# Patient Record
Sex: MALE | Race: BLACK OR AFRICAN AMERICAN | Employment: PART TIME | URBAN - METROPOLITAN AREA
[De-identification: names, ages, dates, MRNs, and addresses within clinical notes are randomized per-mention and may not be internally consistent; named-entity substitution may affect disease eponyms.]

---

## 2019-02-04 ENCOUNTER — HOSPITAL ENCOUNTER (INPATIENT)
Age: 39
LOS: 7 days | Discharge: HOME HEALTH | DRG: 464 | End: 2019-02-11
Attending: EMERGENCY MEDICINE | Admitting: INTERNAL MEDICINE
Payer: COMMERCIAL

## 2019-02-04 ENCOUNTER — APPOINTMENT (OUTPATIENT)
Dept: GENERAL RADIOLOGY | Age: 39
DRG: 464 | End: 2019-02-04
Payer: COMMERCIAL

## 2019-02-04 DIAGNOSIS — E11.621 DIABETIC ULCER OF LEFT FOOT ASSOCIATED WITH TYPE 2 DIABETES MELLITUS, WITH BONE INVOLVEMENT WITHOUT EVIDENCE OF NECROSIS, UNSPECIFIED PART OF FOOT (HCC): Primary | ICD-10-CM

## 2019-02-04 DIAGNOSIS — E11.628 DIABETIC FOOT INFECTION (HCC): ICD-10-CM

## 2019-02-04 DIAGNOSIS — L08.9 DIABETIC FOOT INFECTION (HCC): ICD-10-CM

## 2019-02-04 DIAGNOSIS — E11.65 TYPE 2 DIABETES MELLITUS WITH HYPERGLYCEMIA, WITHOUT LONG-TERM CURRENT USE OF INSULIN (HCC): ICD-10-CM

## 2019-02-04 DIAGNOSIS — L97.526 DIABETIC ULCER OF LEFT FOOT ASSOCIATED WITH TYPE 2 DIABETES MELLITUS, WITH BONE INVOLVEMENT WITHOUT EVIDENCE OF NECROSIS, UNSPECIFIED PART OF FOOT (HCC): Primary | ICD-10-CM

## 2019-02-04 LAB
ANION GAP SERPL CALCULATED.3IONS-SCNC: 10 MMOL/L (ref 3–16)
BASOPHILS ABSOLUTE: 0 K/UL (ref 0–0.2)
BASOPHILS RELATIVE PERCENT: 0.4 %
BUN BLDV-MCNC: 17 MG/DL (ref 7–20)
C-REACTIVE PROTEIN: 30.5 MG/L (ref 0–5.1)
CALCIUM SERPL-MCNC: 9.6 MG/DL (ref 8.3–10.6)
CHLORIDE BLD-SCNC: 102 MMOL/L (ref 99–110)
CO2: 26 MMOL/L (ref 21–32)
CREAT SERPL-MCNC: 1.1 MG/DL (ref 0.9–1.3)
EOSINOPHILS ABSOLUTE: 0.2 K/UL (ref 0–0.6)
EOSINOPHILS RELATIVE PERCENT: 1.8 %
GFR AFRICAN AMERICAN: >60
GFR NON-AFRICAN AMERICAN: >60
GLUCOSE BLD-MCNC: 305 MG/DL (ref 70–99)
HCT VFR BLD CALC: 41.5 % (ref 40.5–52.5)
HEMOGLOBIN: 13.5 G/DL (ref 13.5–17.5)
LYMPHOCYTES ABSOLUTE: 2.3 K/UL (ref 1–5.1)
LYMPHOCYTES RELATIVE PERCENT: 23.4 %
MCH RBC QN AUTO: 29.1 PG (ref 26–34)
MCHC RBC AUTO-ENTMCNC: 32.5 G/DL (ref 31–36)
MCV RBC AUTO: 89.6 FL (ref 80–100)
MONOCYTES ABSOLUTE: 0.4 K/UL (ref 0–1.3)
MONOCYTES RELATIVE PERCENT: 4.3 %
NEUTROPHILS ABSOLUTE: 6.9 K/UL (ref 1.7–7.7)
NEUTROPHILS RELATIVE PERCENT: 70.1 %
PDW BLD-RTO: 14.1 % (ref 12.4–15.4)
PLATELET # BLD: 206 K/UL (ref 135–450)
PMV BLD AUTO: 8.2 FL (ref 5–10.5)
POTASSIUM REFLEX MAGNESIUM: 4.8 MMOL/L (ref 3.5–5.1)
RBC # BLD: 4.63 M/UL (ref 4.2–5.9)
SEDIMENTATION RATE, ERYTHROCYTE: 61 MM/HR (ref 0–15)
SODIUM BLD-SCNC: 138 MMOL/L (ref 136–145)
WBC # BLD: 9.8 K/UL (ref 4–11)

## 2019-02-04 PROCEDURE — 86140 C-REACTIVE PROTEIN: CPT

## 2019-02-04 PROCEDURE — 86403 PARTICLE AGGLUT ANTBDY SCRN: CPT

## 2019-02-04 PROCEDURE — 99285 EMERGENCY DEPT VISIT HI MDM: CPT

## 2019-02-04 PROCEDURE — 80048 BASIC METABOLIC PNL TOTAL CA: CPT

## 2019-02-04 PROCEDURE — 85652 RBC SED RATE AUTOMATED: CPT

## 2019-02-04 PROCEDURE — 2580000003 HC RX 258: Performed by: STUDENT IN AN ORGANIZED HEALTH CARE EDUCATION/TRAINING PROGRAM

## 2019-02-04 PROCEDURE — 87070 CULTURE OTHR SPECIMN AEROBIC: CPT

## 2019-02-04 PROCEDURE — 2580000003 HC RX 258: Performed by: PHYSICIAN ASSISTANT

## 2019-02-04 PROCEDURE — 85025 COMPLETE CBC W/AUTO DIFF WBC: CPT

## 2019-02-04 PROCEDURE — 87186 SC STD MICRODIL/AGAR DIL: CPT

## 2019-02-04 PROCEDURE — 1200000000 HC SEMI PRIVATE

## 2019-02-04 PROCEDURE — 87205 SMEAR GRAM STAIN: CPT

## 2019-02-04 PROCEDURE — 87040 BLOOD CULTURE FOR BACTERIA: CPT

## 2019-02-04 PROCEDURE — 83036 HEMOGLOBIN GLYCOSYLATED A1C: CPT

## 2019-02-04 PROCEDURE — 73630 X-RAY EXAM OF FOOT: CPT

## 2019-02-04 PROCEDURE — 6360000002 HC RX W HCPCS: Performed by: STUDENT IN AN ORGANIZED HEALTH CARE EDUCATION/TRAINING PROGRAM

## 2019-02-04 PROCEDURE — 96365 THER/PROPH/DIAG IV INF INIT: CPT

## 2019-02-04 PROCEDURE — 36415 COLL VENOUS BLD VENIPUNCTURE: CPT

## 2019-02-04 PROCEDURE — 87077 CULTURE AEROBIC IDENTIFY: CPT

## 2019-02-04 RX ORDER — NICOTINE POLACRILEX 4 MG
15 LOZENGE BUCCAL PRN
Status: DISCONTINUED | OUTPATIENT
Start: 2019-02-04 | End: 2019-02-11 | Stop reason: HOSPADM

## 2019-02-04 RX ORDER — LISINOPRIL 10 MG/1
10 TABLET ORAL DAILY
Status: DISCONTINUED | OUTPATIENT
Start: 2019-02-05 | End: 2019-02-11 | Stop reason: HOSPADM

## 2019-02-04 RX ORDER — ONDANSETRON 2 MG/ML
4 INJECTION INTRAMUSCULAR; INTRAVENOUS EVERY 6 HOURS PRN
Status: DISCONTINUED | OUTPATIENT
Start: 2019-02-04 | End: 2019-02-11 | Stop reason: HOSPADM

## 2019-02-04 RX ORDER — SODIUM CHLORIDE 0.9 % (FLUSH) 0.9 %
10 SYRINGE (ML) INJECTION EVERY 12 HOURS SCHEDULED
Status: DISCONTINUED | OUTPATIENT
Start: 2019-02-04 | End: 2019-02-11 | Stop reason: HOSPADM

## 2019-02-04 RX ORDER — 0.9 % SODIUM CHLORIDE 0.9 %
1000 INTRAVENOUS SOLUTION INTRAVENOUS ONCE
Status: COMPLETED | OUTPATIENT
Start: 2019-02-04 | End: 2019-02-04

## 2019-02-04 RX ORDER — DEXTROSE MONOHYDRATE 50 MG/ML
100 INJECTION, SOLUTION INTRAVENOUS PRN
Status: DISCONTINUED | OUTPATIENT
Start: 2019-02-04 | End: 2019-02-11 | Stop reason: HOSPADM

## 2019-02-04 RX ORDER — DEXTROSE MONOHYDRATE 25 G/50ML
12.5 INJECTION, SOLUTION INTRAVENOUS PRN
Status: DISCONTINUED | OUTPATIENT
Start: 2019-02-04 | End: 2019-02-11 | Stop reason: HOSPADM

## 2019-02-04 RX ORDER — SODIUM CHLORIDE 0.9 % (FLUSH) 0.9 %
10 SYRINGE (ML) INJECTION PRN
Status: DISCONTINUED | OUTPATIENT
Start: 2019-02-04 | End: 2019-02-11 | Stop reason: HOSPADM

## 2019-02-04 RX ADMIN — DEXTROSE MONOHYDRATE 3.38 G: 5 INJECTION INTRAVENOUS at 21:01

## 2019-02-04 RX ADMIN — SODIUM CHLORIDE 1000 ML: 0.9 INJECTION, SOLUTION INTRAVENOUS at 20:36

## 2019-02-04 RX ADMIN — VANCOMYCIN HYDROCHLORIDE 2000 MG: 10 INJECTION, POWDER, LYOPHILIZED, FOR SOLUTION INTRAVENOUS at 21:58

## 2019-02-04 ASSESSMENT — ENCOUNTER SYMPTOMS
NAUSEA: 0
VOMITING: 0
SINUS PRESSURE: 0
COLOR CHANGE: 1
CONSTIPATION: 0
SHORTNESS OF BREATH: 0
COUGH: 0
CHEST TIGHTNESS: 0
ABDOMINAL PAIN: 0
SORE THROAT: 0
DIARRHEA: 0

## 2019-02-05 ENCOUNTER — APPOINTMENT (OUTPATIENT)
Dept: MRI IMAGING | Age: 39
DRG: 464 | End: 2019-02-05
Payer: COMMERCIAL

## 2019-02-05 LAB
ANION GAP SERPL CALCULATED.3IONS-SCNC: 12 MMOL/L (ref 3–16)
BASOPHILS ABSOLUTE: 0 K/UL (ref 0–0.2)
BASOPHILS RELATIVE PERCENT: 0.5 %
BUN BLDV-MCNC: 15 MG/DL (ref 7–20)
CALCIUM SERPL-MCNC: 9.1 MG/DL (ref 8.3–10.6)
CHLORIDE BLD-SCNC: 105 MMOL/L (ref 99–110)
CHOLESTEROL, TOTAL: 105 MG/DL (ref 0–199)
CO2: 21 MMOL/L (ref 21–32)
CREAT SERPL-MCNC: 1 MG/DL (ref 0.9–1.3)
EKG ATRIAL RATE: 78 BPM
EKG DIAGNOSIS: NORMAL
EKG P AXIS: 39 DEGREES
EKG P-R INTERVAL: 216 MS
EKG Q-T INTERVAL: 364 MS
EKG QRS DURATION: 90 MS
EKG QTC CALCULATION (BAZETT): 414 MS
EKG R AXIS: 6 DEGREES
EKG T AXIS: 30 DEGREES
EKG VENTRICULAR RATE: 78 BPM
EOSINOPHILS ABSOLUTE: 0.2 K/UL (ref 0–0.6)
EOSINOPHILS RELATIVE PERCENT: 1.8 %
ESTIMATED AVERAGE GLUCOSE: 274.7 MG/DL
GFR AFRICAN AMERICAN: >60
GFR NON-AFRICAN AMERICAN: >60
GLUCOSE BLD-MCNC: 144 MG/DL (ref 70–99)
GLUCOSE BLD-MCNC: 160 MG/DL (ref 70–99)
GLUCOSE BLD-MCNC: 279 MG/DL (ref 70–99)
GLUCOSE BLD-MCNC: 282 MG/DL (ref 70–99)
GLUCOSE BLD-MCNC: 418 MG/DL (ref 70–99)
HBA1C MFR BLD: 11.2 %
HCT VFR BLD CALC: 38.3 % (ref 40.5–52.5)
HDLC SERPL-MCNC: 27 MG/DL (ref 40–60)
HEMOGLOBIN: 12.5 G/DL (ref 13.5–17.5)
LACTIC ACID: 1 MMOL/L (ref 0.4–2)
LDL CHOLESTEROL CALCULATED: 27 MG/DL
LYMPHOCYTES ABSOLUTE: 2 K/UL (ref 1–5.1)
LYMPHOCYTES RELATIVE PERCENT: 24.1 %
MCH RBC QN AUTO: 29.1 PG (ref 26–34)
MCHC RBC AUTO-ENTMCNC: 32.6 G/DL (ref 31–36)
MCV RBC AUTO: 89.3 FL (ref 80–100)
MONOCYTES ABSOLUTE: 0.4 K/UL (ref 0–1.3)
MONOCYTES RELATIVE PERCENT: 5 %
NEUTROPHILS ABSOLUTE: 5.8 K/UL (ref 1.7–7.7)
NEUTROPHILS RELATIVE PERCENT: 68.6 %
PDW BLD-RTO: 14.3 % (ref 12.4–15.4)
PERFORMED ON: ABNORMAL
PLATELET # BLD: 181 K/UL (ref 135–450)
PMV BLD AUTO: 8.5 FL (ref 5–10.5)
POTASSIUM REFLEX MAGNESIUM: 4 MMOL/L (ref 3.5–5.1)
RBC # BLD: 4.28 M/UL (ref 4.2–5.9)
SODIUM BLD-SCNC: 138 MMOL/L (ref 136–145)
TRIGL SERPL-MCNC: 253 MG/DL (ref 0–150)
TSH REFLEX: 2.9 UIU/ML (ref 0.27–4.2)
VLDLC SERPL CALC-MCNC: 51 MG/DL
WBC # BLD: 8.5 K/UL (ref 4–11)

## 2019-02-05 PROCEDURE — 85025 COMPLETE CBC W/AUTO DIFF WBC: CPT

## 2019-02-05 PROCEDURE — 6360000002 HC RX W HCPCS: Performed by: STUDENT IN AN ORGANIZED HEALTH CARE EDUCATION/TRAINING PROGRAM

## 2019-02-05 PROCEDURE — 6370000000 HC RX 637 (ALT 250 FOR IP): Performed by: INTERNAL MEDICINE

## 2019-02-05 PROCEDURE — 73718 MRI LOWER EXTREMITY W/O DYE: CPT

## 2019-02-05 PROCEDURE — 84443 ASSAY THYROID STIM HORMONE: CPT

## 2019-02-05 PROCEDURE — 6370000000 HC RX 637 (ALT 250 FOR IP): Performed by: STUDENT IN AN ORGANIZED HEALTH CARE EDUCATION/TRAINING PROGRAM

## 2019-02-05 PROCEDURE — 6360000002 HC RX W HCPCS: Performed by: INTERNAL MEDICINE

## 2019-02-05 PROCEDURE — 80061 LIPID PANEL: CPT

## 2019-02-05 PROCEDURE — 93005 ELECTROCARDIOGRAM TRACING: CPT | Performed by: INTERNAL MEDICINE

## 2019-02-05 PROCEDURE — 2580000003 HC RX 258: Performed by: STUDENT IN AN ORGANIZED HEALTH CARE EDUCATION/TRAINING PROGRAM

## 2019-02-05 PROCEDURE — 36415 COLL VENOUS BLD VENIPUNCTURE: CPT

## 2019-02-05 PROCEDURE — 2580000003 HC RX 258: Performed by: INTERNAL MEDICINE

## 2019-02-05 PROCEDURE — 80048 BASIC METABOLIC PNL TOTAL CA: CPT

## 2019-02-05 PROCEDURE — 83605 ASSAY OF LACTIC ACID: CPT

## 2019-02-05 PROCEDURE — 1200000000 HC SEMI PRIVATE

## 2019-02-05 RX ORDER — 0.9 % SODIUM CHLORIDE 0.9 %
500 INTRAVENOUS SOLUTION INTRAVENOUS ONCE
Status: DISCONTINUED | OUTPATIENT
Start: 2019-02-05 | End: 2019-02-05

## 2019-02-05 RX ORDER — LIDOCAINE 4 G/G
1 PATCH TOPICAL DAILY
Status: DISCONTINUED | OUTPATIENT
Start: 2019-02-05 | End: 2019-02-11 | Stop reason: HOSPADM

## 2019-02-05 RX ORDER — ACETAMINOPHEN 500 MG
1000 TABLET ORAL EVERY 6 HOURS PRN
Status: DISCONTINUED | OUTPATIENT
Start: 2019-02-05 | End: 2019-02-11 | Stop reason: HOSPADM

## 2019-02-05 RX ADMIN — LISINOPRIL 10 MG: 10 TABLET ORAL at 09:34

## 2019-02-05 RX ADMIN — INSULIN LISPRO 10 UNITS: 100 INJECTION, SOLUTION INTRAVENOUS; SUBCUTANEOUS at 12:39

## 2019-02-05 RX ADMIN — ENOXAPARIN SODIUM 40 MG: 40 INJECTION SUBCUTANEOUS at 09:34

## 2019-02-05 RX ADMIN — INSULIN LISPRO 10 UNITS: 100 INJECTION, SOLUTION INTRAVENOUS; SUBCUTANEOUS at 18:58

## 2019-02-05 RX ADMIN — Medication 10 ML: at 23:06

## 2019-02-05 RX ADMIN — INSULIN GLARGINE 30 UNITS: 100 INJECTION, SOLUTION SUBCUTANEOUS at 01:01

## 2019-02-05 RX ADMIN — INSULIN LISPRO 1 UNITS: 100 INJECTION, SOLUTION INTRAVENOUS; SUBCUTANEOUS at 23:37

## 2019-02-05 RX ADMIN — PIPERACILLIN SODIUM,TAZOBACTAM SODIUM 3.38 G: 3; .375 INJECTION, POWDER, FOR SOLUTION INTRAVENOUS at 19:04

## 2019-02-05 RX ADMIN — ACETAMINOPHEN 1000 MG: 500 TABLET, FILM COATED ORAL at 23:03

## 2019-02-05 RX ADMIN — PIPERACILLIN SODIUM,TAZOBACTAM SODIUM 3.38 G: 3; .375 INJECTION, POWDER, FOR SOLUTION INTRAVENOUS at 11:53

## 2019-02-05 RX ADMIN — VANCOMYCIN HYDROCHLORIDE 1500 MG: 10 INJECTION, POWDER, LYOPHILIZED, FOR SOLUTION INTRAVENOUS at 23:05

## 2019-02-05 RX ADMIN — VANCOMYCIN HYDROCHLORIDE 1500 MG: 10 INJECTION, POWDER, LYOPHILIZED, FOR SOLUTION INTRAVENOUS at 09:21

## 2019-02-05 RX ADMIN — Medication 10 ML: at 09:22

## 2019-02-05 RX ADMIN — INSULIN LISPRO 2 UNITS: 100 INJECTION, SOLUTION INTRAVENOUS; SUBCUTANEOUS at 18:58

## 2019-02-05 RX ADMIN — INSULIN LISPRO 3 UNITS: 100 INJECTION, SOLUTION INTRAVENOUS; SUBCUTANEOUS at 01:01

## 2019-02-05 RX ADMIN — INSULIN LISPRO 6 UNITS: 100 INJECTION, SOLUTION INTRAVENOUS; SUBCUTANEOUS at 12:38

## 2019-02-05 RX ADMIN — PIPERACILLIN SODIUM,TAZOBACTAM SODIUM 3.38 G: 3; .375 INJECTION, POWDER, FOR SOLUTION INTRAVENOUS at 05:00

## 2019-02-05 RX ADMIN — INSULIN GLARGINE 30 UNITS: 100 INJECTION, SOLUTION SUBCUTANEOUS at 23:38

## 2019-02-05 ASSESSMENT — PAIN DESCRIPTION - DIRECTION: RADIATING_TOWARDS: LEFT NECK

## 2019-02-05 ASSESSMENT — PAIN DESCRIPTION - PAIN TYPE
TYPE: CHRONIC PAIN
TYPE: CHRONIC PAIN

## 2019-02-05 ASSESSMENT — PAIN DESCRIPTION - PROGRESSION
CLINICAL_PROGRESSION: NOT CHANGED
CLINICAL_PROGRESSION: NOT CHANGED

## 2019-02-05 ASSESSMENT — PAIN DESCRIPTION - ORIENTATION
ORIENTATION: LEFT
ORIENTATION: LEFT

## 2019-02-05 ASSESSMENT — PAIN DESCRIPTION - DESCRIPTORS
DESCRIPTORS: BURNING
DESCRIPTORS: ACHING;DISCOMFORT

## 2019-02-05 ASSESSMENT — PAIN SCALES - GENERAL
PAINLEVEL_OUTOF10: 7
PAINLEVEL_OUTOF10: 5
PAINLEVEL_OUTOF10: 8

## 2019-02-05 ASSESSMENT — PAIN DESCRIPTION - ONSET
ONSET: ON-GOING
ONSET: ON-GOING

## 2019-02-05 ASSESSMENT — PAIN DESCRIPTION - LOCATION
LOCATION: SHOULDER
LOCATION: SHOULDER

## 2019-02-05 ASSESSMENT — PAIN DESCRIPTION - FREQUENCY
FREQUENCY: CONTINUOUS
FREQUENCY: CONTINUOUS

## 2019-02-06 ENCOUNTER — ANESTHESIA EVENT (OUTPATIENT)
Dept: OPERATING ROOM | Age: 39
DRG: 464 | End: 2019-02-06
Payer: COMMERCIAL

## 2019-02-06 PROBLEM — E11.42 DIABETIC POLYNEUROPATHY ASSOCIATED WITH TYPE 2 DIABETES MELLITUS (HCC): Status: ACTIVE | Noted: 2019-02-06

## 2019-02-06 PROBLEM — L08.9 DIABETIC FOOT INFECTION (HCC): Status: ACTIVE | Noted: 2019-02-06

## 2019-02-06 PROBLEM — L97.526 DIABETIC ULCER OF LEFT FOOT ASSOCIATED WITH TYPE 2 DIABETES MELLITUS, WITH BONE INVOLVEMENT WITHOUT EVIDENCE OF NECROSIS (HCC): Status: ACTIVE | Noted: 2019-02-06

## 2019-02-06 PROBLEM — E11.628 DIABETIC FOOT INFECTION (HCC): Status: ACTIVE | Noted: 2019-02-06

## 2019-02-06 PROBLEM — E11.621 DIABETIC ULCER OF LEFT FOOT ASSOCIATED WITH TYPE 2 DIABETES MELLITUS, WITH BONE INVOLVEMENT WITHOUT EVIDENCE OF NECROSIS (HCC): Status: ACTIVE | Noted: 2019-02-06

## 2019-02-06 LAB
ANION GAP SERPL CALCULATED.3IONS-SCNC: 11 MMOL/L (ref 3–16)
BASOPHILS ABSOLUTE: 0 K/UL (ref 0–0.2)
BASOPHILS RELATIVE PERCENT: 0.2 %
BUN BLDV-MCNC: 15 MG/DL (ref 7–20)
CALCIUM SERPL-MCNC: 9.2 MG/DL (ref 8.3–10.6)
CHLORIDE BLD-SCNC: 104 MMOL/L (ref 99–110)
CO2: 24 MMOL/L (ref 21–32)
CREAT SERPL-MCNC: 1.1 MG/DL (ref 0.9–1.3)
EOSINOPHILS ABSOLUTE: 0.1 K/UL (ref 0–0.6)
EOSINOPHILS RELATIVE PERCENT: 2 %
GFR AFRICAN AMERICAN: >60
GFR NON-AFRICAN AMERICAN: >60
GLUCOSE BLD-MCNC: 174 MG/DL (ref 70–99)
GLUCOSE BLD-MCNC: 230 MG/DL (ref 70–99)
GLUCOSE BLD-MCNC: 290 MG/DL (ref 70–99)
GLUCOSE BLD-MCNC: 322 MG/DL (ref 70–99)
GLUCOSE BLD-MCNC: 322 MG/DL (ref 70–99)
HCT VFR BLD CALC: 38.4 % (ref 40.5–52.5)
HEMOGLOBIN: 12.4 G/DL (ref 13.5–17.5)
INR BLD: 0.95 (ref 0.86–1.14)
LYMPHOCYTES ABSOLUTE: 1.9 K/UL (ref 1–5.1)
LYMPHOCYTES RELATIVE PERCENT: 28 %
MCH RBC QN AUTO: 29 PG (ref 26–34)
MCHC RBC AUTO-ENTMCNC: 32.2 G/DL (ref 31–36)
MCV RBC AUTO: 90 FL (ref 80–100)
MONOCYTES ABSOLUTE: 0.4 K/UL (ref 0–1.3)
MONOCYTES RELATIVE PERCENT: 5.4 %
NEUTROPHILS ABSOLUTE: 4.5 K/UL (ref 1.7–7.7)
NEUTROPHILS RELATIVE PERCENT: 64.4 %
PDW BLD-RTO: 14.3 % (ref 12.4–15.4)
PERFORMED ON: ABNORMAL
PLATELET # BLD: 179 K/UL (ref 135–450)
PMV BLD AUTO: 8.5 FL (ref 5–10.5)
POTASSIUM REFLEX MAGNESIUM: 4.2 MMOL/L (ref 3.5–5.1)
PROTHROMBIN TIME: 10.8 SEC (ref 9.8–13)
RBC # BLD: 4.26 M/UL (ref 4.2–5.9)
SODIUM BLD-SCNC: 139 MMOL/L (ref 136–145)
VANCOMYCIN TROUGH: 17.1 UG/ML (ref 10–20)
WBC # BLD: 6.9 K/UL (ref 4–11)

## 2019-02-06 PROCEDURE — 36415 COLL VENOUS BLD VENIPUNCTURE: CPT

## 2019-02-06 PROCEDURE — 80202 ASSAY OF VANCOMYCIN: CPT

## 2019-02-06 PROCEDURE — 85610 PROTHROMBIN TIME: CPT

## 2019-02-06 PROCEDURE — 80048 BASIC METABOLIC PNL TOTAL CA: CPT

## 2019-02-06 PROCEDURE — 1200000000 HC SEMI PRIVATE

## 2019-02-06 PROCEDURE — 99255 IP/OBS CONSLTJ NEW/EST HI 80: CPT | Performed by: INTERNAL MEDICINE

## 2019-02-06 PROCEDURE — 2580000003 HC RX 258: Performed by: INTERNAL MEDICINE

## 2019-02-06 PROCEDURE — 6360000002 HC RX W HCPCS: Performed by: STUDENT IN AN ORGANIZED HEALTH CARE EDUCATION/TRAINING PROGRAM

## 2019-02-06 PROCEDURE — 6370000000 HC RX 637 (ALT 250 FOR IP): Performed by: STUDENT IN AN ORGANIZED HEALTH CARE EDUCATION/TRAINING PROGRAM

## 2019-02-06 PROCEDURE — 6360000002 HC RX W HCPCS: Performed by: INTERNAL MEDICINE

## 2019-02-06 PROCEDURE — 2580000003 HC RX 258: Performed by: STUDENT IN AN ORGANIZED HEALTH CARE EDUCATION/TRAINING PROGRAM

## 2019-02-06 PROCEDURE — 85025 COMPLETE CBC W/AUTO DIFF WBC: CPT

## 2019-02-06 PROCEDURE — 6370000000 HC RX 637 (ALT 250 FOR IP): Performed by: INTERNAL MEDICINE

## 2019-02-06 RX ADMIN — ACETAMINOPHEN 1000 MG: 500 TABLET, FILM COATED ORAL at 10:00

## 2019-02-06 RX ADMIN — INSULIN LISPRO 8 UNITS: 100 INJECTION, SOLUTION INTRAVENOUS; SUBCUTANEOUS at 18:09

## 2019-02-06 RX ADMIN — PIPERACILLIN SODIUM,TAZOBACTAM SODIUM 3.38 G: 3; .375 INJECTION, POWDER, FOR SOLUTION INTRAVENOUS at 12:58

## 2019-02-06 RX ADMIN — INSULIN LISPRO 2 UNITS: 100 INJECTION, SOLUTION INTRAVENOUS; SUBCUTANEOUS at 20:53

## 2019-02-06 RX ADMIN — ENOXAPARIN SODIUM 40 MG: 40 INJECTION SUBCUTANEOUS at 09:10

## 2019-02-06 RX ADMIN — INSULIN LISPRO 2 UNITS: 100 INJECTION, SOLUTION INTRAVENOUS; SUBCUTANEOUS at 13:33

## 2019-02-06 RX ADMIN — VANCOMYCIN HYDROCHLORIDE 1500 MG: 10 INJECTION, POWDER, LYOPHILIZED, FOR SOLUTION INTRAVENOUS at 10:56

## 2019-02-06 RX ADMIN — INSULIN LISPRO 6 UNITS: 100 INJECTION, SOLUTION INTRAVENOUS; SUBCUTANEOUS at 10:03

## 2019-02-06 RX ADMIN — PIPERACILLIN SODIUM,TAZOBACTAM SODIUM 3.38 G: 3; .375 INJECTION, POWDER, FOR SOLUTION INTRAVENOUS at 20:26

## 2019-02-06 RX ADMIN — INSULIN LISPRO 10 UNITS: 100 INJECTION, SOLUTION INTRAVENOUS; SUBCUTANEOUS at 13:33

## 2019-02-06 RX ADMIN — Medication 10 ML: at 20:27

## 2019-02-06 RX ADMIN — PIPERACILLIN SODIUM,TAZOBACTAM SODIUM 3.38 G: 3; .375 INJECTION, POWDER, FOR SOLUTION INTRAVENOUS at 03:02

## 2019-02-06 RX ADMIN — INSULIN GLARGINE 20 UNITS: 100 INJECTION, SOLUTION SUBCUTANEOUS at 20:52

## 2019-02-06 RX ADMIN — INSULIN LISPRO 10 UNITS: 100 INJECTION, SOLUTION INTRAVENOUS; SUBCUTANEOUS at 10:03

## 2019-02-06 RX ADMIN — INSULIN LISPRO 10 UNITS: 100 INJECTION, SOLUTION INTRAVENOUS; SUBCUTANEOUS at 18:09

## 2019-02-06 RX ADMIN — LISINOPRIL 10 MG: 10 TABLET ORAL at 09:10

## 2019-02-06 ASSESSMENT — PAIN SCALES - GENERAL
PAINLEVEL_OUTOF10: 2
PAINLEVEL_OUTOF10: 0
PAINLEVEL_OUTOF10: 3

## 2019-02-06 ASSESSMENT — PAIN - FUNCTIONAL ASSESSMENT: PAIN_FUNCTIONAL_ASSESSMENT: ACTIVITIES ARE NOT PREVENTED

## 2019-02-06 ASSESSMENT — PAIN DESCRIPTION - LOCATION: LOCATION: SHOULDER

## 2019-02-06 ASSESSMENT — PAIN DESCRIPTION - ORIENTATION: ORIENTATION: LEFT

## 2019-02-06 ASSESSMENT — PAIN DESCRIPTION - PAIN TYPE: TYPE: CHRONIC PAIN

## 2019-02-06 ASSESSMENT — PAIN DESCRIPTION - PROGRESSION
CLINICAL_PROGRESSION: NOT CHANGED
CLINICAL_PROGRESSION: GRADUALLY IMPROVING
CLINICAL_PROGRESSION: NOT CHANGED

## 2019-02-06 ASSESSMENT — PAIN DESCRIPTION - ONSET: ONSET: ON-GOING

## 2019-02-06 ASSESSMENT — PAIN DESCRIPTION - DESCRIPTORS: DESCRIPTORS: ACHING

## 2019-02-06 ASSESSMENT — PAIN DESCRIPTION - FREQUENCY: FREQUENCY: CONTINUOUS

## 2019-02-07 ENCOUNTER — ANESTHESIA (OUTPATIENT)
Dept: OPERATING ROOM | Age: 39
DRG: 464 | End: 2019-02-07
Payer: COMMERCIAL

## 2019-02-07 ENCOUNTER — APPOINTMENT (OUTPATIENT)
Dept: GENERAL RADIOLOGY | Age: 39
DRG: 464 | End: 2019-02-07
Payer: COMMERCIAL

## 2019-02-07 VITALS — DIASTOLIC BLOOD PRESSURE: 73 MMHG | SYSTOLIC BLOOD PRESSURE: 119 MMHG | OXYGEN SATURATION: 97 % | TEMPERATURE: 96.6 F

## 2019-02-07 LAB
ANION GAP SERPL CALCULATED.3IONS-SCNC: 10 MMOL/L (ref 3–16)
BASOPHILS ABSOLUTE: 0 K/UL (ref 0–0.2)
BASOPHILS RELATIVE PERCENT: 0.2 %
BUN BLDV-MCNC: 20 MG/DL (ref 7–20)
CALCIUM SERPL-MCNC: 9.4 MG/DL (ref 8.3–10.6)
CHLORIDE BLD-SCNC: 101 MMOL/L (ref 99–110)
CO2: 27 MMOL/L (ref 21–32)
CREAT SERPL-MCNC: 1.2 MG/DL (ref 0.9–1.3)
EOSINOPHILS ABSOLUTE: 0.1 K/UL (ref 0–0.6)
EOSINOPHILS RELATIVE PERCENT: 2 %
GFR AFRICAN AMERICAN: >60
GFR NON-AFRICAN AMERICAN: >60
GLUCOSE BLD-MCNC: 155 MG/DL (ref 70–99)
GLUCOSE BLD-MCNC: 159 MG/DL (ref 70–99)
GLUCOSE BLD-MCNC: 213 MG/DL (ref 70–99)
GLUCOSE BLD-MCNC: 279 MG/DL (ref 70–99)
GLUCOSE BLD-MCNC: 336 MG/DL (ref 70–99)
GLUCOSE BLD-MCNC: 416 MG/DL (ref 70–99)
HCT VFR BLD CALC: 38.7 % (ref 40.5–52.5)
HEMOGLOBIN: 12.3 G/DL (ref 13.5–17.5)
LYMPHOCYTES ABSOLUTE: 1.5 K/UL (ref 1–5.1)
LYMPHOCYTES RELATIVE PERCENT: 24.3 %
MCH RBC QN AUTO: 28.7 PG (ref 26–34)
MCHC RBC AUTO-ENTMCNC: 31.8 G/DL (ref 31–36)
MCV RBC AUTO: 90.3 FL (ref 80–100)
MONOCYTES ABSOLUTE: 0.4 K/UL (ref 0–1.3)
MONOCYTES RELATIVE PERCENT: 6.8 %
NEUTROPHILS ABSOLUTE: 4.2 K/UL (ref 1.7–7.7)
NEUTROPHILS RELATIVE PERCENT: 66.7 %
PDW BLD-RTO: 14.4 % (ref 12.4–15.4)
PERFORMED ON: ABNORMAL
PLATELET # BLD: 177 K/UL (ref 135–450)
PMV BLD AUTO: 8.4 FL (ref 5–10.5)
POTASSIUM REFLEX MAGNESIUM: 4.4 MMOL/L (ref 3.5–5.1)
RBC # BLD: 4.28 M/UL (ref 4.2–5.9)
SODIUM BLD-SCNC: 138 MMOL/L (ref 136–145)
WBC # BLD: 6.4 K/UL (ref 4–11)

## 2019-02-07 PROCEDURE — 0HXNXZZ TRANSFER LEFT FOOT SKIN, EXTERNAL APPROACH: ICD-10-PCS | Performed by: PODIATRIST

## 2019-02-07 PROCEDURE — 87205 SMEAR GRAM STAIN: CPT

## 2019-02-07 PROCEDURE — 6360000002 HC RX W HCPCS

## 2019-02-07 PROCEDURE — 6370000000 HC RX 637 (ALT 250 FOR IP): Performed by: STUDENT IN AN ORGANIZED HEALTH CARE EDUCATION/TRAINING PROGRAM

## 2019-02-07 PROCEDURE — 3700000001 HC ADD 15 MINUTES (ANESTHESIA): Performed by: PODIATRIST

## 2019-02-07 PROCEDURE — 3600000004 HC SURGERY LEVEL 4 BASE: Performed by: PODIATRIST

## 2019-02-07 PROCEDURE — 6360000002 HC RX W HCPCS: Performed by: STUDENT IN AN ORGANIZED HEALTH CARE EDUCATION/TRAINING PROGRAM

## 2019-02-07 PROCEDURE — 73630 X-RAY EXAM OF FOOT: CPT

## 2019-02-07 PROCEDURE — 6360000002 HC RX W HCPCS: Performed by: INTERNAL MEDICINE

## 2019-02-07 PROCEDURE — 1200000000 HC SEMI PRIVATE

## 2019-02-07 PROCEDURE — 2709999900 HC NON-CHARGEABLE SUPPLY: Performed by: PODIATRIST

## 2019-02-07 PROCEDURE — 2580000003 HC RX 258: Performed by: STUDENT IN AN ORGANIZED HEALTH CARE EDUCATION/TRAINING PROGRAM

## 2019-02-07 PROCEDURE — 36415 COLL VENOUS BLD VENIPUNCTURE: CPT

## 2019-02-07 PROCEDURE — 2580000003 HC RX 258: Performed by: INTERNAL MEDICINE

## 2019-02-07 PROCEDURE — 88311 DECALCIFY TISSUE: CPT

## 2019-02-07 PROCEDURE — 87070 CULTURE OTHR SPECIMN AEROBIC: CPT

## 2019-02-07 PROCEDURE — 80048 BASIC METABOLIC PNL TOTAL CA: CPT

## 2019-02-07 PROCEDURE — 2580000003 HC RX 258: Performed by: PODIATRIST

## 2019-02-07 PROCEDURE — 0Q9P0ZX DRAINAGE OF LEFT METATARSAL, OPEN APPROACH, DIAGNOSTIC: ICD-10-PCS | Performed by: PODIATRIST

## 2019-02-07 PROCEDURE — 87102 FUNGUS ISOLATION CULTURE: CPT

## 2019-02-07 PROCEDURE — 87116 MYCOBACTERIA CULTURE: CPT

## 2019-02-07 PROCEDURE — 88304 TISSUE EXAM BY PATHOLOGIST: CPT

## 2019-02-07 PROCEDURE — 99232 SBSQ HOSP IP/OBS MODERATE 35: CPT | Performed by: INTERNAL MEDICINE

## 2019-02-07 PROCEDURE — 2500000003 HC RX 250 WO HCPCS: Performed by: PODIATRIST

## 2019-02-07 PROCEDURE — 3600000014 HC SURGERY LEVEL 4 ADDTL 15MIN: Performed by: PODIATRIST

## 2019-02-07 PROCEDURE — 0Y6N0Z9 DETACHMENT AT LEFT FOOT, PARTIAL 1ST RAY, OPEN APPROACH: ICD-10-PCS | Performed by: PODIATRIST

## 2019-02-07 PROCEDURE — 87185 SC STD ENZYME DETCJ PER NZM: CPT

## 2019-02-07 PROCEDURE — 6360000002 HC RX W HCPCS: Performed by: NURSE ANESTHETIST, CERTIFIED REGISTERED

## 2019-02-07 PROCEDURE — 85025 COMPLETE CBC W/AUTO DIFF WBC: CPT

## 2019-02-07 PROCEDURE — 87075 CULTR BACTERIA EXCEPT BLOOD: CPT

## 2019-02-07 PROCEDURE — 3700000000 HC ANESTHESIA ATTENDED CARE: Performed by: PODIATRIST

## 2019-02-07 PROCEDURE — 87206 SMEAR FLUORESCENT/ACID STAI: CPT

## 2019-02-07 PROCEDURE — 2580000003 HC RX 258: Performed by: NURSE ANESTHETIST, CERTIFIED REGISTERED

## 2019-02-07 PROCEDURE — 7100000001 HC PACU RECOVERY - ADDTL 15 MIN: Performed by: PODIATRIST

## 2019-02-07 PROCEDURE — 2500000003 HC RX 250 WO HCPCS: Performed by: NURSE ANESTHETIST, CERTIFIED REGISTERED

## 2019-02-07 PROCEDURE — 87076 CULTURE ANAEROBE IDENT EACH: CPT

## 2019-02-07 PROCEDURE — 6370000000 HC RX 637 (ALT 250 FOR IP): Performed by: INTERNAL MEDICINE

## 2019-02-07 PROCEDURE — 7100000000 HC PACU RECOVERY - FIRST 15 MIN: Performed by: PODIATRIST

## 2019-02-07 PROCEDURE — 6360000002 HC RX W HCPCS: Performed by: PODIATRIST

## 2019-02-07 PROCEDURE — 87015 SPECIMEN INFECT AGNT CONCNTJ: CPT

## 2019-02-07 RX ORDER — HYDRALAZINE HYDROCHLORIDE 20 MG/ML
5 INJECTION INTRAMUSCULAR; INTRAVENOUS EVERY 5 MIN PRN
Status: DISCONTINUED | OUTPATIENT
Start: 2019-02-07 | End: 2019-02-07 | Stop reason: HOSPADM

## 2019-02-07 RX ORDER — EPHEDRINE SULFATE 50 MG/ML
INJECTION INTRAVENOUS PRN
Status: DISCONTINUED | OUTPATIENT
Start: 2019-02-07 | End: 2019-02-07 | Stop reason: SDUPTHER

## 2019-02-07 RX ORDER — FENTANYL CITRATE 50 UG/ML
INJECTION, SOLUTION INTRAMUSCULAR; INTRAVENOUS PRN
Status: DISCONTINUED | OUTPATIENT
Start: 2019-02-07 | End: 2019-02-07 | Stop reason: SDUPTHER

## 2019-02-07 RX ORDER — OXYCODONE HYDROCHLORIDE AND ACETAMINOPHEN 5; 325 MG/1; MG/1
1 TABLET ORAL ONCE
Status: COMPLETED | OUTPATIENT
Start: 2019-02-07 | End: 2019-02-07

## 2019-02-07 RX ORDER — MAGNESIUM HYDROXIDE 1200 MG/15ML
LIQUID ORAL CONTINUOUS PRN
Status: COMPLETED | OUTPATIENT
Start: 2019-02-07 | End: 2019-02-07

## 2019-02-07 RX ORDER — FENTANYL CITRATE 50 UG/ML
25 INJECTION, SOLUTION INTRAMUSCULAR; INTRAVENOUS EVERY 5 MIN PRN
Status: DISCONTINUED | OUTPATIENT
Start: 2019-02-07 | End: 2019-02-07 | Stop reason: HOSPADM

## 2019-02-07 RX ORDER — SODIUM CHLORIDE 9 MG/ML
INJECTION, SOLUTION INTRAVENOUS CONTINUOUS PRN
Status: DISCONTINUED | OUTPATIENT
Start: 2019-02-07 | End: 2019-02-07 | Stop reason: SDUPTHER

## 2019-02-07 RX ORDER — OXYCODONE HYDROCHLORIDE 5 MG/1
10 TABLET ORAL EVERY 4 HOURS PRN
Status: DISCONTINUED | OUTPATIENT
Start: 2019-02-07 | End: 2019-02-07 | Stop reason: SDUPTHER

## 2019-02-07 RX ORDER — PROPOFOL 10 MG/ML
INJECTION, EMULSION INTRAVENOUS PRN
Status: DISCONTINUED | OUTPATIENT
Start: 2019-02-07 | End: 2019-02-07 | Stop reason: SDUPTHER

## 2019-02-07 RX ORDER — ROCURONIUM BROMIDE 10 MG/ML
INJECTION, SOLUTION INTRAVENOUS PRN
Status: DISCONTINUED | OUTPATIENT
Start: 2019-02-07 | End: 2019-02-07 | Stop reason: SDUPTHER

## 2019-02-07 RX ORDER — OXYCODONE HYDROCHLORIDE 5 MG/1
10 TABLET ORAL EVERY 4 HOURS PRN
Status: DISCONTINUED | OUTPATIENT
Start: 2019-02-07 | End: 2019-02-11 | Stop reason: HOSPADM

## 2019-02-07 RX ORDER — OXYCODONE HYDROCHLORIDE 5 MG/1
5 TABLET ORAL EVERY 4 HOURS PRN
Status: DISCONTINUED | OUTPATIENT
Start: 2019-02-07 | End: 2019-02-11 | Stop reason: HOSPADM

## 2019-02-07 RX ORDER — LIDOCAINE HYDROCHLORIDE 20 MG/ML
INJECTION, SOLUTION EPIDURAL; INFILTRATION; INTRACAUDAL; PERINEURAL PRN
Status: DISCONTINUED | OUTPATIENT
Start: 2019-02-07 | End: 2019-02-07 | Stop reason: SDUPTHER

## 2019-02-07 RX ORDER — LIDOCAINE HYDROCHLORIDE 20 MG/ML
INJECTION, SOLUTION EPIDURAL; INFILTRATION; INTRACAUDAL; PERINEURAL PRN
Status: DISCONTINUED | OUTPATIENT
Start: 2019-02-07 | End: 2019-02-07 | Stop reason: ALTCHOICE

## 2019-02-07 RX ORDER — MORPHINE SULFATE 4 MG/ML
2 INJECTION, SOLUTION INTRAMUSCULAR; INTRAVENOUS EVERY 5 MIN PRN
Status: DISCONTINUED | OUTPATIENT
Start: 2019-02-07 | End: 2019-02-07 | Stop reason: HOSPADM

## 2019-02-07 RX ORDER — PIPERACILLIN SODIUM, TAZOBACTAM SODIUM 3; .375 G/15ML; G/15ML
INJECTION, POWDER, LYOPHILIZED, FOR SOLUTION INTRAVENOUS PRN
Status: DISCONTINUED | OUTPATIENT
Start: 2019-02-07 | End: 2019-02-07 | Stop reason: SDUPTHER

## 2019-02-07 RX ORDER — GLYCOPYRROLATE 1 MG/5 ML
SYRINGE (ML) INTRAVENOUS PRN
Status: DISCONTINUED | OUTPATIENT
Start: 2019-02-07 | End: 2019-02-07 | Stop reason: SDUPTHER

## 2019-02-07 RX ORDER — LINEZOLID 600 MG/1
600 TABLET, FILM COATED ORAL EVERY 12 HOURS SCHEDULED
Status: DISCONTINUED | OUTPATIENT
Start: 2019-02-07 | End: 2019-02-11 | Stop reason: HOSPADM

## 2019-02-07 RX ORDER — SUCCINYLCHOLINE CHLORIDE 20 MG/ML
INJECTION INTRAMUSCULAR; INTRAVENOUS PRN
Status: DISCONTINUED | OUTPATIENT
Start: 2019-02-07 | End: 2019-02-07 | Stop reason: SDUPTHER

## 2019-02-07 RX ORDER — BUPIVACAINE HYDROCHLORIDE 5 MG/ML
INJECTION, SOLUTION EPIDURAL; INTRACAUDAL PRN
Status: DISCONTINUED | OUTPATIENT
Start: 2019-02-07 | End: 2019-02-07 | Stop reason: ALTCHOICE

## 2019-02-07 RX ORDER — OXYCODONE HYDROCHLORIDE 5 MG/1
5 TABLET ORAL EVERY 4 HOURS PRN
Status: DISCONTINUED | OUTPATIENT
Start: 2019-02-07 | End: 2019-02-07 | Stop reason: SDUPTHER

## 2019-02-07 RX ORDER — POVIDONE-IODINE 10 MG/G
OINTMENT TOPICAL PRN
Status: DISCONTINUED | OUTPATIENT
Start: 2019-02-07 | End: 2019-02-07 | Stop reason: ALTCHOICE

## 2019-02-07 RX ORDER — LABETALOL HYDROCHLORIDE 5 MG/ML
5 INJECTION, SOLUTION INTRAVENOUS EVERY 10 MIN PRN
Status: DISCONTINUED | OUTPATIENT
Start: 2019-02-07 | End: 2019-02-07 | Stop reason: HOSPADM

## 2019-02-07 RX ORDER — MIDAZOLAM HYDROCHLORIDE 1 MG/ML
INJECTION INTRAMUSCULAR; INTRAVENOUS PRN
Status: DISCONTINUED | OUTPATIENT
Start: 2019-02-07 | End: 2019-02-07 | Stop reason: SDUPTHER

## 2019-02-07 RX ORDER — ENALAPRILAT 2.5 MG/2ML
1.25 INJECTION INTRAVENOUS
Status: DISCONTINUED | OUTPATIENT
Start: 2019-02-07 | End: 2019-02-07 | Stop reason: HOSPADM

## 2019-02-07 RX ORDER — ONDANSETRON 2 MG/ML
INJECTION INTRAMUSCULAR; INTRAVENOUS PRN
Status: DISCONTINUED | OUTPATIENT
Start: 2019-02-07 | End: 2019-02-07 | Stop reason: SDUPTHER

## 2019-02-07 RX ORDER — ONDANSETRON 2 MG/ML
4 INJECTION INTRAMUSCULAR; INTRAVENOUS
Status: DISCONTINUED | OUTPATIENT
Start: 2019-02-07 | End: 2019-02-07 | Stop reason: HOSPADM

## 2019-02-07 RX ADMIN — FENTANYL CITRATE 100 MCG: 50 INJECTION INTRAMUSCULAR; INTRAVENOUS at 12:10

## 2019-02-07 RX ADMIN — SODIUM CHLORIDE: 900 INJECTION, SOLUTION INTRAVENOUS at 12:01

## 2019-02-07 RX ADMIN — PIPERACILLIN SODIUM,TAZOBACTAM SODIUM 3.38 G: 3; .375 INJECTION, POWDER, FOR SOLUTION INTRAVENOUS at 12:35

## 2019-02-07 RX ADMIN — HYDROMORPHONE HYDROCHLORIDE 0.25 MG: 1 INJECTION, SOLUTION INTRAMUSCULAR; INTRAVENOUS; SUBCUTANEOUS at 15:10

## 2019-02-07 RX ADMIN — PIPERACILLIN SODIUM AND TAZOBACTAM SODIUM 3.38 G: .375; 3 INJECTION, POWDER, LYOPHILIZED, FOR SOLUTION INTRAVENOUS at 12:01

## 2019-02-07 RX ADMIN — Medication 0.2 MG: at 12:01

## 2019-02-07 RX ADMIN — LIDOCAINE HYDROCHLORIDE 100 MG: 20 INJECTION, SOLUTION EPIDURAL; INFILTRATION; INTRACAUDAL; PERINEURAL at 12:10

## 2019-02-07 RX ADMIN — EPHEDRINE SULFATE 10 MG: 50 INJECTION INTRAVENOUS at 12:56

## 2019-02-07 RX ADMIN — ACETAMINOPHEN 1000 MG: 500 TABLET, FILM COATED ORAL at 21:36

## 2019-02-07 RX ADMIN — ROCURONIUM BROMIDE 5 MG: 10 INJECTION, SOLUTION INTRAVENOUS at 12:10

## 2019-02-07 RX ADMIN — LISINOPRIL 10 MG: 10 TABLET ORAL at 08:17

## 2019-02-07 RX ADMIN — Medication 0.25 MG: at 15:10

## 2019-02-07 RX ADMIN — INSULIN LISPRO 3 UNITS: 100 INJECTION, SOLUTION INTRAVENOUS; SUBCUTANEOUS at 22:44

## 2019-02-07 RX ADMIN — EPHEDRINE SULFATE 5 MG: 50 INJECTION INTRAVENOUS at 13:42

## 2019-02-07 RX ADMIN — OXYCODONE AND ACETAMINOPHEN 1 TABLET: 5; 325 TABLET ORAL at 16:48

## 2019-02-07 RX ADMIN — ONDANSETRON 4 MG: 2 INJECTION INTRAMUSCULAR; INTRAVENOUS at 12:55

## 2019-02-07 RX ADMIN — SUCCINYLCHOLINE CHLORIDE 160 MG: 20 INJECTION, SOLUTION INTRAMUSCULAR; INTRAVENOUS; PARENTERAL at 12:10

## 2019-02-07 RX ADMIN — EPHEDRINE SULFATE 10 MG: 50 INJECTION INTRAVENOUS at 12:52

## 2019-02-07 RX ADMIN — EPHEDRINE SULFATE 5 MG: 50 INJECTION INTRAVENOUS at 13:30

## 2019-02-07 RX ADMIN — Medication 10 ML: at 20:57

## 2019-02-07 RX ADMIN — PIPERACILLIN SODIUM,TAZOBACTAM SODIUM 3.38 G: 3; .375 INJECTION, POWDER, FOR SOLUTION INTRAVENOUS at 03:55

## 2019-02-07 RX ADMIN — PROPOFOL 350 MG: 10 INJECTION, EMULSION INTRAVENOUS at 12:10

## 2019-02-07 RX ADMIN — ROCURONIUM BROMIDE 25 MG: 10 INJECTION, SOLUTION INTRAVENOUS at 12:27

## 2019-02-07 RX ADMIN — LINEZOLID 600 MG: 600 TABLET, FILM COATED ORAL at 20:52

## 2019-02-07 RX ADMIN — PIPERACILLIN SODIUM,TAZOBACTAM SODIUM 3.38 G: 3; .375 INJECTION, POWDER, FOR SOLUTION INTRAVENOUS at 18:34

## 2019-02-07 RX ADMIN — MIDAZOLAM HYDROCHLORIDE 2 MG: 2 INJECTION, SOLUTION INTRAMUSCULAR; INTRAVENOUS at 12:01

## 2019-02-07 RX ADMIN — INSULIN GLARGINE 20 UNITS: 100 INJECTION, SOLUTION SUBCUTANEOUS at 22:45

## 2019-02-07 ASSESSMENT — PULMONARY FUNCTION TESTS
PIF_VALUE: 1
PIF_VALUE: 28
PIF_VALUE: 28
PIF_VALUE: 29
PIF_VALUE: 28
PIF_VALUE: 28
PIF_VALUE: 2
PIF_VALUE: 28
PIF_VALUE: 28
PIF_VALUE: 1
PIF_VALUE: 28
PIF_VALUE: 29
PIF_VALUE: 28
PIF_VALUE: 1
PIF_VALUE: 29
PIF_VALUE: 28
PIF_VALUE: 21
PIF_VALUE: 30
PIF_VALUE: 28
PIF_VALUE: 28
PIF_VALUE: 1
PIF_VALUE: 28
PIF_VALUE: 1
PIF_VALUE: 21
PIF_VALUE: 29
PIF_VALUE: 1
PIF_VALUE: 28
PIF_VALUE: 4
PIF_VALUE: 28
PIF_VALUE: 21
PIF_VALUE: 29
PIF_VALUE: 28
PIF_VALUE: 28
PIF_VALUE: 29
PIF_VALUE: 3
PIF_VALUE: 28
PIF_VALUE: 29
PIF_VALUE: 28
PIF_VALUE: 28
PIF_VALUE: 20
PIF_VALUE: 2
PIF_VALUE: 7
PIF_VALUE: 29
PIF_VALUE: 29
PIF_VALUE: 21
PIF_VALUE: 28
PIF_VALUE: 29
PIF_VALUE: 28
PIF_VALUE: 4
PIF_VALUE: 28
PIF_VALUE: 29
PIF_VALUE: 3
PIF_VALUE: 22
PIF_VALUE: 28
PIF_VALUE: 37
PIF_VALUE: 0
PIF_VALUE: 8
PIF_VALUE: 3
PIF_VALUE: 28
PIF_VALUE: 28
PIF_VALUE: 22
PIF_VALUE: 27
PIF_VALUE: 29
PIF_VALUE: 1
PIF_VALUE: 28
PIF_VALUE: 22
PIF_VALUE: 22
PIF_VALUE: 28
PIF_VALUE: 29
PIF_VALUE: 29
PIF_VALUE: 28
PIF_VALUE: 28
PIF_VALUE: 29
PIF_VALUE: 29
PIF_VALUE: 28
PIF_VALUE: 29
PIF_VALUE: 28
PIF_VALUE: 2
PIF_VALUE: 28
PIF_VALUE: 29
PIF_VALUE: 28
PIF_VALUE: 29
PIF_VALUE: 1
PIF_VALUE: 28
PIF_VALUE: 28
PIF_VALUE: 29
PIF_VALUE: 21
PIF_VALUE: 7
PIF_VALUE: 3
PIF_VALUE: 28
PIF_VALUE: 29
PIF_VALUE: 21
PIF_VALUE: 28
PIF_VALUE: 22
PIF_VALUE: 28
PIF_VALUE: 30
PIF_VALUE: 28
PIF_VALUE: 31
PIF_VALUE: 1
PIF_VALUE: 28
PIF_VALUE: 29
PIF_VALUE: 28
PIF_VALUE: 28
PIF_VALUE: 2
PIF_VALUE: 29
PIF_VALUE: 28
PIF_VALUE: 28
PIF_VALUE: 2
PIF_VALUE: 28
PIF_VALUE: 29
PIF_VALUE: 28
PIF_VALUE: 29
PIF_VALUE: 28
PIF_VALUE: 20
PIF_VALUE: 21
PIF_VALUE: 35
PIF_VALUE: 28
PIF_VALUE: 29
PIF_VALUE: 28
PIF_VALUE: 29

## 2019-02-07 ASSESSMENT — PAIN DESCRIPTION - DESCRIPTORS: DESCRIPTORS: HEADACHE

## 2019-02-07 ASSESSMENT — PAIN DESCRIPTION - LOCATION
LOCATION: FOOT
LOCATION: HEAD

## 2019-02-07 ASSESSMENT — PAIN DESCRIPTION - ORIENTATION: ORIENTATION: LEFT

## 2019-02-07 ASSESSMENT — PAIN SCALES - GENERAL
PAINLEVEL_OUTOF10: 0
PAINLEVEL_OUTOF10: 3
PAINLEVEL_OUTOF10: 7
PAINLEVEL_OUTOF10: 0
PAINLEVEL_OUTOF10: 0
PAINLEVEL_OUTOF10: 3
PAINLEVEL_OUTOF10: 7

## 2019-02-07 ASSESSMENT — PAIN DESCRIPTION - PAIN TYPE
TYPE: SURGICAL PAIN
TYPE: ACUTE PAIN

## 2019-02-07 ASSESSMENT — PAIN DESCRIPTION - ONSET: ONSET: ON-GOING

## 2019-02-07 ASSESSMENT — PAIN DESCRIPTION - FREQUENCY: FREQUENCY: INTERMITTENT

## 2019-02-07 ASSESSMENT — PAIN DESCRIPTION - PROGRESSION: CLINICAL_PROGRESSION: GRADUALLY WORSENING

## 2019-02-08 LAB
ANION GAP SERPL CALCULATED.3IONS-SCNC: 11 MMOL/L (ref 3–16)
BASOPHILS ABSOLUTE: 0 K/UL (ref 0–0.2)
BASOPHILS RELATIVE PERCENT: 0.2 %
BUN BLDV-MCNC: 20 MG/DL (ref 7–20)
C-REACTIVE PROTEIN: 7.8 MG/L (ref 0–5.1)
CALCIUM SERPL-MCNC: 9 MG/DL (ref 8.3–10.6)
CHLORIDE BLD-SCNC: 102 MMOL/L (ref 99–110)
CO2: 25 MMOL/L (ref 21–32)
CREAT SERPL-MCNC: 1.2 MG/DL (ref 0.9–1.3)
EOSINOPHILS ABSOLUTE: 0.1 K/UL (ref 0–0.6)
EOSINOPHILS RELATIVE PERCENT: 1.4 %
GFR AFRICAN AMERICAN: >60
GFR NON-AFRICAN AMERICAN: >60
GLUCOSE BLD-MCNC: 216 MG/DL (ref 70–99)
GLUCOSE BLD-MCNC: 279 MG/DL (ref 70–99)
GLUCOSE BLD-MCNC: 345 MG/DL (ref 70–99)
GLUCOSE BLD-MCNC: 346 MG/DL (ref 70–99)
GLUCOSE BLD-MCNC: 388 MG/DL (ref 70–99)
GRAM STAIN RESULT: ABNORMAL
HCT VFR BLD CALC: 35.8 % (ref 40.5–52.5)
HEMOGLOBIN: 11.8 G/DL (ref 13.5–17.5)
LYMPHOCYTES ABSOLUTE: 1.4 K/UL (ref 1–5.1)
LYMPHOCYTES RELATIVE PERCENT: 14.9 %
MCH RBC QN AUTO: 29.4 PG (ref 26–34)
MCHC RBC AUTO-ENTMCNC: 32.9 G/DL (ref 31–36)
MCV RBC AUTO: 89.3 FL (ref 80–100)
MONOCYTES ABSOLUTE: 0.5 K/UL (ref 0–1.3)
MONOCYTES RELATIVE PERCENT: 5.8 %
NEUTROPHILS ABSOLUTE: 7.3 K/UL (ref 1.7–7.7)
NEUTROPHILS RELATIVE PERCENT: 77.7 %
ORGANISM: ABNORMAL
ORGANISM: ABNORMAL
PDW BLD-RTO: 14.4 % (ref 12.4–15.4)
PERFORMED ON: ABNORMAL
PLATELET # BLD: 196 K/UL (ref 135–450)
PMV BLD AUTO: 7.9 FL (ref 5–10.5)
POTASSIUM REFLEX MAGNESIUM: 4.4 MMOL/L (ref 3.5–5.1)
RBC # BLD: 4.01 M/UL (ref 4.2–5.9)
SEDIMENTATION RATE, ERYTHROCYTE: 39 MM/HR (ref 0–15)
SODIUM BLD-SCNC: 138 MMOL/L (ref 136–145)
WBC # BLD: 9.4 K/UL (ref 4–11)
WOUND/ABSCESS: ABNORMAL

## 2019-02-08 PROCEDURE — 97530 THERAPEUTIC ACTIVITIES: CPT

## 2019-02-08 PROCEDURE — 2580000003 HC RX 258: Performed by: INTERNAL MEDICINE

## 2019-02-08 PROCEDURE — 1200000000 HC SEMI PRIVATE

## 2019-02-08 PROCEDURE — 6370000000 HC RX 637 (ALT 250 FOR IP): Performed by: INTERNAL MEDICINE

## 2019-02-08 PROCEDURE — 85652 RBC SED RATE AUTOMATED: CPT

## 2019-02-08 PROCEDURE — 97166 OT EVAL MOD COMPLEX 45 MIN: CPT

## 2019-02-08 PROCEDURE — 6370000000 HC RX 637 (ALT 250 FOR IP): Performed by: STUDENT IN AN ORGANIZED HEALTH CARE EDUCATION/TRAINING PROGRAM

## 2019-02-08 PROCEDURE — 86140 C-REACTIVE PROTEIN: CPT

## 2019-02-08 PROCEDURE — 99232 SBSQ HOSP IP/OBS MODERATE 35: CPT | Performed by: INTERNAL MEDICINE

## 2019-02-08 PROCEDURE — 6360000002 HC RX W HCPCS: Performed by: STUDENT IN AN ORGANIZED HEALTH CARE EDUCATION/TRAINING PROGRAM

## 2019-02-08 PROCEDURE — 2580000003 HC RX 258: Performed by: STUDENT IN AN ORGANIZED HEALTH CARE EDUCATION/TRAINING PROGRAM

## 2019-02-08 PROCEDURE — 97116 GAIT TRAINING THERAPY: CPT

## 2019-02-08 PROCEDURE — 85025 COMPLETE CBC W/AUTO DIFF WBC: CPT

## 2019-02-08 PROCEDURE — 97535 SELF CARE MNGMENT TRAINING: CPT

## 2019-02-08 PROCEDURE — 80048 BASIC METABOLIC PNL TOTAL CA: CPT

## 2019-02-08 PROCEDURE — 97162 PT EVAL MOD COMPLEX 30 MIN: CPT

## 2019-02-08 PROCEDURE — 36415 COLL VENOUS BLD VENIPUNCTURE: CPT

## 2019-02-08 RX ORDER — LINEZOLID 600 MG/1
600 TABLET, FILM COATED ORAL 2 TIMES DAILY
Qty: 84 TABLET | Refills: 0 | Status: SHIPPED | OUTPATIENT
Start: 2019-02-08 | End: 2019-03-22

## 2019-02-08 RX ADMIN — INSULIN LISPRO 5 UNITS: 100 INJECTION, SOLUTION INTRAVENOUS; SUBCUTANEOUS at 21:13

## 2019-02-08 RX ADMIN — OXYCODONE HYDROCHLORIDE 5 MG: 5 TABLET ORAL at 16:13

## 2019-02-08 RX ADMIN — INSULIN GLARGINE 20 UNITS: 100 INJECTION, SOLUTION SUBCUTANEOUS at 11:44

## 2019-02-08 RX ADMIN — Medication 10 ML: at 21:22

## 2019-02-08 RX ADMIN — PIPERACILLIN SODIUM,TAZOBACTAM SODIUM 3.38 G: 3; .375 INJECTION, POWDER, FOR SOLUTION INTRAVENOUS at 11:47

## 2019-02-08 RX ADMIN — INSULIN LISPRO 10 UNITS: 100 INJECTION, SOLUTION INTRAVENOUS; SUBCUTANEOUS at 11:43

## 2019-02-08 RX ADMIN — INSULIN LISPRO 12 UNITS: 100 INJECTION, SOLUTION INTRAVENOUS; SUBCUTANEOUS at 17:06

## 2019-02-08 RX ADMIN — LINEZOLID 600 MG: 600 TABLET, FILM COATED ORAL at 07:51

## 2019-02-08 RX ADMIN — INSULIN LISPRO 10 UNITS: 100 INJECTION, SOLUTION INTRAVENOUS; SUBCUTANEOUS at 08:01

## 2019-02-08 RX ADMIN — PIPERACILLIN SODIUM,TAZOBACTAM SODIUM 3.38 G: 3; .375 INJECTION, POWDER, FOR SOLUTION INTRAVENOUS at 03:24

## 2019-02-08 RX ADMIN — INSULIN GLARGINE 20 UNITS: 100 INJECTION, SOLUTION SUBCUTANEOUS at 21:14

## 2019-02-08 RX ADMIN — PIPERACILLIN SODIUM,TAZOBACTAM SODIUM 3.38 G: 3; .375 INJECTION, POWDER, FOR SOLUTION INTRAVENOUS at 21:20

## 2019-02-08 RX ADMIN — Medication 10 ML: at 07:52

## 2019-02-08 RX ADMIN — ENOXAPARIN SODIUM 40 MG: 40 INJECTION SUBCUTANEOUS at 07:51

## 2019-02-08 RX ADMIN — LISINOPRIL 10 MG: 10 TABLET ORAL at 07:51

## 2019-02-08 RX ADMIN — INSULIN LISPRO 8 UNITS: 100 INJECTION, SOLUTION INTRAVENOUS; SUBCUTANEOUS at 08:03

## 2019-02-08 RX ADMIN — LINEZOLID 600 MG: 600 TABLET, FILM COATED ORAL at 21:21

## 2019-02-08 RX ADMIN — INSULIN LISPRO 8 UNITS: 100 INJECTION, SOLUTION INTRAVENOUS; SUBCUTANEOUS at 11:43

## 2019-02-08 ASSESSMENT — PAIN SCALES - GENERAL
PAINLEVEL_OUTOF10: 0
PAINLEVEL_OUTOF10: 6
PAINLEVEL_OUTOF10: 7
PAINLEVEL_OUTOF10: 3
PAINLEVEL_OUTOF10: 6

## 2019-02-09 LAB
ANION GAP SERPL CALCULATED.3IONS-SCNC: 9 MMOL/L (ref 3–16)
BASOPHILS ABSOLUTE: 0 K/UL (ref 0–0.2)
BASOPHILS RELATIVE PERCENT: 0.2 %
BLOOD CULTURE, ROUTINE: NORMAL
BUN BLDV-MCNC: 22 MG/DL (ref 7–20)
CALCIUM SERPL-MCNC: 8.6 MG/DL (ref 8.3–10.6)
CHLORIDE BLD-SCNC: 104 MMOL/L (ref 99–110)
CO2: 26 MMOL/L (ref 21–32)
CREAT SERPL-MCNC: 1.3 MG/DL (ref 0.9–1.3)
CULTURE, BLOOD 2: NORMAL
EOSINOPHILS ABSOLUTE: 0.2 K/UL (ref 0–0.6)
EOSINOPHILS RELATIVE PERCENT: 2.1 %
GFR AFRICAN AMERICAN: >60
GFR NON-AFRICAN AMERICAN: >60
GLUCOSE BLD-MCNC: 122 MG/DL (ref 70–99)
GLUCOSE BLD-MCNC: 188 MG/DL (ref 70–99)
GLUCOSE BLD-MCNC: 238 MG/DL (ref 70–99)
GLUCOSE BLD-MCNC: 261 MG/DL (ref 70–99)
GLUCOSE BLD-MCNC: 271 MG/DL (ref 70–99)
GLUCOSE BLD-MCNC: 362 MG/DL (ref 70–99)
GLUCOSE BLD-MCNC: 419 MG/DL (ref 70–99)
HCT VFR BLD CALC: 35.6 % (ref 40.5–52.5)
HEMOGLOBIN: 11.4 G/DL (ref 13.5–17.5)
LYMPHOCYTES ABSOLUTE: 1.4 K/UL (ref 1–5.1)
LYMPHOCYTES RELATIVE PERCENT: 19.3 %
MCH RBC QN AUTO: 29.2 PG (ref 26–34)
MCHC RBC AUTO-ENTMCNC: 32 G/DL (ref 31–36)
MCV RBC AUTO: 91.3 FL (ref 80–100)
MONOCYTES ABSOLUTE: 0.5 K/UL (ref 0–1.3)
MONOCYTES RELATIVE PERCENT: 7 %
NEUTROPHILS ABSOLUTE: 5.3 K/UL (ref 1.7–7.7)
NEUTROPHILS RELATIVE PERCENT: 71.4 %
PDW BLD-RTO: 14.5 % (ref 12.4–15.4)
PERFORMED ON: ABNORMAL
PLATELET # BLD: 167 K/UL (ref 135–450)
PMV BLD AUTO: 8.1 FL (ref 5–10.5)
POTASSIUM REFLEX MAGNESIUM: 4.8 MMOL/L (ref 3.5–5.1)
RBC # BLD: 3.9 M/UL (ref 4.2–5.9)
SODIUM BLD-SCNC: 139 MMOL/L (ref 136–145)
WBC # BLD: 7.5 K/UL (ref 4–11)

## 2019-02-09 PROCEDURE — 80048 BASIC METABOLIC PNL TOTAL CA: CPT

## 2019-02-09 PROCEDURE — 36415 COLL VENOUS BLD VENIPUNCTURE: CPT

## 2019-02-09 PROCEDURE — 6370000000 HC RX 637 (ALT 250 FOR IP): Performed by: STUDENT IN AN ORGANIZED HEALTH CARE EDUCATION/TRAINING PROGRAM

## 2019-02-09 PROCEDURE — 6370000000 HC RX 637 (ALT 250 FOR IP): Performed by: INTERNAL MEDICINE

## 2019-02-09 PROCEDURE — 2580000003 HC RX 258: Performed by: STUDENT IN AN ORGANIZED HEALTH CARE EDUCATION/TRAINING PROGRAM

## 2019-02-09 PROCEDURE — 1200000000 HC SEMI PRIVATE

## 2019-02-09 PROCEDURE — 85025 COMPLETE CBC W/AUTO DIFF WBC: CPT

## 2019-02-09 PROCEDURE — 2580000003 HC RX 258: Performed by: INTERNAL MEDICINE

## 2019-02-09 PROCEDURE — 99232 SBSQ HOSP IP/OBS MODERATE 35: CPT | Performed by: INTERNAL MEDICINE

## 2019-02-09 PROCEDURE — 6360000002 HC RX W HCPCS: Performed by: STUDENT IN AN ORGANIZED HEALTH CARE EDUCATION/TRAINING PROGRAM

## 2019-02-09 PROCEDURE — 99254 IP/OBS CNSLTJ NEW/EST MOD 60: CPT | Performed by: INTERNAL MEDICINE

## 2019-02-09 PROCEDURE — 2580000003 HC RX 258

## 2019-02-09 RX ORDER — ATORVASTATIN CALCIUM 20 MG/1
40 TABLET, FILM COATED ORAL NIGHTLY
Status: DISCONTINUED | OUTPATIENT
Start: 2019-02-09 | End: 2019-02-11 | Stop reason: HOSPADM

## 2019-02-09 RX ORDER — SODIUM CHLORIDE 9 MG/ML
INJECTION, SOLUTION INTRAVENOUS
Status: COMPLETED
Start: 2019-02-09 | End: 2019-02-09

## 2019-02-09 RX ADMIN — Medication 10 ML: at 21:21

## 2019-02-09 RX ADMIN — OXYCODONE HYDROCHLORIDE 10 MG: 5 TABLET ORAL at 06:31

## 2019-02-09 RX ADMIN — INSULIN LISPRO 15 UNITS: 100 INJECTION, SOLUTION INTRAVENOUS; SUBCUTANEOUS at 14:19

## 2019-02-09 RX ADMIN — LINEZOLID 600 MG: 600 TABLET, FILM COATED ORAL at 21:18

## 2019-02-09 RX ADMIN — INSULIN HUMAN 10 UNITS: 100 INJECTION, SOLUTION PARENTERAL at 08:31

## 2019-02-09 RX ADMIN — INSULIN LISPRO 9 UNITS: 100 INJECTION, SOLUTION INTRAVENOUS; SUBCUTANEOUS at 10:23

## 2019-02-09 RX ADMIN — INSULIN LISPRO 2 UNITS: 100 INJECTION, SOLUTION INTRAVENOUS; SUBCUTANEOUS at 14:21

## 2019-02-09 RX ADMIN — ATORVASTATIN CALCIUM 40 MG: 20 TABLET, FILM COATED ORAL at 21:18

## 2019-02-09 RX ADMIN — PIPERACILLIN SODIUM,TAZOBACTAM SODIUM 3.38 G: 3; .375 INJECTION, POWDER, FOR SOLUTION INTRAVENOUS at 04:30

## 2019-02-09 RX ADMIN — PIPERACILLIN SODIUM,TAZOBACTAM SODIUM 3.38 G: 3; .375 INJECTION, POWDER, FOR SOLUTION INTRAVENOUS at 10:22

## 2019-02-09 RX ADMIN — LISINOPRIL 10 MG: 10 TABLET ORAL at 08:33

## 2019-02-09 RX ADMIN — OXYCODONE HYDROCHLORIDE 10 MG: 5 TABLET ORAL at 01:05

## 2019-02-09 RX ADMIN — INSULIN LISPRO 15 UNITS: 100 INJECTION, SOLUTION INTRAVENOUS; SUBCUTANEOUS at 17:48

## 2019-02-09 RX ADMIN — Medication 10 ML: at 08:42

## 2019-02-09 RX ADMIN — SODIUM CHLORIDE: 9 INJECTION, SOLUTION INTRAVENOUS at 10:33

## 2019-02-09 RX ADMIN — LINEZOLID 600 MG: 600 TABLET, FILM COATED ORAL at 08:33

## 2019-02-09 RX ADMIN — OXYCODONE HYDROCHLORIDE 5 MG: 5 TABLET ORAL at 20:06

## 2019-02-09 RX ADMIN — ENOXAPARIN SODIUM 40 MG: 40 INJECTION SUBCUTANEOUS at 08:33

## 2019-02-09 ASSESSMENT — PAIN SCALES - GENERAL
PAINLEVEL_OUTOF10: 2
PAINLEVEL_OUTOF10: 8
PAINLEVEL_OUTOF10: 6
PAINLEVEL_OUTOF10: 7
PAINLEVEL_OUTOF10: 6
PAINLEVEL_OUTOF10: 6
PAINLEVEL_OUTOF10: 0
PAINLEVEL_OUTOF10: 5

## 2019-02-09 ASSESSMENT — PAIN DESCRIPTION - PAIN TYPE: TYPE: ACUTE PAIN

## 2019-02-09 ASSESSMENT — PAIN DESCRIPTION - ONSET: ONSET: ON-GOING

## 2019-02-09 ASSESSMENT — PAIN DESCRIPTION - PROGRESSION: CLINICAL_PROGRESSION: GRADUALLY WORSENING

## 2019-02-09 ASSESSMENT — PAIN DESCRIPTION - ORIENTATION: ORIENTATION: LEFT

## 2019-02-09 ASSESSMENT — PAIN DESCRIPTION - FREQUENCY: FREQUENCY: CONTINUOUS

## 2019-02-09 ASSESSMENT — PAIN DESCRIPTION - LOCATION: LOCATION: ANKLE

## 2019-02-10 LAB
ANION GAP SERPL CALCULATED.3IONS-SCNC: 10 MMOL/L (ref 3–16)
BASOPHILS ABSOLUTE: 0 K/UL (ref 0–0.2)
BASOPHILS RELATIVE PERCENT: 0.3 %
BUN BLDV-MCNC: 25 MG/DL (ref 7–20)
CALCIUM SERPL-MCNC: 9 MG/DL (ref 8.3–10.6)
CHLORIDE BLD-SCNC: 107 MMOL/L (ref 99–110)
CO2: 24 MMOL/L (ref 21–32)
CREAT SERPL-MCNC: 1.4 MG/DL (ref 0.9–1.3)
EOSINOPHILS ABSOLUTE: 0.2 K/UL (ref 0–0.6)
EOSINOPHILS RELATIVE PERCENT: 2.2 %
GFR AFRICAN AMERICAN: >60
GFR NON-AFRICAN AMERICAN: 56
GLUCOSE BLD-MCNC: 103 MG/DL (ref 70–99)
GLUCOSE BLD-MCNC: 125 MG/DL (ref 70–99)
GLUCOSE BLD-MCNC: 129 MG/DL (ref 70–99)
GLUCOSE BLD-MCNC: 140 MG/DL (ref 70–99)
GLUCOSE BLD-MCNC: 143 MG/DL (ref 70–99)
GLUCOSE BLD-MCNC: 99 MG/DL (ref 70–99)
HCT VFR BLD CALC: 35.5 % (ref 40.5–52.5)
HEMOGLOBIN: 11.2 G/DL (ref 13.5–17.5)
LYMPHOCYTES ABSOLUTE: 2 K/UL (ref 1–5.1)
LYMPHOCYTES RELATIVE PERCENT: 23.9 %
MCH RBC QN AUTO: 28.6 PG (ref 26–34)
MCHC RBC AUTO-ENTMCNC: 31.5 G/DL (ref 31–36)
MCV RBC AUTO: 90.8 FL (ref 80–100)
MONOCYTES ABSOLUTE: 0.7 K/UL (ref 0–1.3)
MONOCYTES RELATIVE PERCENT: 7.9 %
NEUTROPHILS ABSOLUTE: 5.6 K/UL (ref 1.7–7.7)
NEUTROPHILS RELATIVE PERCENT: 65.7 %
PDW BLD-RTO: 14.8 % (ref 12.4–15.4)
PERFORMED ON: ABNORMAL
PERFORMED ON: NORMAL
PLATELET # BLD: 176 K/UL (ref 135–450)
PMV BLD AUTO: 7.6 FL (ref 5–10.5)
POTASSIUM REFLEX MAGNESIUM: 4.4 MMOL/L (ref 3.5–5.1)
RBC # BLD: 3.91 M/UL (ref 4.2–5.9)
SODIUM BLD-SCNC: 141 MMOL/L (ref 136–145)
WBC # BLD: 8.6 K/UL (ref 4–11)

## 2019-02-10 PROCEDURE — 6370000000 HC RX 637 (ALT 250 FOR IP): Performed by: STUDENT IN AN ORGANIZED HEALTH CARE EDUCATION/TRAINING PROGRAM

## 2019-02-10 PROCEDURE — 1200000000 HC SEMI PRIVATE

## 2019-02-10 PROCEDURE — 80048 BASIC METABOLIC PNL TOTAL CA: CPT

## 2019-02-10 PROCEDURE — 6370000000 HC RX 637 (ALT 250 FOR IP): Performed by: INTERNAL MEDICINE

## 2019-02-10 PROCEDURE — 6360000002 HC RX W HCPCS: Performed by: STUDENT IN AN ORGANIZED HEALTH CARE EDUCATION/TRAINING PROGRAM

## 2019-02-10 PROCEDURE — 36415 COLL VENOUS BLD VENIPUNCTURE: CPT

## 2019-02-10 PROCEDURE — 85025 COMPLETE CBC W/AUTO DIFF WBC: CPT

## 2019-02-10 PROCEDURE — 2580000003 HC RX 258: Performed by: INTERNAL MEDICINE

## 2019-02-10 RX ADMIN — INSULIN LISPRO 15 UNITS: 100 INJECTION, SOLUTION INTRAVENOUS; SUBCUTANEOUS at 12:49

## 2019-02-10 RX ADMIN — ENOXAPARIN SODIUM 40 MG: 40 INJECTION SUBCUTANEOUS at 09:03

## 2019-02-10 RX ADMIN — Medication 10 ML: at 22:26

## 2019-02-10 RX ADMIN — OXYCODONE HYDROCHLORIDE 5 MG: 5 TABLET ORAL at 09:23

## 2019-02-10 RX ADMIN — INSULIN LISPRO 15 UNITS: 100 INJECTION, SOLUTION INTRAVENOUS; SUBCUTANEOUS at 18:08

## 2019-02-10 RX ADMIN — ATORVASTATIN CALCIUM 40 MG: 20 TABLET, FILM COATED ORAL at 20:54

## 2019-02-10 RX ADMIN — LINEZOLID 600 MG: 600 TABLET, FILM COATED ORAL at 09:03

## 2019-02-10 RX ADMIN — INSULIN LISPRO 1 UNITS: 100 INJECTION, SOLUTION INTRAVENOUS; SUBCUTANEOUS at 12:49

## 2019-02-10 RX ADMIN — Medication 10 ML: at 09:10

## 2019-02-10 RX ADMIN — LINEZOLID 600 MG: 600 TABLET, FILM COATED ORAL at 20:54

## 2019-02-10 RX ADMIN — LISINOPRIL 10 MG: 10 TABLET ORAL at 09:10

## 2019-02-10 RX ADMIN — INSULIN LISPRO 15 UNITS: 100 INJECTION, SOLUTION INTRAVENOUS; SUBCUTANEOUS at 09:04

## 2019-02-10 ASSESSMENT — PAIN SCALES - GENERAL
PAINLEVEL_OUTOF10: 0
PAINLEVEL_OUTOF10: 2
PAINLEVEL_OUTOF10: 6

## 2019-02-10 ASSESSMENT — PAIN DESCRIPTION - ORIENTATION: ORIENTATION: LEFT

## 2019-02-10 ASSESSMENT — PAIN DESCRIPTION - LOCATION: LOCATION: ANKLE

## 2019-02-11 VITALS
SYSTOLIC BLOOD PRESSURE: 148 MMHG | RESPIRATION RATE: 17 BRPM | OXYGEN SATURATION: 95 % | HEIGHT: 78 IN | BODY MASS INDEX: 36.45 KG/M2 | TEMPERATURE: 98.2 F | HEART RATE: 81 BPM | DIASTOLIC BLOOD PRESSURE: 82 MMHG | WEIGHT: 315 LBS

## 2019-02-11 LAB
ANION GAP SERPL CALCULATED.3IONS-SCNC: 12 MMOL/L (ref 3–16)
BASOPHILS ABSOLUTE: 0 K/UL (ref 0–0.2)
BASOPHILS RELATIVE PERCENT: 0.3 %
BUN BLDV-MCNC: 26 MG/DL (ref 7–20)
CALCIUM SERPL-MCNC: 9.6 MG/DL (ref 8.3–10.6)
CHLORIDE BLD-SCNC: 108 MMOL/L (ref 99–110)
CO2: 24 MMOL/L (ref 21–32)
CREAT SERPL-MCNC: 1.2 MG/DL (ref 0.9–1.3)
EOSINOPHILS ABSOLUTE: 0.2 K/UL (ref 0–0.6)
EOSINOPHILS RELATIVE PERCENT: 2.3 %
GFR AFRICAN AMERICAN: >60
GFR NON-AFRICAN AMERICAN: >60
GLUCOSE BLD-MCNC: 102 MG/DL (ref 70–99)
GLUCOSE BLD-MCNC: 147 MG/DL (ref 70–99)
GLUCOSE BLD-MCNC: 183 MG/DL (ref 70–99)
GLUCOSE BLD-MCNC: 196 MG/DL (ref 70–99)
HCT VFR BLD CALC: 36.3 % (ref 40.5–52.5)
HEMOGLOBIN: 11.7 G/DL (ref 13.5–17.5)
LYMPHOCYTES ABSOLUTE: 2.3 K/UL (ref 1–5.1)
LYMPHOCYTES RELATIVE PERCENT: 27.3 %
MCH RBC QN AUTO: 29 PG (ref 26–34)
MCHC RBC AUTO-ENTMCNC: 32.1 G/DL (ref 31–36)
MCV RBC AUTO: 90.5 FL (ref 80–100)
MONOCYTES ABSOLUTE: 0.6 K/UL (ref 0–1.3)
MONOCYTES RELATIVE PERCENT: 7.3 %
NEUTROPHILS ABSOLUTE: 5.3 K/UL (ref 1.7–7.7)
NEUTROPHILS RELATIVE PERCENT: 62.8 %
PDW BLD-RTO: 15 % (ref 12.4–15.4)
PERFORMED ON: ABNORMAL
PLATELET # BLD: 195 K/UL (ref 135–450)
PMV BLD AUTO: 7.9 FL (ref 5–10.5)
POTASSIUM REFLEX MAGNESIUM: 4.4 MMOL/L (ref 3.5–5.1)
RBC # BLD: 4.02 M/UL (ref 4.2–5.9)
SODIUM BLD-SCNC: 144 MMOL/L (ref 136–145)
WBC # BLD: 8.5 K/UL (ref 4–11)

## 2019-02-11 PROCEDURE — 36415 COLL VENOUS BLD VENIPUNCTURE: CPT

## 2019-02-11 PROCEDURE — 85025 COMPLETE CBC W/AUTO DIFF WBC: CPT

## 2019-02-11 PROCEDURE — 6370000000 HC RX 637 (ALT 250 FOR IP): Performed by: STUDENT IN AN ORGANIZED HEALTH CARE EDUCATION/TRAINING PROGRAM

## 2019-02-11 PROCEDURE — 6370000000 HC RX 637 (ALT 250 FOR IP): Performed by: INTERNAL MEDICINE

## 2019-02-11 PROCEDURE — 6360000002 HC RX W HCPCS: Performed by: STUDENT IN AN ORGANIZED HEALTH CARE EDUCATION/TRAINING PROGRAM

## 2019-02-11 PROCEDURE — 80048 BASIC METABOLIC PNL TOTAL CA: CPT

## 2019-02-11 PROCEDURE — 2580000003 HC RX 258: Performed by: INTERNAL MEDICINE

## 2019-02-11 PROCEDURE — 99232 SBSQ HOSP IP/OBS MODERATE 35: CPT | Performed by: INTERNAL MEDICINE

## 2019-02-11 RX ORDER — LISINOPRIL 10 MG/1
10 TABLET ORAL DAILY
Qty: 30 TABLET | Refills: 3 | Status: ON HOLD | OUTPATIENT
Start: 2019-02-12 | End: 2022-05-14 | Stop reason: HOSPADM

## 2019-02-11 RX ORDER — LINEZOLID 600 MG/1
600 TABLET, FILM COATED ORAL EVERY 12 HOURS SCHEDULED
Qty: 84 TABLET | Refills: 0 | Status: SHIPPED | OUTPATIENT
Start: 2019-02-11 | End: 2019-03-25

## 2019-02-11 RX ORDER — ATORVASTATIN CALCIUM 40 MG/1
40 TABLET, FILM COATED ORAL NIGHTLY
Qty: 30 TABLET | Refills: 3 | Status: ON HOLD | OUTPATIENT
Start: 2019-02-11 | End: 2022-05-14 | Stop reason: SDUPTHER

## 2019-02-11 RX ADMIN — LISINOPRIL 10 MG: 10 TABLET ORAL at 08:24

## 2019-02-11 RX ADMIN — ENOXAPARIN SODIUM 40 MG: 40 INJECTION SUBCUTANEOUS at 08:24

## 2019-02-11 RX ADMIN — Medication 10 ML: at 08:25

## 2019-02-11 RX ADMIN — LINEZOLID 600 MG: 600 TABLET, FILM COATED ORAL at 08:24

## 2019-02-11 RX ADMIN — INSULIN LISPRO 1 UNITS: 100 INJECTION, SOLUTION INTRAVENOUS; SUBCUTANEOUS at 08:27

## 2019-02-11 RX ADMIN — INSULIN LISPRO 1 UNITS: 100 INJECTION, SOLUTION INTRAVENOUS; SUBCUTANEOUS at 02:29

## 2019-02-11 RX ADMIN — INSULIN LISPRO 15 UNITS: 100 INJECTION, SOLUTION INTRAVENOUS; SUBCUTANEOUS at 08:32

## 2019-02-11 RX ADMIN — INSULIN LISPRO 15 UNITS: 100 INJECTION, SOLUTION INTRAVENOUS; SUBCUTANEOUS at 12:11

## 2019-02-11 ASSESSMENT — PAIN SCALES - GENERAL: PAINLEVEL_OUTOF10: 0

## 2019-02-14 LAB
ANAEROBIC CULTURE: ABNORMAL
CULTURE SURGICAL: ABNORMAL
GRAM STAIN RESULT: ABNORMAL
ORGANISM: ABNORMAL
ORGANISM: ABNORMAL

## 2019-02-28 LAB
C-REACTIVE PROTEIN: 1.3 MG/L (ref 0–5.1)
REASON FOR REJECTION: NORMAL
REJECTED TEST: NORMAL

## 2019-03-08 LAB
BASOPHILS ABSOLUTE: 0 K/UL (ref 0–0.2)
BASOPHILS RELATIVE PERCENT: 0.5 %
EOSINOPHILS ABSOLUTE: 0.2 K/UL (ref 0–0.6)
EOSINOPHILS RELATIVE PERCENT: 1.8 %
HCT VFR BLD CALC: 42.1 % (ref 40.5–52.5)
HEMOGLOBIN: 13.3 G/DL (ref 13.5–17.5)
LYMPHOCYTES ABSOLUTE: 2.2 K/UL (ref 1–5.1)
LYMPHOCYTES RELATIVE PERCENT: 22 %
MCH RBC QN AUTO: 29.6 PG (ref 26–34)
MCHC RBC AUTO-ENTMCNC: 31.7 G/DL (ref 31–36)
MCV RBC AUTO: 93.5 FL (ref 80–100)
MONOCYTES ABSOLUTE: 0.5 K/UL (ref 0–1.3)
MONOCYTES RELATIVE PERCENT: 4.5 %
NEUTROPHILS ABSOLUTE: 7.2 K/UL (ref 1.7–7.7)
NEUTROPHILS RELATIVE PERCENT: 71.2 %
PDW BLD-RTO: 16.4 % (ref 12.4–15.4)
PLATELET # BLD: 145 K/UL (ref 135–450)
PMV BLD AUTO: 9 FL (ref 5–10.5)
RBC # BLD: 4.5 M/UL (ref 4.2–5.9)
SEDIMENTATION RATE, ERYTHROCYTE: 29 MM/HR (ref 0–15)
WBC # BLD: 10.1 K/UL (ref 4–11)

## 2019-03-09 LAB
ANION GAP SERPL CALCULATED.3IONS-SCNC: 18 MMOL/L (ref 3–16)
BUN BLDV-MCNC: 28 MG/DL (ref 7–20)
C-REACTIVE PROTEIN: 1 MG/L (ref 0–5.1)
CALCIUM SERPL-MCNC: 10.2 MG/DL (ref 8.3–10.6)
CHLORIDE BLD-SCNC: 101 MMOL/L (ref 99–110)
CO2: 18 MMOL/L (ref 21–32)
CREAT SERPL-MCNC: 1.4 MG/DL (ref 0.9–1.3)
GFR AFRICAN AMERICAN: >60
GFR NON-AFRICAN AMERICAN: 56
GLUCOSE BLD-MCNC: 85 MG/DL (ref 70–99)
POTASSIUM SERPL-SCNC: 5.6 MMOL/L (ref 3.5–5.1)
SODIUM BLD-SCNC: 137 MMOL/L (ref 136–145)

## 2019-03-11 LAB
FUNGUS (MYCOLOGY) CULTURE: NORMAL
FUNGUS STAIN: NORMAL

## 2019-03-26 LAB
AFB CULTURE (MYCOBACTERIA): NORMAL
AFB SMEAR: NORMAL

## 2020-09-10 LAB
ALBUMIN SERPL-MCNC: 4.1 G/DL
ALP BLD-CCNC: 69 U/L
ALT SERPL-CCNC: 9 U/L
ANION GAP SERPL CALCULATED.3IONS-SCNC: NORMAL MMOL/L
AST SERPL-CCNC: 11 U/L
BASOPHILS ABSOLUTE: 0.1 /ΜL
BASOPHILS RELATIVE PERCENT: 0 %
BILIRUB SERPL-MCNC: 0.2 MG/DL (ref 0.1–1.4)
BUN BLDV-MCNC: 18 MG/DL
CALCIUM SERPL-MCNC: 9.7 MG/DL
CHLORIDE BLD-SCNC: 102 MMOL/L
CO2: 24 MMOL/L
CREAT SERPL-MCNC: 1.5 MG/DL
EOSINOPHILS ABSOLUTE: 0.1 /ΜL
EOSINOPHILS RELATIVE PERCENT: 2 %
GFR CALCULATED: 65
GLUCOSE BLD-MCNC: 308 MG/DL
HCT VFR BLD CALC: 44.2 % (ref 41–53)
HEMOGLOBIN: 14.5 G/DL (ref 13.5–17.5)
LYMPHOCYTES ABSOLUTE: 2.8 /ΜL
LYMPHOCYTES RELATIVE PERCENT: 21 %
MCH RBC QN AUTO: 29.7 PG
MCHC RBC AUTO-ENTMCNC: 32.8 G/DL
MCV RBC AUTO: 91 FL
MONOCYTES ABSOLUTE: 0.6 /ΜL
MONOCYTES RELATIVE PERCENT: 5 %
NEUTROPHILS ABSOLUTE: 9.8 /ΜL
NEUTROPHILS RELATIVE PERCENT: 71 %
PDW BLD-RTO: 14 %
PLATELET # BLD: 223 K/ΜL
PMV BLD AUTO: NORMAL FL
POTASSIUM SERPL-SCNC: 4.6 MMOL/L
RBC # BLD: 4.88 10^6/ΜL
SEDIMENTATION RATE, ERYTHROCYTE: 88
SODIUM BLD-SCNC: 136 MMOL/L
TOTAL PROTEIN: 7.5
WBC # BLD: 13.6 10^3/ML

## 2020-09-16 ENCOUNTER — HOSPITAL ENCOUNTER (OUTPATIENT)
Dept: MRI IMAGING | Age: 40
Discharge: HOME | End: 2020-09-16
Payer: COMMERCIAL

## 2020-09-16 PROCEDURE — 73718 MRI LOWER EXTREMITY W/O DYE: CPT

## 2020-09-17 ENCOUNTER — VIRTUAL VISIT (OUTPATIENT)
Dept: INFECTIOUS DISEASES | Age: 40
End: 2020-09-17
Payer: COMMERCIAL

## 2020-09-17 PROCEDURE — 99423 OL DIG E/M SVC 21+ MIN: CPT | Performed by: INTERNAL MEDICINE

## 2020-09-17 NOTE — PROGRESS NOTES
Infectious Diseases Oupatient Follow-up Note    Primary Care Physician:   Agnes Abreu MD  Last visit:    2/11/19  History Obtained From:    Patient, EPIC    CHIEF COMPLAINT / ID problem:     Chief Complaint   Patient presents with    Follow-up     wound opened up on L leg. was being seen by Dr. Meet Robertson. he was seen yesterday and was advised to FU with ID. states he doesnt understand much, mom normally handles all this. for his understanding needs IV abx and PICC line placed. HISTORY OF PRESENT ILLNESS / Interval history:      Ambar Mcdaniel is a 41 y.o. male who was seen today for L stump infection    40 yo man with hx DM, neuropathy, CVA, seizure. Hx L DFI, hx cult + MRSA (2/2016).  Hx L TMA     Admit 2/4/19 with L stump dehiscence with chronc wound  X-ray with irregularity of distal lateral 1st MT.    Culture + MRSA, Gp C Strep (GS with both GNR / GPC)  OR revision 2/9/19. Discharged on po linezolid. Today 9/17/20 - Video Visit (obtained consent, pt at home, doxy. me - see disclaimer at bottom)  L stump opened, about 1 yr ago per pt. Saw Podiatry / Dr Shelley Lyons, 'trimmed callous'. Labs down - sent to [facility]  Pt had MRI at [facility] - 1st MT signal abnormal c/c osteomyelitis (see report)  Had culture sent on 9/16. Had labs through Dr Keeley Jackson office    Pt reports that L stump wound - no pain, no drainage.     Past Medical History:    Past Medical History:   Diagnosis Date    Amputation of third toe, left, traumatic ([address]     Blood circulation, collateral     Cellulitis     Depression     Diabetic polyneuropathy associated with type 2 diabetes mellitus ([address] 2/6/2019    Hypertension     Morbid obesity due to excess calories ([address]     MRSA infection 02/04/2019    foot wound    Seizures (HCC)     Type II or unspecified type diabetes mellitus without mention of complication, not stated as uncontrolled     Unspecified cerebral artery occlusion with cerebral infarction        Past Surgical PE  General appearance, HEENT, resp status, skin, neuro - no abnormalities within limits of video visit     DATA:    See Epic    2/4/19 Wound cult + heavy growth group C Strep and MRSA  [facility] AUREUS MRSA   Antibiotic Interpretation VINOD Unit   ceFAZolin Resistant >16 mcg/mL   clindamycin Sensitive <=0.5 mcg/mL   erythromycin Resistant >4 mcg/mL   linezolid Sensitive 2 mcg/mL   oxacillin Resistant >2 mcg/mL   tetracycline Sensitive <=0.5 mcg/mL   trimethoprim-sulfamethoxazole Sensitive <=0.5/9.5 mcg/mL   vancomycin Sensitive 1 mcg/mL     9/16 MRI L foot --  Impression    1.  Status post transmetatarsal amputation.  There is soft tissue ulceration    at the plantar aspect of the 1st metatarsal stump with signal abnormality in    the 1st metatarsal compatible with osteomyelitis.  Signal abnormality is also    seen within the 2nd and 3rd metatarsal stumps, most likely related to    reactive osteitis, but possibility of early osteomyelitis at the distal tip    of the 2nd metatarsal stump is not entirely excluded.         2.  No evidence of soft tissue abscess.         3.  Signal change and atrophy in the intrinsic muscles of the foot, likely    related to diabetic neuropathy. IMPRESSION    DM, neuropathy, hx L TMA    L stump infection with extension osteomyelitis, polymicrobial / mixed infection   - 2/9/19 TMA revision, closure   - Cult - GBS, MRSA    L stump wound with extension OM by MRI   - wound chronic   - w/u labs, cult, MRI    - pt did NOT want to have surg and try antibiotic per Podiatrist     RECOMMENDATIONS:      Cont off antibiotics for now  Will obtain labs, cult result   Will review MRI    Discussed case with Dr Fransisca Hicks - chronic wound, not urgent to start antibiotic, will sent me lab results, micro result (when available). Dr Fransisca Hicks reported that pt did not want to have surg.   Given pt's non-compliance (walks on foot, does not take DM med), may NOT be appropriate for home iv antibiotics    Lico Brewster Stephany Thomason is a 41 y.o. male evaluated by a Virtual Visit (video visit) encounter to address concerns as mentioned above. A caregiver was present when appropriate. Due to this being a TeleHealth encounter ([facility]), evaluation of the following organ systems was limited: Vitals/Constitutional/EENT/Resp/CV/GI//MS/Neuro/Skin/Heme-Lymph-Imm. Pursuant to the emergency declaration under the [address] [place] [address] and the [employer] Act, this Virtual Visit was conducted with patient's (and/or legal guardian's) consent, to reduce the patient's risk of exposure to COVID-19 and provide necessary medical care. The patient (and/or legal guardian) has also been advised to contact this office for worsening conditions or problems, and seek emergency medical treatment and/or call 911 if deemed necessary. Patient identification was verified at the start of the visit: Yes    Total time spent for this encounter: >40 min    Services were provided through a video synchronous discussion virtually to substitute for in-person clinic visit. --Oscar Robles MD on 9/17/2020 at 3:26 PM  An electronic signature was used to authenticate this note.

## 2020-10-20 ENCOUNTER — TELEPHONE (OUTPATIENT)
Dept: INFECTIOUS DISEASES | Age: 40
End: 2020-10-20

## 2020-10-20 NOTE — TELEPHONE ENCOUNTER
Patient calling to see about RX for antibiotic as per discussion at last visit. Patient's preferred pharmacy is [facility] on [address] in [facility].

## 2020-10-26 NOTE — TELEPHONE ENCOUNTER
Called pt --    Pt has L TMA stump wound / osteomyelitis   - abnormal MRI    - wound PCR + MRSA, Ps aeruginosa (R cipro), P acne  Has had f/u with Dr Katharine Laguerre    Pt reports wound same or improving, no   Need surg debridement, revision, do not feel antibiotics will have any affect used alone    Call out to Dr Latonya Alvarado

## 2020-11-03 PROBLEM — E66.9 OBESE: Status: RESOLVED | Noted: 2020-11-03 | Resolved: 2020-11-03

## 2021-04-23 ENCOUNTER — PREP FOR PROCEDURE (OUTPATIENT)
Dept: PODIATRY | Age: 41
End: 2021-04-23

## 2021-04-23 DIAGNOSIS — L97.522 ULCER OF LEFT FOOT, WITH FAT LAYER EXPOSED (HCC): ICD-10-CM

## 2021-04-23 DIAGNOSIS — E11.42 DIABETIC POLYNEUROPATHY ASSOCIATED WITH TYPE 2 DIABETES MELLITUS (HCC): Primary | ICD-10-CM

## 2021-04-23 DIAGNOSIS — L03.031 CELLULITIS OF SECOND TOE, RIGHT: ICD-10-CM

## 2021-04-23 DIAGNOSIS — M86.171 ACUTE OSTEOMYELITIS OF RIGHT ANKLE OR FOOT (HCC): ICD-10-CM

## 2021-04-23 DIAGNOSIS — L02.611 ABSCESS OF SECOND TOE, RIGHT: ICD-10-CM

## 2021-04-23 RX ORDER — SODIUM CHLORIDE 0.9 % (FLUSH) 0.9 %
5-40 SYRINGE (ML) INJECTION PRN
Status: CANCELLED | OUTPATIENT
Start: 2021-04-23

## 2021-04-23 RX ORDER — SODIUM CHLORIDE 0.9 % (FLUSH) 0.9 %
5-40 SYRINGE (ML) INJECTION EVERY 12 HOURS SCHEDULED
Status: CANCELLED | OUTPATIENT
Start: 2021-04-23

## 2021-04-23 RX ORDER — SODIUM CHLORIDE 9 MG/ML
25 INJECTION, SOLUTION INTRAVENOUS PRN
Status: CANCELLED | OUTPATIENT
Start: 2021-04-23

## 2021-11-24 ENCOUNTER — HOSPITAL ENCOUNTER (EMERGENCY)
Age: 41
Discharge: HOME | End: 2021-11-24
Attending: EMERGENCY MEDICINE
Payer: COMMERCIAL

## 2021-11-24 ENCOUNTER — APPOINTMENT (OUTPATIENT)
Dept: GENERAL RADIOLOGY | Age: 41
End: 2021-11-24
Payer: COMMERCIAL

## 2021-11-24 VITALS
SYSTOLIC BLOOD PRESSURE: 157 MMHG | HEIGHT: 78 IN | DIASTOLIC BLOOD PRESSURE: 87 MMHG | HEART RATE: 78 BPM | TEMPERATURE: 98.1 F | BODY MASS INDEX: 36.45 KG/M2 | OXYGEN SATURATION: 98 % | WEIGHT: 315 LBS | RESPIRATION RATE: 14 BRPM

## 2021-11-24 DIAGNOSIS — S63.502A LEFT WRIST SPRAIN, INITIAL ENCOUNTER: ICD-10-CM
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
## 2021-11-24 DIAGNOSIS — S63.92XA HAND SPRAIN, LEFT, INITIAL ENCOUNTER: ICD-10-CM

## 2021-11-24 PROCEDURE — 6370000000 HC RX 637 (ALT 250 FOR IP): Performed by: EMERGENCY MEDICINE

## 2021-11-24 PROCEDURE — 73130 X-RAY EXAM OF HAND: CPT

## 2021-11-24 PROCEDURE — 99284 EMERGENCY DEPT VISIT MOD MDM: CPT

## 2021-11-24 PROCEDURE — 73090 X-RAY EXAM OF FOREARM: CPT

## 2021-11-24 RX ORDER — IBUPROFEN 800 MG/1
800 TABLET ORAL EVERY 8 HOURS PRN
Qty: 20 TABLET | Refills: 0 | Status: ON HOLD | OUTPATIENT
Start: 2021-11-24 | End: 2022-05-14 | Stop reason: HOSPADM

## 2021-11-24 RX ORDER — IBUPROFEN 800 MG/1
800 TABLET ORAL ONCE
Status: COMPLETED | OUTPATIENT
Start: 2021-11-24 | End: 2021-11-24

## 2021-11-24 RX ADMIN — IBUPROFEN 800 MG: 800 TABLET, FILM COATED ORAL at 19:31

## 2021-11-24 ASSESSMENT — PAIN SCALES - GENERAL
PAINLEVEL_OUTOF10: 9
PAINLEVEL_OUTOF10: 8

## 2021-11-24 ASSESSMENT — PAIN DESCRIPTION - LOCATION: LOCATION: ARM

## 2021-11-24 ASSESSMENT — PAIN DESCRIPTION - FREQUENCY: FREQUENCY: CONTINUOUS

## 2021-11-24 ASSESSMENT — PAIN DESCRIPTION - DESCRIPTORS: DESCRIPTORS: ACHING

## 2021-11-24 ASSESSMENT — PAIN DESCRIPTION - ORIENTATION: ORIENTATION: LEFT
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
## 2022-05-12 ENCOUNTER — APPOINTMENT (OUTPATIENT)
Dept: CT IMAGING | Age: 42
DRG: 469 | End: 2022-05-12
Payer: MEDICAID

## 2022-05-12 ENCOUNTER — HOSPITAL ENCOUNTER (INPATIENT)
Age: 42
LOS: 1 days | Discharge: LEFT BEFORE DISCHARGE | DRG: 469 | End: 2022-05-14
Attending: EMERGENCY MEDICINE | Admitting: INTERNAL MEDICINE
Payer: MEDICAID

## 2022-05-12 DIAGNOSIS — K85.90 ACUTE PANCREATITIS WITHOUT INFECTION OR NECROSIS, UNSPECIFIED PANCREATITIS TYPE: ICD-10-CM

## 2022-05-12 DIAGNOSIS — R11.2 NON-INTRACTABLE VOMITING WITH NAUSEA, UNSPECIFIED VOMITING TYPE: Primary | ICD-10-CM

## 2022-05-12 DIAGNOSIS — N17.9 AKI (ACUTE KIDNEY INJURY) (HCC): ICD-10-CM

## 2022-05-12 DIAGNOSIS — Z87.74 H/O ARTERIOVENOUS MALFORMATION (AVM): ICD-10-CM

## 2022-05-12 DIAGNOSIS — H54.61 VISION LOSS OF RIGHT EYE: ICD-10-CM

## 2022-05-12 LAB
A/G RATIO: 1 (ref 1.1–2.2)
ALBUMIN SERPL-MCNC: 3.5 G/DL (ref 3.4–5)
ALP BLD-CCNC: 86 U/L (ref 40–129)
ALT SERPL-CCNC: 28 U/L (ref 10–40)
ANION GAP SERPL CALCULATED.3IONS-SCNC: 15 MMOL/L (ref 3–16)
AST SERPL-CCNC: 26 U/L (ref 15–37)
BANDED NEUTROPHILS RELATIVE PERCENT: 1 % (ref 0–7)
BASOPHILS ABSOLUTE: 0 K/UL (ref 0–0.2)
BASOPHILS RELATIVE PERCENT: 0 %
BILIRUB SERPL-MCNC: 1.2 MG/DL (ref 0–1)
BUN BLDV-MCNC: 34 MG/DL (ref 7–20)
CALCIUM SERPL-MCNC: 9.7 MG/DL (ref 8.3–10.6)
CHLORIDE BLD-SCNC: 102 MMOL/L (ref 99–110)
CO2: 27 MMOL/L (ref 21–32)
CREAT SERPL-MCNC: 2.1 MG/DL (ref 0.9–1.3)
EOSINOPHILS ABSOLUTE: 0 K/UL (ref 0–0.6)
EOSINOPHILS RELATIVE PERCENT: 0 %
GFR AFRICAN AMERICAN: 42
GFR NON-AFRICAN AMERICAN: 35
GLUCOSE BLD-MCNC: 158 MG/DL (ref 70–99)
HCT VFR BLD CALC: 48.9 % (ref 40.5–52.5)
HEMOGLOBIN: 15.8 G/DL (ref 13.5–17.5)
INR BLD: 1.01 (ref 0.88–1.12)
LACTIC ACID: 1.3 MMOL/L (ref 0.4–2)
LIPASE: 185 U/L (ref 13–60)
LYMPHOCYTES ABSOLUTE: 2.3 K/UL (ref 1–5.1)
LYMPHOCYTES RELATIVE PERCENT: 15 %
MCH RBC QN AUTO: 28.8 PG (ref 26–34)
MCHC RBC AUTO-ENTMCNC: 32.3 G/DL (ref 31–36)
MCV RBC AUTO: 89.2 FL (ref 80–100)
MONOCYTES ABSOLUTE: 0.6 K/UL (ref 0–1.3)
MONOCYTES RELATIVE PERCENT: 4 %
NEUTROPHILS ABSOLUTE: 12.4 K/UL (ref 1.7–7.7)
NEUTROPHILS RELATIVE PERCENT: 80 %
PDW BLD-RTO: 15.1 % (ref 12.4–15.4)
PLATELET # BLD: 185 K/UL (ref 135–450)
PMV BLD AUTO: 8.2 FL (ref 5–10.5)
POTASSIUM REFLEX MAGNESIUM: 3.8 MMOL/L (ref 3.5–5.1)
PROTHROMBIN TIME: 11.4 SEC (ref 9.9–12.7)
RBC # BLD: 5.48 M/UL (ref 4.2–5.9)
RBC # BLD: NORMAL 10*6/UL
SODIUM BLD-SCNC: 144 MMOL/L (ref 136–145)
TOTAL PROTEIN: 7.1 G/DL (ref 6.4–8.2)
TOXIC GRANULATION: PRESENT
TROPONIN: <0.01 NG/ML
WBC # BLD: 15.3 K/UL (ref 4–11)

## 2022-05-12 PROCEDURE — 99285 EMERGENCY DEPT VISIT HI MDM: CPT

## 2022-05-12 PROCEDURE — 96361 HYDRATE IV INFUSION ADD-ON: CPT

## 2022-05-12 PROCEDURE — 80053 COMPREHEN METABOLIC PANEL: CPT

## 2022-05-12 PROCEDURE — 93005 ELECTROCARDIOGRAM TRACING: CPT | Performed by: EMERGENCY MEDICINE

## 2022-05-12 PROCEDURE — 84484 ASSAY OF TROPONIN QUANT: CPT

## 2022-05-12 PROCEDURE — 96360 HYDRATION IV INFUSION INIT: CPT

## 2022-05-12 PROCEDURE — 83690 ASSAY OF LIPASE: CPT

## 2022-05-12 PROCEDURE — 85610 PROTHROMBIN TIME: CPT

## 2022-05-12 PROCEDURE — 74176 CT ABD & PELVIS W/O CONTRAST: CPT

## 2022-05-12 PROCEDURE — 85025 COMPLETE CBC W/AUTO DIFF WBC: CPT

## 2022-05-12 PROCEDURE — 2580000003 HC RX 258: Performed by: EMERGENCY MEDICINE

## 2022-05-12 PROCEDURE — 83605 ASSAY OF LACTIC ACID: CPT

## 2022-05-12 RX ORDER — 0.9 % SODIUM CHLORIDE 0.9 %
1000 INTRAVENOUS SOLUTION INTRAVENOUS ONCE
Status: COMPLETED | OUTPATIENT
Start: 2022-05-12 | End: 2022-05-13

## 2022-05-12 RX ADMIN — SODIUM CHLORIDE 1000 ML: 9 INJECTION, SOLUTION INTRAVENOUS at 23:00

## 2022-05-13 ENCOUNTER — APPOINTMENT (OUTPATIENT)
Dept: CT IMAGING | Age: 42
DRG: 469 | End: 2022-05-13
Payer: MEDICAID

## 2022-05-13 ENCOUNTER — APPOINTMENT (OUTPATIENT)
Dept: MRI IMAGING | Age: 42
DRG: 469 | End: 2022-05-13
Payer: MEDICAID

## 2022-05-13 PROBLEM — H54.61 VISION LOSS OF RIGHT EYE: Status: ACTIVE | Noted: 2022-05-13

## 2022-05-13 LAB
ALBUMIN SERPL-MCNC: 2.6 G/DL (ref 3.4–5)
ANION GAP SERPL CALCULATED.3IONS-SCNC: 9 MMOL/L (ref 3–16)
BACTERIA: ABNORMAL /HPF
BASOPHILS ABSOLUTE: 0.1 K/UL (ref 0–0.2)
BASOPHILS RELATIVE PERCENT: 0.4 %
BILIRUBIN URINE: ABNORMAL
BLOOD, URINE: ABNORMAL
BUN BLDV-MCNC: 32 MG/DL (ref 7–20)
C-REACTIVE PROTEIN: 3.6 MG/L (ref 0–5.1)
CALCIUM SERPL-MCNC: 8.9 MG/DL (ref 8.3–10.6)
CHLORIDE BLD-SCNC: 103 MMOL/L (ref 99–110)
CHOLESTEROL, TOTAL: 166 MG/DL (ref 0–199)
CLARITY: ABNORMAL
CO2: 23 MMOL/L (ref 21–32)
COLOR: ABNORMAL
CREAT SERPL-MCNC: 2.1 MG/DL (ref 0.9–1.3)
EOSINOPHILS ABSOLUTE: 0.1 K/UL (ref 0–0.6)
EOSINOPHILS RELATIVE PERCENT: 1.1 %
EPITHELIAL CELLS, UA: ABNORMAL /HPF (ref 0–5)
GFR AFRICAN AMERICAN: 42
GFR NON-AFRICAN AMERICAN: 35
GLUCOSE BLD-MCNC: 136 MG/DL (ref 70–99)
GLUCOSE BLD-MCNC: 142 MG/DL (ref 70–99)
GLUCOSE BLD-MCNC: 143 MG/DL (ref 70–99)
GLUCOSE BLD-MCNC: 145 MG/DL (ref 70–99)
GLUCOSE BLD-MCNC: 151 MG/DL (ref 70–99)
GLUCOSE BLD-MCNC: 173 MG/DL (ref 70–99)
GLUCOSE URINE: 100 MG/DL
HCT VFR BLD CALC: 43.9 % (ref 40.5–52.5)
HDLC SERPL-MCNC: 33 MG/DL (ref 40–60)
HEMOGLOBIN: 14.3 G/DL (ref 13.5–17.5)
KETONES, URINE: 15 MG/DL
LDL CHOLESTEROL CALCULATED: 91 MG/DL
LEUKOCYTE ESTERASE, URINE: ABNORMAL
LYMPHOCYTES ABSOLUTE: 2.4 K/UL (ref 1–5.1)
LYMPHOCYTES RELATIVE PERCENT: 19.2 %
MCH RBC QN AUTO: 29.3 PG (ref 26–34)
MCHC RBC AUTO-ENTMCNC: 32.5 G/DL (ref 31–36)
MCV RBC AUTO: 90.1 FL (ref 80–100)
MICROSCOPIC EXAMINATION: YES
MONOCYTES ABSOLUTE: 0.8 K/UL (ref 0–1.3)
MONOCYTES RELATIVE PERCENT: 6.1 %
NEUTROPHILS ABSOLUTE: 9 K/UL (ref 1.7–7.7)
NEUTROPHILS RELATIVE PERCENT: 73.2 %
NITRITE, URINE: NEGATIVE
PDW BLD-RTO: 15.3 % (ref 12.4–15.4)
PERFORMED ON: ABNORMAL
PH UA: 5.5 (ref 5–8)
PHOSPHORUS: 3.3 MG/DL (ref 2.5–4.9)
PLATELET # BLD: 151 K/UL (ref 135–450)
PMV BLD AUTO: 8.2 FL (ref 5–10.5)
POTASSIUM SERPL-SCNC: 3.7 MMOL/L (ref 3.5–5.1)
PROTEIN UA: >=300 MG/DL
RBC # BLD: 4.88 M/UL (ref 4.2–5.9)
RBC UA: ABNORMAL /HPF (ref 0–4)
SODIUM BLD-SCNC: 135 MMOL/L (ref 136–145)
SPECIFIC GRAVITY UA: 1.02 (ref 1–1.03)
TOTAL CK: 31 U/L (ref 39–308)
TRICHOMONAS: PRESENT /HPF
TRIGL SERPL-MCNC: 210 MG/DL (ref 0–150)
URINE REFLEX TO CULTURE: YES
URINE TYPE: ABNORMAL
UROBILINOGEN, URINE: 4 E.U./DL
VLDLC SERPL CALC-MCNC: 42 MG/DL
WBC # BLD: 12.3 K/UL (ref 4–11)
WBC UA: >100 /HPF (ref 0–5)

## 2022-05-13 PROCEDURE — 2060000000 HC ICU INTERMEDIATE R&B

## 2022-05-13 PROCEDURE — 86140 C-REACTIVE PROTEIN: CPT

## 2022-05-13 PROCEDURE — 87086 URINE CULTURE/COLONY COUNT: CPT

## 2022-05-13 PROCEDURE — 70450 CT HEAD/BRAIN W/O DYE: CPT

## 2022-05-13 PROCEDURE — 87077 CULTURE AEROBIC IDENTIFY: CPT

## 2022-05-13 PROCEDURE — 36415 COLL VENOUS BLD VENIPUNCTURE: CPT

## 2022-05-13 PROCEDURE — 81001 URINALYSIS AUTO W/SCOPE: CPT

## 2022-05-13 PROCEDURE — 80061 LIPID PANEL: CPT

## 2022-05-13 PROCEDURE — 2580000003 HC RX 258: Performed by: EMERGENCY MEDICINE

## 2022-05-13 PROCEDURE — 99223 1ST HOSP IP/OBS HIGH 75: CPT | Performed by: PSYCHIATRY & NEUROLOGY

## 2022-05-13 PROCEDURE — 70551 MRI BRAIN STEM W/O DYE: CPT

## 2022-05-13 PROCEDURE — 6370000000 HC RX 637 (ALT 250 FOR IP): Performed by: INTERNAL MEDICINE

## 2022-05-13 PROCEDURE — 70547 MR ANGIOGRAPHY NECK W/O DYE: CPT

## 2022-05-13 PROCEDURE — 2580000003 HC RX 258: Performed by: INTERNAL MEDICINE

## 2022-05-13 PROCEDURE — 82550 ASSAY OF CK (CPK): CPT

## 2022-05-13 PROCEDURE — 83036 HEMOGLOBIN GLYCOSYLATED A1C: CPT

## 2022-05-13 PROCEDURE — APPNB180 APP NON BILLABLE TIME > 60 MINS: Performed by: NURSE PRACTITIONER

## 2022-05-13 PROCEDURE — 70544 MR ANGIOGRAPHY HEAD W/O DYE: CPT

## 2022-05-13 PROCEDURE — 92610 EVALUATE SWALLOWING FUNCTION: CPT

## 2022-05-13 PROCEDURE — 85025 COMPLETE CBC W/AUTO DIFF WBC: CPT

## 2022-05-13 PROCEDURE — 80069 RENAL FUNCTION PANEL: CPT

## 2022-05-13 PROCEDURE — 6360000002 HC RX W HCPCS: Performed by: INTERNAL MEDICINE

## 2022-05-13 RX ORDER — NIFEDIPINE 60 MG/1
60 TABLET, EXTENDED RELEASE ORAL 2 TIMES DAILY
Status: DISCONTINUED | OUTPATIENT
Start: 2022-05-13 | End: 2022-05-14 | Stop reason: HOSPADM

## 2022-05-13 RX ORDER — ASPIRIN 81 MG/1
81 TABLET ORAL DAILY
Status: DISCONTINUED | OUTPATIENT
Start: 2022-05-14 | End: 2022-05-14 | Stop reason: HOSPADM

## 2022-05-13 RX ORDER — SODIUM CHLORIDE 9 MG/ML
1000 INJECTION, SOLUTION INTRAVENOUS CONTINUOUS
Status: DISCONTINUED | OUTPATIENT
Start: 2022-05-13 | End: 2022-05-14 | Stop reason: HOSPADM

## 2022-05-13 RX ORDER — DEXTROSE MONOHYDRATE 50 MG/ML
100 INJECTION, SOLUTION INTRAVENOUS PRN
Status: DISCONTINUED | OUTPATIENT
Start: 2022-05-13 | End: 2022-05-14 | Stop reason: HOSPADM

## 2022-05-13 RX ORDER — ONDANSETRON 4 MG/1
4 TABLET, ORALLY DISINTEGRATING ORAL EVERY 8 HOURS PRN
Status: DISCONTINUED | OUTPATIENT
Start: 2022-05-13 | End: 2022-05-14 | Stop reason: HOSPADM

## 2022-05-13 RX ORDER — ASPIRIN 300 MG/1
300 SUPPOSITORY RECTAL DAILY
Status: DISCONTINUED | OUTPATIENT
Start: 2022-05-14 | End: 2022-05-14 | Stop reason: HOSPADM

## 2022-05-13 RX ORDER — ONDANSETRON 2 MG/ML
4 INJECTION INTRAMUSCULAR; INTRAVENOUS EVERY 6 HOURS PRN
Status: DISCONTINUED | OUTPATIENT
Start: 2022-05-13 | End: 2022-05-14 | Stop reason: HOSPADM

## 2022-05-13 RX ORDER — INSULIN LISPRO 100 [IU]/ML
0-3 INJECTION, SOLUTION INTRAVENOUS; SUBCUTANEOUS NIGHTLY
Status: DISCONTINUED | OUTPATIENT
Start: 2022-05-13 | End: 2022-05-14 | Stop reason: HOSPADM

## 2022-05-13 RX ORDER — ROSUVASTATIN CALCIUM 20 MG/1
20 TABLET, COATED ORAL NIGHTLY
Status: DISCONTINUED | OUTPATIENT
Start: 2022-05-13 | End: 2022-05-14 | Stop reason: HOSPADM

## 2022-05-13 RX ORDER — POLYETHYLENE GLYCOL 3350 17 G/17G
17 POWDER, FOR SOLUTION ORAL DAILY PRN
Status: DISCONTINUED | OUTPATIENT
Start: 2022-05-13 | End: 2022-05-14 | Stop reason: HOSPADM

## 2022-05-13 RX ORDER — ENOXAPARIN SODIUM 100 MG/ML
30 INJECTION SUBCUTANEOUS 2 TIMES DAILY
Status: DISCONTINUED | OUTPATIENT
Start: 2022-05-13 | End: 2022-05-14 | Stop reason: HOSPADM

## 2022-05-13 RX ORDER — SODIUM CHLORIDE 9 MG/ML
1000 INJECTION, SOLUTION INTRAVENOUS CONTINUOUS
Status: DISCONTINUED | OUTPATIENT
Start: 2022-05-13 | End: 2022-05-13

## 2022-05-13 RX ORDER — AMLODIPINE BESYLATE 5 MG/1
5 TABLET ORAL DAILY
Status: DISCONTINUED | OUTPATIENT
Start: 2022-05-13 | End: 2022-05-13

## 2022-05-13 RX ORDER — INSULIN LISPRO 100 [IU]/ML
0-6 INJECTION, SOLUTION INTRAVENOUS; SUBCUTANEOUS
Status: DISCONTINUED | OUTPATIENT
Start: 2022-05-13 | End: 2022-05-14 | Stop reason: HOSPADM

## 2022-05-13 RX ORDER — LABETALOL HYDROCHLORIDE 5 MG/ML
10 INJECTION, SOLUTION INTRAVENOUS EVERY 10 MIN PRN
Status: DISCONTINUED | OUTPATIENT
Start: 2022-05-13 | End: 2022-05-14 | Stop reason: HOSPADM

## 2022-05-13 RX ADMIN — ROSUVASTATIN CALCIUM 20 MG: 20 TABLET, COATED ORAL at 22:55

## 2022-05-13 RX ADMIN — INSULIN GLARGINE 10 UNITS: 100 INJECTION, SOLUTION SUBCUTANEOUS at 22:58

## 2022-05-13 RX ADMIN — ENOXAPARIN SODIUM 30 MG: 100 INJECTION SUBCUTANEOUS at 11:34

## 2022-05-13 RX ADMIN — AMLODIPINE BESYLATE 5 MG: 5 TABLET ORAL at 11:34

## 2022-05-13 RX ADMIN — CEFTRIAXONE 1000 MG: 1 INJECTION, POWDER, FOR SOLUTION INTRAMUSCULAR; INTRAVENOUS at 11:36

## 2022-05-13 RX ADMIN — NIFEDIPINE 60 MG: 60 TABLET, FILM COATED, EXTENDED RELEASE ORAL at 14:03

## 2022-05-13 RX ADMIN — INSULIN LISPRO 1 UNITS: 100 INJECTION, SOLUTION INTRAVENOUS; SUBCUTANEOUS at 22:57

## 2022-05-13 RX ADMIN — INSULIN LISPRO 1 UNITS: 100 INJECTION, SOLUTION INTRAVENOUS; SUBCUTANEOUS at 14:05

## 2022-05-13 RX ADMIN — INSULIN GLARGINE 10 UNITS: 100 INJECTION, SOLUTION SUBCUTANEOUS at 14:04

## 2022-05-13 RX ADMIN — SODIUM CHLORIDE 1000 ML: 9 INJECTION, SOLUTION INTRAVENOUS at 06:44

## 2022-05-13 RX ADMIN — NIFEDIPINE 60 MG: 60 TABLET, FILM COATED, EXTENDED RELEASE ORAL at 22:54

## 2022-05-13 RX ADMIN — ENOXAPARIN SODIUM 30 MG: 100 INJECTION SUBCUTANEOUS at 22:55

## 2022-05-13 ASSESSMENT — ENCOUNTER SYMPTOMS
VOICE CHANGE: 0
SHORTNESS OF BREATH: 0
BACK PAIN: 0
ABDOMINAL PAIN: 0

## 2022-05-13 ASSESSMENT — LIFESTYLE VARIABLES
HOW MANY STANDARD DRINKS CONTAINING ALCOHOL DO YOU HAVE ON A TYPICAL DAY: 1 OR 2
HOW OFTEN DO YOU HAVE A DRINK CONTAINING ALCOHOL: MONTHLY OR LESS

## 2022-05-13 ASSESSMENT — PAIN SCALES - GENERAL
PAINLEVEL_OUTOF10: 0

## 2022-05-13 NOTE — ED NOTES
Visual acuity repeated L eye 20/200      R eye - 20/25. EMD aware.      Willow Sesay RN  05/12/22 [id number]

## 2022-05-13 NOTE — ED TRIAGE NOTES
Patient c/o nausea for several days x three. Has been in bed for three days in the dark, today noticed that he could not see R eye. Denies headache, intermittent abdomen pain. Unable to see out of R eye doing visual acuity.  EMD here

## 2022-05-13 NOTE — ED NOTES
Report called to Raimundo GARCIA at [facility].      Raheem Galaviz RN  05/13/22 [id number]
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
## 2022-05-13 NOTE — PLAN OF CARE
Problem: SLP Adult - Impaired Swallowing  Goal: By Discharge: Advance to least restrictive diet without signs or symptoms of aspiration for planned discharge setting. See evaluation for individualized goals.   Outcome: Progressing

## 2022-05-13 NOTE — PROGRESS NOTES
Speech Language Pathology  Facility/Department: [place] [address] [facility]   CLINICAL BEDSIDE SWALLOW EVALUATION    NAME: Shane Herzog  : 1980  MRN: [id number]    ADMISSION DATE: 2022  ADMITTING DIAGNOSIS: has Amputation of third toe, left, traumatic ([address] Type II or unspecified type diabetes mellitus without mention of complication, not stated as uncontrolled ([address] Obesity (BMI 35.0-39.9 without comorbidity); DKA (diabetic ketoacidoses); Scrotal abscess; Perineal abscess; Sepsis due to Streptococcus agalactiae ([address] Morbid obesity due to excess calories ([address] Hypertension; Cellulitis of foot, left; Diabetic foot infection ([address] Diabetic ulcer of left foot associated with type 2 diabetes mellitus, with bone involvement without evidence of necrosis ([address] Diabetic polyneuropathy associated with type 2 diabetes mellitus ([address] Uncontrolled type 2 diabetes mellitus with hyperglycemia ([address] Ulcer of left foot, with fat layer exposed ([address] Acute osteomyelitis of right ankle or foot ([address] Cellulitis of second toe, right; Abscess of second toe, right; and Vision loss of right eye on their problem list.  ONSET DATE: 2022    Recent Chest Xray: none this admission    CT of Head: (2022)     Impression       1.  No acute intracranial process.         Date of Eval: 2022  Evaluating Therapist: Burnadette Apgar, SLP    Current Diet level:  Clear liquid      Primary Complaint  Patient Complaint: Denies difficulty swallowing. Pain:  Pain Assessment  Pain Assessment: None - Denies Pain  Pain Level: 0  Patient's Stated Pain Goal: 0 - No pain    Reason for Referral  Shane Herzog was referred for a bedside swallow evaluation to assess the efficiency of his swallow function, identify signs and symptoms of aspiration and make recommendations regarding safe dietary consistencies, effective compensatory strategies, and safe eating environment.     Impression  Dysphagia Diagnosis: No clinical indicators of dysphagia  Dysphagia Impression : Swallow function appears grossly intact; of note PO trials limited to clear liquids per diet order. Assessed tolerance thins via cup/straw/3 oz screen and mastication via large teaspoon full ice; pt with positive oral acceptance, timely oral prep, positive swallow movement, good oral clearance, no overt signs of aspiration or penetration, with patient passing 3 oz screen. Recommend continue current diet, and advance as able. Dysphagia Outcome Severity Scale: Level 6: Within functional limits/Modified independence     Treatment Plan  Requires SLP Intervention: Yes  Duration of Treatment: 1-2 wks or LOS  D/C Recommendations: To be determined       Recommended Diet and Intervention  Clears; recommend advance to regular solids when able  Recommended Form of Meds: PO  Recommendations: Dysphagia treatment  Therapeutic Interventions: Diet tolerance monitoring;Patient/Family education    Compensatory Swallowing Strategies  Compensatory Swallowing Strategies : Upright as possible for all oral intake    Treatment/Goals  Short-term Goals  Timeframe for Short-term Goals: 1-2 wks or LOS  Goal 1: Patient will tolerate least restrictive diet without overt signs of aspiration or associated decline in respiratory status. Goal 2: Patient/caregiver will demonstrate understanding of swallowing concerns/recommendations. Long-term Goals  Timeframe for Long-term Goals: NA    General  Chart Reviewed: Yes  Comments: Per yesterday's ED note (05/12/2022): 'Maryam Suresh is a 35 y.o. male who presents to the emergency department for vomiting, abdominal pain headache and loss of vision in his right eye. Patient's explains that about 3 days ago he started vomiting. He is also began having epigastric abdominal pain radiating to the left upper quadrant. He has no hematemesis. No diarrhea no constipation.   The patient also noticed that he has lost vision in his right eye though he is precautions in place; Bed alarm in place; Left in bed;Call light within reach       Therapy Time  SLP Individual Minutes  Time In: 1980  Time Out: 1252  Minutes: 22     SLP Total Treatment Time  Timed Code Treatment Minutes: 0 Minutes  Total Treatment Time: 22    Plan  Diet Recommendations: clears; recommend advance to regular solids as able/tolerated    Discharge Plan:  TBD  Discussed with RN, [facility]. Needs within reach. Electronically Signed by:  Niesha Mcintosh M.A., Jose Gifford   Speech-Language Pathologist  Pager #[phone]    This document will serve as a discharge summary if pt discharges before next treatment.

## 2022-05-13 NOTE — ED NOTES
EMD speaking with hospitalist. Requests EMD to call neurology and [facility]5 Melina Kramer, RN  05/13/22 7431

## 2022-05-13 NOTE — PLAN OF CARE
Problem: Discharge Planning  Goal: Discharge to home or other facility with appropriate resources  Outcome: Progressing  Flowsheets (Taken 5/13/2022 0854)  Discharge to home or other facility with appropriate resources: Identify barriers to discharge with patient and caregiver  Note: Pt to discharge to home after reviewing test results. Pt may stay overnight     Problem: Pain  Goal: Verbalizes/displays adequate comfort level or baseline comfort level  Outcome: Progressing  Note: Pt denies pain this shift     Problem: Safety - Adult  Goal: Free from fall injury  Outcome: Progressing     Problem: SLP Adult - Impaired Swallowing  Goal: By Discharge: Advance to least restrictive diet without signs or symptoms of aspiration for planned discharge setting. See evaluation for individualized goals.   5/13/2022 1359 by JOSE Reddy  Outcome: Progressing

## 2022-05-13 NOTE — CONSULTS
NEUROLOGY / Odilia Lazaro MD is requesting this consult. Reason for Consult:  R sided vision loss, hx of AVM  Admission Chief Complaint: Emesis and Other (loss of vision R eye)        History of Present Illness     Soraya Flores is a 35 y.o. y/o male with DM2, HTN, obesity (BMI 40), hx of AVM w/ stroke at 10 (per mother AVM did not rupture) who presents with severe emesis and partial vision loss to R eye (able to see some shadows and has floaters throughout visual field). Emesis started Monday evening, patient has been in dark room and he is unsure when vision loss started, it was noted until yesterday. Patient does recall vomiting violently. It was recommended in ED that patient have a dilated eye exam & stroke w/u, he was unable to transfer to  d/t bed availability and was transferred to [facility] for further w/u. Has hx of frequent headaches about 3 week, currently does not have headache. REVIEW OF SYSTEMS:   Constitutional- No weight loss or fevers   Eyes- No diplopia. No photophobia. Ears/nose/throat- No dysphagia. No Dysarthria   Cardiovascular- No palpitations. No chest pain   Respiratory- No dyspnea. No Cough   Gastrointestinal- No Abdominal pain. No Vomiting. Genitourinary- No incontinence. No urinary retention   Musculoskeletal- No myalgia. No arthralgia   Skin- No rash. No easy bruising. Psychiatric- No depression. No anxiety   Endocrine- No diabetes. No thyroid issues. Hematologic- No bleeding difficulty.  No fatigue   Neurologic- R eye vision loss     Past Medical, Surgical, Family, and Social History   PAST MEDICAL HISTORY:  Past Medical History:   Diagnosis Date    Amputation of third toe, left, traumatic ([address]     Anesthesia complication     has history of being aggitated and \"fights\"    Blood circulation, collateral     Cellulitis     Depression     Diabetic polyneuropathy associated with type 2 diabetes mellitus ([address] 2019    Hypertension     Morbid obesity due to excess calories [facility]     MRSA infection 2019    foot wound    Seizures (HCC)     Type II or unspecified type diabetes mellitus without mention of complication, not stated as uncontrolled     Unspecified cerebral artery occlusion with cerebral infarction     pt states at 10years of age     SURGICAL HISTORY:  Past Surgical History:   Procedure Laterality Date    AMPUTATION      left 3rd toe    FOOT AMPUTATION Left 2019    INCISION AND DRAINAGE, REVISION OF TRANSMETATARSAL AMPUTATION LEFT FOOT performed by Phil Nicholson DPM at [address] [place] [address]  2015    incision and drainage scrotal abcess  wound vac on    TOE AMPUTATION Right     partial great toe     FAMILY HISTORY & SOCIAL HISTORY:  Family history non-contributory  Family History   Problem Relation Age of Onset    Diabetes Mother     High Blood Pressure Mother     High Cholesterol Mother     Mental Illness Father     High Blood Pressure Father     Diabetes Father     Diabetes Paternal Grandfather      Social History     Tobacco Use    Smoking status: Current Every Day Smoker     Packs/day: 0.25     Years: 17.00     Pack years: 4.25     Types: Cigarettes, Cigars     Last attempt to quit: 2014     Years since quittin.9    Smokeless tobacco: Never Used   Vaping Use    Vaping Use: Never used   Substance Use Topics    Alcohol use: Yes     Comment: occasional    Drug use: Yes     Types: Marijuana Nara Mccain)     Comment: 2021          Allergies & Outpatient Medications   ALLERGIES:  Allergies   Allergen Reactions    Vicodin [Hydrocodone-Acetaminophen] Rash     Patient can tolerate Percocet without any problems     HOME MEDICATIONS:  Current Discharge Medication List      CONTINUE these medications which have NOT CHANGED    Details   ibuprofen (IBU) 800 MG tablet Take 1 tablet by mouth every 8 hours as needed for Pain  Qty: 20 tablet, Refills: 0      insulin glargine (LANTUS) 100 UNIT/ML injection pen Inject 30 Units into the skin 2 times daily  Qty: 5 pen, Refills: 3      insulin lispro (HUMALOG) 100 UNIT/ML pen Inject 15 Units into the skin 3 times daily (with meals)  Qty: 5 pen, Refills: 3      atorvastatin (LIPITOR) 40 MG tablet Take 1 tablet by mouth nightly  Qty: 30 tablet, Refills: 3      lisinopril (PRINIVIL;ZESTRIL) 10 MG tablet Take 1 tablet by mouth daily  Qty: 30 tablet, Refills: 3      metFORMIN (GLUCOPHAGE) 1000 MG tablet Take 1,000 mg by mouth 2 times daily (with meals)               Physical Exam   PHYSICAL EXAM:  BP (!) 173/99   Pulse 92   Temp 97.8 °F (36.6 °C) (Oral)   Resp 18   Wt (!) 330 lb (149.7 kg)   SpO2 97%   BMI 37.18 kg/m²     General Alert, no distress, well-nourished    Neurologic  Mental status:   Orientation to person, place, time, situation  Attention intact as able to attend well to the exam    Language fluent in conversation  Comprehension intact; follows simple commands    Cranial nerves:   CN2: left eye visual fields intact on gross assessment - R eye w/ minimal vision able to see some colors and shapes, has floaters throughout visual field. CN 3,4,6: Pupils equal and reactive to light, extraocular muscles intact  CN5: Facial sensation symmetric   CN7: Face symmetric  CN8: Hearing symmetric to spoken voice  CN9: Palate elevated symmetrically  CN11: Traps full strength on shoulder shrug  CN12: Tongue midline with protrusion    Motor Exam:   R  L    Deltoid 5 5   Biceps 5 5   Triceps 5 5   Wrist extension  5 5   Interossei 5 5      R  L    Hip flexion  5 5   Hip extension  5 5   Knee flexion  5 5   Knee extension  5 5   Ankle dorsiflexion  5 5   Ankle plantar flexion  5 5       Sensory: light touch intact and symmetric in all 4 extremities.   Cerebellar/coordination: finger nose finger normal without ataxia  Tone: normal in all 4 extremities    Cardiovascular: Warm, appears well perfused   Respiratory: Easy, non-labored respiratory pattern   Abdominal: Abdomen is without distention   Extremities: Upper and lower extremities are atraumatic in appearance without deformity. Diagnostic Results   IMAGES:  Images personally reviewed and agree w/ radiology interpretation. Head CT w/o contrast  Impression       1.  No acute intracranial process. LABS:  All results below personally reviewed. Pertinent positives & negatives are addressed in Impression & Recommendations below. LABS   Metabolic Panel Recent Labs     05/12/22 2125      K 3.8      CO2 27   BUN 34*   CREATININE 2.1*   GLUCOSE 158*   CALCIUM 9.7   LABALBU 3.5   ALKPHOS 86   ALT 28   AST 26      CBC / Coags Recent Labs     05/12/22 2125   WBC 15.3*   RBC 5.48   HGB 15.8   HCT 48.9      INR 1.01      Other Lab Results   Component Value Date    LABA1C 11.2 02/04/2019    LDLCALC 27 02/05/2019    TRIG 253 02/05/2019     Lab Results   Component Value Date    LACTA 1.3 05/12/2022          CURRENT SCHEDULED MEDICATIONS   Inpatient Medications   enoxaparin, 30 mg, SubCUTAneous, BID    [START ON 5/14/2022] aspirin, 81 mg, Oral, Daily **OR** [START ON 5/14/2022] aspirin, 300 mg, Rectal, Daily    rosuvastatin, 20 mg, Oral, Nightly    insulin glargine, 10 Units, SubCUTAneous, BID    insulin lispro, 0-6 Units, SubCUTAneous, TID WC    insulin lispro, 0-3 Units, SubCUTAneous, Nightly    amLODIPine, 5 mg, Oral, Daily   Infusions    sodium chloride 1,000 mL (05/13/22 0644)    dextrose        Antibiotics   Recent Abx Admin      No antibiotic orders with administrations found.                    IMPRESSION & RECOMMENDATIONS     IMPRESSION:  Patient is a 34 y/o M with acute onset vision loss to right eye based on symptoms would be concerned for possible retinal detachment vs retinal artery occlusion and believe patient should have urgent follow up with ophthalmology for dilated eye exam. With duration of symptoms not sure there is anything else to do acutely for patient. Will obtain an MRI to assess for new stroke or any related changes with AVM, if this is all stable would recommend discharging with urgent ophtho follow up as well as follow up w/ neurosurgery for continued monitoring of AVM    RECOMMENDATIONS:  MRI / MRA of brain  Follow up ophthalmology for dilated eye exam  Follow up w/ neurosurgery for continued monitoring of AVM if present on MRI / MRA  Patient should defer from driving w/ new vision loss until seen by ophthalmology and full visual field assessment can be completed.      MICHELLE Serrano - CNP   Neurology & Neurocritical Care   Neurology Line: [phone]  [employer]: [facility] NPs  5/13/2022 10:40 AM

## 2022-05-13 NOTE — ED NOTES
Pt alert and oriented at time of transfer and agreeable to admission. Report to [employer]. Pt's glasses and jacket placed in belonging's bag.      Chicho Carpio RN  05/13/22 [id number]

## 2022-05-13 NOTE — CONSULTS
Nephrology Consult Note  [facility]  [phone]   [email or web address]        Reason for Consult:  MICHELLE on CKD    HISTORY OF PRESENT ILLNESS:                This is a patient with significant past medical history of MICHELLE events who presented with N/V x 3 days and new right eye blindness. He presented to [facility] ED. Head CT was normal.  He has a baseline Cr of 1.4-1.5, with a GFR over 60. His Cr today is 2.1. No hematuria, foamy urine, or leg swelling. He did have an MICHELLE event in 2014 at the time of an abscess and infection. Past Medical History:        Diagnosis Date    Amputation of third toe, left, traumatic ([address]     Anesthesia complication     has history of being aggitated and \"fights\"    Blood circulation, collateral     Cellulitis     Depression     Diabetic polyneuropathy associated with type 2 diabetes mellitus ([facility] 2/6/2019    Hypertension     Morbid obesity due to excess calories ([facility]     MRSA infection 02/04/2019    foot wound    Seizures (HCC)     Type II or unspecified type diabetes mellitus without mention of complication, not stated as uncontrolled     Unspecified cerebral artery occlusion with cerebral infarction     pt states at 10years of age       Past Surgical History:        Procedure Laterality Date    AMPUTATION      left 3rd toe    FOOT AMPUTATION Left 2/7/2019    INCISION AND DRAINAGE, REVISION OF TRANSMETATARSAL AMPUTATION LEFT FOOT performed by Jennifer John DPM at [address] [place] [address]  8-    incision and drainage scrotal abcess  wound vac on    TOE AMPUTATION Right     partial great toe       Current Medications:    No current facility-administered medications on file prior to encounter.      Current Outpatient Medications on File Prior to Encounter   Medication Sig Dispense Refill    ibuprofen (IBU) 800 MG tablet Take 1 tablet by mouth every 8 hours as needed for Pain 20 tablet 0    insulin glargine (LANTUS) 100 UNIT/ML injection pen Inject 30 Units into the skin 2 times daily 5 pen 3    insulin lispro (HUMALOG) 100 UNIT/ML pen Inject 15 Units into the skin 3 times daily (with meals) 5 pen 3    atorvastatin (LIPITOR) 40 MG tablet Take 1 tablet by mouth nightly 30 tablet 3    lisinopril (PRINIVIL;ZESTRIL) 10 MG tablet Take 1 tablet by mouth daily 30 tablet 3    metFORMIN (GLUCOPHAGE) 1000 MG tablet Take 1,000 mg by mouth 2 times daily (with meals)         Allergies:  Vicodin [hydrocodone-acetaminophen]    Social History:    Social History     Socioeconomic History    Marital status: Single     Spouse name: Not on file    Number of children: Not on file    Years of education: Not on file    Highest education level: Not on file   Occupational History    Not on file   Tobacco Use    Smoking status: Current Every Day Smoker     Packs/day: 0.25     Years: 17.00     Pack years: 4.25     Types: Cigarettes, Cigars     Last attempt to quit: 2014     Years since quittin.9    Smokeless tobacco: Never Used   Vaping Use    Vaping Use: Never used   Substance and Sexual Activity    Alcohol use: Yes     Comment: occasional    Drug use: Yes     Types: Marijuana Lety Julissa)     Comment: 2021    Sexual activity: Yes     Partners: Female   Other Topics Concern    Not on file   Social History Narrative    Not on file     Social Determinants of Health     Financial Resource Strain:     Difficulty of Paying Living Expenses: Not on file   Food Insecurity:     Worried About Running Out of Food in the Last Year: Not on file    Geena of Food in the Last Year: Not on file   Transportation Needs:     Lack of Transportation (Medical): Not on file    Lack of Transportation (Non-Medical):  Not on file   Physical Activity:     Days of Exercise per Week: Not on file    Minutes of Exercise per Session: Not on file   Stress:     Feeling of Stress : Not on file   Social Connections:     Frequency of Communication with Friends and Family: Not on file    Frequency of Social Gatherings with Friends and Family: Not on file    Attends [employer] Services: Not on file    Active Member of Clubs or Organizations: Not on file    Attends Club or Organization Meetings: Not on file    Marital Status: Not on file   Intimate Partner Violence:     Fear of Current or Ex-Partner: Not on file    Emotionally Abused: Not on file    Physically Abused: Not on file    Sexually Abused: Not on file   Housing Stability:     Unable to Pay for Housing in the Last Year: Not on file    Number of [place] in the Last Year: Not on file    Unstable Housing in the Last Year: Not on file       Family History:       Problem Relation Age of Onset    Diabetes Mother     High Blood Pressure Mother     High Cholesterol Mother     Mental Illness Father     High Blood Pressure Father     Diabetes Father     Diabetes Paternal Grandfather          REVIEW OF SYSTEMS:    +right eye blindness. No chest pain. No shortness of breath. The remainder of the ROS is negative except per HPI. PHYSICAL EXAM:    Vitals:    BP (!) 173/99   Pulse 92   Temp 97.8 °F (36.6 °C) (Oral)   Resp 18   Wt (!) 330 lb (149.7 kg)   SpO2 97%   BMI 37.18 kg/m²   No intake/output data recorded. No intake/output data recorded. Physical Exam:  Gen: Resting in bed, NAD. HEENT: MMM, OP clear. CV: RRR no m/r. No S3.  Lungs: Clear bilaterally. No rhonchi. Abd: S/NT +BS  Ext: No edema, no cyanosis  Skin: Warm. No rashes appreciated. : No TTP over bladder, nondistended. No vision in right eye. Neuro: Alert and oriented x 3, nonfocal.  Joints: No erythema noted over joints.     DATA:    CBC:   Lab Results   Component Value Date    WBC 15.3 05/12/2022    RBC 5.48 05/12/2022    HGB 15.8 05/12/2022    HCT 48.9 05/12/2022    MCV 89.2 05/12/2022    MCH 28.8 05/12/2022    MCHC 32.3 05/12/2022    RDW 15.1 05/12/2022     05/12/2022    MPV 8.2 05/12/2022     BMP:    Lab Results   Component Value Date     05/12/2022    K 3.8 05/12/2022     05/12/2022    CO2 27 05/12/2022    BUN 34 05/12/2022    LABALBU 3.5 05/12/2022    CREATININE 2.1 05/12/2022    CALCIUM 9.7 05/12/2022    GFRAA 42 05/12/2022    GFRAA >60 06/29/2012    LABGLOM 35 05/12/2022    GLUCOSE 158 05/12/2022       IMPRESSION/RECOMMENDATIONS:      1. MICHELLE: Baseline Cr is 1.4-1.5, GFR of 60-70. Cr today is 2.1. Likely his MICHELLE is volume depletion in the setting of an ACE. Noted proteinuria on his UA, with 100+ WBC. Moderate blood, 3-4 RBC. Send CK, repeat UA and urine prot/Cr when he has improved. On IV fluids. Holding ACE. 2. Proteinuria: Likely diabetic nephropathy. 3. N/V: Symptomatic control. 4. Poor right eye vision: CT reviewed. Neuro and ophtho to see. MRI pending. 5. Hx of AV malformation, possible hx of CVA at age 10? 6. HTN urgency/emergency: BP not controlled. Stop norvasc, holding lisinopril. Start nifedipine BID. 7. DM2: Holding metformin. 8. Possible UTI: By UA. Send urine culture. Thank you for allowing me to participate in the care of this patient. I will continue to follow along. Please call with questions. Bebeto Romero MD  [facility]  [phone]  [facility]

## 2022-05-13 NOTE — ED NOTES
POCT . RN aware.       .Electronically signed by Thomas Hernandez RCP on 5/13/2022 at 6:33 AM     Thomas Hernandez RCP  05/13/22 1820

## 2022-05-13 NOTE — ED NOTES
AUSTIN spoke with neurology, waiting return call opthalmology.      Raheem Galaviz RN  05/13/22 5523

## 2022-05-13 NOTE — ED NOTES
Patient lying on R side, supported with pillows. Warm blanket in place, SR ^. Patient sleeping, arouses to verbal stimulation, EMD at bedside, updated on plan of care. Waiting on bed assignment at [facility]. Patient denies pain or SOB currently, feels comfortable.      Rodrick Acuña RN  05/13/22 1049

## 2022-05-13 NOTE — ED PROVIDER NOTES
[id number] [address] ENCOUNTER      Pt Name: Raven Smith  MRN: [id number]  [place] 1980  Date of evaluation: 5/12/2022  Provider: Shellie James MD    [address] [place] [address]       Chief Complaint   Patient presents with    Emesis    Other     loss of vision R eye         HISTORY OF PRESENT ILLNESS   (Location/Symptom, Timing/Onset,Context/Setting, Quality, Duration, Modifying Factors, Severity)  Note limiting factors. Raven Smith is a 35 y.o. male who presents to the emergency department for vomiting, abdominal pain headache and loss of vision in his right eye. Patient's explains that about 3 days ago he started vomiting. He is also began having epigastric abdominal pain radiating to the left upper quadrant. He has no hematemesis. No diarrhea no constipation. The patient also noticed that he has lost vision in his right eye though he is unable to tell me exactly when that has been. He said since has been vomiting he has been mostly in the room so he is uncertain if this occurred while he had been in the room. It was only today when he got out into the bright light that he noticed he had difficulty seeing out of the right eye. He describes it as blurry vision with ink blotches. It was not until much later in his ED stay that the patient mother presented and she mentioned that his symptoms had started on Monday night into Tuesday morning when she had spoken to the patient he mentioned that he had had a severe headache then he started vomiting. Since then he has not had the headache. She also mentioned that the patient has a history of an AV malformation. She said at the age of 10 he had a stroke but it did not rupture. He has not undergone any surgery for this. Patient has not had a fever. No neck pain or stiffness. Nursing notes were reviewed.     REVIEW OF SYSTEMS    (2-9 systems for level 4, 10 or more for level 5)     Review of Systems Constitutional: Negative for fever. HENT: Negative for voice change. Eyes: Positive for visual disturbance. Respiratory: Negative for shortness of breath. Cardiovascular: Negative for chest pain. Gastrointestinal: Negative for abdominal pain. Genitourinary: Negative for dysuria. Musculoskeletal: Negative for back pain, neck pain and neck stiffness. Skin: Negative for rash. Neurological: Positive for headaches. Negative for weakness and numbness. Hematological: Does not bruise/bleed easily.          PAST MEDICAL HISTORY     Past Medical History:   Diagnosis Date    Amputation of third toe, left, traumatic ([address]     Anesthesia complication     has history of being aggitated and \"fights\"    Blood circulation, collateral     Cellulitis     Depression     Diabetic polyneuropathy associated with type 2 diabetes mellitus ([facility] [address] 2/6/2019    Hypertension     Morbid obesity due to excess calories ([facility] [facility]     MRSA infection 02/04/2019    foot wound    Seizures (HCC)     Type II or unspecified type diabetes mellitus without mention of complication, not stated as uncontrolled     Unspecified cerebral artery occlusion with cerebral infarction     pt states at 10years of age         SURGICALHISTORY       Past Surgical History:   Procedure Laterality Date    AMPUTATION      left 3rd toe    FOOT AMPUTATION Left 2/7/2019    INCISION AND DRAINAGE, REVISION OF TRANSMETATARSAL AMPUTATION LEFT FOOT performed by Leroy Reddy DPM at [place] [address]  8-    incision and drainage scrotal abcess  wound vac on    TOE AMPUTATION Right     partial great toe         CURRENT MEDICATIONS       Previous Medications    ATORVASTATIN (LIPITOR) 40 MG TABLET    Take 1 tablet by mouth nightly    IBUPROFEN (IBU) 800 MG TABLET    Take 1 tablet by mouth every 8 hours as needed for Pain    INSULIN GLARGINE (LANTUS) 100 UNIT/ML INJECTION PEN    Inject 30 Units into the skin 2 times daily    INSULIN LISPRO (HUMALOG) 100 UNIT/ML PEN    Inject 15 Units into the skin 3 times daily (with meals)    LISINOPRIL (PRINIVIL;ZESTRIL) 10 MG TABLET    Take 1 tablet by mouth daily    METFORMIN (GLUCOPHAGE) 1000 MG TABLET    Take 1,000 mg by mouth 2 times daily (with meals)       ALLERGIES     Vicodin [hydrocodone-acetaminophen]    FAMILY HISTORY       Family History   Problem Relation Age of Onset    Diabetes Mother     High Blood Pressure Mother     High Cholesterol Mother     Mental Illness Father     High Blood Pressure Father     Diabetes Father     Diabetes Paternal Grandfather           SOCIAL HISTORY       Social History     Socioeconomic History    Marital status: Single     Spouse name: Not on file    Number of children: Not on file    Years of education: Not on file    Highest education level: Not on file   Occupational History    Not on file   Tobacco Use    Smoking status: Current Every Day Smoker     Packs/day: 0.25     Years: 17.00     Pack years: 4.25     Types: Cigarettes, Cigars     Last attempt to quit: 2014     Years since quittin.9    Smokeless tobacco: Never Used   Vaping Use    Vaping Use: Never used   Substance and Sexual Activity    Alcohol use: Yes     Comment: occasional    Drug use: Yes     Types: Marijuana Rodena Capes)     Comment: 2021    Sexual activity: Yes     Partners: Female   Other Topics Concern    Not on file   Social History Narrative    Not on file     Social Determinants of Health     Financial Resource Strain:     Difficulty of Paying Living Expenses: Not on file   Food Insecurity:     Worried About Running Out of Food in the Last Year: Not on file    Geena of Food in the Last Year: Not on file   Transportation Needs:     Lack of Transportation (Medical): Not on file    Lack of Transportation (Non-Medical):  Not on file   Physical Activity:     Days of Exercise per Week: Not on file    Minutes of Exercise per Session: Not on file   Stress:     Feeling of Stress : Not on file   Social Connections:     Frequency of Communication with Friends and Family: Not on file    Frequency of Social Gatherings with Friends and Family: Not on file    Attends [employer] Services: Not on file    Active Member of Clubs or Organizations: Not on file    Attends Club or Organization Meetings: Not on file    Marital Status: Not on file   Intimate Partner Violence:     Fear of Current or Ex-Partner: Not on file    Emotionally Abused: Not on file    Physically Abused: Not on file    Sexually Abused: Not on file   Housing Stability:     Unable to Pay for Housing in the Last Year: Not on file    Number of [place] in the Last Year: Not on file    Unstable Housing in the Last Year: Not on file       SCREENINGS   NIH Stroke Scale  Interval: Baseline  Level of Consciousness (1a): Alert  LOC Questions (1b): Answers both correctly  LOC Commands (1c): Performs both tasks correctly  Best Gaze (2): Normal  Visual (3): No visual loss  Facial Palsy (4): Normal symmetrical movement  Motor Arm, Left (5a): No drift  Motor Arm, Right (5b): No drift  Motor Leg, Left (6a): No drift  Motor Leg, Right (6b): No drift  Limb Ataxia (7): Absent  Sensory (8): Normal  Best Language (9): No aphasia  Dysarthria (10): Normal  Extinction and Inattention (11): No abnormality  Total: 0[place] Coma Scale  Eye Opening: Spontaneous  Best Verbal Response: Oriented  Best Motor Response: Obeys commands  [place] Coma Scale Score: 15        PHYSICAL EXAM    (up to 7 for level 4, 8 or more for level 5)     ED Triage Vitals   BP Temp Temp Source Pulse Resp SpO2 Height Weight   05/12/22 2100 05/12/22 2100 05/12/22 2100 05/12/22 2330 05/12/22 2330 05/12/22 2100 -- 05/12/22 2040   (!) 150/82 98.4 °F (36.9 °C) Oral 86 18 96 %  (!) 330 lb (149.7 kg)       Physical Exam  Vitals and nursing note reviewed. Constitutional:       Appearance: Normal appearance. He is well-developed. He is not ill-appearing. HENT:      Head: Normocephalic and atraumatic. Right Ear: External ear normal.      Left Ear: External ear normal.      Nose: Nose normal.   Eyes:      General: No scleral icterus. Right eye: No discharge. Left eye: No discharge. Extraocular Movements: Extraocular movements intact. Conjunctiva/sclera: Conjunctivae normal.      Pupils: Pupils are equal, round, and reactive to light. Comments: Eye pressure, right, 12. Vision reduced in the right eye to perception of light and shadows. There is no opacification of the lens on the right. The patient has normal.  Finger responds to direct and consensual light. Cardiovascular:      Rate and Rhythm: Normal rate and regular rhythm. Heart sounds: Normal heart sounds. Pulmonary:      Effort: Pulmonary effort is normal. No respiratory distress. Breath sounds: Normal breath sounds. No wheezing or rales. Abdominal:      General: Bowel sounds are normal. There is no distension. Palpations: Abdomen is soft. Tenderness: There is abdominal tenderness in the epigastric area and left upper quadrant. There is no guarding or rebound. Musculoskeletal:      Cervical back: Neck supple. Skin:     Coloration: Skin is not pale. Neurological:      Mental Status: He is alert. Comments: NIH 0.    Psychiatric:         Mood and Affect: Mood normal.         Behavior: Behavior normal.             DIAGNOSTIC RESULTS     EKG: All EKG's are interpreted by the Emergency Department Physician who either signs or Co-signs this chart in the absence of a cardiologist.    12 lead EKG shows Normal sinus rhythm, WI interval QRS QTC normal.  Normal axis.   No acute ischemic changes    RADIOLOGY:   Non-plain film images such as CT, Ultrasound and MRI are read by the radiologist. Plain radiographic images are visualized and preliminarily interpreted by the emergency physician with the below findings:        Interpretation per the Radiologist below, if available at the time of this note:    CT Head WO Contrast   Final Result      1. No acute intracranial process. CT ABDOMEN PELVIS WO CONTRAST Additional Contrast? None   Final Result      1. No acute abnormality on noncontrast CT of the abdomen and pelvis. ED BEDSIDE ULTRASOUND:   Performed by ED Physician - none    LABS:  Labs Reviewed   CBC WITH AUTO DIFFERENTIAL - Abnormal; Notable for the following components:       Result Value    WBC 15.3 (*)     Neutrophils Absolute 12.4 (*)     Toxic Granulation Present (*)     All other components within normal limits   COMPREHENSIVE METABOLIC PANEL W/ REFLEX TO MG FOR LOW K - Abnormal; Notable for the following components:    Glucose 158 (*)     BUN 34 (*)     CREATININE 2.1 (*)     GFR Non- 35 (*)     GFR African American 42 (*)     Albumin/Globulin Ratio 1.0 (*)     Total Bilirubin 1.2 (*)     All other components within normal limits   LIPASE - Abnormal; Notable for the following components:    Lipase 185.0 (*)     All other components within normal limits   PROTIME-INR   LACTIC ACID   TROPONIN   URINALYSIS WITH REFLEX TO CULTURE       All other labs were within normal range or not returned as of this dictation. EMERGENCY DEPARTMENT COURSE and DIFFERENTIAL DIAGNOSIS/MDM:   Vitals:    Vitals:    05/13/22 0330 05/13/22 0350 05/13/22 0403 05/13/22 0520   BP: (!) 154/72 (!) 158/64 (!) 158/64 (!) 156/88   Pulse:   78 80   Resp:   16 16   Temp:       TempSrc:       SpO2: 91% 97% 98% 99%   Weight:         Adult male who comes in with nausea and vomiting and pain in the epigastric region. On exam he has tenderness in the epigastric and left upper quadrant. Laboratory studies chest x-ray and EKG ordered. CT scan of the abdomen pelvis also ordered. CT scan of his head is also ordered. Laboratory studies reveal acute kidney injury elevated BUN and creatinine. Patient also has an elevated lipase.  his blood sugar hospitalist Dr. Roxane Washington ultimately accepts the patient to her facility. CRITICAL CARE TIME   Total Critical Care time was 50 minutes, excluding separately reportable procedures. There was a high probability of clinically significant/life threatening deterioration in the patient's condition which required my urgent intervention. CONSULTS:  None    PROCEDURES:       Procedures    FINAL IMPRESSION      1. Non-intractable vomiting with nausea, unspecified vomiting type    2. Vision loss of right eye    3. H/O arteriovenous malformation (AVM)    4. MICHELLE (acute kidney injury) ([facility] [address]    5. Acute pancreatitis without infection or necrosis, unspecified pancreatitis type          DISPOSITION/PLAN   DISPOSITION Decision To Admit 05/12/2022 11:58:58 PM      PATIENT REFERREDTO:  No follow-up provider specified.     DISCHARGEMEDICATIONS:  New Prescriptions    No medications on file          (Please note that portions of this note were completed with a voice recognition program.  Efforts were made to edit the dictations but occasionally words are mis-transcribed.)    Radha Copeland MD (electronically signed)  Attending Emergency Physician          Radha Copeland MD  05/13/22 5893

## 2022-05-13 NOTE — CONSULTS
GI consult Note    Shane Herzog is a 35 y.o. male patient. 1. Non-intractable vomiting with nausea, unspecified vomiting type    2. Vision loss of right eye    3. H/O arteriovenous malformation (AVM)    4. MICHELLE (acute kidney injury) ([facility] [address]    5. Acute pancreatitis without infection or necrosis, unspecified pancreatitis type        Admit Date: 5/12/2022    Subjective:       Seen at bed side and evaluated  Records reviewed  GI symptoms much improved      ROS:  Cardiovascular ROS: no chest pain or dyspnea on exertion  Gastrointestinal ROS: nausea and vomiting on admission.  Now better  Respiratory ROS: no cough, shortness of breath, or wheezing    Scheduled Meds:   enoxaparin  30 mg SubCUTAneous BID    [START ON 5/14/2022] aspirin  81 mg Oral Daily    Or    [START ON 5/14/2022] aspirin  300 mg Rectal Daily    rosuvastatin  20 mg Oral Nightly    insulin glargine  10 Units SubCUTAneous BID    insulin lispro  0-6 Units SubCUTAneous TID WC    insulin lispro  0-3 Units SubCUTAneous Nightly    cefTRIAXone (ROCEPHIN) IV  1,000 mg IntraVENous Q24H    NIFEdipine  60 mg Oral BID       Continuous Infusions:   dextrose      sodium chloride 100 mL/hr at 05/13/22 1131       PRN Meds:  ondansetron **OR** ondansetron, polyethylene glycol, labetalol, glucose, dextrose bolus **OR** dextrose bolus, glucagon (rDNA), dextrose      Objective:       Patient Vitals for the past 24 hrs:   BP Temp Temp src Pulse Resp SpO2 Weight   05/13/22 1710 (!) 183/97 98 °F (36.7 °C) Oral 77 18 100 % --   05/13/22 1210 (!) 193/99 97.4 °F (36.3 °C) Oral 74 18 100 % --   05/13/22 0851 (!) 173/99 -- -- -- -- -- --   05/13/22 0838 (!) 173/99 97.8 °F (36.6 °C) Oral 75 18 97 % --   05/13/22 0758 (!) 142/104 -- -- 75 16 100 % --   05/13/22 0520 (!) 156/88 -- -- 80 16 99 % --   05/13/22 0403 (!) 158/64 -- -- 78 16 98 % --   05/13/22 0350 (!) 158/64 -- -- -- -- 97 % --   05/13/22 0330 (!) 154/72 -- -- -- -- 91 % --   05/13/22 0300 (!) 169/74 -- -- -- -- 90 % --   22 0231 (!) 164/69 -- -- -- -- 96 % --   22 0213 (!) 153/84 -- -- 82 16 98 % --   22 0017 (!) 158/74 -- -- 79 16 100 % --   22 2330 (!) 162/92 -- -- 86 18 99 % --   22 2100 (!) 150/82 98.4 °F (36.9 °C) Oral -- -- 96 % --   220 -- -- -- -- -- -- (!) 330 lb (149.7 kg)       Exam:    VITALS:  BP (!) 183/97   Pulse 77   Temp 98 °F (36.7 °C) (Oral)   Resp 18   Wt (!) 330 lb (149.7 kg)   SpO2 100%   BMI 37.18 kg/m²   TEMPERATURE:  Current - Temp: 98 °F (36.7 °C); Max - Temp  Av.9 °F (36.6 °C)  Min: 97.4 °F (36.3 °C)  Max: 98.4 °F (36.9 °C)    NAD  General appearance: alert, appears stated age, cooperative and no distress  Head: Normocephalic, without obvious abnormality, atraumatic  Neck: supple, symmetrical, trachea midline and thyroid not enlarged, symmetric, no tenderness/mass/nodules  CVS:  RRR, Nl s1s2  Lungs CTA Bilaterally, normal effort  Abdomen: soft, non-tender; bowel sounds normal; no masses,  no organomegaly  AAOx3, No asterixis or encephalopathy  Extremities: No edema. Lab Data:  Recent Labs     22  140   WBC 15.3* 12.3*   HGB 15.8 14.3   HCT 48.9 43.9   MCV 89.2 90.1    151     Recent Labs     22  1405    135*   K 3.8 3.7    103   CO2 27 23   PHOS  --  3.3   BUN 34* 32*   CREATININE 2.1* 2.1*     Recent Labs     22   AST 26   ALT 28   BILITOT 1.2*   ALKPHOS 86     Recent Labs     22   LIPASE 185.0*     Recent Labs     22   PROT 7.1   INR 1.01     No results for input(s): PTT in the last 72 hours. No results for input(s): OCCULTBLD in the last 72 hours. Radiology review: as in records    Assessment:       Principal Problem:    Vision loss of right eye  Resolved Problems:    * No resolved hospital problems. *  nausea and vomiting and abdominal pain .  R/o ulcer vs acute gastritis    Recommendations:       EGD  Will order covid 19 test  May have to do his egd on Monday as his symptoms improved and dose not meet the criteria for emergent or urgent procedure.    Discussed with DR James Gerard MD  5/13/2022  7:15 PM

## 2022-05-13 NOTE — ED NOTES
Patient normally takes insulin injection QHS, has not had since Monday because he has not eaten. D/W EMD, will hold insulin for now.       Jose Guadalupe Duvall RN  05/13/22 0214

## 2022-05-14 VITALS
WEIGHT: 315 LBS | BODY MASS INDEX: 37.18 KG/M2 | TEMPERATURE: 97.8 F | RESPIRATION RATE: 18 BRPM | OXYGEN SATURATION: 97 % | HEART RATE: 80 BPM | DIASTOLIC BLOOD PRESSURE: 83 MMHG | SYSTOLIC BLOOD PRESSURE: 144 MMHG

## 2022-05-14 LAB
ALBUMIN SERPL-MCNC: 2.7 G/DL (ref 3.4–5)
ANION GAP SERPL CALCULATED.3IONS-SCNC: 8 MMOL/L (ref 3–16)
BUN BLDV-MCNC: 28 MG/DL (ref 7–20)
CALCIUM SERPL-MCNC: 9 MG/DL (ref 8.3–10.6)
CHLORIDE BLD-SCNC: 105 MMOL/L (ref 99–110)
CO2: 26 MMOL/L (ref 21–32)
CREAT SERPL-MCNC: 2 MG/DL (ref 0.9–1.3)
GFR AFRICAN AMERICAN: 44
GFR NON-AFRICAN AMERICAN: 37
GLUCOSE BLD-MCNC: 121 MG/DL (ref 70–99)
HCT VFR BLD CALC: 43.8 % (ref 40.5–52.5)
HEMOGLOBIN: 14.2 G/DL (ref 13.5–17.5)
MAGNESIUM: 1.8 MG/DL (ref 1.8–2.4)
MCH RBC QN AUTO: 29.4 PG (ref 26–34)
MCHC RBC AUTO-ENTMCNC: 32.6 G/DL (ref 31–36)
MCV RBC AUTO: 90.4 FL (ref 80–100)
ORGANISM: ABNORMAL
PDW BLD-RTO: 15.2 % (ref 12.4–15.4)
PHOSPHORUS: 3.2 MG/DL (ref 2.5–4.9)
PLATELET # BLD: 150 K/UL (ref 135–450)
PMV BLD AUTO: 8.2 FL (ref 5–10.5)
POTASSIUM SERPL-SCNC: 3.3 MMOL/L (ref 3.5–5.1)
RBC # BLD: 4.84 M/UL (ref 4.2–5.9)
SODIUM BLD-SCNC: 139 MMOL/L (ref 136–145)
URINE CULTURE, ROUTINE: ABNORMAL
WBC # BLD: 10.2 K/UL (ref 4–11)

## 2022-05-14 PROCEDURE — 85027 COMPLETE CBC AUTOMATED: CPT

## 2022-05-14 PROCEDURE — 6370000000 HC RX 637 (ALT 250 FOR IP): Performed by: INTERNAL MEDICINE

## 2022-05-14 PROCEDURE — 83735 ASSAY OF MAGNESIUM: CPT

## 2022-05-14 PROCEDURE — 80069 RENAL FUNCTION PANEL: CPT

## 2022-05-14 PROCEDURE — 36415 COLL VENOUS BLD VENIPUNCTURE: CPT

## 2022-05-14 PROCEDURE — 6360000002 HC RX W HCPCS: Performed by: INTERNAL MEDICINE

## 2022-05-14 PROCEDURE — 2580000003 HC RX 258: Performed by: INTERNAL MEDICINE

## 2022-05-14 RX ORDER — ACETAMINOPHEN 325 MG/1
650 TABLET ORAL EVERY 4 HOURS PRN
Status: DISCONTINUED | OUTPATIENT
Start: 2022-05-14 | End: 2022-05-14 | Stop reason: HOSPADM

## 2022-05-14 RX ORDER — ATORVASTATIN CALCIUM 40 MG/1
40 TABLET, FILM COATED ORAL NIGHTLY
Qty: 30 TABLET | Refills: 0 | Status: SHIPPED | OUTPATIENT
Start: 2022-05-14 | End: 2022-08-16

## 2022-05-14 RX ORDER — CEFUROXIME AXETIL 250 MG/1
250 TABLET ORAL 2 TIMES DAILY
Qty: 14 TABLET | Refills: 0 | Status: SHIPPED | OUTPATIENT
Start: 2022-05-14 | End: 2022-05-21

## 2022-05-14 RX ORDER — NIFEDIPINE 60 MG/1
60 TABLET, EXTENDED RELEASE ORAL 2 TIMES DAILY
Qty: 30 TABLET | Refills: 3 | Status: SHIPPED | OUTPATIENT
Start: 2022-05-14

## 2022-05-14 RX ORDER — PANTOPRAZOLE SODIUM 20 MG/1
20 TABLET, DELAYED RELEASE ORAL DAILY
Qty: 30 TABLET | Refills: 3 | Status: SHIPPED | OUTPATIENT
Start: 2022-05-14

## 2022-05-14 RX ORDER — ASPIRIN 81 MG/1
81 TABLET ORAL DAILY
Qty: 30 TABLET | Refills: 3 | Status: SHIPPED | OUTPATIENT
Start: 2022-05-15

## 2022-05-14 RX ADMIN — ENOXAPARIN SODIUM 30 MG: 100 INJECTION SUBCUTANEOUS at 10:36

## 2022-05-14 RX ADMIN — NIFEDIPINE 60 MG: 60 TABLET, FILM COATED, EXTENDED RELEASE ORAL at 10:36

## 2022-05-14 RX ADMIN — ASPIRIN 81 MG: 81 TABLET, COATED ORAL at 10:36

## 2022-05-14 RX ADMIN — INSULIN GLARGINE 10 UNITS: 100 INJECTION, SOLUTION SUBCUTANEOUS at 10:51

## 2022-05-14 RX ADMIN — CEFTRIAXONE 1000 MG: 1 INJECTION, POWDER, FOR SOLUTION INTRAMUSCULAR; INTRAVENOUS at 10:47

## 2022-05-14 RX ADMIN — SODIUM CHLORIDE 1000 ML: 9 INJECTION, SOLUTION INTRAVENOUS at 10:46

## 2022-05-14 ASSESSMENT — PAIN DESCRIPTION - PAIN TYPE: TYPE: ACUTE PAIN

## 2022-05-14 ASSESSMENT — PAIN SCALES - GENERAL
PAINLEVEL_OUTOF10: 10
PAINLEVEL_OUTOF10: 0

## 2022-05-14 ASSESSMENT — PAIN DESCRIPTION - LOCATION: LOCATION: HEAD

## 2022-05-14 ASSESSMENT — PAIN DESCRIPTION - FREQUENCY: FREQUENCY: INTERMITTENT

## 2022-05-14 ASSESSMENT — PAIN DESCRIPTION - ORIENTATION: ORIENTATION: ANTERIOR

## 2022-05-14 ASSESSMENT — PAIN DESCRIPTION - ONSET: ONSET: GRADUAL

## 2022-05-14 ASSESSMENT — PAIN - FUNCTIONAL ASSESSMENT: PAIN_FUNCTIONAL_ASSESSMENT: ACTIVITIES ARE NOT PREVENTED

## 2022-05-14 ASSESSMENT — PAIN DESCRIPTION - DESCRIPTORS: DESCRIPTORS: ACHING

## 2022-05-14 NOTE — PLAN OF CARE
Problem: Pain  Goal: Verbalizes/displays adequate comfort level or baseline comfort level  5/14/2022 0653 by Maxwell Richardson RN  Outcome: Progressing  Flowsheets (Taken 5/14/2022 9652)  Verbalizes/displays adequate comfort level or baseline comfort level:   Assess pain using appropriate pain scale   Encourage patient to monitor pain and request assistance  Note: Pt has 10/10 headache pain. Pt refused to take tylenol at this time. Lights dimmed. Will continue to monitor. Problem: Safety - Adult  Goal: Free from fall injury  5/14/2022 0653 by Maxwell Richardson RN  Outcome: Progressing  Flowsheets (Taken 5/14/2022 0377)  Free From Fall Injury: Instruct family/caregiver on patient safety  Note: Pt is independent and steady on feet. Pt scored as a low fall risk on Slaughter Fall Scale.

## 2022-05-14 NOTE — DISCHARGE SUMMARY
sounds. Musculoskeletal:         General: Normal range of motion. Skin:     General: Skin is warm and dry. Neurological:      Mental Status: He is alert. Comments: Right eye vision blurred            Consultants:   IP CONSULT TO NEUROLOGY  IP CONSULT TO NEPHROLOGY    Time Spent on Discharge:  30 minutes were spent in patient examination, evaluation, counseling as well as medication reconciliation, prescriptions for required medications, discharge plan and follow up. Surgeries/Procedures Performed:            Significant Diagnostic Studies:   Recent Labs:  CBC:   Recent Labs     05/12/22 2125 05/13/22 1405 05/14/22 0535   WBC 15.3* 12.3* 10.2   HGB 15.8 14.3 14.2   HCT 48.9 43.9 43.8   MCV 89.2 90.1 90.4    151 150     BMP:   Recent Labs     05/12/22 2125 05/13/22 1405 05/14/22 0535    135* 139   K 3.8 3.7 3.3*    103 105   CO2 27 23 26   PHOS  --  3.3 3.2   BUN 34* 32* 28*   CREATININE 2.1* 2.1* 2.0*     Mag:   Lab Results   Component Value Date    MG 1.80 05/14/2022     LIVER PROFILE:   Recent Labs     05/12/22 2125   AST 26   ALT 28   LIPASE 185.0*   BILITOT 1.2*   ALKPHOS 86     PT/INR:   Recent Labs     05/12/22 2125   PROTIME 11.4   INR 1.01     APTT: No results for input(s): APTT in the last 72 hours. BNP:  No results for input(s): BNP in the last 72 hours. CARDIAC ENZYMES:   Recent Labs     05/12/22 2125 05/13/22 1406   CKTOTAL  --  31*   TROPONINI <0.01  --          Radiology Last 7 Days:  MRA NECK WO CONTRAST   Final Result      1. No acute intracranial abnormality. 2.  Mild chronic small vessel ischemic disease. 3.  No aneurysms, vascular occlusions, or intracranial stenoses identified. 4. No MRI or MRA evidence of vascular malformation. 5.  No significant stenosis in the extracranial vertebral or carotid arteries. MRA HEAD WO CONTRAST   Final Result      1. No acute intracranial abnormality. 2.  Mild chronic small vessel ischemic disease. 3.  No aneurysms, vascular occlusions, or intracranial stenoses identified. 4. No MRI or MRA evidence of vascular malformation. 5.  No significant stenosis in the extracranial vertebral or carotid arteries. MRI BRAIN WO CONTRAST   Final Result      1. No acute intracranial abnormality. 2.  Mild chronic small vessel ischemic disease. 3.  No aneurysms, vascular occlusions, or intracranial stenoses identified. 4. No MRI or MRA evidence of vascular malformation. 5.  No significant stenosis in the extracranial vertebral or carotid arteries. CT Head WO Contrast   Final Result      1. No acute intracranial process. CT ABDOMEN PELVIS WO CONTRAST Additional Contrast? None   Final Result      1. No acute abnormality on noncontrast CT of the abdomen and pelvis. Treatment team at time of Discharge: Treatment Team: Attending Provider: Sawyer Hogan MD; Utilization Reviewer: Pa Mahajan RN; Consulting Physician: Maria L Ma MD; Utilization Reviewer: Nargis Wang RN; Registered Nurse: Tisha Pratt RN    Discharge Plan   Disposition: AMA    Provider Follow-Up:   Jesus Howe MD  [facility] [address]    In 1 day  follow up     Maria L Ma MD  [address] [place] [address] [phone]    Schedule an appointment as soon as possible for a visit in 1 week  follow up     Heaven Butler MD  [address] [place] [address]  [phone]    In 1 week  follow up     [address] [address]  [phone]    Schedule an appointment as soon as possible for a visit  new referral       Hospital/Incidental Findings Requiring Follow-Up:  [address]   Final Result      1. No acute intracranial abnormality. 2.  Mild chronic small vessel ischemic disease. 3.  No aneurysms, vascular occlusions, or intracranial stenoses identified. 4.   No MRI or MRA evidence of vascular malformation. 5.  No significant stenosis in the extracranial vertebral or carotid arteries. MRA HEAD WO CONTRAST   Final Result      1. No acute intracranial abnormality. 2.  Mild chronic small vessel ischemic disease. 3.  No aneurysms, vascular occlusions, or intracranial stenoses identified. 4. No MRI or MRA evidence of vascular malformation. 5.  No significant stenosis in the extracranial vertebral or carotid arteries. MRI BRAIN WO CONTRAST   Final Result      1. No acute intracranial abnormality. 2.  Mild chronic small vessel ischemic disease. 3.  No aneurysms, vascular occlusions, or intracranial stenoses identified. 4. No MRI or MRA evidence of vascular malformation. 5.  No significant stenosis in the extracranial vertebral or carotid arteries. CT Head WO Contrast   Final Result      1. No acute intracranial process. CT ABDOMEN PELVIS WO CONTRAST Additional Contrast? None   Final Result      1. No acute abnormality on noncontrast CT of the abdomen and pelvis. Discharge Medications         Medication List      START taking these medications    aspirin 81 MG EC tablet  Take 1 tablet by mouth daily  Start taking on:  May 15, 2022  Notes to patient: Aspirin  USE--Prevents heart attack and stroke (decreases platelet adhesion)  SIDE EFFECTS-- Stomach upset, bleeding/bruising more easily     cefUROXime 250 MG tablet  Commonly known as: CEFTIN  Take 1 tablet by mouth 2 times daily for 7 days  Notes to patient: Cefuroxime (Ceftin)  USE--  Treat bacterial infection  SIDE EFFECTS--   Upset stomach, diarrhea, vomiting     NIFEdipine 60 MG extended release tablet  Commonly known as: PROCARDIA XL  Take 1 tablet by mouth in the morning and at bedtime     pantoprazole 20 MG tablet  Commonly known as: Protonix  Take 1 tablet by mouth daily  Notes to patient: Pantoprazole  (Protonix)  USE--  Reduce stomach acid, protect stomach lining  SIDE EFFECTS--  Headache, diarrhea        CONTINUE taking these medications    atorvastatin 40 MG tablet  Commonly known as: LIPITOR  Take 1 tablet by mouth nightly     insulin glargine 100 UNIT/ML injection pen  Commonly known as: LANTUS;BASAGLAR  Inject 30 Units into the skin 2 times daily     insulin lispro 100 UNIT/ML pen  Commonly known as: HUMALOG  Inject 15 Units into the skin 3 times daily (with meals)        STOP taking these medications    ibuprofen 800 MG tablet  Commonly known as: IBU     lisinopril 10 MG tablet  Commonly known as: PRINIVIL;ZESTRIL     metFORMIN 1000 MG tablet  Commonly known as: GLUCOPHAGE           Where to Get Your Medications      You can get these medications from any pharmacy    Bring a paper prescription for each of these medications  · aspirin 81 MG EC tablet  · atorvastatin 40 MG tablet  · cefUROXime 250 MG tablet  · NIFEdipine 60 MG extended release tablet  · pantoprazole 20 MG tablet         Electronically signed by Cruzito Munoz MD on 5/14/22 at 12:08 PM EDT

## 2022-05-14 NOTE — PROGRESS NOTES
Pt leaving AMAwith all personal belongings. Pt walked down by this RN. IV and tele removed and tolerated well. Pt denies any questions at this time.

## 2022-05-15 LAB
EKG ATRIAL RATE: 88 BPM
EKG DIAGNOSIS: NORMAL
EKG P AXIS: 58 DEGREES
EKG P-R INTERVAL: 146 MS
EKG Q-T INTERVAL: 352 MS
EKG QRS DURATION: 88 MS
EKG QTC CALCULATION (BAZETT): 425 MS
EKG R AXIS: -2 DEGREES
EKG T AXIS: 44 DEGREES
EKG VENTRICULAR RATE: 88 BPM
ESTIMATED AVERAGE GLUCOSE: 159.9 MG/DL
HBA1C MFR BLD: 7.2 %

## 2022-05-15 PROCEDURE — 93010 ELECTROCARDIOGRAM REPORT: CPT | Performed by: INTERNAL MEDICINE

## 2022-08-16 ENCOUNTER — APPOINTMENT (OUTPATIENT)
Dept: GENERAL RADIOLOGY | Age: 42
End: 2022-08-16
Payer: COMMERCIAL

## 2022-08-16 ENCOUNTER — HOSPITAL ENCOUNTER (EMERGENCY)
Age: 42
Discharge: HOME | End: 2022-08-16
Attending: EMERGENCY MEDICINE
Payer: COMMERCIAL

## 2022-08-16 VITALS
TEMPERATURE: 98.1 F | WEIGHT: 315 LBS | DIASTOLIC BLOOD PRESSURE: 50 MMHG | SYSTOLIC BLOOD PRESSURE: 156 MMHG | OXYGEN SATURATION: 98 % | BODY MASS INDEX: 39.2 KG/M2 | RESPIRATION RATE: 16 BRPM | HEART RATE: 72 BPM

## 2022-08-16 DIAGNOSIS — S39.012A STRAIN OF LUMBAR REGION, INITIAL ENCOUNTER: Primary | ICD-10-CM
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
## 2022-08-16 DIAGNOSIS — M25.562 ACUTE PAIN OF LEFT KNEE: ICD-10-CM

## 2022-08-16 PROCEDURE — 99283 EMERGENCY DEPT VISIT LOW MDM: CPT

## 2022-08-16 PROCEDURE — 73560 X-RAY EXAM OF KNEE 1 OR 2: CPT

## 2022-08-16 RX ORDER — AMLODIPINE BESYLATE 10 MG/1
TABLET ORAL
COMMUNITY
Start: 2022-06-21

## 2022-08-16 RX ORDER — LIRAGLUTIDE 6 MG/ML
INJECTION SUBCUTANEOUS
COMMUNITY
Start: 2022-06-21

## 2022-08-16 RX ORDER — METHOCARBAMOL 750 MG/1
750 TABLET, FILM COATED ORAL 4 TIMES DAILY
Qty: 40 TABLET | Refills: 0 | Status: SHIPPED | OUTPATIENT
Start: 2022-08-16 | End: 2022-08-26

## 2022-08-16 RX ORDER — CARVEDILOL 12.5 MG/1
TABLET ORAL
COMMUNITY
Start: 2022-06-21

## 2022-08-16 RX ORDER — LISINOPRIL 10 MG/1
TABLET ORAL
COMMUNITY
Start: 2022-06-21

## 2022-08-16 ASSESSMENT — PAIN DESCRIPTION - LOCATION: LOCATION: GENERALIZED;BACK;NECK

## 2022-08-16 ASSESSMENT — PAIN SCALES - GENERAL: PAINLEVEL_OUTOF10: 8

## 2022-08-16 ASSESSMENT — PAIN - FUNCTIONAL ASSESSMENT: PAIN_FUNCTIONAL_ASSESSMENT: 0-10
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
## 2022-08-16 NOTE — DISCHARGE INSTRUCTIONS
The x-ray did not show any fractures. You may take Tylenol and ibuprofen on a scheduled basis. You may also take the muscle relaxer called Robaxin for muscle spasm. Please follow-up with your primary care doctor. Please return for worsening symptoms.

## 2022-08-16 NOTE — Clinical Note
Merna Blair was seen and treated in our emergency department on 8/16/2022. He may return to work on 08/19/2022. If you have any questions or concerns, please don't hesitate to call.       Mita Maria MD
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
## 2023-04-24 ENCOUNTER — APPOINTMENT (OUTPATIENT)
Dept: GENERAL RADIOLOGY | Age: 43
DRG: 304 | End: 2023-04-24
Payer: COMMERCIAL

## 2023-04-24 ENCOUNTER — HOSPITAL ENCOUNTER (INPATIENT)
Age: 43
LOS: 6 days | Discharge: LEFT BEFORE DISCHARGE | DRG: 304 | End: 2023-04-30
Attending: EMERGENCY MEDICINE | Admitting: INTERNAL MEDICINE
Payer: COMMERCIAL

## 2023-04-24 ENCOUNTER — APPOINTMENT (OUTPATIENT)
Dept: CT IMAGING | Age: 43
DRG: 304 | End: 2023-04-24
Payer: COMMERCIAL

## 2023-04-24 DIAGNOSIS — I16.0 HYPERTENSIVE URGENCY: ICD-10-CM

## 2023-04-24 DIAGNOSIS — R51.9 INTRACTABLE HEADACHE, UNSPECIFIED CHRONICITY PATTERN, UNSPECIFIED HEADACHE TYPE: ICD-10-CM

## 2023-04-24 DIAGNOSIS — N17.9 ACUTE KIDNEY INJURY (HCC): Primary | ICD-10-CM

## 2023-04-24 LAB
ALBUMIN SERPL-MCNC: 2.7 G/DL (ref 3.4–5)
ALBUMIN/GLOB SERPL: 0.7 {RATIO} (ref 1.1–2.2)
ALP SERPL-CCNC: 90 U/L (ref 40–129)
ALT SERPL-CCNC: 7 U/L (ref 10–40)
AMPHETAMINES UR QL SCN>1000 NG/ML: NORMAL
ANION GAP SERPL CALCULATED.3IONS-SCNC: 12 MMOL/L (ref 3–16)
AST SERPL-CCNC: 13 U/L (ref 15–37)
BACTERIA URNS QL MICRO: ABNORMAL /HPF
BARBITURATES UR QL SCN>200 NG/ML: NORMAL
BASE EXCESS BLDV CALC-SCNC: 0.9 MMOL/L (ref -3–3)
BASOPHILS # BLD: 0.1 K/UL (ref 0–0.2)
BASOPHILS NFR BLD: 0.6 %
BENZODIAZ UR QL SCN>200 NG/ML: NORMAL
BILIRUB SERPL-MCNC: 0.5 MG/DL (ref 0–1)
BILIRUB UR QL STRIP.AUTO: NEGATIVE
BUN SERPL-MCNC: 31 MG/DL (ref 7–20)
CALCIUM SERPL-MCNC: 9.5 MG/DL (ref 8.3–10.6)
CANNABINOIDS UR QL SCN>50 NG/ML: NORMAL
CHLORIDE SERPL-SCNC: 108 MMOL/L (ref 99–110)
CLARITY UR: CLEAR
CO2 BLDV-SCNC: 22 MMOL/L
CO2 SERPL-SCNC: 22 MMOL/L (ref 21–32)
COCAINE UR QL SCN: NORMAL
COLOR UR: YELLOW
CREAT SERPL-MCNC: 4.7 MG/DL (ref 0.9–1.3)
DEPRECATED RDW RBC AUTO: 15.8 % (ref 12.4–15.4)
DRUG SCREEN COMMENT UR-IMP: NORMAL
EOSINOPHIL # BLD: 0.1 K/UL (ref 0–0.6)
EOSINOPHIL NFR BLD: 0.9 %
EPI CELLS #/AREA URNS HPF: ABNORMAL /HPF (ref 0–5)
FENTANYL SCREEN, URINE: NORMAL
GFR SERPLBLD CREATININE-BSD FMLA CKD-EPI: 15 ML/MIN/{1.73_M2}
GLUCOSE BLD-MCNC: 149 MG/DL (ref 70–99)
GLUCOSE SERPL-MCNC: 180 MG/DL (ref 70–99)
GLUCOSE UR STRIP.AUTO-MCNC: 250 MG/DL
HCO3 BLDV-SCNC: 23.3 MMOL/L (ref 23–29)
HCT VFR BLD AUTO: 44.6 % (ref 40.5–52.5)
HGB BLD-MCNC: 14.8 G/DL (ref 13.5–17.5)
HGB UR QL STRIP.AUTO: ABNORMAL
KETONES UR STRIP.AUTO-MCNC: 15 MG/DL
LACTATE BLDV-SCNC: 1.8 MMOL/L (ref 0.4–1.9)
LEUKOCYTE ESTERASE UR QL STRIP.AUTO: NEGATIVE
LIPASE SERPL-CCNC: 75 U/L (ref 13–60)
LYMPHOCYTES # BLD: 1.7 K/UL (ref 1–5.1)
LYMPHOCYTES NFR BLD: 12.7 %
MCH RBC QN AUTO: 29.7 PG (ref 26–34)
MCHC RBC AUTO-ENTMCNC: 33.1 G/DL (ref 31–36)
MCV RBC AUTO: 89.7 FL (ref 80–100)
METHADONE UR QL SCN>300 NG/ML: NORMAL
MONOCYTES # BLD: 0.4 K/UL (ref 0–1.3)
MONOCYTES NFR BLD: 2.6 %
NEUTROPHILS # BLD: 11.4 K/UL (ref 1.7–7.7)
NEUTROPHILS NFR BLD: 83.2 %
NITRITE UR QL STRIP.AUTO: NEGATIVE
NT-PROBNP SERPL-MCNC: 1386 PG/ML (ref 0–124)
O2 THERAPY: ABNORMAL
OPIATES UR QL SCN>300 NG/ML: NORMAL
OXYCODONE UR QL SCN: NORMAL
PCO2 BLDV: 27 MMHG (ref 40–50)
PCP UR QL SCN>25 NG/ML: NORMAL
PERFORMED ON: ABNORMAL
PH BLDV: 7.54 [PH] (ref 7.35–7.45)
PH UR STRIP.AUTO: 7.5 [PH] (ref 5–8)
PH UR STRIP: 7.5 [PH]
PLATELET # BLD AUTO: 210 K/UL (ref 135–450)
PMV BLD AUTO: 7.9 FL (ref 5–10.5)
PO2 BLDV: 25.5 MMHG (ref 25–40)
POTASSIUM SERPL-SCNC: 4.5 MMOL/L (ref 3.5–5.1)
PROT SERPL-MCNC: 6.4 G/DL (ref 6.4–8.2)
PROT UR STRIP.AUTO-MCNC: >=300 MG/DL
RBC # BLD AUTO: 4.98 M/UL (ref 4.2–5.9)
RBC #/AREA URNS HPF: ABNORMAL /HPF (ref 0–4)
SAO2 % BLDV: 58 %
SODIUM SERPL-SCNC: 142 MMOL/L (ref 136–145)
SP GR UR STRIP.AUTO: 1.02 (ref 1–1.03)
TROPONIN, HIGH SENSITIVITY: 22 NG/L (ref 0–22)
TROPONIN, HIGH SENSITIVITY: 33 NG/L (ref 0–22)
UA COMPLETE W REFLEX CULTURE PNL UR: ABNORMAL
UA DIPSTICK W REFLEX MICRO PNL UR: YES
URN SPEC COLLECT METH UR: ABNORMAL
UROBILINOGEN UR STRIP-ACNC: 0.2 E.U./DL
WBC # BLD AUTO: 13.7 K/UL (ref 4–11)
WBC #/AREA URNS HPF: ABNORMAL /HPF (ref 0–5)

## 2023-04-24 PROCEDURE — 82803 BLOOD GASES ANY COMBINATION: CPT

## 2023-04-24 PROCEDURE — 73620 X-RAY EXAM OF FOOT: CPT

## 2023-04-24 PROCEDURE — 80053 COMPREHEN METABOLIC PANEL: CPT

## 2023-04-24 PROCEDURE — 96374 THER/PROPH/DIAG INJ IV PUSH: CPT

## 2023-04-24 PROCEDURE — 83880 ASSAY OF NATRIURETIC PEPTIDE: CPT

## 2023-04-24 PROCEDURE — 84484 ASSAY OF TROPONIN QUANT: CPT

## 2023-04-24 PROCEDURE — 96376 TX/PRO/DX INJ SAME DRUG ADON: CPT

## 2023-04-24 PROCEDURE — 6360000002 HC RX W HCPCS: Performed by: EMERGENCY MEDICINE

## 2023-04-24 PROCEDURE — 2580000003 HC RX 258: Performed by: EMERGENCY MEDICINE

## 2023-04-24 PROCEDURE — 2500000003 HC RX 250 WO HCPCS: Performed by: EMERGENCY MEDICINE

## 2023-04-24 PROCEDURE — 80307 DRUG TEST PRSMV CHEM ANLYZR: CPT

## 2023-04-24 PROCEDURE — 83605 ASSAY OF LACTIC ACID: CPT

## 2023-04-24 PROCEDURE — 2500000003 HC RX 250 WO HCPCS: Performed by: INTERNAL MEDICINE

## 2023-04-24 PROCEDURE — 70450 CT HEAD/BRAIN W/O DYE: CPT

## 2023-04-24 PROCEDURE — 81001 URINALYSIS AUTO W/SCOPE: CPT

## 2023-04-24 PROCEDURE — 99285 EMERGENCY DEPT VISIT HI MDM: CPT

## 2023-04-24 PROCEDURE — 96375 TX/PRO/DX INJ NEW DRUG ADDON: CPT

## 2023-04-24 PROCEDURE — 1200000000 HC SEMI PRIVATE

## 2023-04-24 PROCEDURE — 93005 ELECTROCARDIOGRAM TRACING: CPT | Performed by: EMERGENCY MEDICINE

## 2023-04-24 PROCEDURE — 83690 ASSAY OF LIPASE: CPT

## 2023-04-24 PROCEDURE — 96361 HYDRATE IV INFUSION ADD-ON: CPT

## 2023-04-24 PROCEDURE — 85025 COMPLETE CBC W/AUTO DIFF WBC: CPT

## 2023-04-24 RX ORDER — 0.9 % SODIUM CHLORIDE 0.9 %
1000 INTRAVENOUS SOLUTION INTRAVENOUS ONCE
Status: COMPLETED | OUTPATIENT
Start: 2023-04-24 | End: 2023-04-24

## 2023-04-24 RX ORDER — HALOPERIDOL 5 MG/ML
2 INJECTION INTRAMUSCULAR ONCE
Status: COMPLETED | OUTPATIENT
Start: 2023-04-24 | End: 2023-04-24

## 2023-04-24 RX ORDER — HYDRALAZINE HYDROCHLORIDE 20 MG/ML
10 INJECTION INTRAMUSCULAR; INTRAVENOUS ONCE
Status: COMPLETED | OUTPATIENT
Start: 2023-04-24 | End: 2023-04-24

## 2023-04-24 RX ORDER — LABETALOL HYDROCHLORIDE 5 MG/ML
10 INJECTION, SOLUTION INTRAVENOUS ONCE
Status: DISCONTINUED | OUTPATIENT
Start: 2023-04-24 | End: 2023-04-24

## 2023-04-24 RX ORDER — HYDRALAZINE HYDROCHLORIDE 20 MG/ML
10 INJECTION INTRAMUSCULAR; INTRAVENOUS EVERY 6 HOURS PRN
Status: DISCONTINUED | OUTPATIENT
Start: 2023-04-24 | End: 2023-04-30 | Stop reason: HOSPADM

## 2023-04-24 RX ORDER — KETOROLAC TROMETHAMINE 30 MG/ML
30 INJECTION, SOLUTION INTRAMUSCULAR; INTRAVENOUS ONCE
Status: DISCONTINUED | OUTPATIENT
Start: 2023-04-24 | End: 2023-04-24

## 2023-04-24 RX ORDER — DIPHENHYDRAMINE HYDROCHLORIDE 50 MG/ML
25 INJECTION INTRAMUSCULAR; INTRAVENOUS ONCE
Status: COMPLETED | OUTPATIENT
Start: 2023-04-24 | End: 2023-04-24

## 2023-04-24 RX ORDER — METOPROLOL TARTRATE 5 MG/5ML
5 INJECTION INTRAVENOUS ONCE
Status: DISCONTINUED | OUTPATIENT
Start: 2023-04-24 | End: 2023-04-24

## 2023-04-24 RX ORDER — LABETALOL HYDROCHLORIDE 5 MG/ML
10 INJECTION, SOLUTION INTRAVENOUS EVERY 4 HOURS PRN
Status: DISCONTINUED | OUTPATIENT
Start: 2023-04-24 | End: 2023-04-30 | Stop reason: HOSPADM

## 2023-04-24 RX ORDER — METOCLOPRAMIDE HYDROCHLORIDE 5 MG/ML
10 INJECTION INTRAMUSCULAR; INTRAVENOUS ONCE
Status: COMPLETED | OUTPATIENT
Start: 2023-04-24 | End: 2023-04-24

## 2023-04-24 RX ORDER — HYDRALAZINE HYDROCHLORIDE 20 MG/ML
20 INJECTION INTRAMUSCULAR; INTRAVENOUS ONCE
Status: COMPLETED | OUTPATIENT
Start: 2023-04-24 | End: 2023-04-24

## 2023-04-24 RX ORDER — MORPHINE SULFATE 2 MG/ML
4 INJECTION, SOLUTION INTRAMUSCULAR; INTRAVENOUS ONCE
Status: COMPLETED | OUTPATIENT
Start: 2023-04-24 | End: 2023-04-24

## 2023-04-24 RX ADMIN — METOCLOPRAMIDE HYDROCHLORIDE 10 MG: 5 INJECTION INTRAMUSCULAR; INTRAVENOUS at 15:47

## 2023-04-24 RX ADMIN — METOPROLOL TARTRATE 5 MG: 5 INJECTION INTRAVENOUS at 18:40

## 2023-04-24 RX ADMIN — HYDRALAZINE HYDROCHLORIDE 20 MG: 20 INJECTION INTRAMUSCULAR; INTRAVENOUS at 21:47

## 2023-04-24 RX ADMIN — MORPHINE SULFATE 4 MG: 2 INJECTION, SOLUTION INTRAMUSCULAR; INTRAVENOUS at 16:38

## 2023-04-24 RX ADMIN — HALOPERIDOL LACTATE 2 MG: 5 INJECTION, SOLUTION INTRAMUSCULAR at 18:40

## 2023-04-24 RX ADMIN — DIPHENHYDRAMINE HYDROCHLORIDE 25 MG: 50 INJECTION, SOLUTION INTRAMUSCULAR; INTRAVENOUS at 15:46

## 2023-04-24 RX ADMIN — LABETALOL HYDROCHLORIDE 10 MG: 5 INJECTION, SOLUTION INTRAVENOUS at 23:49

## 2023-04-24 RX ADMIN — HYDRALAZINE HYDROCHLORIDE 10 MG: 20 INJECTION INTRAMUSCULAR; INTRAVENOUS at 17:23

## 2023-04-24 RX ADMIN — SODIUM CHLORIDE 1000 ML: 9 INJECTION, SOLUTION INTRAVENOUS at 15:49

## 2023-04-24 ASSESSMENT — PAIN - FUNCTIONAL ASSESSMENT
PAIN_FUNCTIONAL_ASSESSMENT: 0-10
PAIN_FUNCTIONAL_ASSESSMENT: ACTIVITIES ARE NOT PREVENTED

## 2023-04-24 ASSESSMENT — PAIN DESCRIPTION - ORIENTATION
ORIENTATION: ANTERIOR
ORIENTATION: LEFT;RIGHT
ORIENTATION: ANTERIOR

## 2023-04-24 ASSESSMENT — PAIN DESCRIPTION - LOCATION
LOCATION: HEAD

## 2023-04-24 ASSESSMENT — PAIN DESCRIPTION - DESCRIPTORS
DESCRIPTORS: ACHING
DESCRIPTORS: ACHING

## 2023-04-24 ASSESSMENT — PAIN DESCRIPTION - ONSET: ONSET: ON-GOING

## 2023-04-24 ASSESSMENT — PAIN SCALES - GENERAL
PAINLEVEL_OUTOF10: 10

## 2023-04-24 ASSESSMENT — PAIN DESCRIPTION - FREQUENCY: FREQUENCY: CONTINUOUS

## 2023-04-24 ASSESSMENT — PAIN DESCRIPTION - PAIN TYPE
TYPE: ACUTE PAIN
TYPE: ACUTE PAIN

## 2023-04-25 ENCOUNTER — APPOINTMENT (OUTPATIENT)
Dept: GENERAL RADIOLOGY | Age: 43
DRG: 304 | End: 2023-04-25
Payer: COMMERCIAL

## 2023-04-25 LAB
ANION GAP SERPL CALCULATED.3IONS-SCNC: 11 MMOL/L (ref 3–16)
BASOPHILS # BLD: 0.1 K/UL (ref 0–0.2)
BASOPHILS NFR BLD: 0.7 %
BUN SERPL-MCNC: 32 MG/DL (ref 7–20)
CALCIUM SERPL-MCNC: 8.9 MG/DL (ref 8.3–10.6)
CHLORIDE SERPL-SCNC: 111 MMOL/L (ref 99–110)
CHLORIDE UR-SCNC: 35 MMOL/L
CK SERPL-CCNC: 122 U/L (ref 39–308)
CO2 SERPL-SCNC: 21 MMOL/L (ref 21–32)
CREAT SERPL-MCNC: 4.6 MG/DL (ref 0.9–1.3)
CRP SERPL-MCNC: <3 MG/L (ref 0–5.1)
DEPRECATED RDW RBC AUTO: 15.9 % (ref 12.4–15.4)
EKG ATRIAL RATE: 82 BPM
EKG DIAGNOSIS: NORMAL
EKG P AXIS: 54 DEGREES
EKG P-R INTERVAL: 180 MS
EKG Q-T INTERVAL: 392 MS
EKG QRS DURATION: 82 MS
EKG QTC CALCULATION (BAZETT): 457 MS
EKG R AXIS: 9 DEGREES
EKG T AXIS: -10 DEGREES
EKG VENTRICULAR RATE: 82 BPM
EOSINOPHIL # BLD: 0.1 K/UL (ref 0–0.6)
EOSINOPHIL NFR BLD: 0.5 %
ERYTHROCYTE [SEDIMENTATION RATE] IN BLOOD BY WESTERGREN METHOD: 74 MM/HR (ref 0–15)
GFR SERPLBLD CREATININE-BSD FMLA CKD-EPI: 15 ML/MIN/{1.73_M2}
GLUCOSE BLD-MCNC: 110 MG/DL (ref 70–99)
GLUCOSE BLD-MCNC: 119 MG/DL (ref 70–99)
GLUCOSE BLD-MCNC: 147 MG/DL (ref 70–99)
GLUCOSE BLD-MCNC: 85 MG/DL (ref 70–99)
GLUCOSE SERPL-MCNC: 107 MG/DL (ref 70–99)
HCT VFR BLD AUTO: 43.2 % (ref 40.5–52.5)
HGB BLD-MCNC: 14.2 G/DL (ref 13.5–17.5)
LACTATE BLDV-SCNC: 0.9 MMOL/L (ref 0.4–1.9)
LYMPHOCYTES # BLD: 3 K/UL (ref 1–5.1)
LYMPHOCYTES NFR BLD: 18.7 %
MCH RBC QN AUTO: 29.7 PG (ref 26–34)
MCHC RBC AUTO-ENTMCNC: 32.8 G/DL (ref 31–36)
MCV RBC AUTO: 90.6 FL (ref 80–100)
MONOCYTES # BLD: 0.8 K/UL (ref 0–1.3)
MONOCYTES NFR BLD: 5.3 %
NEUTROPHILS # BLD: 11.9 K/UL (ref 1.7–7.7)
NEUTROPHILS NFR BLD: 74.8 %
PERFORMED ON: ABNORMAL
PERFORMED ON: NORMAL
PLATELET # BLD AUTO: 204 K/UL (ref 135–450)
PMV BLD AUTO: 8 FL (ref 5–10.5)
POTASSIUM SERPL-SCNC: 3.9 MMOL/L (ref 3.5–5.1)
RBC # BLD AUTO: 4.77 M/UL (ref 4.2–5.9)
SODIUM SERPL-SCNC: 143 MMOL/L (ref 136–145)
SODIUM UR-SCNC: 50 MMOL/L
WBC # BLD AUTO: 15.9 K/UL (ref 4–11)

## 2023-04-25 PROCEDURE — 97162 PT EVAL MOD COMPLEX 30 MIN: CPT

## 2023-04-25 PROCEDURE — 83036 HEMOGLOBIN GLYCOSYLATED A1C: CPT

## 2023-04-25 PROCEDURE — 6370000000 HC RX 637 (ALT 250 FOR IP): Performed by: STUDENT IN AN ORGANIZED HEALTH CARE EDUCATION/TRAINING PROGRAM

## 2023-04-25 PROCEDURE — 73630 X-RAY EXAM OF FOOT: CPT

## 2023-04-25 PROCEDURE — 86140 C-REACTIVE PROTEIN: CPT

## 2023-04-25 PROCEDURE — 36415 COLL VENOUS BLD VENIPUNCTURE: CPT

## 2023-04-25 PROCEDURE — 82550 ASSAY OF CK (CPK): CPT

## 2023-04-25 PROCEDURE — 83605 ASSAY OF LACTIC ACID: CPT

## 2023-04-25 PROCEDURE — 2580000003 HC RX 258: Performed by: STUDENT IN AN ORGANIZED HEALTH CARE EDUCATION/TRAINING PROGRAM

## 2023-04-25 PROCEDURE — 85025 COMPLETE CBC W/AUTO DIFF WBC: CPT

## 2023-04-25 PROCEDURE — 97165 OT EVAL LOW COMPLEX 30 MIN: CPT

## 2023-04-25 PROCEDURE — 80048 BASIC METABOLIC PNL TOTAL CA: CPT

## 2023-04-25 PROCEDURE — 97530 THERAPEUTIC ACTIVITIES: CPT

## 2023-04-25 PROCEDURE — 6370000000 HC RX 637 (ALT 250 FOR IP): Performed by: INTERNAL MEDICINE

## 2023-04-25 PROCEDURE — 82570 ASSAY OF URINE CREATININE: CPT

## 2023-04-25 PROCEDURE — 2500000003 HC RX 250 WO HCPCS: Performed by: INTERNAL MEDICINE

## 2023-04-25 PROCEDURE — 2580000003 HC RX 258: Performed by: INTERNAL MEDICINE

## 2023-04-25 PROCEDURE — 93010 ELECTROCARDIOGRAM REPORT: CPT | Performed by: INTERNAL MEDICINE

## 2023-04-25 PROCEDURE — 97535 SELF CARE MNGMENT TRAINING: CPT

## 2023-04-25 PROCEDURE — 84300 ASSAY OF URINE SODIUM: CPT

## 2023-04-25 PROCEDURE — 84156 ASSAY OF PROTEIN URINE: CPT

## 2023-04-25 PROCEDURE — 85652 RBC SED RATE AUTOMATED: CPT

## 2023-04-25 PROCEDURE — 6360000002 HC RX W HCPCS: Performed by: INTERNAL MEDICINE

## 2023-04-25 PROCEDURE — 84134 ASSAY OF PREALBUMIN: CPT

## 2023-04-25 PROCEDURE — 97116 GAIT TRAINING THERAPY: CPT

## 2023-04-25 PROCEDURE — 1200000000 HC SEMI PRIVATE

## 2023-04-25 PROCEDURE — 82436 ASSAY OF URINE CHLORIDE: CPT

## 2023-04-25 RX ORDER — SODIUM CHLORIDE 9 MG/ML
INJECTION, SOLUTION INTRAVENOUS PRN
Status: DISCONTINUED | OUTPATIENT
Start: 2023-04-25 | End: 2023-04-30 | Stop reason: HOSPADM

## 2023-04-25 RX ORDER — SODIUM CHLORIDE 9 MG/ML
INJECTION, SOLUTION INTRAVENOUS CONTINUOUS
Status: DISCONTINUED | OUTPATIENT
Start: 2023-04-25 | End: 2023-04-25

## 2023-04-25 RX ORDER — SODIUM CHLORIDE 0.9 % (FLUSH) 0.9 %
10 SYRINGE (ML) INJECTION EVERY 12 HOURS SCHEDULED
Status: DISCONTINUED | OUTPATIENT
Start: 2023-04-25 | End: 2023-04-30 | Stop reason: HOSPADM

## 2023-04-25 RX ORDER — ACETAMINOPHEN 325 MG/1
650 TABLET ORAL EVERY 6 HOURS PRN
Status: DISCONTINUED | OUTPATIENT
Start: 2023-04-25 | End: 2023-04-25

## 2023-04-25 RX ORDER — POLYETHYLENE GLYCOL 3350 17 G/17G
17 POWDER, FOR SOLUTION ORAL DAILY PRN
Status: DISCONTINUED | OUTPATIENT
Start: 2023-04-25 | End: 2023-04-30 | Stop reason: HOSPADM

## 2023-04-25 RX ORDER — DEXTROSE MONOHYDRATE 100 MG/ML
INJECTION, SOLUTION INTRAVENOUS CONTINUOUS PRN
Status: DISCONTINUED | OUTPATIENT
Start: 2023-04-25 | End: 2023-04-30 | Stop reason: HOSPADM

## 2023-04-25 RX ORDER — AMLODIPINE BESYLATE 10 MG/1
10 TABLET ORAL DAILY
Status: DISCONTINUED | OUTPATIENT
Start: 2023-04-25 | End: 2023-04-25

## 2023-04-25 RX ORDER — ACETAMINOPHEN 650 MG/1
650 SUPPOSITORY RECTAL EVERY 6 HOURS PRN
Status: DISCONTINUED | OUTPATIENT
Start: 2023-04-25 | End: 2023-04-30 | Stop reason: HOSPADM

## 2023-04-25 RX ORDER — INSULIN LISPRO 100 [IU]/ML
0-4 INJECTION, SOLUTION INTRAVENOUS; SUBCUTANEOUS NIGHTLY
Status: DISCONTINUED | OUTPATIENT
Start: 2023-04-25 | End: 2023-04-30 | Stop reason: HOSPADM

## 2023-04-25 RX ORDER — PROMETHAZINE HYDROCHLORIDE 25 MG/1
12.5 TABLET ORAL EVERY 6 HOURS PRN
Status: DISCONTINUED | OUTPATIENT
Start: 2023-04-25 | End: 2023-04-30 | Stop reason: HOSPADM

## 2023-04-25 RX ORDER — SODIUM CHLORIDE 0.9 % (FLUSH) 0.9 %
10 SYRINGE (ML) INJECTION PRN
Status: DISCONTINUED | OUTPATIENT
Start: 2023-04-25 | End: 2023-04-30 | Stop reason: HOSPADM

## 2023-04-25 RX ORDER — HYDRALAZINE HYDROCHLORIDE 50 MG/1
50 TABLET, FILM COATED ORAL EVERY 8 HOURS SCHEDULED
Status: DISCONTINUED | OUTPATIENT
Start: 2023-04-25 | End: 2023-04-30 | Stop reason: HOSPADM

## 2023-04-25 RX ORDER — ACETAMINOPHEN 325 MG/1
650 TABLET ORAL EVERY 6 HOURS PRN
Status: DISCONTINUED | OUTPATIENT
Start: 2023-04-25 | End: 2023-04-30 | Stop reason: HOSPADM

## 2023-04-25 RX ORDER — NIFEDIPINE 60 MG/1
60 TABLET, EXTENDED RELEASE ORAL 2 TIMES DAILY
Status: DISCONTINUED | OUTPATIENT
Start: 2023-04-25 | End: 2023-04-30 | Stop reason: HOSPADM

## 2023-04-25 RX ORDER — ACETAMINOPHEN 650 MG/1
650 SUPPOSITORY RECTAL EVERY 6 HOURS PRN
Status: DISCONTINUED | OUTPATIENT
Start: 2023-04-25 | End: 2023-04-25

## 2023-04-25 RX ORDER — KETOROLAC TROMETHAMINE 15 MG/ML
15 INJECTION, SOLUTION INTRAMUSCULAR; INTRAVENOUS
Status: COMPLETED | OUTPATIENT
Start: 2023-04-25 | End: 2023-04-25

## 2023-04-25 RX ORDER — HEPARIN SODIUM 5000 [USP'U]/ML
5000 INJECTION, SOLUTION INTRAVENOUS; SUBCUTANEOUS EVERY 8 HOURS SCHEDULED
Status: DISCONTINUED | OUTPATIENT
Start: 2023-04-25 | End: 2023-04-30 | Stop reason: HOSPADM

## 2023-04-25 RX ORDER — ASPIRIN 81 MG/1
81 TABLET ORAL DAILY
Status: DISCONTINUED | OUTPATIENT
Start: 2023-04-25 | End: 2023-04-30 | Stop reason: HOSPADM

## 2023-04-25 RX ORDER — GLUCAGON 1 MG/ML
1 KIT INJECTION PRN
Status: DISCONTINUED | OUTPATIENT
Start: 2023-04-25 | End: 2023-04-30 | Stop reason: HOSPADM

## 2023-04-25 RX ORDER — CARVEDILOL 12.5 MG/1
12.5 TABLET ORAL 2 TIMES DAILY WITH MEALS
Status: DISCONTINUED | OUTPATIENT
Start: 2023-04-25 | End: 2023-04-29

## 2023-04-25 RX ORDER — ONDANSETRON 2 MG/ML
4 INJECTION INTRAMUSCULAR; INTRAVENOUS EVERY 6 HOURS PRN
Status: DISCONTINUED | OUTPATIENT
Start: 2023-04-25 | End: 2023-04-30 | Stop reason: HOSPADM

## 2023-04-25 RX ORDER — SODIUM CHLORIDE, SODIUM LACTATE, POTASSIUM CHLORIDE, CALCIUM CHLORIDE 600; 310; 30; 20 MG/100ML; MG/100ML; MG/100ML; MG/100ML
INJECTION, SOLUTION INTRAVENOUS CONTINUOUS
Status: DISCONTINUED | OUTPATIENT
Start: 2023-04-25 | End: 2023-04-28

## 2023-04-25 RX ORDER — ATORVASTATIN CALCIUM 20 MG/1
20 TABLET, FILM COATED ORAL NIGHTLY
Status: DISCONTINUED | OUTPATIENT
Start: 2023-04-25 | End: 2023-04-30 | Stop reason: HOSPADM

## 2023-04-25 RX ORDER — INSULIN LISPRO 100 [IU]/ML
0-8 INJECTION, SOLUTION INTRAVENOUS; SUBCUTANEOUS
Status: DISCONTINUED | OUTPATIENT
Start: 2023-04-25 | End: 2023-04-30 | Stop reason: HOSPADM

## 2023-04-25 RX ORDER — PANTOPRAZOLE SODIUM 20 MG/1
20 TABLET, DELAYED RELEASE ORAL DAILY
Status: DISCONTINUED | OUTPATIENT
Start: 2023-04-25 | End: 2023-04-30 | Stop reason: HOSPADM

## 2023-04-25 RX ADMIN — HEPARIN SODIUM 5000 UNITS: 5000 INJECTION INTRAVENOUS; SUBCUTANEOUS at 05:52

## 2023-04-25 RX ADMIN — SODIUM CHLORIDE, PRESERVATIVE FREE 10 ML: 5 INJECTION INTRAVENOUS at 09:28

## 2023-04-25 RX ADMIN — ONDANSETRON 4 MG: 2 INJECTION INTRAMUSCULAR; INTRAVENOUS at 12:36

## 2023-04-25 RX ADMIN — ATORVASTATIN CALCIUM 20 MG: 20 TABLET, FILM COATED ORAL at 21:44

## 2023-04-25 RX ADMIN — HYDRALAZINE HYDROCHLORIDE 50 MG: 50 TABLET, FILM COATED ORAL at 21:44

## 2023-04-25 RX ADMIN — AMLODIPINE BESYLATE 10 MG: 10 TABLET ORAL at 09:28

## 2023-04-25 RX ADMIN — HYDRALAZINE HYDROCHLORIDE 50 MG: 50 TABLET, FILM COATED ORAL at 15:10

## 2023-04-25 RX ADMIN — LABETALOL HYDROCHLORIDE 10 MG: 5 INJECTION, SOLUTION INTRAVENOUS at 09:28

## 2023-04-25 RX ADMIN — ASPIRIN 81 MG: 81 TABLET, COATED ORAL at 09:27

## 2023-04-25 RX ADMIN — KETOROLAC TROMETHAMINE 15 MG: 15 INJECTION, SOLUTION INTRAMUSCULAR; INTRAVENOUS at 02:25

## 2023-04-25 RX ADMIN — NIFEDIPINE 60 MG: 60 TABLET, EXTENDED RELEASE ORAL at 09:28

## 2023-04-25 RX ADMIN — NIFEDIPINE 60 MG: 60 TABLET, EXTENDED RELEASE ORAL at 21:44

## 2023-04-25 RX ADMIN — CARVEDILOL 12.5 MG: 12.5 TABLET, FILM COATED ORAL at 09:27

## 2023-04-25 RX ADMIN — SODIUM CHLORIDE, POTASSIUM CHLORIDE, SODIUM LACTATE AND CALCIUM CHLORIDE: 600; 310; 30; 20 INJECTION, SOLUTION INTRAVENOUS at 13:51

## 2023-04-25 RX ADMIN — HEPARIN SODIUM 5000 UNITS: 5000 INJECTION INTRAVENOUS; SUBCUTANEOUS at 21:44

## 2023-04-25 RX ADMIN — ACETAMINOPHEN 650 MG: 325 TABLET ORAL at 21:44

## 2023-04-25 RX ADMIN — PANTOPRAZOLE SODIUM 20 MG: 20 TABLET, DELAYED RELEASE ORAL at 06:47

## 2023-04-25 RX ADMIN — CARVEDILOL 12.5 MG: 12.5 TABLET, FILM COATED ORAL at 16:53

## 2023-04-25 RX ADMIN — HYDRALAZINE HYDROCHLORIDE 10 MG: 20 INJECTION INTRAMUSCULAR; INTRAVENOUS at 04:07

## 2023-04-25 RX ADMIN — HEPARIN SODIUM 5000 UNITS: 5000 INJECTION INTRAVENOUS; SUBCUTANEOUS at 15:10

## 2023-04-25 ASSESSMENT — PAIN DESCRIPTION - DESCRIPTORS
DESCRIPTORS: ACHING

## 2023-04-25 ASSESSMENT — PAIN DESCRIPTION - LOCATION
LOCATION: HEAD

## 2023-04-25 ASSESSMENT — PAIN DESCRIPTION - ORIENTATION
ORIENTATION: ANTERIOR
ORIENTATION: MID
ORIENTATION: ANTERIOR

## 2023-04-25 ASSESSMENT — PAIN - FUNCTIONAL ASSESSMENT
PAIN_FUNCTIONAL_ASSESSMENT: ACTIVITIES ARE NOT PREVENTED

## 2023-04-25 ASSESSMENT — PAIN DESCRIPTION - ONSET
ONSET: ON-GOING

## 2023-04-25 ASSESSMENT — PAIN SCALES - GENERAL
PAINLEVEL_OUTOF10: 5
PAINLEVEL_OUTOF10: 0
PAINLEVEL_OUTOF10: 7
PAINLEVEL_OUTOF10: 0
PAINLEVEL_OUTOF10: 7
PAINLEVEL_OUTOF10: 0
PAINLEVEL_OUTOF10: 3
PAINLEVEL_OUTOF10: 10
PAINLEVEL_OUTOF10: 7
PAINLEVEL_OUTOF10: 0

## 2023-04-25 ASSESSMENT — PAIN DESCRIPTION - PAIN TYPE
TYPE: ACUTE PAIN

## 2023-04-25 ASSESSMENT — PAIN DESCRIPTION - FREQUENCY
FREQUENCY: CONTINUOUS

## 2023-04-26 ENCOUNTER — APPOINTMENT (OUTPATIENT)
Dept: MRI IMAGING | Age: 43
DRG: 304 | End: 2023-04-26
Payer: COMMERCIAL

## 2023-04-26 ENCOUNTER — APPOINTMENT (OUTPATIENT)
Dept: ULTRASOUND IMAGING | Age: 43
DRG: 304 | End: 2023-04-26
Payer: COMMERCIAL

## 2023-04-26 ENCOUNTER — APPOINTMENT (OUTPATIENT)
Dept: CT IMAGING | Age: 43
DRG: 304 | End: 2023-04-26
Payer: COMMERCIAL

## 2023-04-26 LAB
ALBUMIN SERPL-MCNC: 1.9 G/DL (ref 3.4–5)
ANION GAP SERPL CALCULATED.3IONS-SCNC: 10 MMOL/L (ref 3–16)
BASOPHILS # BLD: 0.1 K/UL (ref 0–0.2)
BASOPHILS NFR BLD: 0.6 %
BUN SERPL-MCNC: 33 MG/DL (ref 7–20)
CALCIUM SERPL-MCNC: 8.6 MG/DL (ref 8.3–10.6)
CHLORIDE SERPL-SCNC: 109 MMOL/L (ref 99–110)
CO2 SERPL-SCNC: 23 MMOL/L (ref 21–32)
CREAT SERPL-MCNC: 5.1 MG/DL (ref 0.9–1.3)
CREAT UR-MCNC: 132.9 MG/DL (ref 39–259)
DEPRECATED RDW RBC AUTO: 16.4 % (ref 12.4–15.4)
EOSINOPHIL # BLD: 0.2 K/UL (ref 0–0.6)
EOSINOPHIL NFR BLD: 1.7 %
EST. AVERAGE GLUCOSE BLD GHB EST-MCNC: 125.5 MG/DL
GFR SERPLBLD CREATININE-BSD FMLA CKD-EPI: 13 ML/MIN/{1.73_M2}
GLUCOSE BLD-MCNC: 118 MG/DL (ref 70–99)
GLUCOSE BLD-MCNC: 132 MG/DL (ref 70–99)
GLUCOSE BLD-MCNC: 135 MG/DL (ref 70–99)
GLUCOSE SERPL-MCNC: 110 MG/DL (ref 70–99)
HBA1C MFR BLD: 6 %
HCT VFR BLD AUTO: 41.3 % (ref 40.5–52.5)
HGB BLD-MCNC: 13.6 G/DL (ref 13.5–17.5)
LYMPHOCYTES # BLD: 2.8 K/UL (ref 1–5.1)
LYMPHOCYTES NFR BLD: 26.4 %
MCH RBC QN AUTO: 30.3 PG (ref 26–34)
MCHC RBC AUTO-ENTMCNC: 32.9 G/DL (ref 31–36)
MCV RBC AUTO: 92.1 FL (ref 80–100)
MONOCYTES # BLD: 0.5 K/UL (ref 0–1.3)
MONOCYTES NFR BLD: 5.1 %
NEUTROPHILS # BLD: 7.1 K/UL (ref 1.7–7.7)
NEUTROPHILS NFR BLD: 66.2 %
PERFORMED ON: ABNORMAL
PHOSPHATE SERPL-MCNC: 5.3 MG/DL (ref 2.5–4.9)
PLATELET # BLD AUTO: 174 K/UL (ref 135–450)
PMV BLD AUTO: 8.1 FL (ref 5–10.5)
POTASSIUM SERPL-SCNC: 4.1 MMOL/L (ref 3.5–5.1)
POTASSIUM SERPL-SCNC: 4.1 MMOL/L (ref 3.5–5.1)
PREALB SERPL-MCNC: 16.2 MG/DL (ref 20–40)
PROT UR-MCNC: 2310 MG/DL
PROT/CREAT UR-RTO: 17.4 MG/DL
RBC # BLD AUTO: 4.48 M/UL (ref 4.2–5.9)
SODIUM SERPL-SCNC: 142 MMOL/L (ref 136–145)
WBC # BLD AUTO: 10.7 K/UL (ref 4–11)

## 2023-04-26 PROCEDURE — 83036 HEMOGLOBIN GLYCOSYLATED A1C: CPT

## 2023-04-26 PROCEDURE — 2580000003 HC RX 258: Performed by: INTERNAL MEDICINE

## 2023-04-26 PROCEDURE — 70450 CT HEAD/BRAIN W/O DYE: CPT

## 2023-04-26 PROCEDURE — 0JBP0ZZ EXCISION OF LEFT LOWER LEG SUBCUTANEOUS TISSUE AND FASCIA, OPEN APPROACH: ICD-10-PCS

## 2023-04-26 PROCEDURE — 6370000000 HC RX 637 (ALT 250 FOR IP): Performed by: INTERNAL MEDICINE

## 2023-04-26 PROCEDURE — 73718 MRI LOWER EXTREMITY W/O DYE: CPT

## 2023-04-26 PROCEDURE — 1200000000 HC SEMI PRIVATE

## 2023-04-26 PROCEDURE — 6370000000 HC RX 637 (ALT 250 FOR IP): Performed by: STUDENT IN AN ORGANIZED HEALTH CARE EDUCATION/TRAINING PROGRAM

## 2023-04-26 PROCEDURE — 36415 COLL VENOUS BLD VENIPUNCTURE: CPT

## 2023-04-26 PROCEDURE — 6360000002 HC RX W HCPCS: Performed by: INTERNAL MEDICINE

## 2023-04-26 PROCEDURE — 76770 US EXAM ABDO BACK WALL COMP: CPT

## 2023-04-26 PROCEDURE — 2580000003 HC RX 258: Performed by: STUDENT IN AN ORGANIZED HEALTH CARE EDUCATION/TRAINING PROGRAM

## 2023-04-26 PROCEDURE — 85025 COMPLETE CBC W/AUTO DIFF WBC: CPT

## 2023-04-26 PROCEDURE — 80069 RENAL FUNCTION PANEL: CPT

## 2023-04-26 RX ADMIN — ATORVASTATIN CALCIUM 20 MG: 20 TABLET, FILM COATED ORAL at 21:31

## 2023-04-26 RX ADMIN — PANTOPRAZOLE SODIUM 20 MG: 20 TABLET, DELAYED RELEASE ORAL at 09:26

## 2023-04-26 RX ADMIN — HYDRALAZINE HYDROCHLORIDE 50 MG: 50 TABLET, FILM COATED ORAL at 21:31

## 2023-04-26 RX ADMIN — NIFEDIPINE 60 MG: 60 TABLET, EXTENDED RELEASE ORAL at 21:31

## 2023-04-26 RX ADMIN — HEPARIN SODIUM 5000 UNITS: 5000 INJECTION INTRAVENOUS; SUBCUTANEOUS at 15:05

## 2023-04-26 RX ADMIN — ACETAMINOPHEN 650 MG: 325 TABLET ORAL at 11:05

## 2023-04-26 RX ADMIN — HEPARIN SODIUM 5000 UNITS: 5000 INJECTION INTRAVENOUS; SUBCUTANEOUS at 05:33

## 2023-04-26 RX ADMIN — CEFEPIME 2000 MG: 2 INJECTION, POWDER, FOR SOLUTION INTRAVENOUS at 11:23

## 2023-04-26 RX ADMIN — CARVEDILOL 12.5 MG: 12.5 TABLET, FILM COATED ORAL at 09:26

## 2023-04-26 RX ADMIN — CARVEDILOL 12.5 MG: 12.5 TABLET, FILM COATED ORAL at 18:09

## 2023-04-26 RX ADMIN — NIFEDIPINE 60 MG: 60 TABLET, EXTENDED RELEASE ORAL at 09:26

## 2023-04-26 RX ADMIN — SODIUM CHLORIDE, PRESERVATIVE FREE 10 ML: 5 INJECTION INTRAVENOUS at 09:28

## 2023-04-26 RX ADMIN — ACETAMINOPHEN 650 MG: 325 TABLET ORAL at 21:37

## 2023-04-26 RX ADMIN — ASPIRIN 81 MG: 81 TABLET, COATED ORAL at 09:26

## 2023-04-26 RX ADMIN — SODIUM CHLORIDE, POTASSIUM CHLORIDE, SODIUM LACTATE AND CALCIUM CHLORIDE: 600; 310; 30; 20 INJECTION, SOLUTION INTRAVENOUS at 03:43

## 2023-04-26 RX ADMIN — SODIUM CHLORIDE, POTASSIUM CHLORIDE, SODIUM LACTATE AND CALCIUM CHLORIDE: 600; 310; 30; 20 INJECTION, SOLUTION INTRAVENOUS at 18:05

## 2023-04-26 RX ADMIN — HYDRALAZINE HYDROCHLORIDE 50 MG: 50 TABLET, FILM COATED ORAL at 15:05

## 2023-04-26 RX ADMIN — HYDRALAZINE HYDROCHLORIDE 50 MG: 50 TABLET, FILM COATED ORAL at 05:33

## 2023-04-26 RX ADMIN — HEPARIN SODIUM 5000 UNITS: 5000 INJECTION INTRAVENOUS; SUBCUTANEOUS at 21:31

## 2023-04-26 ASSESSMENT — PAIN DESCRIPTION - FREQUENCY
FREQUENCY: CONTINUOUS
FREQUENCY: CONTINUOUS
FREQUENCY: INTERMITTENT

## 2023-04-26 ASSESSMENT — PAIN DESCRIPTION - LOCATION
LOCATION: HEAD

## 2023-04-26 ASSESSMENT — PAIN DESCRIPTION - DESCRIPTORS
DESCRIPTORS: ACHING
DESCRIPTORS: SHARP
DESCRIPTORS: SHARP

## 2023-04-26 ASSESSMENT — PAIN SCALES - GENERAL
PAINLEVEL_OUTOF10: 0
PAINLEVEL_OUTOF10: 10
PAINLEVEL_OUTOF10: 5
PAINLEVEL_OUTOF10: 3
PAINLEVEL_OUTOF10: 6

## 2023-04-26 ASSESSMENT — PAIN DESCRIPTION - PAIN TYPE
TYPE: ACUTE PAIN

## 2023-04-26 ASSESSMENT — PAIN DESCRIPTION - ONSET
ONSET: ON-GOING

## 2023-04-26 ASSESSMENT — PAIN - FUNCTIONAL ASSESSMENT
PAIN_FUNCTIONAL_ASSESSMENT: ACTIVITIES ARE NOT PREVENTED

## 2023-04-26 ASSESSMENT — PAIN DESCRIPTION - ORIENTATION: ORIENTATION: MID

## 2023-04-27 LAB
ALBUMIN SERPL-MCNC: 1.9 G/DL (ref 3.4–5)
ANION GAP SERPL CALCULATED.3IONS-SCNC: 9 MMOL/L (ref 3–16)
BASOPHILS # BLD: 0 K/UL (ref 0–0.2)
BASOPHILS NFR BLD: 0.4 %
BUN SERPL-MCNC: 35 MG/DL (ref 7–20)
CALCIUM SERPL-MCNC: 8.3 MG/DL (ref 8.3–10.6)
CHLORIDE SERPL-SCNC: 109 MMOL/L (ref 99–110)
CO2 SERPL-SCNC: 24 MMOL/L (ref 21–32)
CREAT SERPL-MCNC: 5.3 MG/DL (ref 0.9–1.3)
DEPRECATED RDW RBC AUTO: 16.2 % (ref 12.4–15.4)
EOSINOPHIL # BLD: 0.3 K/UL (ref 0–0.6)
EOSINOPHIL NFR BLD: 2.9 %
EST. AVERAGE GLUCOSE BLD GHB EST-MCNC: 125.5 MG/DL
GFR SERPLBLD CREATININE-BSD FMLA CKD-EPI: 13 ML/MIN/{1.73_M2}
GLUCOSE BLD-MCNC: 125 MG/DL (ref 70–99)
GLUCOSE BLD-MCNC: 162 MG/DL (ref 70–99)
GLUCOSE BLD-MCNC: 184 MG/DL (ref 70–99)
GLUCOSE SERPL-MCNC: 110 MG/DL (ref 70–99)
HBA1C MFR BLD: 6 %
HCT VFR BLD AUTO: 39.9 % (ref 40.5–52.5)
HGB BLD-MCNC: 13.2 G/DL (ref 13.5–17.5)
LYMPHOCYTES # BLD: 2.7 K/UL (ref 1–5.1)
LYMPHOCYTES NFR BLD: 29.8 %
MCH RBC QN AUTO: 30.7 PG (ref 26–34)
MCHC RBC AUTO-ENTMCNC: 33.2 G/DL (ref 31–36)
MCV RBC AUTO: 92.5 FL (ref 80–100)
MONOCYTES # BLD: 0.5 K/UL (ref 0–1.3)
MONOCYTES NFR BLD: 5.6 %
NEUTROPHILS # BLD: 5.6 K/UL (ref 1.7–7.7)
NEUTROPHILS NFR BLD: 61.3 %
PERFORMED ON: ABNORMAL
PHOSPHATE SERPL-MCNC: 4.8 MG/DL (ref 2.5–4.9)
PLATELET # BLD AUTO: 157 K/UL (ref 135–450)
PMV BLD AUTO: 8.2 FL (ref 5–10.5)
POTASSIUM SERPL-SCNC: 4 MMOL/L (ref 3.5–5.1)
POTASSIUM SERPL-SCNC: 4 MMOL/L (ref 3.5–5.1)
RBC # BLD AUTO: 4.32 M/UL (ref 4.2–5.9)
SODIUM SERPL-SCNC: 142 MMOL/L (ref 136–145)
WBC # BLD AUTO: 9.1 K/UL (ref 4–11)

## 2023-04-27 PROCEDURE — 6370000000 HC RX 637 (ALT 250 FOR IP): Performed by: INTERNAL MEDICINE

## 2023-04-27 PROCEDURE — 2580000003 HC RX 258: Performed by: STUDENT IN AN ORGANIZED HEALTH CARE EDUCATION/TRAINING PROGRAM

## 2023-04-27 PROCEDURE — 80069 RENAL FUNCTION PANEL: CPT

## 2023-04-27 PROCEDURE — 85025 COMPLETE CBC W/AUTO DIFF WBC: CPT

## 2023-04-27 PROCEDURE — 2580000003 HC RX 258: Performed by: INTERNAL MEDICINE

## 2023-04-27 PROCEDURE — 36415 COLL VENOUS BLD VENIPUNCTURE: CPT

## 2023-04-27 PROCEDURE — 6370000000 HC RX 637 (ALT 250 FOR IP): Performed by: STUDENT IN AN ORGANIZED HEALTH CARE EDUCATION/TRAINING PROGRAM

## 2023-04-27 PROCEDURE — 1200000000 HC SEMI PRIVATE

## 2023-04-27 PROCEDURE — 6360000002 HC RX W HCPCS: Performed by: INTERNAL MEDICINE

## 2023-04-27 RX ADMIN — SODIUM CHLORIDE, POTASSIUM CHLORIDE, SODIUM LACTATE AND CALCIUM CHLORIDE: 600; 310; 30; 20 INJECTION, SOLUTION INTRAVENOUS at 14:16

## 2023-04-27 RX ADMIN — HYDRALAZINE HYDROCHLORIDE 50 MG: 50 TABLET, FILM COATED ORAL at 05:27

## 2023-04-27 RX ADMIN — NIFEDIPINE 60 MG: 60 TABLET, EXTENDED RELEASE ORAL at 09:57

## 2023-04-27 RX ADMIN — HEPARIN SODIUM 5000 UNITS: 5000 INJECTION INTRAVENOUS; SUBCUTANEOUS at 21:30

## 2023-04-27 RX ADMIN — CARVEDILOL 12.5 MG: 12.5 TABLET, FILM COATED ORAL at 09:57

## 2023-04-27 RX ADMIN — SODIUM CHLORIDE, PRESERVATIVE FREE 10 ML: 5 INJECTION INTRAVENOUS at 09:57

## 2023-04-27 RX ADMIN — HEPARIN SODIUM 5000 UNITS: 5000 INJECTION INTRAVENOUS; SUBCUTANEOUS at 14:14

## 2023-04-27 RX ADMIN — ATORVASTATIN CALCIUM 20 MG: 20 TABLET, FILM COATED ORAL at 21:30

## 2023-04-27 RX ADMIN — CARVEDILOL 12.5 MG: 12.5 TABLET, FILM COATED ORAL at 18:16

## 2023-04-27 RX ADMIN — NIFEDIPINE 60 MG: 60 TABLET, EXTENDED RELEASE ORAL at 21:30

## 2023-04-27 RX ADMIN — HEPARIN SODIUM 5000 UNITS: 5000 INJECTION INTRAVENOUS; SUBCUTANEOUS at 05:27

## 2023-04-27 RX ADMIN — SODIUM CHLORIDE, POTASSIUM CHLORIDE, SODIUM LACTATE AND CALCIUM CHLORIDE: 600; 310; 30; 20 INJECTION, SOLUTION INTRAVENOUS at 05:27

## 2023-04-27 RX ADMIN — ASPIRIN 81 MG: 81 TABLET, COATED ORAL at 09:57

## 2023-04-27 RX ADMIN — PANTOPRAZOLE SODIUM 20 MG: 20 TABLET, DELAYED RELEASE ORAL at 09:57

## 2023-04-27 RX ADMIN — HYDRALAZINE HYDROCHLORIDE 50 MG: 50 TABLET, FILM COATED ORAL at 21:30

## 2023-04-27 RX ADMIN — HYDRALAZINE HYDROCHLORIDE 50 MG: 50 TABLET, FILM COATED ORAL at 14:15

## 2023-04-27 ASSESSMENT — PAIN SCALES - GENERAL: PAINLEVEL_OUTOF10: 0

## 2023-04-28 LAB
ALBUMIN SERPL-MCNC: 2.1 G/DL (ref 3.4–5)
ANION GAP SERPL CALCULATED.3IONS-SCNC: 10 MMOL/L (ref 3–16)
BASOPHILS # BLD: 0 K/UL (ref 0–0.2)
BASOPHILS NFR BLD: 0.5 %
BUN SERPL-MCNC: 36 MG/DL (ref 7–20)
CALCIUM SERPL-MCNC: 8.4 MG/DL (ref 8.3–10.6)
CHLORIDE SERPL-SCNC: 109 MMOL/L (ref 99–110)
CO2 SERPL-SCNC: 24 MMOL/L (ref 21–32)
CREAT SERPL-MCNC: 5.4 MG/DL (ref 0.9–1.3)
DEPRECATED RDW RBC AUTO: 15.8 % (ref 12.4–15.4)
EOSINOPHIL # BLD: 0.3 K/UL (ref 0–0.6)
EOSINOPHIL NFR BLD: 3 %
GFR SERPLBLD CREATININE-BSD FMLA CKD-EPI: 13 ML/MIN/{1.73_M2}
GLUCOSE BLD-MCNC: 119 MG/DL (ref 70–99)
GLUCOSE BLD-MCNC: 120 MG/DL (ref 70–99)
GLUCOSE BLD-MCNC: 152 MG/DL (ref 70–99)
GLUCOSE BLD-MCNC: 158 MG/DL (ref 70–99)
GLUCOSE SERPL-MCNC: 117 MG/DL (ref 70–99)
HCT VFR BLD AUTO: 39.2 % (ref 40.5–52.5)
HGB BLD-MCNC: 13.1 G/DL (ref 13.5–17.5)
LYMPHOCYTES # BLD: 2.9 K/UL (ref 1–5.1)
LYMPHOCYTES NFR BLD: 28.1 %
MCH RBC QN AUTO: 30.6 PG (ref 26–34)
MCHC RBC AUTO-ENTMCNC: 33.5 G/DL (ref 31–36)
MCV RBC AUTO: 91.3 FL (ref 80–100)
MONOCYTES # BLD: 0.6 K/UL (ref 0–1.3)
MONOCYTES NFR BLD: 6.1 %
NEUTROPHILS # BLD: 6.3 K/UL (ref 1.7–7.7)
NEUTROPHILS NFR BLD: 62.3 %
PERFORMED ON: ABNORMAL
PHOSPHATE SERPL-MCNC: 4.6 MG/DL (ref 2.5–4.9)
PLATELET # BLD AUTO: 175 K/UL (ref 135–450)
PMV BLD AUTO: 8.4 FL (ref 5–10.5)
POTASSIUM SERPL-SCNC: 3.7 MMOL/L (ref 3.5–5.1)
RBC # BLD AUTO: 4.29 M/UL (ref 4.2–5.9)
SODIUM SERPL-SCNC: 143 MMOL/L (ref 136–145)
WBC # BLD AUTO: 10.2 K/UL (ref 4–11)

## 2023-04-28 PROCEDURE — 6370000000 HC RX 637 (ALT 250 FOR IP): Performed by: INTERNAL MEDICINE

## 2023-04-28 PROCEDURE — 6370000000 HC RX 637 (ALT 250 FOR IP): Performed by: STUDENT IN AN ORGANIZED HEALTH CARE EDUCATION/TRAINING PROGRAM

## 2023-04-28 PROCEDURE — 6360000002 HC RX W HCPCS: Performed by: INTERNAL MEDICINE

## 2023-04-28 PROCEDURE — 2580000003 HC RX 258: Performed by: STUDENT IN AN ORGANIZED HEALTH CARE EDUCATION/TRAINING PROGRAM

## 2023-04-28 PROCEDURE — 80069 RENAL FUNCTION PANEL: CPT

## 2023-04-28 PROCEDURE — 36415 COLL VENOUS BLD VENIPUNCTURE: CPT

## 2023-04-28 PROCEDURE — 85025 COMPLETE CBC W/AUTO DIFF WBC: CPT

## 2023-04-28 PROCEDURE — 2580000003 HC RX 258: Performed by: INTERNAL MEDICINE

## 2023-04-28 PROCEDURE — 1200000000 HC SEMI PRIVATE

## 2023-04-28 RX ADMIN — HEPARIN SODIUM 5000 UNITS: 5000 INJECTION INTRAVENOUS; SUBCUTANEOUS at 22:15

## 2023-04-28 RX ADMIN — NIFEDIPINE 60 MG: 60 TABLET, EXTENDED RELEASE ORAL at 22:15

## 2023-04-28 RX ADMIN — ASPIRIN 81 MG: 81 TABLET, COATED ORAL at 09:55

## 2023-04-28 RX ADMIN — HEPARIN SODIUM 5000 UNITS: 5000 INJECTION INTRAVENOUS; SUBCUTANEOUS at 13:19

## 2023-04-28 RX ADMIN — ATORVASTATIN CALCIUM 20 MG: 20 TABLET, FILM COATED ORAL at 22:15

## 2023-04-28 RX ADMIN — CARVEDILOL 12.5 MG: 12.5 TABLET, FILM COATED ORAL at 17:14

## 2023-04-28 RX ADMIN — PANTOPRAZOLE SODIUM 20 MG: 20 TABLET, DELAYED RELEASE ORAL at 09:55

## 2023-04-28 RX ADMIN — HYDRALAZINE HYDROCHLORIDE 50 MG: 50 TABLET, FILM COATED ORAL at 22:14

## 2023-04-28 RX ADMIN — SODIUM CHLORIDE, POTASSIUM CHLORIDE, SODIUM LACTATE AND CALCIUM CHLORIDE: 600; 310; 30; 20 INJECTION, SOLUTION INTRAVENOUS at 10:00

## 2023-04-28 RX ADMIN — HEPARIN SODIUM 5000 UNITS: 5000 INJECTION INTRAVENOUS; SUBCUTANEOUS at 05:15

## 2023-04-28 RX ADMIN — CARVEDILOL 12.5 MG: 12.5 TABLET, FILM COATED ORAL at 09:55

## 2023-04-28 RX ADMIN — NIFEDIPINE 60 MG: 60 TABLET, EXTENDED RELEASE ORAL at 09:55

## 2023-04-28 RX ADMIN — HYDRALAZINE HYDROCHLORIDE 50 MG: 50 TABLET, FILM COATED ORAL at 13:19

## 2023-04-28 RX ADMIN — HYDRALAZINE HYDROCHLORIDE 50 MG: 50 TABLET, FILM COATED ORAL at 05:15

## 2023-04-28 RX ADMIN — SODIUM CHLORIDE, PRESERVATIVE FREE 10 ML: 5 INJECTION INTRAVENOUS at 00:16

## 2023-04-28 RX ADMIN — SODIUM CHLORIDE, POTASSIUM CHLORIDE, SODIUM LACTATE AND CALCIUM CHLORIDE: 600; 310; 30; 20 INJECTION, SOLUTION INTRAVENOUS at 00:16

## 2023-04-28 ASSESSMENT — PAIN SCALES - GENERAL
PAINLEVEL_OUTOF10: 0
PAINLEVEL_OUTOF10: 0

## 2023-04-29 LAB
ALBUMIN SERPL-MCNC: 2.1 G/DL (ref 3.4–5)
ANION GAP SERPL CALCULATED.3IONS-SCNC: 12 MMOL/L (ref 3–16)
BASOPHILS # BLD: 0 K/UL (ref 0–0.2)
BASOPHILS NFR BLD: 0.4 %
BUN SERPL-MCNC: 33 MG/DL (ref 7–20)
CALCIUM SERPL-MCNC: 8.8 MG/DL (ref 8.3–10.6)
CHLORIDE SERPL-SCNC: 110 MMOL/L (ref 99–110)
CO2 SERPL-SCNC: 21 MMOL/L (ref 21–32)
CREAT SERPL-MCNC: 5.3 MG/DL (ref 0.9–1.3)
CRP SERPL-MCNC: <3 MG/L (ref 0–5.1)
DEPRECATED RDW RBC AUTO: 16.3 % (ref 12.4–15.4)
EOSINOPHIL # BLD: 0.2 K/UL (ref 0–0.6)
EOSINOPHIL NFR BLD: 2.9 %
ERYTHROCYTE [SEDIMENTATION RATE] IN BLOOD BY WESTERGREN METHOD: 91 MM/HR (ref 0–15)
GFR SERPLBLD CREATININE-BSD FMLA CKD-EPI: 13 ML/MIN/{1.73_M2}
GLUCOSE BLD-MCNC: 113 MG/DL (ref 70–99)
GLUCOSE BLD-MCNC: 121 MG/DL (ref 70–99)
GLUCOSE BLD-MCNC: 162 MG/DL (ref 70–99)
GLUCOSE BLD-MCNC: 186 MG/DL (ref 70–99)
GLUCOSE SERPL-MCNC: 124 MG/DL (ref 70–99)
HCT VFR BLD AUTO: 43.4 % (ref 40.5–52.5)
HGB BLD-MCNC: 14 G/DL (ref 13.5–17.5)
LYMPHOCYTES # BLD: 1.8 K/UL (ref 1–5.1)
LYMPHOCYTES NFR BLD: 23.3 %
MCH RBC QN AUTO: 29.9 PG (ref 26–34)
MCHC RBC AUTO-ENTMCNC: 32.3 G/DL (ref 31–36)
MCV RBC AUTO: 92.6 FL (ref 80–100)
MONOCYTES # BLD: 0.4 K/UL (ref 0–1.3)
MONOCYTES NFR BLD: 5.9 %
NEUTROPHILS # BLD: 5.1 K/UL (ref 1.7–7.7)
NEUTROPHILS NFR BLD: 67.5 %
PERFORMED ON: ABNORMAL
PHOSPHATE SERPL-MCNC: 4.6 MG/DL (ref 2.5–4.9)
PLATELET # BLD AUTO: 179 K/UL (ref 135–450)
PMV BLD AUTO: 8.8 FL (ref 5–10.5)
POTASSIUM SERPL-SCNC: 4.2 MMOL/L (ref 3.5–5.1)
RBC # BLD AUTO: 4.69 M/UL (ref 4.2–5.9)
SODIUM SERPL-SCNC: 143 MMOL/L (ref 136–145)
WBC # BLD AUTO: 7.6 K/UL (ref 4–11)

## 2023-04-29 PROCEDURE — 6370000000 HC RX 637 (ALT 250 FOR IP): Performed by: STUDENT IN AN ORGANIZED HEALTH CARE EDUCATION/TRAINING PROGRAM

## 2023-04-29 PROCEDURE — 86140 C-REACTIVE PROTEIN: CPT

## 2023-04-29 PROCEDURE — 6360000002 HC RX W HCPCS: Performed by: INTERNAL MEDICINE

## 2023-04-29 PROCEDURE — 1200000000 HC SEMI PRIVATE

## 2023-04-29 PROCEDURE — 80069 RENAL FUNCTION PANEL: CPT

## 2023-04-29 PROCEDURE — 6370000000 HC RX 637 (ALT 250 FOR IP): Performed by: INTERNAL MEDICINE

## 2023-04-29 PROCEDURE — 85025 COMPLETE CBC W/AUTO DIFF WBC: CPT

## 2023-04-29 PROCEDURE — 36415 COLL VENOUS BLD VENIPUNCTURE: CPT

## 2023-04-29 PROCEDURE — 85652 RBC SED RATE AUTOMATED: CPT

## 2023-04-29 PROCEDURE — 2580000003 HC RX 258: Performed by: INTERNAL MEDICINE

## 2023-04-29 RX ORDER — CARVEDILOL 25 MG/1
25 TABLET ORAL 2 TIMES DAILY WITH MEALS
Status: DISCONTINUED | OUTPATIENT
Start: 2023-04-29 | End: 2023-04-30 | Stop reason: HOSPADM

## 2023-04-29 RX ADMIN — NIFEDIPINE 60 MG: 60 TABLET, EXTENDED RELEASE ORAL at 11:21

## 2023-04-29 RX ADMIN — NIFEDIPINE 60 MG: 60 TABLET, EXTENDED RELEASE ORAL at 21:00

## 2023-04-29 RX ADMIN — HYDRALAZINE HYDROCHLORIDE 50 MG: 50 TABLET, FILM COATED ORAL at 06:36

## 2023-04-29 RX ADMIN — CARVEDILOL 25 MG: 25 TABLET, FILM COATED ORAL at 17:26

## 2023-04-29 RX ADMIN — HEPARIN SODIUM 5000 UNITS: 5000 INJECTION INTRAVENOUS; SUBCUTANEOUS at 21:04

## 2023-04-29 RX ADMIN — CARVEDILOL 25 MG: 25 TABLET, FILM COATED ORAL at 11:21

## 2023-04-29 RX ADMIN — ATORVASTATIN CALCIUM 20 MG: 20 TABLET, FILM COATED ORAL at 21:00

## 2023-04-29 RX ADMIN — HYDRALAZINE HYDROCHLORIDE 50 MG: 50 TABLET, FILM COATED ORAL at 13:55

## 2023-04-29 RX ADMIN — HYDRALAZINE HYDROCHLORIDE 50 MG: 50 TABLET, FILM COATED ORAL at 21:00

## 2023-04-29 RX ADMIN — HEPARIN SODIUM 5000 UNITS: 5000 INJECTION INTRAVENOUS; SUBCUTANEOUS at 06:36

## 2023-04-29 RX ADMIN — HEPARIN SODIUM 5000 UNITS: 5000 INJECTION INTRAVENOUS; SUBCUTANEOUS at 13:56

## 2023-04-29 RX ADMIN — ASPIRIN 81 MG: 81 TABLET, COATED ORAL at 11:21

## 2023-04-29 RX ADMIN — SODIUM CHLORIDE, PRESERVATIVE FREE 10 ML: 5 INJECTION INTRAVENOUS at 11:21

## 2023-04-29 RX ADMIN — PANTOPRAZOLE SODIUM 20 MG: 20 TABLET, DELAYED RELEASE ORAL at 11:21

## 2023-04-29 RX ADMIN — SODIUM CHLORIDE, PRESERVATIVE FREE 10 ML: 5 INJECTION INTRAVENOUS at 21:01

## 2023-04-29 ASSESSMENT — PAIN SCALES - GENERAL: PAINLEVEL_OUTOF10: 0

## 2023-04-30 VITALS
TEMPERATURE: 97.4 F | HEART RATE: 80 BPM | HEIGHT: 78 IN | WEIGHT: 315 LBS | DIASTOLIC BLOOD PRESSURE: 88 MMHG | RESPIRATION RATE: 16 BRPM | SYSTOLIC BLOOD PRESSURE: 148 MMHG | BODY MASS INDEX: 36.45 KG/M2 | OXYGEN SATURATION: 97 %

## 2023-04-30 LAB
BASOPHILS # BLD: 0 K/UL (ref 0–0.2)
BASOPHILS NFR BLD: 0.5 %
DEPRECATED RDW RBC AUTO: 16.2 % (ref 12.4–15.4)
EOSINOPHIL # BLD: 0.3 K/UL (ref 0–0.6)
EOSINOPHIL NFR BLD: 2.8 %
GLUCOSE BLD-MCNC: 123 MG/DL (ref 70–99)
GLUCOSE BLD-MCNC: 154 MG/DL (ref 70–99)
HCT VFR BLD AUTO: 39.8 % (ref 40.5–52.5)
HGB BLD-MCNC: 12.9 G/DL (ref 13.5–17.5)
LYMPHOCYTES # BLD: 2.2 K/UL (ref 1–5.1)
LYMPHOCYTES NFR BLD: 24.2 %
MCH RBC QN AUTO: 30.1 PG (ref 26–34)
MCHC RBC AUTO-ENTMCNC: 32.5 G/DL (ref 31–36)
MCV RBC AUTO: 92.6 FL (ref 80–100)
MONOCYTES # BLD: 0.7 K/UL (ref 0–1.3)
MONOCYTES NFR BLD: 7.1 %
NEUTROPHILS # BLD: 6 K/UL (ref 1.7–7.7)
NEUTROPHILS NFR BLD: 65.4 %
PERFORMED ON: ABNORMAL
PERFORMED ON: ABNORMAL
PLATELET # BLD AUTO: 174 K/UL (ref 135–450)
PMV BLD AUTO: 8.5 FL (ref 5–10.5)
RBC # BLD AUTO: 4.3 M/UL (ref 4.2–5.9)
WBC # BLD AUTO: 9.2 K/UL (ref 4–11)

## 2023-04-30 PROCEDURE — 6370000000 HC RX 637 (ALT 250 FOR IP): Performed by: INTERNAL MEDICINE

## 2023-04-30 PROCEDURE — 2580000003 HC RX 258: Performed by: INTERNAL MEDICINE

## 2023-04-30 PROCEDURE — 85025 COMPLETE CBC W/AUTO DIFF WBC: CPT

## 2023-04-30 PROCEDURE — 6370000000 HC RX 637 (ALT 250 FOR IP): Performed by: STUDENT IN AN ORGANIZED HEALTH CARE EDUCATION/TRAINING PROGRAM

## 2023-04-30 PROCEDURE — 36415 COLL VENOUS BLD VENIPUNCTURE: CPT

## 2023-04-30 PROCEDURE — 6360000002 HC RX W HCPCS: Performed by: INTERNAL MEDICINE

## 2023-04-30 RX ADMIN — PANTOPRAZOLE SODIUM 20 MG: 20 TABLET, DELAYED RELEASE ORAL at 09:42

## 2023-04-30 RX ADMIN — CARVEDILOL 25 MG: 25 TABLET, FILM COATED ORAL at 09:42

## 2023-04-30 RX ADMIN — SODIUM CHLORIDE, PRESERVATIVE FREE 10 ML: 5 INJECTION INTRAVENOUS at 09:43

## 2023-04-30 RX ADMIN — HEPARIN SODIUM 5000 UNITS: 5000 INJECTION INTRAVENOUS; SUBCUTANEOUS at 04:31

## 2023-04-30 RX ADMIN — ASPIRIN 81 MG: 81 TABLET, COATED ORAL at 09:42

## 2023-04-30 RX ADMIN — NIFEDIPINE 60 MG: 60 TABLET, EXTENDED RELEASE ORAL at 09:42

## 2023-04-30 RX ADMIN — HYDRALAZINE HYDROCHLORIDE 50 MG: 50 TABLET, FILM COATED ORAL at 04:31

## 2023-04-30 ASSESSMENT — PAIN SCALES - GENERAL: PAINLEVEL_OUTOF10: 0

## 2024-01-22 PROBLEM — H43.10 VITREOUS HEMORRHAGE (HCC): Status: ACTIVE | Noted: 2023-02-21

## 2024-01-22 PROBLEM — S63.502A WRIST SPRAIN, LEFT, INITIAL ENCOUNTER: Status: ACTIVE | Noted: 2021-11-30

## 2024-03-03 NOTE — PROGRESS NOTES
arm AV graft.  Risks and benefits of surgically created dialysis access discussed  Non-maturation, need for surgical revision/maintenance and ischemic steal (decreased blood flow to hand) discussed  Consent obtained in the office    Sincerely appreciate the opportunity to participate in the care of this patient.           Reddy Howe M.D., FACS.  3/3/2024  10:51 AM

## 2024-03-05 ENCOUNTER — OFFICE VISIT (OUTPATIENT)
Dept: VASCULAR SURGERY | Age: 44
End: 2024-03-05
Payer: COMMERCIAL

## 2024-03-05 VITALS — WEIGHT: 315 LBS | HEIGHT: 78 IN | BODY MASS INDEX: 36.45 KG/M2

## 2024-03-05 DIAGNOSIS — N18.6 ESRD (END STAGE RENAL DISEASE) (HCC): Primary | ICD-10-CM

## 2024-03-05 PROBLEM — G93.40 ACUTE ENCEPHALOPATHY: Status: ACTIVE | Noted: 2023-10-01

## 2024-03-05 PROBLEM — I63.9 ISCHEMIC CEREBROVASCULAR ACCIDENT (CVA) (HCC): Status: ACTIVE | Noted: 2023-12-04

## 2024-03-05 PROBLEM — G93.9 DISORDER OF BRAIN: Status: ACTIVE | Noted: 2023-10-01

## 2024-03-05 PROCEDURE — 99204 OFFICE O/P NEW MOD 45 MIN: CPT | Performed by: SURGERY

## 2024-03-05 RX ORDER — NIRMATRELVIR AND RITONAVIR 300-100 MG
KIT ORAL
COMMUNITY
Start: 2023-11-27

## 2024-03-05 RX ORDER — HYDRALAZINE HYDROCHLORIDE 50 MG/1
TABLET, FILM COATED ORAL
COMMUNITY
Start: 2023-05-08

## 2024-03-05 RX ORDER — SODIUM BICARBONATE 650 MG/1
TABLET ORAL
COMMUNITY
Start: 2023-04-17

## 2024-03-05 RX ORDER — ONDANSETRON 4 MG/1
TABLET, FILM COATED ORAL
COMMUNITY

## 2024-03-05 RX ORDER — SEVELAMER CARBONATE 800 MG/1
TABLET, FILM COATED ORAL
COMMUNITY

## 2024-03-06 ENCOUNTER — PREP FOR PROCEDURE (OUTPATIENT)
Dept: VASCULAR SURGERY | Age: 44
End: 2024-03-06

## 2024-03-06 DIAGNOSIS — N18.6 END STAGE RENAL DISEASE (HCC): ICD-10-CM

## 2024-03-06 RX ORDER — SODIUM CHLORIDE 0.9 % (FLUSH) 0.9 %
5-40 SYRINGE (ML) INJECTION EVERY 12 HOURS SCHEDULED
Status: CANCELLED | OUTPATIENT
Start: 2024-03-06

## 2024-03-06 RX ORDER — SODIUM CHLORIDE 9 MG/ML
INJECTION, SOLUTION INTRAVENOUS CONTINUOUS
Status: CANCELLED | OUTPATIENT
Start: 2024-03-06

## 2024-03-06 RX ORDER — SODIUM CHLORIDE 9 MG/ML
INJECTION, SOLUTION INTRAVENOUS PRN
Status: CANCELLED | OUTPATIENT
Start: 2024-03-06

## 2024-03-06 RX ORDER — SODIUM CHLORIDE 0.9 % (FLUSH) 0.9 %
5-40 SYRINGE (ML) INJECTION PRN
Status: CANCELLED | OUTPATIENT
Start: 2024-03-06

## 2024-04-10 ENCOUNTER — ANESTHESIA EVENT (OUTPATIENT)
Dept: OPERATING ROOM | Age: 44
End: 2024-04-10
Payer: COMMERCIAL

## 2024-04-11 ENCOUNTER — HOSPITAL ENCOUNTER (OUTPATIENT)
Age: 44
Setting detail: OUTPATIENT SURGERY
Discharge: HOME | End: 2024-04-11
Attending: SURGERY | Admitting: SURGERY
Payer: COMMERCIAL

## 2024-04-11 ENCOUNTER — ANESTHESIA (OUTPATIENT)
Dept: OPERATING ROOM | Age: 44
End: 2024-04-11
Payer: COMMERCIAL

## 2024-04-11 VITALS
RESPIRATION RATE: 16 BRPM | HEIGHT: 70 IN | DIASTOLIC BLOOD PRESSURE: 63 MMHG | BODY MASS INDEX: 44.82 KG/M2 | OXYGEN SATURATION: 96 % | WEIGHT: 313.05 LBS | TEMPERATURE: 97 F | HEART RATE: 71 BPM | SYSTOLIC BLOOD PRESSURE: 153 MMHG

## 2024-04-11 DIAGNOSIS — Z99.2 ESRD (END STAGE RENAL DISEASE) ON DIALYSIS (HCC): Primary | ICD-10-CM

## 2024-04-11 DIAGNOSIS — N18.6 ESRD (END STAGE RENAL DISEASE) ON DIALYSIS (HCC): Primary | ICD-10-CM

## 2024-04-11 LAB
ABO + RH BLD: NORMAL
ANION GAP SERPL CALCULATED.3IONS-SCNC: 14 MMOL/L (ref 3–16)
APTT BLD: 29.5 SEC (ref 22.1–36.4)
BLD GP AB SCN SERPL QL: NORMAL
BUN SERPL-MCNC: 45 MG/DL (ref 7–20)
CALCIUM SERPL-MCNC: 10 MG/DL (ref 8.3–10.6)
CHLORIDE SERPL-SCNC: 93 MMOL/L (ref 99–110)
CO2 SERPL-SCNC: 28 MMOL/L (ref 21–32)
CREAT SERPL-MCNC: 10.7 MG/DL (ref 0.9–1.3)
DEPRECATED RDW RBC AUTO: 15.6 % (ref 12.4–15.4)
GFR SERPLBLD CREATININE-BSD FMLA CKD-EPI: 5 ML/MIN/{1.73_M2}
GLUCOSE BLD-MCNC: 116 MG/DL (ref 70–99)
GLUCOSE BLD-MCNC: 162 MG/DL (ref 70–99)
GLUCOSE SERPL-MCNC: 114 MG/DL (ref 70–99)
HCT VFR BLD AUTO: 30.9 % (ref 40.5–52.5)
HGB BLD-MCNC: 10 G/DL (ref 13.5–17.5)
INR PPP: 0.97 (ref 0.85–1.15)
MCH RBC QN AUTO: 30.2 PG (ref 26–34)
MCHC RBC AUTO-ENTMCNC: 32.4 G/DL (ref 31–36)
MCV RBC AUTO: 93.1 FL (ref 80–100)
PERFORMED ON: ABNORMAL
PERFORMED ON: ABNORMAL
PLATELET # BLD AUTO: 135 K/UL (ref 135–450)
PMV BLD AUTO: 7.8 FL (ref 5–10.5)
POTASSIUM SERPL-SCNC: 4.1 MMOL/L (ref 3.5–5.1)
PROTHROMBIN TIME: 13.1 SEC (ref 11.9–14.9)
RBC # BLD AUTO: 3.32 M/UL (ref 4.2–5.9)
SODIUM SERPL-SCNC: 135 MMOL/L (ref 136–145)
WBC # BLD AUTO: 7.3 K/UL (ref 4–11)

## 2024-04-11 PROCEDURE — C1768 GRAFT, VASCULAR: HCPCS | Performed by: SURGERY

## 2024-04-11 PROCEDURE — 6370000000 HC RX 637 (ALT 250 FOR IP): Performed by: SURGERY

## 2024-04-11 PROCEDURE — 86901 BLOOD TYPING SEROLOGIC RH(D): CPT

## 2024-04-11 PROCEDURE — 7100000001 HC PACU RECOVERY - ADDTL 15 MIN: Performed by: SURGERY

## 2024-04-11 PROCEDURE — 6360000002 HC RX W HCPCS: Performed by: NURSE ANESTHETIST, CERTIFIED REGISTERED

## 2024-04-11 PROCEDURE — 80048 BASIC METABOLIC PNL TOTAL CA: CPT

## 2024-04-11 PROCEDURE — 2500000003 HC RX 250 WO HCPCS: Performed by: SURGERY

## 2024-04-11 PROCEDURE — 86850 RBC ANTIBODY SCREEN: CPT

## 2024-04-11 PROCEDURE — 6360000002 HC RX W HCPCS: Performed by: SURGERY

## 2024-04-11 PROCEDURE — C1889 IMPLANT/INSERT DEVICE, NOC: HCPCS | Performed by: SURGERY

## 2024-04-11 PROCEDURE — 6360000002 HC RX W HCPCS

## 2024-04-11 PROCEDURE — 36830 ARTERY-VEIN NONAUTOGRAFT: CPT | Performed by: SURGERY

## 2024-04-11 PROCEDURE — 85730 THROMBOPLASTIN TIME PARTIAL: CPT

## 2024-04-11 PROCEDURE — 2580000003 HC RX 258: Performed by: SURGERY

## 2024-04-11 PROCEDURE — 7100000000 HC PACU RECOVERY - FIRST 15 MIN: Performed by: SURGERY

## 2024-04-11 PROCEDURE — 85610 PROTHROMBIN TIME: CPT

## 2024-04-11 PROCEDURE — 7100000011 HC PHASE II RECOVERY - ADDTL 15 MIN: Performed by: SURGERY

## 2024-04-11 PROCEDURE — 2500000003 HC RX 250 WO HCPCS: Performed by: NURSE ANESTHETIST, CERTIFIED REGISTERED

## 2024-04-11 PROCEDURE — P9047 ALBUMIN (HUMAN), 25%, 50ML: HCPCS | Performed by: NURSE ANESTHETIST, CERTIFIED REGISTERED

## 2024-04-11 PROCEDURE — 2709999900 HC NON-CHARGEABLE SUPPLY: Performed by: SURGERY

## 2024-04-11 PROCEDURE — 7100000010 HC PHASE II RECOVERY - FIRST 15 MIN: Performed by: SURGERY

## 2024-04-11 PROCEDURE — 86900 BLOOD TYPING SEROLOGIC ABO: CPT

## 2024-04-11 PROCEDURE — 3700000000 HC ANESTHESIA ATTENDED CARE: Performed by: SURGERY

## 2024-04-11 PROCEDURE — 36415 COLL VENOUS BLD VENIPUNCTURE: CPT

## 2024-04-11 PROCEDURE — 85027 COMPLETE CBC AUTOMATED: CPT

## 2024-04-11 PROCEDURE — 3600000004 HC SURGERY LEVEL 4 BASE: Performed by: SURGERY

## 2024-04-11 PROCEDURE — 3700000001 HC ADD 15 MINUTES (ANESTHESIA): Performed by: SURGERY

## 2024-04-11 PROCEDURE — 3600000014 HC SURGERY LEVEL 4 ADDTL 15MIN: Performed by: SURGERY

## 2024-04-11 PROCEDURE — A4217 STERILE WATER/SALINE, 500 ML: HCPCS | Performed by: SURGERY

## 2024-04-11 PROCEDURE — 6360000002 HC RX W HCPCS: Performed by: STUDENT IN AN ORGANIZED HEALTH CARE EDUCATION/TRAINING PROGRAM

## 2024-04-11 DEVICE — CLIP LIG M BLU TI HRT SHP WIRE HORZ 6 CLIPS PER PK: Type: IMPLANTABLE DEVICE | Status: FUNCTIONAL

## 2024-04-11 DEVICE — IMPLANTABLE DEVICE: Type: IMPLANTABLE DEVICE | Status: FUNCTIONAL

## 2024-04-11 DEVICE — GRAFT VASC L45CM DIA4-6MM UNIV PTFE STR STD WALL TAPR: Type: IMPLANTABLE DEVICE | Site: ARM | Status: FUNCTIONAL

## 2024-04-11 RX ORDER — SODIUM CHLORIDE 0.9 % (FLUSH) 0.9 %
5-40 SYRINGE (ML) INJECTION EVERY 12 HOURS SCHEDULED
Status: DISCONTINUED | OUTPATIENT
Start: 2024-04-11 | End: 2024-04-11 | Stop reason: HOSPADM

## 2024-04-11 RX ORDER — LIDOCAINE HYDROCHLORIDE 20 MG/ML
INJECTION, SOLUTION EPIDURAL; INFILTRATION; INTRACAUDAL; PERINEURAL PRN
Status: DISCONTINUED | OUTPATIENT
Start: 2024-04-11 | End: 2024-04-11 | Stop reason: SDUPTHER

## 2024-04-11 RX ORDER — HYDROMORPHONE HYDROCHLORIDE 2 MG/ML
0.5 INJECTION, SOLUTION INTRAMUSCULAR; INTRAVENOUS; SUBCUTANEOUS EVERY 5 MIN PRN
Status: DISCONTINUED | OUTPATIENT
Start: 2024-04-11 | End: 2024-04-11 | Stop reason: HOSPADM

## 2024-04-11 RX ORDER — DIPHENHYDRAMINE HYDROCHLORIDE 50 MG/ML
12.5 INJECTION INTRAMUSCULAR; INTRAVENOUS
Status: DISCONTINUED | OUTPATIENT
Start: 2024-04-11 | End: 2024-04-11 | Stop reason: HOSPADM

## 2024-04-11 RX ORDER — ONDANSETRON 2 MG/ML
4 INJECTION INTRAMUSCULAR; INTRAVENOUS
Status: COMPLETED | OUTPATIENT
Start: 2024-04-11 | End: 2024-04-11

## 2024-04-11 RX ORDER — FENTANYL CITRATE 50 UG/ML
INJECTION, SOLUTION INTRAMUSCULAR; INTRAVENOUS PRN
Status: DISCONTINUED | OUTPATIENT
Start: 2024-04-11 | End: 2024-04-11 | Stop reason: SDUPTHER

## 2024-04-11 RX ORDER — PROPOFOL 10 MG/ML
INJECTION, EMULSION INTRAVENOUS PRN
Status: DISCONTINUED | OUTPATIENT
Start: 2024-04-11 | End: 2024-04-11 | Stop reason: SDUPTHER

## 2024-04-11 RX ORDER — HEPARIN SODIUM 1000 [USP'U]/ML
INJECTION, SOLUTION INTRAVENOUS; SUBCUTANEOUS PRN
Status: DISCONTINUED | OUTPATIENT
Start: 2024-04-11 | End: 2024-04-11 | Stop reason: SDUPTHER

## 2024-04-11 RX ORDER — SODIUM CHLORIDE 0.9 % (FLUSH) 0.9 %
5-40 SYRINGE (ML) INJECTION PRN
Status: DISCONTINUED | OUTPATIENT
Start: 2024-04-11 | End: 2024-04-11 | Stop reason: HOSPADM

## 2024-04-11 RX ORDER — DEXAMETHASONE SODIUM PHOSPHATE 4 MG/ML
INJECTION, SOLUTION INTRA-ARTICULAR; INTRALESIONAL; INTRAMUSCULAR; INTRAVENOUS; SOFT TISSUE PRN
Status: DISCONTINUED | OUTPATIENT
Start: 2024-04-11 | End: 2024-04-11 | Stop reason: SDUPTHER

## 2024-04-11 RX ORDER — HALOPERIDOL 5 MG/ML
1 INJECTION INTRAMUSCULAR
Status: DISCONTINUED | OUTPATIENT
Start: 2024-04-11 | End: 2024-04-11 | Stop reason: HOSPADM

## 2024-04-11 RX ORDER — SODIUM CHLORIDE 9 MG/ML
INJECTION, SOLUTION INTRAVENOUS PRN
Status: DISCONTINUED | OUTPATIENT
Start: 2024-04-11 | End: 2024-04-11 | Stop reason: HOSPADM

## 2024-04-11 RX ORDER — EPHEDRINE SULFATE 50 MG/ML
INJECTION INTRAVENOUS PRN
Status: DISCONTINUED | OUTPATIENT
Start: 2024-04-11 | End: 2024-04-11 | Stop reason: SDUPTHER

## 2024-04-11 RX ORDER — ALBUMIN (HUMAN) 12.5 G/50ML
SOLUTION INTRAVENOUS PRN
Status: DISCONTINUED | OUTPATIENT
Start: 2024-04-11 | End: 2024-04-11 | Stop reason: SDUPTHER

## 2024-04-11 RX ORDER — IPRATROPIUM BROMIDE AND ALBUTEROL SULFATE 2.5; .5 MG/3ML; MG/3ML
1 SOLUTION RESPIRATORY (INHALATION)
Status: DISCONTINUED | OUTPATIENT
Start: 2024-04-11 | End: 2024-04-11 | Stop reason: HOSPADM

## 2024-04-11 RX ORDER — PROCHLORPERAZINE EDISYLATE 5 MG/ML
5 INJECTION INTRAMUSCULAR; INTRAVENOUS ONCE
Status: COMPLETED | OUTPATIENT
Start: 2024-04-11 | End: 2024-04-11

## 2024-04-11 RX ORDER — PHENYLEPHRINE HCL IN 0.9% NACL 1 MG/10 ML
SYRINGE (ML) INTRAVENOUS PRN
Status: DISCONTINUED | OUTPATIENT
Start: 2024-04-11 | End: 2024-04-11 | Stop reason: SDUPTHER

## 2024-04-11 RX ORDER — SODIUM CHLORIDE 9 MG/ML
INJECTION, SOLUTION INTRAVENOUS CONTINUOUS
Status: DISCONTINUED | OUTPATIENT
Start: 2024-04-11 | End: 2024-04-11 | Stop reason: HOSPADM

## 2024-04-11 RX ORDER — OXYCODONE HYDROCHLORIDE 5 MG/1
5 TABLET ORAL PRN
Status: DISCONTINUED | OUTPATIENT
Start: 2024-04-11 | End: 2024-04-11 | Stop reason: HOSPADM

## 2024-04-11 RX ORDER — OXYCODONE HYDROCHLORIDE 5 MG/1
5 TABLET ORAL EVERY 6 HOURS PRN
Qty: 20 TABLET | Refills: 0 | Status: SHIPPED | OUTPATIENT
Start: 2024-04-11 | End: 2024-04-16

## 2024-04-11 RX ORDER — HYDROMORPHONE HYDROCHLORIDE 2 MG/ML
0.25 INJECTION, SOLUTION INTRAMUSCULAR; INTRAVENOUS; SUBCUTANEOUS EVERY 5 MIN PRN
Status: DISCONTINUED | OUTPATIENT
Start: 2024-04-11 | End: 2024-04-11 | Stop reason: HOSPADM

## 2024-04-11 RX ORDER — OXYCODONE HYDROCHLORIDE 5 MG/1
10 TABLET ORAL PRN
Status: DISCONTINUED | OUTPATIENT
Start: 2024-04-11 | End: 2024-04-11 | Stop reason: HOSPADM

## 2024-04-11 RX ORDER — MIDAZOLAM HYDROCHLORIDE 1 MG/ML
INJECTION INTRAMUSCULAR; INTRAVENOUS PRN
Status: DISCONTINUED | OUTPATIENT
Start: 2024-04-11 | End: 2024-04-11 | Stop reason: SDUPTHER

## 2024-04-11 RX ORDER — PROCHLORPERAZINE EDISYLATE 5 MG/ML
INJECTION INTRAMUSCULAR; INTRAVENOUS
Status: COMPLETED
Start: 2024-04-11 | End: 2024-04-11

## 2024-04-11 RX ORDER — FENTANYL CITRATE 50 UG/ML
100 INJECTION, SOLUTION INTRAMUSCULAR; INTRAVENOUS
Status: DISCONTINUED | OUTPATIENT
Start: 2024-04-11 | End: 2024-04-11 | Stop reason: HOSPADM

## 2024-04-11 RX ORDER — ROCURONIUM BROMIDE 10 MG/ML
INJECTION, SOLUTION INTRAVENOUS PRN
Status: DISCONTINUED | OUTPATIENT
Start: 2024-04-11 | End: 2024-04-11 | Stop reason: SDUPTHER

## 2024-04-11 RX ORDER — MIDAZOLAM HYDROCHLORIDE 2 MG/2ML
2 INJECTION, SOLUTION INTRAMUSCULAR; INTRAVENOUS
Status: DISCONTINUED | OUTPATIENT
Start: 2024-04-11 | End: 2024-04-11 | Stop reason: HOSPADM

## 2024-04-11 RX ORDER — ONDANSETRON 2 MG/ML
INJECTION INTRAMUSCULAR; INTRAVENOUS PRN
Status: DISCONTINUED | OUTPATIENT
Start: 2024-04-11 | End: 2024-04-11 | Stop reason: SDUPTHER

## 2024-04-11 RX ORDER — HYDRALAZINE HYDROCHLORIDE 20 MG/ML
10 INJECTION INTRAMUSCULAR; INTRAVENOUS
Status: DISCONTINUED | OUTPATIENT
Start: 2024-04-11 | End: 2024-04-11 | Stop reason: HOSPADM

## 2024-04-11 RX ORDER — NALOXONE HYDROCHLORIDE 0.4 MG/ML
INJECTION, SOLUTION INTRAMUSCULAR; INTRAVENOUS; SUBCUTANEOUS PRN
Status: DISCONTINUED | OUTPATIENT
Start: 2024-04-11 | End: 2024-04-11 | Stop reason: HOSPADM

## 2024-04-11 RX ORDER — LABETALOL HYDROCHLORIDE 5 MG/ML
10 INJECTION, SOLUTION INTRAVENOUS
Status: DISCONTINUED | OUTPATIENT
Start: 2024-04-11 | End: 2024-04-11 | Stop reason: HOSPADM

## 2024-04-11 RX ORDER — PROTAMINE SULFATE 10 MG/ML
INJECTION, SOLUTION INTRAVENOUS PRN
Status: DISCONTINUED | OUTPATIENT
Start: 2024-04-11 | End: 2024-04-11 | Stop reason: SDUPTHER

## 2024-04-11 RX ADMIN — PROTAMINE SULFATE 20 MG: 10 INJECTION, SOLUTION INTRAVENOUS at 11:20

## 2024-04-11 RX ADMIN — EPHEDRINE SULFATE 10 MG: 50 INJECTION, SOLUTION INTRAVENOUS at 10:57

## 2024-04-11 RX ADMIN — EPHEDRINE SULFATE 20 MG: 50 INJECTION, SOLUTION INTRAVENOUS at 11:00

## 2024-04-11 RX ADMIN — SUGAMMADEX 100 MG: 100 INJECTION, SOLUTION INTRAVENOUS at 11:29

## 2024-04-11 RX ADMIN — MIDAZOLAM 2 MG: 1 INJECTION INTRAMUSCULAR; INTRAVENOUS at 09:50

## 2024-04-11 RX ADMIN — Medication 300 MCG: at 10:28

## 2024-04-11 RX ADMIN — SODIUM CHLORIDE 3000 MG: 900 INJECTION INTRAVENOUS at 09:47

## 2024-04-11 RX ADMIN — ROCURONIUM BROMIDE 50 MG: 10 INJECTION, SOLUTION INTRAVENOUS at 09:55

## 2024-04-11 RX ADMIN — SUGAMMADEX 100 MG: 100 INJECTION, SOLUTION INTRAVENOUS at 11:34

## 2024-04-11 RX ADMIN — HEPARIN SODIUM 4000 UNITS: 1000 INJECTION INTRAVENOUS; SUBCUTANEOUS at 10:29

## 2024-04-11 RX ADMIN — HYDROMORPHONE HYDROCHLORIDE 0.5 MG: 2 INJECTION, SOLUTION INTRAMUSCULAR; INTRAVENOUS; SUBCUTANEOUS at 12:20

## 2024-04-11 RX ADMIN — PROPOFOL 120 MG: 10 INJECTION, EMULSION INTRAVENOUS at 09:55

## 2024-04-11 RX ADMIN — PROCHLORPERAZINE EDISYLATE 5 MG: 5 INJECTION INTRAMUSCULAR; INTRAVENOUS at 13:53

## 2024-04-11 RX ADMIN — ALBUMIN (HUMAN) 12.5 G: 0.25 INJECTION, SOLUTION INTRAVENOUS at 11:02

## 2024-04-11 RX ADMIN — LIDOCAINE HYDROCHLORIDE 100 MG: 20 INJECTION, SOLUTION EPIDURAL; INFILTRATION; INTRACAUDAL; PERINEURAL at 09:55

## 2024-04-11 RX ADMIN — PROTAMINE SULFATE 20 MG: 10 INJECTION, SOLUTION INTRAVENOUS at 11:04

## 2024-04-11 RX ADMIN — FENTANYL CITRATE 50 MCG: 50 INJECTION, SOLUTION INTRAMUSCULAR; INTRAVENOUS at 09:55

## 2024-04-11 RX ADMIN — Medication 200 MCG: at 10:37

## 2024-04-11 RX ADMIN — ONDANSETRON 4 MG: 2 INJECTION INTRAMUSCULAR; INTRAVENOUS at 11:18

## 2024-04-11 RX ADMIN — Medication 200 MCG: at 10:17

## 2024-04-11 RX ADMIN — DEXAMETHASONE SODIUM PHOSPHATE 10 MG: 4 INJECTION, SOLUTION INTRAMUSCULAR; INTRAVENOUS at 10:03

## 2024-04-11 RX ADMIN — SODIUM CHLORIDE: 9 INJECTION, SOLUTION INTRAVENOUS at 09:44

## 2024-04-11 RX ADMIN — Medication 200 MCG: at 10:45

## 2024-04-11 RX ADMIN — ONDANSETRON 4 MG: 2 INJECTION INTRAMUSCULAR; INTRAVENOUS at 12:57

## 2024-04-11 RX ADMIN — FENTANYL CITRATE 50 MCG: 50 INJECTION, SOLUTION INTRAMUSCULAR; INTRAVENOUS at 11:23

## 2024-04-11 ASSESSMENT — PAIN SCALES - GENERAL
PAINLEVEL_OUTOF10: 7
PAINLEVEL_OUTOF10: 9

## 2024-04-11 ASSESSMENT — PAIN DESCRIPTION - LOCATION
LOCATION: ARM
LOCATION: ARM

## 2024-04-11 ASSESSMENT — PAIN - FUNCTIONAL ASSESSMENT
PAIN_FUNCTIONAL_ASSESSMENT: 0-10
PAIN_FUNCTIONAL_ASSESSMENT: 0-10

## 2024-04-11 ASSESSMENT — PAIN DESCRIPTION - ORIENTATION
ORIENTATION: RIGHT
ORIENTATION: RIGHT

## 2024-04-11 ASSESSMENT — ENCOUNTER SYMPTOMS: SHORTNESS OF BREATH: 1

## 2024-04-11 NOTE — OP NOTE
Operative Note      Patient: Serafin Weaver  YOB: 1980  MRN: [id number]    Date of Procedure: 4/11/2024    Pre-Op Diagnosis Codes:     * End stage renal disease (HCC) [N18.6]    Post-Op Diagnosis: Same       Procedure(s):  RIGHT ARM ARTERIOVENOUS GRAFT    Surgeon(s):  Reddy Howe II, MD    Assistant:   First Assistant: Serafin Sy    Anesthesia: General    Estimated Blood Loss (mL): Minimal    Complications: None    Specimens:   * No specimens in log *    Implants:  Implant Name Type Inv. Item Serial No.  Lot No. LRB No. Used Action   CLIP INT WECK SM WIDE RED TI TRNSVRS GRV CHEVRON SHP W/ PERCIS TIP 6 PER PK - [id number]  CLIP INT WECK SM WIDE RED TI TRNSVRS GRV CHEVRON SHP W/ PERCIS TIP 6 PER PK  [employer]  Right 2 Implanted   CLIP LIG M MAO TI HRT SHP WIRE HORZ 6 CLIPS PER PK - [id number]  CLIP LIG M MAO TI HRT SHP WIRE HORZ 6 CLIPS PER PK  [employer]  Right 1 Implanted   GRAFT VASC L45CM DIA4-6MM UNIV PTFE STR STD WALL TAPR - [id number] Vascular grafts GRAFT VASC L45CM DIA4-6MM UNIV PTFE STR STD WALL TAPR [id number] [employer]  Right 1 Implanted         Drains: * No LDAs found *    Findings:  Infection Present At Time Of Surgery (PATOS) (choose all levels that have infection present):  No infection present  Other Findings: as above    Detailed Description of Procedure:   [facility]    Operative Note 4/11/2024    Serafin Weaver  YOB: 1980  [id number]    Pre-procedure Diagnosis:  ESRD with need for permanent dialysis access    Post-procedure Diagnosis: Same    Procedure: right Brachial artery to basilic vein AV graft with 4X6mm Propaten graft        Anesthesia: MAC    Surgeons/Assistants: Reddy Howe II, MD    Estimated Blood Loss: Minimal    Complications: none    Specimens: were not obtained    Indications and consent:  This is a 43 y.o. year old male with end stage renal disease.  The

## 2024-04-11 NOTE — PROGRESS NOTES
Discharge instructions reviewed and understanding verbalized per pt/family with copy given. All home medications/new prescriptions have been reviewed, questions answered and patient/family state understanding. Medication information sheet provided for new prescriptions received when applicable      PT DISCHARGED HOME WITH FAMILY, PT IN STABLE CONDITION, TRANSPORTED to transport van with this RN  
Patient is at [facility]-faxed request for information to [phone]  
Patient states pain and nausea are \"better,\" left arm dressing CDI, phase I discharge criteria met, will transfer to [facility].  
Patient transferred from OR to PACU, responds to voice, VSS, right arm dressing CDI, +bruit over right arm graft, RUE vascular checks WDL.  
Patient vomiting green clear liquid, zofran given.  
Spoke with nurse at pts [facility] stated that blood thinners were placed on hold on 4/5/24.   
Teaching / education initiated regarding perioperative experience, expectations, and pain management during stay. Patient verbalized understanding.  
                  PRE OP INSTRUCTIONS

## 2024-04-11 NOTE — ANESTHESIA PRE PROCEDURE
Department of Anesthesiology  Preprocedure Note       Name:  Serafin Weaver   Age:  43 y.o.  :  1980                                          MRN:  [id number]         Date:  4/10/2024      Surgeon: Surgeon(s):  Reddy Howe II, MD    Procedure: Procedure(s):  RIGHT ARM ARTERIOVENOUS GRAFT  POSSIBLE RIGHT ARM ARTERIOVENOUS FISTULA CREATION    Medications prior to admission:   Prior to Admission medications    Medication Sig Start Date End Date Taking? Authorizing Provider   apixaban (ELIQUIS) 2.5 MG TABS tablet Take by mouth 10/12/23   ProviderHeavenly MD   bevacizumab (AVASTIN) 100 MG/4ML chemo injection inject 0.05mL intraocularly in the affected eye 23   Heavenly Larios MD   hydrALAZINE (APRESOLINE) 50 MG tablet Take by mouth 23   ProviderHeavenly MD   PAXLOVID, 300/100, 20 x 150 MG & 10 x 100MG TBPK  23   Heavenly Larios MD   ondansetron (ZOFRAN) 4 MG tablet Take by mouth    ProviderHeavenly MD   sevelamer (RENVELA) 800 MG tablet Take by mouth    Provider, MD Heavenly   sodium bicarbonate 650 MG tablet  23   ProviderHeavenly MD   VICTOZA 18 MG/3ML SOPN SC injection  22   ProviderHeavenly MD   lisinopril (PRINIVIL;ZESTRIL) 10 MG tablet  22   ProviderHeavenly MD   metFORMIN (GLUCOPHAGE) 1000 MG tablet  22   Heavenly Larios MD   carvedilol (COREG) 12.5 MG tablet  22   ProviderHeavenly MD   amLODIPine (NORVASC) 10 MG tablet  22   Provider, MD Heavenly   aspirin 81 MG EC tablet Take 1 tablet by mouth daily 5/15/22   Jose Chisholm MD   atorvastatin (LIPITOR) 40 MG tablet Take 1 tablet by mouth nightly 22  Jose Chisholm MD   NIFEdipine (PROCARDIA XL) 60 MG extended release tablet Take 1 tablet by mouth in the morning and at bedtime  Patient not taking: Reported on 2024   Jose Chisholm MD   pantoprazole (PROTONIX) 20 MG tablet Take 1 tablet by mouth daily  Patient not

## 2024-04-11 NOTE — ANESTHESIA POSTPROCEDURE EVALUATION
Department of Anesthesiology  Postprocedure Note    Patient: Serafin Weaver  MRN: [id number]  YOB: 1980  Date of evaluation: 4/11/2024    Procedure Summary       Date: 04/11/24 Room / Location: [address]    Anesthesia Start: 0949 Anesthesia Stop: 1146    Procedure: RIGHT ARM ARTERIOVENOUS GRAFT (Right: Arm Lower) Diagnosis:       End stage renal disease (HCC)      (End stage renal disease (HCC) [N18.6])    Surgeons: Reddy Howe II, MD Responsible Provider: Alexi Humphrey MD    Anesthesia Type: general ASA Status: 4            Anesthesia Type: No value filed.    Teresa Phase I: Teresa Score: 10    Teresa Phase II:      Anesthesia Post Evaluation    Patient location during evaluation: PACU  Patient participation: complete - patient participated  Level of consciousness: awake and alert  Pain score: 3  Airway patency: patent  Nausea & Vomiting: no nausea  Cardiovascular status: blood pressure returned to baseline  Respiratory status: acceptable  Hydration status: euvolemic  Pain management: adequate    No notable events documented.

## 2024-04-11 NOTE — H&P
Heavenly Larios MD   lisinopril (PRINIVIL;ZESTRIL) 10 MG tablet  6/21/22   Heavenly Larios MD   metFORMIN (GLUCOPHAGE) 1000 MG tablet  6/21/22   Heavenly Larios MD   carvedilol (COREG) 12.5 MG tablet  6/21/22   Heavenly Larios MD   amLODIPine (NORVASC) 10 MG tablet  6/21/22   Heavenly Larios MD   aspirin 81 MG EC tablet Take 1 tablet by mouth daily 5/15/22   Jose Chisholm MD   atorvastatin (LIPITOR) 40 MG tablet Take 1 tablet by mouth nightly 5/14/22 8/16/22  Jose Chisholm MD   NIFEdipine (PROCARDIA XL) 60 MG extended release tablet Take 1 tablet by mouth in the morning and at bedtime  Patient not taking: Reported on 4/4/2024 5/14/22   Jose Chisholm MD   pantoprazole (PROTONIX) 20 MG tablet Take 1 tablet by mouth daily  Patient not taking: Reported on 4/29/2023 5/14/22   Jose Chisholm MD   insulin glargine (LANTUS) 100 UNIT/ML injection pen Inject 30 Units into the skin 2 times daily  Patient not taking: Reported on 8/16/2022 2/11/19   Mirlande Tena MD   insulin lispro (HUMALOG) 100 UNIT/ML pen Inject 15 Units into the skin 3 times daily (with meals)  Patient not taking: Reported on 4/24/2023 2/11/19   Mirlande Tena MD        [facility] Meds:    Current Facility-Administered Medications   Medication Dose Route Frequency Provider Last Rate Last Admin    fentaNYL (SUBLIMAZE) injection 100 mcg  100 mcg IntraVENous Once PRN Alexi Humphrey MD        midazolam PF (VERSED) injection 2 mg  2 mg IntraVENous Once PRN Alexi Humphrey MD        0.9 % sodium chloride infusion   IntraVENous Continuous Reddy Howe II, MD        sodium chloride flush 0.9 % injection 5-40 mL  5-40 mL IntraVENous 2 times per day Reddy Howe II, MD        sodium chloride flush 0.9 % injection 5-40 mL  5-40 mL IntraVENous PRN Reddy Howe II, MD        0.9 % sodium chloride infusion   IntraVENous PRN Reddy Howe II, MD        ceFAZolin Sodium (ANCEF) 3,000 mg in sodium chloride 0.9

## 2024-04-11 NOTE — DISCHARGE INSTRUCTIONS
Call Dr. Howe with any questions, concerns, and to schedule a follow up appointment.  [phone]    When you sleep, avoid placing pressure on the extremity with the fistula or graft.  Never allow anyone to take a blood pressure or perform a venipuncture on the extremity with the fistula or graft.  Do not wear tight clothing above or around the access site.  Turbulent blood flow over the AV fistula or AV graft is commonly felt as a vibration and is termed a thrill. Routinely check the access site for the thrill, which indicates that the AV site is still functioning (if the vibration or thrill disappears, call your health care provider immediately).     Call your doctor immediately or come to the Emergency Department if you:    have severe chills or fever    have excessive bleeding from the access site    have excessive swelling    pain at the access site that is not relieved by pain medication      ANESTHESIA DISCHARGE INSTRUCTIONS    Wear your seatbelt home.  You are under the influence of drugs-do not drink alcohol, drive, operate machinery, make any important decisions or sign any legal documents for 24 hours.  Children should not ride bikes, skate boards or play on gym sets for 24 hours after surgery.  A responsible adult needs to be with you for 24 hours.  You may experience lightheadedness, dizziness, or sleepiness following surgery.  Rest at home today- increase activity as tolerated.  Progress slowly to a regular diet unless your physician has instructed you otherwise. Drink plenty of water.  If persistent nausea and vomiting becomes a problem, call your physician.  Coughing, sore throat and muscle aches are other side effects of anesthesia, and should improve with time.  Do not drive or operate machinery while taking narcotics.  Females of childbearing potential and on hormonal birth control, should use two forms of contraception following procedure if given a medication called sugammadex and/or emend.

## 2024-04-29 ENCOUNTER — TELEPHONE (OUTPATIENT)
Dept: VASCULAR SURGERY | Age: 44
End: 2024-04-29

## 2024-04-29 NOTE — TELEPHONE ENCOUNTER
Nimesh Oliver advised that patient will need to be seen at [facility] access [place] to have a new Tunneled Dialysis catheter placed. Phone number to [facility] given to her.

## 2024-04-29 NOTE — TELEPHONE ENCOUNTER
Rashida with [facility] called in regards to pt. Rashida stated that pt was seem at  ED 4/27 for a dislodged fistula. Rashida stated that pt no longer has dialysis access. Rashida wanted to make sure our office was made aware of the situation. Best callback number is [phone].

## 2024-05-12 NOTE — PROGRESS NOTES
[facility] Vascular and Endovascular Surgery     Referring Provider:  Venita Kendrick MD     Nephrologist:  Mary    Office Follow Up after Dialysis Access    Subjective: Patient is status post right brachial artery basilic vein AV graft on April 11, 2024 and presents today for his first postoperative visit.  Denies any pain or complaints of his right arm or hand.      Objective:  There were no vitals taken for this visit.  Incision: Clean and dry  Good thrill and bruit  2+ radial pulse    Assessment:  ESRD s/p right brachial artery basilic vein graft      Plan:  43-year-old male with clinically patent right upper arm AV graft.  Okay to begin cannulation.  He is doing very well.  Happy to see back as needed.  Sincerely appreciate the opportunity to participate in his care.

## 2024-05-14 ENCOUNTER — OFFICE VISIT (OUTPATIENT)
Dept: VASCULAR SURGERY | Age: 44
End: 2024-05-14

## 2024-05-14 VITALS — BODY MASS INDEX: 44.82 KG/M2 | WEIGHT: 313.05 LBS | HEIGHT: 70 IN

## 2024-05-14 DIAGNOSIS — N18.6 END STAGE RENAL DISEASE (HCC): Primary | ICD-10-CM

## 2024-05-14 PROCEDURE — 99024 POSTOP FOLLOW-UP VISIT: CPT | Performed by: SURGERY

## 2024-07-17 ENCOUNTER — APPOINTMENT (OUTPATIENT)
Dept: GENERAL RADIOLOGY | Age: 44
DRG: 239 | End: 2024-07-17
Payer: COMMERCIAL

## 2024-07-17 ENCOUNTER — HOSPITAL ENCOUNTER (INPATIENT)
Age: 44
LOS: 12 days | Discharge: NURSING, REHAB OR LONG-TERM CARE | DRG: 239 | End: 2024-07-30
Attending: EMERGENCY MEDICINE | Admitting: FAMILY MEDICINE
Payer: COMMERCIAL

## 2024-07-17 DIAGNOSIS — M89.50 OSTEOLYSIS: ICD-10-CM

## 2024-07-17 DIAGNOSIS — M86.9 OSTEOMYELITIS OF RIGHT FOOT, UNSPECIFIED TYPE (HCC): ICD-10-CM

## 2024-07-17 DIAGNOSIS — A48.0 GAS GANGRENE (HCC): ICD-10-CM

## 2024-07-17 DIAGNOSIS — M86.10 ACUTE OSTEOMYELITIS (HCC): Primary | ICD-10-CM

## 2024-07-17 DIAGNOSIS — M86.9 OSTEOMYELITIS, UNSPECIFIED SITE, UNSPECIFIED TYPE (HCC): ICD-10-CM

## 2024-07-17 LAB
ALBUMIN SERPL-MCNC: 3.6 G/DL (ref 3.4–5)
ALBUMIN/GLOB SERPL: 0.9 {RATIO} (ref 1.1–2.2)
ALP SERPL-CCNC: 85 U/L (ref 40–129)
ALT SERPL-CCNC: 14 U/L (ref 10–40)
ANION GAP SERPL CALCULATED.3IONS-SCNC: 17 MMOL/L (ref 3–16)
AST SERPL-CCNC: 14 U/L (ref 15–37)
BASOPHILS # BLD: 0 K/UL (ref 0–0.2)
BASOPHILS NFR BLD: 0 %
BILIRUB SERPL-MCNC: 0.6 MG/DL (ref 0–1)
BUN SERPL-MCNC: 53 MG/DL (ref 7–20)
CALCIUM SERPL-MCNC: 9.7 MG/DL (ref 8.3–10.6)
CHLORIDE SERPL-SCNC: 93 MMOL/L (ref 99–110)
CO2 SERPL-SCNC: 26 MMOL/L (ref 21–32)
CREAT SERPL-MCNC: 13.8 MG/DL (ref 0.9–1.3)
DEPRECATED RDW RBC AUTO: 14.6 % (ref 12.4–15.4)
EOSINOPHIL # BLD: 0 K/UL (ref 0–0.6)
EOSINOPHIL NFR BLD: 0 %
ERYTHROCYTE [SEDIMENTATION RATE] IN BLOOD BY WESTERGREN METHOD: 100 MM/HR (ref 0–15)
GFR SERPLBLD CREATININE-BSD FMLA CKD-EPI: 4 ML/MIN/{1.73_M2}
GLUCOSE SERPL-MCNC: 108 MG/DL (ref 70–99)
HCT VFR BLD AUTO: 31.4 % (ref 40.5–52.5)
HGB BLD-MCNC: 10.3 G/DL (ref 13.5–17.5)
LYMPHOCYTES # BLD: 2.3 K/UL (ref 1–5.1)
LYMPHOCYTES NFR BLD: 13 %
MCH RBC QN AUTO: 31.9 PG (ref 26–34)
MCHC RBC AUTO-ENTMCNC: 32.8 G/DL (ref 31–36)
MCV RBC AUTO: 97.3 FL (ref 80–100)
MONOCYTES # BLD: 1.4 K/UL (ref 0–1.3)
MONOCYTES NFR BLD: 8 %
NEUTROPHILS # BLD: 14 K/UL (ref 1.7–7.7)
NEUTROPHILS NFR BLD: 79 %
PLATELET # BLD AUTO: 205 K/UL (ref 135–450)
PMV BLD AUTO: 7.7 FL (ref 5–10.5)
POIKILOCYTOSIS BLD QL SMEAR: ABNORMAL
POTASSIUM SERPL-SCNC: 4.4 MMOL/L (ref 3.5–5.1)
PROT SERPL-MCNC: 7.7 G/DL (ref 6.4–8.2)
RBC # BLD AUTO: 3.23 M/UL (ref 4.2–5.9)
SCHISTOCYTES BLD QL SMEAR: ABNORMAL
SODIUM SERPL-SCNC: 136 MMOL/L (ref 136–145)
WBC # BLD AUTO: 17.7 K/UL (ref 4–11)

## 2024-07-17 PROCEDURE — 84134 ASSAY OF PREALBUMIN: CPT

## 2024-07-17 PROCEDURE — 2580000003 HC RX 258

## 2024-07-17 PROCEDURE — 71045 X-RAY EXAM CHEST 1 VIEW: CPT

## 2024-07-17 PROCEDURE — 80053 COMPREHEN METABOLIC PANEL: CPT

## 2024-07-17 PROCEDURE — 99285 EMERGENCY DEPT VISIT HI MDM: CPT

## 2024-07-17 PROCEDURE — 86140 C-REACTIVE PROTEIN: CPT

## 2024-07-17 PROCEDURE — 96365 THER/PROPH/DIAG IV INF INIT: CPT

## 2024-07-17 PROCEDURE — 85025 COMPLETE CBC W/AUTO DIFF WBC: CPT

## 2024-07-17 PROCEDURE — 36415 COLL VENOUS BLD VENIPUNCTURE: CPT

## 2024-07-17 PROCEDURE — 6370000000 HC RX 637 (ALT 250 FOR IP)

## 2024-07-17 PROCEDURE — 6360000002 HC RX W HCPCS

## 2024-07-17 PROCEDURE — 83036 HEMOGLOBIN GLYCOSYLATED A1C: CPT

## 2024-07-17 PROCEDURE — 85652 RBC SED RATE AUTOMATED: CPT

## 2024-07-17 PROCEDURE — 87040 BLOOD CULTURE FOR BACTERIA: CPT

## 2024-07-17 PROCEDURE — 73630 X-RAY EXAM OF FOOT: CPT

## 2024-07-17 RX ORDER — OXYCODONE HYDROCHLORIDE 5 MG/1
5 TABLET ORAL ONCE
Status: COMPLETED | OUTPATIENT
Start: 2024-07-17 | End: 2024-07-17

## 2024-07-17 RX ADMIN — PIPERACILLIN AND TAZOBACTAM 3375 MG: 3; .375 INJECTION, POWDER, LYOPHILIZED, FOR SOLUTION INTRAVENOUS at 23:52

## 2024-07-17 RX ADMIN — OXYCODONE HYDROCHLORIDE 5 MG: 5 TABLET ORAL at 21:19

## 2024-07-17 ASSESSMENT — PAIN DESCRIPTION - ORIENTATION
ORIENTATION: LEFT
ORIENTATION: RIGHT

## 2024-07-17 ASSESSMENT — PAIN DESCRIPTION - LOCATION: LOCATION: FOOT

## 2024-07-17 ASSESSMENT — PAIN DESCRIPTION - ONSET: ONSET: ON-GOING

## 2024-07-17 ASSESSMENT — PAIN DESCRIPTION - DESCRIPTORS
DESCRIPTORS: SHARP
DESCRIPTORS: DISCOMFORT

## 2024-07-17 ASSESSMENT — PAIN - FUNCTIONAL ASSESSMENT
PAIN_FUNCTIONAL_ASSESSMENT: PREVENTS OR INTERFERES SOME ACTIVE ACTIVITIES AND ADLS
PAIN_FUNCTIONAL_ASSESSMENT: 0-10

## 2024-07-17 ASSESSMENT — PAIN DESCRIPTION - FREQUENCY: FREQUENCY: CONTINUOUS

## 2024-07-17 ASSESSMENT — LIFESTYLE VARIABLES
HOW OFTEN DO YOU HAVE A DRINK CONTAINING ALCOHOL: NEVER
HOW MANY STANDARD DRINKS CONTAINING ALCOHOL DO YOU HAVE ON A TYPICAL DAY: PATIENT DOES NOT DRINK

## 2024-07-17 ASSESSMENT — PAIN DESCRIPTION - PAIN TYPE: TYPE: ACUTE PAIN;SURGICAL PAIN

## 2024-07-17 ASSESSMENT — PAIN SCALES - GENERAL: PAINLEVEL_OUTOF10: 10

## 2024-07-18 ENCOUNTER — APPOINTMENT (OUTPATIENT)
Dept: GENERAL RADIOLOGY | Age: 44
DRG: 239 | End: 2024-07-18
Payer: COMMERCIAL

## 2024-07-18 ENCOUNTER — ANESTHESIA (OUTPATIENT)
Dept: OPERATING ROOM | Age: 44
End: 2024-07-18
Payer: COMMERCIAL

## 2024-07-18 ENCOUNTER — ANESTHESIA EVENT (OUTPATIENT)
Dept: OPERATING ROOM | Age: 44
End: 2024-07-18
Payer: COMMERCIAL

## 2024-07-18 ENCOUNTER — APPOINTMENT (OUTPATIENT)
Dept: VASCULAR LAB | Age: 44
DRG: 239 | End: 2024-07-18
Payer: COMMERCIAL

## 2024-07-18 PROBLEM — M86.10 ACUTE OSTEOMYELITIS (HCC): Status: ACTIVE | Noted: 2021-04-23

## 2024-07-18 PROBLEM — M86.9 OSTEOMYELITIS OF FIFTH TOE OF RIGHT FOOT (HCC): Status: ACTIVE | Noted: 2024-07-18

## 2024-07-18 PROBLEM — A48.0 GAS GANGRENE (HCC): Status: ACTIVE | Noted: 2024-07-18

## 2024-07-18 LAB
ABO + RH BLD: NORMAL
BLD GP AB SCN SERPL QL: NORMAL
CRP SERPL-MCNC: 304.8 MG/L (ref 0–5.1)
EKG ATRIAL RATE: 77 BPM
EKG DIAGNOSIS: NORMAL
EKG P AXIS: 48 DEGREES
EKG P-R INTERVAL: 164 MS
EKG Q-T INTERVAL: 370 MS
EKG QRS DURATION: 86 MS
EKG QTC CALCULATION (BAZETT): 418 MS
EKG R AXIS: 14 DEGREES
EKG T AXIS: 49 DEGREES
EKG VENTRICULAR RATE: 77 BPM
EST. AVERAGE GLUCOSE BLD GHB EST-MCNC: 119.8 MG/DL
GLUCOSE BLD-MCNC: 100 MG/DL (ref 70–99)
GLUCOSE BLD-MCNC: 103 MG/DL (ref 70–99)
GLUCOSE BLD-MCNC: 109 MG/DL (ref 70–99)
GLUCOSE BLD-MCNC: 112 MG/DL (ref 70–99)
HBA1C MFR BLD: 5.8 %
INR PPP: 1.33 (ref 0.85–1.15)
PERFORMED ON: ABNORMAL
PREALB SERPL-MCNC: 10.4 MG/DL (ref 20–40)
PROTHROMBIN TIME: 16.7 SEC (ref 11.9–14.9)

## 2024-07-18 PROCEDURE — 73630 X-RAY EXAM OF FOOT: CPT

## 2024-07-18 PROCEDURE — 6360000002 HC RX W HCPCS

## 2024-07-18 PROCEDURE — 2580000003 HC RX 258

## 2024-07-18 PROCEDURE — 6360000002 HC RX W HCPCS: Performed by: FAMILY MEDICINE

## 2024-07-18 PROCEDURE — 99255 IP/OBS CONSLTJ NEW/EST HI 80: CPT | Performed by: INTERNAL MEDICINE

## 2024-07-18 PROCEDURE — 87076 CULTURE ANAEROBE IDENT EACH: CPT

## 2024-07-18 PROCEDURE — 93010 ELECTROCARDIOGRAM REPORT: CPT | Performed by: INTERNAL MEDICINE

## 2024-07-18 PROCEDURE — 99223 1ST HOSP IP/OBS HIGH 75: CPT | Performed by: INTERNAL MEDICINE

## 2024-07-18 PROCEDURE — 87015 SPECIMEN INFECT AGNT CONCNTJ: CPT

## 2024-07-18 PROCEDURE — 87077 CULTURE AEROBIC IDENTIFY: CPT

## 2024-07-18 PROCEDURE — 85610 PROTHROMBIN TIME: CPT

## 2024-07-18 PROCEDURE — 6360000002 HC RX W HCPCS: Performed by: ANESTHESIOLOGY

## 2024-07-18 PROCEDURE — 87075 CULTR BACTERIA EXCEPT BLOOD: CPT

## 2024-07-18 PROCEDURE — 93925 LOWER EXTREMITY STUDY: CPT

## 2024-07-18 PROCEDURE — 87070 CULTURE OTHR SPECIMN AEROBIC: CPT

## 2024-07-18 PROCEDURE — 7100000001 HC PACU RECOVERY - ADDTL 15 MIN: Performed by: PODIATRIST

## 2024-07-18 PROCEDURE — 87206 SMEAR FLUORESCENT/ACID STAI: CPT

## 2024-07-18 PROCEDURE — 0Q9N0ZZ DRAINAGE OF RIGHT METATARSAL, OPEN APPROACH: ICD-10-PCS | Performed by: PODIATRIST

## 2024-07-18 PROCEDURE — 3600000002 HC SURGERY LEVEL 2 BASE: Performed by: PODIATRIST

## 2024-07-18 PROCEDURE — 0Y6X0Z1 DETACHMENT AT RIGHT 5TH TOE, HIGH, OPEN APPROACH: ICD-10-PCS | Performed by: PODIATRIST

## 2024-07-18 PROCEDURE — 93005 ELECTROCARDIOGRAM TRACING: CPT

## 2024-07-18 PROCEDURE — 6360000002 HC RX W HCPCS: Performed by: PODIATRIST

## 2024-07-18 PROCEDURE — 6370000000 HC RX 637 (ALT 250 FOR IP): Performed by: FAMILY MEDICINE

## 2024-07-18 PROCEDURE — 36415 COLL VENOUS BLD VENIPUNCTURE: CPT

## 2024-07-18 PROCEDURE — 3700000001 HC ADD 15 MINUTES (ANESTHESIA): Performed by: PODIATRIST

## 2024-07-18 PROCEDURE — 86901 BLOOD TYPING SEROLOGIC RH(D): CPT

## 2024-07-18 PROCEDURE — 3700000000 HC ANESTHESIA ATTENDED CARE: Performed by: PODIATRIST

## 2024-07-18 PROCEDURE — 87040 BLOOD CULTURE FOR BACTERIA: CPT

## 2024-07-18 PROCEDURE — 88311 DECALCIFY TISSUE: CPT

## 2024-07-18 PROCEDURE — 87186 SC STD MICRODIL/AGAR DIL: CPT

## 2024-07-18 PROCEDURE — 87116 MYCOBACTERIA CULTURE: CPT

## 2024-07-18 PROCEDURE — 1200000000 HC SEMI PRIVATE

## 2024-07-18 PROCEDURE — 87205 SMEAR GRAM STAIN: CPT

## 2024-07-18 PROCEDURE — 87102 FUNGUS ISOLATION CULTURE: CPT

## 2024-07-18 PROCEDURE — 2580000003 HC RX 258: Performed by: ANESTHESIOLOGY

## 2024-07-18 PROCEDURE — 86850 RBC ANTIBODY SCREEN: CPT

## 2024-07-18 PROCEDURE — 2580000003 HC RX 258: Performed by: FAMILY MEDICINE

## 2024-07-18 PROCEDURE — 2709999900 HC NON-CHARGEABLE SUPPLY: Performed by: PODIATRIST

## 2024-07-18 PROCEDURE — 86900 BLOOD TYPING SEROLOGIC ABO: CPT

## 2024-07-18 PROCEDURE — 96367 TX/PROPH/DG ADDL SEQ IV INF: CPT

## 2024-07-18 PROCEDURE — 7100000000 HC PACU RECOVERY - FIRST 15 MIN: Performed by: PODIATRIST

## 2024-07-18 PROCEDURE — 3600000012 HC SURGERY LEVEL 2 ADDTL 15MIN: Performed by: PODIATRIST

## 2024-07-18 PROCEDURE — 2500000003 HC RX 250 WO HCPCS: Performed by: PODIATRIST

## 2024-07-18 PROCEDURE — 88305 TISSUE EXAM BY PATHOLOGIST: CPT

## 2024-07-18 RX ORDER — POLYETHYLENE GLYCOL 3350 17 G/17G
17 POWDER, FOR SOLUTION ORAL DAILY PRN
Status: DISCONTINUED | OUTPATIENT
Start: 2024-07-18 | End: 2024-07-30 | Stop reason: HOSPADM

## 2024-07-18 RX ORDER — LABETALOL HYDROCHLORIDE 5 MG/ML
10 INJECTION, SOLUTION INTRAVENOUS
Status: DISCONTINUED | OUTPATIENT
Start: 2024-07-18 | End: 2024-07-18 | Stop reason: HOSPADM

## 2024-07-18 RX ORDER — SODIUM BICARBONATE 650 MG/1
650 TABLET ORAL DAILY
Status: DISCONTINUED | OUTPATIENT
Start: 2024-07-18 | End: 2024-07-18

## 2024-07-18 RX ORDER — ASPIRIN 81 MG/1
81 TABLET ORAL DAILY
Status: DISCONTINUED | OUTPATIENT
Start: 2024-07-18 | End: 2024-07-30 | Stop reason: HOSPADM

## 2024-07-18 RX ORDER — INSULIN LISPRO 100 [IU]/ML
0-16 INJECTION, SOLUTION INTRAVENOUS; SUBCUTANEOUS
Status: DISCONTINUED | OUTPATIENT
Start: 2024-07-18 | End: 2024-07-30 | Stop reason: HOSPADM

## 2024-07-18 RX ORDER — SODIUM CHLORIDE 0.9 % (FLUSH) 0.9 %
5-40 SYRINGE (ML) INJECTION EVERY 12 HOURS SCHEDULED
Status: DISCONTINUED | OUTPATIENT
Start: 2024-07-18 | End: 2024-07-26

## 2024-07-18 RX ORDER — ACETAMINOPHEN 325 MG/1
650 TABLET ORAL
Status: DISCONTINUED | OUTPATIENT
Start: 2024-07-18 | End: 2024-07-18 | Stop reason: HOSPADM

## 2024-07-18 RX ORDER — AMLODIPINE BESYLATE 5 MG/1
5 TABLET ORAL DAILY
Status: DISCONTINUED | OUTPATIENT
Start: 2024-07-18 | End: 2024-07-19

## 2024-07-18 RX ORDER — PROCHLORPERAZINE EDISYLATE 5 MG/ML
5 INJECTION INTRAMUSCULAR; INTRAVENOUS
Status: DISCONTINUED | OUTPATIENT
Start: 2024-07-18 | End: 2024-07-18 | Stop reason: HOSPADM

## 2024-07-18 RX ORDER — SODIUM CHLORIDE 0.9 % (FLUSH) 0.9 %
5-40 SYRINGE (ML) INJECTION EVERY 12 HOURS SCHEDULED
Status: DISCONTINUED | OUTPATIENT
Start: 2024-07-18 | End: 2024-07-18 | Stop reason: HOSPADM

## 2024-07-18 RX ORDER — HYDROMORPHONE HYDROCHLORIDE 1 MG/ML
0.5 INJECTION, SOLUTION INTRAMUSCULAR; INTRAVENOUS; SUBCUTANEOUS EVERY 5 MIN PRN
Status: DISCONTINUED | OUTPATIENT
Start: 2024-07-18 | End: 2024-07-18 | Stop reason: HOSPADM

## 2024-07-18 RX ORDER — BUPIVACAINE HYDROCHLORIDE 5 MG/ML
INJECTION, SOLUTION EPIDURAL; INTRACAUDAL PRN
Status: DISCONTINUED | OUTPATIENT
Start: 2024-07-18 | End: 2024-07-18 | Stop reason: ALTCHOICE

## 2024-07-18 RX ORDER — SEVELAMER CARBONATE 800 MG/1
800 TABLET, FILM COATED ORAL
Status: DISCONTINUED | OUTPATIENT
Start: 2024-07-18 | End: 2024-07-29

## 2024-07-18 RX ORDER — PREGABALIN 75 MG/1
75 CAPSULE ORAL DAILY
Status: DISCONTINUED | OUTPATIENT
Start: 2024-07-18 | End: 2024-07-30 | Stop reason: HOSPADM

## 2024-07-18 RX ORDER — NALOXONE HYDROCHLORIDE 0.4 MG/ML
INJECTION, SOLUTION INTRAMUSCULAR; INTRAVENOUS; SUBCUTANEOUS PRN
Status: DISCONTINUED | OUTPATIENT
Start: 2024-07-18 | End: 2024-07-18 | Stop reason: HOSPADM

## 2024-07-18 RX ORDER — SODIUM CHLORIDE 0.9 % (FLUSH) 0.9 %
5-40 SYRINGE (ML) INJECTION PRN
Status: DISCONTINUED | OUTPATIENT
Start: 2024-07-18 | End: 2024-07-30 | Stop reason: HOSPADM

## 2024-07-18 RX ORDER — INSULIN LISPRO 100 [IU]/ML
0-4 INJECTION, SOLUTION INTRAVENOUS; SUBCUTANEOUS NIGHTLY
Status: DISCONTINUED | OUTPATIENT
Start: 2024-07-18 | End: 2024-07-30 | Stop reason: HOSPADM

## 2024-07-18 RX ORDER — HYDRALAZINE HYDROCHLORIDE 50 MG/1
50 TABLET, FILM COATED ORAL EVERY 8 HOURS SCHEDULED
Status: DISCONTINUED | OUTPATIENT
Start: 2024-07-18 | End: 2024-07-18

## 2024-07-18 RX ORDER — FENTANYL CITRATE 50 UG/ML
25 INJECTION, SOLUTION INTRAMUSCULAR; INTRAVENOUS EVERY 5 MIN PRN
Status: DISCONTINUED | OUTPATIENT
Start: 2024-07-18 | End: 2024-07-18 | Stop reason: HOSPADM

## 2024-07-18 RX ORDER — CARVEDILOL 12.5 MG/1
12.5 TABLET ORAL 2 TIMES DAILY
Status: DISCONTINUED | OUTPATIENT
Start: 2024-07-18 | End: 2024-07-19

## 2024-07-18 RX ORDER — LIDOCAINE HYDROCHLORIDE 10 MG/ML
INJECTION, SOLUTION EPIDURAL; INFILTRATION; INTRACAUDAL; PERINEURAL PRN
Status: DISCONTINUED | OUTPATIENT
Start: 2024-07-18 | End: 2024-07-18 | Stop reason: ALTCHOICE

## 2024-07-18 RX ORDER — OXYCODONE HYDROCHLORIDE 5 MG/1
2.5 TABLET ORAL EVERY 6 HOURS PRN
Status: DISCONTINUED | OUTPATIENT
Start: 2024-07-18 | End: 2024-07-19

## 2024-07-18 RX ORDER — HEPARIN SODIUM 5000 [USP'U]/ML
5000 INJECTION, SOLUTION INTRAVENOUS; SUBCUTANEOUS EVERY 8 HOURS SCHEDULED
Status: DISCONTINUED | OUTPATIENT
Start: 2024-07-18 | End: 2024-07-19

## 2024-07-18 RX ORDER — LINEZOLID 2 MG/ML
600 INJECTION, SOLUTION INTRAVENOUS EVERY 12 HOURS
Status: DISCONTINUED | OUTPATIENT
Start: 2024-07-18 | End: 2024-07-18

## 2024-07-18 RX ORDER — IPRATROPIUM BROMIDE AND ALBUTEROL SULFATE 2.5; .5 MG/3ML; MG/3ML
1 SOLUTION RESPIRATORY (INHALATION)
Status: DISCONTINUED | OUTPATIENT
Start: 2024-07-18 | End: 2024-07-18 | Stop reason: HOSPADM

## 2024-07-18 RX ORDER — ONDANSETRON 2 MG/ML
INJECTION INTRAMUSCULAR; INTRAVENOUS PRN
Status: DISCONTINUED | OUTPATIENT
Start: 2024-07-18 | End: 2024-07-18 | Stop reason: SDUPTHER

## 2024-07-18 RX ORDER — ONDANSETRON 2 MG/ML
4 INJECTION INTRAMUSCULAR; INTRAVENOUS
Status: DISCONTINUED | OUTPATIENT
Start: 2024-07-18 | End: 2024-07-18 | Stop reason: HOSPADM

## 2024-07-18 RX ORDER — FENTANYL CITRATE 50 UG/ML
INJECTION, SOLUTION INTRAMUSCULAR; INTRAVENOUS PRN
Status: DISCONTINUED | OUTPATIENT
Start: 2024-07-18 | End: 2024-07-18 | Stop reason: SDUPTHER

## 2024-07-18 RX ORDER — OXYCODONE HYDROCHLORIDE 5 MG/1
5 TABLET ORAL EVERY 4 HOURS PRN
Status: DISCONTINUED | OUTPATIENT
Start: 2024-07-18 | End: 2024-07-30 | Stop reason: HOSPADM

## 2024-07-18 RX ORDER — ONDANSETRON 2 MG/ML
4 INJECTION INTRAMUSCULAR; INTRAVENOUS EVERY 6 HOURS PRN
Status: DISCONTINUED | OUTPATIENT
Start: 2024-07-18 | End: 2024-07-30 | Stop reason: HOSPADM

## 2024-07-18 RX ORDER — SODIUM CHLORIDE 9 MG/ML
INJECTION, SOLUTION INTRAVENOUS PRN
Status: DISCONTINUED | OUTPATIENT
Start: 2024-07-18 | End: 2024-07-30 | Stop reason: HOSPADM

## 2024-07-18 RX ORDER — ONDANSETRON 4 MG/1
4 TABLET, ORALLY DISINTEGRATING ORAL EVERY 8 HOURS PRN
Status: DISCONTINUED | OUTPATIENT
Start: 2024-07-18 | End: 2024-07-30 | Stop reason: HOSPADM

## 2024-07-18 RX ORDER — SODIUM CHLORIDE 9 MG/ML
INJECTION, SOLUTION INTRAVENOUS PRN
Status: DISCONTINUED | OUTPATIENT
Start: 2024-07-18 | End: 2024-07-18 | Stop reason: HOSPADM

## 2024-07-18 RX ORDER — SODIUM CHLORIDE 9 MG/ML
INJECTION, SOLUTION INTRAVENOUS CONTINUOUS PRN
Status: DISCONTINUED | OUTPATIENT
Start: 2024-07-18 | End: 2024-07-18 | Stop reason: SDUPTHER

## 2024-07-18 RX ORDER — HEPARIN SODIUM 5000 [USP'U]/ML
5000 INJECTION, SOLUTION INTRAVENOUS; SUBCUTANEOUS EVERY 8 HOURS SCHEDULED
Status: CANCELLED | OUTPATIENT
Start: 2024-07-19

## 2024-07-18 RX ORDER — SODIUM CHLORIDE 0.9 % (FLUSH) 0.9 %
5-40 SYRINGE (ML) INJECTION PRN
Status: DISCONTINUED | OUTPATIENT
Start: 2024-07-18 | End: 2024-07-18 | Stop reason: HOSPADM

## 2024-07-18 RX ORDER — MIDAZOLAM HYDROCHLORIDE 1 MG/ML
INJECTION INTRAMUSCULAR; INTRAVENOUS PRN
Status: DISCONTINUED | OUTPATIENT
Start: 2024-07-18 | End: 2024-07-18 | Stop reason: SDUPTHER

## 2024-07-18 RX ORDER — ATORVASTATIN CALCIUM 40 MG/1
40 TABLET, FILM COATED ORAL NIGHTLY
Status: DISCONTINUED | OUTPATIENT
Start: 2024-07-18 | End: 2024-07-30 | Stop reason: HOSPADM

## 2024-07-18 RX ADMIN — PHENYLEPHRINE HYDROCHLORIDE 100 MCG: 10 INJECTION INTRAVENOUS at 21:31

## 2024-07-18 RX ADMIN — SODIUM CHLORIDE: 9 INJECTION, SOLUTION INTRAVENOUS at 10:34

## 2024-07-18 RX ADMIN — SEVELAMER CARBONATE 800 MG: 800 TABLET, FILM COATED ORAL at 10:22

## 2024-07-18 RX ADMIN — SODIUM CHLORIDE, PRESERVATIVE FREE 10 ML: 5 INJECTION INTRAVENOUS at 23:01

## 2024-07-18 RX ADMIN — PIPERACILLIN AND TAZOBACTAM 3375 MG: 3; .375 INJECTION, POWDER, LYOPHILIZED, FOR SOLUTION INTRAVENOUS at 12:00

## 2024-07-18 RX ADMIN — CARVEDILOL 12.5 MG: 12.5 TABLET, FILM COATED ORAL at 10:22

## 2024-07-18 RX ADMIN — MIDAZOLAM HYDROCHLORIDE 2 MG: 2 INJECTION, SOLUTION INTRAMUSCULAR; INTRAVENOUS at 21:01

## 2024-07-18 RX ADMIN — PREGABALIN 75 MG: 75 CAPSULE ORAL at 05:04

## 2024-07-18 RX ADMIN — FENTANYL CITRATE 100 MCG: 50 INJECTION, SOLUTION INTRAMUSCULAR; INTRAVENOUS at 21:02

## 2024-07-18 RX ADMIN — SODIUM BICARBONATE 650 MG: 650 TABLET ORAL at 10:22

## 2024-07-18 RX ADMIN — SODIUM CHLORIDE: 9 INJECTION, SOLUTION INTRAVENOUS at 11:59

## 2024-07-18 RX ADMIN — OXYCODONE 5 MG: 5 TABLET ORAL at 17:53

## 2024-07-18 RX ADMIN — SODIUM CHLORIDE: 9 INJECTION, SOLUTION INTRAVENOUS at 20:54

## 2024-07-18 RX ADMIN — SODIUM CHLORIDE, PRESERVATIVE FREE 10 ML: 5 INJECTION INTRAVENOUS at 09:00

## 2024-07-18 RX ADMIN — VANCOMYCIN HYDROCHLORIDE 2500 MG: 10 INJECTION, POWDER, LYOPHILIZED, FOR SOLUTION INTRAVENOUS at 00:49

## 2024-07-18 RX ADMIN — ATORVASTATIN CALCIUM 40 MG: 40 TABLET, FILM COATED ORAL at 22:59

## 2024-07-18 RX ADMIN — LINEZOLID 600 MG: 600 INJECTION, SOLUTION INTRAVENOUS at 01:42

## 2024-07-18 RX ADMIN — OXYCODONE 5 MG: 5 TABLET ORAL at 10:22

## 2024-07-18 RX ADMIN — HEPARIN SODIUM 5000 UNITS: 5000 INJECTION INTRAVENOUS; SUBCUTANEOUS at 05:38

## 2024-07-18 RX ADMIN — AMLODIPINE BESYLATE 5 MG: 5 TABLET ORAL at 10:22

## 2024-07-18 RX ADMIN — CARVEDILOL 12.5 MG: 12.5 TABLET, FILM COATED ORAL at 22:59

## 2024-07-18 RX ADMIN — MIDAZOLAM HYDROCHLORIDE 2 MG: 2 INJECTION, SOLUTION INTRAMUSCULAR; INTRAVENOUS at 21:25

## 2024-07-18 RX ADMIN — ONDANSETRON 4 MG: 2 INJECTION INTRAMUSCULAR; INTRAVENOUS at 21:30

## 2024-07-18 ASSESSMENT — PAIN SCALES - WONG BAKER
WONGBAKER_NUMERICALRESPONSE: NO HURT

## 2024-07-18 ASSESSMENT — PAIN SCALES - GENERAL
PAINLEVEL_OUTOF10: 0
PAINLEVEL_OUTOF10: 10

## 2024-07-18 ASSESSMENT — PAIN DESCRIPTION - ORIENTATION
ORIENTATION: RIGHT
ORIENTATION: RIGHT

## 2024-07-18 ASSESSMENT — PAIN DESCRIPTION - LOCATION: LOCATION: LEG;FOOT

## 2024-07-18 ASSESSMENT — PAIN DESCRIPTION - DESCRIPTORS
DESCRIPTORS: ACHING;BURNING
DESCRIPTORS: ACHING;BURNING

## 2024-07-18 NOTE — ED PROVIDER NOTES
ED Attending Attestation Note     Date of evaluation: 2/23/2023    This patient was seen by the advance practice provider.  I have seen and examined the patient, agree with the workup, evaluation, management and diagnosis. The care plan has been discussed.      Patient History     Chief Complaint: No chief complaint on file.      HPI: Serafin Weaver is a 44 y.o. who presents to the Emergency Department with complaints of worsening right foot pain.  The patient has a complex past medical history that includes end-stage renal disease on hemodialysis and a number of other comorbidities.  He is being followed by podiatry for known right foot ulceration, and presents today with increasing pain in the right foot.      He has a past medical history of Amputation of third toe, left, traumatic (HCC), Anesthesia complication, Blood circulation, collateral, Cellulitis, Depression, Diabetic polyneuropathy associated with type 2 diabetes mellitus (HCC), Hypertension, Morbid obesity due to excess calories (HCC), MRSA infection, Seizures (HCC), Type II or unspecified type diabetes mellitus without mention of complication, not stated as uncontrolled, and Unspecified cerebral artery occlusion with cerebral infarction.    He has a past surgical history that includes amputation; Toe amputation (Right); other surgical history (8-); Foot Amputation (Left, 2/7/2019); and vascular surgery (Right, 4/11/2024).    family history includes Diabetes in his father, mother, and paternal grandfather; High Blood Pressure in his father and mother; High Cholesterol in his mother; Mental Illness in his father.    He reports that he has been smoking cigarettes and cigars. He has a 4.3 pack-year smoking history. He has never used smokeless tobacco. He reports that he does not currently use alcohol. He reports current drug use. Drug: Marijuana (Weed).    Pertinent Physical Exam Findings:   Patient does have notable ulceration to the lateral 
the right foot, extending laterally.  There is an ulceration on the lateral aspect of the foot, overlying the lateral aspect of the head of the fifth metatarsal.  There is also an ulceration present on the most distal aspect of the great toe.  Second digit status post amputation. + Erythema and warmth  Pulses dopplerable bilaterally    AV fistula right upper extremity with palpable thrill   Skin:     Capillary Refill: Capillary refill takes less than 2 seconds.      Findings: No rash.   Neurological:      Mental Status: He is alert and oriented to person, place, and time.   Psychiatric:         Mood and Affect: Mood normal.         Behavior: Behavior normal.                       Diagnostic Results       RADIOLOGY:  XR CHEST PORTABLE   Final Result   1. Lordotic projection suggesting mild pulmonary venous congestion.      Electronically signed by Kwasi Ren MD      XR FOOT RIGHT (MIN 3 VIEWS)   Final Result   1. Soft tissue gas suspicious for gas-forming organism along the distal lateral foot with lytic changes around the fifth MTP joints suspicious for osteomyelitis.      Electronically signed by Kwasi Ren MD      XR FOOT RIGHT (MIN 3 VIEWS)    (Results Pending)   XR FOOT RIGHT (MIN 3 VIEWS)    (Results Pending)       LABS:   Results for orders placed or performed during the hospital encounter of 07/17/24   CBC with Auto Differential   Result Value Ref Range    WBC 17.7 (H) 4.0 - 11.0 K/uL    RBC 3.23 (L) 4.20 - 5.90 M/uL    Hemoglobin 10.3 (L) 13.5 - 17.5 g/dL    Hematocrit 31.4 (L) 40.5 - 52.5 %    MCV 97.3 80.0 - 100.0 fL    MCH 31.9 26.0 - 34.0 pg    MCHC 32.8 31.0 - 36.0 g/dL    RDW 14.6 12.4 - 15.4 %    Platelets 205 135 - 450 K/uL    MPV 7.7 5.0 - 10.5 fL    Neutrophils % 79.0 %    Lymphocytes % 13.0 %    Monocytes % 8.0 %    Eosinophils % 0.0 %    Basophils % 0.0 %    Neutrophils Absolute 14.0 (H) 1.7 - 7.7 K/uL    Lymphocytes Absolute 2.3 1.0 - 5.1 K/uL    Monocytes Absolute 1.4 (H) 0.0 - 1.3

## 2024-07-18 NOTE — ED NOTES
2nd set of blood cultures, PT/INR, type and screen collected by phlebotomist. Wound culture collected and sent by podiatrist     Nina Girard, JOSE  07/18/24 0726

## 2024-07-18 NOTE — ED NOTES
ED TO INPATIENT SBAR HANDOFF    Patient Name: Serafin Weaver   :  1980  44 y.o.   MRN:  [id number]  Preferred Name  Serafin  ED Room #:  B17/B17-17  Family/Caregiver Present no   Restraints no   Sitter no   Sepsis Risk Score      Situation  Code Status: Full Code Limited Code details: Intubation/Re-intubation No Comment; Defibrillation/Cardioversion No Comment; Chest Compressions No Comment; Resuscitative Medications No Comment; Other No Comment.    Allergies: Vicodin [hydrocodone-acetaminophen], Acetaminophen, and Hydrocodone  Weight: Patient Vitals for the past 96 hrs (Last 3 readings):   Weight   24 (!) 148.7 kg (327 lb 12.8 oz)     Arrived from: nursing home  Chief Complaint: No chief complaint on file.    Hospital Problem/Diagnosis:  Principal Problem:    Osteomyelitis of fifth toe of right foot (HCC)  Resolved Problems:    * No resolved hospital problems. *    Imaging:   XR FOOT RIGHT (MIN 3 VIEWS)   Final Result   1. Persistent lytic changes surrounding the fifth MTP joint suspicious for osteomyelitis and septic arthritis.      Electronically signed by Kwasi Ren MD      XR FOOT RIGHT (MIN 3 VIEWS)   Final Result   1. Findings suspicious for osteomyelitis around the fifth MTP joint.      Electronically signed by Kwasi Ren MD      XR CHEST PORTABLE   Final Result   1. Lordotic projection suggesting mild pulmonary venous congestion.      Electronically signed by Kwasi Ren MD      XR FOOT RIGHT (MIN 3 VIEWS)   Final Result   1. Soft tissue gas suspicious for gas-forming organism along the distal lateral foot with lytic changes around the fifth MTP joints suspicious for osteomyelitis.      Electronically signed by Kwasi Ren MD        Abnormal labs:   Abnormal Labs Reviewed   CBC WITH AUTO DIFFERENTIAL - Abnormal; Notable for the following components:       Result Value    WBC 17.7 (*)     RBC 3.23 (*)     Hemoglobin 10.3 (*)     Hematocrit 31.4 (*)     Neutrophils

## 2024-07-18 NOTE — ED NOTES
Patient is a hard stick and needs second set of blood cultures. Called phlebotomy twice and accepted to come down in ED, patient is still waiting for blood collection     Nina Girard, JOSE  07/18/24 0113

## 2024-07-18 NOTE — H&P
V2.0  History and Physical      Name:  Serafin Weaver /Age/Sex: 1980  (44 y.o. male)   MRN & CSN:  [id number] & [id number] Encounter Date/Time: 2024 1:35 AM EDT   Location:  [facility] PCP: Venita Kendrick MD       Hospital Day: 2    Assessment and Plan:   Serafin Weaver is a 44 y.o. male with a pmh of ESRD on HD MWF, DM2, hx CVA who presents with right foot pain and is found to have Osteomyelitis of fifth toe of right foot (HCC).    Hospital Problems             Last Modified POA    * (Principal) Osteomyelitis of fifth toe of right foot (HCC) 2024 Yes       Plan:    Right 5th toe osteomyelitis with septic joint  Does not meet sepsis criteria  Podiatry consulted  Concern for osteo and gas forming organism  Zyvox/Zosyn  NPO except sips and meds > likely to OR tomorrow afternoon    ESRD on HD MWF  Consult nephro for routine dialysis    DM2   Does not take insulin, though he is supposed to take Lantus 30 u, plus Humalog 15 u TID with meals  Will use HD SSI for now while NPO  Carb controlled diet when able to eat  Will need to start Lantus at that time    Hx CVA    HTN  Continue amlodipine 5 mg, coreg 12.5 mg BID  Holding hydralazine currently due to BP in good range, may restart when patient starts eating again    PVD  Hold Eliquis and aspirin for possible surgery    7. Flat affect  Would consider treating for depression        Disposition:   Current Living situation: ECF  Expected Disposition: ECF  Estimated D/C: 3 days      Diet Diet NPO Exceptions are: Sips of Water with Meds, Ice Chips   DVT Prophylaxis [] Lovenox, []  Heparin, [] SCDs, [] Ambulation,  [] Eliquis, [] Xarelto, [] Coumadin   Code Status Full Code         Personally reviewed Lab Studies and Imaging         History of Present Illness:     Chief Complaint: Right foot pain    Serafin Weaver is a 44 y.o. male with a pmh of HTN, Seizures, ESRD on HD MWF, DM2 with multiple digits amputated (see below), hx CVA who

## 2024-07-19 LAB
ANION GAP SERPL CALCULATED.3IONS-SCNC: 17 MMOL/L (ref 3–16)
APTT BLD: 33.6 SEC (ref 22.1–36.4)
BUN SERPL-MCNC: 63 MG/DL (ref 7–20)
CALCIUM SERPL-MCNC: 9.1 MG/DL (ref 8.3–10.6)
CHLORIDE SERPL-SCNC: 97 MMOL/L (ref 99–110)
CO2 SERPL-SCNC: 24 MMOL/L (ref 21–32)
CREAT SERPL-MCNC: 16 MG/DL (ref 0.9–1.3)
CRP SERPL-MCNC: 406.5 MG/L (ref 0–5.1)
DEPRECATED RDW RBC AUTO: 14.5 % (ref 12.4–15.4)
ECHO BSA: 2.89 M2
ERYTHROCYTE [SEDIMENTATION RATE] IN BLOOD BY WESTERGREN METHOD: 103 MM/HR (ref 0–15)
GFR SERPLBLD CREATININE-BSD FMLA CKD-EPI: 3 ML/MIN/{1.73_M2}
GLUCOSE BLD-MCNC: 114 MG/DL (ref 70–99)
GLUCOSE BLD-MCNC: 162 MG/DL (ref 70–99)
GLUCOSE BLD-MCNC: 194 MG/DL (ref 70–99)
GLUCOSE BLD-MCNC: 78 MG/DL (ref 70–99)
GLUCOSE SERPL-MCNC: 85 MG/DL (ref 70–99)
HCT VFR BLD AUTO: 29.2 % (ref 40.5–52.5)
HGB BLD-MCNC: 9.4 G/DL (ref 13.5–17.5)
INR PPP: 1.31 (ref 0.85–1.15)
MCH RBC QN AUTO: 31.4 PG (ref 26–34)
MCHC RBC AUTO-ENTMCNC: 32.1 G/DL (ref 31–36)
MCV RBC AUTO: 97.7 FL (ref 80–100)
PERFORMED ON: ABNORMAL
PERFORMED ON: NORMAL
PLATELET # BLD AUTO: 204 K/UL (ref 135–450)
PMV BLD AUTO: 7.5 FL (ref 5–10.5)
POTASSIUM SERPL-SCNC: 4.6 MMOL/L (ref 3.5–5.1)
PROTHROMBIN TIME: 16.5 SEC (ref 11.9–14.9)
RBC # BLD AUTO: 2.98 M/UL (ref 4.2–5.9)
SODIUM SERPL-SCNC: 138 MMOL/L (ref 136–145)
VANCOMYCIN SERPL-MCNC: <4 UG/ML
VAS LEFT ABI: 0.91
VAS LEFT ARM BP: 110 MMHG
VAS LEFT ATA DIST PSV: 146 CM/S
VAS LEFT CFA DIST PSV: 140 CM/S
VAS LEFT CFA PROX PSV: 149 CM/S
VAS LEFT DORSALIS PEDIS BP: 98 MMHG
VAS LEFT PERONEAL MID PSV: 44.8 CM/S
VAS LEFT PFA PROX PSV: 129 CM/S
VAS LEFT POP A DIST PSV: 105 CM/S
VAS LEFT POP A PROX PSV: 113 CM/S
VAS LEFT POP A PROX VEL RATIO: 0.98
VAS LEFT PTA BP: 100 MMHG
VAS LEFT PTA DIST PSV: 103 CM/S
VAS LEFT PTA MID PSV: 109 CM/S
VAS LEFT SFA DIST PSV: 115 CM/S
VAS LEFT SFA DIST VEL RATIO: 0.64
VAS LEFT SFA MID PSV: 181 CM/S
VAS LEFT SFA MID VEL RATIO: 1.46
VAS LEFT SFA PROX PSV: 124 CM/S
VAS LEFT SFA PROX VEL RATIO: 0.83
VAS RIGHT ABI: 0.53
VAS RIGHT ATA DIST PSV: 14.1 CM/S
VAS RIGHT CFA DIST PSV: 92.5 CM/S
VAS RIGHT CFA PROX PSV: 102 CM/S
VAS RIGHT DORSALIS PEDIS BP: 58 MMHG
VAS RIGHT PERONEAL MID PSV: 43.5 CM/S
VAS RIGHT PFA PROX PSV: 85.5 CM/S
VAS RIGHT POP A DIST PSV: 56 CM/S
VAS RIGHT POP A PROX PSV: 57.6 CM/S
VAS RIGHT POP A PROX VEL RATIO: 0.25
VAS RIGHT PTA BP: 40 MMHG
VAS RIGHT PTA DIST PSV: 41.7 CM/S
VAS RIGHT PTA MID PSV: 39.5 CM/S
VAS RIGHT SFA DIST PSV: 229 CM/S
VAS RIGHT SFA DIST VEL RATIO: 2.29
VAS RIGHT SFA MID PSV: 100 CM/S
VAS RIGHT SFA MID VEL RATIO: 1.1
VAS RIGHT SFA PROX PSV: 93.3 CM/S
VAS RIGHT SFA PROX VEL RATIO: 0.9
WBC # BLD AUTO: 13.7 K/UL (ref 4–11)

## 2024-07-19 PROCEDURE — 93925 LOWER EXTREMITY STUDY: CPT | Performed by: SURGERY

## 2024-07-19 PROCEDURE — 2580000003 HC RX 258: Performed by: FAMILY MEDICINE

## 2024-07-19 PROCEDURE — 51798 US URINE CAPACITY MEASURE: CPT

## 2024-07-19 PROCEDURE — 85027 COMPLETE CBC AUTOMATED: CPT

## 2024-07-19 PROCEDURE — 85610 PROTHROMBIN TIME: CPT

## 2024-07-19 PROCEDURE — 6360000002 HC RX W HCPCS: Performed by: FAMILY MEDICINE

## 2024-07-19 PROCEDURE — 85730 THROMBOPLASTIN TIME PARTIAL: CPT

## 2024-07-19 PROCEDURE — 99232 SBSQ HOSP IP/OBS MODERATE 35: CPT | Performed by: INTERNAL MEDICINE

## 2024-07-19 PROCEDURE — 85652 RBC SED RATE AUTOMATED: CPT

## 2024-07-19 PROCEDURE — 80048 BASIC METABOLIC PNL TOTAL CA: CPT

## 2024-07-19 PROCEDURE — 6360000002 HC RX W HCPCS: Performed by: INTERNAL MEDICINE

## 2024-07-19 PROCEDURE — 6360000002 HC RX W HCPCS: Performed by: STUDENT IN AN ORGANIZED HEALTH CARE EDUCATION/TRAINING PROGRAM

## 2024-07-19 PROCEDURE — 80202 ASSAY OF VANCOMYCIN: CPT

## 2024-07-19 PROCEDURE — 6370000000 HC RX 637 (ALT 250 FOR IP): Performed by: FAMILY MEDICINE

## 2024-07-19 PROCEDURE — 36415 COLL VENOUS BLD VENIPUNCTURE: CPT

## 2024-07-19 PROCEDURE — 6370000000 HC RX 637 (ALT 250 FOR IP): Performed by: STUDENT IN AN ORGANIZED HEALTH CARE EDUCATION/TRAINING PROGRAM

## 2024-07-19 PROCEDURE — 1200000000 HC SEMI PRIVATE

## 2024-07-19 PROCEDURE — 86140 C-REACTIVE PROTEIN: CPT

## 2024-07-19 PROCEDURE — 2580000003 HC RX 258: Performed by: INTERNAL MEDICINE

## 2024-07-19 PROCEDURE — 6370000000 HC RX 637 (ALT 250 FOR IP): Performed by: INTERNAL MEDICINE

## 2024-07-19 RX ORDER — DEXTROSE MONOHYDRATE 100 MG/ML
INJECTION, SOLUTION INTRAVENOUS CONTINUOUS PRN
Status: DISCONTINUED | OUTPATIENT
Start: 2024-07-19 | End: 2024-07-30 | Stop reason: HOSPADM

## 2024-07-19 RX ORDER — ATORVASTATIN CALCIUM 80 MG/1
80 TABLET, FILM COATED ORAL
COMMUNITY

## 2024-07-19 RX ORDER — CARVEDILOL 6.25 MG/1
6.25 TABLET ORAL 2 TIMES DAILY
Status: DISCONTINUED | OUTPATIENT
Start: 2024-07-19 | End: 2024-07-30 | Stop reason: HOSPADM

## 2024-07-19 RX ORDER — HEPARIN SODIUM 10000 [USP'U]/100ML
5-30 INJECTION, SOLUTION INTRAVENOUS CONTINUOUS
Status: DISPENSED | OUTPATIENT
Start: 2024-07-19 | End: 2024-07-22

## 2024-07-19 RX ORDER — GLUCAGON 1 MG/ML
1 KIT INJECTION PRN
Status: DISCONTINUED | OUTPATIENT
Start: 2024-07-19 | End: 2024-07-30 | Stop reason: HOSPADM

## 2024-07-19 RX ORDER — HEPARIN SODIUM 1000 [USP'U]/ML
5000 INJECTION, SOLUTION INTRAVENOUS; SUBCUTANEOUS PRN
Status: DISCONTINUED | OUTPATIENT
Start: 2024-07-19 | End: 2024-07-23

## 2024-07-19 RX ORDER — ACETAMINOPHEN 325 MG/1
650 TABLET ORAL EVERY 6 HOURS PRN
Status: DISCONTINUED | OUTPATIENT
Start: 2024-07-19 | End: 2024-07-30 | Stop reason: HOSPADM

## 2024-07-19 RX ORDER — 0.9 % SODIUM CHLORIDE 0.9 %
100 INTRAVENOUS SOLUTION INTRAVENOUS PRN
Status: DISCONTINUED | OUTPATIENT
Start: 2024-07-19 | End: 2024-07-30 | Stop reason: HOSPADM

## 2024-07-19 RX ORDER — HEPARIN SODIUM 1000 [USP'U]/ML
10000 INJECTION, SOLUTION INTRAVENOUS; SUBCUTANEOUS ONCE
Status: COMPLETED | OUTPATIENT
Start: 2024-07-19 | End: 2024-07-19

## 2024-07-19 RX ORDER — INSULIN GLARGINE 100 [IU]/ML
5 INJECTION, SOLUTION SUBCUTANEOUS NIGHTLY
Status: ON HOLD | COMMUNITY
End: 2024-07-30 | Stop reason: HOSPADM

## 2024-07-19 RX ORDER — SENNOSIDES 8.6 MG
650 CAPSULE ORAL EVERY 6 HOURS PRN
COMMUNITY

## 2024-07-19 RX ORDER — SEVELAMER CARBONATE 800 MG/1
2 TABLET, FILM COATED ORAL PRN
Status: ON HOLD | COMMUNITY
End: 2024-07-30 | Stop reason: HOSPADM

## 2024-07-19 RX ORDER — HEPARIN SODIUM 1000 [USP'U]/ML
10000 INJECTION, SOLUTION INTRAVENOUS; SUBCUTANEOUS PRN
Status: DISCONTINUED | OUTPATIENT
Start: 2024-07-19 | End: 2024-07-23

## 2024-07-19 RX ORDER — HEPARIN SODIUM 1000 [USP'U]/ML
1000 INJECTION, SOLUTION INTRAVENOUS; SUBCUTANEOUS ONCE
Status: DISCONTINUED | OUTPATIENT
Start: 2024-07-19 | End: 2024-07-19

## 2024-07-19 RX ORDER — OXYCODONE HYDROCHLORIDE 5 MG/1
5 TABLET ORAL EVERY 6 HOURS PRN
COMMUNITY

## 2024-07-19 RX ADMIN — EPOETIN ALFA-EPBX 10000 UNITS: 10000 INJECTION, SOLUTION INTRAVENOUS; SUBCUTANEOUS at 16:41

## 2024-07-19 RX ADMIN — SEVELAMER CARBONATE 800 MG: 800 TABLET, FILM COATED ORAL at 12:00

## 2024-07-19 RX ADMIN — PIPERACILLIN AND TAZOBACTAM 3375 MG: 3; .375 INJECTION, POWDER, LYOPHILIZED, FOR SOLUTION INTRAVENOUS at 17:27

## 2024-07-19 RX ADMIN — SEVELAMER CARBONATE 800 MG: 800 TABLET, FILM COATED ORAL at 17:31

## 2024-07-19 RX ADMIN — SEVELAMER CARBONATE 800 MG: 800 TABLET, FILM COATED ORAL at 09:34

## 2024-07-19 RX ADMIN — CARVEDILOL 6.25 MG: 6.25 TABLET, FILM COATED ORAL at 22:23

## 2024-07-19 RX ADMIN — ACETAMINOPHEN 650 MG: 325 TABLET ORAL at 17:31

## 2024-07-19 RX ADMIN — PREGABALIN 75 MG: 75 CAPSULE ORAL at 09:00

## 2024-07-19 RX ADMIN — ASPIRIN 81 MG: 81 TABLET, COATED ORAL at 09:34

## 2024-07-19 RX ADMIN — SODIUM CHLORIDE, PRESERVATIVE FREE 10 ML: 5 INJECTION INTRAVENOUS at 22:24

## 2024-07-19 RX ADMIN — ATORVASTATIN CALCIUM 40 MG: 40 TABLET, FILM COATED ORAL at 22:23

## 2024-07-19 RX ADMIN — HEPARIN SODIUM 14 UNITS/KG/HR: 10000 INJECTION, SOLUTION INTRAVENOUS at 22:20

## 2024-07-19 RX ADMIN — HEPARIN SODIUM 10000 UNITS: 1000 INJECTION INTRAVENOUS; SUBCUTANEOUS at 22:14

## 2024-07-19 RX ADMIN — SODIUM CHLORIDE 1500 MG: 9 INJECTION, SOLUTION INTRAVENOUS at 18:17

## 2024-07-19 RX ADMIN — PIPERACILLIN AND TAZOBACTAM 3375 MG: 3; .375 INJECTION, POWDER, LYOPHILIZED, FOR SOLUTION INTRAVENOUS at 00:27

## 2024-07-19 RX ADMIN — ACETAMINOPHEN 650 MG: 325 TABLET ORAL at 09:34

## 2024-07-19 RX ADMIN — SODIUM CHLORIDE: 9 INJECTION, SOLUTION INTRAVENOUS at 17:26

## 2024-07-19 ASSESSMENT — PAIN SCALES - WONG BAKER
WONGBAKER_NUMERICALRESPONSE: NO HURT

## 2024-07-19 NOTE — DISCHARGE INSTRUCTIONS
Podiatric Post Operative Instructions:  You have had a surgical procedure on your right foot.      Fluids and Diet:  Begin with clear liquids, broth, dry toast, and crackers.  If not nauseated then resume your regular pre-operative diet when you are ready    Medications:  Take your prescriptions as directed  If your pain is not severe then you may take the non-prescription medication that you normally take for aches and pains  You may resume your regularly scheduled medications (unless otherwise directed)  If any side effects or adverse reactions occur, discontinue the medication and contact your doctor.  Review the patient drug information that is provided before you take any medication    Ambulation and Activity:  You are advised to go directly home from the hospital  Use walker as needed  You may not put weight on the operated foot.    You should wear the surgical shoe at all times when awake.  Avoid stairs if possible.  Do not lift or move heavy objects  Do not drive until cleared by Dr. Olsen    Bandage and Wound Care Instructions:  Keep bandage clean and dry  Do not shower or bathe the operative extremity  Do not remove the bandage (unless otherwise directed)  Do not attempt to put anything between the cast or dressing and your skin, some itching is normal.    Ice and Elevation:  Elevate operative extremity as much as possible to reduce swelling and discomfort.  Elevate with 2 pillows at or above the level of the heart for the first 72 hours.    Special Instructions: Call your doctor immediately if you develop any of the following.  Fever over 100 degrees by mouth - take your temperature daily until your first follow up visit.  Pain not relieved by medication ordered  Swelling, increased redness, warmth, or hardness around operative area.  Numb, tingling or cold toes.  Toe(s) become white or bluish  Bandage becomes wet, soiled, or blood soaked (small amount of bleeding may be normal)  Increased or progressive

## 2024-07-19 NOTE — ANESTHESIA PRE PROCEDURE
Smokeless tobacco: Never   Substance Use Topics   • Alcohol use: Not Currently     Comment: occasional                                Ready to quit: Not Answered  Counseling given: Not Answered      Vital Signs (Current):   Vitals:    07/18/24 1052 07/18/24 1158 07/18/24 1700 07/18/24 1914   BP:  133/75 126/72 100/60   Pulse:  87 86 78   Resp: 18 19 17 19   Temp:  99 °F (37.2 °C) 98.6 °F (37 °C) (!) 100.9 °F (38.3 °C)   TempSrc:  Oral Oral Oral   SpO2:  92% 96%    Weight:       Height:                                                  BP Readings from Last 3 Encounters:   07/18/24 100/60   04/11/24 (!) 153/63   04/30/23 (!) 148/88       NPO Status:                                                                                 BMI:   Wt Readings from Last 3 Encounters:   07/18/24 (!) 148.3 kg (327 lb)   05/14/24 (!) 142 kg (313 lb 0.9 oz)   04/11/24 (!) 142 kg (313 lb 0.9 oz)     Body mass index is 35.99 kg/m².    CBC:   Lab Results   Component Value Date/Time    WBC 17.7 07/17/2024 09:21 PM    RBC 3.23 07/17/2024 09:21 PM    HGB 10.3 07/17/2024 09:21 PM    HCT 31.4 07/17/2024 09:21 PM    MCV 97.3 07/17/2024 09:21 PM    RDW 14.6 07/17/2024 09:21 PM     07/17/2024 09:21 PM       CMP:   Lab Results   Component Value Date/Time     07/17/2024 09:21 PM    K 4.4 07/17/2024 09:21 PM    CL 93 07/17/2024 09:21 PM    CO2 26 07/17/2024 09:21 PM    BUN 53 07/17/2024 09:21 PM    CREATININE 13.8 07/17/2024 09:21 PM    GFRAA 44 05/14/2022 05:35 AM    GFRAA >60 06/29/2012 05:45 AM    AGRATIO 0.9 07/17/2024 09:21 PM    LABGLOM 4 07/17/2024 09:21 PM    LABGLOM 5 04/11/2024 09:23 AM    GLUCOSE 108 07/17/2024 09:21 PM    CALCIUM 9.7 07/17/2024 09:21 PM    BILITOT 0.6 07/17/2024 09:21 PM    ALKPHOS 85 07/17/2024 09:21 PM    AST 14 07/17/2024 09:21 PM    ALT 14 07/17/2024 09:21 PM       POC Tests:   Recent Labs     07/18/24  1834   POCGLU 103*       Coags:   Lab Results   Component Value Date/Time    PROTIME 16.7 07/18/2024

## 2024-07-19 NOTE — BRIEF OP NOTE
Brief Postoperative Note      Patient: Serafin Weaver  YOB: 1980  MRN: [id number]    Date of Procedure: 7/18/2024    Pre-Op Diagnosis Codes:     * Osteolysis [M89.50]    Post-Op Diagnosis: Same       Procedure(s):  RIGHTFOOT INCISION AND DRAINAGE WITH PARTIAL FIFTH RAY RESECTION    Surgeon(s):  Kailee Olsen DPM    Assistant:  Resident: Gerald Nam DPM    Anesthesia: General    Hemostasis: anatomic dissection and electrocautery    Injectables: Pre-Op 20 cc of 1% Lidocaine plain and Post-Op 30 cc of 0.5 % Marcaine plain    Materials: 1/2\" iodoform packing    Estimated Blood Loss (mL): less than 50     Complications: None    Specimens:   ID Type Source Tests Collected by Time Destination   1 : RIGHT FOOT ABCESS Body Fluid Fluid CULTURE, FUNGUS, CULTURE, AERORBIC AND ANAROBIC, JOINT Kailee Olsen DPM 7/18/2024 2129    2 : FIFTH METATARSAL BONES Tissue Tissue CULTURE, FUNGUS, CULTURE, TISSUE, CULTURE WITH SMEAR, ACID FAST BACILLIUS Kailee Olsen DPM 7/18/2024 2150    A : FIFTH DIGIT Tissue Tissue SURGICAL PATHOLOGY Kailee Olsen DPM 7/18/2024 2148        Implants:  * No implants in log *      Drains: * No LDAs found *    Findings:  Infection Present At Time Of Surgery (PATOS) (choose all levels that have infection present):  - Organ Space infection (below fascia) present as evidenced by osteomyelitis  Other Findings: Dishwater liquefactive necrosis noted to the 5th digit extending proximally to the level of the mid metatarsal shaft and extending medially underlying the second digit. Approximately 10cc of purulence expressed. Limited intra-operative bleeding. Bone was discolored and soft consistent with osteomyelitis    DISPO: S/p right foot I&D with partial 5th ray resection. Wound packed opened. Continue IV antibiotics. NWB to RLE. Surgical path and culture obtained. Post operative xrays ordered. Vascular surgery consulted. Patient will need podiatric surgical intervention including repeat

## 2024-07-19 NOTE — DISCHARGE INSTR - COC
Continuity of Care Form    Patient Name: Serafin Weaver   :  1980  MRN:  [id number]    Admit date:  2024  Discharge date:  2024    Code Status Order: Full Code   Advance Directives:     Admitting Physician:  Chucky Avina MD  PCP: Venita Kendrick MD    Discharging Nurse: Lesli Yeh  Discharging Hospital Unit/Room#: 6320/6320-01  Discharging Unit Phone Number: [phone]    Emergency Contact:   Extended Emergency Contact Information  Primary Emergency Contact: OwenAnnie  Address: [address] [place] [address] [address]  Home Phone: [phone]  Relation: Parent  Secondary Emergency Contact: [place] [address] [facility]  Home Phone: [phone]  Relation: Parent    Past Surgical History:  Past Surgical History:   Procedure Laterality Date    AMPUTATION      left 3rd toe    FOOT AMPUTATION Left 2019    INCISION AND DRAINAGE, REVISION OF TRANSMETATARSAL AMPUTATION LEFT FOOT performed by Kailee Olsen DPM at [facility] OR    FOOT DEBRIDEMENT Right 2024    RIGHTFOOT INCISION AND DRAINAGE WITH PARTIAL FIFTH RAY RESECTION performed by Kailee Olsen DPM at [facility] OR    OTHER SURGICAL HISTORY  2015    incision and drainage scrotal abcess  wound vac on    TOE AMPUTATION Right     partial great toe    VASCULAR SURGERY Right 2024    RIGHT ARM ARTERIOVENOUS GRAFT performed by Reddy Howe II, MD at [facility] OR       Immunization History:   Immunization History   Administered Date(s) Administered    Pneumococcal, PPSV23, PNEUMOVAX 23, (age 2y+), SC/IM, 0.5mL 2010       Active Problems:  Patient Active Problem List   Diagnosis Code    Amputation of third toe, left, traumatic ([facility]) S98.132A    Type II or unspecified type diabetes mellitus without mention of complication, not stated as uncontrolled (HCC) E11.9    BMI 37.0-37.9, adult Z68.37    DKA (diabetic ketoacidoses) E11.10    Scrotal abscess

## 2024-07-19 NOTE — ANESTHESIA POSTPROCEDURE EVALUATION
Department of Anesthesiology  Postprocedure Note    Patient: Serafin Weaver  MRN: [id number]  YOB: 1980  Date of evaluation: 7/18/2024    Procedure Summary       Date: 07/18/24 Room / Location: [address]    Anesthesia Start: 2054 Anesthesia Stop: 2217    Procedure: RIGHTFOOT INCISION AND DRAINAGE WITH PARTIAL FIFTH RAY RESECTION (Right: Foot) Diagnosis:       Osteolysis      (Osteolysis [M89.50])    Surgeons: Kailee Olsen DPM Responsible Provider: Cliff Kang MD    Anesthesia Type: general ASA Status: 3            Anesthesia Type: No value filed.    Teresa Phase I: Teresa Score: 8    Teresa Phase II:      Anesthesia Post Evaluation    Patient location during evaluation: PACU  Patient participation: complete - patient participated  Level of consciousness: awake  Pain score: 0  Nausea & Vomiting: no nausea and no vomiting  Cardiovascular status: blood pressure returned to baseline  Respiratory status: acceptable  Hydration status: euvolemic  Pain management: adequate    No notable events documented.

## 2024-07-19 NOTE — DIALYSIS
Treatment time: 4 hours  Net UF: 1500 ml     Pre weight: 148.3 kg  Post weight:146 kg    Access used: R AVG    Access function: Well with  ml/min     Medications or blood products given: Retacrit     Regular outpatient schedule: MWF      Summary of response to treatment: Patient tolerated treatment well and without any complications. Patient remained stable throughout entire treatment and upon the exiting the dialysis suite via transport.     Report given to Naida Bundy RN and copy of dialysis treatment record placed in chart, to be scanned into EMR.

## 2024-07-20 LAB
ANION GAP SERPL CALCULATED.3IONS-SCNC: 13 MMOL/L (ref 3–16)
APTT BLD: 36 SEC (ref 22.1–36.4)
APTT BLD: 36.2 SEC (ref 22.1–36.4)
APTT BLD: 38.1 SEC (ref 22.1–36.4)
BUN SERPL-MCNC: 37 MG/DL (ref 7–20)
CALCIUM SERPL-MCNC: 8.8 MG/DL (ref 8.3–10.6)
CHLORIDE SERPL-SCNC: 98 MMOL/L (ref 99–110)
CO2 SERPL-SCNC: 27 MMOL/L (ref 21–32)
CREAT SERPL-MCNC: 10.3 MG/DL (ref 0.9–1.3)
DEPRECATED RDW RBC AUTO: 15.1 % (ref 12.4–15.4)
GFR SERPLBLD CREATININE-BSD FMLA CKD-EPI: 6 ML/MIN/{1.73_M2}
GLUCOSE BLD-MCNC: 140 MG/DL (ref 70–99)
GLUCOSE BLD-MCNC: 159 MG/DL (ref 70–99)
GLUCOSE BLD-MCNC: 193 MG/DL (ref 70–99)
GLUCOSE SERPL-MCNC: 160 MG/DL (ref 70–99)
HCT VFR BLD AUTO: 28.1 % (ref 40.5–52.5)
HGB BLD-MCNC: 9.2 G/DL (ref 13.5–17.5)
MCH RBC QN AUTO: 31.9 PG (ref 26–34)
MCHC RBC AUTO-ENTMCNC: 32.8 G/DL (ref 31–36)
MCV RBC AUTO: 97.2 FL (ref 80–100)
PERFORMED ON: ABNORMAL
PLATELET # BLD AUTO: 208 K/UL (ref 135–450)
PMV BLD AUTO: 7.4 FL (ref 5–10.5)
POTASSIUM SERPL-SCNC: 4.3 MMOL/L (ref 3.5–5.1)
RBC # BLD AUTO: 2.89 M/UL (ref 4.2–5.9)
SODIUM SERPL-SCNC: 138 MMOL/L (ref 136–145)
VANCOMYCIN SERPL-MCNC: 21.8 UG/ML
WBC # BLD AUTO: 12.9 K/UL (ref 4–11)

## 2024-07-20 PROCEDURE — 76937 US GUIDE VASCULAR ACCESS: CPT

## 2024-07-20 PROCEDURE — 2580000003 HC RX 258: Performed by: NURSE PRACTITIONER

## 2024-07-20 PROCEDURE — 6360000002 HC RX W HCPCS: Performed by: STUDENT IN AN ORGANIZED HEALTH CARE EDUCATION/TRAINING PROGRAM

## 2024-07-20 PROCEDURE — 94761 N-INVAS EAR/PLS OXIMETRY MLT: CPT

## 2024-07-20 PROCEDURE — 6370000000 HC RX 637 (ALT 250 FOR IP)

## 2024-07-20 PROCEDURE — 80202 ASSAY OF VANCOMYCIN: CPT

## 2024-07-20 PROCEDURE — 6360000002 HC RX W HCPCS: Performed by: FAMILY MEDICINE

## 2024-07-20 PROCEDURE — 36415 COLL VENOUS BLD VENIPUNCTURE: CPT

## 2024-07-20 PROCEDURE — 2580000003 HC RX 258: Performed by: STUDENT IN AN ORGANIZED HEALTH CARE EDUCATION/TRAINING PROGRAM

## 2024-07-20 PROCEDURE — 2700000000 HC OXYGEN THERAPY PER DAY

## 2024-07-20 PROCEDURE — 2580000003 HC RX 258

## 2024-07-20 PROCEDURE — 85027 COMPLETE CBC AUTOMATED: CPT

## 2024-07-20 PROCEDURE — C1751 CATH, INF, PER/CENT/MIDLINE: HCPCS

## 2024-07-20 PROCEDURE — 6370000000 HC RX 637 (ALT 250 FOR IP): Performed by: STUDENT IN AN ORGANIZED HEALTH CARE EDUCATION/TRAINING PROGRAM

## 2024-07-20 PROCEDURE — 80048 BASIC METABOLIC PNL TOTAL CA: CPT

## 2024-07-20 PROCEDURE — 85730 THROMBOPLASTIN TIME PARTIAL: CPT

## 2024-07-20 PROCEDURE — 6370000000 HC RX 637 (ALT 250 FOR IP): Performed by: INTERNAL MEDICINE

## 2024-07-20 PROCEDURE — 2580000003 HC RX 258: Performed by: FAMILY MEDICINE

## 2024-07-20 PROCEDURE — 6370000000 HC RX 637 (ALT 250 FOR IP): Performed by: NURSE PRACTITIONER

## 2024-07-20 PROCEDURE — 1200000000 HC SEMI PRIVATE

## 2024-07-20 PROCEDURE — 36410 VNPNXR 3YR/> PHY/QHP DX/THER: CPT

## 2024-07-20 RX ORDER — 0.9 % SODIUM CHLORIDE 0.9 %
1000 INTRAVENOUS SOLUTION INTRAVENOUS ONCE
Status: COMPLETED | OUTPATIENT
Start: 2024-07-20 | End: 2024-07-21

## 2024-07-20 RX ORDER — SODIUM CHLORIDE 9 MG/ML
INJECTION, SOLUTION INTRAVENOUS PRN
Status: DISCONTINUED | OUTPATIENT
Start: 2024-07-20 | End: 2024-07-30 | Stop reason: HOSPADM

## 2024-07-20 RX ORDER — SODIUM CHLORIDE 0.9 % (FLUSH) 0.9 %
5-40 SYRINGE (ML) INJECTION PRN
Status: DISCONTINUED | OUTPATIENT
Start: 2024-07-20 | End: 2024-07-30 | Stop reason: HOSPADM

## 2024-07-20 RX ORDER — SODIUM CHLORIDE 0.9 % (FLUSH) 0.9 %
5-40 SYRINGE (ML) INJECTION EVERY 12 HOURS SCHEDULED
Status: DISCONTINUED | OUTPATIENT
Start: 2024-07-20 | End: 2024-07-26

## 2024-07-20 RX ORDER — ACETAMINOPHEN 325 MG/1
650 TABLET ORAL ONCE
Status: COMPLETED | OUTPATIENT
Start: 2024-07-20 | End: 2024-07-20

## 2024-07-20 RX ADMIN — ACETAMINOPHEN 650 MG: 325 TABLET ORAL at 09:46

## 2024-07-20 RX ADMIN — ATORVASTATIN CALCIUM 40 MG: 40 TABLET, FILM COATED ORAL at 21:23

## 2024-07-20 RX ADMIN — ACETAMINOPHEN 650 MG: 325 TABLET ORAL at 00:54

## 2024-07-20 RX ADMIN — PIPERACILLIN AND TAZOBACTAM 3375 MG: 3; .375 INJECTION, POWDER, LYOPHILIZED, FOR SOLUTION INTRAVENOUS at 00:50

## 2024-07-20 RX ADMIN — SODIUM CHLORIDE 1000 ML: 9 INJECTION, SOLUTION INTRAVENOUS at 21:38

## 2024-07-20 RX ADMIN — CARVEDILOL 6.25 MG: 6.25 TABLET, FILM COATED ORAL at 09:46

## 2024-07-20 RX ADMIN — ACETAMINOPHEN 650 MG: 325 TABLET ORAL at 21:38

## 2024-07-20 RX ADMIN — ASPIRIN 81 MG: 81 TABLET, COATED ORAL at 09:47

## 2024-07-20 RX ADMIN — SEVELAMER CARBONATE 800 MG: 800 TABLET, FILM COATED ORAL at 16:40

## 2024-07-20 RX ADMIN — ACETAMINOPHEN 650 MG: 325 TABLET ORAL at 18:29

## 2024-07-20 RX ADMIN — SODIUM CHLORIDE: 9 INJECTION, SOLUTION INTRAVENOUS at 18:22

## 2024-07-20 RX ADMIN — PREGABALIN 75 MG: 75 CAPSULE ORAL at 09:47

## 2024-07-20 RX ADMIN — HEPARIN SODIUM 10000 UNITS: 1000 INJECTION INTRAVENOUS; SUBCUTANEOUS at 06:51

## 2024-07-20 RX ADMIN — MEROPENEM 1000 MG: 1 INJECTION, POWDER, FOR SOLUTION INTRAVENOUS at 18:23

## 2024-07-20 RX ADMIN — SEVELAMER CARBONATE 800 MG: 800 TABLET, FILM COATED ORAL at 09:47

## 2024-07-20 RX ADMIN — HEPARIN SODIUM 18 UNITS/KG/HR: 10000 INJECTION, SOLUTION INTRAVENOUS at 18:25

## 2024-07-20 ASSESSMENT — PAIN SCALES - WONG BAKER
WONGBAKER_NUMERICALRESPONSE: NO HURT

## 2024-07-21 ENCOUNTER — APPOINTMENT (OUTPATIENT)
Dept: MRI IMAGING | Age: 44
DRG: 239 | End: 2024-07-21
Payer: COMMERCIAL

## 2024-07-21 LAB
ANION GAP SERPL CALCULATED.3IONS-SCNC: 15 MMOL/L (ref 3–16)
ANTI-XA UNFRAC HEPARIN: 0.62 IU/ML (ref 0.3–0.7)
APTT BLD: 148.9 SEC (ref 22.1–36.4)
APTT BLD: 97.6 SEC (ref 22.1–36.4)
BACTERIA SPEC AEROBE CULT: ABNORMAL
BACTERIA SPEC AEROBE CULT: ABNORMAL
BACTERIA SPEC ANAEROBE CULT: ABNORMAL
BUN SERPL-MCNC: 50 MG/DL (ref 7–20)
CALCIUM SERPL-MCNC: 8.7 MG/DL (ref 8.3–10.6)
CHLORIDE SERPL-SCNC: 98 MMOL/L (ref 99–110)
CO2 SERPL-SCNC: 24 MMOL/L (ref 21–32)
CREAT SERPL-MCNC: 11.8 MG/DL (ref 0.9–1.3)
DEPRECATED RDW RBC AUTO: 15.5 % (ref 12.4–15.4)
GFR SERPLBLD CREATININE-BSD FMLA CKD-EPI: 5 ML/MIN/{1.73_M2}
GLUCOSE BLD-MCNC: 105 MG/DL (ref 70–99)
GLUCOSE BLD-MCNC: 106 MG/DL (ref 70–99)
GLUCOSE BLD-MCNC: 114 MG/DL (ref 70–99)
GLUCOSE BLD-MCNC: 131 MG/DL (ref 70–99)
GLUCOSE SERPL-MCNC: 111 MG/DL (ref 70–99)
GRAM STN SPEC: ABNORMAL
HCT VFR BLD AUTO: 28 % (ref 40.5–52.5)
HGB BLD-MCNC: 8.9 G/DL (ref 13.5–17.5)
LACTATE BLDV-SCNC: 1.3 MMOL/L (ref 0.4–2)
MCH RBC QN AUTO: 31.5 PG (ref 26–34)
MCHC RBC AUTO-ENTMCNC: 31.9 G/DL (ref 31–36)
MCV RBC AUTO: 98.6 FL (ref 80–100)
ORGANISM: ABNORMAL
ORGANISM: ABNORMAL
PERFORMED ON: ABNORMAL
PLATELET # BLD AUTO: 227 K/UL (ref 135–450)
PMV BLD AUTO: 7.8 FL (ref 5–10.5)
POTASSIUM SERPL-SCNC: 4.4 MMOL/L (ref 3.5–5.1)
RBC # BLD AUTO: 2.83 M/UL (ref 4.2–5.9)
SODIUM SERPL-SCNC: 137 MMOL/L (ref 136–145)
WBC # BLD AUTO: 13.7 K/UL (ref 4–11)

## 2024-07-21 PROCEDURE — 6370000000 HC RX 637 (ALT 250 FOR IP)

## 2024-07-21 PROCEDURE — 2580000003 HC RX 258: Performed by: STUDENT IN AN ORGANIZED HEALTH CARE EDUCATION/TRAINING PROGRAM

## 2024-07-21 PROCEDURE — 87040 BLOOD CULTURE FOR BACTERIA: CPT

## 2024-07-21 PROCEDURE — 85730 THROMBOPLASTIN TIME PARTIAL: CPT

## 2024-07-21 PROCEDURE — 83605 ASSAY OF LACTIC ACID: CPT

## 2024-07-21 PROCEDURE — 2580000003 HC RX 258: Performed by: NURSE PRACTITIONER

## 2024-07-21 PROCEDURE — 94761 N-INVAS EAR/PLS OXIMETRY MLT: CPT

## 2024-07-21 PROCEDURE — 85520 HEPARIN ASSAY: CPT

## 2024-07-21 PROCEDURE — 6370000000 HC RX 637 (ALT 250 FOR IP): Performed by: STUDENT IN AN ORGANIZED HEALTH CARE EDUCATION/TRAINING PROGRAM

## 2024-07-21 PROCEDURE — 36415 COLL VENOUS BLD VENIPUNCTURE: CPT

## 2024-07-21 PROCEDURE — 80048 BASIC METABOLIC PNL TOTAL CA: CPT

## 2024-07-21 PROCEDURE — 6360000002 HC RX W HCPCS: Performed by: STUDENT IN AN ORGANIZED HEALTH CARE EDUCATION/TRAINING PROGRAM

## 2024-07-21 PROCEDURE — 2580000003 HC RX 258

## 2024-07-21 PROCEDURE — 73718 MRI LOWER EXTREMITY W/O DYE: CPT

## 2024-07-21 PROCEDURE — 85027 COMPLETE CBC AUTOMATED: CPT

## 2024-07-21 PROCEDURE — 6370000000 HC RX 637 (ALT 250 FOR IP): Performed by: NURSE PRACTITIONER

## 2024-07-21 PROCEDURE — 2700000000 HC OXYGEN THERAPY PER DAY

## 2024-07-21 PROCEDURE — 1200000000 HC SEMI PRIVATE

## 2024-07-21 RX ORDER — SODIUM CHLORIDE, SODIUM LACTATE, POTASSIUM CHLORIDE, CALCIUM CHLORIDE 600; 310; 30; 20 MG/100ML; MG/100ML; MG/100ML; MG/100ML
INJECTION, SOLUTION INTRAVENOUS CONTINUOUS
Status: DISCONTINUED | OUTPATIENT
Start: 2024-07-22 | End: 2024-07-22

## 2024-07-21 RX ORDER — 0.9 % SODIUM CHLORIDE 0.9 %
1000 INTRAVENOUS SOLUTION INTRAVENOUS ONCE
Status: COMPLETED | OUTPATIENT
Start: 2024-07-21 | End: 2024-07-21

## 2024-07-21 RX ADMIN — ACETAMINOPHEN 650 MG: 325 TABLET ORAL at 08:24

## 2024-07-21 RX ADMIN — CARVEDILOL 6.25 MG: 6.25 TABLET, FILM COATED ORAL at 20:38

## 2024-07-21 RX ADMIN — SODIUM CHLORIDE, PRESERVATIVE FREE 10 ML: 5 INJECTION INTRAVENOUS at 20:40

## 2024-07-21 RX ADMIN — ASPIRIN 81 MG: 81 TABLET, COATED ORAL at 08:25

## 2024-07-21 RX ADMIN — SODIUM CHLORIDE 1000 ML: 9 INJECTION, SOLUTION INTRAVENOUS at 00:02

## 2024-07-21 RX ADMIN — CARVEDILOL 6.25 MG: 6.25 TABLET, FILM COATED ORAL at 08:25

## 2024-07-21 RX ADMIN — HEPARIN SODIUM 15 UNITS/KG/HR: 10000 INJECTION, SOLUTION INTRAVENOUS at 06:01

## 2024-07-21 RX ADMIN — ATORVASTATIN CALCIUM 40 MG: 40 TABLET, FILM COATED ORAL at 20:38

## 2024-07-21 RX ADMIN — PREGABALIN 75 MG: 75 CAPSULE ORAL at 08:25

## 2024-07-21 RX ADMIN — HEPARIN SODIUM 15 UNITS/KG/HR: 10000 INJECTION, SOLUTION INTRAVENOUS at 18:57

## 2024-07-21 RX ADMIN — SODIUM CHLORIDE, PRESERVATIVE FREE 10 ML: 5 INJECTION INTRAVENOUS at 08:26

## 2024-07-21 RX ADMIN — SEVELAMER CARBONATE 800 MG: 800 TABLET, FILM COATED ORAL at 08:25

## 2024-07-21 RX ADMIN — MEROPENEM 500 MG: 500 INJECTION, POWDER, FOR SOLUTION INTRAVENOUS at 16:51

## 2024-07-21 RX ADMIN — SODIUM CHLORIDE: 9 INJECTION, SOLUTION INTRAVENOUS at 16:50

## 2024-07-21 ASSESSMENT — PAIN SCALES - WONG BAKER
WONGBAKER_NUMERICALRESPONSE: NO HURT

## 2024-07-22 PROBLEM — Z16.12 INFECTION WITH ESBL KLEBSIELLA OXYTOCA: Status: ACTIVE | Noted: 2024-07-22

## 2024-07-22 PROBLEM — A49.8 INFECTION WITH ESBL KLEBSIELLA OXYTOCA: Status: ACTIVE | Noted: 2024-07-22

## 2024-07-22 LAB
ABO + RH BLD: NORMAL
ANION GAP SERPL CALCULATED.3IONS-SCNC: 17 MMOL/L (ref 3–16)
ANTI-XA UNFRAC HEPARIN: <0.1 IU/ML (ref 0.3–0.7)
BACTERIA BLD CULT ORG #2: NORMAL
BACTERIA BLD CULT: NORMAL
BACTERIA SNV CULT: ABNORMAL
BACTERIA SPEC AEROBE CULT: ABNORMAL
BACTERIA SPEC AEROBE CULT: ABNORMAL
BACTERIA SPEC ANAEROBE CULT: ABNORMAL
BLD GP AB SCN SERPL QL: NORMAL
BUN SERPL-MCNC: 57 MG/DL (ref 7–20)
CALCIUM SERPL-MCNC: 9.1 MG/DL (ref 8.3–10.6)
CHLORIDE SERPL-SCNC: 99 MMOL/L (ref 99–110)
CO2 SERPL-SCNC: 23 MMOL/L (ref 21–32)
CREAT SERPL-MCNC: 13.8 MG/DL (ref 0.9–1.3)
CRP SERPL-MCNC: 305 MG/L (ref 0–5.1)
CRP SERPL-MCNC: 314.1 MG/L (ref 0–5.1)
DEPRECATED RDW RBC AUTO: 15.3 % (ref 12.4–15.4)
ECHO BSA: 2.89 M2
ERYTHROCYTE [SEDIMENTATION RATE] IN BLOOD BY WESTERGREN METHOD: 114 MM/HR (ref 0–15)
ERYTHROCYTE [SEDIMENTATION RATE] IN BLOOD BY WESTERGREN METHOD: 130 MM/HR (ref 0–15)
GFR SERPLBLD CREATININE-BSD FMLA CKD-EPI: 4 ML/MIN/{1.73_M2}
GLUCOSE BLD-MCNC: 123 MG/DL (ref 70–99)
GLUCOSE BLD-MCNC: 173 MG/DL (ref 70–99)
GLUCOSE BLD-MCNC: 194 MG/DL (ref 70–99)
GLUCOSE SERPL-MCNC: 74 MG/DL (ref 70–99)
GRAM STN SPEC: ABNORMAL
GRAM STN SPEC: ABNORMAL
HCT VFR BLD AUTO: 29 % (ref 40.5–52.5)
HGB BLD-MCNC: 9.4 G/DL (ref 13.5–17.5)
INR PPP: 1.26 (ref 0.85–1.15)
MCH RBC QN AUTO: 31.6 PG (ref 26–34)
MCHC RBC AUTO-ENTMCNC: 32.3 G/DL (ref 31–36)
MCV RBC AUTO: 97.8 FL (ref 80–100)
ORGANISM: ABNORMAL
PERFORMED ON: ABNORMAL
PLATELET # BLD AUTO: 262 K/UL (ref 135–450)
PMV BLD AUTO: 7.4 FL (ref 5–10.5)
POTASSIUM SERPL-SCNC: 4.9 MMOL/L (ref 3.5–5.1)
PROTHROMBIN TIME: 16 SEC (ref 11.9–14.9)
RBC # BLD AUTO: 2.96 M/UL (ref 4.2–5.9)
SODIUM SERPL-SCNC: 139 MMOL/L (ref 136–145)
VANCOMYCIN SERPL-MCNC: 17.1 UG/ML
WBC # BLD AUTO: 13.6 K/UL (ref 4–11)

## 2024-07-22 PROCEDURE — 80048 BASIC METABOLIC PNL TOTAL CA: CPT

## 2024-07-22 PROCEDURE — 85652 RBC SED RATE AUTOMATED: CPT

## 2024-07-22 PROCEDURE — 99152 MOD SED SAME PHYS/QHP 5/>YRS: CPT | Performed by: SURGERY

## 2024-07-22 PROCEDURE — 75625 CONTRAST EXAM ABDOMINL AORTA: CPT | Performed by: SURGERY

## 2024-07-22 PROCEDURE — 6370000000 HC RX 637 (ALT 250 FOR IP): Performed by: STUDENT IN AN ORGANIZED HEALTH CARE EDUCATION/TRAINING PROGRAM

## 2024-07-22 PROCEDURE — C2623 CATH, TRANSLUMIN, DRUG-COAT: HCPCS | Performed by: SURGERY

## 2024-07-22 PROCEDURE — 7100000011 HC PHASE II RECOVERY - ADDTL 15 MIN: Performed by: SURGERY

## 2024-07-22 PROCEDURE — 6370000000 HC RX 637 (ALT 250 FOR IP)

## 2024-07-22 PROCEDURE — 2580000003 HC RX 258

## 2024-07-22 PROCEDURE — B41F1ZZ FLUOROSCOPY OF RIGHT LOWER EXTREMITY ARTERIES USING LOW OSMOLAR CONTRAST: ICD-10-PCS | Performed by: SURGERY

## 2024-07-22 PROCEDURE — 6360000002 HC RX W HCPCS

## 2024-07-22 PROCEDURE — 80202 ASSAY OF VANCOMYCIN: CPT

## 2024-07-22 PROCEDURE — 2500000003 HC RX 250 WO HCPCS: Performed by: SURGERY

## 2024-07-22 PROCEDURE — 6360000002 HC RX W HCPCS: Performed by: SURGERY

## 2024-07-22 PROCEDURE — 2709999900 HC NON-CHARGEABLE SUPPLY: Performed by: SURGERY

## 2024-07-22 PROCEDURE — 86850 RBC ANTIBODY SCREEN: CPT

## 2024-07-22 PROCEDURE — 5A1D70Z PERFORMANCE OF URINARY FILTRATION, INTERMITTENT, LESS THAN 6 HOURS PER DAY: ICD-10-PCS | Performed by: INTERNAL MEDICINE

## 2024-07-22 PROCEDURE — 6360000002 HC RX W HCPCS: Performed by: INTERNAL MEDICINE

## 2024-07-22 PROCEDURE — 37224 PR REVSC OPN/PRG FEM/POP W/ANGIOPLASTY UNI: CPT | Performed by: SURGERY

## 2024-07-22 PROCEDURE — 36415 COLL VENOUS BLD VENIPUNCTURE: CPT

## 2024-07-22 PROCEDURE — C1894 INTRO/SHEATH, NON-LASER: HCPCS | Performed by: SURGERY

## 2024-07-22 PROCEDURE — 7100000010 HC PHASE II RECOVERY - FIRST 15 MIN: Performed by: SURGERY

## 2024-07-22 PROCEDURE — 2580000003 HC RX 258: Performed by: STUDENT IN AN ORGANIZED HEALTH CARE EDUCATION/TRAINING PROGRAM

## 2024-07-22 PROCEDURE — 2580000003 HC RX 258: Performed by: INTERNAL MEDICINE

## 2024-07-22 PROCEDURE — 85027 COMPLETE CBC AUTOMATED: CPT

## 2024-07-22 PROCEDURE — C1725 CATH, TRANSLUMIN NON-LASER: HCPCS | Performed by: SURGERY

## 2024-07-22 PROCEDURE — 85610 PROTHROMBIN TIME: CPT

## 2024-07-22 PROCEDURE — 86140 C-REACTIVE PROTEIN: CPT

## 2024-07-22 PROCEDURE — 37224 HC FEM POP TERRITORY PLASTY: CPT | Performed by: SURGERY

## 2024-07-22 PROCEDURE — 6360000004 HC RX CONTRAST MEDICATION: Performed by: SURGERY

## 2024-07-22 PROCEDURE — 86901 BLOOD TYPING SEROLOGIC RH(D): CPT

## 2024-07-22 PROCEDURE — 99233 SBSQ HOSP IP/OBS HIGH 50: CPT | Performed by: INTERNAL MEDICINE

## 2024-07-22 PROCEDURE — C1769 GUIDE WIRE: HCPCS | Performed by: SURGERY

## 2024-07-22 PROCEDURE — 75774 ARTERY X-RAY EACH VESSEL: CPT | Performed by: SURGERY

## 2024-07-22 PROCEDURE — 75710 ARTERY X-RAYS ARM/LEG: CPT | Performed by: SURGERY

## 2024-07-22 PROCEDURE — C1760 CLOSURE DEV, VASC: HCPCS | Performed by: SURGERY

## 2024-07-22 PROCEDURE — 99232 SBSQ HOSP IP/OBS MODERATE 35: CPT | Performed by: SURGERY

## 2024-07-22 PROCEDURE — 99153 MOD SED SAME PHYS/QHP EA: CPT | Performed by: SURGERY

## 2024-07-22 PROCEDURE — 90935 HEMODIALYSIS ONE EVALUATION: CPT

## 2024-07-22 PROCEDURE — 047L3D1 DILATION OF LEFT FEMORAL ARTERY WITH INTRALUMINAL DEVICE, USING DRUG-COATED BALLOON, PERCUTANEOUS APPROACH: ICD-10-PCS | Performed by: SURGERY

## 2024-07-22 PROCEDURE — 1200000000 HC SEMI PRIVATE

## 2024-07-22 PROCEDURE — C1887 CATHETER, GUIDING: HCPCS | Performed by: SURGERY

## 2024-07-22 PROCEDURE — 86900 BLOOD TYPING SEROLOGIC ABO: CPT

## 2024-07-22 PROCEDURE — 85520 HEPARIN ASSAY: CPT

## 2024-07-22 RX ORDER — MIDAZOLAM HYDROCHLORIDE 1 MG/ML
INJECTION INTRAMUSCULAR; INTRAVENOUS PRN
Status: DISCONTINUED | OUTPATIENT
Start: 2024-07-22 | End: 2024-07-22 | Stop reason: HOSPADM

## 2024-07-22 RX ORDER — LIDOCAINE HYDROCHLORIDE 10 MG/ML
INJECTION, SOLUTION EPIDURAL; INFILTRATION; INTRACAUDAL; PERINEURAL PRN
Status: DISCONTINUED | OUTPATIENT
Start: 2024-07-22 | End: 2024-07-22 | Stop reason: HOSPADM

## 2024-07-22 RX ORDER — MIDODRINE HYDROCHLORIDE 5 MG/1
5 TABLET ORAL
Status: DISCONTINUED | OUTPATIENT
Start: 2024-07-22 | End: 2024-07-30

## 2024-07-22 RX ORDER — HEPARIN SODIUM 1000 [USP'U]/ML
INJECTION, SOLUTION INTRAVENOUS; SUBCUTANEOUS PRN
Status: DISCONTINUED | OUTPATIENT
Start: 2024-07-22 | End: 2024-07-22 | Stop reason: HOSPADM

## 2024-07-22 RX ORDER — CLOPIDOGREL BISULFATE 75 MG/1
75 TABLET ORAL DAILY
Status: DISCONTINUED | OUTPATIENT
Start: 2024-07-22 | End: 2024-07-24

## 2024-07-22 RX ADMIN — SODIUM CHLORIDE, PRESERVATIVE FREE 10 ML: 5 INJECTION INTRAVENOUS at 18:54

## 2024-07-22 RX ADMIN — CLOPIDOGREL BISULFATE 75 MG: 75 TABLET ORAL at 10:52

## 2024-07-22 RX ADMIN — ATORVASTATIN CALCIUM 40 MG: 40 TABLET, FILM COATED ORAL at 20:09

## 2024-07-22 RX ADMIN — CARVEDILOL 6.25 MG: 6.25 TABLET, FILM COATED ORAL at 20:09

## 2024-07-22 RX ADMIN — MIDODRINE HYDROCHLORIDE 5 MG: 5 TABLET ORAL at 18:53

## 2024-07-22 RX ADMIN — ASPIRIN 81 MG: 81 TABLET, COATED ORAL at 12:34

## 2024-07-22 RX ADMIN — SODIUM CHLORIDE, POTASSIUM CHLORIDE, SODIUM LACTATE AND CALCIUM CHLORIDE: 600; 310; 30; 20 INJECTION, SOLUTION INTRAVENOUS at 00:20

## 2024-07-22 RX ADMIN — PREGABALIN 75 MG: 75 CAPSULE ORAL at 18:53

## 2024-07-22 RX ADMIN — SODIUM CHLORIDE, PRESERVATIVE FREE 10 ML: 5 INJECTION INTRAVENOUS at 18:59

## 2024-07-22 RX ADMIN — MEROPENEM 500 MG: 500 INJECTION, POWDER, FOR SOLUTION INTRAVENOUS at 18:59

## 2024-07-22 RX ADMIN — EPOETIN ALFA-EPBX 10000 UNITS: 10000 INJECTION, SOLUTION INTRAVENOUS; SUBCUTANEOUS at 16:43

## 2024-07-22 RX ADMIN — ACETAMINOPHEN 650 MG: 325 TABLET ORAL at 00:25

## 2024-07-22 RX ADMIN — SODIUM CHLORIDE, PRESERVATIVE FREE 10 ML: 5 INJECTION INTRAVENOUS at 11:22

## 2024-07-22 RX ADMIN — SEVELAMER CARBONATE 800 MG: 800 TABLET, FILM COATED ORAL at 18:54

## 2024-07-22 RX ADMIN — SEVELAMER CARBONATE 800 MG: 800 TABLET, FILM COATED ORAL at 13:35

## 2024-07-22 RX ADMIN — SODIUM CHLORIDE 1500 MG: 9 INJECTION, SOLUTION INTRAVENOUS at 16:33

## 2024-07-22 ASSESSMENT — PAIN SCALES - WONG BAKER
WONGBAKER_NUMERICALRESPONSE: NO HURT

## 2024-07-22 NOTE — BRIEF OP NOTE
Brief Postoperative Note      Patient: Serfain Weaver  YOB: 1980  MRN: [id number]    Date of Procedure: 7/22/2024    Pre-Op Diagnosis Codes:     * Osteomyelitis, unspecified site, unspecified type (HCC) [M86.9]    Post-Op Diagnosis: PAD       Procedure(s):  Angiography lower ext right    Surgeon(s):  Hubert Mirza MD    Assistant:  * No surgical staff found *    Anesthesia: IV Sedation    Estimated Blood Loss (mL): less than 50     Complications: None    Specimens:   * No specimens in log *    Implants:  * No implants in log *      Drains: * No LDAs found *    Findings:  Infection Present At Time Of Surgery (PATOS) (choose all levels that have infection present):  No infection present  Other Findings: RLE arteriogram with left common femoral artery insertion site. Balloon angioplasty x2 at area of severe stenosis at distal SFA/ proximal popliteal arteries. Artery closed with Mynx device.     Electronically signed by Serafin Cardenas DO on 7/22/2024 at 9:52 AM

## 2024-07-22 NOTE — PRE SEDATION
Sedation Plan  ASA: class 3 - patient with severe systemic disease     Mallampati class: III - soft palate, base of uvula visible.    Sedation plan: local anesthesia and moderate (conscious sedation)  Sedation plan comments: Fentanyl, versed, lidocaine  Risks, benefits, and alternatives discussed with patient and mother.  Use of blood products discussed with patient and mother who consented to blood products.       Immediate reassessment prior to sedation:  Patient's status reviewed and vital signs assessed; acceptable to perform procedure and proceed to administer sedation as planned.

## 2024-07-23 LAB
ANION GAP SERPL CALCULATED.3IONS-SCNC: 11 MMOL/L (ref 3–16)
BUN SERPL-MCNC: 38 MG/DL (ref 7–20)
CALCIUM SERPL-MCNC: 8.5 MG/DL (ref 8.3–10.6)
CHLORIDE SERPL-SCNC: 97 MMOL/L (ref 99–110)
CO2 SERPL-SCNC: 26 MMOL/L (ref 21–32)
CREAT SERPL-MCNC: 9.3 MG/DL (ref 0.9–1.3)
CRP SERPL-MCNC: 252.8 MG/L (ref 0–5.1)
DEPRECATED RDW RBC AUTO: 15.2 % (ref 12.4–15.4)
GFR SERPLBLD CREATININE-BSD FMLA CKD-EPI: 6 ML/MIN/{1.73_M2}
GLUCOSE BLD-MCNC: 111 MG/DL (ref 70–99)
GLUCOSE BLD-MCNC: 146 MG/DL (ref 70–99)
GLUCOSE BLD-MCNC: 157 MG/DL (ref 70–99)
GLUCOSE SERPL-MCNC: 111 MG/DL (ref 70–99)
HCT VFR BLD AUTO: 25.5 % (ref 40.5–52.5)
HGB BLD-MCNC: 8.2 G/DL (ref 13.5–17.5)
MCH RBC QN AUTO: 31.1 PG (ref 26–34)
MCHC RBC AUTO-ENTMCNC: 32.1 G/DL (ref 31–36)
MCV RBC AUTO: 96.9 FL (ref 80–100)
PERFORMED ON: ABNORMAL
PHOSPHATE SERPL-MCNC: 4.2 MG/DL (ref 2.5–4.9)
PLATELET # BLD AUTO: 244 K/UL (ref 135–450)
PMV BLD AUTO: 7.5 FL (ref 5–10.5)
POTASSIUM SERPL-SCNC: 4.2 MMOL/L (ref 3.5–5.1)
RBC # BLD AUTO: 2.63 M/UL (ref 4.2–5.9)
SODIUM SERPL-SCNC: 134 MMOL/L (ref 136–145)
WBC # BLD AUTO: 13.9 K/UL (ref 4–11)

## 2024-07-23 PROCEDURE — 6370000000 HC RX 637 (ALT 250 FOR IP): Performed by: STUDENT IN AN ORGANIZED HEALTH CARE EDUCATION/TRAINING PROGRAM

## 2024-07-23 PROCEDURE — 6360000002 HC RX W HCPCS

## 2024-07-23 PROCEDURE — 2580000003 HC RX 258

## 2024-07-23 PROCEDURE — 36415 COLL VENOUS BLD VENIPUNCTURE: CPT

## 2024-07-23 PROCEDURE — 85027 COMPLETE CBC AUTOMATED: CPT

## 2024-07-23 PROCEDURE — 86140 C-REACTIVE PROTEIN: CPT

## 2024-07-23 PROCEDURE — 6370000000 HC RX 637 (ALT 250 FOR IP)

## 2024-07-23 PROCEDURE — 99232 SBSQ HOSP IP/OBS MODERATE 35: CPT | Performed by: INTERNAL MEDICINE

## 2024-07-23 PROCEDURE — 84100 ASSAY OF PHOSPHORUS: CPT

## 2024-07-23 PROCEDURE — 1200000000 HC SEMI PRIVATE

## 2024-07-23 PROCEDURE — 80048 BASIC METABOLIC PNL TOTAL CA: CPT

## 2024-07-23 RX ADMIN — CARVEDILOL 6.25 MG: 6.25 TABLET, FILM COATED ORAL at 21:00

## 2024-07-23 RX ADMIN — PREGABALIN 75 MG: 75 CAPSULE ORAL at 09:52

## 2024-07-23 RX ADMIN — ASPIRIN 81 MG: 81 TABLET, COATED ORAL at 09:52

## 2024-07-23 RX ADMIN — MEROPENEM 500 MG: 500 INJECTION, POWDER, FOR SOLUTION INTRAVENOUS at 14:28

## 2024-07-23 RX ADMIN — ATORVASTATIN CALCIUM 40 MG: 40 TABLET, FILM COATED ORAL at 21:00

## 2024-07-23 RX ADMIN — SODIUM CHLORIDE, PRESERVATIVE FREE 10 ML: 5 INJECTION INTRAVENOUS at 09:57

## 2024-07-23 RX ADMIN — CARVEDILOL 6.25 MG: 6.25 TABLET, FILM COATED ORAL at 09:52

## 2024-07-23 RX ADMIN — SEVELAMER CARBONATE 800 MG: 800 TABLET, FILM COATED ORAL at 17:35

## 2024-07-23 RX ADMIN — SODIUM CHLORIDE: 9 INJECTION, SOLUTION INTRAVENOUS at 14:27

## 2024-07-23 RX ADMIN — SODIUM CHLORIDE, PRESERVATIVE FREE 10 ML: 5 INJECTION INTRAVENOUS at 21:00

## 2024-07-23 RX ADMIN — SEVELAMER CARBONATE 800 MG: 800 TABLET, FILM COATED ORAL at 09:53

## 2024-07-23 RX ADMIN — MIDODRINE HYDROCHLORIDE 5 MG: 5 TABLET ORAL at 12:00

## 2024-07-23 RX ADMIN — MIDODRINE HYDROCHLORIDE 5 MG: 5 TABLET ORAL at 17:35

## 2024-07-23 RX ADMIN — MIDODRINE HYDROCHLORIDE 5 MG: 5 TABLET ORAL at 09:52

## 2024-07-23 RX ADMIN — SODIUM CHLORIDE: 9 INJECTION, SOLUTION INTRAVENOUS at 09:55

## 2024-07-23 RX ADMIN — SEVELAMER CARBONATE 800 MG: 800 TABLET, FILM COATED ORAL at 12:00

## 2024-07-23 RX ADMIN — CLOPIDOGREL BISULFATE 75 MG: 75 TABLET ORAL at 09:53

## 2024-07-23 NOTE — CARE COORDINATION
Patient scheduled for podiatry OR tomorrow 0730 for right foot transmetatarsal amputation with tendo Achilles lengthening.CM will continue to follow patient until discharge.  Electronically signed by Claire Patrick RN on 7/23/2024 at 4:00 PM

## 2024-07-24 ENCOUNTER — ANESTHESIA (OUTPATIENT)
Dept: OPERATING ROOM | Age: 44
End: 2024-07-24
Payer: COMMERCIAL

## 2024-07-24 ENCOUNTER — ANESTHESIA EVENT (OUTPATIENT)
Dept: OPERATING ROOM | Age: 44
End: 2024-07-24
Payer: COMMERCIAL

## 2024-07-24 ENCOUNTER — APPOINTMENT (OUTPATIENT)
Dept: GENERAL RADIOLOGY | Age: 44
DRG: 239 | End: 2024-07-24
Payer: COMMERCIAL

## 2024-07-24 ENCOUNTER — APPOINTMENT (OUTPATIENT)
Dept: GENERAL RADIOLOGY | Age: 44
DRG: 239 | End: 2024-07-24
Attending: PODIATRIST
Payer: COMMERCIAL

## 2024-07-24 LAB
ANION GAP SERPL CALCULATED.3IONS-SCNC: 15 MMOL/L (ref 3–16)
BUN SERPL-MCNC: 47 MG/DL (ref 7–20)
CALCIUM SERPL-MCNC: 8.9 MG/DL (ref 8.3–10.6)
CHLORIDE SERPL-SCNC: 100 MMOL/L (ref 99–110)
CO2 SERPL-SCNC: 25 MMOL/L (ref 21–32)
CREAT SERPL-MCNC: 10.8 MG/DL (ref 0.9–1.3)
CRP SERPL-MCNC: 191 MG/L (ref 0–5.1)
DEPRECATED RDW RBC AUTO: 15.3 % (ref 12.4–15.4)
ERYTHROCYTE [SEDIMENTATION RATE] IN BLOOD BY WESTERGREN METHOD: 121 MM/HR (ref 0–15)
ERYTHROCYTE [SEDIMENTATION RATE] IN BLOOD BY WESTERGREN METHOD: 121 MM/HR (ref 0–15)
GFR SERPLBLD CREATININE-BSD FMLA CKD-EPI: 5 ML/MIN/{1.73_M2}
GLUCOSE BLD-MCNC: 100 MG/DL (ref 70–99)
GLUCOSE BLD-MCNC: 140 MG/DL (ref 70–99)
GLUCOSE BLD-MCNC: 82 MG/DL (ref 70–99)
GLUCOSE SERPL-MCNC: 137 MG/DL (ref 70–99)
HCT VFR BLD AUTO: 27.8 % (ref 40.5–52.5)
HGB BLD-MCNC: 8.8 G/DL (ref 13.5–17.5)
MCH RBC QN AUTO: 30.6 PG (ref 26–34)
MCHC RBC AUTO-ENTMCNC: 31.7 G/DL (ref 31–36)
MCV RBC AUTO: 96.7 FL (ref 80–100)
PERFORMED ON: ABNORMAL
PERFORMED ON: ABNORMAL
PERFORMED ON: NORMAL
PHOSPHATE SERPL-MCNC: 5.5 MG/DL (ref 2.5–4.9)
PLATELET # BLD AUTO: 285 K/UL (ref 135–450)
PMV BLD AUTO: 7.5 FL (ref 5–10.5)
POTASSIUM SERPL-SCNC: 4.5 MMOL/L (ref 3.5–5.1)
RBC # BLD AUTO: 2.87 M/UL (ref 4.2–5.9)
SODIUM SERPL-SCNC: 140 MMOL/L (ref 136–145)
VANCOMYCIN SERPL-MCNC: 20 UG/ML
WBC # BLD AUTO: 16.5 K/UL (ref 4–11)

## 2024-07-24 PROCEDURE — 0Y6M0ZB DETACHMENT AT RIGHT FOOT, PARTIAL 2ND RAY, OPEN APPROACH: ICD-10-PCS | Performed by: PODIATRIST

## 2024-07-24 PROCEDURE — 80048 BASIC METABOLIC PNL TOTAL CA: CPT

## 2024-07-24 PROCEDURE — 88307 TISSUE EXAM BY PATHOLOGIST: CPT

## 2024-07-24 PROCEDURE — 73620 X-RAY EXAM OF FOOT: CPT

## 2024-07-24 PROCEDURE — 2500000003 HC RX 250 WO HCPCS

## 2024-07-24 PROCEDURE — 86140 C-REACTIVE PROTEIN: CPT

## 2024-07-24 PROCEDURE — 3700000001 HC ADD 15 MINUTES (ANESTHESIA): Performed by: PODIATRIST

## 2024-07-24 PROCEDURE — 2580000003 HC RX 258

## 2024-07-24 PROCEDURE — 6360000002 HC RX W HCPCS

## 2024-07-24 PROCEDURE — 0Y6M0ZF DETACHMENT AT RIGHT FOOT, PARTIAL 5TH RAY, OPEN APPROACH: ICD-10-PCS | Performed by: PODIATRIST

## 2024-07-24 PROCEDURE — 7100000000 HC PACU RECOVERY - FIRST 15 MIN: Performed by: PODIATRIST

## 2024-07-24 PROCEDURE — 3600000004 HC SURGERY LEVEL 4 BASE: Performed by: PODIATRIST

## 2024-07-24 PROCEDURE — 0Y6M0Z9 DETACHMENT AT RIGHT FOOT, PARTIAL 1ST RAY, OPEN APPROACH: ICD-10-PCS | Performed by: PODIATRIST

## 2024-07-24 PROCEDURE — 85652 RBC SED RATE AUTOMATED: CPT

## 2024-07-24 PROCEDURE — 3700000000 HC ANESTHESIA ATTENDED CARE: Performed by: PODIATRIST

## 2024-07-24 PROCEDURE — 6370000000 HC RX 637 (ALT 250 FOR IP): Performed by: PODIATRIST

## 2024-07-24 PROCEDURE — 3600000014 HC SURGERY LEVEL 4 ADDTL 15MIN: Performed by: PODIATRIST

## 2024-07-24 PROCEDURE — 6360000002 HC RX W HCPCS: Performed by: PODIATRIST

## 2024-07-24 PROCEDURE — 2500000003 HC RX 250 WO HCPCS: Performed by: PODIATRIST

## 2024-07-24 PROCEDURE — 73630 X-RAY EXAM OF FOOT: CPT

## 2024-07-24 PROCEDURE — 6370000000 HC RX 637 (ALT 250 FOR IP)

## 2024-07-24 PROCEDURE — 2709999900 HC NON-CHARGEABLE SUPPLY: Performed by: PODIATRIST

## 2024-07-24 PROCEDURE — 0Y6M0ZC DETACHMENT AT RIGHT FOOT, PARTIAL 3RD RAY, OPEN APPROACH: ICD-10-PCS | Performed by: PODIATRIST

## 2024-07-24 PROCEDURE — 85027 COMPLETE CBC AUTOMATED: CPT

## 2024-07-24 PROCEDURE — 2580000003 HC RX 258: Performed by: PODIATRIST

## 2024-07-24 PROCEDURE — 7100000001 HC PACU RECOVERY - ADDTL 15 MIN: Performed by: PODIATRIST

## 2024-07-24 PROCEDURE — 99232 SBSQ HOSP IP/OBS MODERATE 35: CPT | Performed by: INTERNAL MEDICINE

## 2024-07-24 PROCEDURE — 0Y6M0ZD DETACHMENT AT RIGHT FOOT, PARTIAL 4TH RAY, OPEN APPROACH: ICD-10-PCS | Performed by: PODIATRIST

## 2024-07-24 PROCEDURE — 88311 DECALCIFY TISSUE: CPT

## 2024-07-24 PROCEDURE — 84100 ASSAY OF PHOSPHORUS: CPT

## 2024-07-24 PROCEDURE — 0L8N0ZZ DIVISION OF RIGHT LOWER LEG TENDON, OPEN APPROACH: ICD-10-PCS | Performed by: PODIATRIST

## 2024-07-24 PROCEDURE — 80202 ASSAY OF VANCOMYCIN: CPT

## 2024-07-24 PROCEDURE — 90935 HEMODIALYSIS ONE EVALUATION: CPT

## 2024-07-24 PROCEDURE — 36415 COLL VENOUS BLD VENIPUNCTURE: CPT

## 2024-07-24 PROCEDURE — A4217 STERILE WATER/SALINE, 500 ML: HCPCS | Performed by: PODIATRIST

## 2024-07-24 PROCEDURE — 1200000000 HC SEMI PRIVATE

## 2024-07-24 RX ORDER — HYDRALAZINE HYDROCHLORIDE 20 MG/ML
10 INJECTION INTRAMUSCULAR; INTRAVENOUS
Status: DISCONTINUED | OUTPATIENT
Start: 2024-07-24 | End: 2024-07-24 | Stop reason: HOSPADM

## 2024-07-24 RX ORDER — ONDANSETRON 2 MG/ML
INJECTION INTRAMUSCULAR; INTRAVENOUS PRN
Status: DISCONTINUED | OUTPATIENT
Start: 2024-07-24 | End: 2024-07-24 | Stop reason: SDUPTHER

## 2024-07-24 RX ORDER — SODIUM CHLORIDE 9 MG/ML
INJECTION, SOLUTION INTRAVENOUS PRN
Status: DISCONTINUED | OUTPATIENT
Start: 2024-07-24 | End: 2024-07-30 | Stop reason: HOSPADM

## 2024-07-24 RX ORDER — POVIDONE-IODINE 10 MG/G
OINTMENT TOPICAL PRN
Status: DISCONTINUED | OUTPATIENT
Start: 2024-07-24 | End: 2024-07-24 | Stop reason: HOSPADM

## 2024-07-24 RX ORDER — FENTANYL CITRATE 50 UG/ML
INJECTION, SOLUTION INTRAMUSCULAR; INTRAVENOUS PRN
Status: DISCONTINUED | OUTPATIENT
Start: 2024-07-24 | End: 2024-07-24 | Stop reason: SDUPTHER

## 2024-07-24 RX ORDER — LIDOCAINE HYDROCHLORIDE AND EPINEPHRINE BITARTRATE 20; .01 MG/ML; MG/ML
INJECTION, SOLUTION SUBCUTANEOUS PRN
Status: DISCONTINUED | OUTPATIENT
Start: 2024-07-24 | End: 2024-07-24 | Stop reason: HOSPADM

## 2024-07-24 RX ORDER — OXYCODONE HYDROCHLORIDE 5 MG/1
5 TABLET ORAL
Status: DISCONTINUED | OUTPATIENT
Start: 2024-07-24 | End: 2024-07-24 | Stop reason: HOSPADM

## 2024-07-24 RX ORDER — DEXMEDETOMIDINE HYDROCHLORIDE 100 UG/ML
INJECTION, SOLUTION INTRAVENOUS PRN
Status: DISCONTINUED | OUTPATIENT
Start: 2024-07-24 | End: 2024-07-24 | Stop reason: SDUPTHER

## 2024-07-24 RX ORDER — PHENYLEPHRINE HYDROCHLORIDE 10 MG/ML
INJECTION INTRAVENOUS PRN
Status: DISCONTINUED | OUTPATIENT
Start: 2024-07-24 | End: 2024-07-24 | Stop reason: SDUPTHER

## 2024-07-24 RX ORDER — CLOPIDOGREL BISULFATE 75 MG/1
75 TABLET ORAL DAILY
Status: DISCONTINUED | OUTPATIENT
Start: 2024-07-25 | End: 2024-07-30 | Stop reason: HOSPADM

## 2024-07-24 RX ORDER — NALOXONE HYDROCHLORIDE 0.4 MG/ML
INJECTION, SOLUTION INTRAMUSCULAR; INTRAVENOUS; SUBCUTANEOUS PRN
Status: DISCONTINUED | OUTPATIENT
Start: 2024-07-24 | End: 2024-07-30 | Stop reason: HOSPADM

## 2024-07-24 RX ORDER — SODIUM CHLORIDE 0.9 % (FLUSH) 0.9 %
5-40 SYRINGE (ML) INJECTION PRN
Status: DISCONTINUED | OUTPATIENT
Start: 2024-07-24 | End: 2024-07-30 | Stop reason: HOSPADM

## 2024-07-24 RX ORDER — MAGNESIUM HYDROXIDE 1200 MG/15ML
LIQUID ORAL CONTINUOUS PRN
Status: DISCONTINUED | OUTPATIENT
Start: 2024-07-24 | End: 2024-07-24 | Stop reason: HOSPADM

## 2024-07-24 RX ORDER — LABETALOL HYDROCHLORIDE 5 MG/ML
10 INJECTION, SOLUTION INTRAVENOUS
Status: DISCONTINUED | OUTPATIENT
Start: 2024-07-24 | End: 2024-07-24 | Stop reason: HOSPADM

## 2024-07-24 RX ORDER — LIDOCAINE HYDROCHLORIDE 20 MG/ML
INJECTION, SOLUTION INTRAVENOUS PRN
Status: DISCONTINUED | OUTPATIENT
Start: 2024-07-24 | End: 2024-07-24 | Stop reason: SDUPTHER

## 2024-07-24 RX ORDER — GLYCOPYRROLATE 0.2 MG/ML
INJECTION INTRAMUSCULAR; INTRAVENOUS PRN
Status: DISCONTINUED | OUTPATIENT
Start: 2024-07-24 | End: 2024-07-24 | Stop reason: SDUPTHER

## 2024-07-24 RX ORDER — ROCURONIUM BROMIDE 10 MG/ML
INJECTION, SOLUTION INTRAVENOUS PRN
Status: DISCONTINUED | OUTPATIENT
Start: 2024-07-24 | End: 2024-07-24 | Stop reason: SDUPTHER

## 2024-07-24 RX ORDER — PROCHLORPERAZINE EDISYLATE 5 MG/ML
5 INJECTION INTRAMUSCULAR; INTRAVENOUS
Status: DISCONTINUED | OUTPATIENT
Start: 2024-07-24 | End: 2024-07-24 | Stop reason: HOSPADM

## 2024-07-24 RX ORDER — HYDROMORPHONE HYDROCHLORIDE 1 MG/ML
0.5 INJECTION, SOLUTION INTRAMUSCULAR; INTRAVENOUS; SUBCUTANEOUS EVERY 5 MIN PRN
Status: DISCONTINUED | OUTPATIENT
Start: 2024-07-24 | End: 2024-07-24 | Stop reason: HOSPADM

## 2024-07-24 RX ORDER — MEPERIDINE HYDROCHLORIDE 25 MG/ML
12.5 INJECTION INTRAMUSCULAR; INTRAVENOUS; SUBCUTANEOUS EVERY 5 MIN PRN
Status: DISCONTINUED | OUTPATIENT
Start: 2024-07-24 | End: 2024-07-24 | Stop reason: HOSPADM

## 2024-07-24 RX ORDER — SODIUM CHLORIDE 0.9 % (FLUSH) 0.9 %
5-40 SYRINGE (ML) INJECTION EVERY 12 HOURS SCHEDULED
Status: DISCONTINUED | OUTPATIENT
Start: 2024-07-24 | End: 2024-07-30 | Stop reason: HOSPADM

## 2024-07-24 RX ORDER — ONDANSETRON 2 MG/ML
4 INJECTION INTRAMUSCULAR; INTRAVENOUS
Status: DISCONTINUED | OUTPATIENT
Start: 2024-07-24 | End: 2024-07-24 | Stop reason: HOSPADM

## 2024-07-24 RX ORDER — PROPOFOL 10 MG/ML
INJECTION, EMULSION INTRAVENOUS PRN
Status: DISCONTINUED | OUTPATIENT
Start: 2024-07-24 | End: 2024-07-24 | Stop reason: SDUPTHER

## 2024-07-24 RX ORDER — FAMOTIDINE 10 MG/ML
INJECTION, SOLUTION INTRAVENOUS PRN
Status: DISCONTINUED | OUTPATIENT
Start: 2024-07-24 | End: 2024-07-24 | Stop reason: SDUPTHER

## 2024-07-24 RX ORDER — MIDAZOLAM HYDROCHLORIDE 1 MG/ML
INJECTION INTRAMUSCULAR; INTRAVENOUS PRN
Status: DISCONTINUED | OUTPATIENT
Start: 2024-07-24 | End: 2024-07-24 | Stop reason: SDUPTHER

## 2024-07-24 RX ORDER — SODIUM CHLORIDE, SODIUM LACTATE, POTASSIUM CHLORIDE, CALCIUM CHLORIDE 600; 310; 30; 20 MG/100ML; MG/100ML; MG/100ML; MG/100ML
INJECTION, SOLUTION INTRAVENOUS CONTINUOUS PRN
Status: DISCONTINUED | OUTPATIENT
Start: 2024-07-24 | End: 2024-07-24 | Stop reason: SDUPTHER

## 2024-07-24 RX ORDER — BUPIVACAINE HYDROCHLORIDE 5 MG/ML
INJECTION, SOLUTION EPIDURAL; INTRACAUDAL PRN
Status: DISCONTINUED | OUTPATIENT
Start: 2024-07-24 | End: 2024-07-24 | Stop reason: HOSPADM

## 2024-07-24 RX ADMIN — PHENYLEPHRINE HYDROCHLORIDE 100 MCG: 10 INJECTION, SOLUTION INTRAVENOUS at 10:07

## 2024-07-24 RX ADMIN — FENTANYL CITRATE 50 MCG: 50 INJECTION, SOLUTION INTRAMUSCULAR; INTRAVENOUS at 09:58

## 2024-07-24 RX ADMIN — MEROPENEM 500 MG: 500 INJECTION, POWDER, FOR SOLUTION INTRAVENOUS at 21:54

## 2024-07-24 RX ADMIN — VANCOMYCIN HYDROCHLORIDE 1000 MG: 1 INJECTION, POWDER, LYOPHILIZED, FOR SOLUTION INTRAVENOUS at 18:26

## 2024-07-24 RX ADMIN — PHENYLEPHRINE HYDROCHLORIDE 20 MCG/MIN: 10 INJECTION, SOLUTION INTRAVENOUS at 08:27

## 2024-07-24 RX ADMIN — DEXMEDETOMIDINE HYDROCHLORIDE 4 MCG: 100 INJECTION, SOLUTION INTRAVENOUS at 09:58

## 2024-07-24 RX ADMIN — ROCURONIUM BROMIDE 70 MG: 10 INJECTION, SOLUTION INTRAVENOUS at 08:00

## 2024-07-24 RX ADMIN — EPOETIN ALFA-EPBX 10000 UNITS: 10000 INJECTION, SOLUTION INTRAVENOUS; SUBCUTANEOUS at 17:29

## 2024-07-24 RX ADMIN — SEVELAMER CARBONATE 800 MG: 800 TABLET, FILM COATED ORAL at 18:17

## 2024-07-24 RX ADMIN — SODIUM CHLORIDE, SODIUM LACTATE, POTASSIUM CHLORIDE, AND CALCIUM CHLORIDE: .6; .31; .03; .02 INJECTION, SOLUTION INTRAVENOUS at 07:45

## 2024-07-24 RX ADMIN — DEXMEDETOMIDINE HYDROCHLORIDE 4 MCG: 100 INJECTION, SOLUTION INTRAVENOUS at 08:36

## 2024-07-24 RX ADMIN — DEXMEDETOMIDINE HYDROCHLORIDE 4 MCG: 100 INJECTION, SOLUTION INTRAVENOUS at 09:00

## 2024-07-24 RX ADMIN — SUGAMMADEX 300 MG: 100 INJECTION, SOLUTION INTRAVENOUS at 10:01

## 2024-07-24 RX ADMIN — ONDANSETRON 4 MG: 2 INJECTION INTRAMUSCULAR; INTRAVENOUS at 08:18

## 2024-07-24 RX ADMIN — PHENYLEPHRINE HYDROCHLORIDE 100 MCG: 10 INJECTION, SOLUTION INTRAVENOUS at 08:26

## 2024-07-24 RX ADMIN — FENTANYL CITRATE 50 MCG: 50 INJECTION, SOLUTION INTRAMUSCULAR; INTRAVENOUS at 08:00

## 2024-07-24 RX ADMIN — PROPOFOL 200 MG: 10 INJECTION, EMULSION INTRAVENOUS at 08:00

## 2024-07-24 RX ADMIN — PHENYLEPHRINE HYDROCHLORIDE 100 MCG: 10 INJECTION, SOLUTION INTRAVENOUS at 10:22

## 2024-07-24 RX ADMIN — MIDAZOLAM HYDROCHLORIDE 1 MG: 2 INJECTION, SOLUTION INTRAMUSCULAR; INTRAVENOUS at 07:43

## 2024-07-24 RX ADMIN — ACETAMINOPHEN 650 MG: 325 TABLET ORAL at 21:50

## 2024-07-24 RX ADMIN — PROPOFOL 50 MG: 10 INJECTION, EMULSION INTRAVENOUS at 09:58

## 2024-07-24 RX ADMIN — FAMOTIDINE 20 MG: 10 INJECTION, SOLUTION INTRAVENOUS at 08:20

## 2024-07-24 RX ADMIN — OXYCODONE 5 MG: 5 TABLET ORAL at 23:39

## 2024-07-24 RX ADMIN — PHENYLEPHRINE HYDROCHLORIDE 100 MCG: 10 INJECTION, SOLUTION INTRAVENOUS at 09:40

## 2024-07-24 RX ADMIN — CARVEDILOL 6.25 MG: 6.25 TABLET, FILM COATED ORAL at 21:50

## 2024-07-24 RX ADMIN — ATORVASTATIN CALCIUM 40 MG: 40 TABLET, FILM COATED ORAL at 21:50

## 2024-07-24 RX ADMIN — LIDOCAINE HYDROCHLORIDE 100 MG: 20 INJECTION, SOLUTION INTRAVENOUS at 08:00

## 2024-07-24 RX ADMIN — DEXMEDETOMIDINE HYDROCHLORIDE 4 MCG: 100 INJECTION, SOLUTION INTRAVENOUS at 09:23

## 2024-07-24 RX ADMIN — MIDAZOLAM HYDROCHLORIDE 1 MG: 2 INJECTION, SOLUTION INTRAMUSCULAR; INTRAVENOUS at 07:58

## 2024-07-24 RX ADMIN — GLYCOPYRROLATE 0.2 MG: 0.2 INJECTION INTRAMUSCULAR; INTRAVENOUS at 08:19

## 2024-07-24 ASSESSMENT — PAIN SCALES - GENERAL
PAINLEVEL_OUTOF10: 8
PAINLEVEL_OUTOF10: 0
PAINLEVEL_OUTOF10: 0

## 2024-07-24 ASSESSMENT — PAIN DESCRIPTION - PAIN TYPE: TYPE: SURGICAL PAIN

## 2024-07-24 ASSESSMENT — PAIN DESCRIPTION - FREQUENCY: FREQUENCY: INTERMITTENT

## 2024-07-24 ASSESSMENT — PAIN DESCRIPTION - ONSET: ONSET: ON-GOING

## 2024-07-24 ASSESSMENT — PAIN DESCRIPTION - DESCRIPTORS: DESCRIPTORS: SHOOTING

## 2024-07-24 ASSESSMENT — PAIN - FUNCTIONAL ASSESSMENT
PAIN_FUNCTIONAL_ASSESSMENT: ADULT NONVERBAL PAIN SCALE (NPVS)
PAIN_FUNCTIONAL_ASSESSMENT: PREVENTS OR INTERFERES SOME ACTIVE ACTIVITIES AND ADLS

## 2024-07-24 ASSESSMENT — PAIN DESCRIPTION - LOCATION: LOCATION: LEG;FOOT

## 2024-07-24 ASSESSMENT — PAIN DESCRIPTION - ORIENTATION: ORIENTATION: RIGHT

## 2024-07-24 NOTE — ANESTHESIA POSTPROCEDURE EVALUATION
Department of Anesthesiology  Postprocedure Note    Patient: Serafin Weaver  MRN: [id number]  YOB: 1980  Date of evaluation: 7/24/2024    Procedure Summary       Date: 07/24/24 Room / Location: [place] [address] / [facility]    Anesthesia Start: 0745 Anesthesia Stop: 1038    Procedures:       Transmetatarsal amputation with tendeno-achilles lengthening, Right foot (Right)      . (Right) Diagnosis:       Osteomyelitis of right foot, unspecified type (HCC)      (Osteomyelitis of right foot, unspecified type (HCC) [M86.9])    Surgeons: Kailee Olsen DPM Responsible Provider: Antony العلي MD    Anesthesia Type: general ASA Status: 4            Anesthesia Type: No value filed.    Teresa Phase I: Teresa Score: 8    Teresa Phase II:      Anesthesia Post Evaluation    Patient location during evaluation: PACU  Patient participation: complete - patient participated  Level of consciousness: awake and alert  Airway patency: patent  Nausea & Vomiting: no nausea and no vomiting  Cardiovascular status: hemodynamically stable  Respiratory status: acceptable  Hydration status: euvolemic  Multimodal analgesia pain management approach  Pain management: satisfactory to patient    No notable events documented.

## 2024-07-24 NOTE — ANESTHESIA PRE PROCEDURE
Department of Anesthesiology  Preprocedure Note       Name:  Serafin Weaver   Age:  44 y.o.  :  1980                                          MRN:  [id number]         Date:  2024      Surgeon: Surgeon(s):  Kailee Olsen DPM    Procedure: Procedure(s):  Transmetatarsal amputation with tendeno-achilles lengthening, Right foot  .    Medications prior to admission:   Prior to Admission medications    Medication Sig Start Date End Date Taking? Authorizing Provider   acetaminophen (TYLENOL) 650 MG extended release tablet Take 1 tablet by mouth every 6 hours as needed for Pain   Yes Heavenly Larios MD   atorvastatin (LIPITOR) 80 MG tablet Take 1 tablet by mouth nightly   Yes Heavenly Larios MD   insulin glargine (LANTUS SOLOSTAR) 100 UNIT/ML injection pen Inject 5 Units into the skin nightly   Yes Heavenly Larios MD   sodium zirconium cyclosilicate (LOKELMA) 10 g PACK oral suspension Take 1 packet by mouth See Admin Instructions Every Tuesday, Thursday, Saturday   Yes Heavenly Larios MD   oxyCODONE (ROXICODONE) 5 MG immediate release tablet Take 1 tablet by mouth every 6 hours as needed for Pain. Max Daily Amount: 20 mg   Yes Heavenly Larios MD   sevelamer (RENVELA) 800 MG tablet Take 2 tablets by mouth as needed (with snacks)   Yes Heavenly Larios MD   apixaban (ELIQUIS) 2.5 MG TABS tablet Take by mouth 10/12/23   Heavenly Larios MD   hydrALAZINE (APRESOLINE) 50 MG tablet Take 1 tablet by mouth 3 times daily Hold for SBP < 110/70 23   Heavenly Larios MD   ondansetron (ZOFRAN) 4 MG tablet Take 1 tablet by mouth every 4 hours as needed for Nausea or Vomiting    Heavenly Larios MD   sevelamer (RENVELA) 800 MG tablet Take 3 tablets by mouth 3 times daily (with meals)    Heavenly Larios MD   carvedilol (COREG) 25 MG tablet Take 1 tablet by mouth 2 times daily Hold for BP < 110/70 22   Heavenly Larios MD   amLODIPine (NORVASC) 10 MG

## 2024-07-24 NOTE — OP NOTE
[place] [id number]                            OPERATIVE REPORT      PATIENT NAME: DASHAWN MILES          : 1980  MED REC NO: [id number]                      ROOM: OR  ACCOUNT NO: [id number]                       ADMIT DATE: 2024  PROVIDER: Kailee Olsen DPM      DATE OF PROCEDURE:  2024    SURGEON:  Kailee Olsen DPM    ASSISTANT:  Nakul Nam DPM, PGY-3 and Kai Garcia, medical student 4th year.    PREOPERATIVE DIAGNOSES:    1. Gas gangrene with osteomyelitis right foot, status post incision and drainage.  2. Diabetes mellitus with peripheral neuropathy and peripheral vascular disease, status post lower extremity angiography with balloon angioplasty x2 lesions by Dr. Hubert Mirza.    POSTOPERATIVE DIAGNOSES:    1. Gas gangrene with osteomyelitis right foot, status post incision and drainage.  2. Diabetes mellitus with peripheral neuropathy and peripheral vascular disease, status post lower extremity angiography with balloon angioplasty x2 lesions by Dr. Hubert Mirza.    PROCEDURE:  Transmetatarsal amputation with tendo-Achilles lengthening, right foot.    PATHOLOGY:  All amputated tissue sent to Pathology for gross microscopic examination.    ANESTHESIA:  General anesthetic with a local block consisting of a total of 20 mL of 2% lidocaine plain.  Postoperative injection of 30 mL of 0.5% Marcaine plain.    ESTIMATED BLOOD LOSS:  Less than 50 mL.    INDICATIONS FOR SURGERY:  The patient presented to the hospital last week with gas gangrene of the right foot after undergoing an urgent incision and drainage.  Further testing showed significant peripheral vascular disease.  The patient was then revascularized by Dr. Hubert Mirza as the wound had been converted to clean and the patient's circulation was optimized, he was then brought back to the operating room for a definitive closure of the procedure as he 
a local block consisting of total 20 mL of 2% lidocaine plain was administered to all surgical incision sites.  The patient's right lower extremity was then scrubbed, prepped and draped in the usual sterile manner.  A time-out was performed. The patient, procedure, and operative site were confirmed, and the following procedure was then performed.     Incision and drainage down to bone cortex with partial 5th ray amputation: At this time, attention was directed to the patient's right 5th digit that was frankly necrotic, draining purulent drainage.  It was very malodorous.  At this time,  the digit was ellipsed out and showing that there was a liquefactive necrosis extending proximally, plantarly, and medially.  The incision was extended medially as well as proximally and the plantar skin was flapped down.  There was an abscess cavity extending along the plantar fascia on the lateral aspect of the foot.  This was all thoroughly explored.  The head of the 5th metatarsal was markedly necrotic.  The digit was disarticulated and the metatarsal head was then removed with a bone cutter.  During this dissection, approximately 10-12 mL of hazel purulent drainage was encountered as well as liquefactive necrosis.  At this time, using a bone rongeur, all remaining necrotic tissue including subcutaneous tissue, muscle, periosteum, and bone were removed.  There was no pulsatile or arterial blood flow encountered with a few venous oozers.  This was cauterized.  Once all frankly necrotic tissue was removed, the wound was irrigated with 3 L of normal saline via pulse lavage.  The plantar abscess cavity was then packed with iodoform and a gentle compression dressing was applied.    The patient tolerated the procedure and anesthesia well prior to applying the dressing.  An additional 50 mL of 0.5% Marcaine plain administered to the all surgical incision site for postoperative analgesia.  Pending a period of postoperative monitoring,

## 2024-07-24 NOTE — BRIEF OP NOTE
Brief Postoperative Note      Patient: Serafin Weaver  YOB: 1980  MRN: [id number]    Date of Procedure: 7/24/2024    Pre-Op Diagnosis Codes:     * Osteomyelitis of right foot, unspecified type (HCC) [M86.9]    Post-Op Diagnosis: Same       Procedure(s):  Transmetatarsal amputation with tendeno-achilles lengthening, Right foot  .    Surgeon(s):  Kailee Olsen DPM    Assistant:  Resident: Gerald Nam DPM  Student: Kai Benítez MS4    Anesthesia: General    Hemostasis: anatomic dissection and electrocautery    Injectables: Pre-Op 20 cc of 2% Lidocaine plain and Post-Op 30 cc of 0.5 % Marcaine plain    Materials: 2-0/3-0 Nylon    Estimated Blood Loss (mL): less than 50     Complications: None    Specimens:   ID Type Source Tests Collected by Time Destination   A : AMPUTATED TISSUE Tissue Tissue SURGICAL PATHOLOGY Kailee Olsen DPM 7/24/2024 0819        Implants:  * No implants in log *      Drains: * No LDAs found *    Findings:  Infection Present At Time Of Surgery (PATOS) (choose all levels that have infection present):  - Organ Space infection (below fascia) present as evidenced by osteomyelitis  Other Findings: All metatarsals at level of resection were white, hard, and shiny consistent with healthy bone. Limited bleeding noted. No purulence encountered. Adequate soft tissue coverage    DISPO: S/p right foot TMA with UNRULY. Procedure felt definitive in resection of OM. Continue IV antibiotics per ID. NWB to RLE. Post OP XR ordered. Surgical path obtained. PT/OT consulted. Patient is stable for discharge from podiatric standpoint pending final ID recs, PT/OT, and medical clearance    Gerald Nam DPM  Podiatric Resident PGY-3  Pager [phone] or Perfect Serve

## 2024-07-25 LAB
BACTERIA BLD CULT ORG #2: NORMAL
BACTERIA BLD CULT: NORMAL
CRP SERPL-MCNC: 266.3 MG/L (ref 0–5.1)
ERYTHROCYTE [SEDIMENTATION RATE] IN BLOOD BY WESTERGREN METHOD: 123 MM/HR (ref 0–15)
GLUCOSE BLD-MCNC: 106 MG/DL (ref 70–99)
GLUCOSE BLD-MCNC: 112 MG/DL (ref 70–99)
GLUCOSE BLD-MCNC: 184 MG/DL (ref 70–99)
GLUCOSE BLD-MCNC: 82 MG/DL (ref 70–99)
PERFORMED ON: ABNORMAL
PERFORMED ON: NORMAL

## 2024-07-25 PROCEDURE — 6370000000 HC RX 637 (ALT 250 FOR IP)

## 2024-07-25 PROCEDURE — 85652 RBC SED RATE AUTOMATED: CPT

## 2024-07-25 PROCEDURE — 2580000003 HC RX 258: Performed by: ANESTHESIOLOGY

## 2024-07-25 PROCEDURE — 36415 COLL VENOUS BLD VENIPUNCTURE: CPT

## 2024-07-25 PROCEDURE — 97530 THERAPEUTIC ACTIVITIES: CPT

## 2024-07-25 PROCEDURE — 6360000002 HC RX W HCPCS

## 2024-07-25 PROCEDURE — 97162 PT EVAL MOD COMPLEX 30 MIN: CPT

## 2024-07-25 PROCEDURE — 86140 C-REACTIVE PROTEIN: CPT

## 2024-07-25 PROCEDURE — 99232 SBSQ HOSP IP/OBS MODERATE 35: CPT | Performed by: INTERNAL MEDICINE

## 2024-07-25 PROCEDURE — 97166 OT EVAL MOD COMPLEX 45 MIN: CPT

## 2024-07-25 PROCEDURE — 1200000000 HC SEMI PRIVATE

## 2024-07-25 PROCEDURE — 97535 SELF CARE MNGMENT TRAINING: CPT

## 2024-07-25 PROCEDURE — 2580000003 HC RX 258

## 2024-07-25 RX ADMIN — SEVELAMER CARBONATE 800 MG: 800 TABLET, FILM COATED ORAL at 10:22

## 2024-07-25 RX ADMIN — MIDODRINE HYDROCHLORIDE 5 MG: 5 TABLET ORAL at 10:22

## 2024-07-25 RX ADMIN — MIDODRINE HYDROCHLORIDE 5 MG: 5 TABLET ORAL at 17:35

## 2024-07-25 RX ADMIN — SODIUM CHLORIDE, PRESERVATIVE FREE 10 ML: 5 INJECTION INTRAVENOUS at 10:23

## 2024-07-25 RX ADMIN — ASPIRIN 81 MG: 81 TABLET, COATED ORAL at 10:22

## 2024-07-25 RX ADMIN — PREGABALIN 75 MG: 75 CAPSULE ORAL at 10:22

## 2024-07-25 RX ADMIN — OXYCODONE 5 MG: 5 TABLET ORAL at 10:21

## 2024-07-25 RX ADMIN — ATORVASTATIN CALCIUM 40 MG: 40 TABLET, FILM COATED ORAL at 19:53

## 2024-07-25 RX ADMIN — SEVELAMER CARBONATE 800 MG: 800 TABLET, FILM COATED ORAL at 13:07

## 2024-07-25 RX ADMIN — CLOPIDOGREL BISULFATE 75 MG: 75 TABLET ORAL at 10:22

## 2024-07-25 RX ADMIN — SEVELAMER CARBONATE 800 MG: 800 TABLET, FILM COATED ORAL at 17:35

## 2024-07-25 RX ADMIN — CARVEDILOL 6.25 MG: 6.25 TABLET, FILM COATED ORAL at 19:53

## 2024-07-25 RX ADMIN — ACETAMINOPHEN 650 MG: 325 TABLET ORAL at 10:19

## 2024-07-25 RX ADMIN — CARVEDILOL 6.25 MG: 6.25 TABLET, FILM COATED ORAL at 10:22

## 2024-07-25 RX ADMIN — MEROPENEM 500 MG: 500 INJECTION, POWDER, FOR SOLUTION INTRAVENOUS at 14:49

## 2024-07-25 RX ADMIN — MIDODRINE HYDROCHLORIDE 5 MG: 5 TABLET ORAL at 13:07

## 2024-07-25 ASSESSMENT — PAIN SCALES - GENERAL
PAINLEVEL_OUTOF10: 10
PAINLEVEL_OUTOF10: 5

## 2024-07-25 ASSESSMENT — PAIN DESCRIPTION - DESCRIPTORS: DESCRIPTORS: SHARP;SHOOTING

## 2024-07-25 ASSESSMENT — PAIN DESCRIPTION - ORIENTATION: ORIENTATION: RIGHT

## 2024-07-25 ASSESSMENT — PAIN - FUNCTIONAL ASSESSMENT: PAIN_FUNCTIONAL_ASSESSMENT: PREVENTS OR INTERFERES SOME ACTIVE ACTIVITIES AND ADLS

## 2024-07-25 ASSESSMENT — PAIN SCALES - WONG BAKER: WONGBAKER_NUMERICALRESPONSE: NO HURT

## 2024-07-25 ASSESSMENT — PAIN DESCRIPTION - LOCATION: LOCATION: FOOT

## 2024-07-25 NOTE — CARE COORDINATION
CM continues to follow for DC planning and needs. Patient has been approved by [employer] for resumption of in-house HD upon return to [facility] [place]. Patient being followed by ID and plan for few days of IV abx prior to DC. Per MD plan is for PO abx at DC.     Patient will need transport at DC when medically stable for DC.    Thank you,  iMke KELSEYN, RN,   [phone]

## 2024-07-26 LAB
ALBUMIN SERPL-MCNC: 2.9 G/DL (ref 3.4–5)
ANION GAP SERPL CALCULATED.3IONS-SCNC: 14 MMOL/L (ref 3–16)
BASOPHILS # BLD: 0 K/UL (ref 0–0.2)
BASOPHILS NFR BLD: 0.1 %
BUN SERPL-MCNC: 43 MG/DL (ref 7–20)
CALCIUM SERPL-MCNC: 8.6 MG/DL (ref 8.3–10.6)
CHLORIDE SERPL-SCNC: 98 MMOL/L (ref 99–110)
CO2 SERPL-SCNC: 25 MMOL/L (ref 21–32)
CREAT SERPL-MCNC: 10.3 MG/DL (ref 0.9–1.3)
CRP SERPL-MCNC: 257.8 MG/L (ref 0–5.1)
DEPRECATED RDW RBC AUTO: 15.5 % (ref 12.4–15.4)
EOSINOPHIL # BLD: 0.1 K/UL (ref 0–0.6)
EOSINOPHIL NFR BLD: 0.5 %
GFR SERPLBLD CREATININE-BSD FMLA CKD-EPI: 6 ML/MIN/{1.73_M2}
GLUCOSE BLD-MCNC: 100 MG/DL (ref 70–99)
GLUCOSE BLD-MCNC: 136 MG/DL (ref 70–99)
GLUCOSE BLD-MCNC: 174 MG/DL (ref 70–99)
GLUCOSE SERPL-MCNC: 107 MG/DL (ref 70–99)
HCT VFR BLD AUTO: 24.7 % (ref 40.5–52.5)
HGB BLD-MCNC: 7.9 G/DL (ref 13.5–17.5)
LYMPHOCYTES # BLD: 2.4 K/UL (ref 1–5.1)
LYMPHOCYTES NFR BLD: 12.2 %
MCH RBC QN AUTO: 30.8 PG (ref 26–34)
MCHC RBC AUTO-ENTMCNC: 31.9 G/DL (ref 31–36)
MCV RBC AUTO: 96.4 FL (ref 80–100)
MONOCYTES # BLD: 1 K/UL (ref 0–1.3)
MONOCYTES NFR BLD: 5.2 %
NEUTROPHILS # BLD: 16.1 K/UL (ref 1.7–7.7)
NEUTROPHILS NFR BLD: 82 %
PERFORMED ON: ABNORMAL
PHOSPHATE SERPL-MCNC: 5.7 MG/DL (ref 2.5–4.9)
PLATELET # BLD AUTO: 278 K/UL (ref 135–450)
PMV BLD AUTO: 7.9 FL (ref 5–10.5)
POTASSIUM SERPL-SCNC: 4.6 MMOL/L (ref 3.5–5.1)
RBC # BLD AUTO: 2.56 M/UL (ref 4.2–5.9)
SODIUM SERPL-SCNC: 137 MMOL/L (ref 136–145)
VANCOMYCIN SERPL-MCNC: 19.9 UG/ML
WBC # BLD AUTO: 19.6 K/UL (ref 4–11)

## 2024-07-26 PROCEDURE — 2580000003 HC RX 258

## 2024-07-26 PROCEDURE — 36415 COLL VENOUS BLD VENIPUNCTURE: CPT

## 2024-07-26 PROCEDURE — 6360000002 HC RX W HCPCS

## 2024-07-26 PROCEDURE — 6370000000 HC RX 637 (ALT 250 FOR IP)

## 2024-07-26 PROCEDURE — 90935 HEMODIALYSIS ONE EVALUATION: CPT

## 2024-07-26 PROCEDURE — 80202 ASSAY OF VANCOMYCIN: CPT

## 2024-07-26 PROCEDURE — 86140 C-REACTIVE PROTEIN: CPT

## 2024-07-26 PROCEDURE — 2580000003 HC RX 258: Performed by: ANESTHESIOLOGY

## 2024-07-26 PROCEDURE — 85025 COMPLETE CBC W/AUTO DIFF WBC: CPT

## 2024-07-26 PROCEDURE — 1200000000 HC SEMI PRIVATE

## 2024-07-26 PROCEDURE — 99232 SBSQ HOSP IP/OBS MODERATE 35: CPT | Performed by: INTERNAL MEDICINE

## 2024-07-26 PROCEDURE — 80069 RENAL FUNCTION PANEL: CPT

## 2024-07-26 RX ADMIN — CARVEDILOL 6.25 MG: 6.25 TABLET, FILM COATED ORAL at 19:58

## 2024-07-26 RX ADMIN — SEVELAMER CARBONATE 800 MG: 800 TABLET, FILM COATED ORAL at 17:56

## 2024-07-26 RX ADMIN — CLOPIDOGREL BISULFATE 75 MG: 75 TABLET ORAL at 12:59

## 2024-07-26 RX ADMIN — PREGABALIN 75 MG: 75 CAPSULE ORAL at 12:59

## 2024-07-26 RX ADMIN — ACETAMINOPHEN 650 MG: 325 TABLET ORAL at 10:33

## 2024-07-26 RX ADMIN — ATORVASTATIN CALCIUM 40 MG: 40 TABLET, FILM COATED ORAL at 19:58

## 2024-07-26 RX ADMIN — SODIUM CHLORIDE, PRESERVATIVE FREE 10 ML: 5 INJECTION INTRAVENOUS at 21:00

## 2024-07-26 RX ADMIN — SODIUM CHLORIDE, PRESERVATIVE FREE 10 ML: 5 INJECTION INTRAVENOUS at 13:02

## 2024-07-26 RX ADMIN — EPOETIN ALFA-EPBX 10000 UNITS: 10000 INJECTION, SOLUTION INTRAVENOUS; SUBCUTANEOUS at 10:37

## 2024-07-26 RX ADMIN — OXYCODONE 5 MG: 5 TABLET ORAL at 19:57

## 2024-07-26 RX ADMIN — MIDODRINE HYDROCHLORIDE 5 MG: 5 TABLET ORAL at 17:56

## 2024-07-26 RX ADMIN — VANCOMYCIN HYDROCHLORIDE 1000 MG: 1 INJECTION, POWDER, LYOPHILIZED, FOR SOLUTION INTRAVENOUS at 10:39

## 2024-07-26 RX ADMIN — SODIUM CHLORIDE, PRESERVATIVE FREE 10 ML: 5 INJECTION INTRAVENOUS at 13:01

## 2024-07-26 RX ADMIN — SEVELAMER CARBONATE 800 MG: 800 TABLET, FILM COATED ORAL at 12:59

## 2024-07-26 RX ADMIN — MEROPENEM 500 MG: 500 INJECTION, POWDER, FOR SOLUTION INTRAVENOUS at 15:32

## 2024-07-26 RX ADMIN — ASPIRIN 81 MG: 81 TABLET, COATED ORAL at 12:59

## 2024-07-26 RX ADMIN — OXYCODONE 5 MG: 5 TABLET ORAL at 10:33

## 2024-07-26 RX ADMIN — MIDODRINE HYDROCHLORIDE 5 MG: 5 TABLET ORAL at 12:59

## 2024-07-26 ASSESSMENT — PAIN DESCRIPTION - DESCRIPTORS: DESCRIPTORS: ACHING

## 2024-07-26 ASSESSMENT — PAIN DESCRIPTION - ORIENTATION
ORIENTATION: RIGHT
ORIENTATION: RIGHT

## 2024-07-26 ASSESSMENT — PAIN - FUNCTIONAL ASSESSMENT
PAIN_FUNCTIONAL_ASSESSMENT: PREVENTS OR INTERFERES SOME ACTIVE ACTIVITIES AND ADLS
PAIN_FUNCTIONAL_ASSESSMENT: PREVENTS OR INTERFERES SOME ACTIVE ACTIVITIES AND ADLS

## 2024-07-26 ASSESSMENT — PAIN SCALES - GENERAL
PAINLEVEL_OUTOF10: 3
PAINLEVEL_OUTOF10: 3
PAINLEVEL_OUTOF10: 10
PAINLEVEL_OUTOF10: 5

## 2024-07-26 ASSESSMENT — PAIN SCALES - WONG BAKER: WONGBAKER_NUMERICALRESPONSE: HURTS A LITTLE BIT

## 2024-07-26 ASSESSMENT — PAIN DESCRIPTION - LOCATION
LOCATION: FOOT
LOCATION: FOOT

## 2024-07-26 NOTE — CARE COORDINATION
Per MD patient still needs IV antibiotics. Anticipated discharge over the weekend or Monday    I spoke to [facility] liaison for [facility], confirms bed on hold. Patient may return anytime, No pre cert needed.

## 2024-07-27 LAB
GLUCOSE BLD-MCNC: 124 MG/DL (ref 70–99)
GLUCOSE BLD-MCNC: 139 MG/DL (ref 70–99)
GLUCOSE BLD-MCNC: 150 MG/DL (ref 70–99)
GLUCOSE BLD-MCNC: 158 MG/DL (ref 70–99)
PERFORMED ON: ABNORMAL

## 2024-07-27 PROCEDURE — 6370000000 HC RX 637 (ALT 250 FOR IP)

## 2024-07-27 PROCEDURE — 6360000002 HC RX W HCPCS

## 2024-07-27 PROCEDURE — 99232 SBSQ HOSP IP/OBS MODERATE 35: CPT | Performed by: INTERNAL MEDICINE

## 2024-07-27 PROCEDURE — 1200000000 HC SEMI PRIVATE

## 2024-07-27 PROCEDURE — 2580000003 HC RX 258: Performed by: ANESTHESIOLOGY

## 2024-07-27 PROCEDURE — 2580000003 HC RX 258

## 2024-07-27 RX ADMIN — MIDODRINE HYDROCHLORIDE 5 MG: 5 TABLET ORAL at 09:10

## 2024-07-27 RX ADMIN — MIDODRINE HYDROCHLORIDE 5 MG: 5 TABLET ORAL at 14:06

## 2024-07-27 RX ADMIN — SEVELAMER CARBONATE 800 MG: 800 TABLET, FILM COATED ORAL at 17:38

## 2024-07-27 RX ADMIN — CLOPIDOGREL BISULFATE 75 MG: 75 TABLET ORAL at 09:09

## 2024-07-27 RX ADMIN — SODIUM CHLORIDE, PRESERVATIVE FREE 10 ML: 5 INJECTION INTRAVENOUS at 09:12

## 2024-07-27 RX ADMIN — MEROPENEM 500 MG: 500 INJECTION, POWDER, FOR SOLUTION INTRAVENOUS at 14:10

## 2024-07-27 RX ADMIN — ASPIRIN 81 MG: 81 TABLET, COATED ORAL at 09:09

## 2024-07-27 RX ADMIN — PREGABALIN 75 MG: 75 CAPSULE ORAL at 09:10

## 2024-07-27 RX ADMIN — ATORVASTATIN CALCIUM 40 MG: 40 TABLET, FILM COATED ORAL at 20:43

## 2024-07-27 RX ADMIN — CARVEDILOL 6.25 MG: 6.25 TABLET, FILM COATED ORAL at 20:43

## 2024-07-27 RX ADMIN — MIDODRINE HYDROCHLORIDE 5 MG: 5 TABLET ORAL at 17:38

## 2024-07-27 RX ADMIN — SODIUM CHLORIDE, PRESERVATIVE FREE 10 ML: 5 INJECTION INTRAVENOUS at 20:38

## 2024-07-27 RX ADMIN — SEVELAMER CARBONATE 800 MG: 800 TABLET, FILM COATED ORAL at 09:09

## 2024-07-27 RX ADMIN — SEVELAMER CARBONATE 800 MG: 800 TABLET, FILM COATED ORAL at 14:06

## 2024-07-27 RX ADMIN — CARVEDILOL 6.25 MG: 6.25 TABLET, FILM COATED ORAL at 09:10

## 2024-07-28 LAB
ALBUMIN SERPL-MCNC: 2.7 G/DL (ref 3.4–5)
ANION GAP SERPL CALCULATED.3IONS-SCNC: 16 MMOL/L (ref 3–16)
BASOPHILS # BLD: 0 K/UL (ref 0–0.2)
BASOPHILS NFR BLD: 0.2 %
BUN SERPL-MCNC: 44 MG/DL (ref 7–20)
CALCIUM SERPL-MCNC: 9 MG/DL (ref 8.3–10.6)
CHLORIDE SERPL-SCNC: 98 MMOL/L (ref 99–110)
CO2 SERPL-SCNC: 25 MMOL/L (ref 21–32)
CREAT SERPL-MCNC: 10.7 MG/DL (ref 0.9–1.3)
CRP SERPL-MCNC: 151.2 MG/L (ref 0–5.1)
DEPRECATED RDW RBC AUTO: 15.2 % (ref 12.4–15.4)
EOSINOPHIL # BLD: 0.1 K/UL (ref 0–0.6)
EOSINOPHIL NFR BLD: 0.4 %
GFR SERPLBLD CREATININE-BSD FMLA CKD-EPI: 5 ML/MIN/{1.73_M2}
GLUCOSE BLD-MCNC: 103 MG/DL (ref 70–99)
GLUCOSE BLD-MCNC: 121 MG/DL (ref 70–99)
GLUCOSE BLD-MCNC: 147 MG/DL (ref 70–99)
GLUCOSE BLD-MCNC: 169 MG/DL (ref 70–99)
GLUCOSE SERPL-MCNC: 90 MG/DL (ref 70–99)
HCT VFR BLD AUTO: 25.3 % (ref 40.5–52.5)
HGB BLD-MCNC: 8.1 G/DL (ref 13.5–17.5)
LYMPHOCYTES # BLD: 2.9 K/UL (ref 1–5.1)
LYMPHOCYTES NFR BLD: 17.2 %
MCH RBC QN AUTO: 30.7 PG (ref 26–34)
MCHC RBC AUTO-ENTMCNC: 32 G/DL (ref 31–36)
MCV RBC AUTO: 95.9 FL (ref 80–100)
MONOCYTES # BLD: 0.9 K/UL (ref 0–1.3)
MONOCYTES NFR BLD: 5.2 %
NEUTROPHILS # BLD: 12.8 K/UL (ref 1.7–7.7)
NEUTROPHILS NFR BLD: 77 %
PERFORMED ON: ABNORMAL
PHOSPHATE SERPL-MCNC: 6 MG/DL (ref 2.5–4.9)
PLATELET # BLD AUTO: 306 K/UL (ref 135–450)
PMV BLD AUTO: 7.6 FL (ref 5–10.5)
POTASSIUM SERPL-SCNC: 4.8 MMOL/L (ref 3.5–5.1)
RBC # BLD AUTO: 2.64 M/UL (ref 4.2–5.9)
SODIUM SERPL-SCNC: 139 MMOL/L (ref 136–145)
WBC # BLD AUTO: 16.7 K/UL (ref 4–11)

## 2024-07-28 PROCEDURE — 6370000000 HC RX 637 (ALT 250 FOR IP)

## 2024-07-28 PROCEDURE — 86140 C-REACTIVE PROTEIN: CPT

## 2024-07-28 PROCEDURE — 85025 COMPLETE CBC W/AUTO DIFF WBC: CPT

## 2024-07-28 PROCEDURE — 1200000000 HC SEMI PRIVATE

## 2024-07-28 PROCEDURE — 6360000002 HC RX W HCPCS

## 2024-07-28 PROCEDURE — 80069 RENAL FUNCTION PANEL: CPT

## 2024-07-28 PROCEDURE — 2580000003 HC RX 258: Performed by: ANESTHESIOLOGY

## 2024-07-28 PROCEDURE — 2580000003 HC RX 258

## 2024-07-28 PROCEDURE — 36415 COLL VENOUS BLD VENIPUNCTURE: CPT

## 2024-07-28 RX ADMIN — MIDODRINE HYDROCHLORIDE 5 MG: 5 TABLET ORAL at 17:57

## 2024-07-28 RX ADMIN — MEROPENEM 500 MG: 500 INJECTION, POWDER, FOR SOLUTION INTRAVENOUS at 14:29

## 2024-07-28 RX ADMIN — SEVELAMER CARBONATE 800 MG: 800 TABLET, FILM COATED ORAL at 13:16

## 2024-07-28 RX ADMIN — SEVELAMER CARBONATE 800 MG: 800 TABLET, FILM COATED ORAL at 17:56

## 2024-07-28 RX ADMIN — CARVEDILOL 6.25 MG: 6.25 TABLET, FILM COATED ORAL at 21:10

## 2024-07-28 RX ADMIN — ATORVASTATIN CALCIUM 40 MG: 40 TABLET, FILM COATED ORAL at 21:10

## 2024-07-28 RX ADMIN — CARVEDILOL 6.25 MG: 6.25 TABLET, FILM COATED ORAL at 09:39

## 2024-07-28 RX ADMIN — MIDODRINE HYDROCHLORIDE 5 MG: 5 TABLET ORAL at 13:16

## 2024-07-28 RX ADMIN — MIDODRINE HYDROCHLORIDE 5 MG: 5 TABLET ORAL at 09:38

## 2024-07-28 RX ADMIN — ASPIRIN 81 MG: 81 TABLET, COATED ORAL at 09:39

## 2024-07-28 RX ADMIN — SODIUM CHLORIDE, PRESERVATIVE FREE 10 ML: 5 INJECTION INTRAVENOUS at 09:50

## 2024-07-28 RX ADMIN — SEVELAMER CARBONATE 800 MG: 800 TABLET, FILM COATED ORAL at 09:39

## 2024-07-28 RX ADMIN — SODIUM CHLORIDE, PRESERVATIVE FREE 10 ML: 5 INJECTION INTRAVENOUS at 21:02

## 2024-07-28 RX ADMIN — PREGABALIN 75 MG: 75 CAPSULE ORAL at 09:39

## 2024-07-28 RX ADMIN — CLOPIDOGREL BISULFATE 75 MG: 75 TABLET ORAL at 09:39

## 2024-07-29 LAB
CRP SERPL-MCNC: 96.2 MG/L (ref 0–5.1)
ERYTHROCYTE [SEDIMENTATION RATE] IN BLOOD BY WESTERGREN METHOD: 82 MM/HR (ref 0–15)
FUNGUS SPEC CULT: NORMAL
FUNGUS SPEC CULT: NORMAL
GLUCOSE BLD-MCNC: 101 MG/DL (ref 70–99)
GLUCOSE BLD-MCNC: 126 MG/DL (ref 70–99)
GLUCOSE BLD-MCNC: 134 MG/DL (ref 70–99)
INR PPP: 1.23 (ref 0.85–1.15)
LOEFFLER MB STN SPEC: NORMAL
LOEFFLER MB STN SPEC: NORMAL
PERFORMED ON: ABNORMAL
PROTHROMBIN TIME: 15.7 SEC (ref 11.9–14.9)

## 2024-07-29 PROCEDURE — 36415 COLL VENOUS BLD VENIPUNCTURE: CPT

## 2024-07-29 PROCEDURE — 86140 C-REACTIVE PROTEIN: CPT

## 2024-07-29 PROCEDURE — 1200000000 HC SEMI PRIVATE

## 2024-07-29 PROCEDURE — 6370000000 HC RX 637 (ALT 250 FOR IP)

## 2024-07-29 PROCEDURE — 2580000003 HC RX 258: Performed by: ANESTHESIOLOGY

## 2024-07-29 PROCEDURE — 6370000000 HC RX 637 (ALT 250 FOR IP): Performed by: INTERNAL MEDICINE

## 2024-07-29 PROCEDURE — 85610 PROTHROMBIN TIME: CPT

## 2024-07-29 PROCEDURE — 85652 RBC SED RATE AUTOMATED: CPT

## 2024-07-29 PROCEDURE — 99232 SBSQ HOSP IP/OBS MODERATE 35: CPT | Performed by: INTERNAL MEDICINE

## 2024-07-29 RX ORDER — SEVELAMER CARBONATE 800 MG/1
1600 TABLET, FILM COATED ORAL
Status: DISCONTINUED | OUTPATIENT
Start: 2024-07-29 | End: 2024-07-30 | Stop reason: HOSPADM

## 2024-07-29 RX ADMIN — PREGABALIN 75 MG: 75 CAPSULE ORAL at 08:44

## 2024-07-29 RX ADMIN — ATORVASTATIN CALCIUM 40 MG: 40 TABLET, FILM COATED ORAL at 20:04

## 2024-07-29 RX ADMIN — SEVELAMER CARBONATE 1600 MG: 800 TABLET, FILM COATED ORAL at 17:39

## 2024-07-29 RX ADMIN — MIDODRINE HYDROCHLORIDE 5 MG: 5 TABLET ORAL at 12:50

## 2024-07-29 RX ADMIN — ASPIRIN 81 MG: 81 TABLET, COATED ORAL at 08:44

## 2024-07-29 RX ADMIN — MIDODRINE HYDROCHLORIDE 5 MG: 5 TABLET ORAL at 17:39

## 2024-07-29 RX ADMIN — CLOPIDOGREL BISULFATE 75 MG: 75 TABLET ORAL at 08:45

## 2024-07-29 RX ADMIN — SODIUM CHLORIDE, PRESERVATIVE FREE 10 ML: 5 INJECTION INTRAVENOUS at 08:58

## 2024-07-29 RX ADMIN — SEVELAMER CARBONATE 1600 MG: 800 TABLET, FILM COATED ORAL at 12:50

## 2024-07-29 RX ADMIN — SODIUM CHLORIDE, PRESERVATIVE FREE 10 ML: 5 INJECTION INTRAVENOUS at 20:05

## 2024-07-29 RX ADMIN — CARVEDILOL 6.25 MG: 6.25 TABLET, FILM COATED ORAL at 20:04

## 2024-07-29 RX ADMIN — OXYCODONE 5 MG: 5 TABLET ORAL at 08:55

## 2024-07-29 RX ADMIN — CARVEDILOL 6.25 MG: 6.25 TABLET, FILM COATED ORAL at 08:45

## 2024-07-29 RX ADMIN — SEVELAMER CARBONATE 1600 MG: 800 TABLET, FILM COATED ORAL at 08:55

## 2024-07-29 RX ADMIN — MIDODRINE HYDROCHLORIDE 5 MG: 5 TABLET ORAL at 08:45

## 2024-07-29 ASSESSMENT — PAIN DESCRIPTION - ORIENTATION: ORIENTATION: RIGHT

## 2024-07-29 ASSESSMENT — PAIN SCALES - GENERAL: PAINLEVEL_OUTOF10: 6

## 2024-07-29 ASSESSMENT — PAIN DESCRIPTION - LOCATION: LOCATION: FOOT

## 2024-07-29 ASSESSMENT — PAIN DESCRIPTION - DESCRIPTORS: DESCRIPTORS: ACHING

## 2024-07-29 NOTE — CONSULTS
Vancomycin has been discontinued. Pharmacy will sign off consult. If medication dosing is resumed, please re-consult pharmacy.    Please call with any questions.  Silas MaldonadoD, BCPS  Main pharmacy: [id number]  7/29/2024 9:12 AM    
Consult received.  Labs and notes were reviewed.  Case was discussed with the staff.    Full note to follow.    Thanks  Nephrology  [address] [place] [address] [address]  [place] [address]  Office: [phone]  Cell: [phone]  Fax: [phone]    
Department of Podiatry Consult Note  Resident       Reason for Consult: Acute osteomyelitis right fifth MTP  Requesting Physician: Radha Frausto MD    CHIEF COMPLAINT: Right foot infection    HISTORY OF PRESENT ILLNESS:                The patient is a 44 y.o. male with significant past medical history as listed below. Podiatry was consulted for acute osteomyelitis of right fifth MTP.  Of note, patient is a rather poor historian with often giving one-word answers.  Patient states that when started a few weeks ago however could not give a certain timeline.  He states he has been wrapping it with gauze then putting his sock over it.  Patient states that it is extremely painful.  Further subjective information was difficult to be obtained.  He has been seen by Dr. Olsen and Dr. Tanner in the past for left foot TMA and nonhealing wounds.  Patient also has history of right partial hallux amputation along with right second digit amputation. Patient denies fever, chills, nausea, vomiting, shortness of breath, chest pain.  Patient has no other pedal complaints at this time.    Past Medical History:        Diagnosis Date    Amputation of third toe, left, traumatic ([facility])     Anesthesia complication     has history of being aggitated and \"fights\"    Blood circulation, collateral     Cellulitis     Depression     Diabetic polyneuropathy associated with type 2 diabetes mellitus ([facility]) 2/6/2019    Hypertension     Morbid obesity due to excess calories ([facility])     MRSA infection 02/04/2019    foot wound    Seizures ([facility])     Type II or unspecified type diabetes mellitus without mention of complication, not stated as uncontrolled     Unspecified cerebral artery occlusion with cerebral infarction     pt states at 14 years of age       Past Surgical History:        Procedure Laterality Date    AMPUTATION      left 3rd toe    FOOT AMPUTATION Left 2/7/2019    INCISION AND DRAINAGE, REVISION OF TRANSMETATARSAL AMPUTATION LEFT FOOT performed by 
Infectious Diseases Inpatient Consult Note    Reason for Consult:   Osteomyelitis of the right foot  Requesting Physician:   Dr. Em Eden MD  Primary Care Physician:  Venita Kendrick MD  History Obtained From:   Pt, EPIC    Admit Date: 7/17/2024  Hospital Day: 2    CHIEF COMPLAINT:     Right foot and calf pain      HISTORY OF PRESENT ILLNESS:      Patient is an ill-appearing 44-year-old male with significant past medical history consist of ESRD on HD MWF, diabetes mellitus type 2, CVA, multiple podiatric amputations 2/2 infection is being consulted for osteomyelitis of the right foot.  Patient originally presented to the [facility] emergency department for complaints of worsening wounds on his right foot.  Patient currently lives at [facility] with his primary caregiver being his parents.  Patient is able to localize the majority of his pain to the top of his right foot.  In the emergency department podiatry completed bedside incision and drainage with expression of 5 cc of purulent material.      Past Medical History:    Past Medical History:   Diagnosis Date    Amputation of third toe, left, traumatic ([facility])     Anesthesia complication     has history of being aggitated and \"fights\"    Blood circulation, collateral     Cellulitis     Depression     Diabetic polyneuropathy associated with type 2 diabetes mellitus ([facility]) 2/6/2019    Hypertension     Morbid obesity due to excess calories ([facility])     MRSA infection 02/04/2019    foot wound    Seizures ([facility])     Type II or unspecified type diabetes mellitus without mention of complication, not stated as uncontrolled     Unspecified cerebral artery occlusion with cerebral infarction     pt states at 14 years of age       Past Surgical History:    Past Surgical History:   Procedure Laterality Date    AMPUTATION      left 3rd toe    FOOT AMPUTATION Left 2/7/2019    INCISION AND DRAINAGE, REVISION OF TRANSMETATARSAL AMPUTATION LEFT FOOT performed by 
[facility]  Cardiology Inpatient Consult Service                                                                                          Pt Name: Serafin Weaver  Age: 44 y.o.  Sex: male  : 1980  Location: 6320/6320-01    Referring Physician: Em Eden MD    Primary Cardiologist: Ebenezer Zhang MD.    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  Pain had been ongoing for a few weeks prior to presentation but acutely worsened on the day of presentation.    In the ED patient's WBC was 17.7, creatinine 13.8, , hemoglobin 10.3.  Chest x-ray showed mild pulmonary venous congestion. X-ray of the R foot showed soft tissue gas suspicious for gas-forming organism along the distal lateral fourth with lytic changes around the R fifth MTP joint concerning for osteomyelitis.  Patient received empiric IV antibiotics in the ER and was admitted to our service.  Podiatry was consulted. Currently on Zosyn and Zrox BID.     He has no history of IHD , no recent chest pain, palpitations , trouble breathing , SOB , PND , orthopnea. History suggests PVD but no US dopplex / confirmatory studies found in charts.toe amputation due to infections vs pvd ? No recent Stress Test available. No history of CHD.        He denies chest pain, chest pressure/discomfort, claudication, dyspnea, exertional chest pressure/discomfort, fatigue, irregular heart beat, lower extremity edema, 
See consult note published overnight  
[facility] -  Clinical Pharmacy Note    Vancomycin - Management by Pharmacy    Consult Date(s): 7/18/24  Consulting Provider(s): Dr Valencia    Assessment / Plan  1)  Rt foot gas gangrene - Vancomycin  Concurrent Antimicrobials: Zosyn  Day of Vanc Therapy:  day #1  Current Dosing Method: Intermittent Dosing by Levels  Therapeutic Goal: Trough ~ 15 mg/L  Current Dose / Plan:   Pt is ESRD on HD Mon-Wed-Fri.  Per nephro, plan to continue same schedule for now.  Received loading dose of 2500mg early this AM - no further Vanc needed today.  Random level is ordered for tomorrow AM.  Will continue to monitor clinical condition and make adjustments to regimen as appropriate.    Please call with questions--  Thanks--  Mary Bray, PharmD, BCPS, BCGP  [id number] ([facility])   7/18/2024 1:19 PM      Interval update:  Podiatry taking pt to OR later today for Rt foot I&D with partial 5th ray resection.    Subjective/Objective:   Serafin Weaver is a 44 y.o. male with a PMHx significant for ESRD on HD Mon-Wed-Fri, DM 2, depression, HTN, seizures, and CA who is admitted with Right foot gas gangrene.     Pharmacy is consulted to dose Vancomycin.    Ht Readings from Last 1 Encounters:   07/18/24 2.03 m (6' 7.92\")     Wt Readings from Last 1 Encounters:   07/18/24 (!) 148.3 kg (327 lb)     Current & Prior Antimicrobial Regimen(s):  Zosyn (7/17-current)  Vancomycin - Pharmacy to dose  Intermittent dosing (7/18-current)    Date Vanc Level Vanc Dose   7/18  2500mg   7/19 ordered           Cultures & Sensitivities:    Date Site Micro Susceptibility / Result   7/18 Blood x2 sent    7/18 Foot wound sent      Recent Labs     07/17/24 2121   CREATININE 13.8*   BUN 53*   WBC 17.7*     CrCl is not estimated 2/2 ESRD on HD  
Vascular Surgery   Resident Consult Note    Reason for Consult: \"limited intra-op bleeding in the setting of PAD\"    History of Present Illness:   Serafin Weaver is a 44 y.o. male with Hx of ESRD on HD MWF, DM2, CVA who presented with R foot pain and was found to have osteomyelitis to RLE 5th digit status post I&D with partial ray resection (07/18). Vascular surgery was consulted due to limited intraoperative bleeding in the setting of PAD. Patient has had a RUE AVF placement in 04/2024 with Dr. Howe. Denies any other vascular intervention.    Recent bilateral lower extremity arterial Duplex was completed showing:    Right side findings: Resting CHIRAG is 0.53. SFA velocity 229 cm/s, proximal popliteal 57 cm/s with spectral broadening    Left side findings: Resting CHIRAG is 0.91.    Patient reporting that he is still ambulatory with claudication symptoms. Denies rest pain.     Past Medical History:        Diagnosis Date    Amputation of third toe, left, traumatic ([facility])     Anesthesia complication     has history of being aggitated and \"fights\"    Blood circulation, collateral     Cellulitis     Depression     Diabetic polyneuropathy associated with type 2 diabetes mellitus ([facility]) 2/6/2019    Hypertension     Morbid obesity due to excess calories ([facility])     MRSA infection 02/04/2019    foot wound    Seizures ([facility])     Type II or unspecified type diabetes mellitus without mention of complication, not stated as uncontrolled     Unspecified cerebral artery occlusion with cerebral infarction     pt states at 14 years of age       Past Surgical History:        Procedure Laterality Date    AMPUTATION      left 3rd toe    FOOT AMPUTATION Left 2/7/2019    INCISION AND DRAINAGE, REVISION OF TRANSMETATARSAL AMPUTATION LEFT FOOT performed by Kailee Olsen DPM at [facility] OR    FOOT DEBRIDEMENT Right 7/18/2024    RIGHTFOOT INCISION AND DRAINAGE WITH PARTIAL FIFTH RAY RESECTION performed by Kailee Olsen DPM at [facility] OR    OTHER 
SubCUTAneous, Nightly  pregabalin (LYRICA) capsule 75 mg, 75 mg, Oral, Daily  piperacillin-tazobactam (ZOSYN) 3,375 mg in sodium chloride 0.9 % 50 mL IVPB (mini-bag), 3,375 mg, IntraVENous, Q12H  oxyCODONE (ROXICODONE) immediate release tablet 5 mg, 5 mg, Oral, Q4H PRN    Vitals :     Vitals:    07/18/24 0310   BP: 118/68   Pulse: 80   Resp: 18   Temp: 98.7 °F (37.1 °C)   SpO2: 95%          Physical Examination :     appearance: Alert, orientated  Respiratory: no distress  Cardiovascular: no visibly  raised JVD, Edema   Abdomen: -  soft  Other relevant findings: wrapped right foot      Labs :     CBC:   Recent Labs     07/17/24 2121   WBC 17.7*   HGB 10.3*   HCT 31.4*        BMP:    Recent Labs     07/17/24 2121      K 4.4   CL 93*   CO2 26   BUN 53*   CREATININE 13.8*   GLUCOSE 108*     Lab Results   Component Value Date/Time    COLORU Yellow 04/24/2023 04:48 PM    NITRU Negative 04/24/2023 04:48 PM    GLUCOSEU 250 04/24/2023 04:48 PM    KETUA 15 04/24/2023 04:48 PM    UROBILINOGEN 0.2 04/24/2023 04:48 PM    BILIRUBINUR Negative 04/24/2023 04:48 PM        ----------------------------------------------------------  Please call with questions at      24 Hrs Answering service [phone]  Perfect serve, or cell phone 7 am - 5pm  [facility]    Patient was seen and examined and the case was discussed with the resident. He acted as my scribe. I agree with the assessment and plan.    Thanks  Nephrology  [address]  [place] [address]  Office: [phone]  Cell: [phone]  Fax: [phone]

## 2024-07-29 NOTE — CARE COORDINATION
Chart review day 11- pt from [facility], can return with HD. TMA/UNRULY on 7/24. Fistula blocked and unable to get HD today. CM will continue to follow for DCP needs.

## 2024-07-30 ENCOUNTER — HOSPITAL ENCOUNTER (INPATIENT)
Dept: INTERVENTIONAL RADIOLOGY/VASCULAR | Age: 44
Discharge: HOME | DRG: 239 | End: 2024-08-01
Payer: COMMERCIAL

## 2024-07-30 VITALS
OXYGEN SATURATION: 92 % | HEART RATE: 77 BPM | DIASTOLIC BLOOD PRESSURE: 80 MMHG | WEIGHT: 315 LBS | SYSTOLIC BLOOD PRESSURE: 127 MMHG | RESPIRATION RATE: 18 BRPM | TEMPERATURE: 97.7 F | BODY MASS INDEX: 36.45 KG/M2 | HEIGHT: 78 IN

## 2024-07-30 LAB
ACID FAST STN SPEC QL: NORMAL
GLUCOSE BLD-MCNC: 117 MG/DL (ref 70–99)
MYCOBACTERIUM SPEC CULT: NORMAL
PERFORMED ON: ABNORMAL

## 2024-07-30 PROCEDURE — 6360000002 HC RX W HCPCS

## 2024-07-30 PROCEDURE — 36905 THRMBC/NFS DIALYSIS CIRCUIT: CPT

## 2024-07-30 PROCEDURE — 2500000003 HC RX 250 WO HCPCS: Performed by: RADIOLOGY

## 2024-07-30 PROCEDURE — C1887 CATHETER, GUIDING: HCPCS

## 2024-07-30 PROCEDURE — C1757 CATH, THROMBECTOMY/EMBOLECT: HCPCS

## 2024-07-30 PROCEDURE — 99153 MOD SED SAME PHYS/QHP EA: CPT

## 2024-07-30 PROCEDURE — 76080 X-RAY EXAM OF FISTULA: CPT

## 2024-07-30 PROCEDURE — 7100000011 HC PHASE II RECOVERY - ADDTL 15 MIN

## 2024-07-30 PROCEDURE — C1724 CATH, TRANS ATHEREC,ROTATION: HCPCS

## 2024-07-30 PROCEDURE — C1725 CATH, TRANSLUMIN NON-LASER: HCPCS

## 2024-07-30 PROCEDURE — 05CY3ZZ EXTIRPATION OF MATTER FROM UPPER VEIN, PERCUTANEOUS APPROACH: ICD-10-PCS | Performed by: RADIOLOGY

## 2024-07-30 PROCEDURE — 99152 MOD SED SAME PHYS/QHP 5/>YRS: CPT

## 2024-07-30 PROCEDURE — 6360000002 HC RX W HCPCS: Performed by: RADIOLOGY

## 2024-07-30 PROCEDURE — 3E05317 INTRODUCTION OF OTHER THROMBOLYTIC INTO PERIPHERAL ARTERY, PERCUTANEOUS APPROACH: ICD-10-PCS | Performed by: RADIOLOGY

## 2024-07-30 PROCEDURE — 03CY3ZZ EXTIRPATION OF MATTER FROM UPPER ARTERY, PERCUTANEOUS APPROACH: ICD-10-PCS | Performed by: RADIOLOGY

## 2024-07-30 PROCEDURE — 85347 COAGULATION TIME ACTIVATED: CPT

## 2024-07-30 PROCEDURE — C1769 GUIDE WIRE: HCPCS

## 2024-07-30 PROCEDURE — 7100000010 HC PHASE II RECOVERY - FIRST 15 MIN

## 2024-07-30 PROCEDURE — C1894 INTRO/SHEATH, NON-LASER: HCPCS

## 2024-07-30 PROCEDURE — 90935 HEMODIALYSIS ONE EVALUATION: CPT

## 2024-07-30 PROCEDURE — 6360000004 HC RX CONTRAST MEDICATION: Performed by: RADIOLOGY

## 2024-07-30 PROCEDURE — 99232 SBSQ HOSP IP/OBS MODERATE 35: CPT | Performed by: INTERNAL MEDICINE

## 2024-07-30 RX ORDER — HEPARIN SODIUM 1000 [USP'U]/ML
INJECTION, SOLUTION INTRAVENOUS; SUBCUTANEOUS PRN
Status: COMPLETED | OUTPATIENT
Start: 2024-07-30 | End: 2024-07-30

## 2024-07-30 RX ORDER — LIDOCAINE HYDROCHLORIDE 10 MG/ML
INJECTION, SOLUTION EPIDURAL; INFILTRATION; INTRACAUDAL; PERINEURAL PRN
Status: COMPLETED | OUTPATIENT
Start: 2024-07-30 | End: 2024-07-30

## 2024-07-30 RX ORDER — FENTANYL CITRATE 50 UG/ML
INJECTION, SOLUTION INTRAMUSCULAR; INTRAVENOUS PRN
Status: COMPLETED | OUTPATIENT
Start: 2024-07-30 | End: 2024-07-30

## 2024-07-30 RX ORDER — CARVEDILOL 6.25 MG/1
6.25 TABLET ORAL 2 TIMES DAILY
Qty: 60 TABLET | Refills: 3 | Status: SHIPPED | OUTPATIENT
Start: 2024-07-30

## 2024-07-30 RX ORDER — DIPHENHYDRAMINE HYDROCHLORIDE 50 MG/ML
INJECTION INTRAMUSCULAR; INTRAVENOUS PRN
Status: COMPLETED | OUTPATIENT
Start: 2024-07-30 | End: 2024-07-30

## 2024-07-30 RX ORDER — MIDAZOLAM HYDROCHLORIDE 1 MG/ML
INJECTION INTRAMUSCULAR; INTRAVENOUS PRN
Status: COMPLETED | OUTPATIENT
Start: 2024-07-30 | End: 2024-07-30

## 2024-07-30 RX ORDER — SEVELAMER CARBONATE 800 MG/1
1600 TABLET, FILM COATED ORAL
Qty: 90 TABLET | Refills: 3 | Status: SHIPPED | OUTPATIENT
Start: 2024-07-30

## 2024-07-30 RX ORDER — CLOPIDOGREL BISULFATE 75 MG/1
75 TABLET ORAL DAILY
Qty: 30 TABLET | Refills: 3 | Status: SHIPPED | OUTPATIENT
Start: 2024-07-30

## 2024-07-30 RX ORDER — PREGABALIN 75 MG/1
75 CAPSULE ORAL DAILY
Qty: 30 CAPSULE | Refills: 0 | Status: SHIPPED | OUTPATIENT
Start: 2024-07-30 | End: 2024-08-29

## 2024-07-30 RX ORDER — PREGABALIN 75 MG/1
75 CAPSULE ORAL DAILY
Qty: 30 CAPSULE | Refills: 0 | Status: SHIPPED | OUTPATIENT
Start: 2024-07-30 | End: 2024-07-30 | Stop reason: HOSPADM

## 2024-07-30 RX ORDER — MIDAZOLAM HYDROCHLORIDE 5 MG/ML
INJECTION INTRAMUSCULAR; INTRAVENOUS PRN
Status: COMPLETED | OUTPATIENT
Start: 2024-07-30 | End: 2024-07-30

## 2024-07-30 RX ADMIN — HEPARIN SODIUM 3000 UNITS: 1000 INJECTION INTRAVENOUS; SUBCUTANEOUS at 09:01

## 2024-07-30 RX ADMIN — FENTANYL CITRATE 25 MCG: 50 INJECTION, SOLUTION INTRAMUSCULAR; INTRAVENOUS at 09:23

## 2024-07-30 RX ADMIN — DIPHENHYDRAMINE HYDROCHLORIDE 25 MG: 50 INJECTION, SOLUTION INTRAMUSCULAR; INTRAVENOUS at 08:26

## 2024-07-30 RX ADMIN — FENTANYL CITRATE 25 MCG: 50 INJECTION, SOLUTION INTRAMUSCULAR; INTRAVENOUS at 08:49

## 2024-07-30 RX ADMIN — LIDOCAINE HYDROCHLORIDE 10 ML: 10 INJECTION, SOLUTION EPIDURAL; INFILTRATION; INTRACAUDAL; PERINEURAL at 08:36

## 2024-07-30 RX ADMIN — FENTANYL CITRATE 25 MCG: 50 INJECTION, SOLUTION INTRAMUSCULAR; INTRAVENOUS at 08:26

## 2024-07-30 RX ADMIN — IOPAMIDOL 50 ML: 755 INJECTION, SOLUTION INTRAVENOUS at 09:47

## 2024-07-30 RX ADMIN — MIDAZOLAM HYDROCHLORIDE 0.5 MG: 2 INJECTION, SOLUTION INTRAMUSCULAR; INTRAVENOUS at 09:23

## 2024-07-30 RX ADMIN — MIDAZOLAM HYDROCHLORIDE 1 MG: 5 INJECTION, SOLUTION INTRAMUSCULAR; INTRAVENOUS at 08:26

## 2024-07-30 RX ADMIN — ALTEPLASE 2 MG: 2.2 INJECTION, POWDER, LYOPHILIZED, FOR SOLUTION INTRAVENOUS at 08:49

## 2024-07-30 RX ADMIN — EPOETIN ALFA-EPBX 10000 UNITS: 10000 INJECTION, SOLUTION INTRAVENOUS; SUBCUTANEOUS at 15:19

## 2024-07-30 NOTE — PROGRESS NOTES
Ph: [phone], Fax: [phone]           [facility]               Reason for admission:    Osteomyelitis of right fifth toe                Brief Summary :     Serafin Weaver is being seen by nephrology for ESRD on HD MWF.      Interval History and plan:     BP is stable   Labs reviewed with creatinine of 16    HD arranged for today via AVG. He is MWF and orders placed.   Compliance stressed   Continue Renvela with meals   DC amlodipine and decrease Coreg   On ABX for Osteo. Appreciate podiatry and vascular consult                      Assessment :   ESRD on HD MWF   Access: Brachial artery to basilic vein AV graft (4/11/24)  BUN 53, Cr. 13.8. Weight today 327.   - continue HD MWF   - Continue sevelamer 800 mg 3 times daily with meals  - d/c sodium bicarb tablets   - daily weights  - strict I's and O's  - avoid nephrotoxic agents     Acute normocytic anemia   Hgb 10.3, Hct 31.4.   - iron studies, folate, vit b12 pending   - protocrit during dialysis     Right fifth toe osteomyelitis with septic joint  - s/p I& D 7/18  - Management per primary team  - NPO for surgery (right foot incision and drainage with partial fifth ray resection)  - Podiatry following  - Zyvox and Zosyn    Type 2 diabetes  Does not take insulin.  He is supposed to take Lantus 30 units twice daily with Humalog 15 units 3 times daily with meals.  - A1c 7/17- 5.8  - high dose sliding scale     Hypertension  At home is on amlodipine 5 mg, Coreg 12.5 mg twice daily and hydralazine 50 mg every 8 hours.  -Continue amlodipine 5 & Coreg 12.5 mg twice daily    Hyperlipidemia  - Continue on atorvastatin 40 mg    Prior CVA   Positive Anti-Cardiolipin IgM  On Eliquis and aspirin at home.  - held due to surgery     [facility] would like to thank Em Eden MD   for opportunity to serve this patient      Please call with questions at-   24 Hrs Answering service [phone] or  7 am- 5 pm via Perfect serve or cell 
       Ph: [phone], Fax: [phone]           [facility]               Reason for admission:    Osteomyelitis of right fifth toe                Brief Summary :     Serafin Weaver is being seen by nephrology for ESRD on HD MWF.      Interval History and plan:   Patient seen in recovery room after procedure  Comfortable  BP softer side 99/62  On 2 liter/min oxygen but denied SOB  Lytes stable   Hb 9.4        HD today per MWF schedule  UF as tolerated to EDW  Epogen with HD  Follow BP. Medications were adjusted recently.       D/W patient and dialysis nurse                         Assessment :   ESRD on HD MWF   Access: Brachial artery to basilic vein AV graft (4/11/24)  BUN 53, Cr. 13.8. Weight today 327.   - continue HD MWF   - Continue sevelamer 800 mg 3 times daily with meals  - d/c sodium bicarb tablets   - daily weights  - strict I's and O's  - avoid nephrotoxic agents     Acute normocytic anemia   Hgb 10.3, Hct 31.4.   - iron studies, folate, vit b12 pending   - protocrit during dialysis     Right fifth toe osteomyelitis with septic joint  - s/p I& D 7/18  - Management per primary team  - NPO for surgery (right foot incision and drainage with partial fifth ray resection)  - Podiatry following  - Zyvox and Zosyn    Type 2 diabetes  Does not take insulin.  He is supposed to take Lantus 30 units twice daily with Humalog 15 units 3 times daily with meals.  - A1c 7/17- 5.8  - high dose sliding scale     Hypertension  At home is on amlodipine 5 mg, Coreg 12.5 mg twice daily and hydralazine 50 mg every 8 hours.  -Continue amlodipine 5 & Coreg 12.5 mg twice daily    Hyperlipidemia  - Continue on atorvastatin 40 mg    Prior CVA   Positive Anti-Cardiolipin IgM  On Eliquis and aspirin at home.  - held due to surgery     [facility] would like to thank Em Eden MD   for opportunity to serve this patient      Please call with questions at-   24 Hrs Answering service [phone] 
       Ph: [phone], Fax: [phone]           [facility]               Reason for admission:    Osteomyelitis of right fifth toe                Brief Summary :     Serafin Weaver is being seen by nephrology for ESRD on HD MWF.      Interval History and plan:     Patient was seen in room  BP is uncontrolled   AVF is malfunctioning with no thrill  AP RLE angioplasty and amputation of 5th ray on 7/18  He is refusing lab draw today         HD per MWF schedule . HD once AVF is declotted   Check renal panel daily .   Retacrit with HD  DC midodrine as BP is elevated   Increase Renvela with meals  IR consulted for AVG declotting   Continue current antihypertensives   Fluid restriction for hyponatremia  ABX stopped by ID                      Assessment :   ESRD on HD MWF   Access: Brachial artery to basilic vein AV graft (4/11/24)  BUN 53, Cr. 13.8. Weight today 327.   - continue HD MWF   - Continue sevelamer 800 mg 3 times daily with meals  - d/c sodium bicarb tablets   - daily weights  - strict I's and O's  - avoid nephrotoxic agents     Acute normocytic anemia   Hgb 10.3, Hct 31.4.   - iron studies, folate, vit b12 pending   - protocrit during dialysis     Right fifth toe osteomyelitis with septic joint  - s/p I& D 7/18  - Management per primary team  - NPO for surgery (right foot incision and drainage with partial fifth ray resection)  - Podiatry following  - Zyvox and Zosyn    Type 2 diabetes  Does not take insulin.  He is supposed to take Lantus 30 units twice daily with Humalog 15 units 3 times daily with meals.  - A1c 7/17- 5.8  - high dose sliding scale     Hypertension  At home is on amlodipine 5 mg, Coreg 12.5 mg twice daily and hydralazine 50 mg every 8 hours.  -Continue amlodipine 5 & Coreg 12.5 mg twice daily    Hyperlipidemia  - Continue on atorvastatin 40 mg    Prior CVA   Positive Anti-Cardiolipin IgM  On Eliquis and aspirin at home.  - held due to surgery     [facility] 
       Ph: [phone], Fax: [phone]           [facility]               Reason for admission:    Osteomyelitis of right fifth toe                Brief Summary :     Serafin Weaver is being seen by nephrology for ESRD on HD MWF.      Interval History and plan:     Patient was seen in the room  BP is stable   For TMA with tendeno-achilles lengthening   He SP HD yesterday with 2 liters removal   Labs are pending from today       HD in am  per MWF schedule.    Retacrit  with HD  Continue current antihypertensives   Fluid restriction for hyponatremia  ABX per ID                      Assessment :   ESRD on HD MWF   Access: Brachial artery to basilic vein AV graft (4/11/24)  BUN 53, Cr. 13.8. Weight today 327.   - continue HD MWF   - Continue sevelamer 800 mg 3 times daily with meals  - d/c sodium bicarb tablets   - daily weights  - strict I's and O's  - avoid nephrotoxic agents     Acute normocytic anemia   Hgb 10.3, Hct 31.4.   - iron studies, folate, vit b12 pending   - protocrit during dialysis     Right fifth toe osteomyelitis with septic joint  - s/p I& D 7/18  - Management per primary team  - NPO for surgery (right foot incision and drainage with partial fifth ray resection)  - Podiatry following  - Zyvox and Zosyn    Type 2 diabetes  Does not take insulin.  He is supposed to take Lantus 30 units twice daily with Humalog 15 units 3 times daily with meals.  - A1c 7/17- 5.8  - high dose sliding scale     Hypertension  At home is on amlodipine 5 mg, Coreg 12.5 mg twice daily and hydralazine 50 mg every 8 hours.  -Continue amlodipine 5 & Coreg 12.5 mg twice daily    Hyperlipidemia  - Continue on atorvastatin 40 mg    Prior CVA   Positive Anti-Cardiolipin IgM  On Eliquis and aspirin at home.  - held due to surgery     [facility] would like to thank Ramon Bhardwaj MD   for opportunity to serve this patient      Please call with questions at-   24 Hrs Answering service [phone] or  7 
       Ph: [phone], Fax: [phone]           [email or web address]               Reason for admission:    Osteomyelitis of right fifth toe                Brief Summary :     Serafin Weaver is being seen by nephrology for ESRD on HD MWF.      Interval History and plan:     Patient was seen in the room  BP is stable   For TMA with tendeno-achilles lengthening         HD today  per MWF schedule.    Retacrit  with HD  Continue current antihypertensives   Fluid restriction for hyponatremia                     Assessment :   ESRD on HD MWF   Access: Brachial artery to basilic vein AV graft (4/11/24)  BUN 53, Cr. 13.8. Weight today 327.   - continue HD MWF   - Continue sevelamer 800 mg 3 times daily with meals  - d/c sodium bicarb tablets   - daily weights  - strict I's and O's  - avoid nephrotoxic agents     Acute normocytic anemia   Hgb 10.3, Hct 31.4.   - iron studies, folate, vit b12 pending   - protocrit during dialysis     Right fifth toe osteomyelitis with septic joint  - s/p I& D 7/18  - Management per primary team  - NPO for surgery (right foot incision and drainage with partial fifth ray resection)  - Podiatry following  - Zyvox and Zosyn    Type 2 diabetes  Does not take insulin.  He is supposed to take Lantus 30 units twice daily with Humalog 15 units 3 times daily with meals.  - A1c 7/17- 5.8  - high dose sliding scale     Hypertension  At home is on amlodipine 5 mg, Coreg 12.5 mg twice daily and hydralazine 50 mg every 8 hours.  -Continue amlodipine 5 & Coreg 12.5 mg twice daily    Hyperlipidemia  - Continue on atorvastatin 40 mg    Prior CVA   Positive Anti-Cardiolipin IgM  On Eliquis and aspirin at home.  - held due to surgery     [facility] would like to thank Ramon Bhardwaj MD   for opportunity to serve this patient      Please call with questions at-   24 Hrs Answering service [phone] or  7 am- 5 pm via Perfect serve or cell phone  Dr.Muhammad Rizwana Hernandez MD       HPI : 
    V2.0    [facility] [place]      Name:  Serafin Weaver /Age/Sex: 1980  (44 y.o. male)   MRN & CSN:  [id number] & [id number] Encounter Date/Time: 2024 5:11 PM EDT   Location:  [facility]/6318-01 PCP: Venita Kendrick MD     Attending:Ramon Bhardwaj MD       Hospital Day: 12    Assessment and Recommendations   Serafin Weaver is a 44 y.o. male with pmh of ESRD on HD, Type II DM, OWEN, hypercoagulable disorder on Eliquis who presents with Osteomyelitis of fifth toe of right foot (HCC)    #Osteomyelitis of R 5th toe  #PAD  -Vascular Consulted  --s/p RLE arteriogram with balloon angioplasty x2 at sites of severe stenosis  -Podiatry consulted  --s/p I&D with partial 5th ray resection   -POD#4 R TMA  -ID consulted  --Continue vanc and meropenem at least through tomorrow, plan to discharge off abx per ID rec    #Type II DM  - SSI  - Hypoglycemia monitoring and treatment prn per protocol    #ESRD  - Nephrology consulted  - HD per their recs    #Hypercoagulable state, positive anticardiolipin IgM  - Continue Eliquis    Diet ADULT DIET; Regular; 4 carb choices (60 gm/meal); Low Potassium (Less than 3000 mg/day)  ADULT ORAL NUTRITION SUPPLEMENT; Breakfast, Lunch, Dinner; Wound Healing Oral Supplement  ADULT ORAL NUTRITION SUPPLEMENT; Breakfast, Lunch, Dinner; Wound Healing Oral Supplement   DVT Prophylaxis [] Lovenox, []  Heparin, [] SCDs, [] Ambulation,  [] Eliquis, [] Xarelto  [] Coumadin   Code Status Full Code   Disposition From: Home  Expected Disposition: TBD  Estimated Date of Discharge: 1-2 days  Patient requires continued admission due to surgery needs   Surrogate Decision Maker/ ANY Weaver, parent     Personally reviewed Lab Studies and Imaging       Subjective:     Chief Complaint: Right Foot Pain    Serafin Weaver is a 44 y.o. male who presented for RLE pain and diagnosed with new osteomyelitis and possible gas gangrene. Admitted for further treatment.     No acute clinical 
    V2.0    [facility] [place]      Name:  Serafin Weaver /Age/Sex: 1980  (44 y.o. male)   MRN & CSN:  [id number] & [id number] Encounter Date/Time: 2024 5:11 PM EDT   Location:  [facility]/6318- PCP: Venita Kendrick MD     Attending:Ramon Bhardwaj MD       Hospital Day: 8    Assessment and Recommendations   Serafin Weaver is a 44 y.o. male with pmh of ESRD on HD, Type II DM, OWEN, hypercoagulable disorder on Eliquis who presents with Osteomyelitis of fifth toe of right foot (HCC)    #Osteomyelitis of R 5th toe  #PAD  -ID consulted  --Continue meropenem  -Vascular Consulted  --s/p RLE arteriogram with balloon angioplasty x2 at sites of severe stenosis  -Podiatry consulted  --s/p I&D with partial 5th ray resection   -R TMA today      #Type II DM  - SSI  - Hypoglycemia monitoring and treatment prn per protocol    #ESRD  - Nephrology consulted  - HD per their recs    #Hypercoagulable state, positive anticardiolipin IgM  - Continue Eliquis and hold as needed for surgical planning      Diet ADULT DIET; Regular; 4 carb choices (60 gm/meal); Low Potassium (Less than 3000 mg/day)  ADULT ORAL NUTRITION SUPPLEMENT; Breakfast, Lunch, Dinner; Wound Healing Oral Supplement  ADULT ORAL NUTRITION SUPPLEMENT; Breakfast, Lunch, Dinner; Wound Healing Oral Supplement   DVT Prophylaxis [] Lovenox, []  Heparin, [] SCDs, [] Ambulation,  [] Eliquis, [] Xarelto  [] Coumadin   Code Status Full Code   Disposition From: Home  Expected Disposition: TBD  Estimated Date of Discharge: 2+ days  Patient requires continued admission due to surgery needs   Surrogate Decision Maker/ POLOIDA Weaver, parent     Personally reviewed Lab Studies and Imaging       Subjective:     Chief Complaint: Right Foot Pain    Serafin Weaver is a 44 y.o. male who presented for RLE pain and diagnosed with new osteomyelitis and possible gas gangrene. Admitted for further treatment.     No acute changes reported overnight. Vitals stable. 
    V2.0    [facility] [place]      Name:  Serafin Weaver /Age/Sex: 1980  (44 y.o. male)   MRN & CSN:  [id number] & [id number] Encounter Date/Time: 2024 5:11 PM EDT   [facility] PCP: Venita Kendrick MD     Attending:Ramon Bhardwaj MD       Hospital Day: 13    Assessment and Recommendations   Serafin Weaver is a 44 y.o. male with pmh of ESRD on HD, Type II DM, OWEN, hypercoagulable disorder on Eliquis who presents with Osteomyelitis of fifth toe of right foot (HCC)    #Osteomyelitis of R 5th toe  #PAD  -Vascular Consulted  --s/p RLE arteriogram with balloon angioplasty x2 at sites of severe stenosis  -Podiatry consulted  --s/p I&D with partial 5th ray resection   -POD#5 R TMA  -ID consulted  --Finished IV vanc and meropenem course, no further abx per ID    #ESRD  #AVF malfunction  - Nephrology consulted  - HD per their recs  - IR consulted per Nephro for AVF kolton/declotting  - Per Nephro needs one run of HD post AVF declotting prior to DC    #Type II DM  - SSI  - Hypoglycemia monitoring and treatment prn per protocol    #Hypercoagulable state, positive anticardiolipin IgM  - Continue Eliquis    Diet ADULT DIET; Regular; 4 carb choices (60 gm/meal); Low Potassium (Less than 3000 mg/day)  ADULT ORAL NUTRITION SUPPLEMENT; Breakfast, Lunch, Dinner; Wound Healing Oral Supplement  ADULT ORAL NUTRITION SUPPLEMENT; Breakfast, Lunch, Dinner; Wound Healing Oral Supplement   DVT Prophylaxis [] Lovenox, []  Heparin, [] SCDs, [] Ambulation,  [] Eliquis, [] Xarelto  [] Coumadin   Code Status Full Code   Disposition From: LTC  Expected Disposition: LTC  Estimated Date of Discharge: 1-2 days  Patient requires continued admission due to AVF malfunction repair   Surrogate Decision Maker/ ANY Weaver, parent     Personally reviewed Lab Studies and Imaging       Subjective:     Chief Complaint: Right Foot Pain    Serafin Weaver is a 44 y.o. male who presented for RLE pain and 
    V2.0    [facility] [place]      Name:  Serafin Weaver /Age/Sex: 1980  (44 y.o. male)   MRN & CSN:  [id number] & [id number] Encounter Date/Time: 2024 5:11 PM EDT   Location:  [facility]/6318- PCP: Venita Kendrick MD     Attending:Ramon Bhardwaj MD       Hospital Day: 10    Assessment and Recommendations   Serafin Weaver is a 44 y.o. male with pmh of ESRD on HD, Type II DM, OWEN, hypercoagulable disorder on Eliquis who presents with Osteomyelitis of fifth toe of right foot (HCC)    #Osteomyelitis of R 5th toe  #PAD  -Vascular Consulted  --s/p RLE arteriogram with balloon angioplasty x2 at sites of severe stenosis  -Podiatry consulted  --s/p I&D with partial 5th ray resection   -POD#2 R TMA  -ID consulted  --Continue vanc and meropenem for a few more days, plan to discharge off abx per ID rec    #Type II DM  - SSI  - Hypoglycemia monitoring and treatment prn per protocol    #ESRD  - Nephrology consulted  - HD per their recs    #Hypercoagulable state, positive anticardiolipin IgM  - Continue Eliquis    Diet ADULT DIET; Regular; 4 carb choices (60 gm/meal); Low Potassium (Less than 3000 mg/day)  ADULT ORAL NUTRITION SUPPLEMENT; Breakfast, Lunch, Dinner; Wound Healing Oral Supplement  ADULT ORAL NUTRITION SUPPLEMENT; Breakfast, Lunch, Dinner; Wound Healing Oral Supplement   DVT Prophylaxis [] Lovenox, []  Heparin, [] SCDs, [] Ambulation,  [] Eliquis, [] Xarelto  [] Coumadin   Code Status Full Code   Disposition From: Home  Expected Disposition: TBD  Estimated Date of Discharge: 2+ days  Patient requires continued admission due to surgery needs   Surrogate Decision Maker/ ANY Weaver, parent     Personally reviewed Lab Studies and Imaging       Subjective:     Chief Complaint: Right Foot Pain    Serafin Weaver is a 44 y.o. male who presented for RLE pain and diagnosed with new osteomyelitis and possible gas gangrene. Admitted for further treatment.     No acute clinical events 
    V2.0    [facility] [place]      Name:  Serafin Weaver /Age/Sex: 1980  (44 y.o. male)   MRN & CSN:  [id number] & [id number] Encounter Date/Time: 2024 5:11 PM EDT   Location:  [facility]/6318- PCP: Venita Kendrick MD     Attending:Ramon Bhardwaj MD       Hospital Day: 9    Assessment and Recommendations   Serafin Weaver is a 44 y.o. male with pmh of ESRD on HD, Type II DM, OWEN, hypercoagulable disorder on Eliquis who presents with Osteomyelitis of fifth toe of right foot (HCC)    #Osteomyelitis of R 5th toe  #PAD  -Vascular Consulted  --s/p RLE arteriogram with balloon angioplasty x2 at sites of severe stenosis  -Podiatry consulted  --s/p I&D with partial 5th ray resection   -POD#1 R TMA  -ID consulted  --Continue vanc and meropenem for a few more days, should be able to discharge off IV abx per ID    #Type II DM  - SSI  - Hypoglycemia monitoring and treatment prn per protocol    #ESRD  - Nephrology consulted  - HD per their recs    #Hypercoagulable state, positive anticardiolipin IgM  - Continue Eliquis    Diet ADULT DIET; Regular; 4 carb choices (60 gm/meal); Low Potassium (Less than 3000 mg/day)  ADULT ORAL NUTRITION SUPPLEMENT; Breakfast, Lunch, Dinner; Wound Healing Oral Supplement  ADULT ORAL NUTRITION SUPPLEMENT; Breakfast, Lunch, Dinner; Wound Healing Oral Supplement   DVT Prophylaxis [] Lovenox, []  Heparin, [] SCDs, [] Ambulation,  [] Eliquis, [] Xarelto  [] Coumadin   Code Status Full Code   Disposition From: Home  Expected Disposition: TBD  Estimated Date of Discharge: 2+ days  Patient requires continued admission due to surgery needs   Surrogate Decision Maker/ ANY Weaver, parent     Personally reviewed Lab Studies and Imaging       Subjective:     Chief Complaint: Right Foot Pain    Serafin Weaver is a 44 y.o. male who presented for RLE pain and diagnosed with new osteomyelitis and possible gas gangrene. Admitted for further treatment.     No acute events 
    V2.0    [facility] [place]      Name:  Serafin Weaver /Age/Sex: 1980  (44 y.o. male)   MRN & CSN:  [id number] & [id number] Encounter Date/Time: 2024 5:11 PM EDT   Location:  [facility]/6318-01 PCP: Venita Kendrick MD     Attending:Feliciano Bhatt MD       Hospital Day: 14    Assessment and Recommendations   Serafin Weaver is a 44 y.o. male with pmh of ESRD on HD, Type II DM, OWEN, hypercoagulable disorder on Eliquis who presents with Osteomyelitis of fifth toe of right foot (HCC)    Patient can be discharged after clotted AVF is intervened upon by IR.    #Osteomyelitis of R 5th toe  #PAD  -Vascular Consulted  --s/p RLE arteriogram with balloon angioplasty x2 at sites of severe stenosis  -Podiatry consulted  --s/p I&D with partial 5th ray resection   -s/p R TMA  -ID consulted  --Finished IV vanc and meropenem course, no further abx per ID    #ESRD  #AVF malfunction  - Nephrology consulted  - HD per their recs  - IR consulted per Nephro for AVF kolton/declotting  - Per Nephro needs one run of HD post AVF declotting prior to DC    #Type II DM  - SSI  - Hypoglycemia monitoring and treatment prn per protocol    #Hypercoagulable state, positive anticardiolipin IgM  - Continue Eliquis    Diet Diet NPO Exceptions are: Sips of Water with Meds   DVT Prophylaxis [] Lovenox, []  Heparin, [] SCDs, [] Ambulation,  [] Eliquis, [] Xarelto  [] Coumadin   Code Status Full Code   Disposition From: [facility]  Expected Disposition: LTC  Estimated Date of Discharge: 1-2 days  Patient requires continued admission due to AVF malfunction repair   Surrogate Decision Maker/ ANY Weaver, parent     Personally reviewed Lab Studies and Imaging       Subjective:     Chief Complaint: Right Foot Pain    Serafin Weaver is a 44 y.o. male who presented for RLE pain and diagnosed with new osteomyelitis and possible gas gangrene. Admitted for further treatment.     No acute clinical changes reported overnight. Vitals stable. 
  Comprehensive Nutrition Assessment    RECOMMENDATIONS:  PO Diet: Continue 4cc, 1500 ml FR, d/c low K  Nutrition Supplement: Continue Glucerna TID, add Expedite for wound healing   Nutrition Education: Education not indicated     NUTRITION ASSESSMENT:   Nutritional summary & status: + screen (wound). Unstageable PI to toe, other DM ulcer and dermatitis noted. RD attempted to speak with patient, did not wake to verbal entry. Minimal PO intake doucmentation this admit. K WNL this admit, will d/c restriction, Will order Expedite for wound healing. Wt stable x 3 months, appears well nourished. Will monitor acceptance of ONS and follow up.     Admission // PMH: Osteomyelitis of the 5th right toe // ESRD on HD, DM2    MALNUTRITION ASSESSMENT  Context of Malnutrition: Acute Illness   Malnutrition Status: At risk for malnutrition (Comment)  Findings of the 6 clinical characteristics of malnutrition (Minimum of 2 out of 6 clinical characteristics is required to make the diagnosis of moderate or severe Protein Calorie Malnutrition based on AND/ASPEN Guidelines):  Energy Intake:  Mild decrease in energy intake (Comment) (x 6 days)  Weight Loss:  No significant weight loss     Body Fat Loss:  No significant body fat loss     Muscle Mass Loss:  No significant muscle mass loss        NUTRITION DIAGNOSIS   Increased nutrient needs related to increase demand for energy/nutrients as evidenced by wounds    Nutrition Monitoring and Evaluation:   Food/Nutrient Intake Outcomes:  Food and Nutrient Intake, Supplement Intake  Physical Signs/Symptoms Outcomes:  Biochemical Data, Nutrition Focused Physical Findings, Skin, Weight     OBJECTIVE DATA: Significant to nutrition assessment  Nutrition Related Findings: , BM 7/23  Wounds: Pressure Injury, Unstageable, Diabetic Ulcer  Nutrition Goals: Meet at least 75% of estimated needs, by next RD assessment     CURRENT NUTRITION THERAPIES  ADULT ORAL NUTRITION SUPPLEMENT; Breakfast, Lunch, 
  Comprehensive Nutrition Assessment    RECOMMENDATIONS:  PO Diet: Liberalize diet to CC5 to promote intake  Nutrition Supplement: Continue expedite TID, add Nepro BID  Nutrition Education: Education not indicated     NUTRITION ASSESSMENT:   Nutritional summary & status: Follow up. Patient continues on dialysis, tolerating CC4 diet however PO intake has been varied. RD will liberalize to CC5 diet and add Nepro to aid in meeting increased protein needs. No other nutritional concerns at this time, RD will continue to follow.     Admission // PMH: Osteomyelitis of the 5th right toe // ESRD on HD, DM2    MALNUTRITION ASSESSMENT  Context of Malnutrition: Acute Illness   Malnutrition Status: At risk for malnutrition (Comment)  Findings of the 6 clinical characteristics of malnutrition (Minimum of 2 out of 6 clinical characteristics is required to make the diagnosis of moderate or severe Protein Calorie Malnutrition based on AND/ASPEN Guidelines):  Energy Intake:  Mild decrease in energy intake (Comment) (x 6 days)  Weight Loss:  No significant weight loss     Body Fat Loss:  No significant body fat loss     Muscle Mass Loss:  No significant muscle mass loss      NUTRITION DIAGNOSIS   Increased nutrient needs related to catabolic illness as evidenced by wounds    Nutrition Monitoring and Evaluation:   Food/Nutrient Intake Outcomes:  Food and Nutrient Intake, Supplement Intake  Physical Signs/Symptoms Outcomes:  Biochemical Data, Nutrition Focused Physical Findings, Skin, Weight     OBJECTIVE DATA: Significant to nutrition assessment  Nutrition Related Findings: , A1C 5.8, +BM 7/27  Wounds: Pressure Injury, Unstageable, Diabetic Ulcer  Nutrition Goals: Meet at least 75% of estimated needs, by next RD assessment     CURRENT NUTRITION THERAPIES  ADULT DIET; Regular; 4 carb choices (60 gm/meal); Low Potassium (Less than 3000 mg/day)  ADULT ORAL NUTRITION SUPPLEMENT; Breakfast, Lunch, Dinner; Wound Healing Oral 
  Hospital Medicine Progress Note      Date of Admission: 7/17/2024  Hospital Day: 2    Chief Admission Complaint: Right foot pain     Subjective:  Patient was seen and evaluated at bedside.  Continues to report discomfort in the right foot, slightly improved from presentation.  Status post I&D at bedside by podiatry.  No events overnight.    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  Pain had been ongoing for a few days prior to presentation but acutely worsened on the day of presentation.  In the ED patient's WBC was 17.7, creatinine 13.8, , hemoglobin 10.3.  Chest x-ray showed mild pulmonary venous congestion. X-ray of the R foot showed soft tissue gas suspicious for gas-forming organism along the distal lateral fourth with lytic changes around the R fifth MTP joint concerning for osteomyelitis.  Patient received empiric IV antibiotics in the ER and was admitted to our service.  Podiatry was consulted    Assessment/Plan:      Current Principal Problem:  Osteomyelitis of fifth toe of right foot (HCC)    Right fifth toe osteomyelitis/septic joint  Non-insulin-dependent type 2 diabetes/hypertension  End-stage renal disease on hemodialysis  Hypercoagulable disorder with positive anticardiolipin IgM/right atrial thrombus on Eliquis    Plan:  *X-ray of the right foot shows soft tissue gas suspicious for gas-forming organism along the distal lateral fourth with lytic changes around the R fifth MTP joint concerning for osteomyelitis.  .8  Follow blood cultures and wound cultures  On empiric IV Zosyn and linezolid  7/18/2024 
  Hospital Medicine Progress Note      Date of Admission: 7/17/2024  Hospital Day: 3    Chief Admission Complaint: Right foot pain     Subjective:  Patient was seen and evaluated at bedside.  Right foot discomfort improved.  No events overnight.    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  Pain had been ongoing for a few days prior to presentation but acutely worsened on the day of presentation.  In the ED patient's WBC was 17.7, creatinine 13.8, , hemoglobin 10.3.  Chest x-ray showed mild pulmonary venous congestion. X-ray of the R foot showed soft tissue gas suspicious for gas-forming organism along the distal lateral fourth with lytic changes around the R fifth MTP joint concerning for osteomyelitis.  Patient received empiric IV antibiotics in the ER and was admitted to our service.  Podiatry was consulted    Assessment/Plan:      Current Principal Problem:  Osteomyelitis of fifth toe of right foot (HCC)    Right fifth toe osteomyelitis/septic joint  Non-insulin-dependent type 2 diabetes/hypertension  End-stage renal disease on hemodialysis  Hypercoagulable disorder with positive anticardiolipin IgM/right atrial thrombus on Eliquis/PAD    Plan:  *X-ray of the right foot shows soft tissue gas suspicious for gas-forming organism along the distal lateral fourth with lytic changes around the R fifth MTP joint concerning for osteomyelitis.  .8  7/18/2024 status post bedside I&D by podiatry  Blood cultures and wound cultures negative  7/18/2024 underwent right foot I&D with partial fifth ray resection in the OR by 
  Hospital Medicine Progress Note      Date of Admission: 7/17/2024  Hospital Day: 4    Chief Admission Complaint: Right foot pain     Subjective:  Patient was seen and evaluated at bedside.  Right foot discomfort stable.  Had a temperature of 100.5 again today.  Patient lost IV access overnight    Presenting Admission History:       44-year-old gentleman with a past medical history of end-stage renal disease on hemodialysis Monday Wednesday Friday, history of CVA, non-insulin-dependent type 2 diabetes, multiple toe amputations, SAH secondary to aneurysm as a child, hypercoagulable disorder with positive anticardiolipin IgM and Right atrial thrombus on Eliquis who presented to the hospital for concerns of right foot pain.  Pain had been ongoing for a few days prior to presentation but acutely worsened on the day of presentation.  In the ED patient's WBC was 17.7, creatinine 13.8, , hemoglobin 10.3.  Chest x-ray showed mild pulmonary venous congestion. X-ray of the R foot showed soft tissue gas suspicious for gas-forming organism along the distal lateral fourth with lytic changes around the R fifth MTP joint concerning for osteomyelitis.  Patient received empiric IV antibiotics in the ER and was admitted to our service.  Podiatry was consulted    Assessment/Plan:      Current Principal Problem:  Osteomyelitis of fifth toe of right foot (HCC)    Right fifth toe osteomyelitis/septic joint  Non-insulin-dependent type 2 diabetes/hypertension  End-stage renal disease on hemodialysis  Hypercoagulable disorder with positive anticardiolipin IgM/right atrial thrombus on Eliquis/PAD    Plan:  *X-ray of the right foot shows soft tissue gas suspicious for gas-forming organism along the distal lateral fourth with lytic changes around the R fifth MTP joint concerning for osteomyelitis.  .8  7/18/2024 status post bedside I&D by podiatry  Blood cultures NGTD  7/18/2024 underwent right foot I&D with partial fifth ray 
  Hospital Medicine Progress Note      Date of Admission: 7/17/2024  Hospital Day: 5    Chief Admission Complaint: Right foot pain     Subjective:  Patient was seen and evaluated at bedside.  Is drowsy but arousable.  Per nursing staff patient is on nitro and stays up all night and sleeps during the day.  Patient has been spiking temperatures, 101 yesterday, 100.8 today.    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  Pain had been ongoing for a few days prior to presentation but acutely worsened on the day of presentation.  In the ED patient's WBC was 17.7, creatinine 13.8, , hemoglobin 10.3.  Chest x-ray showed mild pulmonary venous congestion. X-ray of the R foot showed soft tissue gas suspicious for gas-forming organism along the distal lateral fourth with lytic changes around the R fifth MTP joint concerning for osteomyelitis.  Patient received empiric IV antibiotics in the ER and was admitted to our service.  Podiatry was consulted    Assessment/Plan:      Current Principal Problem:  Osteomyelitis of fifth toe of right foot (HCC)    Right fifth toe osteomyelitis/septic joint  Non-insulin-dependent type 2 diabetes/hypertension  End-stage renal disease on hemodialysis  Hypercoagulable disorder with positive anticardiolipin IgM/right atrial thrombus on Eliquis/PAD    Plan:  *X-ray of the right foot shows soft tissue gas suspicious for gas-forming organism along the distal lateral fourth with lytic changes around the R fifth MTP joint concerning for osteomyelitis.  .8  7/18/2024 status post bedside I&D by 
0715   Access assessment done to patient  (-) Thrill (-) Bruit ; acquired 2nd and 3rd opinion with [facility] staff pressent.    0717   Informed MD, Dr. LOIDA Hernandez.  Received message \"will call IR for evaluation\"      Pt kept comfortable, Primary RN informed and picked up the patient around 8:00AM    0800    Dr LOIDA Hernandez seen the pt along the hallway as the Primary RN and PCT transports the pt.  
4 Eyes Skin Assessment     NAME:  Serafin Weaver  YOB: 1980  MEDICAL RECORD NUMBER:  [id number]    The patient is being assessed for  Transfer to New Unit    I agree that at least one RN has performed a thorough Head to Toe Skin Assessment on the patient. ALL assessment sites listed below have been assessed.      Areas assessed by both nurses:    Head, Face, Ears, Shoulders, Back, Chest, Arms, Elbows, Hands, Sacrum. Buttock, Coccyx, Ischium, Legs. Feet and Heels, and Under Medical Devices         Does the Patient have a Wound? Yes wound(s) were present on assessment. LDA wound assessment was Initiated and completed by RN       Alf Prevention initiated by RN: No  Wound Care Orders initiated by RN: Yes    Pressure Injury (Stage 3,4, Unstageable, DTI, NWPT, and Complex wounds) if present, place Wound referral order by RN under : No    New Ostomies, if present place, Ostomy referral order under : No     Nurse 1 eSignature: Electronically signed by Ralf Rabago RN on 7/22/24 at 7:54 PM EDT    **SHARE this note so that the co-signing nurse can place an eSignature**    Nurse 2 eSignature: Electronically signed by Kwasi Gonzalez RN on 7/22/24 at 6:14 PM EDT   
4 Eyes Skin Assessment     NAME:  Serafin Weaver  YOB: 1980  MEDICAL RECORD NUMBER:  [id number]    The patient is being assessed for  Transfer to New Unit    I agree that at least one RN has performed a thorough Head to Toe Skin Assessment on the patient. ALL assessment sites listed below have been assessed.      Areas assessed by both nurses:    Head, Face, Ears, Shoulders, Back, Chest, Arms, Elbows, Hands, Sacrum. Buttock, Coccyx, Ischium, Legs. Feet and Heels, and Under Medical Devices         Does the Patient have a Wound? Yes wound(s) were present on assessment. LDA wound assessment was Initiated and completed by RN       Alf Prevention initiated by RN: Yes  Wound Care Orders initiated by RN: No    Pressure Injury (Stage 3,4, Unstageable, DTI, NWPT, and Complex wounds) if present, place Wound referral order by RN under : No    New Ostomies, if present place, Ostomy referral order under : No     Nurse 1 eSignature: Electronically signed by Laura Hanson RN on 7/23/24 at 6:39 AM EDT    **SHARE this note so that the co-signing nurse can place an eSignature**    Nurse 2 eSignature: {Esignature:[id number]}    
4 Eyes Skin Assessment     NAME:  Serafin Weaver  YOB: 1980  MEDICAL RECORD NUMBER:  [id number]    The patient is being assessed for  Admission    I agree that at least one RN has performed a thorough Head to Toe Skin Assessment on the patient. ALL assessment sites listed below have been assessed.      Areas assessed by both nurses:    Head, Face, Ears, Shoulders, Back, Chest, Arms, Elbows, Hands, Sacrum. Buttock, Coccyx, Ischium, Legs. Feet and Heels, and Under Medical Devices         Does the Patient have a Wound? Yes wound(s) were present on assessment. LDA wound assessment was Initiated and completed by RN        Alf Prevention initiated by RN: Yes  Wound Care Orders initiated by RN: Yes    Pressure Injury (Stage 3,4, Unstageable, DTI, NWPT, and Complex wounds) if present, place Wound referral order by RN under : Yes    New Ostomies, if present place, Ostomy referral order under : Yes     Nurse 1 eSignature: Electronically signed by Amelia Reddy RN on 7/18/24 at 5:07 AM EDT    **SHARE this note so that the co-signing nurse can place an eSignature**    Nurse 2 eSignature: Electronically signed by Luke Moore RN on 7/18/24 at 10:45 AM EDT   
Admitted to pacu.BP low and txed per CRNA. Dr. العلي here and aware 400 mls left in running IV and told to run it in . No pain or nausea. Nonverbal at present  
Arrived to place midline with bedside RN Fileeta. Pre-procedure and timeout done with RN, discussed limitations of placement and allergies. Consent confirmed. Vital signs stable. Labs, allergies, medications, and code status reviewed. No contraindications noted.    Procedure explained to pt, including the risk and benefits of the procedure. All questions answered. Pt verbalizes understanding of the procedure and states no more questions.     Pt's basilic, brachial, cephalic are all easily collapsible with no indication for a clot. Vein selected is large enough for catheter. Pt tolerated sterile procedure well, with no difficulty accessing brachial  vein, when accessed - blood was free flowing and non-pulsatile. Guidewire, introducer, and catheter went in smoothly.       Please replace all existing IV tubing with new IV tubing prior to using the Midline for current IV infusions.  Please remove any PIVs from Midline arm.  All of the above may be sources of infection or an increase chance of a clot.      Post procedure - reorganized pt table, placed pt in lowest position, with call light and educated on line care. Instructed pt/RN not to use arm for at least 30min to avoid bleeding. Reported off to bedside RN.        [phone]      
Clinical Pharmacy Progress Note  Medication History     List of of current medications patient is taking is complete. Home Medication list in EPIC updated to reflect changes noted below.    Source of information: medication list from [facility]     Changes made to medication list:   Medications removed:   Avastin  Humalog  Lisinopril  Metformin  Nifedipine  Pantoprazole  Sodium Bicarb  Victoza    Medications added:   APAP prn  Lokelma 10g on Tue-Thur-Sat only  OxyIR 5mg po q6h prn    Medication doses adjusted / updated:   Atorvastatin - takes 80mg qhs  Carvedilol - takes 25mg po BID  Lantus - takes 5 units SQ nightly  Renvela - takes 3 tabs TID with meals and 2 tabs prn with snacks    Other notes:   Unclear when pt had last doses of PRN meds - marked as \"unknown\" last dose times, but pt has active orders for them at NH.    Please call with questions--  Thanks--  Mary Bray, PharmD, BCPS, BCGP  [id number] ([facility])   7/19/2024 10:51 AM      Current Outpatient Medications   Medication Instructions    acetaminophen (TYLENOL) 650 mg, Oral, EVERY 6 HOURS PRN    amLODIPine (NORVASC) 10 MG tablet No dose, route, or frequency recorded.    apixaban (ELIQUIS) 2.5 MG TABS tablet Oral    aspirin 81 mg, Oral, DAILY    atorvastatin (LIPITOR) 80 mg, Oral, EVERY BEDTIME    carvedilol (COREG) 25 mg, Oral, 2 TIMES DAILY, Hold for BP < 110/70    hydrALAZINE (APRESOLINE) 50 mg, Oral, 3 TIMES DAILY, Hold for SBP < 110/70    Lantus SoloStar 5 Units, SubCUTAneous, NIGHTLY    ondansetron (ZOFRAN) 4 mg, Oral, EVERY 4 HOURS PRN    oxyCODONE (ROXICODONE) 5 mg, Oral, EVERY 6 HOURS PRN    sevelamer (RENVELA) 800 MG tablet 3 tablets, Oral, 3 TIMES DAILY WITH MEALS    sevelamer (RENVELA) 800 MG tablet 2 tablets, Oral, PRN    sodium zirconium cyclosilicate (LOKELMA) 10 g, Oral, SEE ADMIN INSTRUCTIONS, Every Tuesday, Thursday, Saturday           
ID Follow-up NOTE    CC:   R foot gas gangrene, R DFI  Antibiotics: Meropenem    Admit Date: 7/17/2024  Hospital Day: 7    Subjective:     POD#1  R LE angiogram, s/p R SFA, popliteal a angioplasties  POD#5  I&D, R partial 5th ray resection    Patient reports no R foot pain at present       Objective:     Patient Vitals for the past 8 hrs:   BP Temp Temp src Pulse Resp SpO2 Weight   07/23/24 0625 130/69 98.2 °F (36.8 °C) Oral 78 18 98 % --   07/23/24 0545 -- -- -- 70 -- -- --   07/23/24 0351 138/63 98 °F (36.7 °C) Oral 79 19 96 % (!) 156.3 kg (344 lb 9.3 oz)   07/23/24 0200 -- -- -- 70 -- -- --       I/O last 3 completed shifts:  In: 360 [P.O.:360]  Out: 5 [Blood:5]  No intake/output data recorded.    EXAM:  GENERAL: No apparent distress.  2L  HEENT: Membranes moist, no oral lesion  NECK:  Supple, no lymphadenopathy  LUNGS: Clear b/l, no rales, no dullness  CARDIAC: RRR, no murmur appreciated  ABD:  + BS, soft / NT  EXT:  L TMA closed, R foot with dressing, prior partial hallux amp / closed  NEURO: No focal neurologic findings  PSYCH: Orientation, sensorium, mood normal  LINES:  Peripheral iv  R arm AVF       Data Review:  Lab Results   Component Value Date    WBC 13.9 (H) 07/23/2024    HGB 8.2 (L) 07/23/2024    HCT 25.5 (L) 07/23/2024    MCV 96.9 07/23/2024     07/23/2024     Lab Results   Component Value Date    CREATININE 9.3 (HH) 07/23/2024    BUN 38 (H) 07/23/2024     (L) 07/23/2024    K 4.2 07/23/2024    CL 97 (L) 07/23/2024    CO2 26 07/23/2024       Hepatic Function Panel:   Lab Results   Component Value Date/Time    ALKPHOS 85 07/17/2024 09:21 PM    ALT 14 07/17/2024 09:21 PM    AST 14 07/17/2024 09:21 PM    BILITOT 0.6 07/17/2024 09:21 PM    BILIDIR 0.3 06/08/2014 05:37 AM    IBILI 0.4 06/08/2014 05:37 AM       MICRO:  7/17,18 BC no growth  7/18 R foot wound cult - heavy B fragilis, mod K oxytoca ESBL, light mixed skin mary  Klebsiella oxytoca ESBL   Antibiotic Interpretation Microscan  
ID Follow-up NOTE    CC:   R foot gas gangrene, R DFI  Antibiotics: Meropenem    Admit Date: 7/17/2024  Hospital Day: 8    Subjective:     POD#0  R TMA, closure  POD#2  R LE angiogram, s/p R SFA, popliteal a angioplasties  POD#6  I&D, R partial 5th ray resection    Patient reports no R foot pain at present       Objective:     Patient Vitals for the past 8 hrs:   BP Temp Temp src Pulse Resp SpO2   07/24/24 1156 102/61 97.8 °F (36.6 °C) Axillary 66 16 95 %   07/24/24 1130 97/67 98.1 °F (36.7 °C) Oral 67 17 100 %   07/24/24 1115 (!) 106/56 -- -- 67 12 100 %   07/24/24 1100 102/64 -- -- 67 19 100 %   07/24/24 1050 111/63 -- -- 68 16 100 %   07/24/24 1045 106/68 -- -- 69 13 100 %   07/24/24 1035 (!) 86/54 97 °F (36.1 °C) Temporal 69 13 100 %       I/O last 3 completed shifts:  In: 240 [P.O.:240]  Out: -   I/O this shift:  In: 600 [I.V.:600]  Out: 40 [Blood:40]    EXAM:  GENERAL: No apparent distress.  2L  HEENT: Membranes moist, no oral lesion  NECK:  Supple, no lymphadenopathy  LUNGS: Clear b/l, no rales, no dullness  CARDIAC: RRR, no murmur appreciated  ABD:  + BS, soft / NT  EXT:  L TMA closed, R foot with dressing / splint  NEURO: No focal neurologic findings  PSYCH: Orientation, sensorium, mood normal  LINES:  Peripheral iv  R arm AVF       Data Review:  Lab Results   Component Value Date    WBC 16.5 (H) 07/24/2024    HGB 8.8 (L) 07/24/2024    HCT 27.8 (L) 07/24/2024    MCV 96.7 07/24/2024     07/24/2024     Lab Results   Component Value Date    CREATININE 10.8 (HH) 07/24/2024    BUN 47 (H) 07/24/2024     07/24/2024    K 4.5 07/24/2024     07/24/2024    CO2 25 07/24/2024       Hepatic Function Panel:   Lab Results   Component Value Date/Time    ALKPHOS 85 07/17/2024 09:21 PM    ALT 14 07/17/2024 09:21 PM    AST 14 07/17/2024 09:21 PM    BILITOT 0.6 07/17/2024 09:21 PM    BILIDIR 0.3 06/08/2014 05:37 AM    IBILI 0.4 06/08/2014 05:37 AM       MICRO:  7/17,18 BC no growth  7/18 R foot wound cult - 
ID Follow-up NOTE    CC:   R foot gas gangrene, R DFI  Antibiotics: None (Vancomycin, Meropenem stopped today)    Admit Date: 7/17/2024  Hospital Day: 13    Subjective:     POD#5  R TMA, closure  POD#7  R LE angiogram, s/p R SFA, popliteal a angioplasties  POD#11  I&D, R partial 5th ray resection    Patient reports LESS R foot /surg site pain      Objective:     Afebrile since 7/25    Patient Vitals for the past 8 hrs:   BP Temp Temp src Pulse Resp SpO2   07/29/24 0805 (!) 151/73 97.8 °F (36.6 °C) Oral 65 16 93 %       I/O last 3 completed shifts:  In: 240 [P.O.:240]  Out: 0   No intake/output data recorded.    EXAM:  GENERAL: No apparent distress.  HEENT: Membranes moist, no oral lesion  NECK:  Supple, no lymphadenopathy  LUNGS: Clear b/l, no rales, no dullness  CARDIAC: RRR, no murmur appreciated  ABD:  + BS, soft / NT  EXT:  L TMA closed, R foot with dressing / splint  NEURO: No focal neurologic findings  PSYCH: Orientation, sensorium, mood normal  LINES:  Peripheral iv  R arm AVF       Data Review:  Lab Results   Component Value Date    WBC 16.7 (H) 07/28/2024    HGB 8.1 (L) 07/28/2024    HCT 25.3 (L) 07/28/2024    MCV 95.9 07/28/2024     07/28/2024     Lab Results   Component Value Date    CREATININE 10.7 (HH) 07/28/2024    BUN 44 (H) 07/28/2024     07/28/2024    K 4.8 07/28/2024    CL 98 (L) 07/28/2024    CO2 25 07/28/2024       Hepatic Function Panel:   Lab Results   Component Value Date/Time    ALKPHOS 85 07/17/2024 09:21 PM    ALT 14 07/17/2024 09:21 PM    AST 14 07/17/2024 09:21 PM    BILITOT 0.6 07/17/2024 09:21 PM    BILIDIR 0.3 06/08/2014 05:37 AM    IBILI 0.4 06/08/2014 05:37 AM       MICRO:  7/17,18 BC no growth  7/18 R foot wound cult - heavy B fragilis, mod K oxytoca ESBL, light mixed skin mary  Klebsiella oxytoca ESBL   Antibiotic Interpretation Microscan    ampicillin Resistant >=32 mcg/mL   ampicillin-sulbactam Sensitive 8 mcg/mL   ceFAZolin Resistant >=64 mcg/mL   cefepime 
ID Follow-up NOTE    CC:   R foot gas gangrene, R DFI  Antibiotics: Vancomycin, Meropenem    Admit Date: 7/17/2024  Hospital Day: 11    Subjective:     POD#3  R TMA, closure  POD#5  R LE angiogram, s/p R SFA, popliteal a angioplasties  POD#9  I&D, R partial 5th ray resection    Patient c/o R foot /surg site pain      Objective:     Afebrile >48 hr  Tm 101.3 on 7/25 in am    Patient Vitals for the past 8 hrs:   BP Temp Temp src Pulse Resp SpO2   07/27/24 1114 (!) 109/51 97.9 °F (36.6 °C) Axillary 75 16 96 %   07/27/24 0903 -- -- -- -- -- 98 %   07/27/24 0858 130/77 97.9 °F (36.6 °C) Axillary 71 18 (!) 89 %       I/O last 3 completed shifts:  In: 240 [P.O.:240]  Out: 0   No intake/output data recorded.    EXAM:  GENERAL: No apparent distress.  HEENT: Membranes moist, no oral lesion  NECK:  Supple, no lymphadenopathy  LUNGS: Clear b/l, no rales, no dullness  CARDIAC: RRR, no murmur appreciated  ABD:  + BS, soft / NT  EXT:  L TMA closed, R foot with dressing / splint  NEURO: No focal neurologic findings  PSYCH: Orientation, sensorium, mood normal  LINES:  Peripheral iv  R arm AVF       Data Review:  Lab Results   Component Value Date    WBC 19.6 (H) 07/26/2024    HGB 7.9 (L) 07/26/2024    HCT 24.7 (L) 07/26/2024    MCV 96.4 07/26/2024     07/26/2024     Lab Results   Component Value Date    CREATININE 10.3 (HH) 07/26/2024    BUN 43 (H) 07/26/2024     07/26/2024    K 4.6 07/26/2024    CL 98 (L) 07/26/2024    CO2 25 07/26/2024       Hepatic Function Panel:   Lab Results   Component Value Date/Time    ALKPHOS 85 07/17/2024 09:21 PM    ALT 14 07/17/2024 09:21 PM    AST 14 07/17/2024 09:21 PM    BILITOT 0.6 07/17/2024 09:21 PM    BILIDIR 0.3 06/08/2014 05:37 AM    IBILI 0.4 06/08/2014 05:37 AM       MICRO:  7/17,18 BC no growth  7/18 R foot wound cult - heavy B fragilis, mod K oxytoca ESBL, light mixed skin mary  Klebsiella oxytoca ESBL   Antibiotic Interpretation Microscan    ampicillin Resistant >=32 mcg/mL 
ID Follow-up NOTE    CC:   R foot gas gangrene, R DFI  Antibiotics: Vancomycin, Meropenem    Admit Date: 7/17/2024  Hospital Day: 6    Subjective:     R LE angiogram today, s/p R SFA, popliteal a angioplasties  POD# 4 I&D, R partial 5th ray resection    Patient reports no pain at present       Objective:     Patient Vitals for the past 8 hrs:   BP Temp Temp src Pulse Resp SpO2 Weight   07/22/24 1337 110/62 -- -- 75 20 99 % --   07/22/24 1330 110/62 98 °F (36.7 °C) -- 74 20 -- (!) 158 kg (348 lb 5.2 oz)   07/22/24 1300 (!) 97/57 -- -- 71 12 100 % --   07/22/24 1245 (!) 89/76 -- -- 75 12 -- --   07/22/24 1230 (!) 96/58 -- -- 73 15 90 % --   07/22/24 1215 (!) 85/57 -- -- 67 11 -- --   07/22/24 1200 99/62 -- -- 67 13 -- --   07/22/24 1145 (!) 91/49 -- -- 76 17 100 % --   07/22/24 1130 (!) 84/46 -- -- 75 17 98 % --   07/22/24 1120 (!) 91/42 -- -- 74 12 (!) 73 % --   07/22/24 1115 (!) 78/46 -- -- 73 12 -- --   07/22/24 1100 (!) 86/52 -- -- 73 11 -- --   07/22/24 1050 (!) 80/49 -- -- 75 13 93 % --   07/22/24 1045 -- -- -- 73 11 93 % --   07/22/24 1040 (!) 81/53 -- -- 73 10 91 % --   07/22/24 1030 (!) 91/49 -- -- 74 10 91 % --   07/22/24 1015 (!) 101/48 -- -- 73 12 90 % --   07/22/24 1003 (!) 92/53 -- -- 73 14 96 % --   07/22/24 1000 (!) 57/37 -- -- 74 11 92 % --   07/22/24 0947 (!) 91/52 -- -- 75 12 97 % --   07/22/24 0734 139/71 98 °F (36.7 °C) Oral 76 18 98 % --     No intake/output data recorded.  I/O this shift:  In: 120 [P.O.:120]  Out: 5 [Blood:5]    EXAM:  GENERAL: No apparent distress.  2L  HEENT: Membranes moist, no oral lesion  NECK:  Supple, no lymphadenopathy  LUNGS: Clear b/l, no rales, no dullness  CARDIAC: RRR, no murmur appreciated  ABD:  + BS, soft / NT  EXT:  L TMA closed, R foot with dressing, prior partial hallux amp / closed  NEURO: No focal neurologic findings  PSYCH: Orientation, sensorium, mood normal  LINES:  Peripheral iv  R arm AVF       Data Review:  Lab Results   Component Value Date    WBC 
ID Follow-up NOTE    CC:   R foot gas gangrene, R DFI  Antibiotics: Vancomycin, Meropenem    Admit Date: 7/17/2024  Hospital Day: 9    Subjective:     POD#1  R TMA, closure  POD#3  R LE angiogram, s/p R SFA, popliteal a angioplasties  POD#7  I&D, R partial 5th ray resection    Patient c/o R foot /surg site pain      Objective:     T 101.3    Patient Vitals for the past 8 hrs:   BP Temp Temp src Pulse Resp SpO2 Weight   07/25/24 1051 -- -- -- -- 16 -- --   07/25/24 1021 (!) 151/63 -- -- 85 -- -- --   07/25/24 0859 (!) 151/63 (!) 101.3 °F (38.5 °C) Oral 85 18 94 % --   07/25/24 0403 -- -- -- -- -- 94 % --   07/25/24 0401 130/70 99.9 °F (37.7 °C) Oral 80 16 (!) 88 % (!) 161.1 kg (355 lb 2.6 oz)       I/O last 3 completed shifts:  In: 600 [I.V.:600]  Out: 40 [Blood:40]  No intake/output data recorded.    EXAM:  GENERAL: No apparent distress.  HEENT: Membranes moist, no oral lesion  NECK:  Supple, no lymphadenopathy  LUNGS: Clear b/l, no rales, no dullness  CARDIAC: RRR, no murmur appreciated  ABD:  + BS, soft / NT  EXT:  L TMA closed, R foot with dressing / splint  NEURO: No focal neurologic findings  PSYCH: Orientation, sensorium, mood normal  LINES:  Peripheral iv  R arm AVF       Data Review:  Lab Results   Component Value Date    WBC 16.5 (H) 07/24/2024    HGB 8.8 (L) 07/24/2024    HCT 27.8 (L) 07/24/2024    MCV 96.7 07/24/2024     07/24/2024     Lab Results   Component Value Date    CREATININE 10.8 (HH) 07/24/2024    BUN 47 (H) 07/24/2024     07/24/2024    K 4.5 07/24/2024     07/24/2024    CO2 25 07/24/2024       Hepatic Function Panel:   Lab Results   Component Value Date/Time    ALKPHOS 85 07/17/2024 09:21 PM    ALT 14 07/17/2024 09:21 PM    AST 14 07/17/2024 09:21 PM    BILITOT 0.6 07/17/2024 09:21 PM    BILIDIR 0.3 06/08/2014 05:37 AM    IBILI 0.4 06/08/2014 05:37 AM       MICRO:  7/17,18 BC no growth  7/18 R foot wound cult - heavy B fragilis, mod K oxytoca ESBL, light mixed skin 
ID Follow-up NOTE    CC:   Right foot pain  Antibiotics: Vancomycin and Zosyn    Admit Date: 7/17/2024  Hospital Day: 3    Subjective:     Patient is a 44-year-old male with history of ESRD on HD, DM2, CVA, osteomyelitis of left foot status post TMA here for right foot and calf pain. Patient seen and evaluated by vascular surgery by consult of podiatry for sub-optimal bleeding during surgery. Vascular surgery recommending intervention to improve perfusion to RLE but patient is currently refusing at this time. Endorses right foot and calf pain.      Objective:     Patient Vitals for the past 8 hrs:   BP Temp Temp src Pulse Resp SpO2   07/19/24 0829 96/72 (!) 100.5 °F (38.1 °C) Oral 75 15 98 %     I/O last 3 completed shifts:  In: 410 [P.O.:60; I.V.:350]  Out: 10 [Blood:10]  No intake/output data recorded.    EXAM:  GENERAL: No apparent distress.    HEENT: Membranes moist, no oral lesion  NECK:  Supple, no lymphadenopathy  LUNGS: Clear b/l, no rales, no dullness  CARDIAC: RRR, no murmur appreciated  ABD:  + BS, soft / NT  EXT:  Dressing intact to RLE  NEURO: No focal neurologic findings  PSYCH: Melancholy and sleepy during exam  LINES:  Peripheral iv       Data Review:  Lab Results   Component Value Date    WBC 13.7 (H) 07/19/2024    HGB 9.4 (L) 07/19/2024    HCT 29.2 (L) 07/19/2024    MCV 97.7 07/19/2024     07/19/2024     Lab Results   Component Value Date    CREATININE 16.0 (HH) 07/19/2024    BUN 63 (H) 07/19/2024     07/19/2024    K 4.6 07/19/2024    CL 97 (L) 07/19/2024    CO2 24 07/19/2024       Hepatic Function Panel:   Lab Results   Component Value Date/Time    ALKPHOS 85 07/17/2024 09:21 PM    ALT 14 07/17/2024 09:21 PM    AST 14 07/17/2024 09:21 PM    BILITOT 0.6 07/17/2024 09:21 PM    BILIDIR 0.3 06/08/2014 05:37 AM    IBILI 0.4 06/08/2014 05:37 AM       Micro:  Gram Stain Result No Epithelial Cells seen  No WBC's seen  3+ Gram positive cocci  2+ RBC's  1+ Gram negative rods     Blood culture: 
Nephrologist, Dr. Pope in to assess patient and informed patient and RN that patient will continue with HD today.   
Nephrology okay with pt. Getting line placed after ultrasound failed attempts.  
Occupational Therapy  Facility/Department: [address]  Occupational Therapy Initial Assessment, Tx, and DC    Name: Serafin Weaver  : 1980  MRN: [id number]  Date of Service: 2024    Discharge Recommendations:  Long Term Care with OT  OT Equipment Recommendations  Other: Pt has needed DME       Patient Diagnosis(es): The primary encounter diagnosis was Acute osteomyelitis (HCC). Diagnoses of Gas gangrene (HCC), Osteolysis, Osteomyelitis, unspecified site, unspecified type (HCC), and Osteomyelitis of right foot, unspecified type (HCC) were also pertinent to this visit.  Past Medical History:  has a past medical history of Amputation of third toe, left, traumatic (HCC), Anesthesia complication, Blood circulation, collateral, Cellulitis, Depression, Diabetic polyneuropathy associated with type 2 diabetes mellitus (HCC), Hypertension, Morbid obesity due to excess calories (HCC), MRSA infection, Seizures (HCC), Type II or unspecified type diabetes mellitus without mention of complication, not stated as uncontrolled, and Unspecified cerebral artery occlusion with cerebral infarction.  Past Surgical History:  has a past surgical history that includes amputation; Toe amputation (Right); other surgical history (2015); Foot Amputation (Left, 2019); vascular surgery (Right, 2024); Foot Debridement (Right, 2024); invasive vascular (N/A, 2024); Toe amputation (Right, 2024); and Achilles tendon surgery (Right, 2024).           Assessment   Performance deficits / Impairments: Decreased functional mobility ;Decreased safe awareness;Decreased cognition;Decreased ADL status  Assessment: Pt is 44 y.o male who presents from [facility] now s/p R TMA with NWB to RLE precaution. Pt presents argumentative requiring max encouragement for participation and refusing to trial OOB/chair t/f. Pt reports he is primarily WC level at [facility] and recevies intermittent assist with ADLs. Pt demos 
PACU Transfer Note    Vitals:    07/18/24 2230   BP: 103/61   Pulse: 74   Resp: 15   Temp: 98.6   SpO2: 99%       In: 410 [P.O.:60; I.V.:350]  Out: 10     Pain assessment:  none  Pain Level: 10    Report given to Receiving unit RN.    7/18/2024 10:37 PM      
PACU Transfer to [id number]    Vitals:    07/24/24 1130   BP: 97/67   Pulse: 67   Resp: 17   Temp: 98.1 °F (36.7 °C)   SpO2: 100%         Intake/Output Summary (Last 24 hours) at 7/24/2024 1141  Last data filed at 7/24/2024 1016  Gross per 24 hour   Intake 600 ml   Output 40 ml   Net 560 ml       Pain assessment:  none  Pain Level: 0    Patient transferred via bed per myself RN to care of Venita FranciscoS JOSE.        
PRE-OP NOTE  Department of Surgery      Chief Complaint or Reason for Surgery: Peripheral arterial disease    Procedure: Right lower extremity angiogram  Expected time: 7/22, 8:30 am    Plan  1. Diet: NPO at midnight  2. IVF: LR 50  3. Antibiotics: Ancef OCTOR  4. Labs to be drawn: Type and Screen, INR  5. Anesthesia: to see patient  6. Consent: in chart  7. Pulmonary: CXR: in chart 7/17  8. Cardiac: EKG: in chart 7/18      Yasmin Navarro MD  07/21/24  6:20 PM   
Patient admitted to PACU # 13 from OR 2208 post RIGHTFOOT INCISION AND DRAINAGE WITH PARTIAL FIFTH RAY RESECTION - Right  per Dr. Olsen.  Attached to PACU monitoring system and report received from anesthesia provider.  Patient was reported to be hemodynamically stable during procedure.  Patient drowsy on admission and denied pain. Pt on 4 L NC. Pt RLE surgical drsg c/d/I with ace wrap. RLE elevated on pillow. Pt NSR on monitor. Xray called. Will continue to monitor.  
Patient did not have HD today due to access being blocked. Messaged Dr. Bhardwaj about BP trending upwards today and at 1601 BP was 168/71. Patient denies any signs or symptoms. Dr. Bhardwaj advised to hold PRN's for now and to continue to monitor. Holding PRN's and will continue to monitor patient.   
Patient left floor for cath lab.  
Patient left the floor for the cath lab this morning abound 0745. After the cath lab, they took him directly to HD. I have not seen him since he left this morning for cath lab. So I have not taken any vitals or done any assessments (head to toe or IV). Patient is still currently at HD.  
Patient not seen since he was in the OR all day    Em Eden MD  [facility] Medicine  [facility]    
Patient was d/c to [facility]. Transportation pick patient up. Attempted to call report to [facility] but sat on hold for over 20 minutes before hanging up. Gathered all patients belongings. Sent with patient and transport. Removed patients Left midline. Gave AVS to transportation along with patient RX script.   
Patient went directly from the cath lab to HD. Patient current at HD.  
Patient went to cath lab.  
Perfect Serve sent to vascular service to confirm patient to have HD today.  
Pharmacy Progress Note    Heparin high dose weight based infusion is ordered for patient.  Per chart review, patient has received an oral Factor Xa inhibitor (apixaban) within the last 72 hours.  As oral Factor Xa inhibitors can impact the anti-Xa test calibrated to unfractionated heparin, Heparin infusion will be monitored and adjusted using aPTT's per [facility] Policy.    APTT algorithm:  aPTT < 59         Heparin 80 units/kg bolus     Increase infusion by 4 units/kg/hr                             (maximum 10,000 units)  aPTT 59-72.9    Heparin 40 units/kg bolus     Increase infusion by 2 units/kg/hr                             (maximum 5,000 units)  aPTT      No bolus                                No change  aPTT 102.1-109      No bolus                          Decrease infusion by 1 units/kg/hr  aPTT  109.1-122.9  No bolus    Decrease infusion by 2 units/kg/hr  aPTT  > 123      Hold heparin for 1 hour         Decrease infusion by 3 units/kg/hr     Pharmacy will monitor daily to assist with adjustments & ordering of labs as needed.  If patient remains on heparin infusion 72 hours from last dose of oral factor Xa inhibitor, pharmacy will change infusion back to usual monitoring via the Anti-Xa algorithm per [facility] Policy, as the interaction will no longer occur at that time.    Please call pharmacy with any questions -  Shanita Cisneros, PharmD  7/19/2024  3:36 PM       
Physical Therapy  Facility/Department: [address]  Physical Therapy Initial Assessment/treatment note    Name: Serafin Weaver  : 1980  MRN: [id number]  Date of Service: 2024    Discharge Recommendations:  Long Term Care without PT   PT Equipment Recommendations  Equipment Needed: No      Patient Diagnosis(es): The primary encounter diagnosis was Acute osteomyelitis (HCC). Diagnoses of Gas gangrene (HCC), Osteolysis, Osteomyelitis, unspecified site, unspecified type (HCC), and Osteomyelitis of right foot, unspecified type (HCC) were also pertinent to this visit.  Past Medical History:  has a past medical history of Amputation of third toe, left, traumatic (HCC), Anesthesia complication, Blood circulation, collateral, Cellulitis, Depression, Diabetic polyneuropathy associated with type 2 diabetes mellitus (HCC), Hypertension, Morbid obesity due to excess calories (HCC), MRSA infection, Seizures (HCC), Type II or unspecified type diabetes mellitus without mention of complication, not stated as uncontrolled, and Unspecified cerebral artery occlusion with cerebral infarction.  Past Surgical History:  has a past surgical history that includes amputation; Toe amputation (Right); other surgical history (2015); Foot Amputation (Left, 2019); vascular surgery (Right, 2024); Foot Debridement (Right, 2024); invasive vascular (N/A, 2024); Toe amputation (Right, 2024); and Achilles tendon surgery (Right, 2024).    Assessment   Assessment: 43 yo admitted  for acute osteomyelitis and subsequent R TMA and UNRULY. Pt is now NWB R post op. Pt contrary and argumentative and refused to listen to reason or explanation of NWB R. Pt refused to trial OOB to chair and requested no further PT. Pt states he is tired of staff \"bugging\"  him all the time.  Per notes, pt is from LTC and pt reports he is mainly at wheelchair level at baseline. Recommend pt return to LTC with PT 
Picc team has been notified of pt. Needing line. MD decided to give pt. A midline and pt. Is in agreement with decision.   
Podiatric Surgery Daily Progress Note  Serafin Weaver      Subjective :   Patient seen and examined this am at the bedside. Pt is s/p right foot I&D with partial 5th ray resection (DOS 07/17).  Per patient vascular surgery swung by and talk to them and he is uncertain of having a procedure done due to access being near his groin for his femoral artery.  This was discussed at length with the patient and the overall necessity was stressed.Mr. Weaver appears to be more receptive after our visit. Patient denies any acute overnight events. Patient denies N/V/F/C/SOB.     Review of Systems: Pertinent positive and negative findings as documented in the HPI, otherwise all other systems were reviewed and were negative.        Objective     /65   Pulse 82   Temp 99.3 °F (37.4 °C) (Oral)   Resp 16   Ht 2.03 m (6' 7.92\")   Wt (!) 148.3 kg (327 lb)   SpO2 97%   BMI 35.99 kg/m²      I/O:No intake or output data in the 24 hours ending 07/21/24 0747             Wt Readings from Last 3 Encounters:   07/18/24 (!) 148.3 kg (327 lb)   05/14/24 (!) 142 kg (313 lb 0.9 oz)   04/11/24 (!) 142 kg (313 lb 0.9 oz)       LABS:    Recent Labs     07/19/24  0406 07/20/24  0454   WBC 13.7* 12.9*   HGB 9.4* 9.2*   HCT 29.2* 28.1*    208        Recent Labs     07/20/24  0454      K 4.3   CL 98*   CO2 27   BUN 37*   CREATININE 10.3*        Recent Labs     07/19/24  2127 07/20/24  0454 07/20/24  1753 07/20/24  2300   INR 1.31*  --   --   --    APTT 33.6   < > 36.2 148.9*    < > = values in this interval not displayed.           LOWER EXTREMITY EXAMINATION    Dressing to Right LE intact. No drainage on outer layers of dressing. Sanguinous strikethrough noted to the internal dressing.     VASCULAR: DP and PT pulses are faintly palpable +1 B/L.  Upon previous hand-held Doppler examination, DP and PT pulses are monophasic. CFT is sluggish to the remaining digits on right foot and brisk to TMA stump on left. Skin 
Podiatric Surgery Daily Progress Note  Serafin Weaver      Subjective :   Patient seen and examined this am at the bedside. Pt is s/p right foot TMA with UNRLUY (7/24).  Patient states that he is tired of being woken up this morning. Also states that he does not want to be stuck anymore with needles.  Patient denies all other pedal complaints.     Review of Systems: Pertinent positive and negative findings as documented in the HPI, otherwise all other systems were reviewed and were negative.        Objective     BP (!) 156/75   Pulse 73   Temp 99.9 °F (37.7 °C) (Oral)   Resp 20   Ht 2.03 m (6' 7.92\")   Wt (!) 150.3 kg (331 lb 5.6 oz)   SpO2 91%   BMI 36.47 kg/m²      I/O:  Intake/Output Summary (Last 24 hours) at 7/30/2024 0714  Last data filed at 7/29/2024 1421  Gross per 24 hour   Intake 120 ml   Output --   Net 120 ml                  Wt Readings from Last 3 Encounters:   07/26/24 (!) 150.3 kg (331 lb 5.6 oz)   05/14/24 (!) 142 kg (313 lb 0.9 oz)   04/11/24 (!) 142 kg (313 lb 0.9 oz)       LABS:    Recent Labs     07/28/24  0947   WBC 16.7*   HGB 8.1*   HCT 25.3*             Recent Labs     07/28/24  0947      K 4.8   CL 98*   CO2 25   PHOS 6.0*   BUN 44*   CREATININE 10.7*          Recent Labs     07/29/24  1910   INR 1.23*             LOWER EXTREMITY EXAMINATION    Dressing to right lower extremity left clean, dry, and intact.  No strikethrough noted to external dressing.    Sensation diminished to distal TMA stump B/L consistent with pre operative state.  No pain with calf compression bilateral.  Patient able to perform active range of motion to ankle bilateral.     IMAGING:  XR right Foot (07/24/24)  FINDINGS/  IMPRESSION:  Status post 1-5th digit transmetatarsal amputation with soft tissue swelling and placement of a brace.    B/L LE Art Dup (7/18):  Right Lower Arterial     Proximal Common Femoral Artery: Multiphasic (normal) Doppler waveforms.   Distal Common Femoral Artery: 
Podiatric Surgery Daily Progress Note  Serafin Weaver      Subjective :   Patient seen and examined this am at the bedside. Pt is s/p right foot TMA with UNRULY (7/24).  Patient had angio and dialysis done 7/22/2024.  Patient states that he is tired and would like to sleep.  Mr. Weaver does note a tingling type pain to his right lower extremity consistent with the postoperative course. Patient voices that he would feel more comfortable in a wheelchair when he goes back to his LTC facility rather than having crutches or anything else. PT/OT in room during visit today.  Patient denies any acute overnight events.  Patient denies all other pedal complaints.     Review of Systems: Pertinent positive and negative findings as documented in the HPI, otherwise all other systems were reviewed and were negative.        Objective     /70   Pulse 80   Temp 99.9 °F (37.7 °C) (Oral)   Resp 16   Ht 2.03 m (6' 7.92\")   Wt (!) 161.1 kg (355 lb 2.6 oz)   SpO2 94%   BMI 39.09 kg/m²      I/O:  Intake/Output Summary (Last 24 hours) at 7/25/2024 0938  Last data filed at 7/24/2024 1016  Gross per 24 hour   Intake 600 ml   Output 40 ml   Net 560 ml                Wt Readings from Last 3 Encounters:   07/25/24 (!) 161.1 kg (355 lb 2.6 oz)   05/14/24 (!) 142 kg (313 lb 0.9 oz)   04/11/24 (!) 142 kg (313 lb 0.9 oz)       LABS:    Recent Labs     07/23/24  0736 07/24/24  0525   WBC 13.9* 16.5*   HGB 8.2* 8.8*   HCT 25.5* 27.8*    285        Recent Labs     07/24/24  0525      K 4.5      CO2 25   PHOS 5.5*   BUN 47*   CREATININE 10.8*        No results for input(s): \"INR\", \"APTT\" in the last 72 hours.    Invalid input(s): \"PROT\"          RIGHT FOCUSED LOWER EXTREMITY EXAMINATION    Dressing to right lower extremity left clean, dry, and intact.  No strikethrough noted to external dressing.  Posterior splint with adequate padding and no signs of irritation.    Sensation diminished consistent with postoperative 
Podiatric Surgery Daily Progress Note  Serafin Weaver      Subjective :   Patient seen and examined this am at the bedside. Pt is s/p right foot TMA with UNRULY (7/24).  Patient is resting comfortably with no complaints of pain.  Patient states that he has been compliant with weightbearing status while in hospital.  Patient denies any acute overnight events.  Patient denies all other pedal complaints.     Review of Systems: Pertinent positive and negative findings as documented in the HPI, otherwise all other systems were reviewed and were negative.        Objective     /77   Pulse 71   Temp 97.9 °F (36.6 °C) (Axillary)   Resp 18   Ht 2.03 m (6' 7.92\")   Wt (!) 150.3 kg (331 lb 5.6 oz)   SpO2 98%   BMI 36.47 kg/m²      I/O:  Intake/Output Summary (Last 24 hours) at 7/27/2024 0949  Last data filed at 7/26/2024 2210  Gross per 24 hour   Intake 240 ml   Output 0 ml   Net 240 ml                  Wt Readings from Last 3 Encounters:   07/26/24 (!) 150.3 kg (331 lb 5.6 oz)   05/14/24 (!) 142 kg (313 lb 0.9 oz)   04/11/24 (!) 142 kg (313 lb 0.9 oz)       LABS:    Recent Labs     07/26/24  0810   WBC 19.6*   HGB 7.9*   HCT 24.7*             Recent Labs     07/26/24  0810      K 4.6   CL 98*   CO2 25   PHOS 5.7*   BUN 43*   CREATININE 10.3*          No results for input(s): \"INR\", \"APTT\" in the last 72 hours.    Invalid input(s): \"PROT\"          LOWER EXTREMITY EXAMINATION    Dressing to Right LE disheveled however intact.  No strikethrough on outer layers of dressing.  Mild sanguinous drainage h noted to the internal dressing.      VASCULAR: DP and PT pulses are faintly palpable +1 B/L.  Upon previous hand-held Doppler examination, DP and PT pulses are monophasic.  CFT is brisk to TMA stumps bilateral. Skin temperature is warm to warm from proximal to distal with mild focal calor to right forefoot consistent with postoperative state.  Mild nonpitting edema noted to right lower extremity consistent 
Podiatric Surgery Daily Progress Note  Serafin Weaver      Subjective :   Patient seen and examined this am at the bedside.Pt is s/p right foot I&D with partial 5th ray resection (DOS 07/17). Per patient he has not had any pain to his right foot since surgery last night. Patient denies any acute overnight events. Patient denies N/V/F/C/SOB.     Review of Systems: Pertinent positive and negative findings as documented in the HPI, otherwise all other systems were reviewed and were negative.        Objective     /61   Pulse 73   Temp 99 °F (37.2 °C) (Oral)   Resp 16   Ht 2.03 m (6' 7.92\")   Wt (!) 148.3 kg (327 lb)   SpO2 95%   BMI 35.99 kg/m²      I/O:  Intake/Output Summary (Last 24 hours) at 7/19/2024 0757  Last data filed at 7/19/2024 0616  Gross per 24 hour   Intake 410 ml   Output 10 ml   Net 400 ml              Wt Readings from Last 3 Encounters:   07/18/24 (!) 148.3 kg (327 lb)   05/14/24 (!) 142 kg (313 lb 0.9 oz)   04/11/24 (!) 142 kg (313 lb 0.9 oz)       LABS:    Recent Labs     07/17/24  2121 07/19/24  0406   WBC 17.7* 13.7*   HGB 10.3* 9.4*   HCT 31.4* 29.2*    204        Recent Labs     07/19/24  0406      K 4.6   CL 97*   CO2 24   BUN 63*   CREATININE 16.0*        Recent Labs     07/18/24  0130   INR 1.33*           LOWER EXTREMITY EXAMINATION    Dressing to Right LE intact. No drainage on outer layers of dressing. Sanguinous strikethrough noted to the internal dressing.     VASCULAR: DP and PT pulses are faintly palpable +1 B/L.  Upon previous hand-held Doppler examination, DP and PT pulses are monophasic. CFT is sluggish to the remaining digits on right foot and brisk to TMA stump on left. Skin temperature is warm to cool from proximal to distal with focal calor to right lateral forefoot.  Mild nonpitting edema noted to right lower extremity. No pain with calf compression b/l.     NEUROLOGIC: Gross and epicritic sensation is diminished b/l. Protective sensation is 
Podiatric Surgery Daily Progress Note  Serafin Weaver      Subjective :   Patient seen and examined this evening at the bedside. Pt is s/p right foot I&D with partial 5th ray resection (DOS 07/17).  Patient had angio and dialysis done 7/22/2024.  Patient not able to be awakened during today's visit despite multiple attempts.  Limited subjective information obtained. Will return later today in order to consent patient for current surgical plan with TMA w/UNRULY w/POA (mother) on the phone.     Review of Systems: Pertinent positive and negative findings as documented in the HPI, otherwise all other systems were reviewed and were negative.        Objective     /69   Pulse 78   Temp 98.2 °F (36.8 °C) (Oral)   Resp 18   Ht 2.03 m (6' 7.92\")   Wt (!) 156.3 kg (344 lb 9.3 oz)   SpO2 98%   BMI 37.93 kg/m²      I/O:  Intake/Output Summary (Last 24 hours) at 7/23/2024 0942  Last data filed at 7/23/2024 0000  Gross per 24 hour   Intake 360 ml   Output 0 ml   Net 360 ml                Wt Readings from Last 3 Encounters:   07/23/24 (!) 156.3 kg (344 lb 9.3 oz)   05/14/24 (!) 142 kg (313 lb 0.9 oz)   04/11/24 (!) 142 kg (313 lb 0.9 oz)       LABS:    Recent Labs     07/22/24  0715 07/23/24  0736   WBC 13.6* 13.9*   HGB 9.4* 8.2*   HCT 29.0* 25.5*    244        Recent Labs     07/23/24  0736   *   K 4.2   CL 97*   CO2 26   PHOS 4.2   BUN 38*   CREATININE 9.3*        Recent Labs     07/20/24  2300 07/21/24  0840 07/22/24  0715   INR  --   --  1.26*   APTT 148.9* 97.6*  --            LOWER EXTREMITY EXAMINATION    Dressing to Right LE intact.  Mild sanguinous drainage on outer layers of dressing. Sanguinous strikethrough noted to the internal dressing.     VASCULAR: DP and PT pulses are faintly palpable +1 B/L.  CFT is brisk to the remaining digits on right foot and brisk to TMA stump on left. Skin temperature is warm to warm from proximal to distal with focal calor to right lateral forefoot.  Mild 
Podiatric Surgery Daily Progress Note  Serafin Weaver      Subjective :   Patient seen and examined this evening at the bedside. Pt is s/p right foot I&D with partial 5th ray resection (DOS 07/17).  Patient had angio on dialysis done today.  Patient states he is very tired but overall feeling okay.  Patient denies pain to the right lower extremity.  Patient able to answer all questions.  Patient denies any acute overnight events. Patient denies N/V/F/C/SOB.     Review of Systems: Pertinent positive and negative findings as documented in the HPI, otherwise all other systems were reviewed and were negative.        Objective     BP 90/62   Pulse 77   Temp 98.4 °F (36.9 °C) (Oral)   Resp 20   Ht 2.03 m (6' 7.92\")   Wt (!) 155.2 kg (342 lb 2.5 oz)   SpO2 99%   BMI 37.66 kg/m²      I/O:  Intake/Output Summary (Last 24 hours) at 7/22/2024 1936  Last data filed at 7/22/2024 1901  Gross per 24 hour   Intake 360 ml   Output 5 ml   Net 355 ml                Wt Readings from Last 3 Encounters:   07/22/24 (!) 155.2 kg (342 lb 2.5 oz)   05/14/24 (!) 142 kg (313 lb 0.9 oz)   04/11/24 (!) 142 kg (313 lb 0.9 oz)       LABS:    Recent Labs     07/21/24  0840 07/22/24  0715   WBC 13.7* 13.6*   HGB 8.9* 9.4*   HCT 28.0* 29.0*    262        Recent Labs     07/22/24  0715      K 4.9   CL 99   CO2 23   BUN 57*   CREATININE 13.8*        Recent Labs     07/19/24  2127 07/20/24  0454 07/20/24  2300 07/21/24  0840 07/22/24  0715   INR 1.31*  --   --   --  1.26*   APTT 33.6   < > 148.9* 97.6*  --     < > = values in this interval not displayed.           LOWER EXTREMITY EXAMINATION    Dressing to Right LE intact. No drainage on outer layers of dressing. Sanguinous strikethrough noted to the internal dressing.     VASCULAR: DP and PT pulses are faintly palpable +1 B/L.  Upon previous hand-held Doppler examination, DP and PT pulses are monophasic. CFT is sluggish to the remaining digits on right foot and brisk to TMA 
Podiatric Surgery Plan of Care Note  Serafin Weaver  Attempted to see patient this a.m., patient already off floor.  Will return later to assess patient.      Andrei Rodas DPM   Podiatric Resident, PGY-1   [facility]   Pager Number: [phone]  7/26/2024   
Pre-Operative Note  Resident Note     Patient: Serafin Weaver     Procedure: Right foot incision and drainage with partial fifth ray resection    Clearance for surgery: Yes    Consent: Procedure was discussed at length with patient and all questions were answered to completion, consent obtained and placed in chart     Labs:        Recent Labs     07/17/24 2121   WBC 17.7*   HGB 10.3*   HCT 31.4*   MCV 97.3           Recent Labs     07/17/24 2121      K 4.4   CL 93*   CO2 26   BUN 53*   CREATININE 13.8*        Recent Labs     07/17/24 2121   AST 14*   ALT 14   BILITOT 0.6   ALKPHOS 85      No results for input(s): \"LIPASE\", \"AMYLASE\" in the last 72 hours.     Recent Labs     07/18/24  0130   INR 1.33*      No results for input(s): \"CKTOTAL\", \"CKMB\", \"CKMBINDEX\", \"TROPONINI\" in the last 72 hours.    Pre-op Medications:   Current Facility-Administered Medications   Medication Dose Route Frequency Provider Last Rate Last Admin    linezolid (ZYVOX) IVPB 600 mg  600 mg IntraVENous Q12H Johnna Castro PA   Stopped at 07/18/24 0313    sodium chloride flush 0.9 % injection 5-40 mL  5-40 mL IntraVENous 2 times per day Chucky Avina MD        sodium chloride flush 0.9 % injection 5-40 mL  5-40 mL IntraVENous PRN Chucky Avina MD        0.9 % sodium chloride infusion   IntraVENous PRN Chucky Avina MD        [Held by provider] heparin (porcine) injection 5,000 Units  5,000 Units SubCUTAneous 3 times per day Chucky Avina MD   5,000 Units at 07/18/24 0538    ondansetron (ZOFRAN-ODT) disintegrating tablet 4 mg  4 mg Oral Q8H PRN Chucky Avina MD        Or    ondansetron (ZOFRAN) injection 4 mg  4 mg IntraVENous Q6H PRN Chucky Avina MD        polyethylene glycol (GLYCOLAX) packet 17 g  17 g Oral Daily PRN Chucky Avina MD        amLODIPine (NORVASC) tablet 5 mg  5 mg Oral Daily Chucky Avina MD        atorvastatin (LIPITOR) tablet 40 mg  40 
Pre-Operative Note  Resident Note     Patient: eSrafin Weaver      Procedure: Right Foot TMA with UNRULY     Consent: In chart     Labs:        Recent Labs     07/23/24  0736 07/24/24  0525   WBC 13.9* 16.5*   HGB 8.2* 8.8*   HCT 25.5* 27.8*   MCV 96.9 96.7    285        Recent Labs     07/23/24  0736 07/24/24  0525   * 140   K 4.2 4.5   CL 97* 100   CO2 26 25   PHOS 4.2 5.5*   BUN 38* 47*   CREATININE 9.3* 10.8*      No results for input(s): \"AST\", \"ALT\", \"BILIDIR\", \"BILITOT\", \"ALKPHOS\" in the last 72 hours.    Invalid input(s): \"ALB\"   No results for input(s): \"LIPASE\", \"AMYLASE\" in the last 72 hours.     Recent Labs     07/21/24  0840 07/22/24  0715   INR  --  1.26*   APTT 97.6*  --       No results for input(s): \"CKTOTAL\", \"CKMB\", \"CKMBINDEX\", \"TROPONINI\" in the last 72 hours.    Saline lock/IV access: Yes    Clearance for surgery: Yes per medicine     Diet: NPO at Midnight     CXR: normal      EKG: See cardiology portion of EMR     Echocardiogram: not done    PT/INR: 16.0/1.26    Abx: IV Vanc & Meropenem     Type and Screen: A, Rh Positive, antibody negative      Known risk factors for perioperative complications: Diabetes mellitus      Anesthesia to see patient    Andrei Rodas DPM   Podiatric Resident, PGY-1   [employer]   Pager Number: [phone]  7/24/2024     
Pt arrived back to unit from dialysis.   
Pt complained of pain to right foot, bulky dressing C/D/I. Vital signs stable, afebrile. Medicate with Oxycodone 5 mg po as ordered. will continue to monitor alteration in comfort.  
Pt off to OR for rt foot incision and drainage.   
Pt off unit to OR.  
Pt off unit to dialysis   
Pt refused AM lab work  
Pt refused labs this morning. Tried to educate pt and still refused. Stated he is tired of being stuck with needles.   
Pt returned from HD at this time.  
Pt returned to room from PACU at this time. Pt is sleeping, but easy arouse. Will continue to monitor.  
Pt was hypotensive, 86/53. Sent perfect serve to provider and I was advised to hold Coreg and she ordered 2 1000 ml NS bolus. BP now 123/65. Plan of care ongoing.   
Pt with a temp of 100.9, PRN tylenol given.  
Pt. Lost IV access. Heparin has had to be paused. MD has been notified. No new orders at this time.   
Pt. Now has midline and his heparin and Abx has also been started. Pt. Had a fever and has been given tylenol. Will continue to monitor pt.   
Pt. Still has no line, MD has been notified with no new orders at this time. Picc team has been notified, but have not arrived yet.   
Pt. Was refusing angiogram after speaking with vascular surgery earlier this week. Pt. Has since spoke with mom, and is now in agreement with having procedure. Vascular has been notified and stated they will come back and talk to pt.   
Received pt as transferred-in. Alert oriented x 2-3. With O2 at 2L/ min. Orientation given. Vital signs checked and recorded. Tele attached.   
Report called to patients Ralf romano.     1315: Patient transferred to HD. Report given to ARSENIO Joe nurse.    1317:  Bedside report given to PCU nurseRalf.   
Returned to the unit from OR for foot I&D.dressing c/d/I. No pain or discomfort reported.  
[facility] -  Clinical Pharmacy Note    Vancomycin - Management by Pharmacy    Consult Date(s): 7/18/24  Consulting Provider(s): Dr Valencia    Assessment / Plan  1)  Rt foot gas gangrene - Vancomycin  Concurrent Antimicrobials: Meropenem  Day of Vanc Therapy:  day #6  Current Dosing Method: Intermittent Dosing by Levels  Therapeutic Goal: Trough ~ 15 mg/L  Current Dose / Plan:   Pt is ESRD on HD Mon-Wed-Fri.  Per nephro, plan to continue same schedule for now.  Received 1500mg with HD yesterday.  No HD planned today - no Vanc today.  Will check random level tomorrow AM prior to HD.  Attempting to clarify with ID - note indicates Vanc d/c'd, but order still on profile.  Will continue to monitor clinical condition and make adjustments to regimen as appropriate.    Please call with questions--  Thanks--  Mary Bray, PharmD, BCPS, BCGP  [id number] ([facility])   7/23/2024 12:00 PM      Interval update:  Podiatry planning Rt TMA, possibly tomorrow.    Subjective/Objective:   Serafin Weaver is a 44 y.o. male with a PMHx significant for ESRD on HD Mon-Wed-Fri, DM 2, depression, HTN, seizures, and CA who is admitted with Right foot gas gangrene.  Pt is s/p Rt foot I&D with partial 5th ray resection (done 7/18), and s/p RLE arteriogram with balloon angioplasty x2 (done 7/22).    Pharmacy is consulted to dose Vancomycin.    Ht Readings from Last 1 Encounters:   07/18/24 2.03 m (6' 7.92\")     Wt Readings from Last 1 Encounters:   07/23/24 (!) 156.3 kg (344 lb 9.3 oz)     Current & Prior Antimicrobial Regimen(s):  Zosyn (7/17-current)  Vancomycin - Pharmacy to dose  Intermittent dosing (7/18-current)    Date Vanc Level Vanc Dose   7/18  2500 mg   7/19 <4 mcg/mL 1500 mg   7/20 21.8 mcg/mL --   7/21  --   7/22 17.1 mg/L 1500 mg   7/23  --     Cultures & Sensitivities:    Date Site Micro Susceptibility / Result   7/18 Blood x2 No growth to date    7/18 Foot wound Mixed skin mary    Klebsiella oxytoca    B.fragilis 
[facility] - Clinical Pharmacy Note    Vancomycin - Management by Pharmacy    Consult Date: 07/17/2022  Consulting Provider: MADHURI Frank    A vancomycin loading dose of 2500 mg x1 (~ 16 mg/kg) has been ordered for the patient.     If vancomycin is desired to continue on admission, a new consult must be placed for pharmacy to continue dosing.     Thank you for consulting pharmacy,      Julee Moran, PharmD  [id number] (Main Pharmacy)     
[facility] Pharmacy Note    Patient is on Heparin high dose weight based infusion, which is being monitored & adjusted using aPTT as pt received an oral Factor-Xa inhibitor within 72 hrs of starting heparin infusion, which interacts with Anti-Xa monitoring of heparin.     It has now been 72 hours since heparin infusion was initiated, and the oral Factor-Xa inhibitor interaction with Anti-Xa levels is eliminated.  Heparin infusion will now be monitored & adjusted using Anti-Xa levels per the usual [facility] protocol.  Discussed with pt's RN, Dianaeta.    Anti-Xa algorithm:    AntiXa < 0.10 Units/mL Bolus = 80 units/kg  Increase infusion by 4 units/kg/hr     (Maximum bolus = 10,000 units)  0.1-0.29 Units/mL     Bolus = 40 units/kg Increase infusion by 2 units/kg/hr   (Maximum bolus = 5,000 units)  0.3-0.7  Units/mL  No bolus  No change  0.71-0.80  Units/mL    No bolus   Decrease infusion by 1 units/kg/hr  0.81-0.99 Units/mL    No bolus   Decrease infusion by 2 units/kg/hr  1.0 Units/mL or greater  Hold infusion 1 hour     Decrease infusion by 3 units/kg/hr    Hang infusion immediately after drawing initial labs.   Do NOT use ANY anti-Xa drawn prior to initiation of infusion for titration.     Anti Xa levels 6 hours after any rate change  When 2 successive Anti Xa's are at goal   monitor Anti Xa level at least daily    Please call with questions--  Thanks--  Mary Bray, SilasD, BCPS, BCGP  [id number] (\[facility]\"")   7/21/2024 1:05 PM      
[facility] -  Clinical Pharmacy Note    Vancomycin - Management by Pharmacy    Consult Date(s): 7/18/24  Consulting Provider(s): Dr. Valencia    Assessment / Plan  R foot gas gangrene, R DFI - Vancomycin  Concurrent Antimicrobials: Meropenem 500 mg IV q24h EI (Day #8)  Day of Vanc Therapy:  Day #10  Current Dosing Method: Intermittent Dosing by Levels  Therapeutic Goal: Trough ~ 15 mg/L  Current Dose / Plan:   Pt has known ESRD and is dependent on HD Mon-Wed-Fri.  Per nephro, plan to continue same schedule for now  Continue 1000 mg IV 3x/week after HD on Mon-Wed-Fri. Tentative plan for next dose s/p HD on Monday AM  Will not require any vancomycin over the weekend  As dose was decreased Wednesday, and level at end of week was <20, expect future pre-HD levels to remain <20 mg/L.  Will plan to check next random level prior to HD on Wednesday AM to ensure continued appropriate clearance between sessions  Will continue to monitor clinical condition and make adjustments to regimen as appropriate    Thank you for consulting pharmacy,    Nicole Katz, PharmD, [facility]  Clinical Pharmacy Specialist  Wireless: [phone]  7/27/2024 10:25 AM      Interval update:  No changes to vancomycin regimen at this time.     Subjective/Objective: Serafin Weaver is a 44 y.o. male with a PMHx significant for ESRD on HD Mon-Wed-Fri, DM 2, depression, HTN, seizures, and CA who is admitted with Right foot gas gangrene.  Pt is s/p Rt foot I&D with partial 5th ray resection (done 7/18), and s/p RLE arteriogram with balloon angioplasty x2 (done 7/22).  Pt iis now s/p Rt TMA with UNRULY (done 7/24).    Pharmacy is consulted to dose vancomycin.    Ht Readings from Last 1 Encounters:   07/18/24 2.03 m (6' 7.92\")     Wt Readings from Last 1 Encounters:   07/26/24 (!) 150.3 kg (331 lb 5.6 oz)     Current & Prior Antimicrobial Regimen(s):  Zosyn (7/17-7/20)  Meropenem (7/20-current)  Vancomycin - Pharmacy to dose  Intermittent dosing 
[facility] -  Clinical Pharmacy Note    Vancomycin - Management by Pharmacy    Consult Date(s): 7/18/24  Consulting Provider(s): Dr Valencia    Assessment / Plan  1)  Rt foot gas gangrene - Vancomycin  Concurrent Antimicrobials: Meropenem  Day of Vanc Therapy:  day #8  Current Dosing Method: Intermittent Dosing by Levels  Therapeutic Goal: Trough ~ 15 mg/L  Current Dose / Plan:   Pt is ESRD on HD Mon-Wed-Fri.  Per nephro, plan to continue same schedule for now.  Random level this AM = 19.9 mcg/mL.    Continue 1000mg IV 3x/week with / after HD on Mon-Wed-Fri - next dose with HD today.  Will not require any Vancomycin over the weekend.  As dose was decreased Wednesday, and level at end of week was <20, expect future pre-HD levels to remain < 20.  Will plan to check next random level prior to HD on Wednesday AM to ensure continued appropriate clearance between sessions.  Will continue to monitor clinical condition and make adjustments to regimen as appropriate.    Please call with questions--  Thanks--  Mary Bray, PharmD, BCPS, BCGP  [id number] ([facility])   7/26/2024 10:07 AM      Interval update:  Tolerating HD this AM.  Per ID, plan to continue Merrem / Vanc for a few days post-operatively.    Subjective/Objective:   Serafin Weaver is a 44 y.o. male with a PMHx significant for ESRD on HD Mon-Wed-Fri, DM 2, depression, HTN, seizures, and CA who is admitted with Right foot gas gangrene.  Pt is s/p Rt foot I&D with partial 5th ray resection (done 7/18), and s/p RLE arteriogram with balloon angioplasty x2 (done 7/22).  Pt iis now s/p Rt TMA with UNRULY (done 7/24).    Pharmacy is consulted to dose Vancomycin.    Ht Readings from Last 1 Encounters:   07/18/24 2.03 m (6' 7.92\")     Wt Readings from Last 1 Encounters:   07/26/24 (!) 139.8 kg (308 lb 3.3 oz)     Current & Prior Antimicrobial Regimen(s):  Zosyn (7/17-current)  Vancomycin - Pharmacy to dose  Intermittent dosing (7/18-current)    Date Vanc Level 
[facility] -  Clinical Pharmacy Note    Vancomycin - Management by Pharmacy    Consult Date(s): 7/18/24  Consulting Provider(s): Dr Valencia    Assessment / Plan  1)  Rt foot gas gangrene - Vancomycin  Concurrent Antimicrobials: Zosyn  Wound culture growing ESBL Kleb - recommend changing Zosyn to Meropenem.  Will discuss with Dr. Eden.  Day of Vanc Therapy:  day #3  Current Dosing Method: Intermittent Dosing by Levels  Therapeutic Goal: Trough ~ 15 mg/L  Current Dose / Plan:   Pt is ESRD on HD Mon-Wed-Fri.  Per nephro, plan to continue same schedule for now.  Received 1500mg IV x1 after HD last evening.  Random level this AM = 21.8 mcg/mL.  Since HD is not planned for today / tomorrow, expect levels will remain > 15 through the weekend.  Will plan to check next level prior to HD on Monday 7/22, unless HD plan changes.    Will continue to monitor clinical condition and make adjustments to regimen as appropriate.    Please call with questions--  Thanks--  Mary Bray, PharmD, BCPS, BCGP  [id number] ([facility])   7/20/2024 8:29 AM      Interval update:  Vascular surgery consulted due to limited intra-op bleeding - recommending intervention to improve perfusion to RLE, but pt is refusing.  Will need definitive podiatric procedure this admission.  Wound culture growing ESBL Klebsiella; surgical cultures still in process.    Subjective/Objective:   Serafin Weaver is a 44 y.o. male with a PMHx significant for ESRD on HD Mon-Wed-Fri, DM 2, depression, HTN, seizures, and CA who is admitted with Right foot gas gangrene.  Pt is s/p Rt foot I&D with partial 5th ray resection (done 7/18).    Pharmacy is consulted to dose Vancomycin.    Ht Readings from Last 1 Encounters:   07/18/24 2.03 m (6' 7.92\")     Wt Readings from Last 1 Encounters:   07/18/24 (!) 148.3 kg (327 lb)     Current & Prior Antimicrobial Regimen(s):  Zosyn (7/17-current)  Vancomycin - Pharmacy to dose  Intermittent dosing (7/18-current)    Date 
[facility] -  Clinical Pharmacy Note    Vancomycin - Management by Pharmacy    Consult Date(s): 7/18/24  Consulting Provider(s): Dr Valencia    Assessment / Plan  1)  Rt foot gas gangrene - Vancomycin  Concurrent Antimicrobials: Meropenem  Day of Vanc Therapy:  day #7  Current Dosing Method: Intermittent Dosing by Levels  Therapeutic Goal: Trough ~ 15 mg/L  Current Dose / Plan:   Pt is ESRD on HD Mon-Wed-Fri.  Per nephro, plan to continue same schedule for now.  Continue 1000mg IV 3x/week with / after HD on Mon-Wed-Fri - received dose after HD yesterday.  Will check next random level in AM on Friday 7/26 prior to HD.  Will continue to monitor clinical condition and make adjustments to regimen as appropriate.    Please call with questions--  Thanks--  Mary Bray, PharmD, BCPS, BCGP  [id number] ([facility])   7/25/2024 9:15 AM      Interval update:  Pt iis now s/p Rt TMA with UNRULY (done yesterday).  Per ID, plan to continue Merrem / Vanc for a few days post-operatively.    Subjective/Objective:   Serafin Weaver is a 44 y.o. male with a PMHx significant for ESRD on HD Mon-Wed-Fri, DM 2, depression, HTN, seizures, and CA who is admitted with Right foot gas gangrene.  Pt is s/p Rt foot I&D with partial 5th ray resection (done 7/18), and s/p RLE arteriogram with balloon angioplasty x2 (done 7/22).    Pharmacy is consulted to dose Vancomycin.    Ht Readings from Last 1 Encounters:   07/18/24 2.03 m (6' 7.92\")     Wt Readings from Last 1 Encounters:   07/25/24 (!) 161.1 kg (355 lb 2.6 oz)     Current & Prior Antimicrobial Regimen(s):  Zosyn (7/17-current)  Vancomycin - Pharmacy to dose  Intermittent dosing (7/18-current)    Date Vanc Level Vanc Dose   7/18  2500 mg   7/19 <4 mcg/mL 1500 mg   7/20 21.8 mcg/mL --   7/21  --   7/22 17.1 mcg/mL 1500 mg   7/23  --   7/24 20 mcg/mL 1000 mg   7/25  --     Cultures & Sensitivities:    Date Site Micro Susceptibility / Result   7/18 Blood x2 No growth to date    7/18 
[facility] - Clinical Pharmacy Note - Renal Dosing    Piperacillin/Tazobactam ordered for treatment of Osteomyelitis/ Diabetic foot infection. Per [facility] Renal Dose Adjustment Policy, Zosyn 3375 mg Q8H will be changed to 3375 mg Q12H.     Estimated Creatinine Clearance: Estimated Creatinine Clearance: 11 mL/min (A) (based on SCr of 13.8 mg/dL (HH)).  Dialysis Status, MICHELLE, CKD: ESRD, Dialysis  BMI: Body mass index is 35.99 kg/m².    Rationale for Adjustment: Agent is renally eliminated.    Pharmacy will continue to monitor renal function, cultures and sensitivities (where available) and adjust dose as necessary.      Please call with any questions.      Julee Moran, PharmD  [id number] (Main Pharmacy)      
[facility] -  Clinical Pharmacy Note    Vancomycin - Management by Pharmacy    Consult Date(s): 7/18/24  Consulting Provider(s): Dr Valencia    Assessment / Plan  1)  Rt foot gas gangrene - Vancomycin  Concurrent Antimicrobials: Meropenem  Day of Vanc Therapy:  day #4  Current Dosing Method: Intermittent Dosing by Levels  Therapeutic Goal: Trough ~ 15 mg/L  Current Dose / Plan:   Pt is ESRD on HD Mon-Wed-Fri.  Per nephro, plan to continue same schedule for now.  Random level yesterday = 21.8 mcg/mL.  Since HD is not planned for Saturday / Sunday, expect levels will remain > 15 through the weekend.  Will check next level prior to HD on Monday 7/22.  Will continue to monitor clinical condition and make adjustments to regimen as appropriate.    Please call with questions--  Thanks--  Mary Bray, PharmD, BCPS, BCGP  [id number] ([facility])   7/21/2024 1:09 PM      Interval update:  Vascular surgery consulted due to limited intra-op bleeding - recommending intervention to improve perfusion to RLE, but pt is refusing.  Will need definitive podiatric procedure this admission.  Wound culture growing ESBL Klebsiella and B.fragilis; surgical cultures growing ESBL Klebsiella - Zosyn changed to Meropenem yesterday.    Subjective/Objective:   Serafin Weaver is a 44 y.o. male with a PMHx significant for ESRD on HD Mon-Wed-Fri, DM 2, depression, HTN, seizures, and CA who is admitted with Right foot gas gangrene.  Pt is s/p Rt foot I&D with partial 5th ray resection (done 7/18).    Pharmacy is consulted to dose Vancomycin.    Ht Readings from Last 1 Encounters:   07/18/24 2.03 m (6' 7.92\")     Wt Readings from Last 1 Encounters:   07/18/24 (!) 148.3 kg (327 lb)     Current & Prior Antimicrobial Regimen(s):  Zosyn (7/17-current)  Vancomycin - Pharmacy to dose  Intermittent dosing (7/18-current)    Date Vanc Level Vanc Dose   7/18  2500 mg   7/19 <4 mcg/mL 1500 mg   7/20 21.8 mcg/mL --   7/21  --     Cultures & 
[facility] -  Clinical Pharmacy Note    Vancomycin - Management by Pharmacy    Consult Date(s): 7/18/24  Consulting Provider(s): Dr Valencia    Assessment / Plan  1)  Rt foot gas gangrene - Vancomycin  Concurrent Antimicrobials: Meropenem  Day of Vanc Therapy:  day #5  Current Dosing Method: Intermittent Dosing by Levels  Therapeutic Goal: Trough ~ 15 mg/L  Current Dose / Plan:   Pt is ESRD on HD Mon-Wed-Fri.  Per nephro, plan to continue same schedule for now.  Level today = 17.7 mg/L  Will redose vancomycin 1500mg IV x1 today. Confirmed with HD RN that pt is on dialysis schedule for this afternoon.  Plan to check next level prior to HD on Wed 7/24  Will continue to monitor clinical condition and make adjustments to regimen as appropriate.    Please call with questions--  Lisset Blanc, PharmD, [facility]  Wireless: [phone]   7/22/2024 12:04 PM      Interval update:  S/p RLE arteriorgram this AM. Podiatry planning for definitive podiatry surgery likely Tues or Wed.     Subjective/Objective:   Serafin Weaver is a 44 y.o. male with a PMHx significant for ESRD on HD Mon-Wed-Fri, DM 2, depression, HTN, seizures, and CA who is admitted with Right foot gas gangrene.  Pt is s/p Rt foot I&D with partial 5th ray resection (done 7/18).    Pharmacy is consulted to dose Vancomycin.    Ht Readings from Last 1 Encounters:   07/18/24 2.03 m (6' 7.92\")     Wt Readings from Last 1 Encounters:   07/18/24 (!) 148.3 kg (327 lb)     Current & Prior Antimicrobial Regimen(s):  Zosyn (7/17-current)  Vancomycin - Pharmacy to dose  Intermittent dosing (7/18-current)    Date Vanc Level Vanc Dose   7/18  2500 mg   7/19 <4 mcg/mL 1500 mg   7/20 21.8 mcg/mL --   7/21  --   7/22 17.1 mg/L      Cultures & Sensitivities:    Date Site Micro Susceptibility / Result   7/18 Blood x2 No growth to date    7/18 Foot wound Mixed skin mary    Klebsiella oxytoca    B.fragilis No further w/u    ESBL - carbapenems are HORTENCIA    Merrem covers   7/18 Surgical 
Treatment time: 3 hours 30 minutes  Net UF: 2000 ml     Pre weight: 158 kg  Post weight:155.2 kg    Access used: R AVG    Access function: Good with  ml/min     Medications or blood products given: Retacrit 35852 units and Vancomycin 1.5 g     Regular outpatient schedule: MWF     Summary of response to treatment: Patient tolerated treatment well. Bloodlines and dialyzer was changed due to clotting.     Copy of dialysis treatment record placed in chart, to be scanned into EMR.  
Treatment time: 3.5 hours     Net UF: 2 L     Pre weight: 156.5 kg  Post weight: 154.5 kg  EDW:     Access used: Left upper arm graft   Access function: tolerated 350 BFR, 15g     Medications or blood products given: retacrit 10,000u     Regular outpatient schedule: MWF     Summary of response to treatment: tolerated well     Copy of dialysis treatment record placed in chart, to be scanned into EMR.  
Treatment time: 3.5 hrs    Net UF: 2000 ml    Pre weight: 152.3 kg  Post weight: 150.3 kg  EDW:     Access used: Lt UE AVG  Access function: 350 BFR, venous bruit and thrill faint, large clot aspirated from venous site, high venous pressures on treatment. System clotted within 30 minutes and re-set back up. Dr. Hernandez aware. States pt will be referred to access center.    Medications or blood products given: Retacrit 10,000 units, Vancomycin 1,000 mg    Regular outpatient schedule: MWF    Summary of response to treatment: Pt tolerated well. Pt remained stable throughout entire treatment and upon exiting the hemodialysis suite. Report given to Mickey Lara RN.      Copy of dialysis treatment record placed in chart, to be scanned into EMR.  
Treatment time: 4 hours  Net UF: 1500 ml     Pre weight: 155 kg  Post weight:153.5 kg    Access used: Left upper arm graft   Access function: tolerated 350 BFR, 15g     Medications or blood products given: Retacrit 10K     Regular outpatient schedule: MWF     Summary of response to treatment: Patient tolerated treatment well and without any complications. Patient remained stable throughout entire treatment and upon the exiting the dialysis suite via transport.     Cpy of dialysis treatment record placed in chart, to be scanned into EMR.  
Unable to review home med list at this time with Patient or  SNF. Attempted to call SNF with no response. Pharmacy made aware.   
Vascular Surgery   Daily Progress Note  Patient: Serafin Weaver      CC: limited intra-op bleeding in the setting of PAD     SUBJECTIVE:   Resting comfortably. NAEO. No pain.     ROS:   A 14 point review of systems was conducted, significant findings as noted above. All other systems negative.    OBJECTIVE:    PHYSICAL EXAM:    Vitals:    07/23/24 0200 07/23/24 0351 07/23/24 0545 07/23/24 0625   BP:  138/63  130/69   Pulse: 70 79 70 78   Resp:  19  18   Temp:  98 °F (36.7 °C)  98.2 °F (36.8 °C)   TempSrc:  Oral  Oral   SpO2:  96%  98%   Weight:  (!) 156.3 kg (344 lb 9.3 oz)     Height:           General appearance: Alert, no acute distress  Eyes: No scleral icterus, EOM grossly intact  Neck: Trachea midline, no JVD  Chest/Lungs: Normal effort with no accessory muscle use on RA  Cardiovascular: RRR, well perfused  Abdomen: Soft, non-tender, non-distended, no rebound, guarding, or rigidity.  Skin: Warm and dry, no rashes  Extremities: RLE with Kerlix and ACE; Hx LLE TMA. No hematoma at L stick site.   Neuro: A&Ox3, no focal deficits, sensation intact         LABS:   Recent Labs     07/21/24  0840 07/22/24  0715   WBC 13.7* 13.6*   HGB 8.9* 9.4*   HCT 28.0* 29.0*   MCV 98.6 97.8    262          Recent Labs     07/21/24  0840 07/22/24  0715    139   K 4.4 4.9   CL 98* 99   CO2 24 23   BUN 50* 57*   CREATININE 11.8* 13.8*        No results for input(s): \"AST\", \"ALT\", \"BILIDIR\", \"BILITOT\", \"ALKPHOS\" in the last 72 hours.    Invalid input(s): \"ALB\"   No results for input(s): \"LIPASE\", \"AMYLASE\" in the last 72 hours.     Recent Labs     07/20/24  2300 07/21/24  0840 07/22/24  0715   INR  --   --  1.26*   APTT 148.9* 97.6*  --         No results for input(s): \"CKTOTAL\", \"CKMB\", \"CKMBINDEX\", \"TROPONINI\" in the last 72 hours.      ASSESSMENT & PLAN:   This is a 44 y.o. male with Hx of ESRD on HD MWF, DM2, CVA who presented with R foot pain and was found to have osteomyelitis to RLE 5th digit status post I&D 
Vascular Surgery   Daily Progress Note  Patient: Serafin Weaver      CC: limited intra-op bleeding in the setting of PAD     SUBJECTIVE:   Resting comfortably. No acute issues overnight. At baseline.     ROS:   A 14 point review of systems was conducted, significant findings as noted above. All other systems negative.    OBJECTIVE:    PHYSICAL EXAM:    Vitals:    07/21/24 0821 07/21/24 1359 07/21/24 2036 07/22/24 0024   BP: (!) 115/53 111/69 106/60 121/66   Pulse: 84 74 75 74   Resp: 16 16 16 16   Temp: (!) 100.8 °F (38.2 °C) 98.4 °F (36.9 °C) 98.3 °F (36.8 °C) (!) 100.9 °F (38.3 °C)   TempSrc: Oral Oral Oral Oral   SpO2: 98% 98% 96% 98%   Weight:       Height:           General appearance: Alert, no acute distress  Eyes: No scleral icterus, EOM grossly intact  Neck: Trachea midline, no JVD  Chest/Lungs: Normal effort with no accessory muscle use on 2L NC  Cardiovascular: RRR, well perfused  Abdomen: Soft, non-tender, non-distended, no rebound, guarding, or rigidity.  Skin: Warm and dry, no rashes  Extremities: RLE with Kerlix and ACE; Hx LLE TMA  Neuro: A&Ox3, no focal deficits, sensation intact     Pulses     F P PT DP   R + -/+ -/+ -/+   L + + -/+ -/+    +, -/+ ,-/-        LABS:   Recent Labs     07/20/24  0454 07/21/24  0840   WBC 12.9* 13.7*   HGB 9.2* 8.9*   HCT 28.1* 28.0*   MCV 97.2 98.6    227        Recent Labs     07/20/24  0454 07/21/24  0840    137   K 4.3 4.4   CL 98* 98*   CO2 27 24   BUN 37* 50*   CREATININE 10.3* 11.8*      No results for input(s): \"AST\", \"ALT\", \"BILIDIR\", \"BILITOT\", \"ALKPHOS\" in the last 72 hours.    Invalid input(s): \"ALB\"   No results for input(s): \"LIPASE\", \"AMYLASE\" in the last 72 hours.     Recent Labs     07/19/24  2127 07/20/24  0454 07/20/24  2300 07/21/24  0840   INR 1.31*  --   --   --    APTT 33.6   < > 148.9* 97.6*    < > = values in this interval not displayed.      No results for input(s): \"CKTOTAL\", \"CKMB\", \"CKMBINDEX\", \"TROPONINI\" in the last 72 
(normal) Doppler waveforms.   Distal Common Femoral Artery: Multiphasic (normal) Doppler waveforms.   Profunda Artery: Multiphasic (normal) Doppler waveforms.   Proximal Superficial Femoral Artery: Multiphasic (normal) Doppler waveforms.   Middle Superficial Femoral Artery: Multiphasic (normal) Doppler waveforms.   Distal Superficial Femoral Artery: Monophasic Doppler waveforms.   Proximal Popliteal Artery: Monophasic Doppler waveforms.   Distal Popliteal Artery: Monophasic Doppler waveforms.   Anterior Tibial Artery: Monophasic Doppler waveforms.   Tibial/Peroneal Trunk: Monophasic Doppler waveforms.   Posterior Tibial Artery: Monophasic Doppler waveforms.   Peroneal Artery: monophasic Doppler waveforms.     The ankle/brachial index is 0.53 (DP 58, PT 40mmHg).  Normal multiphasic flow in the common femoral artery indicates no significant aorto-iliac inflow disease.   Atherosclerotic plaque and velocities indicate >50% stenosis in the right distal superficial femoral artery (58.1 to 229 cm/s).  Calcific shadowing in the distal superficial femoral artery limits visibility, the percent stenosis may be underestimated,  Unable to obtain blood pressure on right arm due to fistula.   Left Lower Arterial     Proximal Common Femoral Artery: Multiphasic (normal) Doppler waveforms.   Distal Common Femoral Artery: Multiphasic (normal) Doppler waveforms.   Profunda Artery: Multiphasic (normal) Doppler waveforms. .   Proximal Superficial Femoral Artery: Multiphasic (normal) Doppler waveforms.   Middle Superficial Femoral Artery: Multiphasic (normal) Doppler waveforms.   Distal Superficial Femoral Artery: Multiphasic (normal) Doppler waveforms.   Proximal Popliteal Artery: Multiphasic (normal) Doppler waveforms.   Distal Popliteal Artery: Multiphasic (normal) Doppler waveforms.   Anterior Tibial Artery: Multiphasic (normal) Doppler waveforms.   Tibial/Peroneal Trunk: Multiphasic (normal) Doppler waveforms.   Posterior Tibial 
ESRD on HD, does not void  Wound: local care  Ambulation: OOB to chair, encourage ambulation  Prophylaxis: SCDs, continue ASA, start on Plavix 75mg daily  Patient able to be up 4 hours after surgery      Serafin Cardenas DO  PGY-1, General Surgery Resident  07/22/24 3:01 PM  [phone]    
Sensitivities:    Date Site Micro Susceptibility / Result   7/18 Blood x2 No growth to date    7/18 Foot wound Mixed skin mary    Klebsiella oxytoca    B.fragilis No further w/u    ESBL - carbapenems are HORTENCIA    Merrem covers   7/18 Surgical Joint fluid ESBL Klebsiella See above   7/18 Surgical Tissue ESBL Klebsiella See above     Recent Labs     07/22/24  0715 07/23/24  0736 07/24/24  0525   CREATININE 13.8* 9.3* 10.8*   BUN 57* 38* 47*   WBC 13.6* 13.9* 16.5*       CrCl is not estimated 2/2 ESRD on HD  
Sensitivities:    Date Site Micro Susceptibility / Result   7/18 Blood x2 No growth to date    7/18 Foot wound Mixed skin mary    Klebsiella oxytoca No further w/u    In process   7/18 Surgical Joint fluid In process    7/18 Surgical Tissue In process      Recent Labs     07/17/24 2121 07/19/24  0406   CREATININE 13.8* 16.0*   BUN 53* 63*   WBC 17.7* 13.7*       CrCl is not estimated 2/2 ESRD on HD  
am- 5 pm via Perfect [employer] or cell phone  Dr.Muhammad LILI Hernandez MD       HPI :     Serafin Weaver is a 44 y.o. male presented to   the hospital on 7/17/2024 with history of ESRD on hemodialysis MWF, history of MDRO, type 2 diabetes, history of CVA who presents with right foot pain and is found to have osteomyelitis of the fifth toe of the right foot.      PMH/PSH/SH/Family History:     Past Medical History:   Diagnosis Date    Amputation of third toe, left, traumatic (HCC)     Anesthesia complication     has history of being aggitated and \"fights\"    Blood circulation, collateral     Cellulitis     Depression     Diabetic polyneuropathy associated with type 2 diabetes mellitus (HCC) 2/6/2019    Hypertension     Morbid obesity due to excess calories (HCC)     MRSA infection 02/04/2019    foot wound    Seizures ([facility])     Type II or unspecified type diabetes mellitus without mention of complication, not stated as uncontrolled     Unspecified cerebral artery occlusion with cerebral infarction     pt states at 14 years of age          Medication:     Current Facility-Administered Medications: naloxone 0.4 mg in 10 mL sodium chloride syringe, , IntraVENous, PRN  sodium chloride flush 0.9 % injection 5-40 mL, 5-40 mL, IntraVENous, 2 times per day  sodium chloride flush 0.9 % injection 5-40 mL, 5-40 mL, IntraVENous, PRN  0.9 % sodium chloride infusion, , IntraVENous, PRN  vancomycin (VANCOCIN) 1,000 mg in sodium chloride 0.9 % 250 mL IVPB (Dhcc9Set), 1,000 mg, IntraVENous, Once per day on Mon Wed Fri  clopidogrel (PLAVIX) tablet 75 mg, 75 mg, Oral, Daily  midodrine (PROAMATINE) tablet 5 mg, 5 mg, Oral, TID WC  [COMPLETED] meropenem (MERREM) 1,000 mg in sodium chloride 0.9 % 100 mL IVPB (mini-bag), 1,000 mg, IntraVENous, Once **FOLLOWED BY** meropenem (MERREM) 500 mg in sodium chloride 0.9 % 100 mL IVPB (mini-bag), 500 mg, IntraVENous, Q24H  sodium chloride flush 0.9 % injection 5-40 mL, 5-40 mL, IntraVENous, PRN  0.9 % 
b/l.     DERMATOLOGIC:   Right lower extremity:     Full-thickness surgical ulceration to dorsal lateral forefoot. Wound base is granular. Positive probe ot bone with tracking dorsal and plantar int the compartment extending medially toward the 4th webspace. No fluctuance appreciated. No purulent drainage. Slight malodor, improving. Erythema to the level of the midfoot, significantly improved.      Left lower extremity:  Healed cicatrix to TMA stump otherwise is a closed soft tissue envelope with no ulcerations or acute signs of infection.     MUSCULOSKELETAL:  Pt able to move RLE in dorsiflexion, plantarflexion, inversion and eversion. Muscle strength not tested 2/2 open surgical site.  No pain on palpation right LE. Previous left TMA.      IMAGING:  B/L LE Art Dup (7/18):  Right Lower Arterial     Proximal Common Femoral Artery: Multiphasic (normal) Doppler waveforms.   Distal Common Femoral Artery: Multiphasic (normal) Doppler waveforms.   Profunda Artery: Multiphasic (normal) Doppler waveforms.   Proximal Superficial Femoral Artery: Multiphasic (normal) Doppler waveforms.   Middle Superficial Femoral Artery: Multiphasic (normal) Doppler waveforms.   Distal Superficial Femoral Artery: Monophasic Doppler waveforms.   Proximal Popliteal Artery: Monophasic Doppler waveforms.   Distal Popliteal Artery: Monophasic Doppler waveforms.   Anterior Tibial Artery: Monophasic Doppler waveforms.   Tibial/Peroneal Trunk: Monophasic Doppler waveforms.   Posterior Tibial Artery: Monophasic Doppler waveforms.   Peroneal Artery: monophasic Doppler waveforms.     The ankle/brachial index is 0.53 (DP 58, PT 40mmHg).  Normal multiphasic flow in the common femoral artery indicates no significant aorto-iliac inflow disease.   Atherosclerotic plaque and velocities indicate >50% stenosis in the right distal superficial femoral artery (58.1 to 229 cm/s).  Calcific shadowing in the distal superficial femoral artery limits visibility, the 
dosing (7/18-current)    Date Vanc Level Vanc Dose   7/18 -- 2500 mg   7/19 <4 mg/L 1500 mg   7/20 21.8 mg/L --   7/21 -- --   7/22 17.1 mg/L 1500 mg   7/23 -- --   7/24 20 mg/L 1000 mg   7/25 -- --   7/26 19.9 mg/L    7/27 -- --   7/28 -- --   7/29       Cultures & Sensitivities:  Date Site Micro Susceptibility / Result   7/18 Blood x2 No growth Final   7/18 Foot wound Mixed skin mary    Klebsiella oxytoca    B.fragilis No further w/u    ESBL - carbapenems are HORTENCIA    Merrem covers   7/18 Surgical Joint fluid ESBL Klebsiella    B. Fragilis    Peptoniphilus sp See above     7/18 Surgical Tissue ESBL Klebsiella    B. Fragilis    Peptoniphilus sp See above     Recent Labs     07/26/24  0810 07/28/24  0947   CREATININE 10.3* 10.7*   BUN 43* 44*   WBC 19.6* 16.7*       CrCl is not estimated 2/2 ESRD on HD  
   dextrose      sodium chloride 20 mL/hr at 07/20/24 1822       PRN Meds:  naloxone 0.4 mg in 10 mL sodium chloride syringe, sodium chloride flush, sodium chloride, sodium chloride flush, sodium chloride, acetaminophen, sodium chloride, glucose, dextrose bolus **OR** dextrose bolus, glucagon (rDNA), dextrose, sodium chloride flush, sodium chloride, ondansetron **OR** ondansetron, polyethylene glycol, oxyCODONE      Assessment:     CRF on HD, DM, CVA  Hx L TMA    R Diabetic foot infection    Gas gangrene    - R MTP joint infection with ST gas   - 7/18 I&D, R partial 5th ray resection, limited bleeding   - cult Klebsiella, anaerobes   - R TMA 7/24 (R Raúl), felt to be definitive    R LE PAD, s/p R SFA, popliteal a angioplasty 7/22    Fever - 7/26 unclear source, resolved   Leukocytosis     Plan:     Cont Vancomycin, Meropenem for few days after surg  Postop care  per Podiatry     Monitor for further fever    Discharge off antibiotics    Medical Decision Making:  The following items were considered in medical decision making:  Discussion of patient care with other providers  Reviewed clinical lab tests  Reviewed radiology tests  Reviewed other diagnostic tests/interventions  Microbiology cultures and other micro tests reviewed      Discussed with pt    Reddy Murillo MD    
  Distal Popliteal Artery: Monophasic Doppler waveforms.   Anterior Tibial Artery: Monophasic Doppler waveforms.   Tibial/Peroneal Trunk: Monophasic Doppler waveforms.   Posterior Tibial Artery: Monophasic Doppler waveforms.   Peroneal Artery: monophasic Doppler waveforms.     The ankle/brachial index is 0.53 (DP 58, PT 40mmHg).  Normal multiphasic flow in the common femoral artery indicates no significant aorto-iliac inflow disease.   Atherosclerotic plaque and velocities indicate >50% stenosis in the right distal superficial femoral artery (58.1 to 229 cm/s).  Calcific shadowing in the distal superficial femoral artery limits visibility, the percent stenosis may be underestimated,  Unable to obtain blood pressure on right arm due to fistula.   Left Lower Arterial     Proximal Common Femoral Artery: Multiphasic (normal) Doppler waveforms.   Distal Common Femoral Artery: Multiphasic (normal) Doppler waveforms.   Profunda Artery: Multiphasic (normal) Doppler waveforms. .   Proximal Superficial Femoral Artery: Multiphasic (normal) Doppler waveforms.   Middle Superficial Femoral Artery: Multiphasic (normal) Doppler waveforms.   Distal Superficial Femoral Artery: Multiphasic (normal) Doppler waveforms.   Proximal Popliteal Artery: Multiphasic (normal) Doppler waveforms.   Distal Popliteal Artery: Multiphasic (normal) Doppler waveforms.   Anterior Tibial Artery: Multiphasic (normal) Doppler waveforms.   Tibial/Peroneal Trunk: Multiphasic (normal) Doppler waveforms.   Posterior Tibial Artery: Multiphasic (normal) Doppler waveforms.   Peroneal Artery: Multiphasic (normal) Doppler waveforms.     The ankle/brachial index is 0.91 (DP 98, PT 100mmHg).  Normal multiphasic flow in the common femoral artery indicates no significant aorto-iliac inflow disease.   Multiphasic flow throughout the left lower extremity without any evidence of significant focal lesions or stenosis.      XR right foot (7/18):  Findings:  Patient is 
(rDNA), 1 mg, PRN  dextrose, , Continuous PRN  sodium chloride flush, 5-40 mL, PRN  sodium chloride, , PRN  ondansetron, 4 mg, Q8H PRN   Or  ondansetron, 4 mg, Q6H PRN  polyethylene glycol, 17 g, Daily PRN  oxyCODONE, 5 mg, Q4H PRN        Labs and Imaging   Invasive vascular procedure    Result Date: 7/22/2024  Summary: Access is in the middle of the left common femoral artery.  The left common femoral artery is widely patent, the left proximal SFA and profunda are widely patent. The aorta, bilateral renal arteries, bilateral common iliac arteries, bilateral internal and external iliac arteries are widely patent.  Patient has a steep angle to his aortic bifurcation.  The right common femoral artery, proximal SFA, and profunda femoris are widely patent.  The mid SFA is widely patent.  The distal SFA shows a severe, 99%, stenosis with multiple collaterals at this location.  The popliteal artery is widely patent.  The anterior posterior tibial arteries are widely patent to the foot.  The peroneal artery is patent proximally though occludes or ends in the mid third of the leg. No residual stenosis after completion angiogram.  There is small area dissection that was repaired with prolonged balloon inflation with no further dissection noted. Plan: Flat for 2 hours with left leg straight After 2 hours okay to sit up Ambulate in 4 hours ASA/Plavix in recovery    MRI FOOT RIGHT WO CONTRAST    Result Date: 7/22/2024  MRI right foot without contrast HISTORY: Pain. Infection. FINDINGS: Study is compromised by patient motion. Prior amputations of the first distal phalanx, second distal phalanges, as well as the fifth distal metatarsal and phalanges. Very slight marrow signal alteration is present within the fifth metatarsal stump. Diffuse atrophy within the intrinsic musculature of the foot.     Limited study due to patient motion. Slight marrow signal alteration within the fifth metatarsal stump. This is nonspecific. Early 
mL, IntraVENous, PRN  0.9 % sodium chloride infusion, , IntraVENous, PRN  acetaminophen (TYLENOL) tablet 650 mg, 650 mg, Oral, Q6H PRN  carvedilol (COREG) tablet 6.25 mg, 6.25 mg, Oral, BID  epoetin rae-epbx (RETACRIT) injection 10,000 Units, 10,000 Units, IntraVENous, Once per day on Mon Wed Fri  sodium chloride 0.9 % bolus 100 mL, 100 mL, IntraVENous, PRN  glucose chewable tablet 16 g, 4 tablet, Oral, PRN  dextrose bolus 10% 125 mL, 125 mL, IntraVENous, PRN **OR** dextrose bolus 10% 250 mL, 250 mL, IntraVENous, PRN  glucagon injection 1 mg, 1 mg, SubCUTAneous, PRN  dextrose 10 % infusion, , IntraVENous, Continuous PRN  sodium chloride flush 0.9 % injection 5-40 mL, 5-40 mL, IntraVENous, PRN  0.9 % sodium chloride infusion, , IntraVENous, PRN  ondansetron (ZOFRAN-ODT) disintegrating tablet 4 mg, 4 mg, Oral, Q8H PRN **OR** ondansetron (ZOFRAN) injection 4 mg, 4 mg, IntraVENous, Q6H PRN  polyethylene glycol (GLYCOLAX) packet 17 g, 17 g, Oral, Daily PRN  atorvastatin (LIPITOR) tablet 40 mg, 40 mg, Oral, Nightly  sevelamer (RENVELA) tablet 800 mg, 800 mg, Oral, TID WC  insulin lispro (HUMALOG,ADMELOG) injection vial 0-16 Units, 0-16 Units, SubCUTAneous, TID WC  insulin lispro (HUMALOG,ADMELOG) injection vial 0-4 Units, 0-4 Units, SubCUTAneous, Nightly  pregabalin (LYRICA) capsule 75 mg, 75 mg, Oral, Daily  oxyCODONE (ROXICODONE) immediate release tablet 5 mg, 5 mg, Oral, Q4H PRN  aspirin EC tablet 81 mg, 81 mg, Oral, Daily    Vitals :     Vitals:    07/27/24 2307   BP: 129/78   Pulse: 62   Resp: 18   Temp: 98.4 °F (36.9 °C)   SpO2: 95%          Physical Examination :     appearance: Alert, oriented  Respiratory: no distress on room air.   Cardiovascular:  Trace Edema   Abdomen: soft  Other relevant findings:      Labs :     CBC:   Recent Labs     07/26/24  0810   WBC 19.6*   HGB 7.9*   HCT 24.7*          BMP:    Recent Labs     07/26/24  0810      K 4.6   CL 98*   CO2 25   BUN 43*   CREATININE 10.3* 
ondansetron **OR** ondansetron, polyethylene glycol, oxyCODONE      Assessment:     CRF on HD, DM, CVA  Hx L TMA    R Diabetic foot infection    Gas gangrene    - R MTP joint infection with ST gas   - 7/18 I&D, R partial 5th ray resection, limited bleeding   - cult Klebsiella, anaerobes   - R TMA 7/24 (R Raúl), felt to be definitive    R LE PAD, s/p R SFA, popliteal a angioplasty 7/22    Fever - 7/26 unclear source, resolved   Leukocytosis     Plan:     Off antibiotics (Vancomycin, Meropenem discontinued 7/29)  Postop care and f/u per Podiatry   Discharge off antibiotics    Medical Decision Making:  The following items were considered in medical decision making:  Discussion of patient care with other providers  Reviewed clinical lab tests  Reviewed radiology tests  Reviewed other diagnostic tests/interventions  Microbiology cultures and other micro tests reviewed      Discussed with pt  Will sign off, call with further ID issues     Reddy Murillo MD    
times per day  sodium chloride flush 0.9 % injection 5-40 mL, 5-40 mL, IntraVENous, PRN  0.9 % sodium chloride infusion, , IntraVENous, PRN  acetaminophen (TYLENOL) tablet 650 mg, 650 mg, Oral, Q6H PRN  carvedilol (COREG) tablet 6.25 mg, 6.25 mg, Oral, BID  epoetin rae-epbx (RETACRIT) injection 10,000 Units, 10,000 Units, IntraVENous, Once per day on Mon Wed Fri  sodium chloride 0.9 % bolus 100 mL, 100 mL, IntraVENous, PRN  glucose chewable tablet 16 g, 4 tablet, Oral, PRN  dextrose bolus 10% 125 mL, 125 mL, IntraVENous, PRN **OR** dextrose bolus 10% 250 mL, 250 mL, IntraVENous, PRN  glucagon injection 1 mg, 1 mg, SubCUTAneous, PRN  dextrose 10 % infusion, , IntraVENous, Continuous PRN  sodium chloride flush 0.9 % injection 5-40 mL, 5-40 mL, IntraVENous, 2 times per day  sodium chloride flush 0.9 % injection 5-40 mL, 5-40 mL, IntraVENous, PRN  0.9 % sodium chloride infusion, , IntraVENous, PRN  ondansetron (ZOFRAN-ODT) disintegrating tablet 4 mg, 4 mg, Oral, Q8H PRN **OR** ondansetron (ZOFRAN) injection 4 mg, 4 mg, IntraVENous, Q6H PRN  polyethylene glycol (GLYCOLAX) packet 17 g, 17 g, Oral, Daily PRN  atorvastatin (LIPITOR) tablet 40 mg, 40 mg, Oral, Nightly  sevelamer (RENVELA) tablet 800 mg, 800 mg, Oral, TID WC  insulin lispro (HUMALOG,ADMELOG) injection vial 0-16 Units, 0-16 Units, SubCUTAneous, TID WC  insulin lispro (HUMALOG,ADMELOG) injection vial 0-4 Units, 0-4 Units, SubCUTAneous, Nightly  pregabalin (LYRICA) capsule 75 mg, 75 mg, Oral, Daily  oxyCODONE (ROXICODONE) immediate release tablet 5 mg, 5 mg, Oral, Q4H PRN  aspirin EC tablet 81 mg, 81 mg, Oral, Daily    Vitals :     Vitals:    07/26/24 0502   BP: (!) 154/81   Pulse: 72   Resp: 16   Temp: 98.7 °F (37.1 °C)   SpO2: 92%          Physical Examination :     appearance: Alert, oriented  Respiratory: no distress. Lungs clear.   Cardiovascular:  Trace Edema   Abdomen: -soft  Other relevant findings:      Labs :     CBC:   Recent Labs     07/24/24  0525 
04:48 PM    NITRU Negative 04/24/2023 04:48 PM    GLUCOSEU 250 04/24/2023 04:48 PM    KETUA 15 04/24/2023 04:48 PM    UROBILINOGEN 0.2 04/24/2023 04:48 PM    BILIRUBINUR Negative 04/24/2023 04:48 PM        ----------------------------------------------------------  Please call with questions at      24 Hrs Answering service [phone]  Perfect serve, or cell phone 7 am - 5pm  [facility]        [facility]  [id number]  [place] [address]  Office: [phone]  Fax: [phone]       
07/23/24  0736    139 134*   K 4.4 4.9 4.2   CL 98* 99 97*   CO2 24 23 26   BUN 50* 57* 38*   CREATININE 11.8* 13.8* 9.3*   GLUCOSE 111* 74 111*   PHOS  --   --  4.2       Lab Results   Component Value Date/Time    COLORU Yellow 04/24/2023 04:48 PM    NITRU Negative 04/24/2023 04:48 PM    GLUCOSEU 250 04/24/2023 04:48 PM    KETUA 15 04/24/2023 04:48 PM    UROBILINOGEN 0.2 04/24/2023 04:48 PM    BILIRUBINUR Negative 04/24/2023 04:48 PM        ----------------------------------------------------------  Please call with questions at      24 Hrs Answering service [phone]  Perfect serve, or cell phone 7 am - 5pm  [facility]        [facility]  [address]  [place] [address]  Office: [phone]  Fax: [phone]       
16.7*   HGB 7.9* 8.1*   HCT 24.7* 25.3*    306       BMP:    Recent Labs     07/26/24  0810 07/28/24  0947    139   K 4.6 4.8   CL 98* 98*   CO2 25 25   BUN 43* 44*   CREATININE 10.3* 10.7*   GLUCOSE 107* 90   PHOS 5.7* 6.0*       Lab Results   Component Value Date/Time    COLORU Yellow 04/24/2023 04:48 PM    NITRU Negative 04/24/2023 04:48 PM    GLUCOSEU 250 04/24/2023 04:48 PM    KETUA 15 04/24/2023 04:48 PM    UROBILINOGEN 0.2 04/24/2023 04:48 PM    BILIRUBINUR Negative 04/24/2023 04:48 PM        ----------------------------------------------------------  Please call with questions at      24 Hrs Answering service [phone]  Perfect serve, or cell phone 7 am - 5pm  [facility]        [facility]  [address]  [place] [address]  Office: [phone]  Fax: [phone]       
and DC instructions are up-to-date in EMR    Dispo: S/P right foot TMA with UNRULY (DOS 7/24/24). All imaging and labs reviewed. Continue IV ABX per ID: Vanc & meropenem.  NWB to RLE.  Patient stable for discharge pending medical clearance and final ID recs    Discussed assessment and plan with ALO MendozaP.M.    Gerald Nam, LUCILA  Podiatric Resident PGY-3  Pager [phone] or Perfect Serve   
04/24/2023 04:48 PM    LABCAST 0-1 Fine Gran. 05/05/2017 10:35 AM    WBCUA 6-9 04/24/2023 04:48 PM    RBCUA 3-4 04/24/2023 04:48 PM    TRICHOMONAS Present 05/13/2022 06:32 AM    BACTERIA Rare 04/24/2023 04:48 PM    CLARITYU Clear 04/24/2023 04:48 PM    LEUKOCYTESUR Negative 04/24/2023 04:48 PM    UROBILINOGEN 0.2 04/24/2023 04:48 PM    BILIRUBINUR Negative 04/24/2023 04:48 PM    BLOODU SMALL 04/24/2023 04:48 PM    GLUCOSEU 250 04/24/2023 04:48 PM    KETUA 15 04/24/2023 04:48 PM    AMORPHOUS 2+ 06/01/2014 09:56 PM     Urine Cultures:   Lab Results   Component Value Date/Time    LABURIN  05/13/2022 06:32 AM     25,000 CFU/ml  Susceptibility testing of penicillin and other beta lactams is  not necessary for beta hemolytic Streptococci since resistant  strains have not been identified. (CLSI M100)       Blood Cultures:   Lab Results   Component Value Date/Time    BC No Growth after 4 days of incubation. 07/21/2024 12:38 PM     Lab Results   Component Value Date/Time    BLOODCULT2 No Growth after 4 days of incubation. 07/21/2024 12:38 PM     Organism:   Lab Results   Component Value Date/Time    ORG Klebsiella oxytoca ESBL 07/18/2024 09:50 PM    ORG Peptoniphilus species 07/18/2024 09:50 PM    ORG Bacteroides fragilis 07/18/2024 09:50 PM         Electronically signed by Ramon Bhardwaj MD on 7/27/2024 at 10:31 AM

## 2024-07-30 NOTE — CARE COORDINATION
Case Management Assessment            Discharge Note                    Date / Time of Note: 7/30/2024 12:05 PM                  Discharge Note Completed by: Angela Mitchell RN    Patient Name: Serafin Weaver   YOB: 1980  Diagnosis: Acute osteomyelitis (HCC) [M86.10]  Osteomyelitis of fifth toe of right foot (HCC) [M86.9]   Date / Time: 7/17/2024  8:32 PM    Current PCP: Venita Kendrick MD  Clinic patient: No    Hospitalization in the last 30 days: No       Advance Directives:  Code Status: Full Code  [place] DNR form completed and on chart: Not Indicated    Financial:  Payor: T / Plan: [facility] KAREN / Product Type: *No Product type* /      Pharmacy:    [facility] ([place]), [place] - [address] - P [phone] - F [phone]  [address]  [place] ([place]) [place] [address]  Phone: [phone] Fax: [phone]    [facility]/pharmacy #6100 [place], [place] - [address] - P [phone] - F [phone]  [address]  [facility] [address]  Phone: [phone] Fax: [phone]    [facility]/pharmacy #6111 [place], [place] - [address] - P [phone] - F [phone]  [address] [facility] [address]  Phone: [phone] Fax: [phone]      Assistance purchasing medications?:    Assistance provided by Case Management: None at this time    Does patient want to participate in local refill/ meds to beds program?: No    Meds To Beds General Rules:  1. Can ONLY be done Monday- Friday between 8:30am-5pm  2. Prescription(s) must be in pharmacy by 3pm to be filled same day  3.Copy of patient's insurance/ prescription drug card and patient face sheet must be sent along with the prescription(s)  4. Cost of Rx cannot be added to hospital bill. If financial assistance is needed, please contact unit  or ;  or  CANNOT provide pharmacy voucher for patients co-pays  5. Patients can then pick

## 2024-07-30 NOTE — PLAN OF CARE
Podiatric Surgery Plan of Care Note  Serafin Weaver        Patient being prepared for arteriogram when attempted to see this morning.  Will see this afternoon on the floors after arteriogram and postoperative monitoring is complete.  Will likely plan for definitive podiatric surgical intervention tomorrow or Wednesday.    Discussed with Dr. Kailee Olsen DPM.   
  Problem: Discharge Planning  Goal: Discharge to home or other facility with appropriate resources  7/28/2024 1118 by Jenise Morales RN  Outcome: Progressing  Flowsheets (Taken 7/28/2024 1118)  Discharge to home or other facility with appropriate resources:   Identify barriers to discharge with patient and caregiver   Arrange for needed discharge resources and transportation as appropriate   Identify discharge learning needs (meds, wound care, etc)   Arrange for interpreters to assist at discharge as needed   Refer to discharge planning if patient needs post-hospital services based on physician order or complex needs related to functional status, cognitive ability or social support system  Note: Pt to d/c back to [facility].      Problem: Pain  Goal: Verbalizes/displays adequate comfort level or baseline comfort level  7/28/2024 1118 by Jenise Morales RN  Outcome: Progressing  Flowsheets (Taken 7/28/2024 1118)  Verbalizes/displays adequate comfort level or baseline comfort level:   Encourage patient to monitor pain and request assistance   Assess pain using appropriate pain scale   Administer analgesics based on type and severity of pain and evaluate response   Implement non-pharmacological measures as appropriate and evaluate response   Consider cultural and social influences on pain and pain management   Notify Licensed Independent Practitioner if interventions unsuccessful or patient reports new pain  Note: Pt states no pain throughout shift. Will continue to monitor     Problem: Skin/Tissue Integrity  Goal: Absence of new skin breakdown  Description: 1.  Monitor for areas of redness and/or skin breakdown  2.  Assess vascular access sites hourly  3.  Every 4-6 hours minimum:  Change oxygen saturation probe site  4.  Every 4-6 hours:  If on nasal continuous positive airway pressure, respiratory therapy assess nares and determine need for appliance change or resting period.  7/28/2024 1118 by Jenise Morales 
  Problem: Discharge Planning  Goal: Discharge to home or other facility with appropriate resources  Outcome: Completed     Problem: Pain  Goal: Verbalizes/displays adequate comfort level or baseline comfort level  Outcome: Completed     Problem: Skin/Tissue Integrity  Goal: Absence of new skin breakdown  Description: 1.  Monitor for areas of redness and/or skin breakdown  2.  Assess vascular access sites hourly  3.  Every 4-6 hours minimum:  Change oxygen saturation probe site  4.  Every 4-6 hours:  If on nasal continuous positive airway pressure, respiratory therapy assess nares and determine need for appliance change or resting period.  Outcome: Completed     Problem: Safety - Adult  Goal: Free from fall injury  Outcome: Completed     Problem: Chronic Conditions and Co-morbidities  Goal: Patient's chronic conditions and co-morbidity symptoms are monitored and maintained or improved  Outcome: Completed     Problem: Cardiovascular - Adult  Goal: Maintains optimal cardiac output and hemodynamic stability  Outcome: Completed  Goal: Absence of cardiac dysrhythmias or at baseline  Outcome: Completed     Problem: Nutrition Deficit:  Goal: Optimize nutritional status  Outcome: Completed     
  Problem: Discharge Planning  Goal: Discharge to home or other facility with appropriate resources  Outcome: Progressing     Problem: Pain  Goal: Verbalizes/displays adequate comfort level or baseline comfort level  7/23/2024 2329 by Brenda Freedman, RN  Outcome: Progressing     Problem: Skin/Tissue Integrity  Goal: Absence of new skin breakdown  Description: 1.  Monitor for areas of redness and/or skin breakdown  2.  Assess vascular access sites hourly  3.  Every 4-6 hours minimum:  Change oxygen saturation probe site  4.  Every 4-6 hours:  If on nasal continuous positive airway pressure, respiratory therapy assess nares and determine need for appliance change or resting period.  7/23/2024 2329 by Brenda Freedman, RN  Outcome: Progressing     
  Problem: Discharge Planning  Goal: Discharge to home or other facility with appropriate resources  Outcome: Progressing     Problem: Pain  Goal: Verbalizes/displays adequate comfort level or baseline comfort level  Outcome: Progressing     Problem: Skin/Tissue Integrity  Goal: Absence of new skin breakdown  Description: 1.  Monitor for areas of redness and/or skin breakdown  2.  Assess vascular access sites hourly  3.  Every 4-6 hours minimum:  Change oxygen saturation probe site  4.  Every 4-6 hours:  If on nasal continuous positive airway pressure, respiratory therapy assess nares and determine need for appliance change or resting period.  Outcome: Progressing     
  Problem: Discharge Planning  Goal: Discharge to home or other facility with appropriate resources  Outcome: Progressing     Problem: Pain  Goal: Verbalizes/displays adequate comfort level or baseline comfort level  Outcome: Progressing     Problem: Skin/Tissue Integrity  Goal: Absence of new skin breakdown  Description: 1.  Monitor for areas of redness and/or skin breakdown  2.  Assess vascular access sites hourly  3.  Every 4-6 hours minimum:  Change oxygen saturation probe site  4.  Every 4-6 hours:  If on nasal continuous positive airway pressure, respiratory therapy assess nares and determine need for appliance change or resting period.  Outcome: Progressing     
  Problem: Discharge Planning  Goal: Discharge to home or other facility with appropriate resources  Outcome: Progressing  Flowsheets (Taken 7/21/2024 0321)  Discharge to home or other facility with appropriate resources:   Identify barriers to discharge with patient and caregiver   Arrange for needed discharge resources and transportation as appropriate   Identify discharge learning needs (meds, wound care, etc)   Refer to discharge planning if patient needs post-hospital services based on physician order or complex needs related to functional status, cognitive ability or social support system     Problem: Pain  Goal: Verbalizes/displays adequate comfort level or baseline comfort level  Outcome: Progressing  Flowsheets (Taken 7/21/2024 0321)  Verbalizes/displays adequate comfort level or baseline comfort level:   Encourage patient to monitor pain and request assistance   Assess pain using appropriate pain scale   Administer analgesics based on type and severity of pain and evaluate response   Implement non-pharmacological measures as appropriate and evaluate response   Notify Licensed Independent Practitioner if interventions unsuccessful or patient reports new pain   Consider cultural and social influences on pain and pain management     Problem: Skin/Tissue Integrity  Goal: Absence of new skin breakdown  Description: 1.  Monitor for areas of redness and/or skin breakdown  2.  Assess vascular access sites hourly  3.  Every 4-6 hours minimum:  Change oxygen saturation probe site  4.  Every 4-6 hours:  If on nasal continuous positive airway pressure, respiratory therapy assess nares and determine need for appliance change or resting period.  Outcome: Progressing     Problem: Safety - Adult  Goal: Free from fall injury  Outcome: Progressing  Flowsheets (Taken 7/21/2024 0321)  Free From Fall Injury:   Instruct family/caregiver on patient safety   Based on caregiver fall risk screen, instruct family/caregiver to ask 
  Problem: Discharge Planning  Goal: Discharge to home or other facility with appropriate resources  Outcome: Progressing  Flowsheets (Taken 7/22/2024 0048)  Discharge to home or other facility with appropriate resources:   Identify barriers to discharge with patient and caregiver   Arrange for needed discharge resources and transportation as appropriate   Identify discharge learning needs (meds, wound care, etc)   Refer to discharge planning if patient needs post-hospital services based on physician order or complex needs related to functional status, cognitive ability or social support system     Problem: Pain  Goal: Verbalizes/displays adequate comfort level or baseline comfort level  Outcome: Progressing  Flowsheets (Taken 7/22/2024 0048)  Verbalizes/displays adequate comfort level or baseline comfort level:   Assess pain using appropriate pain scale   Encourage patient to monitor pain and request assistance   Administer analgesics based on type and severity of pain and evaluate response   Implement non-pharmacological measures as appropriate and evaluate response   Consider cultural and social influences on pain and pain management   Notify Licensed Independent Practitioner if interventions unsuccessful or patient reports new pain     Problem: Skin/Tissue Integrity  Goal: Absence of new skin breakdown  Description: 1.  Monitor for areas of redness and/or skin breakdown  2.  Assess vascular access sites hourly  3.  Every 4-6 hours minimum:  Change oxygen saturation probe site  4.  Every 4-6 hours:  If on nasal continuous positive airway pressure, respiratory therapy assess nares and determine need for appliance change or resting period.  Outcome: Progressing     Problem: Safety - Adult  Goal: Free from fall injury  Outcome: Progressing  Flowsheets (Taken 7/22/2024 0048)  Free From Fall Injury:   Instruct family/caregiver on patient safety   Based on caregiver fall risk screen, instruct family/caregiver to ask 
  Problem: Discharge Planning  Goal: Discharge to home or other facility with appropriate resources  Outcome: Progressing  Flowsheets (Taken 7/27/2024 1627)  Discharge to home or other facility with appropriate resources:   Identify barriers to discharge with patient and caregiver   Identify discharge learning needs (meds, wound care, etc)   Arrange for needed discharge resources and transportation as appropriate   Arrange for interpreters to assist at discharge as needed   Refer to discharge planning if patient needs post-hospital services based on physician order or complex needs related to functional status, cognitive ability or social support system     Problem: Pain  Goal: Verbalizes/displays adequate comfort level or baseline comfort level  Outcome: Progressing  Flowsheets (Taken 7/27/2024 1627)  Verbalizes/displays adequate comfort level or baseline comfort level:   Encourage patient to monitor pain and request assistance   Assess pain using appropriate pain scale   Administer analgesics based on type and severity of pain and evaluate response   Implement non-pharmacological measures as appropriate and evaluate response   Consider cultural and social influences on pain and pain management   Notify Licensed Independent Practitioner if interventions unsuccessful or patient reports new pain  Note: Pt states no pain throughout shift. Will continue to monitor. PRN oxycodone available per MAR for pain management     Problem: Skin/Tissue Integrity  Goal: Absence of new skin breakdown  Description: 1.  Monitor for areas of redness and/or skin breakdown  2.  Assess vascular access sites hourly  3.  Every 4-6 hours minimum:  Change oxygen saturation probe site  4.  Every 4-6 hours:  If on nasal continuous positive airway pressure, respiratory therapy assess nares and determine need for appliance change or resting period.  Outcome: Progressing  Note: Pt skin remains free further skin breakdown. Skin kept clean and dry. 
  Problem: Discharge Planning  Goal: Discharge to home or other facility with appropriate resources  Outcome: Progressing  Flowsheets (Taken 7/29/2024 2018)  Discharge to home or other facility with appropriate resources:   Identify barriers to discharge with patient and caregiver   Arrange for needed discharge resources and transportation as appropriate   Identify discharge learning needs (meds, wound care, etc)   Refer to discharge planning if patient needs post-hospital services based on physician order or complex needs related to functional status, cognitive ability or social support system     Problem: Pain  Goal: Verbalizes/displays adequate comfort level or baseline comfort level  Flowsheets (Taken 7/29/2024 2018)  Verbalizes/displays adequate comfort level or baseline comfort level:   Encourage patient to monitor pain and request assistance   Assess pain using appropriate pain scale   Administer analgesics based on type and severity of pain and evaluate response   Implement non-pharmacological measures as appropriate and evaluate response   Consider cultural and social influences on pain and pain management  Note: No c/o pain. Plan of care ongoing.     Problem: Skin/Tissue Integrity  Goal: Absence of new skin breakdown  Description: 1.  Monitor for areas of redness and/or skin breakdown  2.  Assess vascular access sites hourly  3.  Every 4-6 hours minimum:  Change oxygen saturation probe site  4.  Every 4-6 hours:  If on nasal continuous positive airway pressure, respiratory therapy assess nares and determine need for appliance change or resting period.  Outcome: Progressing     Problem: Safety - Adult  Goal: Free from fall injury  Outcome: Progressing  Flowsheets (Taken 7/29/2024 2018)  Free From Fall Injury:   Instruct family/caregiver on patient safety   Based on caregiver fall risk screen, instruct family/caregiver to ask for assistance with transferring infant if caregiver noted to have fall risk 
  Problem: Discharge Planning  Goal: Discharge to home or other facility with appropriate resources  Recent Flowsheet Documentation  Taken 7/18/2024 0531 by Amelia Reddy RN  Discharge to home or other facility with appropriate resources: Arrange for needed discharge resources and transportation as appropriate     Problem: Pain  Goal: Verbalizes/displays adequate comfort level or baseline comfort level  Outcome: Progressing  Flowsheets (Taken 7/18/2024 0310)  Verbalizes/displays adequate comfort level or baseline comfort level: Encourage patient to monitor pain and request assistance     Problem: Safety - Adult  Goal: Free from fall injury  Outcome: Progressing     Problem: Chronic Conditions and Co-morbidities  Goal: Patient's chronic conditions and co-morbidity symptoms are monitored and maintained or improved  Outcome: Progressing     
  Problem: Pain  Goal: Verbalizes/displays adequate comfort level or baseline comfort level  7/18/2024 1804 by Naida Bundy, RN  Outcome: Progressing  - pt able to verbalize pain on a scale of 0-10, Prn given pt satisified  Problem: Skin/Tissue Integrity  Goal: Absence of new skin breakdown  Description: 1.  Monitor for areas of redness and/or skin breakdown  2.  Assess vascular access sites hourly  3.  Every 4-6 hours minimum:  Change oxygen saturation probe site  4.  Every 4-6 hours:  If on nasal continuous positive airway pressure, respiratory therapy assess nares and determine need for appliance change or resting period.  Outcome: Progressing   - pt turned and repositioned every 2 hours, 0 new skin issues.  Problem: Safety - Adult  Goal: Free from fall injury  7/18/2024 1804 by Naida Bundy, RN  Outcome: Progressing   - bed in lowest position, wheels locked, bed alarm on, call light within reach, pt educated to use call light before self transferring.   
  Problem: Pain  Goal: Verbalizes/displays adequate comfort level or baseline comfort level  7/22/2024 2009 by Ralf Rabago RN  Outcome: Progressing  Flowsheets (Taken 7/22/2024 2009)  Verbalizes/displays adequate comfort level or baseline comfort level:   Encourage patient to monitor pain and request assistance   Assess pain using appropriate pain scale   Administer analgesics based on type and severity of pain and evaluate response  Note: Pt had no complaints of pain this shift.      Problem: Chronic Conditions and Co-morbidities  Goal: Patient's chronic conditions and co-morbidity symptoms are monitored and maintained or improved  Outcome: Progressing  Flowsheets (Taken 7/22/2024 2326)  Care Plan - Patient's Chronic Conditions and Co-Morbidity Symptoms are Monitored and Maintained or Improved:   Monitor and assess patient's chronic conditions and comorbid symptoms for stability, deterioration, or improvement   Collaborate with multidisciplinary team to address chronic and comorbid conditions and prevent exacerbation or deterioration   Update acute care plan with appropriate goals if chronic or comorbid symptoms are exacerbated and prevent overall improvement and discharge     Problem: Cardiovascular - Adult  Goal: Maintains optimal cardiac output and hemodynamic stability  7/22/2024 2009 by Ralf Rabago RN  Outcome: Progressing  Flowsheets (Taken 7/22/2024 2009)  Maintains optimal cardiac output and hemodynamic stability:   Monitor blood pressure and heart rate   Monitor urine output and notify Licensed Independent Practitioner for values outside of normal range   Assess for signs of decreased cardiac output  Note: Pt maintained stable vitals this shift with no complaints of chest pain, lightheadedness/dizziness, or shortness of breath/dyspnea.      
  Problem: Pain  Goal: Verbalizes/displays adequate comfort level or baseline comfort level  Outcome: Progressing   - Pt denies having any pain or discomfort this shift.  Problem: Skin/Tissue Integrity  Goal: Absence of new skin breakdown  Description: 1.  Monitor for areas of redness and/or skin breakdown  2.  Assess vascular access sites hourly  3.  Every 4-6 hours minimum:  Change oxygen saturation probe site  4.  Every 4-6 hours:  If on nasal continuous positive airway pressure, respiratory therapy assess nares and determine need for appliance change or resting period.  Outcome: Progressing     Problem: Safety - Adult  Goal: Free from fall injury  Outcome: Progressing     
  Problem: Pain  Goal: Verbalizes/displays adequate comfort level or baseline comfort level  Outcome: Progressing  Flowsheets (Taken 7/18/2024 2252 by Amelia Reddy RN)  Verbalizes/displays adequate comfort level or baseline comfort level: Encourage patient to monitor pain and request assistance   - Pt able to verbalize pain on a scale of 0-10, PRN medication given, pt satisfied.   Problem: Skin/Tissue Integrity  Goal: Absence of new skin breakdown  Description: 1.  Monitor for areas of redness and/or skin breakdown  2.  Assess vascular access sites hourly  3.  Every 4-6 hours minimum:  Change oxygen saturation probe site  4.  Every 4-6 hours:  If on nasal continuous positive airway pressure, respiratory therapy assess nares and determine need for appliance change or resting period.  Outcome: Progressing   _ pt repositioned every 2 hours. 0 new skin issues.  Problem: Safety - Adult  Goal: Free from fall injury  Outcome: Progressing   - bed in lowest position, wheels locked, bed alarm on, call light within reach, pt educated to use call light before self transferring.  
  Problem: Pain  Goal: Verbalizes/displays adequate comfort level or baseline comfort level  Outcome: Progressing  Flowsheets (Taken 7/22/2024 2009)  Verbalizes/displays adequate comfort level or baseline comfort level:   Encourage patient to monitor pain and request assistance   Assess pain using appropriate pain scale   Administer analgesics based on type and severity of pain and evaluate response  Note: Pt had no complaints of pain this shift.      Problem: Cardiovascular - Adult  Goal: Maintains optimal cardiac output and hemodynamic stability  Outcome: Progressing  Flowsheets (Taken 7/22/2024 2009)  Maintains optimal cardiac output and hemodynamic stability:   Monitor blood pressure and heart rate   Monitor urine output and notify Licensed Independent Practitioner for values outside of normal range   Assess for signs of decreased cardiac output  Note: Pt maintained stable vitals this shift with no complaints of chest pain, lightheadedness/dizziness, or shortness of breath/dyspnea.      
  Problem: Safety - Adult  Goal: Free from fall injury  7/28/2024 0050 by Renu Mackenzie, RN  Outcome: Progressing   Assist x 3 with gait belt and deisi steady  to [facility] [place] remains NWB to right foot. Pt very weak, offered very little assistance. Remains free from falls   
  Problem: Safety - Adult  Goal: Free from fall injury  Outcome: Progressing     Problem: Skin/Tissue Integrity  Goal: Absence of new skin breakdown  Description: 1.  Monitor for areas of redness and/or skin breakdown  2.  Assess vascular access sites hourly  3.  Every 4-6 hours minimum:  Change oxygen saturation probe site  4.  Every 4-6 hours:  If on nasal continuous positive airway pressure, respiratory therapy assess nares and determine need for appliance change or resting period.  Outcome: Progressing     
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 Repeat bedside I&D was then performed. Patient was having significant pain while performing procedure however declined local anesthetic or IV pain medication. See detailed description of procedure below. Repeat XR ordered.    Bedside I&D  Discussed bedside intervention with patient. All potential risks, benefits, and complications were discussed with the patient. The patient wished to proceed and informed verbal consent was obtained.   Utilizing a #15 blade, A full thickness incision was made over the 4th metatarsal phalangeal joint.  2 cc of purulence was expressed.  Various fascial planes were explored utilizing scissors and no further purulence was expressed. The wound was irrigated copiously with normal sterile saline. Upon completion of this hemostasis was achieved with direct pressure. Once hemostasis was achieved the wound was gently packed with Iodoform 1/4 inch iodoform packing material.  A soft sterile dressing was then applied consisting of betadine soaked gauze, dry sterile gauze, abd, kerlix, and ACE.  The patient tolerated the procedure well with vital signs remaining stable and vascular status intact to the right foot following the procedure.     Gerald Nam DPM  Podiatric Resident PGY-3  Pager [phone] or Perfect Serve   
Podiatric Surgery Plan of Care Note  Serafinazeb Weaver     Patient will undergo podiatric surgical intervention today listed as an add-on including right foot incision and drainage with partial fifth ray resection.    Discussed surgical intervention with patient at bedside. All potential risks, benefits, and complications were discussed with the patient prior to the scheduling of surgery. No guarantees or promises were made to what the outcomes will be. The patient wished to proceed with surgery, and informed written consent was obtained, signed, and placed on the patient's chart.    -Patient n.p.o.   -Anticoag's held   -Preop labs and imaging performed    Gerald Nam DPM  Podiatric Resident PGY-3  Pager [phone] or Perfect Serve   
Podiatric Surgery Plan of Care Note  Serafinazeb Weaver     Patient will undergo podiatric surgical intervention tomorrow morning at 730 with Dr. Kailee Olsen.  Surgery will include right foot transmetatarsal amputation with tendo Achilles lengthening.    Discussed surgical intervention with patient and patient's mother (POA) at bedside. All potential risks, benefits, and complications were discussed with the patient prior to the scheduling of surgery. No guarantees or promises were made to what the outcomes will be. The patient and patient's mother (POA) wished to proceed with surgery, and informed written consent was obtained, signed, and placed on the patient's chart.    -Medically cleared   -Patient n.p.o. at midnight   -Anticoag held   -EKG and chest x-ray performed   -PT/INR and type and screen performed    Gerald Nam DPM  Podiatric Resident PGY-3  Pager [phone] or Perfect Serve     
for assistance with transferring infant if caregiver noted to have fall risk factors     Problem: Chronic Conditions and Co-morbidities  Goal: Patient's chronic conditions and co-morbidity symptoms are monitored and maintained or improved  Outcome: Progressing  Flowsheets (Taken 7/20/2024 0430)  Care Plan - Patient's Chronic Conditions and Co-Morbidity Symptoms are Monitored and Maintained or Improved:   Monitor and assess patient's chronic conditions and comorbid symptoms for stability, deterioration, or improvement   Collaborate with multidisciplinary team to address chronic and comorbid conditions and prevent exacerbation or deterioration   Update acute care plan with appropriate goals if chronic or comorbid symptoms are exacerbated and prevent overall improvement and discharge

## 2024-07-30 NOTE — DISCHARGE SUMMARY
Continue taking this medication, and follow the directions you see here.            CONTINUE taking these medications      acetaminophen 650 MG extended release tablet  Commonly known as: TYLENOL     apixaban 2.5 MG Tabs tablet  Commonly known as: ELIQUIS     aspirin 81 MG EC tablet  Take 1 tablet by mouth daily     atorvastatin 80 MG tablet  Commonly known as: LIPITOR     Lokelma 10 g Pack oral suspension  Generic drug: sodium zirconium cyclosilicate     oxyCODONE 5 MG immediate release tablet  Commonly known as: ROXICODONE            STOP taking these medications      amLODIPine 10 MG tablet  Commonly known as: NORVASC     hydrALAZINE 50 MG tablet  Commonly known as: APRESOLINE     Lantus SoloStar 100 UNIT/ML injection pen  Generic drug: insulin glargine     ondansetron 4 MG tablet  Commonly known as: ZOFRAN     Paxlovid (300/100) 20 x 150 MG & 10 x 100MG Tbpk  Generic drug: nirmatrelvir/ritonavir 300/100               Where to Get Your Medications        These medications were sent to [id number] - [place], [place] - [address] - P [phone] - F [phone]  [address] [facility] [address]      Phone: [phone]   carvedilol 6.25 MG tablet  clopidogrel 75 MG tablet  sevelamer 800 MG tablet       You can get these medications from any pharmacy    Bring a paper prescription for each of these medications  pregabalin 75 MG capsule        Objective Findings at Discharge:   BP (!) 154/81   Pulse 67   Temp 97.9 °F (36.6 °C) (Oral)   Resp 16   Ht 2.03 m (6' 7.92\")   Wt (!) 155 kg (341 lb 11.4 oz)   SpO2 91%   BMI 37.61 kg/m²       Physical Exam:   General: NAD  Eyes: EOMI  ENT: neck supple  Cardiovascular: Regular rate.  Respiratory: Clear to auscultation  Gastrointestinal: Soft, non tender  Genitourinary: no suprapubic tenderness  Musculoskeletal: No edema  Skin: warm, dry  Neuro: Alert.  Psych: Mood appropriate.         Labs and Imaging   XR FOOT RIGHT (MIN 3

## 2024-07-31 LAB
ECHO BSA: 2.89 M2
POC ACT LR: 166 SEC
POC ACT LR: 174 SEC

## 2024-08-05 LAB
FUNGUS SPEC CULT: NORMAL
FUNGUS SPEC CULT: NORMAL
LOEFFLER MB STN SPEC: NORMAL
LOEFFLER MB STN SPEC: NORMAL

## 2024-08-06 LAB
ACID FAST STN SPEC QL: NORMAL
MYCOBACTERIUM SPEC CULT: NORMAL

## 2024-08-08 ENCOUNTER — APPOINTMENT (OUTPATIENT)
Dept: GENERAL RADIOLOGY | Age: 44
End: 2024-08-08
Payer: COMMERCIAL

## 2024-08-08 ENCOUNTER — HOSPITAL ENCOUNTER (EMERGENCY)
Age: 44
Discharge: OTHER INSTITUTION | End: 2024-08-08
Attending: EMERGENCY MEDICINE
Payer: COMMERCIAL

## 2024-08-08 ENCOUNTER — APPOINTMENT (OUTPATIENT)
Dept: VASCULAR LAB | Age: 44
End: 2024-08-08
Attending: EMERGENCY MEDICINE
Payer: COMMERCIAL

## 2024-08-08 ENCOUNTER — HOSPITAL ENCOUNTER (OUTPATIENT)
Dept: WOUND CARE | Age: 44
Discharge: HOME | End: 2024-08-08
Attending: PODIATRIST

## 2024-08-08 VITALS
TEMPERATURE: 98.3 F | DIASTOLIC BLOOD PRESSURE: 107 MMHG | BODY MASS INDEX: 35.54 KG/M2 | SYSTOLIC BLOOD PRESSURE: 146 MMHG | WEIGHT: 307.2 LBS | HEIGHT: 78 IN | RESPIRATION RATE: 19 BRPM | OXYGEN SATURATION: 94 % | HEART RATE: 74 BPM

## 2024-08-08 VITALS
SYSTOLIC BLOOD PRESSURE: 92 MMHG | RESPIRATION RATE: 18 BRPM | HEART RATE: 70 BPM | DIASTOLIC BLOOD PRESSURE: 67 MMHG | TEMPERATURE: 97.1 F

## 2024-08-08 DIAGNOSIS — M79.604 RIGHT LEG PAIN: Primary | ICD-10-CM

## 2024-08-08 LAB
ANION GAP SERPL CALCULATED.3IONS-SCNC: 13 MMOL/L (ref 3–16)
BASOPHILS # BLD: 0.1 K/UL (ref 0–0.2)
BASOPHILS NFR BLD: 0.6 %
BUN SERPL-MCNC: 24 MG/DL (ref 7–20)
CALCIUM SERPL-MCNC: 9 MG/DL (ref 8.3–10.6)
CHLORIDE SERPL-SCNC: 92 MMOL/L (ref 99–110)
CO2 SERPL-SCNC: 27 MMOL/L (ref 21–32)
CREAT SERPL-MCNC: 7.9 MG/DL (ref 0.9–1.3)
DEPRECATED RDW RBC AUTO: 14.9 % (ref 12.4–15.4)
EOSINOPHIL # BLD: 0.1 K/UL (ref 0–0.6)
EOSINOPHIL NFR BLD: 0.8 %
GFR SERPLBLD CREATININE-BSD FMLA CKD-EPI: 8 ML/MIN/{1.73_M2}
GLUCOSE SERPL-MCNC: 111 MG/DL (ref 70–99)
HCT VFR BLD AUTO: 24.9 % (ref 40.5–52.5)
HGB BLD-MCNC: 7.7 G/DL (ref 13.5–17.5)
INR PPP: 1.35 (ref 0.85–1.15)
LYMPHOCYTES # BLD: 1.7 K/UL (ref 1–5.1)
LYMPHOCYTES NFR BLD: 13 %
MCH RBC QN AUTO: 28.7 PG (ref 26–34)
MCHC RBC AUTO-ENTMCNC: 31 G/DL (ref 31–36)
MCV RBC AUTO: 92.8 FL (ref 80–100)
MONOCYTES # BLD: 1 K/UL (ref 0–1.3)
MONOCYTES NFR BLD: 7.4 %
NEUTROPHILS # BLD: 10.1 K/UL (ref 1.7–7.7)
NEUTROPHILS NFR BLD: 78.2 %
PLATELET # BLD AUTO: 284 K/UL (ref 135–450)
PMV BLD AUTO: 7.9 FL (ref 5–10.5)
POTASSIUM SERPL-SCNC: 3.9 MMOL/L (ref 3.5–5.1)
PROTHROMBIN TIME: 16.8 SEC (ref 11.9–14.9)
RBC # BLD AUTO: 2.68 M/UL (ref 4.2–5.9)
SODIUM SERPL-SCNC: 132 MMOL/L (ref 136–145)
WBC # BLD AUTO: 13 K/UL (ref 4–11)

## 2024-08-08 PROCEDURE — 80048 BASIC METABOLIC PNL TOTAL CA: CPT

## 2024-08-08 PROCEDURE — 6370000000 HC RX 637 (ALT 250 FOR IP): Performed by: EMERGENCY MEDICINE

## 2024-08-08 PROCEDURE — 85610 PROTHROMBIN TIME: CPT

## 2024-08-08 PROCEDURE — 73600 X-RAY EXAM OF ANKLE: CPT

## 2024-08-08 PROCEDURE — 99284 EMERGENCY DEPT VISIT MOD MDM: CPT

## 2024-08-08 PROCEDURE — 85025 COMPLETE CBC W/AUTO DIFF WBC: CPT

## 2024-08-08 PROCEDURE — 73630 X-RAY EXAM OF FOOT: CPT

## 2024-08-08 PROCEDURE — 36415 COLL VENOUS BLD VENIPUNCTURE: CPT

## 2024-08-08 PROCEDURE — 93971 EXTREMITY STUDY: CPT

## 2024-08-08 RX ORDER — OXYCODONE HYDROCHLORIDE 5 MG/1
5 TABLET ORAL ONCE
Status: COMPLETED | OUTPATIENT
Start: 2024-08-08 | End: 2024-08-08

## 2024-08-08 RX ADMIN — OXYCODONE HYDROCHLORIDE 5 MG: 5 TABLET ORAL at 16:36

## 2024-08-08 ASSESSMENT — PAIN DESCRIPTION - DESCRIPTORS
DESCRIPTORS: ACHING
DESCRIPTORS: SHARP

## 2024-08-08 ASSESSMENT — PAIN SCALES - GENERAL
PAINLEVEL_OUTOF10: 10
PAINLEVEL_OUTOF10: 10
PAINLEVEL_OUTOF10: 5

## 2024-08-08 ASSESSMENT — LIFESTYLE VARIABLES
HOW MANY STANDARD DRINKS CONTAINING ALCOHOL DO YOU HAVE ON A TYPICAL DAY: PATIENT DOES NOT DRINK
HOW OFTEN DO YOU HAVE A DRINK CONTAINING ALCOHOL: NEVER

## 2024-08-08 ASSESSMENT — PAIN DESCRIPTION - LOCATION
LOCATION: LEG
LOCATION: FOOT
LOCATION: FOOT

## 2024-08-08 ASSESSMENT — PAIN DESCRIPTION - ORIENTATION
ORIENTATION: RIGHT

## 2024-08-08 NOTE — ED PROVIDER NOTES
tablet by mouth 2 times daily    CLOPIDOGREL (PLAVIX) 75 MG TABLET    Take 1 tablet by mouth daily    OXYCODONE (ROXICODONE) 5 MG IMMEDIATE RELEASE TABLET    Take 1 tablet by mouth every 6 hours as needed for Pain. Max Daily Amount: 20 mg    PREGABALIN (LYRICA) 75 MG CAPSULE    Take 1 capsule by mouth daily for 30 days. Max Daily Amount: 75 mg    SEVELAMER (RENVELA) 800 MG TABLET    Take 2 tablets by mouth 3 times daily (with meals)    SODIUM ZIRCONIUM CYCLOSILICATE (LOKELMA) 10 G PACK ORAL SUSPENSION    Take 1 packet by mouth See Admin Instructions Every Tuesday, Thursday, Saturday       Allergies     He is allergic to hydrocodone.    Physical Exam     INITIAL VITALS: BP: (!) 112/54, Temp: 98.3 °F (36.8 °C), Pulse: 79, Respirations: 18, SpO2: 93 %   Physical Exam    General:  Well developed *** in no acute distress, able to converse in full sentences  HEENT:  Normocephalic, atraumatic, PERRL, extra-ocular movements intact, oropharynx benign  Neck:  Supple, trachea midline   pulmonary:   Clear to auscultation bilaterally, good air movement, no wheezes *** no increased work of breathing or accessory muscle use during respiration  Cardiac:  Regular rate and rhythm, no M/R/G  Abdomen:  Soft, non-tender, non-distended, no rebounding or guarding  Musculoskeletal:  2+ pulses, no edema or clubbing  Skin:  No concerning rashes or lesions, no cyanosis  Neuro: Alert and oriented X 4,able to move all four extremities with equal strength bilaterally, sensory exam grossly intact, cranial nerves II-XII intact  Psych:  Appropriate mood and affect

## 2024-08-11 LAB — ECHO BSA: 2.8 M2

## 2024-08-11 PROCEDURE — 93971 EXTREMITY STUDY: CPT | Performed by: SURGERY

## 2024-08-12 ENCOUNTER — TELEPHONE (OUTPATIENT)
Dept: WOUND CARE | Age: 44
End: 2024-08-12

## 2024-08-12 NOTE — TELEPHONE ENCOUNTER
Call returned call to Maame GARCIA with [facility] [phone]; she is requesting orders for woundcare. Advised her to call Dr Kailee Olsen's office as he has not been evaluated in the outpatient woundcare setting. Voices understanding.

## 2024-08-13 LAB
ACID FAST STN SPEC QL: NORMAL
EKG ATRIAL RATE: 80 BPM
EKG DIAGNOSIS: NORMAL
EKG P AXIS: 51 DEGREES
EKG P-R INTERVAL: 166 MS
EKG Q-T INTERVAL: 358 MS
EKG QRS DURATION: 90 MS
EKG QTC CALCULATION (BAZETT): 412 MS
EKG R AXIS: 14 DEGREES
EKG T AXIS: 37 DEGREES
EKG VENTRICULAR RATE: 80 BPM
MYCOBACTERIUM SPEC CULT: NORMAL

## 2024-08-15 ENCOUNTER — APPOINTMENT (OUTPATIENT)
Dept: GENERAL RADIOLOGY | Age: 44
End: 2024-08-15
Payer: COMMERCIAL

## 2024-08-15 ENCOUNTER — HOSPITAL ENCOUNTER (INPATIENT)
Age: 44
LOS: 14 days | Discharge: NURSING, REHAB OR LONG-TERM CARE | End: 2024-08-29
Attending: STUDENT IN AN ORGANIZED HEALTH CARE EDUCATION/TRAINING PROGRAM | Admitting: INTERNAL MEDICINE
Payer: COMMERCIAL

## 2024-08-15 ENCOUNTER — APPOINTMENT (OUTPATIENT)
Dept: CT IMAGING | Age: 44
End: 2024-08-15
Payer: COMMERCIAL

## 2024-08-15 DIAGNOSIS — N18.9 ANEMIA DUE TO CHRONIC KIDNEY DISEASE, UNSPECIFIED CKD STAGE: ICD-10-CM

## 2024-08-15 DIAGNOSIS — D63.1 ANEMIA DUE TO CHRONIC KIDNEY DISEASE, UNSPECIFIED CKD STAGE: ICD-10-CM

## 2024-08-15 DIAGNOSIS — M86.9 OSTEOMYELITIS OF RIGHT FOOT, UNSPECIFIED TYPE (HCC): ICD-10-CM

## 2024-08-15 DIAGNOSIS — L08.9 DIABETIC FOOT INFECTION (HCC): ICD-10-CM

## 2024-08-15 DIAGNOSIS — L97.519 ULCER OF RIGHT FOOT, UNSPECIFIED ULCER STAGE (HCC): ICD-10-CM

## 2024-08-15 DIAGNOSIS — E11.628 DIABETIC FOOT INFECTION (HCC): ICD-10-CM

## 2024-08-15 DIAGNOSIS — A41.9 SEPTICEMIA (HCC): ICD-10-CM

## 2024-08-15 DIAGNOSIS — A41.9 SEPSIS, DUE TO UNSPECIFIED ORGANISM, UNSPECIFIED WHETHER ACUTE ORGAN DYSFUNCTION PRESENT (HCC): ICD-10-CM

## 2024-08-15 DIAGNOSIS — L97.514 FOOT ULCERATION, RIGHT, WITH NECROSIS OF BONE (HCC): ICD-10-CM

## 2024-08-15 DIAGNOSIS — M86.10 ACUTE OSTEOMYELITIS (HCC): ICD-10-CM

## 2024-08-15 DIAGNOSIS — R74.01 TRANSAMINITIS: ICD-10-CM

## 2024-08-15 DIAGNOSIS — N18.6 ESRD (END STAGE RENAL DISEASE) (HCC): Primary | ICD-10-CM

## 2024-08-15 LAB
ABO + RH BLD: NORMAL
ALBUMIN SERPL-MCNC: 2.9 G/DL (ref 3.4–5)
ALBUMIN/GLOB SERPL: 0.6 {RATIO} (ref 1.1–2.2)
ALP SERPL-CCNC: 136 U/L (ref 40–129)
ALT SERPL-CCNC: 281 U/L (ref 10–40)
AMMONIA PLAS-SCNC: 18 UMOL/L (ref 16–60)
ANION GAP SERPL CALCULATED.3IONS-SCNC: 15 MMOL/L (ref 3–16)
AST SERPL-CCNC: 472 U/L (ref 15–37)
BASE EXCESS BLDV CALC-SCNC: 0.6 MMOL/L (ref -2–3)
BASOPHILS # BLD: 0.1 K/UL (ref 0–0.2)
BASOPHILS NFR BLD: 0.2 %
BILIRUB SERPL-MCNC: 0.6 MG/DL (ref 0–1)
BLD GP AB SCN SERPL QL: NORMAL
BLOOD BANK DISPENSE STATUS: NORMAL
BLOOD BANK DISPENSE STATUS: NORMAL
BLOOD BANK PRODUCT CODE: NORMAL
BLOOD BANK PRODUCT CODE: NORMAL
BPU ID: NORMAL
BPU ID: NORMAL
BUN SERPL-MCNC: 68 MG/DL (ref 7–20)
CALCIUM SERPL-MCNC: 9.4 MG/DL (ref 8.3–10.6)
CHLORIDE SERPL-SCNC: 92 MMOL/L (ref 99–110)
CO2 BLDV-SCNC: 28 MMOL/L
CO2 SERPL-SCNC: 26 MMOL/L (ref 21–32)
COHGB MFR BLDV: 1.6 % (ref 0–1.5)
CREAT SERPL-MCNC: 14.5 MG/DL (ref 0.9–1.3)
DEPRECATED RDW RBC AUTO: 15.5 % (ref 12.4–15.4)
DESCRIPTION BLOOD BANK: NORMAL
DESCRIPTION BLOOD BANK: NORMAL
DO-HGB MFR BLDV: 19 %
EKG ATRIAL RATE: 73 BPM
EKG DIAGNOSIS: NORMAL
EKG P AXIS: 39 DEGREES
EKG P-R INTERVAL: 174 MS
EKG Q-T INTERVAL: 386 MS
EKG QRS DURATION: 92 MS
EKG QTC CALCULATION (BAZETT): 425 MS
EKG R AXIS: 61 DEGREES
EKG T AXIS: 10 DEGREES
EKG VENTRICULAR RATE: 73 BPM
EOSINOPHIL # BLD: 0.1 K/UL (ref 0–0.6)
EOSINOPHIL NFR BLD: 0.2 %
FLUAV RNA RESP QL NAA+PROBE: NOT DETECTED
FLUBV RNA RESP QL NAA+PROBE: NOT DETECTED
GFR SERPLBLD CREATININE-BSD FMLA CKD-EPI: 4 ML/MIN/{1.73_M2}
GLUCOSE BLD-MCNC: 234 MG/DL (ref 70–99)
GLUCOSE BLD-MCNC: 253 MG/DL (ref 70–99)
GLUCOSE SERPL-MCNC: 247 MG/DL (ref 70–99)
HCO3 BLDV-SCNC: 26.1 MMOL/L (ref 24–28)
HCT VFR BLD AUTO: 22 % (ref 40.5–52.5)
HGB BLD-MCNC: 6.9 G/DL (ref 13.5–17.5)
INR PPP: 1.42 (ref 0.85–1.15)
LACTATE BLDV-SCNC: 1.1 MMOL/L (ref 0.4–2)
LYMPHOCYTES # BLD: 1.3 K/UL (ref 1–5.1)
LYMPHOCYTES NFR BLD: 6.1 %
MCH RBC QN AUTO: 28.7 PG (ref 26–34)
MCHC RBC AUTO-ENTMCNC: 31.2 G/DL (ref 31–36)
MCV RBC AUTO: 92.1 FL (ref 80–100)
METHGB MFR BLDV: 0.5 % (ref 0–1.5)
MONOCYTES # BLD: 1.3 K/UL (ref 0–1.3)
MONOCYTES NFR BLD: 6.2 %
NEUTROPHILS # BLD: 18.9 K/UL (ref 1.7–7.7)
NEUTROPHILS NFR BLD: 87.3 %
PCO2 BLDV: 45.5 MMHG (ref 41–51)
PERFORMED ON: ABNORMAL
PERFORMED ON: ABNORMAL
PH BLDV: 7.37 [PH] (ref 7.35–7.45)
PHOSPHATE SERPL-MCNC: 4.5 MG/DL (ref 2.5–4.9)
PLATELET # BLD AUTO: 243 K/UL (ref 135–450)
PMV BLD AUTO: 8.2 FL (ref 5–10.5)
PO2 BLDV: 47.8 MMHG (ref 25–40)
POTASSIUM SERPL-SCNC: 5.6 MMOL/L (ref 3.5–5.1)
PROT SERPL-MCNC: 7.9 G/DL (ref 6.4–8.2)
PROTHROMBIN TIME: 17.6 SEC (ref 11.9–14.9)
RBC # BLD AUTO: 2.39 M/UL (ref 4.2–5.9)
SAO2 % BLDV: 81 %
SARS-COV-2 RNA RESP QL NAA+PROBE: NOT DETECTED
SODIUM SERPL-SCNC: 133 MMOL/L (ref 136–145)
TROPONIN, HIGH SENSITIVITY: 149 NG/L (ref 0–22)
TROPONIN, HIGH SENSITIVITY: 150 NG/L (ref 0–22)
WBC # BLD AUTO: 21.6 K/UL (ref 4–11)

## 2024-08-15 PROCEDURE — 86923 COMPATIBILITY TEST ELECTRIC: CPT

## 2024-08-15 PROCEDURE — 6370000000 HC RX 637 (ALT 250 FOR IP): Performed by: STUDENT IN AN ORGANIZED HEALTH CARE EDUCATION/TRAINING PROGRAM

## 2024-08-15 PROCEDURE — 2060000000 HC ICU INTERMEDIATE R&B

## 2024-08-15 PROCEDURE — 87040 BLOOD CULTURE FOR BACTERIA: CPT

## 2024-08-15 PROCEDURE — 96374 THER/PROPH/DIAG INJ IV PUSH: CPT

## 2024-08-15 PROCEDURE — 86850 RBC ANTIBODY SCREEN: CPT

## 2024-08-15 PROCEDURE — 84100 ASSAY OF PHOSPHORUS: CPT

## 2024-08-15 PROCEDURE — 86900 BLOOD TYPING SEROLOGIC ABO: CPT

## 2024-08-15 PROCEDURE — 2580000003 HC RX 258: Performed by: STUDENT IN AN ORGANIZED HEALTH CARE EDUCATION/TRAINING PROGRAM

## 2024-08-15 PROCEDURE — 99285 EMERGENCY DEPT VISIT HI MDM: CPT

## 2024-08-15 PROCEDURE — 36430 TRANSFUSION BLD/BLD COMPNT: CPT

## 2024-08-15 PROCEDURE — P9016 RBC LEUKOCYTES REDUCED: HCPCS

## 2024-08-15 PROCEDURE — 85610 PROTHROMBIN TIME: CPT

## 2024-08-15 PROCEDURE — 82140 ASSAY OF AMMONIA: CPT

## 2024-08-15 PROCEDURE — 2500000003 HC RX 250 WO HCPCS: Performed by: STUDENT IN AN ORGANIZED HEALTH CARE EDUCATION/TRAINING PROGRAM

## 2024-08-15 PROCEDURE — 93005 ELECTROCARDIOGRAM TRACING: CPT | Performed by: STUDENT IN AN ORGANIZED HEALTH CARE EDUCATION/TRAINING PROGRAM

## 2024-08-15 PROCEDURE — 84484 ASSAY OF TROPONIN QUANT: CPT

## 2024-08-15 PROCEDURE — 6360000002 HC RX W HCPCS: Performed by: STUDENT IN AN ORGANIZED HEALTH CARE EDUCATION/TRAINING PROGRAM

## 2024-08-15 PROCEDURE — 85025 COMPLETE CBC W/AUTO DIFF WBC: CPT

## 2024-08-15 PROCEDURE — 96375 TX/PRO/DX INJ NEW DRUG ADDON: CPT

## 2024-08-15 PROCEDURE — 70450 CT HEAD/BRAIN W/O DYE: CPT

## 2024-08-15 PROCEDURE — 80053 COMPREHEN METABOLIC PANEL: CPT

## 2024-08-15 PROCEDURE — 82803 BLOOD GASES ANY COMBINATION: CPT

## 2024-08-15 PROCEDURE — 71045 X-RAY EXAM CHEST 1 VIEW: CPT

## 2024-08-15 PROCEDURE — 86901 BLOOD TYPING SEROLOGIC RH(D): CPT

## 2024-08-15 PROCEDURE — 73630 X-RAY EXAM OF FOOT: CPT

## 2024-08-15 PROCEDURE — 36415 COLL VENOUS BLD VENIPUNCTURE: CPT

## 2024-08-15 PROCEDURE — 30233N1 TRANSFUSION OF NONAUTOLOGOUS RED BLOOD CELLS INTO PERIPHERAL VEIN, PERCUTANEOUS APPROACH: ICD-10-PCS | Performed by: HOSPITALIST

## 2024-08-15 PROCEDURE — 87636 SARSCOV2 & INF A&B AMP PRB: CPT

## 2024-08-15 PROCEDURE — 83605 ASSAY OF LACTIC ACID: CPT

## 2024-08-15 RX ORDER — FLUOXETINE 10 MG/1
10 CAPSULE ORAL DAILY
Status: DISCONTINUED | OUTPATIENT
Start: 2024-08-16 | End: 2024-08-29 | Stop reason: HOSPADM

## 2024-08-15 RX ORDER — SEVELAMER CARBONATE 800 MG/1
1600 TABLET, FILM COATED ORAL
Status: DISCONTINUED | OUTPATIENT
Start: 2024-08-16 | End: 2024-08-29 | Stop reason: HOSPADM

## 2024-08-15 RX ORDER — ATORVASTATIN CALCIUM 80 MG/1
80 TABLET, FILM COATED ORAL
Status: DISCONTINUED | OUTPATIENT
Start: 2024-08-16 | End: 2024-08-29 | Stop reason: HOSPADM

## 2024-08-15 RX ORDER — SODIUM CHLORIDE 0.9 % (FLUSH) 0.9 %
5-40 SYRINGE (ML) INJECTION PRN
Status: DISCONTINUED | OUTPATIENT
Start: 2024-08-15 | End: 2024-08-29 | Stop reason: HOSPADM

## 2024-08-15 RX ORDER — VANCOMYCIN HYDROCHLORIDE 125 MG/1
125 CAPSULE ORAL 4 TIMES DAILY
Status: ON HOLD | COMMUNITY
End: 2024-08-27 | Stop reason: HOSPADM

## 2024-08-15 RX ORDER — PREGABALIN 75 MG/1
75 CAPSULE ORAL DAILY
Status: DISCONTINUED | OUTPATIENT
Start: 2024-08-16 | End: 2024-08-29 | Stop reason: HOSPADM

## 2024-08-15 RX ORDER — SODIUM CHLORIDE 9 MG/ML
INJECTION, SOLUTION INTRAVENOUS PRN
Status: DISCONTINUED | OUTPATIENT
Start: 2024-08-15 | End: 2024-08-29 | Stop reason: HOSPADM

## 2024-08-15 RX ORDER — CALCIUM GLUCONATE 10 MG/ML
1000 INJECTION, SOLUTION INTRAVENOUS ONCE
Status: COMPLETED | OUTPATIENT
Start: 2024-08-15 | End: 2024-08-15

## 2024-08-15 RX ORDER — FLUOXETINE 10 MG/1
10 CAPSULE ORAL DAILY
COMMUNITY

## 2024-08-15 RX ORDER — CLOPIDOGREL BISULFATE 75 MG/1
75 TABLET ORAL DAILY
Status: DISCONTINUED | OUTPATIENT
Start: 2024-08-16 | End: 2024-08-29 | Stop reason: HOSPADM

## 2024-08-15 RX ORDER — ACETAMINOPHEN 325 MG/1
650 TABLET ORAL EVERY 6 HOURS PRN
Status: DISCONTINUED | OUTPATIENT
Start: 2024-08-15 | End: 2024-08-16

## 2024-08-15 RX ORDER — ONDANSETRON 4 MG/1
4 TABLET, ORALLY DISINTEGRATING ORAL EVERY 8 HOURS PRN
Status: DISCONTINUED | OUTPATIENT
Start: 2024-08-15 | End: 2024-08-29 | Stop reason: HOSPADM

## 2024-08-15 RX ORDER — ACETAMINOPHEN 325 MG/1
650 TABLET ORAL EVERY 6 HOURS PRN
COMMUNITY

## 2024-08-15 RX ORDER — CARVEDILOL 6.25 MG/1
6.25 TABLET ORAL 2 TIMES DAILY
Status: DISCONTINUED | OUTPATIENT
Start: 2024-08-16 | End: 2024-08-29 | Stop reason: HOSPADM

## 2024-08-15 RX ORDER — FUROSEMIDE 10 MG/ML
40 INJECTION INTRAMUSCULAR; INTRAVENOUS ONCE
Status: DISCONTINUED | OUTPATIENT
Start: 2024-08-15 | End: 2024-08-19

## 2024-08-15 RX ORDER — GLUCAGON 1 MG/ML
1 KIT INJECTION PRN
Status: DISCONTINUED | OUTPATIENT
Start: 2024-08-15 | End: 2024-08-27

## 2024-08-15 RX ORDER — ONDANSETRON 2 MG/ML
4 INJECTION INTRAMUSCULAR; INTRAVENOUS EVERY 6 HOURS PRN
Status: DISCONTINUED | OUTPATIENT
Start: 2024-08-15 | End: 2024-08-29 | Stop reason: HOSPADM

## 2024-08-15 RX ORDER — SODIUM CHLORIDE 0.9 % (FLUSH) 0.9 %
5-40 SYRINGE (ML) INJECTION EVERY 12 HOURS SCHEDULED
Status: DISCONTINUED | OUTPATIENT
Start: 2024-08-16 | End: 2024-08-29 | Stop reason: HOSPADM

## 2024-08-15 RX ORDER — DEXTROSE MONOHYDRATE 100 MG/ML
INJECTION, SOLUTION INTRAVENOUS CONTINUOUS PRN
Status: DISCONTINUED | OUTPATIENT
Start: 2024-08-15 | End: 2024-08-29 | Stop reason: HOSPADM

## 2024-08-15 RX ORDER — METRONIDAZOLE 500 MG/100ML
500 INJECTION, SOLUTION INTRAVENOUS EVERY 8 HOURS
Status: DISCONTINUED | OUTPATIENT
Start: 2024-08-15 | End: 2024-08-17

## 2024-08-15 RX ORDER — ACETAMINOPHEN 650 MG/1
650 SUPPOSITORY RECTAL EVERY 6 HOURS PRN
Status: DISCONTINUED | OUTPATIENT
Start: 2024-08-15 | End: 2024-08-16

## 2024-08-15 RX ORDER — POLYETHYLENE GLYCOL 3350 17 G/17G
17 POWDER, FOR SOLUTION ORAL DAILY PRN
Status: DISCONTINUED | OUTPATIENT
Start: 2024-08-15 | End: 2024-08-29 | Stop reason: HOSPADM

## 2024-08-15 RX ADMIN — DEXTROSE MONOHYDRATE 250 ML: 100 INJECTION, SOLUTION INTRAVENOUS at 20:41

## 2024-08-15 RX ADMIN — CALCIUM GLUCONATE 1000 MG: 10 INJECTION, SOLUTION INTRAVENOUS at 20:30

## 2024-08-15 RX ADMIN — VANCOMYCIN HYDROCHLORIDE 2500 MG: 10 INJECTION, POWDER, LYOPHILIZED, FOR SOLUTION INTRAVENOUS at 21:10

## 2024-08-15 RX ADMIN — INSULIN HUMAN 5 UNITS: 100 INJECTION, SOLUTION PARENTERAL at 20:42

## 2024-08-15 RX ADMIN — CEFEPIME 2000 MG: 2 INJECTION, POWDER, FOR SOLUTION INTRAVENOUS at 20:16

## 2024-08-15 RX ADMIN — METRONIDAZOLE 500 MG: 500 INJECTION, SOLUTION INTRAVENOUS at 19:52

## 2024-08-15 NOTE — CARE COORDINATION
3:52 PM    Pt will be a readmission and will require a readmission assessment if admitted.       Electronically signed by IRLANDA Cueva, GARRETTW on 8/15/2024 at 3:52 PM  [phone]

## 2024-08-15 NOTE — ED NOTES
Patient would not answer this RN about consenting to receive blood products at this time- PRBC unit sent back to blood bank. MD notified     Rosa Grewal RN  08/15/24 5707

## 2024-08-15 NOTE — ED PROVIDER NOTES
[facility]  EMERGENCY DEPARTMENT ENCOUNTER          ATTENDING PHYSICIAN NOTE       Date of evaluation: 8/15/2024    Chief Complaint     Fatigue      History of Present Illness     Serafin Weaver is a 44 y.o. male who presents with chief complaint of fatigue.  Patient has a history of ESRD, diabetes, osteomyelitis of the right foot status post TMA, concern for recent C. difficile infection.  Patient presents from his nursing home with lethargy.  Per nursing home, he has refused his last 3 dialysis treatments.  Patient reports that he did not go to dialysis because he was fatigued after therapy.  Patient denies chest pain, shortness of breath, abdominal pain, nausea or vomiting.  Per documentation, patient is on oral vancomycin for C. difficile infection.  Patient does report diarrhea while at his facility.      ASSESSMENT / PLAN  (MEDICAL DECISION MAKING)     INITIAL VITALS: BP: 101/61, Temp: 98.7 °F (37.1 °C), Pulse: 74, Respirations: 16, SpO2: 94 %      Serafin Weaver is a 44 y.o. male presenting with chief complaint of lethargy.  On examination, the patient is chronic ill-appearing.  He awakes to voice, but falls back asleep.  His neurologic examination is nonfocal.  His abdomen is soft without focal tenderness to palpation.  Examination of his right lower extremity does demonstrate a large ulceration with surrounding purulence concerning for superficial infection.  Patient was broadly antibiosis with vancomycin, cefepime, Flagyl to treat both skin infection and C. difficile colitis.  Blood cultures were obtained prior to antibiotic administration.  IV fluid resuscitation held given history of ESRD in setting of stable blood pressure. Workup also notable for anemia with hemoglobin of 6.9 from 7.7, suspect anemia of chronic disease.  No clinical signs of bleeding.  Patient was given 1 unit of packed red blood cells following phone consent obtained from mother.  CT head without acute findings to  VANCOMYCIN  IP CONSULT TO PODIATRY  IP CONSULT TO INFECTIOUS DISEASES  IP CONSULT TO VASCULAR ACCESS TEAM  IP CONSULT TO VASCULAR ACCESS TEAM  IP CONSULT TO PALLIATIVE CARE  IP CONSULT TO VASCULAR SURGERY      Past Medical, Surgical, Family, and Social History     He has a past medical history of Amputation of third toe, left, traumatic (HCC), Anesthesia complication, Blood circulation, collateral, Cellulitis, Depression, Diabetic polyneuropathy associated with type 2 diabetes mellitus (HCC), Hypertension, MRSA infection, Seizures (HCC), Type II or unspecified type diabetes mellitus without mention of complication, not stated as uncontrolled, and Unspecified cerebral artery occlusion with cerebral infarction.  He has a past surgical history that includes amputation; Toe amputation (Right); other surgical history (8-); Foot Amputation (Left, 2/7/2019); vascular surgery (Right, 4/11/2024); Foot Debridement (Right, 7/18/2024); invasive vascular (N/A, 7/22/2024); Toe amputation (Right, 7/24/2024); Achilles tendon surgery (Right, 7/24/2024); Foot Debridement (Right, 8/17/2024); and Foot Debridement (Right, 8/22/2024).  His family history includes Diabetes in his father, mother, and paternal grandfather; High Blood Pressure in his father and mother; High Cholesterol in his mother; Mental Illness in his father.  He reports that he has been smoking cigarettes and cigars. He has a 4.3 pack-year smoking history. He has never used smokeless tobacco. He reports that he does not currently use alcohol. He reports current drug use. Drug: Marijuana (Weed).    Medications     Discharge Medication List as of 8/29/2024  6:23 PM        CONTINUE these medications which have NOT CHANGED    Details   acetaminophen (TYLENOL) 325 MG tablet Take 2 tablets by mouth every 6 hours as needed for PainHistorical Med      FLUoxetine (PROZAC) 10 MG capsule Take 1 capsule by mouth dailyHistorical Med      carvedilol (COREG) 6.25 MG tablet Take

## 2024-08-16 ENCOUNTER — APPOINTMENT (OUTPATIENT)
Dept: ULTRASOUND IMAGING | Age: 44
End: 2024-08-16
Payer: COMMERCIAL

## 2024-08-16 ENCOUNTER — APPOINTMENT (OUTPATIENT)
Dept: VASCULAR LAB | Age: 44
End: 2024-08-16
Payer: COMMERCIAL

## 2024-08-16 LAB
25(OH)D3 SERPL-MCNC: 8.2 NG/ML
ALBUMIN SERPL-MCNC: 2.9 G/DL (ref 3.4–5)
ALP SERPL-CCNC: 165 U/L (ref 40–129)
ALT SERPL-CCNC: 526 U/L (ref 10–40)
ANION GAP SERPL CALCULATED.3IONS-SCNC: 16 MMOL/L (ref 3–16)
APAP SERPL-MCNC: <5 UG/ML (ref 10–30)
APTT BLD: 36.9 SEC (ref 22.1–36.4)
AST SERPL-CCNC: 808 U/L (ref 15–37)
BASOPHILS # BLD: 0 K/UL (ref 0–0.2)
BASOPHILS NFR BLD: 0.2 %
BILIRUB DIRECT SERPL-MCNC: 0.6 MG/DL (ref 0–0.3)
BILIRUB INDIRECT SERPL-MCNC: 0.3 MG/DL (ref 0–1)
BILIRUB SERPL-MCNC: 0.9 MG/DL (ref 0–1)
BUN SERPL-MCNC: 77 MG/DL (ref 7–20)
CALCIUM SERPL-MCNC: 9.1 MG/DL (ref 8.3–10.6)
CHLORIDE SERPL-SCNC: 94 MMOL/L (ref 99–110)
CO2 SERPL-SCNC: 24 MMOL/L (ref 21–32)
CREAT SERPL-MCNC: 16.2 MG/DL (ref 0.9–1.3)
CRP SERPL-MCNC: 269.1 MG/L (ref 0–5.1)
DEPRECATED RDW RBC AUTO: 15.3 % (ref 12.4–15.4)
DEPRECATED RDW RBC AUTO: 15.3 % (ref 12.4–15.4)
EOSINOPHIL # BLD: 0.2 K/UL (ref 0–0.6)
EOSINOPHIL NFR BLD: 0.8 %
ERYTHROCYTE [SEDIMENTATION RATE] IN BLOOD BY WESTERGREN METHOD: 104 MM/HR (ref 0–15)
FERRITIN SERPL IA-MCNC: ABNORMAL NG/ML (ref 30–400)
GFR SERPLBLD CREATININE-BSD FMLA CKD-EPI: 3 ML/MIN/{1.73_M2}
GLUCOSE BLD-MCNC: 146 MG/DL (ref 70–99)
GLUCOSE BLD-MCNC: 160 MG/DL (ref 70–99)
GLUCOSE BLD-MCNC: 166 MG/DL (ref 70–99)
GLUCOSE SERPL-MCNC: 157 MG/DL (ref 70–99)
HCT VFR BLD AUTO: 22.2 % (ref 40.5–52.5)
HCT VFR BLD AUTO: 24.2 % (ref 40.5–52.5)
HGB BLD-MCNC: 7.4 G/DL (ref 13.5–17.5)
HGB BLD-MCNC: 7.9 G/DL (ref 13.5–17.5)
INR PPP: 1.58 (ref 0.85–1.15)
IRON SATN MFR SERPL: 52 % (ref 20–50)
IRON SERPL-MCNC: 67 UG/DL (ref 59–158)
LYMPHOCYTES # BLD: 1.7 K/UL (ref 1–5.1)
LYMPHOCYTES NFR BLD: 9.1 %
MAGNESIUM SERPL-MCNC: 2.5 MG/DL (ref 1.8–2.4)
MCH RBC QN AUTO: 29.8 PG (ref 26–34)
MCH RBC QN AUTO: 30.1 PG (ref 26–34)
MCHC RBC AUTO-ENTMCNC: 32.9 G/DL (ref 31–36)
MCHC RBC AUTO-ENTMCNC: 33.2 G/DL (ref 31–36)
MCV RBC AUTO: 90.5 FL (ref 80–100)
MCV RBC AUTO: 90.6 FL (ref 80–100)
MONOCYTES # BLD: 0.9 K/UL (ref 0–1.3)
MONOCYTES NFR BLD: 5 %
NEUTROPHILS # BLD: 15.6 K/UL (ref 1.7–7.7)
NEUTROPHILS NFR BLD: 84.9 %
PERFORMED ON: ABNORMAL
PHOSPHATE SERPL-MCNC: 4.7 MG/DL (ref 2.5–4.9)
PLATELET # BLD AUTO: 211 K/UL (ref 135–450)
PLATELET # BLD AUTO: 223 K/UL (ref 135–450)
PMV BLD AUTO: 7.8 FL (ref 5–10.5)
PMV BLD AUTO: 8 FL (ref 5–10.5)
POTASSIUM SERPL-SCNC: 4.9 MMOL/L (ref 3.5–5.1)
POTASSIUM SERPL-SCNC: 5.2 MMOL/L (ref 3.5–5.1)
PROT SERPL-MCNC: 7.6 G/DL (ref 6.4–8.2)
PROTHROMBIN TIME: 19 SEC (ref 11.9–14.9)
PTH-INTACT SERPL-MCNC: 79 PG/ML (ref 14–72)
RBC # BLD AUTO: 2.46 M/UL (ref 4.2–5.9)
RBC # BLD AUTO: 2.67 M/UL (ref 4.2–5.9)
SALICYLATES SERPL-MCNC: <0.3 MG/DL (ref 15–30)
SODIUM SERPL-SCNC: 134 MMOL/L (ref 136–145)
TIBC SERPL-MCNC: 129 UG/DL (ref 260–445)
VANCOMYCIN SERPL-MCNC: 35.5 UG/ML
WBC # BLD AUTO: 17.9 K/UL (ref 4–11)
WBC # BLD AUTO: 18.4 K/UL (ref 4–11)

## 2024-08-16 PROCEDURE — 82728 ASSAY OF FERRITIN: CPT

## 2024-08-16 PROCEDURE — 83970 ASSAY OF PARATHORMONE: CPT

## 2024-08-16 PROCEDURE — 80061 LIPID PANEL: CPT

## 2024-08-16 PROCEDURE — 84134 ASSAY OF PREALBUMIN: CPT

## 2024-08-16 PROCEDURE — 85025 COMPLETE CBC W/AUTO DIFF WBC: CPT

## 2024-08-16 PROCEDURE — 2060000000 HC ICU INTERMEDIATE R&B

## 2024-08-16 PROCEDURE — 5A1D70Z PERFORMANCE OF URINARY FILTRATION, INTERMITTENT, LESS THAN 6 HOURS PER DAY: ICD-10-PCS | Performed by: INTERNAL MEDICINE

## 2024-08-16 PROCEDURE — 87186 SC STD MICRODIL/AGAR DIL: CPT

## 2024-08-16 PROCEDURE — 82607 VITAMIN B-12: CPT

## 2024-08-16 PROCEDURE — 76705 ECHO EXAM OF ABDOMEN: CPT

## 2024-08-16 PROCEDURE — 83540 ASSAY OF IRON: CPT

## 2024-08-16 PROCEDURE — 85610 PROTHROMBIN TIME: CPT

## 2024-08-16 PROCEDURE — 83550 IRON BINDING TEST: CPT

## 2024-08-16 PROCEDURE — 6370000000 HC RX 637 (ALT 250 FOR IP): Performed by: STUDENT IN AN ORGANIZED HEALTH CARE EDUCATION/TRAINING PROGRAM

## 2024-08-16 PROCEDURE — 86140 C-REACTIVE PROTEIN: CPT

## 2024-08-16 PROCEDURE — 83735 ASSAY OF MAGNESIUM: CPT

## 2024-08-16 PROCEDURE — 6360000002 HC RX W HCPCS: Performed by: INTERNAL MEDICINE

## 2024-08-16 PROCEDURE — 87075 CULTR BACTERIA EXCEPT BLOOD: CPT

## 2024-08-16 PROCEDURE — 80179 DRUG ASSAY SALICYLATE: CPT

## 2024-08-16 PROCEDURE — 6360000002 HC RX W HCPCS

## 2024-08-16 PROCEDURE — 2580000003 HC RX 258

## 2024-08-16 PROCEDURE — 6370000000 HC RX 637 (ALT 250 FOR IP)

## 2024-08-16 PROCEDURE — 80143 DRUG ASSAY ACETAMINOPHEN: CPT

## 2024-08-16 PROCEDURE — 80202 ASSAY OF VANCOMYCIN: CPT

## 2024-08-16 PROCEDURE — 80076 HEPATIC FUNCTION PANEL: CPT

## 2024-08-16 PROCEDURE — 87077 CULTURE AEROBIC IDENTIFY: CPT

## 2024-08-16 PROCEDURE — 6360000002 HC RX W HCPCS: Performed by: STUDENT IN AN ORGANIZED HEALTH CARE EDUCATION/TRAINING PROGRAM

## 2024-08-16 PROCEDURE — 51798 US URINE CAPACITY MEASURE: CPT

## 2024-08-16 PROCEDURE — 90935 HEMODIALYSIS ONE EVALUATION: CPT

## 2024-08-16 PROCEDURE — 85652 RBC SED RATE AUTOMATED: CPT

## 2024-08-16 PROCEDURE — 82746 ASSAY OF FOLIC ACID SERUM: CPT

## 2024-08-16 PROCEDURE — 82306 VITAMIN D 25 HYDROXY: CPT

## 2024-08-16 PROCEDURE — 36415 COLL VENOUS BLD VENIPUNCTURE: CPT

## 2024-08-16 PROCEDURE — P9047 ALBUMIN (HUMAN), 25%, 50ML: HCPCS | Performed by: INTERNAL MEDICINE

## 2024-08-16 PROCEDURE — 85027 COMPLETE CBC AUTOMATED: CPT

## 2024-08-16 PROCEDURE — 87205 SMEAR GRAM STAIN: CPT

## 2024-08-16 PROCEDURE — 87070 CULTURE OTHR SPECIMN AEROBIC: CPT

## 2024-08-16 PROCEDURE — 80074 ACUTE HEPATITIS PANEL: CPT

## 2024-08-16 PROCEDURE — 80069 RENAL FUNCTION PANEL: CPT

## 2024-08-16 PROCEDURE — 84132 ASSAY OF SERUM POTASSIUM: CPT

## 2024-08-16 PROCEDURE — 93975 VASCULAR STUDY: CPT

## 2024-08-16 PROCEDURE — 85730 THROMBOPLASTIN TIME PARTIAL: CPT

## 2024-08-16 RX ORDER — HEPARIN SODIUM 10000 [USP'U]/100ML
5-30 INJECTION, SOLUTION INTRAVENOUS CONTINUOUS
Status: DISCONTINUED | OUTPATIENT
Start: 2024-08-16 | End: 2024-08-29 | Stop reason: HOSPADM

## 2024-08-16 RX ORDER — HEPARIN SODIUM 1000 [USP'U]/ML
5000 INJECTION, SOLUTION INTRAVENOUS; SUBCUTANEOUS PRN
Status: DISCONTINUED | OUTPATIENT
Start: 2024-08-16 | End: 2024-08-29 | Stop reason: ALTCHOICE

## 2024-08-16 RX ORDER — 0.9 % SODIUM CHLORIDE 0.9 %
100 INTRAVENOUS SOLUTION INTRAVENOUS PRN
Status: DISCONTINUED | OUTPATIENT
Start: 2024-08-16 | End: 2024-08-29 | Stop reason: HOSPADM

## 2024-08-16 RX ORDER — HEPARIN SODIUM 1000 [USP'U]/ML
10000 INJECTION, SOLUTION INTRAVENOUS; SUBCUTANEOUS ONCE
Status: COMPLETED | OUTPATIENT
Start: 2024-08-16 | End: 2024-08-16

## 2024-08-16 RX ORDER — ALBUMIN (HUMAN) 12.5 G/50ML
25 SOLUTION INTRAVENOUS ONCE
Status: COMPLETED | OUTPATIENT
Start: 2024-08-16 | End: 2024-08-16

## 2024-08-16 RX ORDER — HEPARIN SODIUM 1000 [USP'U]/ML
10000 INJECTION, SOLUTION INTRAVENOUS; SUBCUTANEOUS PRN
Status: DISCONTINUED | OUTPATIENT
Start: 2024-08-16 | End: 2024-08-29 | Stop reason: ALTCHOICE

## 2024-08-16 RX ADMIN — SEVELAMER CARBONATE 1600 MG: 800 TABLET, FILM COATED ORAL at 08:08

## 2024-08-16 RX ADMIN — EPOETIN ALFA-EPBX 10000 UNITS: 10000 INJECTION, SOLUTION INTRAVENOUS; SUBCUTANEOUS at 13:26

## 2024-08-16 RX ADMIN — SEVELAMER CARBONATE 1600 MG: 800 TABLET, FILM COATED ORAL at 19:05

## 2024-08-16 RX ADMIN — METRONIDAZOLE 500 MG: 500 INJECTION, SOLUTION INTRAVENOUS at 15:33

## 2024-08-16 RX ADMIN — HEPARIN SODIUM 15 UNITS/KG/HR: 10000 INJECTION, SOLUTION INTRAVENOUS at 23:40

## 2024-08-16 RX ADMIN — SODIUM CHLORIDE, PRESERVATIVE FREE 10 ML: 5 INJECTION INTRAVENOUS at 08:02

## 2024-08-16 RX ADMIN — CARVEDILOL 6.25 MG: 6.25 TABLET, FILM COATED ORAL at 08:09

## 2024-08-16 RX ADMIN — SEVELAMER CARBONATE 1600 MG: 800 TABLET, FILM COATED ORAL at 15:17

## 2024-08-16 RX ADMIN — CEFEPIME 1000 MG: 1 INJECTION, POWDER, FOR SOLUTION INTRAMUSCULAR; INTRAVENOUS at 21:32

## 2024-08-16 RX ADMIN — METRONIDAZOLE 500 MG: 500 INJECTION, SOLUTION INTRAVENOUS at 19:31

## 2024-08-16 RX ADMIN — CARVEDILOL 6.25 MG: 6.25 TABLET, FILM COATED ORAL at 21:21

## 2024-08-16 RX ADMIN — CLOPIDOGREL BISULFATE 75 MG: 75 TABLET ORAL at 08:09

## 2024-08-16 RX ADMIN — SODIUM ZIRCONIUM CYCLOSILICATE 10 G: 10 POWDER, FOR SUSPENSION ORAL at 00:09

## 2024-08-16 RX ADMIN — ATORVASTATIN CALCIUM 80 MG: 80 TABLET, FILM COATED ORAL at 00:08

## 2024-08-16 RX ADMIN — HEPARIN SODIUM 10000 UNITS: 1000 INJECTION INTRAVENOUS; SUBCUTANEOUS at 23:31

## 2024-08-16 RX ADMIN — METRONIDAZOLE 500 MG: 500 INJECTION, SOLUTION INTRAVENOUS at 03:57

## 2024-08-16 RX ADMIN — CARVEDILOL 6.25 MG: 6.25 TABLET, FILM COATED ORAL at 00:08

## 2024-08-16 RX ADMIN — ALBUMIN (HUMAN) 25 G: 0.25 INJECTION, SOLUTION INTRAVENOUS at 11:30

## 2024-08-16 ASSESSMENT — PAIN SCALES - GENERAL: PAINLEVEL_OUTOF10: 0

## 2024-08-16 NOTE — PROGRESS NOTES
I have personally seen and examined the patient independently. I have reviewed the patient's available data,including medical history and recent test results. Reviewed and discussed note as documented by resident.  I agree with the physical exam findings, assessment and plan. I personally performed a substantive portion of the visit including all aspects of the medical decision making.      ESRD with non-adherence to dialysis   Hyperkalemia with EKG changes   -Cont counseling  -Hyperkalemia treated   -Nephrology consulted to help with dialysis      Infected Rt foot wound  Hx of Rt foot osteomyelitis S/P TMA  -Per report he had puss coming from his wound. I was not able to examine today  -Xray was ok  -Cont abx pending podiatry evaluation   -Sepsis ruled out      Acute on chronic anemia   - seems worsening in anemia of chronic disease. Given a unit. Monitor   -Ferritin is 24K. Check iron studies       Elevated LFTs   -Hepatocellular patter. Worsening .   --526. -526. -165. Bili is ok  -Unclear if he has been drinking alcohol. Check UDS if possible  -Pending acute hepatitis panel. Check acetaminophen and salicylate level   -RUQ US + doppler (hx of lupus, atrial clot)  -Stop tylenol      ?diarrhea - per report he had diarrhea but he is denying it. There is a recent order for oral vancomycin script but we are not sure. Check for Cdiff if he has diarrhea here         Rest per resident's note      Krishna Obando MD  Hospitalist          Internal Medicine Progress Note    Patient Name: Serafin Weaver   Patient : 1980   Date: 2024   Admit Date: 8/15/2024     CC: One day fatigue s/p x3 missed HD sessions     Interval History     Patient getting dialyzed. He denies pain. He denies nausea and vomiting. He states that he is tolerating diet. Patient denies bowel movement overnight. He endorses producing urine without difficulty. Denies ambulating. Denies chest pain and shortness of breath.    Recent Labs     08/15/24  1638 08/15/24  1737   TROPHS 150* 149*       VBGs:   Recent Labs     08/15/24  1638   PHVEN 7.367   SZA9GMK 45.5   PO2VEN 47.8*       INR:   Recent Labs     08/15/24  1741   INR 1.42*       Microbiology:  Results       Procedure Component Value Units Date/Time    Culture, Anaerobic and Aerobic [[id number]]     Order Status: No result Specimen: Foot     Culture, Blood 1 [[id number]] Collected: 08/15/24 1954    Order Status: Sent Specimen: Blood Updated: 08/16/24 0051    Culture, Blood 2 [[id number]] Collected: 08/15/24 1954    Order Status: Sent Specimen: Blood Updated: 08/16/24 0051    Clostridium difficile toxin/antigen [[id number]]     Order Status: Sent Specimen: Stool     COVID-19 & Influenza Combo [[id number]] Collected: 08/15/24 1607    Order Status: Completed Specimen: Nasopharyngeal Swab Updated: 08/15/24 1710     SARS-CoV-2 RNA, RT PCR NOT DETECTED     Comment: Not Detected results do not preclude SARS-CoV-2 infection and  should not be used as the sole basis for patient management  decisions.  Results must be combined with clinical observations,  patient history, and epidemiological information.  Testing was performed using DILMA KRISTA SARS-CoV-2 and Influenza A/B  nucleic acid assay. This test is a multiplex Real-Time Reverse  Transcriptase Polymerase Chain Reaction (RT-PCR)-based in vitro  diagnostic test intended for the qualitative detection of nucleic  acids from SARS-CoV-2, influenza A, and influenza B in nasopharyngeal  and nasal swab specimens for use under the FDA’s Emergency Use  Authorization (EUA) only.    Patient Fact Sheet:  https://www.fda.gov/media/978019/download  Provider Fact Sheet: https://www.fda.gov/media/503050/download  EUA: https://www.fda.gov/media/214513/download  IFU: https://www.fda.gov/media/515192/download    Methodology:  RT-PCR          Influenza A NOT DETECTED     Influenza B NOT DETECTED               Radiology:  CT HEAD WO CONTRAST   Final

## 2024-08-16 NOTE — PROGRESS NOTES
[facility] -  Clinical Pharmacy Note    Vancomycin - Management by Pharmacy    Consult Date(s): 8/16  Consulting Provider(s): Dr. Eric Ngo    Assessment / Plan  Sepsis of Unknown Etiology - Vancomycin  Concurrent Antimicrobials: Cefepime, metronidazole  Day of Vanc Therapy / Ordered Duration: 2/7  Current Dosing Method: Intermittent Dosing by Levels  Therapeutic Goal: Trough ~ 15 mg/L  Current Dose / Plan:   Hx ESRD on dialysis - will dose intermittent  Missed last 3 dialysis sessions  Planning for HD today  Received loading dose 2500 mg (~18 mg/kg) IV x1 8/15  Level today 35.5. Will hold dose today and recheck level 8/17 in AM  Will continue to monitor clinical condition and make adjustments to regimen as appropriate.    Thank you for consulting pharmacy,    Please call with any Shanita evangelista, PharmD  PGY1 Pharmacy Resident  Wireless: [phone]  8/16/2024 3:36 PM        Interval update:   Dialysis scheduled for 8/16; 10-hour random 35.5    Subjective/Objective:   Serafin Weaver is a 44 y.o. male with a PMHx significant for ESRD (on HD), DM, R foot osteomyelitis s/p TMA (7/24), CVA (9/23) who presented with increased fatigue, diarrhea, purulent discharge from right lower extremity wound. Patient was afebrile on admission. Suspecting possible concurrent C.diff infection (being treated outpatient).    Pharmacy is consulted to dose vancomycin.    Ht Readings from Last 1 Encounters:   08/15/24 2.032 m (6' 8\")     Wt Readings from Last 1 Encounters:   08/15/24 133.5 kg (294 lb 5 oz)     Current & Prior Antimicrobial Regimen(s):  Vancomycin 2500 mg IV x1 (8/15)  To be dose intermittently x7 days  Cefepime 2 g EI IV Q24H (8/15-8/23)  Metronidazole 500 mg IV Q8H (8/15-current)    Vancomycin Level(s) / Doses:    Date Time Dose Type of Level / Level Interpretation   8/16 0722 2500 mg ( 8/15 2110) 35.5 - Random 10-hr random; planning to receive dialysis today; will plan to redose post-dialysis

## 2024-08-16 NOTE — PROGRESS NOTES
[facility] - Clinical Pharmacy Note - Renal Dosing    Cefepime ordered for treatment of Sepsis. Per [facility] Renal Dose Adjustment Policy, Cefepime 2000 mg Q8H will be changed to 1000 mg Q24H.     Estimated Creatinine Clearance: Estimated Creatinine Clearance: 10 mL/min (A) (based on SCr of 14.5 mg/dL (HH)).  Dialysis Status, MICHELLE, CKD: ESRD, HD  BMI: Body mass index is 32.33 kg/m².    Rationale for Adjustment: Agent is renally eliminated.    Pharmacy will continue to monitor renal function, cultures and sensitivities (where available) and adjust dose as necessary.      Please call with any questions.      Julee Moran, PharmD  [id number] (Main Pharmacy)

## 2024-08-16 NOTE — CONSULTS
Department of Podiatry Consult Note  Resident      Reason for Consult:  Right foot infected wound; recent amputation  Requesting Physician: Dr. Krishna Obando MD    CHIEF COMPLAINT:  Right foot wound dehiscence    HISTORY OF PRESENT ILLNESS:    The patient is a 44 y.o. male with significant past medical history as listed below who is consulted to podiatry for a right foot wound that is infected after having a recent amputation. Patient is well know to the podiatric service and recently underwent a right foot transmetatarsal amputation with Dr. Olsen on 7/24/24. The patient was seen in Dr. Olsen's clinic on Thursday when she recommended that the patient present to the ED for evaluation as he had missed 3 days of dialysis and appeared unwell. Patient denies any N/V/F/SOB/CP. Patient denies any other pedal complaints today.    Past Medical History:        Diagnosis Date    Amputation of third toe, left, traumatic (HCC)     Anesthesia complication     has history of being aggitated and \"fights\"    Blood circulation, collateral     Cellulitis     Depression     Diabetic polyneuropathy associated with type 2 diabetes mellitus ([facility]) 2/6/2019    Hypertension     Morbid obesity due to excess calories ([facility])     MRSA infection 02/04/2019    foot wound    Seizures ([facility])     Type II or unspecified type diabetes mellitus without mention of complication, not stated as uncontrolled     Unspecified cerebral artery occlusion with cerebral infarction     pt states at 14 years of age       Past Surgical History:        Procedure Laterality Date    ACHILLES TENDON SURGERY Right 7/24/2024    . performed by Kailee Olsen DPM at [facility] OR    AMPUTATION      left 3rd toe    FOOT AMPUTATION Left 2/7/2019    INCISION AND DRAINAGE, REVISION OF TRANSMETATARSAL AMPUTATION LEFT FOOT performed by Kailee Olsen DPM at [facility] OR    FOOT DEBRIDEMENT Right 7/18/2024    RIGHTFOOT INCISION AND DRAINAGE WITH PARTIAL FIFTH RAY RESECTION performed by Kailee Olsen,

## 2024-08-16 NOTE — ED NOTES
components within normal limits    Narrative:     [phone]  Chemistry results called to and read back by RERE HENSON, 08/15/2024  17:24, by [facility]   BLOOD GAS, VENOUS - Abnormal; Notable for the following components:    PO2, Pascual 47.8 (*)     Carboxyhemoglobin 1.6 (*)     All other components within normal limits   TROPONIN - Abnormal; Notable for the following components:    Troponin, High Sensitivity 150 (*)     All other components within normal limits    Narrative:     [facility]  [phone],  Chemistry results called to and read back by RERE HENSON, 08/15/2024  17:24, [facility]   TROPONIN - Abnormal; Notable for the following components:    Troponin, High Sensitivity 149 (*)     All other components within normal limits   PROTIME-INR - Abnormal; Notable for the following components:    Protime 17.6 (*)     INR 1.42 (*)     All other components within normal limits   POCT GLUCOSE - Abnormal; Notable for the following components:    POC Glucose 253 (*)     All other components within normal limits   POCT GLUCOSE - Abnormal; Notable for the following components:    POC Glucose 234 (*)     All other components within normal limits     Critical values: yes     Abnormal Assessment Findings: Potassium 5.6 Hemoglobin- 6.9- Patient has moments of altered mental status comes from facility- Parent gave consent for blood admin- CDIFF positive per nursing    Background  History:   Past Medical History:   Diagnosis Date    Amputation of third toe, left, traumatic (HCC)     Anesthesia complication     has history of being aggitated and \"fights\"    Blood circulation, collateral     Cellulitis     Depression     Diabetic polyneuropathy associated with type 2 diabetes mellitus (HCC) 2/6/2019    Hypertension     Morbid obesity due to excess calories ([facility])     MRSA infection 02/04/2019    foot wound    Seizures ([facility])     Type II or unspecified type diabetes mellitus without mention of  complication, not stated as uncontrolled     Unspecified cerebral artery occlusion with cerebral infarction     pt states at 14 years of age       Assessment    Vitals/MEWS:        Vitals:    08/15/24 1800 08/15/24 1830 08/15/24 1900 08/15/24 1930   BP: (!) 100/57 99/60 (!) 105/57    Pulse: 71 73 69 69   Resp: 14 13 (!) 0 12   Temp:       TempSrc:       SpO2: 97% 99%  99%   Weight:       Height:         FiO2 (%):   O2 Flow Rate: O2 Device: Nasal cannula O2 Flow Rate (L/min): 2 L/min  Cardiac Rhythm:    Pain Assessment:  [] Verbal [] Gibbs Baker Scale  Pain Scale:    Last documented pain score (0-10 scale)    Last documented pain medication administered:   Mental Status: alert  Orientation Level:    NIH Score:    C-SSRS: Risk of Suicide: No Risk  Bedside swallow:    [place] Coma Scale (GCS): [place] Coma Scale  Eye Opening: Spontaneous  Best Verbal Response: Oriented  Best Motor Response: Obeys commands  [place] Coma Scale Score: 15  Active LDA's:   Peripheral IV 08/15/24 Left Antecubital (Active)       Peripheral IV 08/15/24 Left;Anterior Forearm (Active)                 PO Status:   Pertinent or High Risk Medications/Drips: no     If Yes, please provide details:   Pending Blood Product Administration: yes       You may also review the ED PT Care Timeline found under the Summary Nursing Index tab.    Recommendation    Pending orders   Plan for Discharge (if known):   Additional Comments:    If any further questions, please call Sending RN at [phone]    Electronically signed by: Electronically signed by Rosa Grewal RN on 8/15/2024 at 9:01 PM      Rosa Grewal RN  08/15/24 1807

## 2024-08-16 NOTE — CONSULTS
Ph: [phone], Fax: [phone]           [facility]               Reason for admission:                 Admitted for lethargy    Brief Summary :     Serafin Weaver is being seen by nephrology for ESRD on hemodialysis.      Interval History and plan:      Blood pressure is better controlled now  Labs were reviewed.  Potassium improved to 4.9.    Plan:    Hemodialysis arranged for today.  Orders placed.  Will use a low potassium bath.  Continue with renal diet.  I will check PTH and vitamin D.  Daily renal panels.  I will continue with Retacrit with dialysis.  Please adjust antibiotics to GFR less than 10 mL/min.                     Assessment :     1.  ESRD:  Will plan HD per schedule.  He gets dialysis Monday, Wednesday and Friday.  He gets dialysis currently at [facility] under our care.  Access: AV graft  Daily weights  Fluid restriction: 1 L unless hypotensive  Nephrocap 1 tab PO daily    2.  Anemia:  Erythropoetin dose: He will receive Retacrit with dialysis to keep hemoglobin close to 10.  Hemoglobin admission was 6.9.  Ferritin is greater than 1000.    3.  Osteodystrophy:  Phosphate Binder: Will continue with Renvela 2 tabs p.o. 3 times daily with meals.  I will check PTH and vitamin D.  Daily renal panels.    4.  Hyperkalemia: Resolved.  He is on chronic Lokelma 10 g q. Tuesday, Thursday and Saturday on nondialysis days.    5.  Hypertension: well-controlled.  Continue with carvedilol.    6.  Wound infection: Podiatry consulted.  ID consulted.  He is on cefepime and vancomycin.  He is also getting oral vancomycin for recent C. difficile infection.             [facility] would like to thank Krishna Obando MD   for opportunity to serve this patient      Please call with questions at-   24 Hrs Answering service [phone] or  7 am- 5 pm via Perfect serve or cell phone  Dr.Muhammad Rizwana Hernandez MD       HPI :     Serafin Weaver is a 44 y.o. male presented  GLUCOSEU 250 04/24/2023 04:48 PM    KETUA 15 04/24/2023 04:48 PM    UROBILINOGEN 0.2 04/24/2023 04:48 PM    BILIRUBINUR Negative 04/24/2023 04:48 PM        ----------------------------------------------------------  Please call with questions at      24 Hrs Answering service [phone]  Perfect serve, or cell phone 7 am - 5pm  Jordin Kaur MD   [email or web address]

## 2024-08-16 NOTE — PROGRESS NOTES
Treatment time: 3.5 hrs    Net UF: 1000 ml     Pre weight: 131.5 kg  Post weight: 130.5kg     Access used: RADHA AVF  Access function:  tolerated well,  BFR 400ml/min     Medications or blood products given: Albumin, retacrit     Regular outpatient schedule: MWF     Summary of response to treatment: Pt tolerated well. Pt remained stable throughout entire treatment and upon exiting the hemodialysis suite.      Copy of dialysis treatment record placed in chart, to be scanned into EMR.

## 2024-08-16 NOTE — PROGRESS NOTES
Espinela                [phone]              NEPHROLOGIST DIALYSIS NOTE:    Seen during dialysis  Vitals and labs as given below.     Please see today's consult note for remaining details    Vitals:    08/16/24 0753   BP: 110/67   Pulse: 93   Resp: 18   Temp: 99.3 °F (37.4 °C)   SpO2: 94%     Lab Results   Component Value Date/Time     08/16/2024 07:22 AM    K 5.2 08/16/2024 07:22 AM    K 5.6 08/15/2024 04:38 PM    CL 94 08/16/2024 07:22 AM    CO2 24 08/16/2024 07:22 AM    BUN 77 08/16/2024 07:22 AM    CREATININE 16.2 08/16/2024 07:22 AM    GLUCOSE 157 08/16/2024 07:22 AM    CALCIUM 9.1 08/16/2024 07:22 AM           Please call with questions at-    24 Hrs Answering service [phone]  Perfect serve, or cell phone 7 am - 5pm     [employer]

## 2024-08-16 NOTE — PROGRESS NOTES
(LOKELMA) oral suspension 10 g  10 g Oral Once per day on Tuesday Thursday Saturday Franco Coyle MD        heparin (porcine) injection 10,000 Units  10,000 Units IntraVENous PRN Kai Mantilla MD        heparin (porcine) injection 5,000 Units  5,000 Units IntraVENous PRN Kai Mantilla MD        heparin 25,000 units in dextrose 5% 250 mL (premix) infusion  5-30 Units/kg/hr IntraVENous Continuous Kai Mantilla MD 19.7 mL/hr at 08/16/24 2340 15 Units/kg/hr at 08/16/24 2340    0.9 % sodium chloride infusion   IntraVENous PRN SánchezstChuck arevalo MD        metroNIDAZOLE (FLAGYL) 500 mg in 0.9% NaCl 100 mL IVPB premix  500 mg IntraVENous Q8H Channing Nye DO   Stopped at 08/17/24 0447    furosemide (LASIX) injection 40 mg  40 mg IntraVENous Once BroadstockCuhck MD        glucose chewable tablet 16 g  4 tablet Oral PRN SánchezstChuck arevalo MD        dextrose bolus 10% 125 mL  125 mL IntraVENous PRN Chuck Johnson MD        Or    dextrose bolus 10% 250 mL  250 mL IntraVENous PRN SánchezstChuck arevalo MD        glucagon injection 1 mg  1 mg SubCUTAneous PRN SánchezstChuck arevalo MD        dextrose 10 % infusion   IntraVENous Continuous PRN Chuck Johnson MD        [Held by provider] atorvastatin (LIPITOR) tablet 80 mg  80 mg Oral QHS Eric Ngo MD   80 mg at 08/16/24 0008    carvedilol (COREG) tablet 6.25 mg  6.25 mg Oral BID Eric Ngo MD   6.25 mg at 08/16/24 2121    [Held by provider] clopidogrel (PLAVIX) tablet 75 mg  75 mg Oral Daily Eric Ngo MD   75 mg at 08/16/24 0809    FLUoxetine (PROZAC) capsule 10 mg  10 mg Oral Daily Eric Ngo MD        [Held by provider] pregabalin (LYRICA) capsule 75 mg  75 mg Oral Daily Eric Ngo MD        sevelamer (RENVELA) tablet 1,600 mg  1,600 mg Oral TID WC Eric Ngo MD   1,600 mg at 08/16/24 1908    sodium chloride flush 0.9 % injection 5-40 mL  5-40 mL IntraVENous 2 times per day Huber,  MD Eric   10 mL at 08/16/24 0802    sodium chloride flush 0.9 % injection 5-40 mL  5-40 mL IntraVENous PRN Eric Ngo MD        0.9 % sodium chloride infusion   IntraVENous PRN Eric Ngo MD        ondansetron (ZOFRAN-ODT) disintegrating tablet 4 mg  4 mg Oral Q8H PRN Eric Ngo MD        Or    ondansetron (ZOFRAN) injection 4 mg  4 mg IntraVENous Q6H PRN Eric Ngo MD        polyethylene glycol (GLYCOLAX) packet 17 g  17 g Oral Daily PRN Eric Ngo MD           Diet: Diet NPO Exceptions are: Sips of Water with Meds    CXR: IMPRESSION:  No acute cardiopulmonary abnormality.      EKG:  See cardio section of EMR      Echocardiogram: not done    IV access/ saline lock: Yes    Antibiotics: Cefepime, Flagyl, Vancomycin    PT/INR: 17.6/1.42    Type and Screen: A positive/antibody negative     Pregnancy test: Not warranted     Known risk factors for perioperative complications: Diabetes mellitus, CKD, HTN, PAD, Non-compliance    Anesthesia to see patient.    Frida Hartley DPM  08/17/24  7:11 AM

## 2024-08-16 NOTE — PROGRESS NOTES
Pharmacy Progress Note    Heparin high dose weight based infusion is ordered for patient.  Per chart review, patient has received an oral Factor Xa inhibitor (Apixaban unknown, assume 8/15) within the last 72 hours.  As oral Factor Xa inhibitors can impact the anti-Xa test calibrated to unfractionated heparin, Heparin infusion will be monitored and adjusted using aPTT's per [facility] Policy.    APTT algorithm:  Maximum initial infusion rate = 2100 units/hr    aPTT < 59         Heparin 80 units/kg bolus     Increase infusion by 4 units/kg/hr                             (maximum 10,000 units)  aPTT 59-72.9    Heparin 40 units/kg bolus     Increase infusion by 2 units/kg/hr                             (maximum 5,000 units)  aPTT      No bolus                                No change  aPTT 102.1-109      No bolus                          Decrease infusion by 1 units/kg/hr  aPTT  109.1-122.9  No bolus    Decrease infusion by 2 units/kg/hr  aPTT  > 123      Hold heparin for 1 hour         Decrease infusion by 3 units/kg/hr     Hang infusion immediately after drawing initial labs.   Do NOT use ANY aPTT drawn prior to initiation of infusion for titration.   Obtain aPTT 6 hours after initial bolus and 6 hours after any dose change until two consecutive therapeutic aPTTs are achieved - then daily    Pharmacy will monitor daily to assist with adjustments & ordering of labs as needed.  If patient remains on heparin infusion 72 hours from last dose of oral factor Xa inhibitor, pharmacy will change infusion back to usual monitoring via the Anti-Xa algorithm per [facility] Policy, as the interaction will no longer occur at that time.    Please call pharmacy with any questions -  Lorie Delgado PharmD  Main Pharmacy: [id number]

## 2024-08-16 NOTE — PROGRESS NOTES
4 Eyes Skin Assessment     NAME:  Serafin Weaver  YOB: 1980  MEDICAL RECORD NUMBER:  [id number]    The patient is being assessed for      I agree that at least one RN has performed a thorough Head to Toe Skin Assessment on the patient. ALL assessment sites listed below have been assessed.      Areas assessed by both nurses:    Head, Face, Ears, Shoulders, Back, Chest, Arms, Elbows, Hands, Sacrum. Buttock, Coccyx, Ischium, Legs. Feet and Heels, and Under Medical Devices         Does the Patient have a Wound? Yes wound(s) were present on assessment. LDA wound assessment was Initiated and completed by RN    Stage 4 R foot, prev amputations L foot (healed), abrasions bilat lower extremities       Alf Prevention initiated by RN: Yes  Wound Care Orders initiated by RN: Yes    Pressure Injury (Stage 3,4, Unstageable, DTI, NWPT, and Complex wounds) if present, place Wound referral order by RN under : Yes    New Ostomies, if present place, Ostomy referral order under : No     Nurse 1 eSignature: Electronically signed by Marian Mosqueda RN on 8/15/24 at 10:22 PM EDT    **SHARE this note so that the co-signing nurse can place an eSignature**    Nurse 2 eSignature: Electronically signed by Tiffany Nelson RN on 8/15/24 at 10:34 PM EDT

## 2024-08-16 NOTE — PROGRESS NOTES
Internal Medicine Progress Note    Patient Name: Serafin Weaver   Patient : 1980   Date: 2024   Admit Date: 8/15/2024     CC: One day fatigue s/p x3 missed HD sessions     Interval History     Patient getting dialyzed. He denies pain. He denies nausea and vomiting. He states that he is tolerating diet. Patient denies bowel movement overnight. He endorses producing urine without difficulty. Denies ambulating. Denies chest pain and shortness of breath. Patient defers medical decision making to his mother.      ROS   Review of Systems   Constitutional:  Negative for chills, fatigue and fever.   Respiratory:  Negative for cough and shortness of breath.    Cardiovascular:  Negative for chest pain.   Gastrointestinal:  Negative for abdominal distention, abdominal pain, diarrhea, nausea and vomiting.   Genitourinary:  Negative for dysuria.   Skin:  Positive for color change and wound.   Neurological:  Negative for light-headedness.          Objective     I/Os:  I/O last 3 completed shifts:  In: 796.3 [P.O.:125; Blood:671.3]  Out: -       Vital Signs:  Patient Vitals for the past 8 hrs:   BP Temp Temp src Pulse Resp SpO2 Weight   24 1433 (!) 120/54 98.6 °F (37 °C) Oral 81 18 97 % --   24 1326 121/61 99.3 °F (37.4 °C) -- 79 18 -- 131.5 kg (289 lb 14.5 oz)   24 0943 (!) 92/49 99.1 °F (37.3 °C) -- 79 18 -- 133 kg (293 lb 3.4 oz)       Physical Exam:  Physical Exam  Vitals reviewed.   Constitutional:       General: He is not in acute distress.     Appearance: He is ill-appearing.   HENT:      Head: Normocephalic.      Right Ear: External ear normal.      Left Ear: External ear normal.      Nose: Nose normal.      Mouth/Throat:      Mouth: Mucous membranes are moist.   Eyes:      Extraocular Movements: Extraocular movements intact.      Pupils: Pupils are equal, round, and reactive to light.   Cardiovascular:      Rate and Rhythm: Normal rate.      Pulses: Normal pulses.      Heart  shift    Infected right foot wound with recent C. Difficile infection  History of right foot osteomyelitis s/p TMA 07/24 with reported purulent discharge from wound on presentation. Patient was also on oral vancomycin for recent C. Dificile infection.  -Wound care, podiatry and infectious disease consulted, f/u recommendations  -Antibiotics: Cefepime, Vancomycin, and Metronidazole  -Leukocytosis 18.4 from 21.6  -XR showing post amputation changes, otherwise negative  -Pending x2 blood cultures, f/u results for tailored antibiotics regimen  -Pending C. Difficile, f/u results  -F/u podiatry recs regarding +/- surgical plans this admission, start Hep gtt if surgery is planned, if no surgery OK to start home eliquis for clotting disorder -  positive for anticardiolipin IgM     Acute on chronic anemia, possibly secondary to oozing from right foot wound  The patient presented with Hg 6.9 from baseline of 8-10.  -Hg 7.4 from 6.9 overnight s/p transfusion 1 u pRBC yesterday  -Continue with retacrit during HD to keep Hg closer to 10 per nephrology  -Ferritin 23,989, F/u iron studies  -F/u AM CBC, trend Hg daily    Acute hepatocellular transaminitis of unknown etiology  - from 472 overnight  - from 281 overnight  -Stop tylenol and statins  -F/u UDS and hepatitis panel  -PT/INR 1.42/17.6 on admission, home eliquis held for history of clotting disorder  -Acetaminophen level <5, Salicyclic acid <0.3  -F/u RUQ US + doppler given history of poss lupus and atrial clot  -F/u US Abdomen Pelvis Retro Scrotal to rule out portal and hepatic vein thrombosis  -F/u AM LFTs, trend daily    Elevated troponin, asymptomatic  -Trop on presentation 150, stable at 149 on repeat  -Elevated Trop with PMH ESRD, no plans to continue trending  -EKG without ischemic changes    Chronic conditions  Type II DM   -Hg A1c 5.8 07/2024  -Blood glucose on admission 247  -SSI with glucose checks  -Hypoglycemic protocol in

## 2024-08-16 NOTE — PROGRESS NOTES
Clinical Pharmacy Consult Note  Medication History     Admit Date: 8/15/2024    List of of current medications patient is taking is complete. Home Medication list in EPIC updated to reflect changes noted below.    Source of information: Facility transfer papers    Patient's home pharmacy: [facility] [id number] ([place]), [place] - [address] - P [phone] - F [phone]      Changes made to medication list:   Medications removed: (include reason, ex: therapy completed, patient no longer taking, etc.)  Oxycodone 5 mg tablet - patient not taking per facility transfer papers   Medications added:   Fluoxetine HCl 10 mg capsule   Vancomycin HCl 800 mg tablet   Medication doses adjusted:   Acetaminophen 325 mg tablet - per facility transfer papers dosage form changed to 325 mg tablet from 650 mg tablet   Other notes:   Vancomycin HCl 125 mg capsule - per facility transfer papers short course of antibiotics to treat C diff - 10 days beginning on 08/13/2024 and ending on 08/23/2024    Current Outpatient Medications   Medication Instructions    acetaminophen (TYLENOL) 650 mg, Oral, EVERY 6 HOURS PRN    apixaban (ELIQUIS) 2.5 mg, Oral, DAILY    aspirin 81 mg, Oral, DAILY    atorvastatin (LIPITOR) 80 mg, Oral, EVERY BEDTIME    carvedilol (COREG) 6.25 mg, Oral, 2 TIMES DAILY    clopidogrel (PLAVIX) 75 mg, Oral, DAILY    FLUoxetine (PROZAC) 10 mg, Oral, DAILY    pregabalin (LYRICA) 75 mg, Oral, DAILY    sevelamer (RENVELA) 1,600 mg, Oral, 3 TIMES DAILY WITH MEALS    sodium zirconium cyclosilicate (LOKELMA) 10 g, Oral, SEE ADMIN INSTRUCTIONS, Every Tuesday, Thursday, Saturday    vancomycin (VANCOCIN) 125 mg, Oral, 4 TIMES DAILY, Take 1 capsule by mouth 4 times a day for 10 days for C-diff (08/13/2024-08/23/2024)        Please call with any questions.    Ella Kang, PharmD Candidate

## 2024-08-16 NOTE — PROGRESS NOTES
Vascular lab note:  Laser doppler was attempted. The patient was uncooperative and eventually refused the exam.

## 2024-08-16 NOTE — PLAN OF CARE
Problem: Discharge Planning  Goal: Discharge to home or other facility with appropriate resources  Outcome: Progressing  Flowsheets (Taken 8/15/2024 2324)  Discharge to home or other facility with appropriate resources:   Identify barriers to discharge with patient and caregiver   Identify discharge learning needs (meds, wound care, etc)   Refer to discharge planning if patient needs post-hospital services based on physician order or complex needs related to functional status, cognitive ability or social support system     Problem: Skin/Tissue Integrity  Goal: Absence of new skin breakdown  Description: 1.  Monitor for areas of redness and/or skin breakdown  2.  Assess vascular access sites hourly  3.  Every 4-6 hours minimum:  Change oxygen saturation probe site  4.  Every 4-6 hours:  If on nasal continuous positive airway pressure, respiratory therapy assess nares and determine need for appliance change or resting period.  Outcome: Progressing     Problem: Safety - Adult  Goal: Free from fall injury  Outcome: Progressing  Flowsheets (Taken 8/15/2024 2324)  Free From Fall Injury:   Instruct family/caregiver on patient safety   Based on caregiver fall risk screen, instruct family/caregiver to ask for assistance with transferring infant if caregiver noted to have fall risk factors

## 2024-08-16 NOTE — H&P
Internal Medicine H&P    Date: 2024   Patient: Serafin Weaver   Patient : 1980     CC: Lethargy       Subjective     HPI:   Serafin Weaver is a 44 y.o. with PMH of ESRD on iHD, DM type II, right foot osteomyelitis S/P TMA who presented from nursing home with lethargy for 1 day.     Patient was not cooperative, most of the hx was gotten from the chart review.   Patient presented from his nursing facility with lethargy. He has reportedly missed 3 session of dialysis as he reports it makes him tired. On my evaluation, patient denied any chest pain, SOB, abdominal pain, N/V, fever. Per ED report, patient has been having diarrhea and purulent discharge from his right leg wound.   Wound was cleaned and dressed while he was in the ED.    ED Course:  Vitals: BP: 101/61, Temp: 98.7 °F (37.1 °C), Pulse: 74, Respirations: 16, SpO2: 94 %     Right lower extremity revealed large ulcer with surrounding purulence suggestive of superficial infection so patient was started on broad-spectrum antibiotics vancomycin, cefepime, and Flagyl.  Fluid was not given due to ESRD history .   Workup showed anemia with hemoglobin of 6.9 from 7.7 likely due to chronic disease therefore 1 unit of packed RBC was given.   CT head ruled out encephalopathy as a cause of his lethargy.   BMP showed potassium 5.6 with peaked T wave on EKG. Patient was treated with cardiac calcium, insulin/dextrose, and Lokelma. Additionally, the laboratory results showed stable troponin level at 150. AST and ALT levels were elevated with no clear cause; however, low concern for acute cholecystitis, gallstone pancreatitis, ascending cholangitis, or acute hepatitis given the lack of abdominal pain on examination.  Patient was admitted for further management      PMHx:      Diagnosis Date    Amputation of third toe, left, traumatic (HCC)     Anesthesia complication     has history of being aggitated and \"fights\"    Blood circulation, collateral        Recent Labs     08/15/24  1638   WBC 21.6*   HGB 6.9*   HCT 22.0*          BMP:   Recent Labs     08/15/24  1638   *   K 5.6*   CL 92*   CO2 26   BUN 68*   CREATININE 14.5*   GLUCOSE 247*   PHOS 4.5     Magnesium: No results for input(s): \"MG\" in the last 72 hours.  LFT's:   Recent Labs     08/15/24  1638   *   *   BILITOT 0.6   ALKPHOS 136*       Troponin: No results for input(s): \"TROPONINI\" in the last 72 hours.  Lactic acid: No results for input(s): \"LACTATE\" in the last 72 hours.    BNP: No results for input(s): \"BNP\" in the last 72 hours.  Pro-BNP: No results for input(s): \"PROBNP\" in the last 72 hours.    Procalcitonin: No results for input(s): \"PROCAL\" in the last 72 hours.  CRP: No results for input(s): \"CRP\" in the last 72 hours.  ESR: No results for input(s): \"ESR\" in the last 72 hours.    ABGs: No results for input(s): \"PHART\", \"YCJ0TQQ\", \"PO2ART\" in the last 72 hours.  VBGs:   Recent Labs     08/15/24  1638   PHVEN 7.367   LPT3GQV 45.5   PO2VEN 47.8*       INR:   Recent Labs     08/15/24  1741   INR 1.42*       COVID-19:   Recent Labs     08/15/24  1607   COVID19 NOT DETECTED       U/A: No results for input(s): \"NITRITE\", \"COLORU\", \"PHUR\", \"LABCAST\", \"WBCUA\", \"RBCUA\", \"MUCUS\", \"TRICHOMONAS\", \"YEAST\", \"BACTERIA\", \"CLARITYU\", \"SPECGRAV\", \"LEUKOCYTESUR\", \"UROBILINOGEN\", \"BILIRUBINUR\", \"BLOODU\", \"GLUCOSEU\", \"KETONES\", \"AMORPHOUS\" in the last 72 hours.    Radiology:  CT HEAD WO CONTRAST   Final Result      1. Age-related cortical atrophy   2. No acute intracranial hemorrhage.      Electronically signed by Jessica BLANCHARD FOOT RIGHT (MIN 3 VIEWS)   Final Result   Interval amputation.      Electronically signed by Jessica Patiño      XR CHEST PORTABLE   Final Result   No acute cardiopulmonary abnormality.      Electronically signed by [facility]            Assessment & Plan   Serafin Weaver is a 44 y.o. with PMH of ESRD on iHD, DM type II, right foot osteomyelitis

## 2024-08-16 NOTE — CONSULTS
[facility] -  Clinical Pharmacy Note    Vancomycin - Management by Pharmacy    Consult Date(s): 08/16/2024  Consulting Provider(s): Dr. Eric Ngo    Assessment / Plan  Sepsis due to SSTI- Vancomycin  Concurrent Antimicrobials: Cefepime, Metronidazole  Day of Vanc Therapy / Ordered Duration: Day 1 of 7  Current Dosing Method: Intermittent Dosing by Levels  Therapeutic Goal: Trough ~ 15 mg/L  Current Dose / Plan:   Vancomycin 2500 mg (~18 mg/kg) IV x1  Will dose vancomycin based on intermittent levels, due to unstable renal function, ESRD- HD. Have entered placeholder onto MAR. Obtaining vancomycin level 08/16 morning  Will continue to monitor clinical condition and make adjustments to regimen as appropriate.    Thank you for consulting pharmacy,      Julee Moran, PharmD  [id number] (Main Pharmacy)        Interval update:  therapy initiation     Subjective/Objective:   Serafin Weaver is a 44 y.o. male with a PMHx significant for of ESRD on HD, DM type II, right foot osteomyelitis S/P TMA who is admitted with Sepsis.     Pharmacy is consulted to dose Vancomycin.    Ht Readings from Last 1 Encounters:   08/15/24 2.032 m (6' 8\")     Wt Readings from Last 1 Encounters:   08/15/24 133.5 kg (294 lb 5 oz)     Current & Prior Antimicrobial Regimen(s):  Vancomycin PO (started 8/12)- Outpatient  Cefepime (08/15- Current)  Metronidazole (08/15- Current)  Vancomycin- Pharmacy to dose  Vancomycin 2500 mg IV (08/15)    Vancomycin Level(s) / Doses:    Date Time Dose Type of Level / Level Interpretation                 Note: Serum levels collected for AUC-based dosing may be high if collected in close proximity to the dose administered. This is not necessarily indicative of toxicity.    Cultures & Sensitivities:    Date Site Micro Susceptibility / Result   08/15 COVID-19 and Influenza Combo Not detected  Not detected    08/15 C.Diff toxin antigen, stool Sent    08/15 Blood x2 Sent      Recent Labs     08/15/24  1469

## 2024-08-17 ENCOUNTER — APPOINTMENT (OUTPATIENT)
Dept: GENERAL RADIOLOGY | Age: 44
End: 2024-08-17
Payer: COMMERCIAL

## 2024-08-17 ENCOUNTER — ANESTHESIA (OUTPATIENT)
Dept: OPERATING ROOM | Age: 44
End: 2024-08-17
Payer: COMMERCIAL

## 2024-08-17 ENCOUNTER — ANESTHESIA EVENT (OUTPATIENT)
Dept: OPERATING ROOM | Age: 44
End: 2024-08-17
Payer: COMMERCIAL

## 2024-08-17 LAB
ALBUMIN SERPL-MCNC: 2.8 G/DL (ref 3.4–5)
ALP SERPL-CCNC: 153 U/L (ref 40–129)
ALT SERPL-CCNC: 504 U/L (ref 10–40)
ANION GAP SERPL CALCULATED.3IONS-SCNC: 16 MMOL/L (ref 3–16)
APTT BLD: 33 SEC (ref 22.1–36.4)
APTT BLD: 60.6 SEC (ref 22.1–36.4)
APTT BLD: 64.8 SEC (ref 22.1–36.4)
AST SERPL-CCNC: 571 U/L (ref 15–37)
BASOPHILS # BLD: 0.1 K/UL (ref 0–0.2)
BASOPHILS NFR BLD: 0.4 %
BILIRUB DIRECT SERPL-MCNC: 0.9 MG/DL (ref 0–0.3)
BILIRUB INDIRECT SERPL-MCNC: 0.3 MG/DL (ref 0–1)
BILIRUB SERPL-MCNC: 1.2 MG/DL (ref 0–1)
BUN SERPL-MCNC: 40 MG/DL (ref 7–20)
CALCIUM SERPL-MCNC: 8.7 MG/DL (ref 8.3–10.6)
CHLORIDE SERPL-SCNC: 97 MMOL/L (ref 99–110)
CHOLEST SERPL-MCNC: 77 MG/DL (ref 0–199)
CO2 SERPL-SCNC: 24 MMOL/L (ref 21–32)
CREAT SERPL-MCNC: 9.2 MG/DL (ref 0.9–1.3)
DEPRECATED RDW RBC AUTO: 15.5 % (ref 12.4–15.4)
EOSINOPHIL # BLD: 0.2 K/UL (ref 0–0.6)
EOSINOPHIL NFR BLD: 0.8 %
FOLATE SERPL-MCNC: 9.16 NG/ML (ref 4.78–24.2)
GFR SERPLBLD CREATININE-BSD FMLA CKD-EPI: 7 ML/MIN/{1.73_M2}
GLUCOSE BLD-MCNC: 132 MG/DL (ref 70–99)
GLUCOSE BLD-MCNC: 136 MG/DL (ref 70–99)
GLUCOSE BLD-MCNC: 138 MG/DL (ref 70–99)
GLUCOSE BLD-MCNC: 152 MG/DL (ref 70–99)
GLUCOSE BLD-MCNC: 154 MG/DL (ref 70–99)
GLUCOSE SERPL-MCNC: 136 MG/DL (ref 70–99)
HAV IGM SERPL QL IA: NORMAL
HBV CORE IGM SERPL QL IA: NORMAL
HBV SURFACE AG SERPL QL IA: NORMAL
HCT VFR BLD AUTO: 20 % (ref 40.5–52.5)
HCT VFR BLD AUTO: 22.3 % (ref 40.5–52.5)
HCT VFR BLD AUTO: 23.8 % (ref 40.5–52.5)
HCV AB SERPL QL IA: NORMAL
HDLC SERPL-MCNC: 7 MG/DL (ref 40–60)
HGB BLD-MCNC: 6.3 G/DL (ref 13.5–17.5)
HGB BLD-MCNC: 7.1 G/DL (ref 13.5–17.5)
HGB BLD-MCNC: 7.6 G/DL (ref 13.5–17.5)
LDLC SERPL CALC-MCNC: ABNORMAL MG/DL
LDLC SERPL-MCNC: <4 MG/DL
LYMPHOCYTES # BLD: 2.4 K/UL (ref 1–5.1)
LYMPHOCYTES NFR BLD: 13.2 %
MAGNESIUM SERPL-MCNC: 2.2 MG/DL (ref 1.8–2.4)
MCH RBC QN AUTO: 29.2 PG (ref 26–34)
MCHC RBC AUTO-ENTMCNC: 31.7 G/DL (ref 31–36)
MCV RBC AUTO: 91.9 FL (ref 80–100)
MONOCYTES # BLD: 1.5 K/UL (ref 0–1.3)
MONOCYTES NFR BLD: 8.2 %
NEUTROPHILS # BLD: 14.3 K/UL (ref 1.7–7.7)
NEUTROPHILS NFR BLD: 77.4 %
PERFORMED ON: ABNORMAL
PHOSPHATE SERPL-MCNC: 3.7 MG/DL (ref 2.5–4.9)
PLATELET # BLD AUTO: 222 K/UL (ref 135–450)
PMV BLD AUTO: 8.2 FL (ref 5–10.5)
POTASSIUM SERPL-SCNC: 4.2 MMOL/L (ref 3.5–5.1)
PREALB SERPL-MCNC: 6.1 MG/DL (ref 20–40)
PROT SERPL-MCNC: 7.4 G/DL (ref 6.4–8.2)
RBC # BLD AUTO: 2.43 M/UL (ref 4.2–5.9)
SODIUM SERPL-SCNC: 137 MMOL/L (ref 136–145)
TRIGL SERPL-MCNC: 333 MG/DL (ref 0–150)
VANCOMYCIN SERPL-MCNC: 22 UG/ML
VIT B12 SERPL-MCNC: 1812 PG/ML (ref 211–911)
VLDLC SERPL CALC-MCNC: ABNORMAL MG/DL
WBC # BLD AUTO: 18.5 K/UL (ref 4–11)

## 2024-08-17 PROCEDURE — 73630 X-RAY EXAM OF FOOT: CPT

## 2024-08-17 PROCEDURE — 2580000003 HC RX 258: Performed by: ANESTHESIOLOGY

## 2024-08-17 PROCEDURE — 1200000000 HC SEMI PRIVATE

## 2024-08-17 PROCEDURE — 0Q9N0ZZ DRAINAGE OF RIGHT METATARSAL, OPEN APPROACH: ICD-10-PCS | Performed by: PODIATRIST

## 2024-08-17 PROCEDURE — 88305 TISSUE EXAM BY PATHOLOGIST: CPT

## 2024-08-17 PROCEDURE — 2060000000 HC ICU INTERMEDIATE R&B

## 2024-08-17 PROCEDURE — 2709999900 HC NON-CHARGEABLE SUPPLY: Performed by: PODIATRIST

## 2024-08-17 PROCEDURE — 3600000002 HC SURGERY LEVEL 2 BASE: Performed by: PODIATRIST

## 2024-08-17 PROCEDURE — 2580000003 HC RX 258

## 2024-08-17 PROCEDURE — 3700000000 HC ANESTHESIA ATTENDED CARE: Performed by: PODIATRIST

## 2024-08-17 PROCEDURE — 80202 ASSAY OF VANCOMYCIN: CPT

## 2024-08-17 PROCEDURE — 6370000000 HC RX 637 (ALT 250 FOR IP): Performed by: PODIATRIST

## 2024-08-17 PROCEDURE — 6360000002 HC RX W HCPCS

## 2024-08-17 PROCEDURE — 7100000001 HC PACU RECOVERY - ADDTL 15 MIN: Performed by: PODIATRIST

## 2024-08-17 PROCEDURE — 85018 HEMOGLOBIN: CPT

## 2024-08-17 PROCEDURE — A4217 STERILE WATER/SALINE, 500 ML: HCPCS | Performed by: PODIATRIST

## 2024-08-17 PROCEDURE — 3700000001 HC ADD 15 MINUTES (ANESTHESIA): Performed by: PODIATRIST

## 2024-08-17 PROCEDURE — 3600000012 HC SURGERY LEVEL 2 ADDTL 15MIN: Performed by: PODIATRIST

## 2024-08-17 PROCEDURE — 0Y6M0ZF DETACHMENT AT RIGHT FOOT, PARTIAL 5TH RAY, OPEN APPROACH: ICD-10-PCS | Performed by: PODIATRIST

## 2024-08-17 PROCEDURE — 36415 COLL VENOUS BLD VENIPUNCTURE: CPT

## 2024-08-17 PROCEDURE — 85014 HEMATOCRIT: CPT

## 2024-08-17 PROCEDURE — 80069 RENAL FUNCTION PANEL: CPT

## 2024-08-17 PROCEDURE — 6370000000 HC RX 637 (ALT 250 FOR IP)

## 2024-08-17 PROCEDURE — 0QBN0ZZ EXCISION OF RIGHT METATARSAL, OPEN APPROACH: ICD-10-PCS | Performed by: PODIATRIST

## 2024-08-17 PROCEDURE — 6360000002 HC RX W HCPCS: Performed by: ANESTHESIOLOGY

## 2024-08-17 PROCEDURE — 7100000000 HC PACU RECOVERY - FIRST 15 MIN: Performed by: PODIATRIST

## 2024-08-17 PROCEDURE — 99255 IP/OBS CONSLTJ NEW/EST HI 80: CPT | Performed by: INTERNAL MEDICINE

## 2024-08-17 PROCEDURE — 85730 THROMBOPLASTIN TIME PARTIAL: CPT

## 2024-08-17 PROCEDURE — 2500000003 HC RX 250 WO HCPCS: Performed by: ANESTHESIOLOGY

## 2024-08-17 PROCEDURE — 88311 DECALCIFY TISSUE: CPT

## 2024-08-17 PROCEDURE — 80076 HEPATIC FUNCTION PANEL: CPT

## 2024-08-17 PROCEDURE — 2580000003 HC RX 258: Performed by: PODIATRIST

## 2024-08-17 PROCEDURE — 36430 TRANSFUSION BLD/BLD COMPNT: CPT

## 2024-08-17 PROCEDURE — 85025 COMPLETE CBC W/AUTO DIFF WBC: CPT

## 2024-08-17 PROCEDURE — 83735 ASSAY OF MAGNESIUM: CPT

## 2024-08-17 RX ORDER — MAGNESIUM HYDROXIDE 1200 MG/15ML
LIQUID ORAL CONTINUOUS PRN
Status: DISCONTINUED | OUTPATIENT
Start: 2024-08-17 | End: 2024-08-17 | Stop reason: HOSPADM

## 2024-08-17 RX ORDER — PROCHLORPERAZINE EDISYLATE 5 MG/ML
5 INJECTION INTRAMUSCULAR; INTRAVENOUS
Status: DISCONTINUED | OUTPATIENT
Start: 2024-08-17 | End: 2024-08-17 | Stop reason: HOSPADM

## 2024-08-17 RX ORDER — DIPHENHYDRAMINE HYDROCHLORIDE 50 MG/ML
12.5 INJECTION INTRAMUSCULAR; INTRAVENOUS
Status: DISCONTINUED | OUTPATIENT
Start: 2024-08-17 | End: 2024-08-17 | Stop reason: HOSPADM

## 2024-08-17 RX ORDER — HYDROMORPHONE HYDROCHLORIDE 1 MG/ML
0.5 INJECTION, SOLUTION INTRAMUSCULAR; INTRAVENOUS; SUBCUTANEOUS EVERY 5 MIN PRN
Status: DISCONTINUED | OUTPATIENT
Start: 2024-08-17 | End: 2024-08-17 | Stop reason: HOSPADM

## 2024-08-17 RX ORDER — FENTANYL CITRATE 50 UG/ML
INJECTION, SOLUTION INTRAMUSCULAR; INTRAVENOUS PRN
Status: DISCONTINUED | OUTPATIENT
Start: 2024-08-17 | End: 2024-08-17 | Stop reason: SDUPTHER

## 2024-08-17 RX ORDER — ROCURONIUM BROMIDE 10 MG/ML
INJECTION, SOLUTION INTRAVENOUS PRN
Status: DISCONTINUED | OUTPATIENT
Start: 2024-08-17 | End: 2024-08-17 | Stop reason: SDUPTHER

## 2024-08-17 RX ORDER — PROPOFOL 10 MG/ML
INJECTION, EMULSION INTRAVENOUS PRN
Status: DISCONTINUED | OUTPATIENT
Start: 2024-08-17 | End: 2024-08-17 | Stop reason: SDUPTHER

## 2024-08-17 RX ORDER — METRONIDAZOLE 500 MG/1
500 TABLET ORAL EVERY 8 HOURS SCHEDULED
Status: DISCONTINUED | OUTPATIENT
Start: 2024-08-17 | End: 2024-08-18

## 2024-08-17 RX ORDER — ONDANSETRON 2 MG/ML
4 INJECTION INTRAMUSCULAR; INTRAVENOUS
Status: DISCONTINUED | OUTPATIENT
Start: 2024-08-17 | End: 2024-08-17 | Stop reason: HOSPADM

## 2024-08-17 RX ORDER — 0.9 % SODIUM CHLORIDE 0.9 %
500 INTRAVENOUS SOLUTION INTRAVENOUS ONCE
Status: COMPLETED | OUTPATIENT
Start: 2024-08-17 | End: 2024-08-17

## 2024-08-17 RX ORDER — NALOXONE HYDROCHLORIDE 0.4 MG/ML
INJECTION, SOLUTION INTRAMUSCULAR; INTRAVENOUS; SUBCUTANEOUS PRN
Status: DISCONTINUED | OUTPATIENT
Start: 2024-08-17 | End: 2024-08-17 | Stop reason: HOSPADM

## 2024-08-17 RX ORDER — ERGOCALCIFEROL 1.25 MG/1
50000 CAPSULE, LIQUID FILLED ORAL WEEKLY
Status: DISCONTINUED | OUTPATIENT
Start: 2024-08-17 | End: 2024-08-29 | Stop reason: HOSPADM

## 2024-08-17 RX ORDER — ONDANSETRON 2 MG/ML
INJECTION INTRAMUSCULAR; INTRAVENOUS PRN
Status: DISCONTINUED | OUTPATIENT
Start: 2024-08-17 | End: 2024-08-17 | Stop reason: SDUPTHER

## 2024-08-17 RX ORDER — SODIUM CHLORIDE 0.9 % (FLUSH) 0.9 %
5-40 SYRINGE (ML) INJECTION PRN
Status: DISCONTINUED | OUTPATIENT
Start: 2024-08-17 | End: 2024-08-17 | Stop reason: HOSPADM

## 2024-08-17 RX ORDER — MEPERIDINE HYDROCHLORIDE 25 MG/ML
12.5 INJECTION INTRAMUSCULAR; INTRAVENOUS; SUBCUTANEOUS AS NEEDED
Status: DISCONTINUED | OUTPATIENT
Start: 2024-08-17 | End: 2024-08-17 | Stop reason: HOSPADM

## 2024-08-17 RX ORDER — SODIUM CHLORIDE 9 MG/ML
INJECTION, SOLUTION INTRAVENOUS CONTINUOUS PRN
Status: DISCONTINUED | OUTPATIENT
Start: 2024-08-17 | End: 2024-08-17 | Stop reason: SDUPTHER

## 2024-08-17 RX ORDER — SODIUM CHLORIDE 0.9 % (FLUSH) 0.9 %
5-40 SYRINGE (ML) INJECTION EVERY 12 HOURS SCHEDULED
Status: DISCONTINUED | OUTPATIENT
Start: 2024-08-17 | End: 2024-08-17 | Stop reason: HOSPADM

## 2024-08-17 RX ORDER — SODIUM CHLORIDE 9 MG/ML
INJECTION, SOLUTION INTRAVENOUS PRN
Status: DISCONTINUED | OUTPATIENT
Start: 2024-08-17 | End: 2024-08-17 | Stop reason: HOSPADM

## 2024-08-17 RX ORDER — LIDOCAINE HYDROCHLORIDE 20 MG/ML
INJECTION, SOLUTION EPIDURAL; INFILTRATION; INTRACAUDAL; PERINEURAL PRN
Status: DISCONTINUED | OUTPATIENT
Start: 2024-08-17 | End: 2024-08-17 | Stop reason: SDUPTHER

## 2024-08-17 RX ORDER — SODIUM CHLORIDE 9 MG/ML
INJECTION, SOLUTION INTRAVENOUS PRN
Status: DISCONTINUED | OUTPATIENT
Start: 2024-08-17 | End: 2024-08-17 | Stop reason: SDUPTHER

## 2024-08-17 RX ORDER — FAMOTIDINE 10 MG/ML
INJECTION, SOLUTION INTRAVENOUS PRN
Status: DISCONTINUED | OUTPATIENT
Start: 2024-08-17 | End: 2024-08-17 | Stop reason: SDUPTHER

## 2024-08-17 RX ORDER — HYDRALAZINE HYDROCHLORIDE 20 MG/ML
10 INJECTION INTRAMUSCULAR; INTRAVENOUS
Status: DISCONTINUED | OUTPATIENT
Start: 2024-08-17 | End: 2024-08-17 | Stop reason: HOSPADM

## 2024-08-17 RX ORDER — HYDROMORPHONE HYDROCHLORIDE 1 MG/ML
0.25 INJECTION, SOLUTION INTRAMUSCULAR; INTRAVENOUS; SUBCUTANEOUS EVERY 5 MIN PRN
Status: DISCONTINUED | OUTPATIENT
Start: 2024-08-17 | End: 2024-08-17 | Stop reason: HOSPADM

## 2024-08-17 RX ADMIN — FENTANYL CITRATE 100 MCG: 50 INJECTION, SOLUTION INTRAMUSCULAR; INTRAVENOUS at 08:15

## 2024-08-17 RX ADMIN — LIDOCAINE HYDROCHLORIDE 40 MG: 20 INJECTION, SOLUTION EPIDURAL; INFILTRATION; INTRACAUDAL; PERINEURAL at 08:19

## 2024-08-17 RX ADMIN — FAMOTIDINE 20 MG: 10 INJECTION, SOLUTION INTRAVENOUS at 08:15

## 2024-08-17 RX ADMIN — PROPOFOL 140 MG: 10 INJECTION, EMULSION INTRAVENOUS at 08:19

## 2024-08-17 RX ADMIN — CEFEPIME 1000 MG: 1 INJECTION, POWDER, FOR SOLUTION INTRAMUSCULAR; INTRAVENOUS at 20:35

## 2024-08-17 RX ADMIN — ONDANSETRON 4 MG: 2 INJECTION INTRAMUSCULAR; INTRAVENOUS at 08:15

## 2024-08-17 RX ADMIN — SODIUM CHLORIDE 500 ML: 9 INJECTION, SOLUTION INTRAVENOUS at 12:21

## 2024-08-17 RX ADMIN — METRONIDAZOLE 500 MG: 500 TABLET ORAL at 15:53

## 2024-08-17 RX ADMIN — SUGAMMADEX 200 MG: 100 INJECTION, SOLUTION INTRAVENOUS at 08:52

## 2024-08-17 RX ADMIN — METRONIDAZOLE 500 MG: 500 INJECTION, SOLUTION INTRAVENOUS at 03:26

## 2024-08-17 RX ADMIN — SODIUM CHLORIDE: 900 INJECTION, SOLUTION INTRAVENOUS at 08:09

## 2024-08-17 RX ADMIN — SODIUM CHLORIDE, PRESERVATIVE FREE 10 ML: 5 INJECTION INTRAVENOUS at 20:33

## 2024-08-17 RX ADMIN — SODIUM ZIRCONIUM CYCLOSILICATE 10 G: 10 POWDER, FOR SUSPENSION ORAL at 15:00

## 2024-08-17 RX ADMIN — FLUOXETINE HYDROCHLORIDE 10 MG: 10 CAPSULE ORAL at 15:53

## 2024-08-17 RX ADMIN — ROCURONIUM BROMIDE 50 MG: 10 INJECTION, SOLUTION INTRAVENOUS at 08:19

## 2024-08-17 ASSESSMENT — LIFESTYLE VARIABLES: SMOKING_STATUS: 1

## 2024-08-17 NOTE — PLAN OF CARE
Problem: Discharge Planning  Goal: Discharge to home or other facility with appropriate resources  8/16/2024 2357 by Tiffany Nelson RN  Outcome: Progressing  Flowsheets (Taken 8/16/2024 2357)  Discharge to home or other facility with appropriate resources:   Identify barriers to discharge with patient and caregiver   Identify discharge learning needs (meds, wound care, etc)   Refer to discharge planning if patient needs post-hospital services based on physician order or complex needs related to functional status, cognitive ability or social support system     Problem: Skin/Tissue Integrity  Goal: Absence of new skin breakdown  Description: 1.  Monitor for areas of redness and/or skin breakdown  2.  Assess vascular access sites hourly  3.  Every 4-6 hours minimum:  Change oxygen saturation probe site  4.  Every 4-6 hours:  If on nasal continuous positive airway pressure, respiratory therapy assess nares and determine need for appliance change or resting period.  8/16/2024 2357 by Tiffany Nelson RN  Outcome: Progressing     Problem: Safety - Adult  Goal: Free from fall injury  8/16/2024 2357 by Tiffany Nelson RN  Flowsheets (Taken 8/16/2024 2357)  Free From Fall Injury:   Instruct family/caregiver on patient safety   Based on caregiver fall risk screen, instruct family/caregiver to ask for assistance with transferring infant if caregiver noted to have fall risk factors

## 2024-08-17 NOTE — PROGRESS NOTES
Internal Medicine Progress Note    Patient Name: Serafin Weaver   Patient : 1980   Date: 2024   Admit Date: 8/15/2024     CC: One day fatigue s/p x3 missed HD sessions     Interval History     Heparin drip started overnight.  Patient denies change in baseline level of pain.  He endorses nausea, denies vomiting overnight.  N.p.o. since midnight pending OR this morning with podiatry.  Patient is having regular bowel movements and voiding spontaneously. Denies diarrhea.  He denies ambulating.  He denies fever and chills.  He also denies chest pain and shortness of breath.  Patient understands he is going to the OR today.    Update: Patient hypotensive SBP 80s s/p OR with drop in Hg from 7.1 to 6.3. Transfusing 1 u pRBC and giving bolus. Discontinued hep gtt.      ROS   Review of Systems   Constitutional:  Negative for chills and fever.   Respiratory:  Negative for cough and shortness of breath.    Cardiovascular:  Negative for chest pain.   Gastrointestinal:  Positive for nausea. Negative for abdominal distention, abdominal pain, diarrhea and vomiting.   Genitourinary:  Negative for dysuria.   Skin:  Positive for color change and wound.   Neurological:  Negative for light-headedness.          Objective     I/Os:  I/O last 3 completed shifts:  In: 1036.3 [P.O.:365; Blood:671.3]  Out: -       Vital Signs:  Patient Vitals for the past 8 hrs:   BP Temp Temp src Pulse Resp SpO2   24 1307 (!) 81/52 -- -- -- -- --   24 1304 (!) 78/55 97.7 °F (36.5 °C) Axillary 69 16 92 %   24 1254 (!) 87/54 97.6 °F (36.4 °C) Axillary 71 20 91 %   24 1247 -- 97.5 °F (36.4 °C) -- 72 20 94 %   24 1246 (!) 95/57 97.5 °F (36.4 °C) Oral 72 20 94 %   24 1059 (!) 87/54 97.7 °F (36.5 °C) Oral 71 14 95 %   24 0930 (!) 96/48 -- -- 88 -- 98 %   24 0915 (!) 95/44 -- -- 78 -- 100 %   24 (!) 94/ 97.6 °F (36.4 °C) Temporal 71 13 100 %   24 06 -- -- -- 80 -- --

## 2024-08-17 NOTE — CONSULTS
Infectious Diseases   Consult Note      Reason for Consult: right foot wound s/p TMA  Requesting Physician: Dr. Obando     Date of Admission: 8/15/2024  Subjective:   CHIEF COMPLAINT: lethargy    HPI:  44 y.o. AA male with history of ESRD on HD, DM II, seizure dx, depression, left TMA and right foot OM S/P recent right TMA on 7/24/24 who presented on 8/15 from nursing home with lethargy. He had missed 3 sessions of HD. The pt was evaluated by podiatry in the office and noted to have infection of TMA site.   Upon presentation, WBC was 21.6 and he was afebrile. ESR/CRP was 104/269 respectively. He was started empirically on vanco, cefepime and metronidazole. There was ulcer on the lateral aspect of the TMA stump with purulent drainage and wound probing to bone. X ray showing soft tissue swelling and minimal air in soft tissues. The patient was eval by podiatry and is s/p OR today for debridement, removal of infected bone and clearance fragment from fifth metatarsal sent for path.   BCX x2 neg to date. And right foot wound swab with 3+GNR on gram stain, no growth to date.                      Current abx: vanco, cefepime, metronidazole         Past Surgical History:       Diagnosis Date    Amputation of third toe, left, traumatic (HCC)     Anesthesia complication     has history of being aggitated and \"fights\"    Blood circulation, collateral     Cellulitis     Depression     Diabetic polyneuropathy associated with type 2 diabetes mellitus (HCC) 2/6/2019    Hypertension     Morbid obesity due to excess calories ([facility])     MRSA infection 02/04/2019    foot wound    Seizures (HCC)     Type II or unspecified type diabetes mellitus without mention of complication, not stated as uncontrolled     Unspecified cerebral artery occlusion with cerebral infarction     pt states at 14 years of age         Procedure Laterality Date    ACHILLES TENDON SURGERY Right 7/24/2024    . performed by Kailee Olsen DPM at [facility] OR    AMPUTATION   membranes, no thrush, tongue protrudes midline  NECK:  Supple, symmetrical, trachea midline, no adenopathy  LUNGS:  no increased work of breathing and clear to auscultation. No accessory muscle use  CARDIOVASCULAR: S1 and S2, no murmur  ABDOMEN:  normal bowel sounds, non-tender, non-distended, no hepatosplenomegaly  LYMPHADENOPATHY:  no axillary or supraclavicular adenopathy. No cervical adnenopathy  PSYCHIATRIC: Oriented to person place and time. No obvious depression or anxiety.  MUSCULOSKELETAL: Left TMA site well healed without evidence of ulceration. The right TMA site with sutures in place but lateral aspect ulceration with exposed bone and purulence with mild odor. Periwound necrosis. See photo from 8/16 prior to debridement:    SKIN:  changes per MSK.   NEUROLOGIC: nonfocal exam  ACCESS: PIV, right arm AVF    DATA:    Old records have been reviewed    CBC:  Recent Labs     08/15/24  1638 08/16/24  0722 08/16/24  2221   WBC 21.6* 18.4* 17.9*   RBC 2.39* 2.46* 2.67*   HGB 6.9* 7.4* 7.9*   HCT 22.0* 22.2* 24.2*    223 211   MCV 92.1 90.5 90.6   MCH 28.7 30.1 29.8   MCHC 31.2 33.2 32.9   RDW 15.5* 15.3 15.3      BMP:  Recent Labs     08/15/24  1638 08/16/24  0240 08/16/24  0722   *  --  134*   K 5.6* 4.9 5.2*   CL 92*  --  94*   CO2 26  --  24   BUN 68*  --  77*   CREATININE 14.5*  --  16.2*   CALCIUM 9.4  --  9.1   GLUCOSE 247*  --  157*        Cultures:   Blood cultures x 2 8/15: neg  Wound cx right foot 8/16: pending, gram stain with GNR      Radiology Review:  All pertinent images / reports were reviewed as a part of this visit.     Right foot x ray 8/15:    Interval transmetatarsal amputation proximal aspect first through fifth metatarsals. Minimal air within the soft tissues near the first metatarsal probably postoperative.. Some soft tissue swelling distally. No acute fracture. No osseous destructive lesions.    CT head 8/15:  1. Age-related cortical atrophy  2. No acute intracranial  was 104/269 respectively. He was started empirically on vanco, cefepime and metronidazole.   - X ray showing soft tissue swelling and minimal air in soft tissues.   - The patient was eval by podiatry and is s/p OR today for debridement. Appreciate deep cx intra-op and bone bx. patient will need further debridement in coming days  - bcx remains neg to date. Wound cx showing GNR on gram stain   - on vanco, cefepime and metronidazole, continue this pending cx  - final abx plan pending depth of infection and cx results  - closely monitor vancomcyin and dose per goal trough 15-20 to avoid toxicity      ESRD on HD:   - non compliance with HD with missed sessions  - nephro following  - renally dose adjust abx as needed.   - closely monitor vancomcyin and dose per goal trough 15-20 to avoid toxicity      DM2:  - Good glycemic control to aide in healing and prevention of further infection  - A1c last month 5.8     Medical Decision Making:  The following items were considered in medical decision making:  Discussion of patient care with other providers  Reviewed clinical lab tests  Reviewed radiology tests  Reviewed other diagnostic tests/interventions  Independent review of radiologic images  Microbiology cultures and other micro tests reviewed      Risk of Complications/Morbidity: High   Illness(es)/ Infection present that pose threat to bodily function.   There is potential for severe exacerbation of infection/side effects of treatment.  Therapy requires intensive monitoring for antimicrobial agent toxicity    Discussed with pt and primary team, Dr. Obando and podiatry resident.   Chaya Valencia MD

## 2024-08-17 NOTE — PROGRESS NOTES
[facility] -  Clinical Pharmacy Note    Vancomycin - Management by Pharmacy    Consult Date(s): 8/16  Consulting Provider(s): Dr. Eric Ngo    Assessment / Plan  Sepsis of Unknown Etiology - Vancomycin  Concurrent Antimicrobials: Cefepime, metronidazole  Day of Vanc Therapy / Ordered Duration: 3/7  Current Dosing Method: Intermittent Dosing by Levels  Therapeutic Goal: Trough ~ 15 mg/L  Current Dose / Plan:   Hx ESRD on dialysis - will dose intermittent  Dialysis 8/16  Received loading dose 2500 mg (~18 mg/kg) IV x1 8/15  Level today 22. Will hold dose today and recheck level 8/19 in AM  Will continue to monitor clinical condition and make adjustments to regimen as appropriate.    Thank you for consulting pharmacy,    Please call with any Shanita evangelista, PharmD  PGY1 Pharmacy Resident  Wireless: [phone]  8/17/2024 11:27 AM        Interval update:   Dialysis 8/16; 10-hour random 35.5    Subjective/Objective:   Serafin Weaver is a 44 y.o. male with a PMHx significant for ESRD (on HD), DM, R foot osteomyelitis s/p TMA (7/24), CVA (9/23) who presented with increased fatigue, diarrhea, purulent discharge from right lower extremity wound. Patient was afebrile on admission. Suspecting possible concurrent C.diff infection (being treated outpatient).    Pharmacy is consulted to dose vancomycin.    Ht Readings from Last 1 Encounters:   08/15/24 2.032 m (6' 8\")     Wt Readings from Last 1 Encounters:   08/17/24 131.5 kg (290 lb)     Current & Prior Antimicrobial Regimen(s):  Vancomycin 2500 mg IV x1 (8/15)  To be dose intermittently x7 days  Cefepime 2 g EI IV Q24H (8/15-8/23)  Metronidazole 500 mg IV Q8H (8/15-current)    Vancomycin Level(s) / Doses:    Date Time Dose Type of Level / Level Interpretation   8/16 0722 2500 mg ( 8/15 2110) 35.5 - Random 10-hr random; planning to receive dialysis today; will plan to redose post-dialysis   8/17 0723  22.0 - Random 34-hr random; above goal of 15-20.

## 2024-08-17 NOTE — ANESTHESIA PRE PROCEDURE
Nursing notes reviewed   history of anesthetic complications (fights upon emergence):   Airway: Mallampati: II  TM distance: >3 FB   Neck ROM: full  Mouth opening: > = 3 FB   Dental:    (+) upper dentures and poor dentition      Pulmonary:Negative Pulmonary ROS and normal exam    (+)           current smoker (5 pk yrs   cigars)                           Cardiovascular:  Exercise tolerance: poor (<4 METS)  (+) hypertension: moderate      NYHA Classification: III  ECG reviewed               Beta Blocker:  Dose within 24 Hrs         Neuro/Psych:   (+) seizures: well controlled, CVA (at age 14):, neuromuscular disease:, psychiatric history:            GI/Hepatic/Renal:   (+) renal disease: ESRD and dialysis, morbid obesity     (-) GERD       Endo/Other:    (+) DiabetesType II DM, poorly controlled, using insulin.                 Abdominal:   (+) obese          Vascular: negative vascular ROS.         Other Findings:       Anesthesia Plan      general     ASA 4 - emergent       Induction: intravenous.    MIPS: Postoperative opioids intended and Prophylactic antiemetics administered.  Anesthetic plan and risks discussed with patient.        Attending anesthesiologist reviewed and agrees with Preprocedure content            Ralf Johnson DO   8/17/2024

## 2024-08-17 NOTE — ANESTHESIA POSTPROCEDURE EVALUATION
Department of Anesthesiology  Postprocedure Note    Patient: Serafin Weaver  MRN: [id number]  YOB: 1980  Date of evaluation: 8/17/2024    Procedure Summary       Date: 08/17/24 Room / Location: [address]    Anesthesia Start: 0809 Anesthesia Stop: 0909    Procedure: RIGHT FOOT INCISION AND DRAINAGE WITH REMOVAL OF ALL NONVIABLE TISSUE AND BONE (Right: Foot) Diagnosis:       Diabetic foot infection (HCC)      (Diabetic foot infection (HCC) [E11.628, L08.9])    Surgeons: Roverto Tanner DPM Responsible Provider: Ralf Johnson DO    Anesthesia Type: General ASA Status: 4 - Emergent            Anesthesia Type: General    Teresa Phase I: Teresa Score: 10    Teresa Phase II:      Anesthesia Post Evaluation    Patient location during evaluation: PACU  Patient participation: complete - patient participated  Level of consciousness: awake and alert  Pain score: 0  Airway patency: patent  Nausea & Vomiting: no nausea and no vomiting  Cardiovascular status: hemodynamically stable  Respiratory status: acceptable  Hydration status: stable  Pain management: adequate and satisfactory to patient    No notable events documented.

## 2024-08-17 NOTE — ANESTHESIA POSTPROCEDURE EVALUATION
Department of Anesthesiology  Postprocedure Note    Patient: Serafin Weaver  MRN: [id number]  YOB: 1980  Date of evaluation: 8/17/2024    Procedure Summary       Date: 08/17/24 Room / Location: [place] [address] / [facility]    Anesthesia Start: 0809 Anesthesia Stop: 0909    Procedure: RIGHT FOOT INCISION AND DRAINAGE WITH REMOVAL OF ALL NONVIABLE TISSUE AND BONE (Right: Foot) Diagnosis:       Diabetic foot infection (HCC)      (Diabetic foot infection (HCC) [E11.628, L08.9])    Surgeons: Roverto Tanner DPM Responsible Provider: Ralf Johnson DO    Anesthesia Type: General ASA Status: 4 - Emergent            Anesthesia Type: General    Teresa Phase I:      Teresa Phase II:      Anesthesia Post Evaluation    Patient location during evaluation: PACU  Patient participation: complete - patient participated  Level of consciousness: awake and alert  Pain score: 0  Airway patency: patent  Nausea & Vomiting: no nausea and no vomiting  Cardiovascular status: hemodynamically stable  Respiratory status: acceptable  Hydration status: stable  Pain management: adequate and satisfactory to patient    No notable events documented.

## 2024-08-17 NOTE — FLOWSHEET NOTE
PACU Transfer Note    Vitals:    08/17/24 0930   BP: (!) 96/48   Pulse: 88   Resp: 16   Temp: 97.6   SpO2: 98%       In: 200 [I.V.:200]  Out: 150     Pain assessment:  none   Pt transported back to room in bed on 3 lpm NC.   Per Bin, surgery tech.   Pain Level: 0    Report given to Receiving unit RN.    8/17/2024 9:43 AM

## 2024-08-17 NOTE — PROGRESS NOTES
Pt arrived from OR on bed with 3 lpm NC.  Pt appears comfortable at this time with no sob noted.  Report from Dr. Johnson and OR RN was negative for issues during procedure.   PROCEDURES  RIGHT FOOT INCISION AND DRAINAGE WITH REMOVAL OF ALL NONVIABLE TISSUE AND BONE - Right  Roverto Tanner DPM

## 2024-08-17 NOTE — BRIEF OP NOTE
Brief Postoperative Note      Patient: Serafin Weaver  YOB: 1980  MRN: [id number]    Date of Procedure: 8/17/2024    Pre-Op Diagnosis Codes:      * Diabetic foot infection (HCC) [E11.628, L08.9]    Post-Op Diagnosis: Same       Procedure(s):  RIGHT FOOT INCISION AND DRAINAGE WITH REMOVAL OF ALL NONVIABLE TISSUE AND BONE    Surgeon(s):  Roverto Tanner DPM    Assistant:  Resident: Frida Hartley DPM    Anesthesia: General    Estimated Blood Loss (mL): less than 150     Hemostasis: anatomic dissection and electrocautery    Injectables: Pre-Op 10 cc of 0.5 % Marcaine plain    Materials: 2-0 Nylon    Implants: Surgicel dressing    Complications: None    Specimens:   ID Type Source Tests Collected by Time Destination   A : 5TH METATARSAL RIGHT FOOT, MARKING PROXIMAL SURFACE Tissue Tissue SURGICAL PATHOLOGY Roverto Tanner DPM 8/17/2024 0842        Implants:  * No implants in log *      Drains: * No LDAs found *    Findings:  Infection Present At Time Of Surgery (PATOS) (choose all levels that have infection present):  - Organ Space infection (below fascia) present as evidenced by osteomyelitis  Other Findings: See operative note    DISPO: S/P I&D right foot with removal of bone. Recommend patient continue on IV antibiotics. Procedure was not felt to be definitive. Patient will require another more definitive procedure during this admission. Clearance fragment sent to pathology from the 5th metatarsal.     Electronically signed by Frida Hartley DPM on 8/17/2024 at 8:59 AM

## 2024-08-18 LAB
ALBUMIN SERPL-MCNC: 2.8 G/DL (ref 3.4–5)
ALBUMIN SERPL-MCNC: 4.3 G/DL (ref 3.4–5)
ALBUMIN/GLOB SERPL: 0.6 {RATIO} (ref 1.1–2.2)
ALP SERPL-CCNC: 161 U/L (ref 40–129)
ALP SERPL-CCNC: 69 U/L (ref 40–129)
ALT SERPL-CCNC: 255 U/L (ref 10–40)
ALT SERPL-CCNC: 53 U/L (ref 10–40)
ANION GAP SERPL CALCULATED.3IONS-SCNC: 14 MMOL/L (ref 3–16)
APTT BLD: 37.6 SEC (ref 22.1–36.4)
APTT BLD: 74.2 SEC (ref 22.1–36.4)
AST SERPL-CCNC: 112 U/L (ref 15–37)
AST SERPL-CCNC: 24 U/L (ref 15–37)
BASOPHILS # BLD: 0.1 K/UL (ref 0–0.2)
BASOPHILS NFR BLD: 0.3 %
BILIRUB DIRECT SERPL-MCNC: 0.2 MG/DL (ref 0–0.3)
BILIRUB INDIRECT SERPL-MCNC: 1 MG/DL (ref 0–1)
BILIRUB SERPL-MCNC: 0.7 MG/DL (ref 0–1)
BILIRUB SERPL-MCNC: 1.2 MG/DL (ref 0–1)
BUN SERPL-MCNC: 54 MG/DL (ref 7–20)
CALCIUM SERPL-MCNC: 8.9 MG/DL (ref 8.3–10.6)
CHLORIDE SERPL-SCNC: 98 MMOL/L (ref 99–110)
CO2 SERPL-SCNC: 24 MMOL/L (ref 21–32)
CREAT SERPL-MCNC: 11.4 MG/DL (ref 0.9–1.3)
DEPRECATED RDW RBC AUTO: 16.1 % (ref 12.4–15.4)
EOSINOPHIL # BLD: 0.2 K/UL (ref 0–0.6)
EOSINOPHIL NFR BLD: 1.2 %
GFR SERPLBLD CREATININE-BSD FMLA CKD-EPI: 5 ML/MIN/{1.73_M2}
GLUCOSE BLD-MCNC: 152 MG/DL (ref 70–99)
GLUCOSE BLD-MCNC: 157 MG/DL (ref 70–99)
GLUCOSE BLD-MCNC: 180 MG/DL (ref 70–99)
GLUCOSE SERPL-MCNC: 181 MG/DL (ref 70–99)
HCT VFR BLD AUTO: 26.7 % (ref 40.5–52.5)
HGB BLD-MCNC: 8.4 G/DL (ref 13.5–17.5)
LYMPHOCYTES # BLD: 1.4 K/UL (ref 1–5.1)
LYMPHOCYTES NFR BLD: 8.8 %
MAGNESIUM SERPL-MCNC: 2 MG/DL (ref 1.8–2.4)
MCH RBC QN AUTO: 29.8 PG (ref 26–34)
MCHC RBC AUTO-ENTMCNC: 31.5 G/DL (ref 31–36)
MCV RBC AUTO: 94.6 FL (ref 80–100)
MONOCYTES # BLD: 1.1 K/UL (ref 0–1.3)
MONOCYTES NFR BLD: 6.7 %
NEUTROPHILS # BLD: 13.6 K/UL (ref 1.7–7.7)
NEUTROPHILS NFR BLD: 83 %
PERFORMED ON: ABNORMAL
PLATELET # BLD AUTO: 204 K/UL (ref 135–450)
PMV BLD AUTO: 8.3 FL (ref 5–10.5)
POTASSIUM SERPL-SCNC: 4.8 MMOL/L (ref 3.5–5.1)
PROT SERPL-MCNC: 5.9 G/DL (ref 6.4–8.2)
PROT SERPL-MCNC: 7.4 G/DL (ref 6.4–8.2)
RBC # BLD AUTO: 2.83 M/UL (ref 4.2–5.9)
SODIUM SERPL-SCNC: 136 MMOL/L (ref 136–145)
WBC # BLD AUTO: 16.4 K/UL (ref 4–11)

## 2024-08-18 PROCEDURE — 6370000000 HC RX 637 (ALT 250 FOR IP)

## 2024-08-18 PROCEDURE — 99232 SBSQ HOSP IP/OBS MODERATE 35: CPT | Performed by: INTERNAL MEDICINE

## 2024-08-18 PROCEDURE — 1200000000 HC SEMI PRIVATE

## 2024-08-18 PROCEDURE — 2060000000 HC ICU INTERMEDIATE R&B

## 2024-08-18 PROCEDURE — 36415 COLL VENOUS BLD VENIPUNCTURE: CPT

## 2024-08-18 PROCEDURE — 85025 COMPLETE CBC W/AUTO DIFF WBC: CPT

## 2024-08-18 PROCEDURE — 83735 ASSAY OF MAGNESIUM: CPT

## 2024-08-18 PROCEDURE — 2580000003 HC RX 258

## 2024-08-18 PROCEDURE — 85730 THROMBOPLASTIN TIME PARTIAL: CPT

## 2024-08-18 PROCEDURE — 6360000002 HC RX W HCPCS: Performed by: HOSPITALIST

## 2024-08-18 PROCEDURE — 80053 COMPREHEN METABOLIC PANEL: CPT

## 2024-08-18 PROCEDURE — 2580000003 HC RX 258: Performed by: HOSPITALIST

## 2024-08-18 RX ADMIN — MEROPENEM 1000 MG: 1 INJECTION INTRAVENOUS at 10:00

## 2024-08-18 RX ADMIN — METRONIDAZOLE 500 MG: 500 TABLET ORAL at 05:53

## 2024-08-18 RX ADMIN — CARVEDILOL 6.25 MG: 6.25 TABLET, FILM COATED ORAL at 22:28

## 2024-08-18 RX ADMIN — SODIUM CHLORIDE, PRESERVATIVE FREE 10 ML: 5 INJECTION INTRAVENOUS at 22:28

## 2024-08-18 RX ADMIN — SEVELAMER CARBONATE 1600 MG: 800 TABLET, FILM COATED ORAL at 10:00

## 2024-08-18 RX ADMIN — SODIUM CHLORIDE, PRESERVATIVE FREE 10 ML: 5 INJECTION INTRAVENOUS at 08:00

## 2024-08-18 RX ADMIN — SEVELAMER CARBONATE 1600 MG: 800 TABLET, FILM COATED ORAL at 11:09

## 2024-08-18 RX ADMIN — CLOPIDOGREL BISULFATE 75 MG: 75 TABLET ORAL at 10:00

## 2024-08-18 RX ADMIN — FLUOXETINE HYDROCHLORIDE 10 MG: 10 CAPSULE ORAL at 10:00

## 2024-08-18 RX ADMIN — SEVELAMER CARBONATE 1600 MG: 800 TABLET, FILM COATED ORAL at 17:31

## 2024-08-18 NOTE — PROGRESS NOTES
Internal Medicine Progress Note    Patient Name: Serafin Weaver   Patient : 1980   Date: 2024   Admit Date: 8/15/2024     CC: One day fatigue s/p x3 missed HD sessions     Interval History     Reported improvement in mental status overnight.  Patient denies any pain.  He also denies feeling lightheaded.  He denies nausea and vomiting.  He is tolerating a diet.  Endorses regular bowel movements and voiding spontaneously.  Ambulating with assistance.  He denies fever and chills.  He also denies chest pain and shortness of breath.  Morning labs pending.      ROS   Review of Systems   Constitutional:  Negative for chills and fever.   Respiratory:  Negative for cough and shortness of breath.    Cardiovascular:  Negative for chest pain.   Gastrointestinal:  Negative for abdominal distention, abdominal pain, diarrhea and vomiting.   Genitourinary:  Negative for dysuria.   Skin:  Positive for color change and wound.   Neurological:  Negative for dizziness, syncope and light-headedness.   Psychiatric/Behavioral:  Positive for confusion.           Objective     I/Os:  I/O last 3 completed shifts:  In: 450 [P.O.:250; I.V.:200]  Out: 150 [Blood:150]      Vital Signs:  Patient Vitals for the past 8 hrs:   BP Temp Temp src Pulse Resp SpO2   24 1107 111/66 98.2 °F (36.8 °C) Oral 74 18 94 %   24 0945 (!) 110/58 98.4 °F (36.9 °C) Oral 71 18 95 %       Physical Exam:  Physical Exam  Vitals reviewed.   Constitutional:       General: He is not in acute distress.     Appearance: He is obese. He is ill-appearing.   HENT:      Head: Normocephalic.      Right Ear: External ear normal.      Left Ear: External ear normal.      Nose: Nose normal.      Mouth/Throat:      Mouth: Mucous membranes are moist.   Eyes:      Extraocular Movements: Extraocular movements intact.      Pupils: Pupils are equal, round, and reactive to light.   Cardiovascular:      Rate and Rhythm: Bradycardia present.      Heart sounds:  intermittent hypotension, possibly secondary to oozing from right foot wound and hypovolemia  The patient presented with Hg 6.9 from baseline of 8-10  -Yesterday patient was hypotensive SBP 80s s/p OR w/ podiatry. His Hg also dropped from 7.1 to 6.3. Patient responded well to 1 u pRBC and IVF bolus. Hep gtt was discontinued for initial concerns of bleeding at that time.  -AM Hg pending, Hg 7.6 from 6.3 last night s/p 1 u pRBC  -Transfuse 1 u pRBC of if AM Hg <7  -Restart Hep gtt if AM Hg is stable  -Continue with retacrit during HD to keep Hg closer to 10 per nephrology  -Ferritin 23,989, TS 52%, suspected 2/2 sepsis, rec f/u outpatient when discharged to rule out hemochromatosis   -F/u AM CBC, trend Hg daily    Sepsis related acute hepatocellular transaminitis  -AST pending, 808 yesterday, AM labs pending  -ALT pending  526 yesterday, AM labs pending  -Acetaminophen level <5, Salicyclic acid <0.3  -Stop tylenol and statins  -UDS negative  -Hepatitis panel pending, f/u results  -PT/INR 1.42/17.6 on admission, home eliquis was held at that time  -RUQ US cholelithiasis, no cholecystitis  -F/u US Abdomen Pelvis Retro Scrotal negative for hepatic vein thrombosis  -F/u AM LFTs, trend daily    Elevated troponin, asymptomatic  -Trop on presentation 150, stable at 149 on repeat  -Elevated Trop with PMH ESRD, no plans to continue trending  -EKG without ischemic changes    Chronic conditions  Type II DM   -Hg A1c 5.8 07/2024  -Blood glucose on admission 247  -SSI with glucose checks  -Hypoglycemic protocol in place    Hypertension  -On home Carvedilol 6.25 mg - continued    DVT Ppx: Hep gtt discontinued, +/- restart Hep gtt pending AM Hg  Diet:  ADULT DIET; Regular; 3 carb choices (45 gm/meal); Low Fat/Low Chol/High Fiber/2 gm Na; Low Phosphorus (Less than 1000 mg); 60 to 80 gm  ADULT ORAL NUTRITION SUPPLEMENT; Breakfast, Lunch, Dinner; Wound Healing Oral Supplement   Code status:  Full Code     ELOS: 3  Barriers to

## 2024-08-18 NOTE — OP NOTE
Operative Note      Patient: Serafin Weaver  YOB: 1980  MRN: [id number]     Date of Procedure: 8/17/2024     Pre-Op Diagnosis Codes:      * Diabetic foot infection (HCC) [E11.628, L08.9]     Post-Op Diagnosis: Same       Procedure(s):  RIGHT FOOT INCISION AND DRAINAGE WITH REMOVAL OF ALL NONVIABLE TISSUE AND BONE     Surgeon(s):  Roverto Tanner DPM     Assistant:  Resident: Frida Hartley DPM     Anesthesia: General     Estimated Blood Loss (mL): less than 150      Hemostasis: anatomic dissection and electrocautery     Injectables: Pre-Op 10 cc of 0.5 % Marcaine plain     Materials: 2-0 Nylon     Implants: Surgicel dressing     Complications: None     Specimens:   ID Type Source Tests Collected by Time Destination   A : 5TH METATARSAL RIGHT FOOT, MARKING PROXIMAL SURFACE Tissue Tissue SURGICAL PATHOLOGY Roverto Tanner DPM 8/17/2024 0842           Implants:  * No implants in log *      Drains: * No LDAs found *     Findings:  Infection Present At Time Of Surgery (PATOS) (choose all levels that have infection present):  - Organ Space infection (below fascia) present as evidenced by osteomyelitis  Other Findings: See operative note       INDICATIONS FOR PROCEDURE: This patient has signs and symptoms clinically and radiographically consistent with the above mentioned preoperative diagnosis. Having failed conservative treatment, it was determined that the patient would benefit from surgical intervention. All potential risks, benefits, and complications were discussed with the patient prior to the scheduling of surgery. All the patient's questions were answered and no guarantees were given. The patient wished to proceed with surgery, and informed written consent was obtained.     DETAILS OF PROCEDURE: The patient was brought from the pre-operative area and placed on the operating table in the supine position. Following IV sedation, a local anesthetic block was then injected proximal to the  incision site consisting of 10 cc of 0.5% Marcaine plain. The right lower extremity was then scrubbed, prepped, and draped in the usual sterile fashion. A time-out was performed. The patient, procedure, and operative site were confirmed.     PROCEDURE #1: RIGHT FOOT INCISION AND DRAINAGE WITH REMOVAL OF ALL NONVIABLE TISSUE AND BONE:  At this time, attention was directed to the patients right foot where over the distal lateral aspect of the previous TMA incision a full thickness ulceration was appreciated with the 5th metatarsal noted protruding from the wound. The wound was noted to tunnel along the plantar aspect of the foot medially. Approximally 4 cc of purulent drainage was expressed. At this time, an incision was made over the lateral aspect of the remaining 5th metatarsal using a number 15 blade. A small stab incision was made at the plantar medial midfoot to allow drainage. At this time the periosteum was freed from the distal 5th metatarsal to allow for resection of the distal aspect. A sagittal saw was then used to resect approximately 1 cm of bone.  The bone was passed to the back table and the proximal end was marked and sent to pathology. The bone was noted to be hard and have healthy bleeding. At this time, blunt dissection was carried down over the plantar aspect of the foot. The wound was irrigated copiously with normal sterile saline via pulse lavage. Upon completion of this hemostasis was achieved with electrocauterization and Surgicel dressing. Once hemostasis was achieved the wound was gently packed with gauze fluffs. A retention suture was placed laterally to prevent retraction of the skin flap over the 5th metatarsal.     At this time, a soft sterile dressing was applied consisting of dry gauze, cast padding, ABD pads, coban.     END OF PROCEDURE: The patient tolerated the procedure and anesthesia well and was transported from the operating room to the PACU with vital signs stable and vascular  status intact to all aspects of the patient's right lower extremity and digital capillary refill time immediate to the distal TMA stump of the right foot. Following a period of post-operative monitoring, the patient will be transferred back to the floor for continued medical management. The patient will require a future more definitive surgery.  The patient is to keep dressing clean, dry and intact at all times. The patient is to call if any complications occur.    This operative report was dictated on behalf of Dr. Roverto Tanner DPM.    DISPO: S/P I&D right foot with removal of bone. Recommend patient continue on IV antibiotics. Procedure was not felt to be definitive. Patient will require another more definitive procedure during this admission. Clearance fragment sent to pathology from the 5th metatarsal.     Electronically signed by Frida Hartley DPM on 8/18/2024 at 11:45 AM

## 2024-08-18 NOTE — PLAN OF CARE
Problem: Discharge Planning  Goal: Discharge to home or other facility with appropriate resources  8/18/2024 0144 by Sabine Daniels, RN  Outcome: Progressing   From [facility].  Case management following.    Problem: Skin/Tissue Integrity  Goal: Absence of new skin breakdown  Description: 1.  Monitor for areas of redness and/or skin breakdown  2.  Assess vascular access sites hourly  3.  Every 4-6 hours minimum:  Change oxygen saturation probe site  4.  Every 4-6 hours:  If on nasal continuous positive airway pressure, respiratory therapy assess nares and determine need for appliance change or resting period.  8/18/2024 0144 by Sabine Daniels, RN  Outcome: Progressing   Patient repositions self.  Refusing to assist with turns, refusing pillows under bilateral legs.      Problem: Safety - Adult  Goal: Free from fall injury  8/18/2024 0144 by Sabine Daniels, RN  Outcome: Progressing  Fall precautions in place.  Bed alarm activated, low position, wheels locked.  Call light and belongings within reach.  Continue to monitor safety.

## 2024-08-18 NOTE — PROGRESS NOTES
ID Follow-up NOTE    CC:   Right foot wound status post TMA  Antibiotics: Vanco, meropenem    Admit Date: 8/15/2024  Hospital Day: 4    Subjective:     Patient denies any fevers overnight.  Denies any foot pain.      Objective:     Patient Vitals for the past 8 hrs:   BP Temp Temp src Pulse Resp SpO2   08/18/24 1107 111/66 98.2 °F (36.8 °C) Oral 74 18 94 %   08/18/24 0945 (!) 110/58 98.4 °F (36.9 °C) Oral 71 18 95 %   08/18/24 0512 -- -- -- 68 -- --     I/O last 3 completed shifts:  In: 450 [P.O.:250; I.V.:200]  Out: 150 [Blood:150]  No intake/output data recorded.    EXAM:  GENERAL: No apparent distress.    HEENT: Membranes moist, no oral lesion  NECK:  Supple, no lymphadenopathy  LUNGS: Clear b/l, no rales, no dullness  CARDIAC: RRR, no murmur appreciated  ABD:  + BS, soft / NT  EXT:  Left TMA site well-healed without evidence of ulceration.  The right TMA site with dressing in place. See photo from 8/16 prior to debridement:    NEURO: No focal neurologic findings  PSYCH: Orientation, sensorium, mood normal  LINES:  Peripheral iv, right arm AVF       Data Review:  Lab Results   Component Value Date    WBC 18.5 (H) 08/17/2024    HGB 7.6 (L) 08/17/2024    HCT 23.8 (L) 08/17/2024    MCV 91.9 08/17/2024     08/17/2024     Lab Results   Component Value Date    CREATININE 9.2 (HH) 08/17/2024    BUN 40 (H) 08/17/2024     08/17/2024    K 4.2 08/17/2024    CL 97 (L) 08/17/2024    CO2 24 08/17/2024       Hepatic Function Panel:   Lab Results   Component Value Date/Time    ALKPHOS 153 08/17/2024 07:23 AM     08/17/2024 07:23 AM     08/17/2024 07:23 AM    BILITOT 1.2 08/17/2024 07:23 AM    BILIDIR 0.9 08/17/2024 07:23 AM    IBILI 0.3 08/17/2024 07:23 AM       MICRO:  Blood cultures x 2 8/15: neg  Wound cx right foot 8/16: ESBL Klebsiella heavy growth  Klebsiella oxytoca ESBL (1)    Antibiotic Interpretation Microscan  Method Status    ampicillin Resistant >=32 mcg/mL BACTERIAL SUSCEPTIBILITY PANEL  chloride flush  5-40 mL IntraVENous 2 times per day       Continuous Infusions:   [Held by provider] heparin (PORCINE) Infusion Stopped (08/17/24 8624)    sodium chloride      dextrose      sodium chloride         PRN Meds:  sodium chloride, [Held by provider] heparin (porcine), [Held by provider] heparin (porcine), sodium chloride, glucose, dextrose bolus **OR** dextrose bolus, glucagon (rDNA), dextrose, sodium chloride flush, sodium chloride, ondansetron **OR** ondansetron, polyethylene glycol      Assessment:       Patient Active Problem List   Diagnosis    Amputation of third toe, left, traumatic ([facility])    Type II or unspecified type diabetes mellitus without mention of complication, not stated as uncontrolled ([facility])    BMI 37.0-37.9, adult    DKA (diabetic ketoacidoses)    Scrotal abscess    Perineal abscess    Sepsis due to Streptococcus agalactiae (HCC)    Morbid obesity due to excess calories (HCC)    Hypertension    Cellulitis of foot, left    Diabetic foot infection (HCC)    Diabetic ulcer of left foot associated with type 2 diabetes mellitus, with bone involvement without evidence of necrosis (HCC)    Diabetic polyneuropathy associated with type 2 diabetes mellitus (HCC)    Uncontrolled type 2 diabetes mellitus with hyperglycemia (HCC)    Ulcer of left foot, with fat layer exposed (HCC)    Acute osteomyelitis ([facility])    Cellulitis of second toe, right    Abscess of second toe, right    Vision loss of right eye    Non-intractable vomiting    H/O arteriovenous malformation (AVM)    Hypertensive urgency    Rotator cuff tendonitis    Scapulothoracic syndrome    SLAP tear of shoulder    Vitreous hemorrhage (HCC)    Wrist sprain, left, initial encounter    Acute encephalopathy    Disorder of brain    Ischemic cerebrovascular accident (CVA) ([facility])    ESRD on dialysis (HCC)    Osteomyelitis of fifth toe of right foot (HCC)    Gas gangrene (HCC)    Infection with ESBL Klebsiella oxytoca    Sepsis ([facility])        Plan:

## 2024-08-18 NOTE — CONSULTS
Clinical Pharmacy Progress Note    Vancomycin has been discontinued. Will sign off pharmacy to dose vancomycin consult.  If medication is restarted and pharmacy is to manage dosing, please re-consult at that time.    Abram Gracia, PharmD 8/18/2024, 1:21 PM

## 2024-08-18 NOTE — PROGRESS NOTES
Podiatric Surgery Daily Progress Note  Serafin Weaver      Subjective :   Patient seen and examined this am at the bedside. Patient sleeping through morning exam but does not report any pain to his foot when asked.      Review of Systems: A 12 point review of symptoms is unremarkable with the exception of the chief complaint. Patient specifically denies nausea, fever, vomiting, chills, shortness of breath, chest pain, abdominal pain, constipation or difficulty urinating.       Objective     BP (!) 110/58   Pulse 71   Temp 98.4 °F (36.9 °C) (Oral)   Resp 18   Ht 2.032 m (6' 8\")   Wt 134.1 kg (295 lb 9.6 oz)   SpO2 95%   BMI 32.47 kg/m²      I/O:  Intake/Output Summary (Last 24 hours) at 8/18/2024 1019  Last data filed at 8/18/2024 0630  Gross per 24 hour   Intake 250 ml   Output 0 ml   Net 250 ml              Wt Readings from Last 3 Encounters:   08/18/24 134.1 kg (295 lb 9.6 oz)   08/08/24 (!) 139.3 kg (307 lb 3.2 oz)   07/30/24 (!) 153.5 kg (338 lb 6.5 oz)       LABS:    Recent Labs     08/16/24  2221 08/17/24  0723 08/17/24  1139 08/17/24  1607   WBC 17.9* 18.5*  --   --    HGB 7.9* 7.1* 6.3* 7.6*   HCT 24.2* 22.3* 20.0* 23.8*    222  --   --         Recent Labs     08/17/24  0723      K 4.2   CL 97*   CO2 24   PHOS 3.7   BUN 40*   CREATININE 9.2*        Recent Labs     08/15/24  1741 08/16/24  2221 08/17/24  0723 08/17/24  1139 08/17/24  1753   INR 1.42* 1.58*  --   --   --    APTT  --  36.9*   < > 64.8* 33.0    < > = values in this interval not displayed.           LOWER EXTREMITY EXAMINATION    Dressing to right lower extremity left clean, dry, and intact.  One small spot of strikethrough noted to external dressing on the lateral bandage.    CFT unable to be assessed to distal TMA stump.  Sensation intact proximal to dressing.  No pain with calf compression bilateral.  Patient able to perform active range of motion to ankle joint bilateral.      IMAGING:  XR right foot (8/17/24): Post  Arterial     Proximal Common Femoral Artery: Multiphasic (normal) Doppler waveforms.   Distal Common Femoral Artery: Multiphasic (normal) Doppler waveforms.   Profunda Artery: Multiphasic (normal) Doppler waveforms. .   Proximal Superficial Femoral Artery: Multiphasic (normal) Doppler waveforms.   Middle Superficial Femoral Artery: Multiphasic (normal) Doppler waveforms.   Distal Superficial Femoral Artery: Multiphasic (normal) Doppler waveforms.   Proximal Popliteal Artery: Multiphasic (normal) Doppler waveforms.   Distal Popliteal Artery: Multiphasic (normal) Doppler waveforms.   Anterior Tibial Artery: Multiphasic (normal) Doppler waveforms.   Tibial/Peroneal Trunk: Multiphasic (normal) Doppler waveforms.   Posterior Tibial Artery: Multiphasic (normal) Doppler waveforms.   Peroneal Artery: Multiphasic (normal) Doppler waveforms.     The ankle/brachial index is 0.91 (DP 98, PT 100mmHg).  Normal multiphasic flow in the common femoral artery indicates no significant aorto-iliac inflow disease.   Multiphasic flow throughout the left lower extremity without any evidence of significant focal lesions or stenosis.      XR right foot (7/18):  Findings:  Patient is status post fifth transmetatarsal amputation, in addition to chronic  amputations of the first and second digit. There is no evidence of complication.  No radiopaque foreign body is seen.  IMPRESSION:  Status post fifth transmetatarsal amputation.    ASSESSMENT/PLAN  -S/P I&D right foot with removal of bone (8/17/24)  -Full thickness ulceration, right lower extremity from surgical site dehiscence  -TMA with UNRULY (DOS 7/24/24)   -PAD with recent intervention (RLE arteriogram with L CFA insertion site. Balloon angioplasty x2 7/22/24)  -T2DM with periphral neuropathy   -Hx TMA left LLE    -Patient seen and examined at bedside   -Hypotensive otherwise VSS, No AM CBC  - and .1  -lactic acid 1.1  -HbA1c 5.8 (7/17/24)  -Prealbumin: 6.1. Oral wound healing

## 2024-08-18 NOTE — PROGRESS NOTES
[facility] -  Clinical Pharmacy Note    Vancomycin - Management by Pharmacy    Consult Date(s): 8/16  Consulting Provider(s): Dr. Eric Ngo    Assessment / Plan  Sepsis of Unknown Etiology - Vancomycin  Concurrent Antimicrobials: Cefepime, metronidazole  Day of Vanc Therapy / Ordered Duration: 4/7  Current Dosing Method: Intermittent Dosing by Levels  Therapeutic Goal: Trough ~ 15 mg/L  Current Dose / Plan:   Hx ESRD on dialysis - will dose intermittent  Dialysis 8/16; may expect dialysis 8/19  Received loading dose 2500 mg (~18 mg/kg) IV x1 8/15  8/17 level 22; held dose  Will continue to hold dose today and recheck level 8/19 in AM  Will continue to monitor clinical condition and make adjustments to regimen as appropriate.    Thank you for consulting pharmacy,    Please call with Shanita dunne, PharmD  PGY1 Pharmacy Resident  Wireless: [phone]  8/18/2024 12:56 PM        Interval update:   Dialysis 8/16;  no acute changes    Subjective/Objective:   Serafin Weaver is a 44 y.o. male with a PMHx significant for ESRD (on HD), DM, R foot osteomyelitis s/p TMA (7/24), CVA (9/23) who presented with increased fatigue, diarrhea, purulent discharge from right lower extremity wound. Patient was afebrile on admission. Suspecting possible concurrent C.diff infection (being treated outpatient).    Pharmacy is consulted to dose vancomycin.    Ht Readings from Last 1 Encounters:   08/15/24 2.032 m (6' 8\")     Wt Readings from Last 1 Encounters:   08/18/24 134.1 kg (295 lb 9.6 oz)     Current & Prior Antimicrobial Regimen(s):  Vancomycin 2500 mg IV x1 (8/15)  To be dose intermittently x7 days  Cefepime 2 g EI IV Q24H (8/15-8/23)  Metronidazole 500 mg IV Q8H (8/15-current)    Vancomycin Level(s) / Doses:    Date Time Dose Type of Level / Level Interpretation   8/16 0722 2500 mg ( 8/15 2110) 35.5 - Random 10-hr random; planning to receive dialysis today; will plan to redose post-dialysis   8/17 0723   22.0 - Random 34-hr random; above goal of 15-20. Will hold and recheck 8/18   Note: Serum levels collected for AUC-based dosing may be high if collected in close proximity to the dose administered. This is not necessarily indicative of toxicity.    Cultures & Sensitivities:    Date Site Micro Susceptibility / Result   8/15 Blood x2 pending    8/15 C.diff pending      Recent Labs     08/15/24  1638 08/16/24  0722 08/16/24  2221 08/17/24  0723   CREATININE 14.5* 16.2*  --  9.2*   BUN 68* 77*  --  40*   WBC 21.6* 18.4* 17.9* 18.5*       Estimated Creatinine Clearance: 16 mL/min (A) (based on SCr of 9.2 mg/dL (HH)).    Additional Lab Values / Findings of Note:    No results for input(s): \"PROCAL\" in the last 72 hours.

## 2024-08-19 ENCOUNTER — APPOINTMENT (OUTPATIENT)
Dept: VASCULAR LAB | Age: 44
End: 2024-08-19
Payer: COMMERCIAL

## 2024-08-19 LAB
ALBUMIN SERPL-MCNC: 2.7 G/DL (ref 3.4–5)
ANION GAP SERPL CALCULATED.3IONS-SCNC: 13 MMOL/L (ref 3–16)
ANTI-XA UNFRAC HEPARIN: 0.15 IU/ML (ref 0.3–0.7)
ANTI-XA UNFRAC HEPARIN: 0.22 IU/ML (ref 0.3–0.7)
ANTI-XA UNFRAC HEPARIN: 0.42 IU/ML (ref 0.3–0.7)
BACTERIA BLD CULT ORG #2: NORMAL
BACTERIA BLD CULT: NORMAL
BACTERIA SPEC AEROBE CULT: ABNORMAL
BACTERIA SPEC ANAEROBE CULT: ABNORMAL
BUN SERPL-MCNC: 63 MG/DL (ref 7–20)
CALCIUM SERPL-MCNC: 8.6 MG/DL (ref 8.3–10.6)
CHLORIDE SERPL-SCNC: 99 MMOL/L (ref 99–110)
CO2 SERPL-SCNC: 24 MMOL/L (ref 21–32)
CREAT SERPL-MCNC: 11.9 MG/DL (ref 0.9–1.3)
CRP SERPL-MCNC: 111.6 MG/L (ref 0–5.1)
ERYTHROCYTE [SEDIMENTATION RATE] IN BLOOD BY WESTERGREN METHOD: >130 MM/HR (ref 0–15)
FUNGUS SPEC CULT: NORMAL
FUNGUS SPEC CULT: NORMAL
GFR SERPLBLD CREATININE-BSD FMLA CKD-EPI: 5 ML/MIN/{1.73_M2}
GLUCOSE BLD-MCNC: 187 MG/DL (ref 70–99)
GLUCOSE SERPL-MCNC: 169 MG/DL (ref 70–99)
GRAM STN SPEC: ABNORMAL
LOEFFLER MB STN SPEC: NORMAL
LOEFFLER MB STN SPEC: NORMAL
MAGNESIUM SERPL-MCNC: 2.7 MG/DL (ref 1.8–2.4)
ORGANISM: ABNORMAL
PERFORMED ON: ABNORMAL
PHOSPHATE SERPL-MCNC: 4.6 MG/DL (ref 2.5–4.9)
POTASSIUM SERPL-SCNC: 4.8 MMOL/L (ref 3.5–5.1)
SODIUM SERPL-SCNC: 136 MMOL/L (ref 136–145)

## 2024-08-19 PROCEDURE — 86140 C-REACTIVE PROTEIN: CPT

## 2024-08-19 PROCEDURE — 99232 SBSQ HOSP IP/OBS MODERATE 35: CPT | Performed by: INTERNAL MEDICINE

## 2024-08-19 PROCEDURE — 6360000002 HC RX W HCPCS: Performed by: INTERNAL MEDICINE

## 2024-08-19 PROCEDURE — 1200000000 HC SEMI PRIVATE

## 2024-08-19 PROCEDURE — 6360000002 HC RX W HCPCS: Performed by: STUDENT IN AN ORGANIZED HEALTH CARE EDUCATION/TRAINING PROGRAM

## 2024-08-19 PROCEDURE — 93923 UPR/LXTR ART STDY 3+ LVLS: CPT

## 2024-08-19 PROCEDURE — 85652 RBC SED RATE AUTOMATED: CPT

## 2024-08-19 PROCEDURE — 85520 HEPARIN ASSAY: CPT

## 2024-08-19 PROCEDURE — 36415 COLL VENOUS BLD VENIPUNCTURE: CPT

## 2024-08-19 PROCEDURE — 6360000002 HC RX W HCPCS: Performed by: HOSPITALIST

## 2024-08-19 PROCEDURE — 6370000000 HC RX 637 (ALT 250 FOR IP)

## 2024-08-19 PROCEDURE — 90935 HEMODIALYSIS ONE EVALUATION: CPT

## 2024-08-19 PROCEDURE — 2580000003 HC RX 258: Performed by: HOSPITALIST

## 2024-08-19 PROCEDURE — 80069 RENAL FUNCTION PANEL: CPT

## 2024-08-19 PROCEDURE — 83735 ASSAY OF MAGNESIUM: CPT

## 2024-08-19 RX ORDER — SODIUM HYPOCHLORITE 1.25 MG/ML
SOLUTION TOPICAL DAILY
Status: DISPENSED | OUTPATIENT
Start: 2024-08-19 | End: 2024-08-21

## 2024-08-19 RX ADMIN — SEVELAMER CARBONATE 1600 MG: 800 TABLET, FILM COATED ORAL at 13:53

## 2024-08-19 RX ADMIN — HEPARIN SODIUM 5000 UNITS: 1000 INJECTION INTRAVENOUS; SUBCUTANEOUS at 16:18

## 2024-08-19 RX ADMIN — MEROPENEM 1000 MG: 1 INJECTION INTRAVENOUS at 08:36

## 2024-08-19 RX ADMIN — EPOETIN ALFA-EPBX 10000 UNITS: 10000 INJECTION, SOLUTION INTRAVENOUS; SUBCUTANEOUS at 11:48

## 2024-08-19 RX ADMIN — CLOPIDOGREL BISULFATE 75 MG: 75 TABLET ORAL at 13:53

## 2024-08-19 RX ADMIN — CARVEDILOL 6.25 MG: 6.25 TABLET, FILM COATED ORAL at 13:53

## 2024-08-19 RX ADMIN — CARVEDILOL 6.25 MG: 6.25 TABLET, FILM COATED ORAL at 20:18

## 2024-08-19 RX ADMIN — HEPARIN SODIUM 15 UNITS/KG/HR: 10000 INJECTION, SOLUTION INTRAVENOUS at 05:18

## 2024-08-19 RX ADMIN — FLUOXETINE HYDROCHLORIDE 10 MG: 10 CAPSULE ORAL at 13:54

## 2024-08-19 RX ADMIN — HEPARIN SODIUM 17 UNITS/KG/HR: 10000 INJECTION, SOLUTION INTRAVENOUS at 19:16

## 2024-08-19 ASSESSMENT — PAIN SCALES - GENERAL
PAINLEVEL_OUTOF10: 0
PAINLEVEL_OUTOF10: 0

## 2024-08-19 NOTE — PROGRESS NOTES
Pt's labs not drawn prior to dialysis. Asked dialysis nurse if they were able to draw labs. All labs drawn except APTT. Heparin dosing changed to follow anti xa. STAT anti xa ordered when pt returned to room. Lab called about stat anti xa.

## 2024-08-19 NOTE — CARE COORDINATION
CM following for discharge planning. Pt is from LTC at [facility] with HD. Will need run sheets faxed at discharge.   Podiatry waiting on biopsy to determine if pt will need to go back to the OR. Per ID, if pt needs abx at discharge they will need to iv and PICC placed as they will not be able to be given through HD.     Angela Mitchell RN, BSN, CM  Case Management Department  [phone]

## 2024-08-19 NOTE — PLAN OF CARE
Problem: Discharge Planning  Goal: Discharge to home or other facility with appropriate resources  8/19/2024 0033 by Sabine Daniels, RN  Outcome: Progressing   Case management following for d/c needs.    Problem: Skin/Tissue Integrity  Goal: Absence of new skin breakdown  Description: 1.  Monitor for areas of redness and/or skin breakdown  2.  Assess vascular access sites hourly  3.  Every 4-6 hours minimum:  Change oxygen saturation probe site  4.  Every 4-6 hours:  If on nasal continuous positive airway pressure, respiratory therapy assess nares and determine need for appliance change or resting period.  8/19/2024 0033 by Sabine Daniels, RN  Outcome: Progressing   Redness to inner buttocks, cream applied.  Wound to RLE, per podiatry.  Patient repositions self.  Will continue to monitor skin integrity.    Problem: Safety - Adult  Goal: Free from fall injury  8/19/2024 0033 by Sabine Daniels, RN  Outcome: Progressing   Fall precautions in place.  Bed alarm activated.  Call light and belongings within reach.  Continue to monitor safety.

## 2024-08-19 NOTE — FLOWSHEET NOTE
08/19/24 0929 08/19/24 1301   Vital Signs   /66 112/69   Temp 97.9 °F (36.6 °C) 98 °F (36.7 °C)   Pulse 67 66   Respirations 22 22   Weight - Scale 135 kg (297 lb 9.9 oz) 133 kg (293 lb 3.4 oz)   Weight Method Bed scale Bed scale   Post-Hemodialysis Assessment   NET Removed (ml)  --  1500     Treatment time: 3.5 hours  Net UF: 1500 ml    Pre weight: 135 kg   Post weight: 133 kg  EDW: tbd kg    Access used: RADHA AVF  Access function: good with  ml/min    Medications or blood products given: Retacrit 10K    Regular outpatient schedule: [facility]    Summary of response to treatment: good    Copy of dialysis treatment record placed in chart, to be scanned into EMR.

## 2024-08-19 NOTE — PROGRESS NOTES
[facility] - Clinical Pharmacy Note - Renal Dosing    Meropenem ordered for treatment of SSTI. Per [facility] Renal Dose Adjustment Policy, meropenem 1g IV EI Q24h will be changed to 500 mg IV EI Q24h.     Estimated Creatinine Clearance: Estimated Creatinine Clearance: 12 mL/min (A) (based on SCr of 11.9 mg/dL (HH)).  Dialysis Status, MICHELLE, CKD: ESRD HD  BMI: Body mass index is 32.78 kg/m².    Rationale for Adjustment: Agent is renally eliminated.    Pharmacy will continue to monitor renal function and adjust dose as necessary.      Please call with any quesitons,  Shanita Cisneros, PharmD  PGY1 Pharmacy Resident  Wireless: [phone]  8/19/2024 10:50 AM

## 2024-08-19 NOTE — PROGRESS NOTES
Podiatric Surgery Daily Progress Note  Serafin Weaver      Subjective :   Patient seen and examined this am at the bedside. Patient denies any overnight events and is more responsive today during exam. Patient denies any pedal complaints and only has mild pain with dressing change. Patient denies any N/V/F/SOB/CP.    Review of Systems: A 12 point review of symptoms is unremarkable with the exception of the chief complaint. Patient specifically denies nausea, fever, vomiting, chills, shortness of breath, chest pain, abdominal pain, constipation or difficulty urinating.       Objective     /86   Pulse 69   Temp 98 °F (36.7 °C) (Oral)   Resp 16   Ht 2.032 m (6' 8\")   Wt 135.4 kg (298 lb 6.4 oz)   SpO2 96%   BMI 32.78 kg/m²      I/O:  Intake/Output Summary (Last 24 hours) at 8/19/2024 0938  Last data filed at 8/19/2024 0605  Gross per 24 hour   Intake 660 ml   Output 0 ml   Net 660 ml              Wt Readings from Last 3 Encounters:   08/19/24 135.4 kg (298 lb 6.4 oz)   08/08/24 (!) 139.3 kg (307 lb 3.2 oz)   07/30/24 (!) 153.5 kg (338 lb 6.5 oz)       LABS:    Recent Labs     08/17/24  0723 08/17/24  1139 08/17/24  1607 08/18/24  1444   WBC 18.5*  --   --  16.4*   HGB 7.1*   < > 7.6* 8.4*   HCT 22.3*   < > 23.8* 26.7*     --   --  204    < > = values in this interval not displayed.        Recent Labs     08/17/24  0723 08/18/24  1444    136   K 4.2 4.8   CL 97* 98*   CO2 24 24   PHOS 3.7  --    BUN 40* 54*   CREATININE 9.2* 11.4*        Recent Labs     08/16/24  2221 08/17/24  0723 08/18/24  1444 08/18/24  2233   INR 1.58*  --   --   --    APTT 36.9*   < > 37.6* 74.2*    < > = values in this interval not displayed.           LOWER EXTREMITY EXAMINATION    Dressing to right LE intact. No strikethrough noted to the external dressing. Sanguinous drainage noted to the internal layers of the dressing.     VASCULAR:  DP and PT pulses are faintly palpable +1/4 B/L.  Upon previous hand-held  healing supplement to increase wound healing potential.   -Images reviewed, impression noted above  -TCPO2 pending - patient refused at first attempt    -Wound culture: Klebsiella Oxytoca ESBL   -Continue IV antibiotics per ID: Meropenem  -Right LE dressed with iodoform packing, dry gauze, ABD pad, kerlix, ACE bandage.  -Prevalon boots reapplied. Patient is to wear at all times while in bed to prevent further deep tissue injury.  -Non-weightbearing to right LE    DISPO:S/P I&D right foot with removal of bone (DOS 8/17/24). Recommend patient continue on IV antibiotics per ID; Cefepime, Flagyl, Vancomycin. Pending bone pathology will determine the next procedure for the patient. Podiatry will continue with local wound care at this time and await result of bone pathology before next procedure as patient will require a more definitive surgery consisting of either more bone resection or DPC or wound VAC.    Patients assessment and plan was discussed with LUCILA Tovar DPM   Podiatric Resident PGY1  [facility]  8/19/2024, 9:38 AM     Patient was seen and evaluated at bedside.  Agree with residents assessment and treatment plan.  Kailee Olsen DPM

## 2024-08-19 NOTE — PLAN OF CARE
Problem: Skin/Tissue Integrity  Goal: Absence of new skin breakdown  Description: 1.  Monitor for areas of redness and/or skin breakdown  2.  Assess vascular access sites hourly  3.  Every 4-6 hours minimum:  Change oxygen saturation probe site  4.  Every 4-6 hours:  If on nasal continuous positive airway pressure, respiratory therapy assess nares and determine need for appliance change or resting period.  8/19/2024 0956 by Lesli Gregory, RN  Outcome: Progressing     Problem: Safety - Adult  Goal: Free from fall injury  8/19/2024 0956 by Lesli Gregory, RN  Outcome: Progressing  Flowsheets (Taken 8/19/2024 0956)  Free From Fall Injury: Instruct family/caregiver on patient safety     Problem: Chronic Conditions and Co-morbidities  Goal: Patient's chronic conditions and co-morbidity symptoms are monitored and maintained or improved  Outcome: Progressing  Flowsheets (Taken 8/19/2024 0956)  Care Plan - Patient's Chronic Conditions and Co-Morbidity Symptoms are Monitored and Maintained or Improved: Monitor and assess patient's chronic conditions and comorbid symptoms for stability, deterioration, or improvement

## 2024-08-19 NOTE — PROGRESS NOTES
Ph: [phone], Fax: [phone]           [facility]               Reason for admission:                 Admitted for lethargy    Brief Summary :     Serafin Weaver is being seen by nephrology for ESRD on hemodialysis.      Interval History and plan:      Blood pressure is well controlled   Labs were reviewed from yesterday .      Plan:    Hemodialysis arranged for today. He is MWF schedule.  Continue with renal diet.  Replace Vitamin D   Daily renal panels.  Continue with Retacrit with dialysis. Very high ferritin   Please adjust antibiotics to GFR less than 10 mL/min.                     Assessment :     1.  ESRD:  Will plan HD per schedule.  He gets dialysis Monday, Wednesday and Friday.  He gets dialysis currently at [facility] under our care.  Access: AV graft  Daily weights  Fluid restriction: 1 L unless hypotensive  Nephrocap 1 tab PO daily    2.  Anemia:  Erythropoetin dose: He will receive Retacrit with dialysis to keep hemoglobin close to 10.  Hemoglobin admission was 6.9.  Ferritin is greater than 1000.    3.  Osteodystrophy:  Phosphate Binder: Will continue with Renvela 2 tabs p.o. 3 times daily with meals.  I will check PTH and vitamin D.  Daily renal panels.    4.  Hyperkalemia: Resolved.  He is on chronic Lokelma 10 g q. Tuesday, Thursday and Saturday on nondialysis days.    5.  Hypertension: well-controlled.  Continue with carvedilol.    6.  Wound infection: Podiatry consulted.  ID consulted.  He is on cefepime and vancomycin.  He is also getting oral vancomycin for recent C. difficile infection.             [facility] would like to thank Krishna Obando MD   for opportunity to serve this patient      Please call with questions at-   24 Hrs Answering service [phone] or  7 am- 5 pm via Perfect serve or cell phone  Dr.Muhammad Rizwana Hernandez MD       HPI :     Serafin Weaver is a 44 y.o. male presented to   the hospital on 8/15/2024 with  08/16/24  2221 08/17/24  0723 08/17/24  1139 08/17/24  1607 08/18/24  1444   WBC 17.9* 18.5*  --   --  16.4*   HGB 7.9* 7.1* 6.3* 7.6* 8.4*   HCT 24.2* 22.3* 20.0* 23.8* 26.7*    222  --   --  204     BMP:    Recent Labs     08/16/24  0722 08/17/24  0723 08/18/24  1444   * 137 136   K 5.2* 4.2 4.8   CL 94* 97* 98*   CO2 24 24 24   BUN 77* 40* 54*   CREATININE 16.2* 9.2* 11.4*   GLUCOSE 157* 136* 181*   MG 2.50* 2.20 2.00   PHOS 4.7 3.7  --      Lab Results   Component Value Date/Time    COLORU Yellow 04/24/2023 04:48 PM    NITRU Negative 04/24/2023 04:48 PM    GLUCOSEU 250 04/24/2023 04:48 PM    KETUA 15 04/24/2023 04:48 PM    UROBILINOGEN 0.2 04/24/2023 04:48 PM    BILIRUBINUR Negative 04/24/2023 04:48 PM        ----------------------------------------------------------  Please call with questions at      24 Hrs Answering service [phone]  Perfect serve, or cell phone 7 am - 5pm  Jordin Kaur MD   [facility]

## 2024-08-19 NOTE — PROGRESS NOTES
[facility] - Clinical Pharmacy Note    Patient is on Heparin high dose weight based infusion, which is being monitored & adjusted using aPTT as pt received an oral Factor-Xa inhibitor within 72 hrs of starting heparin infusion, which interacts with Anti-Xa monitoring of heparin.     It has now been 72 hours since heparin infusion was initiated, and the oral Factor-Xa inhibitor interaction with Anti-Xa levels is eliminated.  Heparin infusion will now be monitored & adjusted using Anti-Xa levels per the usual [facility] protocol.    Anti-Xa algorithm:    AntiXa < 0.10 Units/mL Bolus = 80 units/kg  Increase infusion by 4 units/kg/hr     (Maximum bolus = 10,000 units)  0.1-0.29 Units/mL     Bolus = 40 units/kg Increase infusion by 2 units/kg/hr   (Maximum bolus = 5,000 units)  0.3-0.7  Units/mL  No bolus  No change  0.71-0.80  Units/mL    No bolus   Decrease infusion by 1 units/kg/hr  0.81-0.99 Units/mL    No bolus   Decrease infusion by 2 units/kg/hr  1.0 Units/mL or greater  Hold infusion 1 hour     Decrease infusion by 3 units/kg/hr    Hang infusion immediately after drawing initial labs.   Do NOT use ANY anti-Xa drawn prior to initiation of infusion for titration.     Anti Xa levels 6 hours after any rate change  When 2 successive Anti Xa's are at goal   monitor Anti Xa level at least daily          Please call with any quesitons,  Shanita Cisneros, PharmD  PGY1 Pharmacy Resident  Wireless: [phone]  8/19/2024 10:22 AM

## 2024-08-19 NOTE — PROGRESS NOTES
ID Follow-up NOTE    CC:   Right foot wound status post TMA  Antibiotics: Vanco, meropenem    Admit Date: 8/15/2024  Hospital Day: 5    Subjective:     Patient denies any fevers overnight.  Denies any foot pain.  Patient seen well at HD      Objective:     Patient Vitals for the past 8 hrs:   Pulse Weight   08/19/24 0605 69 --   08/19/24 0524 -- 135.4 kg (298 lb 6.4 oz)     I/O last 3 completed shifts:  In: 910 [P.O.:910]  Out: 0   No intake/output data recorded.    EXAM:  GENERAL: No apparent distress.    HEENT: Membranes moist, no oral lesion  NECK:  Supple, no lymphadenopathy  LUNGS: Clear b/l, no rales, no dullness  CARDIAC: RRR, no murmur appreciated  ABD:  + BS, soft / NT  EXT:  Left TMA site well-healed without evidence of ulceration.  The right TMA site with dressing in place. See photo from 8/19:      NEURO: No focal neurologic findings  PSYCH: Orientation, sensorium, mood normal  LINES:  Peripheral iv, right arm AVF       Data Review:  Lab Results   Component Value Date    WBC 16.4 (H) 08/18/2024    HGB 8.4 (L) 08/18/2024    HCT 26.7 (L) 08/18/2024    MCV 94.6 08/18/2024     08/18/2024     Lab Results   Component Value Date    CREATININE 11.4 (HH) 08/18/2024    BUN 54 (H) 08/18/2024     08/18/2024    K 4.8 08/18/2024    CL 98 (L) 08/18/2024    CO2 24 08/18/2024       Hepatic Function Panel:   Lab Results   Component Value Date/Time    ALKPHOS 69 08/18/2024 02:44 PM    ALKPHOS 161 08/18/2024 02:44 PM    ALT 53 08/18/2024 02:44 PM     08/18/2024 02:44 PM    AST 24 08/18/2024 02:44 PM     08/18/2024 02:44 PM    BILITOT 1.2 08/18/2024 02:44 PM    BILITOT 0.7 08/18/2024 02:44 PM    BILIDIR 0.2 08/18/2024 02:44 PM    IBILI 1.0 08/18/2024 02:44 PM       MICRO:  Blood cultures x 2 8/15: neg  Wound cx right foot 8/16: ESBL Klebsiella oxytoca, heavy growth  Klebsiella oxytoca ESBL (1)    Antibiotic Interpretation Microscan  Method Status    ampicillin Resistant >=32 mcg/mL BACTERIAL        Continuous Infusions:   heparin (PORCINE) Infusion 15 Units/kg/hr (08/19/24 0518)    sodium chloride      dextrose      sodium chloride         PRN Meds:  sodium chloride, [Held by provider] heparin (porcine), [Held by provider] heparin (porcine), sodium chloride, glucose, dextrose bolus **OR** dextrose bolus, glucagon (rDNA), dextrose, sodium chloride flush, sodium chloride, ondansetron **OR** ondansetron, polyethylene glycol      Assessment:       Patient Active Problem List   Diagnosis    Amputation of third toe, left, traumatic (HCC)    Type II or unspecified type diabetes mellitus without mention of complication, not stated as uncontrolled (HCC)    BMI 37.0-37.9, adult    DKA (diabetic ketoacidoses)    Scrotal abscess    Perineal abscess    Sepsis due to Streptococcus agalactiae (HCC)    Morbid obesity due to excess calories (HCC)    Hypertension    Cellulitis of foot, left    Diabetic foot infection (HCC)    Diabetic ulcer of left foot associated with type 2 diabetes mellitus, with bone involvement without evidence of necrosis (HCC)    Diabetic polyneuropathy associated with type 2 diabetes mellitus (HCC)    Uncontrolled type 2 diabetes mellitus with hyperglycemia (HCC)    Ulcer of left foot, with fat layer exposed (HCC)    Acute osteomyelitis (HCC)    Cellulitis of second toe, right    Abscess of second toe, right    Vision loss of right eye    Non-intractable vomiting    H/O arteriovenous malformation (AVM)    Hypertensive urgency    Rotator cuff tendonitis    Scapulothoracic syndrome    SLAP tear of shoulder    Vitreous hemorrhage (HCC)    Wrist sprain, left, initial encounter    Acute encephalopathy    Disorder of brain    Ischemic cerebrovascular accident (CVA) (HCC)    ESRD on dialysis (HCC)    Osteomyelitis of fifth toe of right foot (HCC)    Gas gangrene (HCC)    Infection with ESBL Klebsiella oxytoca    Sepsis ([facility])        Plan:   44 y.o. AA male with history of ESRD on HD, DM II, seizure dx,

## 2024-08-19 NOTE — PLAN OF CARE
Podiatric Surgery Plan of Care Note  Serafin Weaver      Instill wound VAC applied to patients right foot. Wound VAC changes planned for MWF. Please call the on call podiatry pager if problems with the wound vac occur. Please check the dressing for leaks. On call pager number [phone]      Discussed with Dr. Kailee Olsen DPM.     Frida Hartley DPM   Podiatric Resident PGY2  [employer]  Pager number [phone]  8/19/2024, 3:39 PM

## 2024-08-19 NOTE — PROGRESS NOTES
Internal Medicine Progress Note    Patient Name: Serafin Weaver   Patient : 1980   Date: 2024   Admit Date: 8/15/2024     CC: One day fatigue s/p x3 missed HD sessions     Interval History     Patient complaining of diarrhea overnight.  He states that he stooled on himself, loose watery nonbloody.  Pain.  He denies nausea and vomiting.  Tolerating diet.  Voiding spontaneously at baseline.  He is ambulating.  Denies fever and chills.  Denies chest pain or shortness of breath.  Right lower extremity arterial ultrasound ordered to evaluate blood flow given ulceration.  Getting dialyzed this morning.      ROS   Review of Systems   Constitutional:  Negative for chills and fever.   Respiratory:  Negative for cough and shortness of breath.    Cardiovascular:  Negative for chest pain and leg swelling.   Gastrointestinal:  Positive for diarrhea. Negative for abdominal distention, abdominal pain, blood in stool, nausea and vomiting.   Genitourinary:  Negative for dysuria.   Skin:  Positive for color change and wound.   Neurological:  Negative for dizziness, syncope and light-headedness.   Psychiatric/Behavioral:  Positive for confusion.           Objective     I/Os:  I/O last 3 completed shifts:  In: 910 [P.O.:910]  Out: 0       Vital Signs:  Patient Vitals for the past 8 hrs:   BP Temp Temp src Pulse Resp SpO2 Weight   24 1341 119/82 98.3 °F (36.8 °C) Oral 80 20 94 % --   24 1301 112/69 98 °F (36.7 °C) -- 66 22 -- 133 kg (293 lb 3.4 oz)   24 0929 134/66 97.9 °F (36.6 °C) -- 67 22 -- 135 kg (297 lb 9.9 oz)       Physical Exam:  Physical Exam  Vitals reviewed.   Constitutional:       General: He is not in acute distress.     Appearance: He is obese. He is ill-appearing.   HENT:      Head: Normocephalic.      Right Ear: External ear normal.      Left Ear: External ear normal.      Nose: Nose normal.      Mouth/Throat:      Mouth: Mucous membranes are moist.   Eyes:      Extraocular

## 2024-08-20 LAB
ACID FAST STN SPEC QL: NORMAL
ALBUMIN SERPL-MCNC: 2.8 G/DL (ref 3.4–5)
ANION GAP SERPL CALCULATED.3IONS-SCNC: 13 MMOL/L (ref 3–16)
ANTI-XA UNFRAC HEPARIN: 0.38 IU/ML (ref 0.3–0.7)
ANTI-XA UNFRAC HEPARIN: <0.1 IU/ML (ref 0.3–0.7)
BUN SERPL-MCNC: 36 MG/DL (ref 7–20)
CALCIUM SERPL-MCNC: 8.6 MG/DL (ref 8.3–10.6)
CHLORIDE SERPL-SCNC: 98 MMOL/L (ref 99–110)
CO2 SERPL-SCNC: 25 MMOL/L (ref 21–32)
CREAT SERPL-MCNC: 8.4 MG/DL (ref 0.9–1.3)
DEPRECATED RDW RBC AUTO: 15.7 % (ref 12.4–15.4)
ECHO BSA: 2.74 M2
GFR SERPLBLD CREATININE-BSD FMLA CKD-EPI: 7 ML/MIN/{1.73_M2}
GLUCOSE BLD-MCNC: 157 MG/DL (ref 70–99)
GLUCOSE SERPL-MCNC: 150 MG/DL (ref 70–99)
HCT VFR BLD AUTO: 26.5 % (ref 40.5–52.5)
HGB BLD-MCNC: 8.1 G/DL (ref 13.5–17.5)
MAGNESIUM SERPL-MCNC: 2.2 MG/DL (ref 1.8–2.4)
MCH RBC QN AUTO: 28.4 PG (ref 26–34)
MCHC RBC AUTO-ENTMCNC: 30.5 G/DL (ref 31–36)
MCV RBC AUTO: 93.2 FL (ref 80–100)
MYCOBACTERIUM SPEC CULT: NORMAL
PERFORMED ON: ABNORMAL
PHOSPHATE SERPL-MCNC: 3.4 MG/DL (ref 2.5–4.9)
PLATELET # BLD AUTO: 219 K/UL (ref 135–450)
PMV BLD AUTO: 8 FL (ref 5–10.5)
POTASSIUM SERPL-SCNC: 4.5 MMOL/L (ref 3.5–5.1)
RBC # BLD AUTO: 2.84 M/UL (ref 4.2–5.9)
SODIUM SERPL-SCNC: 136 MMOL/L (ref 136–145)
WBC # BLD AUTO: 17 K/UL (ref 4–11)

## 2024-08-20 PROCEDURE — 6360000002 HC RX W HCPCS

## 2024-08-20 PROCEDURE — 1200000000 HC SEMI PRIVATE

## 2024-08-20 PROCEDURE — 6360000002 HC RX W HCPCS: Performed by: HOSPITALIST

## 2024-08-20 PROCEDURE — 85027 COMPLETE CBC AUTOMATED: CPT

## 2024-08-20 PROCEDURE — 83735 ASSAY OF MAGNESIUM: CPT

## 2024-08-20 PROCEDURE — 6370000000 HC RX 637 (ALT 250 FOR IP)

## 2024-08-20 PROCEDURE — 36415 COLL VENOUS BLD VENIPUNCTURE: CPT

## 2024-08-20 PROCEDURE — 99232 SBSQ HOSP IP/OBS MODERATE 35: CPT | Performed by: INTERNAL MEDICINE

## 2024-08-20 PROCEDURE — 85520 HEPARIN ASSAY: CPT

## 2024-08-20 PROCEDURE — 80069 RENAL FUNCTION PANEL: CPT

## 2024-08-20 PROCEDURE — 2580000003 HC RX 258: Performed by: HOSPITALIST

## 2024-08-20 PROCEDURE — 6360000002 HC RX W HCPCS: Performed by: STUDENT IN AN ORGANIZED HEALTH CARE EDUCATION/TRAINING PROGRAM

## 2024-08-20 RX ORDER — HEPARIN SODIUM 1000 [USP'U]/ML
10000 INJECTION, SOLUTION INTRAVENOUS; SUBCUTANEOUS ONCE
Status: COMPLETED | OUTPATIENT
Start: 2024-08-20 | End: 2024-08-20

## 2024-08-20 RX ADMIN — HEPARIN SODIUM 10000 UNITS: 1000 INJECTION INTRAVENOUS; SUBCUTANEOUS at 14:29

## 2024-08-20 RX ADMIN — HEPARIN SODIUM 17 UNITS/KG/HR: 10000 INJECTION, SOLUTION INTRAVENOUS at 14:32

## 2024-08-20 RX ADMIN — MEROPENEM 500 MG: 500 INJECTION, POWDER, FOR SOLUTION INTRAVENOUS at 08:22

## 2024-08-20 RX ADMIN — CARVEDILOL 6.25 MG: 6.25 TABLET, FILM COATED ORAL at 21:30

## 2024-08-20 RX ADMIN — FIDAXOMICIN 200 MG: 200 TABLET, FILM COATED ORAL at 12:17

## 2024-08-20 RX ADMIN — FIDAXOMICIN 200 MG: 200 TABLET, FILM COATED ORAL at 21:30

## 2024-08-20 RX ADMIN — HEPARIN SODIUM 17 UNITS/KG/HR: 10000 INJECTION, SOLUTION INTRAVENOUS at 06:36

## 2024-08-20 ASSESSMENT — PAIN SCALES - GENERAL: PAINLEVEL_OUTOF10: 0

## 2024-08-20 NOTE — PLAN OF CARE
Problem: Discharge Planning  Goal: Discharge to home or other facility with appropriate resources  Outcome: Progressing  Flowsheets (Taken 8/16/2024 2564 by Tiffany Nelson RN)  Discharge to home or other facility with appropriate resources:   Identify barriers to discharge with patient and caregiver   Identify discharge learning needs (meds, wound care, etc)   Refer to discharge planning if patient needs post-hospital services based on physician order or complex needs related to functional status, cognitive ability or social support system     Problem: Skin/Tissue Integrity  Goal: Absence of new skin breakdown  Description: 1.  Monitor for areas of redness and/or skin breakdown  2.  Assess vascular access sites hourly  3.  Every 4-6 hours minimum:  Change oxygen saturation probe site  4.  Every 4-6 hours:  If on nasal continuous positive airway pressure, respiratory therapy assess nares and determine need for appliance change or resting period.  Outcome: Progressing  Note: No new skin issues noted      Problem: Safety - Adult  Goal: Free from fall injury  Outcome: Progressing  Flowsheets (Taken 8/19/2024 0956 by Lesli Gregory, RN)  Free From Fall Injury: Instruct family/caregiver on patient safety  Note: Pt free of falls this shift ,bed is at its lowest position, and call light in place      Problem: Chronic Conditions and Co-morbidities  Goal: Patient's chronic conditions and co-morbidity symptoms are monitored and maintained or improved  Outcome: Progressing  Flowsheets (Taken 8/19/2024 0956 by Lesli Gregory, RN)  Care Plan - Patient's Chronic Conditions and Co-Morbidity Symptoms are Monitored and Maintained or Improved: Monitor and assess patient's chronic conditions and comorbid symptoms for stability, deterioration, or improvement

## 2024-08-20 NOTE — PLAN OF CARE
Problem: Skin/Tissue Integrity  Goal: Absence of new skin breakdown  Description: 1.  Monitor for areas of redness and/or skin breakdown  2.  Assess vascular access sites hourly  3.  Every 4-6 hours minimum:  Change oxygen saturation probe site  4.  Every 4-6 hours:  If on nasal continuous positive airway pressure, respiratory therapy assess nares and determine need for appliance change or resting period.  8/20/2024 1820 by Lesli Gregory RN  Outcome: Progressing     Problem: Safety - Adult  Goal: Free from fall injury  8/20/2024 1820 by Lesli Gregory RN  Outcome: Progressing  Flowsheets (Taken 8/20/2024 1820)  Free From Fall Injury: Instruct family/caregiver on patient safety     Problem: Chronic Conditions and Co-morbidities  Goal: Patient's chronic conditions and co-morbidity symptoms are monitored and maintained or improved  8/20/2024 1820 by Lesli Gregory, RN  Outcome: Progressing  Flowsheets (Taken 8/20/2024 1820)  Care Plan - Patient's Chronic Conditions and Co-Morbidity Symptoms are Monitored and Maintained or Improved: Monitor and assess patient's chronic conditions and comorbid symptoms for stability, deterioration, or improvement

## 2024-08-20 NOTE — PROGRESS NOTES
I have personally seen and examined the patient independently. I have reviewed the patient's available data,including medical history and recent test results. Reviewed and discussed note as documented by resident.  I agree with the physical exam findings, assessment and plan. I personally performed a substantive portion of the visit including all aspects of the medical decision making.     Pt cont to be rude and disinterested in talking. He is also confused but seems at baseline      Infected Rt foot wound  Hx of Rt foot osteomyelitis S/P TMA  PAD  -S/P Rt foot I&D with removal of all non-viable tissue and bone 8/17. Plan to go to OR again per podiatry   -Culture is showed ESBL and anaerobes. Antibiotics switched to meropenem for now .ID on board      Acute on chronic anemia   -Seems worsening in anemia of chronic disease. Given a unit 8/15. Hb dropped after surgery and given another unit 8/17.Last Hb 8/18 and since then he has been refusing labs. If he cont to refuse will hold the heparin drip  -Ferritin is very high 24K. TS is 52% . I suspect this is related to sepsis. This needs to monitored as outpt to make sure he does not have hemochromatosis although less likely  . Will repeat ferritin level before discharge      ESRD with non-adherence to dialysis   Hyperkalemia with EKG changes  resolved  -Cont counseling about dialysis   -On [facility]   -Dialysis per Nephrology       Elevated LFTs   -Hepatocellular patter. It worsened initially but almost normalized now   -I suspect related to sepsis/shock liver    -Unclear if he has been drinking alcohol. Pending UDS   -Acute hepatitis panel is negative . -RUQ US + doppler (hx of lupus, atrial clot) are ok   -Restart statin on discharge   -Monitor      ?diarrhea/?recent Cdiff infection - per report tested positive for Cdiff recently . Lab refused to test it here. Will start Dificid       Vit D deficiency - started replacement      Hx of CVA  Hx of Rt atrial thrombus - seen

## 2024-08-20 NOTE — PROGRESS NOTES
Podiatric Surgery Daily Progress Note  Serafin Weaver      Subjective :   Patient seen and examined this am at the bedside. Patient denies any overnight events. Patient denies any pedal complaints. Patient is irritated with staff at today's visit stating he never gets any sleep. Patient denies any N/V/F/SOB/CP.    Review of Systems: A 12 point review of symptoms is unremarkable with the exception of the chief complaint. Patient specifically denies nausea, fever, vomiting, chills, shortness of breath, chest pain, abdominal pain, constipation or difficulty urinating.       Objective     /78   Pulse 68   Temp 97.9 °F (36.6 °C) (Axillary)   Resp 16   Ht 2.032 m (6' 8\")   Wt 133.5 kg (294 lb 5 oz)   SpO2 98%   BMI 32.33 kg/m²      I/O:  Intake/Output Summary (Last 24 hours) at 8/20/2024 1001  Last data filed at 8/20/2024 0330  Gross per 24 hour   Intake 1160 ml   Output 2000 ml   Net -840 ml              Wt Readings from Last 3 Encounters:   08/20/24 133.5 kg (294 lb 5 oz)   08/08/24 (!) 139.3 kg (307 lb 3.2 oz)   07/30/24 (!) 153.5 kg (338 lb 6.5 oz)       LABS:    Recent Labs     08/17/24  1607 08/18/24  1444   WBC  --  16.4*   HGB 7.6* 8.4*   HCT 23.8* 26.7*   PLT  --  204        Recent Labs     08/19/24  0930      K 4.8   CL 99   CO2 24   PHOS 4.6   BUN 63*   CREATININE 11.9*        Recent Labs     08/18/24  1444 08/18/24  2233   APTT 37.6* 74.2*           LOWER EXTREMITY EXAMINATION    Dressing to right lower extremity left clean, dry, and intact.  No strikethrough noted to external dressing.    Sensation intact proximal to dressing bilateral.  No pain with calf compression bilateral.  Patient able to perform active range of motion at ankle joints bilateral.    Wound vac noted to right, left clean, dry, and intact. Excellent seal noted with suction set to 125 mmHg with instill Dakins running. 50 cc sanguinous drainage noted to cannister.        IMAGING:  XR right foot (8/17/24): Post  op  FINDINGS:  Status post transmetatarsal amputation of the foot. There is adjacent soft  tissue swelling. A bandage is present overlying the foot.    XR Right foot (8/16/24):  Findings:   Interval transmetatarsal amputation proximal aspect first through fifth metatarsals. Minimal air within the soft tissues near the first metatarsal probably postoperative. Some soft tissue swelling distally. No acute fracture. No osseous destructive   lesions.     IMPRESSION:  Interval amputation.     XR right Foot (07/24/24)  FINDINGS/  IMPRESSION:  Status post 1-5th digit transmetatarsal amputation with soft tissue swelling and placement of a brace.     B/L LE Art Dup (7/18):  Right Lower Arterial     Proximal Common Femoral Artery: Multiphasic (normal) Doppler waveforms.   Distal Common Femoral Artery: Multiphasic (normal) Doppler waveforms.   Profunda Artery: Multiphasic (normal) Doppler waveforms.   Proximal Superficial Femoral Artery: Multiphasic (normal) Doppler waveforms.   Middle Superficial Femoral Artery: Multiphasic (normal) Doppler waveforms.   Distal Superficial Femoral Artery: Monophasic Doppler waveforms.   Proximal Popliteal Artery: Monophasic Doppler waveforms.   Distal Popliteal Artery: Monophasic Doppler waveforms.   Anterior Tibial Artery: Monophasic Doppler waveforms.   Tibial/Peroneal Trunk: Monophasic Doppler waveforms.   Posterior Tibial Artery: Monophasic Doppler waveforms.   Peroneal Artery: monophasic Doppler waveforms.     The ankle/brachial index is 0.53 (DP 58, PT 40mmHg).  Normal multiphasic flow in the common femoral artery indicates no significant aorto-iliac inflow disease.   Atherosclerotic plaque and velocities indicate >50% stenosis in the right distal superficial femoral artery (58.1 to 229 cm/s).  Calcific shadowing in the distal superficial femoral artery limits visibility, the percent stenosis may be underestimated,  Unable to obtain blood pressure on right arm due to fistula.   Left Lower

## 2024-08-20 NOTE — CARE COORDINATION
CM following for discharge planning. Pt to return to [facility] at discharge, will need run sheets faxed at that time for HD approval, can be done same day as discharge as long as done early in the day.     -Final antibiotic plan pending bone pathology and clearance of infection and final surgical plan. ID and Pod following.     KIRIT spoke with Noemi from [facility] regarding possible IV Abx and she will start precert. If pt does not need IV abx at discharge he can go back as LTC with no precert needed.     Angela Mitchell RN, BSN,   Case Management Department  [phone]

## 2024-08-20 NOTE — PROGRESS NOTES
Ph: [phone], Fax: [phone]           [facility]               Reason for admission:                 Admitted for lethargy    Brief Summary :     Serafin Weaver is being seen by nephrology for ESRD on hemodialysis.      Interval History and plan:      Blood pressure is well controlled   Labs were reviewed from yesterday .   HD yesterday with 1500 ml removed    He is disoriented     Plan:    Hemodialysis arranged per [facility] schedule.  Continue with renal diet.  Replace Vitamin D   Daily renal panels.  Continue with Retacrit with dialysis. Very high ferritin   Please adjust antibiotics to GFR less than 10 mL/min.                     Assessment :     1.  ESRD:  Will plan HD per schedule.  He gets dialysis Monday, Wednesday and Friday.  He gets dialysis currently at [facility] under our care.  Access: AV graft  Daily weights  Fluid restriction: 1 L unless hypotensive  Nephrocap 1 tab PO daily    2.  Anemia:  Erythropoetin dose: He will receive Retacrit with dialysis to keep hemoglobin close to 10.  Hemoglobin admission was 6.9.  Ferritin is greater than 1000.    3.  Osteodystrophy:  Phosphate Binder: Will continue with Renvela 2 tabs p.o. 3 times daily with meals.  I will check PTH and vitamin D.  Daily renal panels.    4.  Hyperkalemia: Resolved.  He is on chronic Lokelma 10 g q. Tuesday, Thursday and Saturday on nondialysis days.    5.  Hypertension: well-controlled.  Continue with carvedilol.    6.  Wound infection: Podiatry consulted.  ID consulted.  He is on cefepime and vancomycin.  He is also getting oral vancomycin for recent C. difficile infection.             [facility] would like to thank Krishna Obando MD   for opportunity to serve this patient      Please call with questions at-   24 Hrs Answering service [phone] or  7 am- 5 pm via [employer] or cell phone  Dr.Muhammad Rizwana Hernandez MD       HPI :     Serafin Weaver is a 44 y.o. male presented  08/18/24  1444   WBC  --   --  16.4*   HGB 6.3* 7.6* 8.4*   HCT 20.0* 23.8* 26.7*   PLT  --   --  204     BMP:    Recent Labs     08/18/24  1444 08/19/24  0930    136   K 4.8 4.8   CL 98* 99   CO2 24 24   BUN 54* 63*   CREATININE 11.4* 11.9*   GLUCOSE 181* 169*   MG 2.00 2.70*   PHOS  --  4.6     Lab Results   Component Value Date/Time    COLORU Yellow 04/24/2023 04:48 PM    NITRU Negative 04/24/2023 04:48 PM    GLUCOSEU 250 04/24/2023 04:48 PM    KETUA 15 04/24/2023 04:48 PM    UROBILINOGEN 0.2 04/24/2023 04:48 PM    BILIRUBINUR Negative 04/24/2023 04:48 PM        ----------------------------------------------------------  Please call with questions at      24 Hrs Answering service [phone]  [employer], or cell phone 7 am - 5pm  Jordin Kaur MD   [facility].[employer]

## 2024-08-20 NOTE — PROGRESS NOTES
Pt refusing morning labs, including antixa. Dr Obando made aware. Will educate pt on importance of labs. Per Dr. Obando, continuing heparin gtt for now.

## 2024-08-20 NOTE — PROGRESS NOTES
Internal Medicine Progress Note    Patient Name: Serafin Weaver   Patient : 1980   Date: 2024   Admit Date: 8/15/2024     CC: One day fatigue s/p x3 missed HD sessions     Interval History     Patient refused labs yesterday and this morning.  Heparin drip was held as dosing was being monitored by Anti Xa.  Spoke with patient about importance of CBC and BMP given his need for ongoing dialysis and anticoagulation in the setting of staged surgeries with podiatry.  Patient agreeable to blood draw at this time.  Podiatry placed wound VAC on right foot that is currently to suction.  Patient is complaining of diarrhea and stooling on himself for multiple days in a row.  He states that he is not taking any medication until his diarrhea is treated.  He denies pain.  Denies nausea or vomiting.  Patient tolerating diet.  Nonambulatory.  Denies fever or chills.  No chest pain or shortness of breath.    Update: Blood draw completed.  Hemoglobin stable. Anti Xa less than 0.1.  Heparin drip and heparin boluses unheld.  Pharmacy notified.  Will start fidaxomycin for concerns of c. Diff.    ROS   Review of Systems   Constitutional:  Negative for chills and fever.   Respiratory:  Negative for cough and shortness of breath.    Cardiovascular:  Negative for chest pain and leg swelling.   Gastrointestinal:  Positive for diarrhea. Negative for abdominal distention, abdominal pain, blood in stool, nausea and vomiting.   Genitourinary:  Negative for dysuria.   Musculoskeletal:  Positive for arthralgias and myalgias.   Skin:  Positive for color change and wound.   Neurological:  Negative for dizziness, syncope and light-headedness.          Objective     I/Os:  I/O last 3 completed shifts:  In: 1380 [P.O.:880]  Out:        Vital Signs:  Patient Vitals for the past 8 hrs:   BP Temp Temp src Pulse Resp SpO2 Weight   24 0814 128/78 97.9 °F (36.6 °C) Axillary 68 16 98 % --   24 0600 -- -- -- -- -- --  COMPLETE   Final Result      Cholelithiasis without sonographic evidence of acute cholecystitis.      No evidence of portal vein thrombosis.      Electronically signed by Yoni Calabrese MD      CT HEAD WO CONTRAST   Final Result      1. Age-related cortical atrophy   2. No acute intracranial hemorrhage.      Electronically signed by Jessica Patiño      XR FOOT RIGHT (MIN 3 VIEWS)   Final Result   Interval amputation.      Electronically signed by Jessica Patiño      XR CHEST PORTABLE   Final Result   No acute cardiopulmonary abnormality.      Electronically signed by Mark Atif            Assessment & Plan   Serafin Weaver is a 44 y.o. with PMH of ESRD on HD (MWF), DM type II, right foot osteomyelitis s/p TMA and left foot partial amputation who presented from nursing home with lethargy for 1 day s/p x3 missed HD sessions     Hyperkalemia, ESRD on HD (MWF) with poor compliance  History of ESRD on HD MWF with poor compliance presented with lethargy from SNF after missing last 3 dialysis sessions.   -Nephrology consulted to manage inpatient HD, recommendations   HD MWF with 1000mg retacrit to keep Hg closer to 10   Renal diet   F/u daily RFP   Adjust antibiotics for GFR<10cc/min   Daily weights   Fluid restrict 1L unless hypotensive   Nephrocap 1 tab PO daily  -Patient had HD session yesterday, HD tomorrow  -AM Potassium 4.5 from 4.8 yesterday, on Lokelma, f/u AM potassium  -Strict I and Os, record and monitor q shift    Infected right foot wound with recent C. Difficile infection  History of right foot osteomyelitis s/p TMA 07/24 with reported purulent discharge from wound on presentation. Patient was also on oral vancomycin for recent C. Dificile infection.  -Wound care, podiatry and infectious disease consulted, recommendations appreciated  -XR showing post amputation changes, otherwise negative  -POD3 s/p I&D w/ removal of soft tissue and bone. Bone biopsy and deep tissue cultures obtained, f/u bone  biopsy pathology - not resulted as of 8/20  -Pending additional podiatry recs regarding date/time of next planned surgery, follow-up their recommendations regarding holding anticoagulation  -Blood cultures 8/15 NGTD  -Antibiotics: Merrem renally dosed for wound culture growing K. Oxytoca ESBL  -WBC yesterday 17.0 from 16.4 on 8/18, patient refused labs yesterday and this morning  -Oral fidaxomicin started today for concerns of C. difficile infection    Acute on chronic anemia with intermittent hypotension, possibly secondary to oozing from right foot wound and hypovolemia  The patient presented with Hg 6.9 from baseline of 8-10  -Hg stable yesterday 8.1 from 8.4 8/18  -Tranfuse 1 u pRBC if Hg <7  -Hep gtt restarted today using Anti-Xa level per pharmacy s/p patient agreeing to blood draw  -Continue with retacrit during HD to keep Hg closer to 10 per nephrology  -Ferritin 23,989, TS 52%, suspected 2/2 sepsis, rec f/u outpatient when discharged to rule out hemochromatosis   -F/u AM CBC, trend Hg daily    Sepsis related acute hepatocellular transaminitis, (resolved)  -Acetaminophen level <5, Salicyclic acid <0.3  -Stopped tylenol and statins  -UDS negative  -PT/INR 1.42/17.6 on admission, home eliquis was held at that time  -ALT and AST nearly within normal limits 8/18, no longer trending  -Hepatitis panel resulted:   Hep A IgM nonreactive   Hep B core AB, IgM nonreactive   Hep B S Ag interp nonreactive   Hep C Ab interp nonreactive  -RUQ US cholelithiasis, no cholecystitis  -F/u US Abdomen Pelvis Retro Scrotal negative for hepatic vein thrombosis    Elevated troponin, asymptomatic (resolved)  -Trop on presentation 150, stable at 149 on repeat  -Elevated Trop with PMH ESRD, no plans to continue trending  -EKG without ischemic changes    Chronic conditions  Type II DM   -Hg A1c 5.8 07/2024  -Blood glucose on admission 247  -SSI with glucose checks  -Hypoglycemic protocol in place    Hypertension  -On home Carvedilol

## 2024-08-20 NOTE — PROGRESS NOTES
ID Follow-up NOTE    CC:   Right foot wound status post TMA  Antibiotics: Vanco, meropenem    Admit Date: 8/15/2024  Hospital Day: 6    Subjective:     Patient denies any fevers overnight.  Denies any foot pain, vac in place.        Objective:     Patient Vitals for the past 8 hrs:   BP Temp Temp src Pulse Resp SpO2 Weight   08/20/24 0814 128/78 97.9 °F (36.6 °C) Axillary 68 16 98 % --   08/20/24 0600 -- -- -- -- -- -- 133.5 kg (294 lb 5 oz)   08/20/24 0330 121/72 98.5 °F (36.9 °C) Oral 70 18 96 % --     I/O last 3 completed shifts:  In: 1380 [P.O.:880]  Out: 2000   No intake/output data recorded.    EXAM:  GENERAL: No apparent distress.    HEENT: Membranes moist, no oral lesion  NECK:  Supple, no lymphadenopathy  LUNGS: Clear b/l, no rales, no dullness  CARDIAC: RRR, no murmur appreciated  ABD:  + BS, soft / NT  EXT:  Left TMA site well-healed without evidence of ulceration.  The right TMA site with wound vac in place. See photo from 8/19:      NEURO: No focal neurologic findings  PSYCH: Orientation, sensorium, mood normal  LINES:  Peripheral iv, right arm AVF       Data Review:  Lab Results   Component Value Date    WBC 16.4 (H) 08/18/2024    HGB 8.4 (L) 08/18/2024    HCT 26.7 (L) 08/18/2024    MCV 94.6 08/18/2024     08/18/2024     Lab Results   Component Value Date    CREATININE 11.9 (HH) 08/19/2024    BUN 63 (H) 08/19/2024     08/19/2024    K 4.8 08/19/2024    CL 99 08/19/2024    CO2 24 08/19/2024       Hepatic Function Panel:   Lab Results   Component Value Date/Time    ALKPHOS 69 08/18/2024 02:44 PM    ALKPHOS 161 08/18/2024 02:44 PM    ALT 53 08/18/2024 02:44 PM     08/18/2024 02:44 PM    AST 24 08/18/2024 02:44 PM     08/18/2024 02:44 PM    BILITOT 1.2 08/18/2024 02:44 PM    BILITOT 0.7 08/18/2024 02:44 PM    BILIDIR 0.2 08/18/2024 02:44 PM    IBILI 1.0 08/18/2024 02:44 PM       MICRO:  Blood cultures x 2 8/15: neg  Wound cx right foot 8/16: ESBL Klebsiella oxytoca, heavy  Oral Daily    sevelamer  1,600 mg Oral TID WC    sodium chloride flush  5-40 mL IntraVENous 2 times per day       Continuous Infusions:   [Held by provider] heparin (PORCINE) Infusion Stopped (08/20/24 0940)    sodium chloride      dextrose      sodium chloride         PRN Meds:  sodium chloride, [Held by provider] heparin (porcine), [Held by provider] heparin (porcine), sodium chloride, glucose, dextrose bolus **OR** dextrose bolus, glucagon (rDNA), dextrose, sodium chloride flush, sodium chloride, ondansetron **OR** ondansetron, polyethylene glycol      Assessment:       Patient Active Problem List   Diagnosis    Amputation of third toe, left, traumatic (HCC)    Type II or unspecified type diabetes mellitus without mention of complication, not stated as uncontrolled (HCC)    BMI 37.0-37.9, adult    DKA (diabetic ketoacidoses)    Scrotal abscess    Perineal abscess    Sepsis due to Streptococcus agalactiae (HCC)    Morbid obesity due to excess calories (HCC)    Hypertension    Cellulitis of foot, left    Diabetic foot infection (HCC)    Diabetic ulcer of left foot associated with type 2 diabetes mellitus, with bone involvement without evidence of necrosis (HCC)    Diabetic polyneuropathy associated with type 2 diabetes mellitus (HCC)    Uncontrolled type 2 diabetes mellitus with hyperglycemia (HCC)    Ulcer of left foot, with fat layer exposed (HCC)    Acute osteomyelitis (HCC)    Cellulitis of second toe, right    Abscess of second toe, right    Vision loss of right eye    Non-intractable vomiting    H/O arteriovenous malformation (AVM)    Hypertensive urgency    Rotator cuff tendonitis    Scapulothoracic syndrome    SLAP tear of shoulder    Vitreous hemorrhage (HCC)    Wrist sprain, left, initial encounter    Acute encephalopathy    Disorder of brain    Ischemic cerebrovascular accident (CVA) (HCC)    ESRD on dialysis (HCC)    Osteomyelitis of fifth toe of right foot (HCC)    Gas gangrene (HCC)    Infection with

## 2024-08-21 ENCOUNTER — ANESTHESIA EVENT (OUTPATIENT)
Dept: OPERATING ROOM | Age: 44
End: 2024-08-21
Payer: COMMERCIAL

## 2024-08-21 LAB
ABO + RH BLD: NORMAL
ALBUMIN SERPL-MCNC: 2.6 G/DL (ref 3.4–5)
ANION GAP SERPL CALCULATED.3IONS-SCNC: 13 MMOL/L (ref 3–16)
ANTI-XA UNFRAC HEPARIN: 0.31 IU/ML (ref 0.3–0.7)
ANTI-XA UNFRAC HEPARIN: 0.32 IU/ML (ref 0.3–0.7)
ANTI-XA UNFRAC HEPARIN: 0.33 IU/ML (ref 0.3–0.7)
BLD GP AB SCN SERPL QL: NORMAL
BUN SERPL-MCNC: 41 MG/DL (ref 7–20)
CALCIUM SERPL-MCNC: 8.6 MG/DL (ref 8.3–10.6)
CHLORIDE SERPL-SCNC: 97 MMOL/L (ref 99–110)
CO2 SERPL-SCNC: 24 MMOL/L (ref 21–32)
CREAT SERPL-MCNC: 9.1 MG/DL (ref 0.9–1.3)
GFR SERPLBLD CREATININE-BSD FMLA CKD-EPI: 7 ML/MIN/{1.73_M2}
GLUCOSE BLD-MCNC: 149 MG/DL (ref 70–99)
GLUCOSE BLD-MCNC: 177 MG/DL (ref 70–99)
GLUCOSE BLD-MCNC: 197 MG/DL (ref 70–99)
GLUCOSE BLD-MCNC: 198 MG/DL (ref 70–99)
GLUCOSE SERPL-MCNC: 166 MG/DL (ref 70–99)
INR PPP: 1.27 (ref 0.85–1.15)
MAGNESIUM SERPL-MCNC: 2.4 MG/DL (ref 1.8–2.4)
PERFORMED ON: ABNORMAL
PHOSPHATE SERPL-MCNC: 4.1 MG/DL (ref 2.5–4.9)
POTASSIUM SERPL-SCNC: 4.8 MMOL/L (ref 3.5–5.1)
PROTHROMBIN TIME: 16.1 SEC (ref 11.9–14.9)
SODIUM SERPL-SCNC: 134 MMOL/L (ref 136–145)

## 2024-08-21 PROCEDURE — 99232 SBSQ HOSP IP/OBS MODERATE 35: CPT | Performed by: INTERNAL MEDICINE

## 2024-08-21 PROCEDURE — 85610 PROTHROMBIN TIME: CPT

## 2024-08-21 PROCEDURE — 6370000000 HC RX 637 (ALT 250 FOR IP)

## 2024-08-21 PROCEDURE — 1200000000 HC SEMI PRIVATE

## 2024-08-21 PROCEDURE — 6360000002 HC RX W HCPCS

## 2024-08-21 PROCEDURE — P9016 RBC LEUKOCYTES REDUCED: HCPCS

## 2024-08-21 PROCEDURE — 80069 RENAL FUNCTION PANEL: CPT

## 2024-08-21 PROCEDURE — 86900 BLOOD TYPING SEROLOGIC ABO: CPT

## 2024-08-21 PROCEDURE — 2580000003 HC RX 258

## 2024-08-21 PROCEDURE — 83735 ASSAY OF MAGNESIUM: CPT

## 2024-08-21 PROCEDURE — 86923 COMPATIBILITY TEST ELECTRIC: CPT

## 2024-08-21 PROCEDURE — 6360000002 HC RX W HCPCS: Performed by: HOSPITALIST

## 2024-08-21 PROCEDURE — 85520 HEPARIN ASSAY: CPT

## 2024-08-21 PROCEDURE — 86901 BLOOD TYPING SEROLOGIC RH(D): CPT

## 2024-08-21 PROCEDURE — 36415 COLL VENOUS BLD VENIPUNCTURE: CPT

## 2024-08-21 PROCEDURE — 86850 RBC ANTIBODY SCREEN: CPT

## 2024-08-21 PROCEDURE — 2580000003 HC RX 258: Performed by: HOSPITALIST

## 2024-08-21 RX ORDER — SODIUM HYPOCHLORITE 1.25 MG/ML
SOLUTION TOPICAL DAILY
Status: DISCONTINUED | OUTPATIENT
Start: 2024-08-22 | End: 2024-08-25

## 2024-08-21 RX ORDER — SODIUM HYPOCHLORITE 1.25 MG/ML
SOLUTION TOPICAL ONCE
Status: COMPLETED | OUTPATIENT
Start: 2024-08-21 | End: 2024-08-21

## 2024-08-21 RX ORDER — SODIUM HYPOCHLORITE 1.25 MG/ML
SOLUTION TOPICAL DAILY
Status: DISCONTINUED | OUTPATIENT
Start: 2024-08-21 | End: 2024-08-21

## 2024-08-21 RX ADMIN — CARVEDILOL 6.25 MG: 6.25 TABLET, FILM COATED ORAL at 21:14

## 2024-08-21 RX ADMIN — FIDAXOMICIN 200 MG: 200 TABLET, FILM COATED ORAL at 11:29

## 2024-08-21 RX ADMIN — SODIUM ZIRCONIUM CYCLOSILICATE 5 G: 5 POWDER, FOR SUSPENSION ORAL at 17:37

## 2024-08-21 RX ADMIN — FLUOXETINE HYDROCHLORIDE 10 MG: 10 CAPSULE ORAL at 11:25

## 2024-08-21 RX ADMIN — SODIUM CHLORIDE, PRESERVATIVE FREE 10 ML: 5 INJECTION INTRAVENOUS at 11:26

## 2024-08-21 RX ADMIN — PREGABALIN 75 MG: 75 CAPSULE ORAL at 11:24

## 2024-08-21 RX ADMIN — HEPARIN SODIUM 17 UNITS/KG/HR: 10000 INJECTION, SOLUTION INTRAVENOUS at 01:31

## 2024-08-21 RX ADMIN — MEROPENEM 500 MG: 500 INJECTION, POWDER, FOR SOLUTION INTRAVENOUS at 10:03

## 2024-08-21 RX ADMIN — CLOPIDOGREL BISULFATE 75 MG: 75 TABLET ORAL at 11:25

## 2024-08-21 RX ADMIN — HEPARIN SODIUM 17 UNITS/KG/HR: 10000 INJECTION, SOLUTION INTRAVENOUS at 13:13

## 2024-08-21 RX ADMIN — CARVEDILOL 6.25 MG: 6.25 TABLET, FILM COATED ORAL at 11:24

## 2024-08-21 RX ADMIN — DAKIN'S SOLUTION 0.125% (QUARTER STRENGTH): 0.12 SOLUTION at 12:45

## 2024-08-21 RX ADMIN — FIDAXOMICIN 200 MG: 200 TABLET, FILM COATED ORAL at 21:14

## 2024-08-21 ASSESSMENT — LIFESTYLE VARIABLES: SMOKING_STATUS: 1

## 2024-08-21 NOTE — FLOWSHEET NOTE
08/21/24 1925   Vital Signs   Temp 97.9 °F (36.6 °C)   Temp Source Oral   Pulse 69   Heart Rate Source Monitor   Respirations 18   /68   MAP (Calculated) 84   BP Location Left upper arm   BP Method Automatic   Patient Position Semi pradipwlers   Pain Assessment   Pain Assessment None - Denies Pain   Care Plan - Pain Goals   Verbalizes/displays adequate comfort level or baseline comfort level Encourage patient to monitor pain and request assistance   Opioid-Induced Sedation   POSS Score 1   RASS   Pastor Agitation Sedation Scale (RASS) 0   Oxygen Therapy   SpO2 97 %   Pulse Oximetry Type Intermittent   Pulse Oximeter Device Mode Intermittent   Pulse Oximeter Device Location Left;Finger   O2 Device None (Room air)   O2 Flow Rate (L/min) 0 L/min   Oxygen Therapy None (Room air)     Pt resting comfortably in bed. IV infusing as ordered. Bed alarm on, call light within reach. No needs expressed at this time. Will continue to monitor.

## 2024-08-21 NOTE — PROGRESS NOTES
Pt refused 2 different attempts at blood draws since initially being agreeable earlier. Will let MD know.

## 2024-08-21 NOTE — FLOWSHEET NOTE
08/20/24 2358   Vital Signs   Temp 98.1 °F (36.7 °C)   Temp Source Axillary   Pulse 71   Heart Rate Source Monitor   Respirations 18   BP (!) 94/56   MAP (Calculated) 69   BP Location Left upper arm   BP Method Automatic   Patient Position Semi pradipwlers   Pain Assessment   Pain Assessment None - Denies Pain   Care Plan - Pain Goals   Verbalizes/displays adequate comfort level or baseline comfort level Encourage patient to monitor pain and request assistance   Opioid-Induced Sedation   POSS Score 1   RASS   Pastor Agitation Sedation Scale (RASS) 0   Oxygen Therapy   SpO2 97 %   Pulse Oximetry Type Intermittent   Pulse Oximeter Device Mode Intermittent   Pulse Oximeter Device Location Right;Finger   O2 Device None (Room air)   Skin Assessment Clean, dry, & intact   O2 Flow Rate (L/min) 0 L/min   Oxygen Therapy None (Room air)

## 2024-08-21 NOTE — PROGRESS NOTES
Pt arrived to dialysis for treatment. Attempted to cannulate pt and could not obtain access with first stick. Pt refused to be stuck again. Pt states he was refusing today. Nephrology aware.

## 2024-08-21 NOTE — PLAN OF CARE
General Internal Medicine Attending    Chart and data reviewed.  Patient seen and examined, case discussed with medical resident.   Physical exam repeated.  Labs and imaging studies reviewed.     Agree with documentation, assessment and plan as outlined         44 y.o. male with ESRD on HD, DM II, seizure dx, depression, left TMA and right foot OM S/P recent right TMA on 7/24/24 who presented on 8/15 from nursing home with lethargy.       Surgical site wound dehiscence, with Infected Rt foot wound with ESBL Klebsiella,  Rt foot osteomyelitis S/P TMA for diabetic foot ulcer with osteomyelitis TMA with UNRULY 7/24/24   PAD with recent intervention (RLE arteriogram with L CFA insertion site. Balloon angioplasty x2 7/22/24)   -S/P Rt foot I&D with removal of all non-viable tissue and bone 8/17.  -Culture is showed ESBL and anaerobes. Antibiotics switched to meropenem for now   - OR again per podiatry  8/22/24  - Antibx per ID     Acute on chronic anemia   - Acute anemia sec to acute infection and blood loss from surgery, heparin gtt on chronic anemia sec to ESRD    -Given a unit 8/15. Hb dropped after surgery and given another unit 8/17. Last Hb 8/18 ; appears as refusing labs    ESRD with non-adherence to dialysis   Hyperkalemia with EKG changes  resolved  -On Lokelma   -Dialysis per Nephrology     DM type 2, complicated by peripheral neuropathy, nephropathy: POA  - Optimize glycemic control      Elevated LFTs   -Hepatocellular pattern. It worsened initially but almost normalized now   -Acute hepatitis panel is negative . -RUQ US + doppler (hx of lupus, atrial clot) are ok   -Restart statin on discharge      Diarrhea/possible Cdiff infection - per report tested positive for Cdiff recently .Lab refused to test it here. 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 Appears he had a Rt atrial clot on RUTHANN but not mentioned in the discharge summary. He did follow with hematology after that   -Has been on heparin drip periop, with plans to switch back to Eliquis when done with surgery     Discussed with patient, SW     Rest as per resident's note.      Disposition:   Recent discharge 7/30 post amp for OM  Pt from [facility] and can return at discharge   Planned OR tomorrow with Podiatry  Antibx plan dep on extent of surgery: ID following             Mine Arteaga MD

## 2024-08-21 NOTE — PLAN OF CARE
Podiatric Surgery Plan of Care Note  Serafin Weaver    Discussed surgical intervention with patient and mother at bedside. All potential risks, benefits, and complications were discussed with the patient prior to the scheduling of surgery. No guarantees or promises were made to what the outcomes will be. The patient wished to proceed with surgery, and informed written consent was obtained, signed, and placed on the patient's chart.   -NPO at midnight   -Will hold morning anticoagulation (Plavix and heparin)   -Preop labs ordered   -Would appreciate medical clearance for OR    Podiatry plan for right foot I&D with resection of 5th metatarsal and tendon transfer with wound VAC application. Patient scheduled for 1:30 pm Thursday.    Discussed with Dr. Kailee Olsen DPM.    Frida Hartley DPM   Podiatric Resident PGY2  [employer]  Pager number [phone]  8/21/2024, 3:33 PM

## 2024-08-21 NOTE — PLAN OF CARE
Problem: Discharge Planning  Goal: Discharge to home or other facility with appropriate resources  Outcome: Progressing  Flowsheets (Taken 8/20/2024 2049)  Discharge to home or other facility with appropriate resources: Identify barriers to discharge with patient and caregiver     Problem: Skin/Tissue Integrity  Goal: Absence of new skin breakdown  Description: 1.  Monitor for areas of redness and/or skin breakdown  2.  Assess vascular access sites hourly  3.  Every 4-6 hours minimum:  Change oxygen saturation probe site  4.  Every 4-6 hours:  If on nasal continuous positive airway pressure, respiratory therapy assess nares and determine need for appliance change or resting period.  8/20/2024 2200 by Venita Correa RN  Outcome: Progressing  8/20/2024 1820 by Lesli Gregory RN  Outcome: Progressing     Problem: Safety - Adult  Goal: Free from fall injury  8/20/2024 2200 by Venita Correa RN  Outcome: Progressing  8/20/2024 1820 by Lesli Gregory RN  Outcome: Progressing  Flowsheets (Taken 8/20/2024 1820)  Free From Fall Injury: Instruct family/caregiver on patient safety     Problem: Chronic Conditions and Co-morbidities  Goal: Patient's chronic conditions and co-morbidity symptoms are monitored and maintained or improved  8/20/2024 2200 by Venita Correa RN  Outcome: Progressing  Flowsheets (Taken 8/20/2024 2049)  Care Plan - Patient's Chronic Conditions and Co-Morbidity Symptoms are Monitored and Maintained or Improved: Monitor and assess patient's chronic conditions and comorbid symptoms for stability, deterioration, or improvement  8/20/2024 1820 by Lesli Gregory RN  Outcome: Progressing  Flowsheets (Taken 8/20/2024 1820)  Care Plan - Patient's Chronic Conditions and Co-Morbidity Symptoms are Monitored and Maintained or Improved: Monitor and assess patient's chronic conditions and comorbid symptoms for stability, deterioration, or improvement

## 2024-08-21 NOTE — PROGRESS NOTES
Ph: [phone], Fax: [phone]           [email or web address]               Reason for admission:                 Admitted for lethargy    Brief Summary :     Serafin Weaver is being seen by nephrology for ESRD on hemodialysis.      Interval History and plan:      Blood pressure is well controlled   Labs were reviewed from yesterday .  He is refusing a lot of his care    He refused dialysis today     Plan:    Hemodialysis arranged per [facility] schedule.  HD arranged for today   Continue with renal diet.  Replace Vitamin D   Daily renal panels.  Continue with Retacrit with dialysis. Very high ferritin ( should not get iron with dialysis )  Please adjust antibiotics to GFR less than 10 mL/min.  OK to place PICC line                     Assessment :     1.  ESRD:  Will plan HD per schedule.  He gets dialysis Monday, Wednesday and Friday.  He gets dialysis currently at [facility] under our care.  Access: AV graft  Daily weights  Fluid restriction: 1 L unless hypotensive  Nephrocap 1 tab PO daily    2.  Anemia:  Erythropoetin dose: He will receive Retacrit with dialysis to keep hemoglobin close to 10.  Hemoglobin admission was 6.9.  Ferritin is greater than 1000.    3.  Osteodystrophy:  Phosphate Binder: Will continue with Renvela 2 tabs p.o. 3 times daily with meals.  I will check PTH and vitamin D.  Daily renal panels.    4.  Hyperkalemia: Resolved.  He is on chronic Lokelma 10 g q. Tuesday, Thursday and Saturday on nondialysis days.    5.  Hypertension: well-controlled.  Continue with carvedilol.    6.  Wound infection: Podiatry consulted.  ID consulted.  He is on cefepime and vancomycin.  He is also getting oral vancomycin for recent C. difficile infection.             [facility] would like to thank Mine Arteaga MD   for opportunity to serve this patient      Please call with questions at-   24 Hrs Answering service [phone] or  7 am- 5 pm via [employer] or cell  right foot ulcer  Abdomen: -  soft  Other relevant findings: Hemodialysis AV graft     Labs :     CBC:   Recent Labs     08/18/24  1444 08/20/24  1029   WBC 16.4* 17.0*   HGB 8.4* 8.1*   HCT 26.7* 26.5*    219     BMP:    Recent Labs     08/18/24  1444 08/19/24  0930 08/20/24  1029    136 136   K 4.8 4.8 4.5   CL 98* 99 98*   CO2 24 24 25   BUN 54* 63* 36*   CREATININE 11.4* 11.9* 8.4*   GLUCOSE 181* 169* 150*   MG 2.00 2.70* 2.20   PHOS  --  4.6 3.4     Lab Results   Component Value Date/Time    COLORU Yellow 04/24/2023 04:48 PM    NITRU Negative 04/24/2023 04:48 PM    GLUCOSEU 250 04/24/2023 04:48 PM    KETUA 15 04/24/2023 04:48 PM    UROBILINOGEN 0.2 04/24/2023 04:48 PM    BILIRUBINUR Negative 04/24/2023 04:48 PM        ----------------------------------------------------------  Please call with questions at      24 Hrs Answering service [phone]  [employer], or cell phone 7 am - 5pm  Jordin Kaur MD   [email or web address]

## 2024-08-21 NOTE — PROGRESS NOTES
Pt refused AM labs earlier this morning. Spoke with pt, states he would be agreeable to lab draw at this time. Called lab, will send tech up when available.

## 2024-08-21 NOTE — PROGRESS NOTES
Internal Medicine Progress Note    Patient Name: Serafin Weaver   Patient : 1980   Date: 2024   Admit Date: 8/15/2024     CC: One day fatigue s/p x3 missed HD sessions     Interval History     Patient had dialysis scheduled today. Patient refused HD today after two failed attempts at access. He is scheduled for the OR tomorrow with podiatry. Will hold plavix today. He is currently on hep gtt which will be held 6 hours before OR. Spoke with the patients mom over the phone today. She endorses personality change since stroke one year ago. She plans on stopping by later today.    Patient denies pain. He denies nausea and vomiting. He is tolerating diet. States that his diarrhea is improving s/p starting fidaxomicin yesterday. Denies ambulating. Denies fever and chills. AM labs pending at time of evaluation.    Objective     I/Os:  I/O last 3 completed shifts:  In: 540 [P.O.:540]  Out: 200 [Drains:200]      Vital Signs:  Patient Vitals for the past 8 hrs:   BP Temp Temp src Pulse Resp SpO2 Weight   24 0751 123/78 97.1 °F (36.2 °C) Axillary 68 18 96 % --   24 0337 115/69 97.4 °F (36.3 °C) Oral 68 18 97 % --   24 0322 111/67 (!) 85.5 °F (29.7 °C) Oral 68 18 98 % 133.6 kg (294 lb 8.6 oz)       Physical Exam  Vitals reviewed.   Constitutional:       General: He is not in acute distress.     Appearance: He is obese. He is ill-appearing.   HENT:      Head: Normocephalic.      Right Ear: External ear normal.      Left Ear: External ear normal.      Nose: Nose normal.      Mouth/Throat:      Mouth: Mucous membranes are moist.   Eyes:      Extraocular Movements: Extraocular movements intact.      Pupils: Pupils are equal, round, and reactive to light.   Cardiovascular:      Rate and Rhythm: Normal rate.      Heart sounds: Normal heart sounds.   Pulmonary:      Effort: Pulmonary effort is normal. No respiratory distress.      Breath sounds: Normal breath sounds.   Abdominal:      General:

## 2024-08-21 NOTE — PLAN OF CARE
Problem: Discharge Planning  Goal: Discharge to home or other facility with appropriate resources  8/21/2024 1044 by Brooklyn Greenberg, RN  Outcome: Progressing  Plan of care reviewed with pt. All questions answered at this time.      Problem: Skin/Tissue Integrity  Goal: Absence of new skin breakdown  8/21/2024 1044 by Brooklyn Greenberg, RN  Outcome: Progressing   Turn and reposition q 2 hrs.

## 2024-08-21 NOTE — PROGRESS NOTES
Podiatric Surgery Daily Progress Note  Serafin Weaver      Subjective :   Patient seen and examined this am at the bedside. Patient denies any overnight events. Patient denies any pedal complaints. Patient is understanding of planned surgery for Thursday on his right foot. Patient denies any N/V/F/SOB/CP.    Review of Systems: A 12 point review of symptoms is unremarkable with the exception of the chief complaint. Patient specifically denies nausea, fever, vomiting, chills, shortness of breath, chest pain, abdominal pain, constipation or difficulty urinating.       Objective     /69   Pulse 68   Temp 97.4 °F (36.3 °C) (Oral)   Resp 18   Ht 2.032 m (6' 8\")   Wt 133.6 kg (294 lb 8.6 oz)   SpO2 97%   BMI 32.36 kg/m²      I/O:  Intake/Output Summary (Last 24 hours) at 8/21/2024 0740  Last data filed at 8/20/2024 2132  Gross per 24 hour   Intake 120 ml   Output 200 ml   Net -80 ml              Wt Readings from Last 3 Encounters:   08/21/24 133.6 kg (294 lb 8.6 oz)   08/08/24 (!) 139.3 kg (307 lb 3.2 oz)   07/30/24 (!) 153.5 kg (338 lb 6.5 oz)       LABS:    Recent Labs     08/18/24  1444 08/20/24  1029   WBC 16.4* 17.0*   HGB 8.4* 8.1*   HCT 26.7* 26.5*    219        Recent Labs     08/20/24  1029      K 4.5   CL 98*   CO2 25   PHOS 3.4   BUN 36*   CREATININE 8.4*        Recent Labs     08/18/24  1444 08/18/24  2233   APTT 37.6* 74.2*           LOWER EXTREMITY EXAMINATION    VASCULAR:  DP and PT pulses are faintly palpable +1/4 B/L.  Upon previous hand-held Doppler examination, DP and PT pulses are monophasic.  CFT is brisk to TMA stumps bilateral. Skin temperature is warm to warm from proximal to distal with no focal calor.  Mild nonpitting edema noted to right lower extremity 2/2 post operative status. No pain with calf compression b/l.      NEUROLOGIC: Gross and epicritic sensation is diminished b/l. Protective sensation is diminished at all pedal sites b/l.     DERMATOLOGIC:  Right    Anterior Tibial Artery: Multiphasic (normal) Doppler waveforms.   Tibial/Peroneal Trunk: Multiphasic (normal) Doppler waveforms.   Posterior Tibial Artery: Multiphasic (normal) Doppler waveforms.   Peroneal Artery: Multiphasic (normal) Doppler waveforms.     The ankle/brachial index is 0.91 (DP 98, PT 100mmHg).  Normal multiphasic flow in the common femoral artery indicates no significant aorto-iliac inflow disease.   Multiphasic flow throughout the left lower extremity without any evidence of significant focal lesions or stenosis.      XR right foot (7/18):  Findings:  Patient is status post fifth transmetatarsal amputation, in addition to chronic  amputations of the first and second digit. There is no evidence of complication.  No radiopaque foreign body is seen.  IMPRESSION:  Status post fifth transmetatarsal amputation.    ASSESSMENT/PLAN  -S/P I&D right foot with removal of bone (8/17/24)  -Full thickness ulceration, right lower extremity from surgical site dehiscence  -TMA with UNRULY (DOS 7/24/24)   -PAD with recent intervention (RLE arteriogram with L CFA insertion site. Balloon angioplasty x2 7/22/24)  -T2DM with periphral neuropathy   -Hx TMA left LLE    -Patient seen and examined at bedside   -VSS, No AM CBC   - and .1. Repeat  .6  -lactic acid 1.1  -HbA1c 5.8 (7/17/24)  -Prealbumin: 6.1. Oral wound healing supplement ordered. Recommend patient take wound healing supplement to increase wound healing potential.   -Images reviewed, impression noted above  -TCPO2 with good wound healing potential 2.74  -Wound culture: Klebsiella Oxytoca ESBL   -Continue IV antibiotics per ID: Meropenem  -Right LE instill wound vac reapplied.  -Prevalon boots reapplied. Patient is to wear at all times while in bed to prevent further deep tissue injury.  -Non-weightbearing to right LE    -Plan for OR on Thursday for tendon transfer and removal of 5th metatarsal base, possible wound VAC placement as

## 2024-08-21 NOTE — PROGRESS NOTES
Pre-Operative Note  Resident Note     Patient: Serafin Weaver     Procedure: Right foot incision and drainage with fifth metatarsal resection and peroneal tendon transfer with wound vac application    Clearance for surgery: Yes, per Internal Medicine    Consent: Procedure was discussed at length with patient and all questions were answered to completion, consent obtained and placed in chart     Labs:        Recent Labs     08/20/24  1029 08/22/24  0500   WBC 17.0* 16.8*   HGB 8.1* 8.2*   HCT 26.5* 26.3*   MCV 93.2 93.5    254        Recent Labs     08/21/24  0231 08/22/24  0500   * 136   K 4.8 4.9   CL 97* 98*   CO2 24 27   PHOS 4.1 5.2*   BUN 41* 48*   CREATININE 9.1* 10.7*        No results for input(s): \"AST\", \"ALT\", \"BILIDIR\", \"BILITOT\", \"ALKPHOS\" in the last 72 hours.    Invalid input(s): \"ALB\"     No results for input(s): \"LIPASE\", \"AMYLASE\" in the last 72 hours.     Recent Labs     08/21/24  1520   INR 1.27*      No results for input(s): \"CKTOTAL\", \"CKMB\", \"CKMBINDEX\", \"TROPONINI\" in the last 72 hours.    Pre-op Medications:   Current Facility-Administered Medications   Medication Dose Route Frequency Provider Last Rate Last Admin    sodium hypochlorite (DAKINS) 0.125 % external solution- 473 ml- dispense in BAG for wound vac   Irrigation Daily Frida Hartley DPM        Fidaxomicin (DIFICID) tablet 200 mg  200 mg Oral BID Franco Coyle MD   200 mg at 08/21/24 2114    meropenem (MERREM) 500 mg in sodium chloride 0.9 % 100 mL IVPB (mini-bag)  500 mg IntraVENous Q24H Krishna Obando MD   Stopped at 08/21/24 1303    vitamin D (ERGOCALCIFEROL) capsule 50,000 Units  50,000 Units Oral Weekly Krishna Obando MD        epoetin rae-epbx (RETACRIT) injection 10,000 Units  10,000 Units IntraVENous Once per day on Monday Wednesday Friday Niko Hernandez MD   10,000 Units at 08/19/24 1148    sodium chloride 0.9 % bolus 100 mL  100 mL IntraVENous PRN Niko Hernandez MD

## 2024-08-21 NOTE — FLOWSHEET NOTE
08/21/24 0337   Vital Signs   Temp 97.4 °F (36.3 °C)   Temp Source Oral   Pulse 68   Heart Rate Source Monitor   Respirations 18   /69   MAP (Calculated) 84   BP Location Left upper arm   BP Method Automatic   Patient Position Semi fowlers   Pain Assessment   Pain Assessment None - Denies Pain   Oxygen Therapy   SpO2 97 %   Pulse Oximetry Type Intermittent   Pulse Oximeter Device Mode Intermittent   Pulse Oximeter Device Location Right;Finger   O2 Device None (Room air)   O2 Flow Rate (L/min) 0 L/min   Oxygen Therapy None (Room air)

## 2024-08-21 NOTE — PROGRESS NOTES
ID Follow-up NOTE    CC:   Right foot wound status post TMA  Antibiotics:  meropenem    Admit Date: 8/15/2024  Hospital Day: 7    Subjective:     Patient denies any fevers overnight.  Denies any foot pain, vac in place.  Patient planning to have further operative intervention with podiatry tomorrow.      Objective:     Patient Vitals for the past 8 hrs:   BP Temp Temp src Pulse Resp SpO2   08/21/24 0751 123/78 97.1 °F (36.2 °C) Axillary 68 18 96 %     I/O last 3 completed shifts:  In: 540 [P.O.:540]  Out: 200 [Drains:200]  I/O this shift:  In: 240 [P.O.:240]  Out: -     EXAM:  GENERAL: No apparent distress.    HEENT: Membranes moist, no oral lesion  NECK:  Supple, no lymphadenopathy  LUNGS: Clear b/l, no rales, no dullness  CARDIAC: RRR, no murmur appreciated  ABD:  + BS, soft / NT  EXT:  Left TMA site well-healed without evidence of ulceration.  The right TMA site with wound vac in place. See photo from 8/19:      NEURO: No focal neurologic findings  PSYCH: Orientation, sensorium, mood normal  LINES:  Peripheral iv, right arm AVF       Data Review:  Lab Results   Component Value Date    WBC 17.0 (H) 08/20/2024    HGB 8.1 (L) 08/20/2024    HCT 26.5 (L) 08/20/2024    MCV 93.2 08/20/2024     08/20/2024     Lab Results   Component Value Date    CREATININE 9.1 (HH) 08/21/2024    BUN 41 (H) 08/21/2024     (L) 08/21/2024    K 4.8 08/21/2024    CL 97 (L) 08/21/2024    CO2 24 08/21/2024       Hepatic Function Panel:   Lab Results   Component Value Date/Time    ALKPHOS 69 08/18/2024 02:44 PM    ALKPHOS 161 08/18/2024 02:44 PM    ALT 53 08/18/2024 02:44 PM     08/18/2024 02:44 PM    AST 24 08/18/2024 02:44 PM     08/18/2024 02:44 PM    BILITOT 1.2 08/18/2024 02:44 PM    BILITOT 0.7 08/18/2024 02:44 PM    BILIDIR 0.2 08/18/2024 02:44 PM    IBILI 1.0 08/18/2024 02:44 PM       MICRO:  Blood cultures x 2 8/15: neg  Wound cx right foot 8/16: ESBL Klebsiella oxytoca, heavy growth  Klebsiella oxytoca ESBL  (1)    Antibiotic Interpretation Microscan  Method Status    ampicillin Resistant >=32 mcg/mL BACTERIAL SUSCEPTIBILITY PANEL BY VINOD     ampicillin-sulbactam Sensitive 4 mcg/mL BACTERIAL SUSCEPTIBILITY PANEL BY VINOD     ceFAZolin Resistant   BACTERIAL SUSCEPTIBILITY PANEL BY VINOD     cefepime Resistant   BACTERIAL SUSCEPTIBILITY PANEL BY VINOD     cefTRIAXone Resistant   BACTERIAL SUSCEPTIBILITY PANEL BY VINOD     ciprofloxacin Sensitive <=0.25 mcg/mL BACTERIAL SUSCEPTIBILITY PANEL BY VINOD     gentamicin Intermediate 8 mcg/mL BACTERIAL SUSCEPTIBILITY PANEL BY VINOD     levofloxacin Intermediate 1 mcg/mL BACTERIAL SUSCEPTIBILITY PANEL BY VINOD     tobramycin Intermediate 8 mcg/mL BACTERIAL SUSCEPTIBILITY PANEL BY VINOD     trimethoprim-sulfamethoxazole Sensitive <=20 mcg/mL BACTERIAL SUSCEPTIBILITY PANEL BY VINOD         IMAGING:I have independently reviewed the images and reports.     Right foot x ray 8/15:    Interval transmetatarsal amputation proximal aspect first through fifth metatarsals. Minimal air within the soft tissues near the first metatarsal probably postoperative.. Some soft tissue swelling distally. No acute fracture. No osseous destructive lesions.     CT head 8/15:  1. Age-related cortical atrophy  2. No acute intracranial hemorrhage.     US Gallbladder 8/16:  Cholelithiasis without sonographic evidence of acute cholecystitis.  No evidence of portal vein thrombosis.       Scheduled Meds:   sodium hypochlorite   Irrigation Once    [START ON 8/22/2024] sodium hypochlorite   Irrigation Daily    Fidaxomicin  200 mg Oral BID    meropenem  500 mg IntraVENous Q24H    vitamin D  50,000 Units Oral Weekly    epoetin rae-epbx  10,000 Units IntraVENous Once per day on Monday Wednesday Friday    sodium zirconium cyclosilicate  10 g Oral Once per day on Tuesday Thursday Saturday    [Held by provider] atorvastatin  80 mg Oral QHS    carvedilol  6.25 mg Oral BID    clopidogrel  75 mg Oral Daily    FLUoxetine  10 mg Oral Daily     pregabalin  75 mg Oral Daily    sevelamer  1,600 mg Oral TID WC    sodium chloride flush  5-40 mL IntraVENous 2 times per day       Continuous Infusions:   heparin (PORCINE) Infusion 17 Units/kg/hr (08/21/24 0131)    sodium chloride      dextrose      sodium chloride         PRN Meds:  sodium chloride, heparin (porcine), heparin (porcine), sodium chloride, glucose, dextrose bolus **OR** dextrose bolus, glucagon (rDNA), dextrose, sodium chloride flush, sodium chloride, ondansetron **OR** ondansetron, polyethylene glycol      Assessment:       Patient Active Problem List   Diagnosis    Amputation of third toe, left, traumatic ([facility])    Type II or unspecified type diabetes mellitus without mention of complication, not stated as uncontrolled ([facility])    BMI 37.0-37.9, adult    DKA (diabetic ketoacidoses)    Scrotal abscess    Perineal abscess    Sepsis due to Streptococcus agalactiae ([facility])    Morbid obesity due to excess calories ([facility])    Hypertension    Cellulitis of foot, left    Diabetic foot infection ([facility])    Diabetic ulcer of left foot associated with type 2 diabetes mellitus, with bone involvement without evidence of necrosis ([facility])    Diabetic polyneuropathy associated with type 2 diabetes mellitus (HCC)    Uncontrolled type 2 diabetes mellitus with hyperglycemia ([facility])    Ulcer of left foot, with fat layer exposed ([facility])    Acute osteomyelitis ([facility])    Cellulitis of second toe, right    Abscess of second toe, right    Vision loss of right eye    Non-intractable vomiting    H/O arteriovenous malformation ([facility])    Hypertensive urgency    Rotator cuff tendonitis    Scapulothoracic syndrome    SLAP tear of shoulder    Vitreous hemorrhage ([facility])    Wrist sprain, left, initial encounter    Acute encephalopathy    Disorder of brain    Ischemic cerebrovascular accident (CVA) ([facility])    ESRD on dialysis (HCC)    Osteomyelitis of fifth toe of right foot (HCC)    Gas gangrene (HCC)    Infection with ESBL Klebsiella oxytoca

## 2024-08-21 NOTE — ANESTHESIA PRE PROCEDURE
• Hypertension I10   • Cellulitis of foot, left L03.116   • Diabetic foot infection ([facility]) E11.628, L08.9   • Diabetic ulcer of left foot associated with type 2 diabetes mellitus, with bone involvement without evidence of necrosis ([facility]) E11.621, L97.526   • Diabetic polyneuropathy associated with type 2 diabetes mellitus ([facility]) E11.42   • Uncontrolled type 2 diabetes mellitus with hyperglycemia ([facility]) E11.65   • Ulcer of left foot, with fat layer exposed ([facility]) L97.522   • Acute osteomyelitis ([facility]) M86.10   • Cellulitis of second toe, right L03.031   • Abscess of second toe, right L02.611   • Vision loss of right eye H54.61   • Non-intractable vomiting R11.10   • H/O arteriovenous malformation ([facility]) Z87.74   • Hypertensive urgency I16.0   • Rotator cuff tendonitis M75.80   • Scapulothoracic syndrome G56.80   • SLAP tear of shoulder S43.439A   • Vitreous hemorrhage ([facility]) H43.10   • Wrist sprain, left, initial encounter S63.502A   • Acute encephalopathy G93.40   • Disorder of brain G93.9   • Ischemic cerebrovascular accident (CVA) ([facility]) I63.9   • ESRD on dialysis ([facility]) N18.6, Z99.2   • Osteomyelitis of fifth toe of right foot ([facility]) M86.9   • Gas gangrene ([facility]) A48.0   • Infection with ESBL Klebsiella oxytoca A49.8, Z16.12   • Sepsis ([facility]) A41.9       Past Medical History:        Diagnosis Date   • Amputation of third toe, left, traumatic ([facility])    • Anesthesia complication     has history of being aggitated and \"fights\"   • Blood circulation, collateral    • Cellulitis    • Depression    • Diabetic polyneuropathy associated with type 2 diabetes mellitus ([facility]) 02/06/2019   • Hypertension    • MRSA infection 02/04/2019    foot wound   • Seizures ([facility])    • Type II or unspecified type diabetes mellitus without mention of complication, not stated as uncontrolled    • Unspecified cerebral artery occlusion with cerebral infarction     pt states at 14 years of age       Past Surgical History:        Procedure Laterality Date

## 2024-08-21 NOTE — CARE COORDINATION
CM following. Pt from [facility] and can return at discharge. Run sheets for HD approval will need to be sent prior to discharge.  Noemi Louise started precert for SNF level of care on 8/20/24 in case pt discharges on IV abx.     Per ID:   -Discussed with podiatry, plan for OR tomorrow for tendon transfer and removal of fifth metatarsal base and possible wound VAC placement.  Anticipate surgery to be definitive and if so may not need a course of IV antibiotics on DC however will follow for Final antibiotic plan pending bone pathology and clearance of infection.     ESRD on HD:   - non compliance with HD with missed sessions  - nephro following  - renally dose adjust abx as needed.   - nephro ok for picc placement if needed for iv abx that cannot be administered post HD.        Angela Mitchell RN, BSN, CM  Case Management Department  [phone]

## 2024-08-21 NOTE — PROGRESS NOTES
Pt became agitated in dialysis while dialysis RN attempting to establish access. Pt refusing dialysis at this time. Pt returned to room via bed. MD at bedside to discuss with pt, plan at this time is to attempt dialysis tomorrow.

## 2024-08-21 NOTE — FLOWSHEET NOTE
08/20/24 2049   Vital Signs   Temp 98.2 °F (36.8 °C)   Temp Source Axillary   Pulse 69   Heart Rate Source Monitor   Respirations 18   BP (!) 151/83   MAP (Calculated) 106   BP Location Left upper arm   BP Method Automatic   Patient Position Semi fowlers   Pain Assessment   Pain Assessment None - Denies Pain   Opioid-Induced Sedation   POSS Score 1   RASS   Pastor Agitation Sedation Scale (RASS) 0   Oxygen Therapy   SpO2 96 %   Pulse Oximetry Type Intermittent   Pulse Oximeter Device Mode Intermittent   Pulse Oximeter Device Location Right;Finger   O2 Device None (Room air)   Skin Assessment Clean, dry, & intact   O2 Flow Rate (L/min) 0 L/min   Oxygen Therapy None (Room air)     Pt resting comfortably in bed. IV infusing as ordered. Bed alarm on, call light within reach. No needs expressed at this time. Will continue to monitor.

## 2024-08-22 ENCOUNTER — ANESTHESIA (OUTPATIENT)
Dept: OPERATING ROOM | Age: 44
End: 2024-08-22
Payer: COMMERCIAL

## 2024-08-22 ENCOUNTER — APPOINTMENT (OUTPATIENT)
Dept: GENERAL RADIOLOGY | Age: 44
End: 2024-08-22
Payer: COMMERCIAL

## 2024-08-22 LAB
ALBUMIN SERPL-MCNC: 2.7 G/DL (ref 3.4–5)
ANION GAP SERPL CALCULATED.3IONS-SCNC: 11 MMOL/L (ref 3–16)
ANTI-XA UNFRAC HEPARIN: <0.1 IU/ML (ref 0.3–0.7)
ANTI-XA UNFRAC HEPARIN: <0.1 IU/ML (ref 0.3–0.7)
BUN SERPL-MCNC: 48 MG/DL (ref 7–20)
CALCIUM SERPL-MCNC: 9 MG/DL (ref 8.3–10.6)
CHLORIDE SERPL-SCNC: 98 MMOL/L (ref 99–110)
CO2 SERPL-SCNC: 27 MMOL/L (ref 21–32)
CREAT SERPL-MCNC: 10.7 MG/DL (ref 0.9–1.3)
DEPRECATED RDW RBC AUTO: 15.8 % (ref 12.4–15.4)
GFR SERPLBLD CREATININE-BSD FMLA CKD-EPI: 5 ML/MIN/{1.73_M2}
GLUCOSE BLD-MCNC: 111 MG/DL (ref 70–99)
GLUCOSE BLD-MCNC: 114 MG/DL (ref 70–99)
GLUCOSE BLD-MCNC: 175 MG/DL (ref 70–99)
GLUCOSE SERPL-MCNC: 113 MG/DL (ref 70–99)
HCT VFR BLD AUTO: 26.3 % (ref 40.5–52.5)
HGB BLD-MCNC: 8.2 G/DL (ref 13.5–17.5)
MAGNESIUM SERPL-MCNC: 2.6 MG/DL (ref 1.8–2.4)
MCH RBC QN AUTO: 29.2 PG (ref 26–34)
MCHC RBC AUTO-ENTMCNC: 31.2 G/DL (ref 31–36)
MCV RBC AUTO: 93.5 FL (ref 80–100)
PERFORMED ON: ABNORMAL
PHOSPHATE SERPL-MCNC: 5.2 MG/DL (ref 2.5–4.9)
PLATELET # BLD AUTO: 254 K/UL (ref 135–450)
PMV BLD AUTO: 8.4 FL (ref 5–10.5)
POTASSIUM SERPL-SCNC: 4.9 MMOL/L (ref 3.5–5.1)
RBC # BLD AUTO: 2.81 M/UL (ref 4.2–5.9)
REASON FOR REJECTION: NORMAL
REJECTED TEST: NORMAL
SODIUM SERPL-SCNC: 136 MMOL/L (ref 136–145)
WBC # BLD AUTO: 16.8 K/UL (ref 4–11)

## 2024-08-22 PROCEDURE — 7100000001 HC PACU RECOVERY - ADDTL 15 MIN: Performed by: PODIATRIST

## 2024-08-22 PROCEDURE — 88305 TISSUE EXAM BY PATHOLOGIST: CPT

## 2024-08-22 PROCEDURE — 6370000000 HC RX 637 (ALT 250 FOR IP)

## 2024-08-22 PROCEDURE — 2580000003 HC RX 258

## 2024-08-22 PROCEDURE — 6360000002 HC RX W HCPCS

## 2024-08-22 PROCEDURE — 87102 FUNGUS ISOLATION CULTURE: CPT

## 2024-08-22 PROCEDURE — 85520 HEPARIN ASSAY: CPT

## 2024-08-22 PROCEDURE — 2709999900 HC NON-CHARGEABLE SUPPLY: Performed by: PODIATRIST

## 2024-08-22 PROCEDURE — 87075 CULTR BACTERIA EXCEPT BLOOD: CPT

## 2024-08-22 PROCEDURE — 87205 SMEAR GRAM STAIN: CPT

## 2024-08-22 PROCEDURE — 2500000003 HC RX 250 WO HCPCS

## 2024-08-22 PROCEDURE — 36415 COLL VENOUS BLD VENIPUNCTURE: CPT

## 2024-08-22 PROCEDURE — 3600000002 HC SURGERY LEVEL 2 BASE: Performed by: PODIATRIST

## 2024-08-22 PROCEDURE — 87077 CULTURE AEROBIC IDENTIFY: CPT

## 2024-08-22 PROCEDURE — 87070 CULTURE OTHR SPECIMN AEROBIC: CPT

## 2024-08-22 PROCEDURE — 85027 COMPLETE CBC AUTOMATED: CPT

## 2024-08-22 PROCEDURE — 6360000002 HC RX W HCPCS: Performed by: HOSPITALIST

## 2024-08-22 PROCEDURE — 73620 X-RAY EXAM OF FOOT: CPT

## 2024-08-22 PROCEDURE — 0QBN0ZZ EXCISION OF RIGHT METATARSAL, OPEN APPROACH: ICD-10-PCS | Performed by: PODIATRIST

## 2024-08-22 PROCEDURE — 1200000000 HC SEMI PRIVATE

## 2024-08-22 PROCEDURE — 3700000001 HC ADD 15 MINUTES (ANESTHESIA): Performed by: PODIATRIST

## 2024-08-22 PROCEDURE — 83735 ASSAY OF MAGNESIUM: CPT

## 2024-08-22 PROCEDURE — 2500000003 HC RX 250 WO HCPCS: Performed by: PODIATRIST

## 2024-08-22 PROCEDURE — 88311 DECALCIFY TISSUE: CPT

## 2024-08-22 PROCEDURE — 7100000000 HC PACU RECOVERY - FIRST 15 MIN: Performed by: PODIATRIST

## 2024-08-22 PROCEDURE — 87186 SC STD MICRODIL/AGAR DIL: CPT

## 2024-08-22 PROCEDURE — 0LXN0ZZ TRANSFER RIGHT LOWER LEG TENDON, OPEN APPROACH: ICD-10-PCS | Performed by: PODIATRIST

## 2024-08-22 PROCEDURE — 3600000012 HC SURGERY LEVEL 2 ADDTL 15MIN: Performed by: PODIATRIST

## 2024-08-22 PROCEDURE — 99232 SBSQ HOSP IP/OBS MODERATE 35: CPT | Performed by: INTERNAL MEDICINE

## 2024-08-22 PROCEDURE — 6360000002 HC RX W HCPCS: Performed by: PODIATRIST

## 2024-08-22 PROCEDURE — 3700000000 HC ANESTHESIA ATTENDED CARE: Performed by: PODIATRIST

## 2024-08-22 PROCEDURE — 80069 RENAL FUNCTION PANEL: CPT

## 2024-08-22 PROCEDURE — 2580000003 HC RX 258: Performed by: HOSPITALIST

## 2024-08-22 RX ORDER — TRAMADOL HYDROCHLORIDE 50 MG/1
100 TABLET ORAL EVERY 12 HOURS PRN
Status: DISCONTINUED | OUTPATIENT
Start: 2024-08-22 | End: 2024-08-29 | Stop reason: HOSPADM

## 2024-08-22 RX ORDER — FENTANYL CITRATE 50 UG/ML
INJECTION, SOLUTION INTRAMUSCULAR; INTRAVENOUS PRN
Status: DISCONTINUED | OUTPATIENT
Start: 2024-08-22 | End: 2024-08-22 | Stop reason: SDUPTHER

## 2024-08-22 RX ORDER — TRAMADOL HYDROCHLORIDE 50 MG/1
50 TABLET ORAL EVERY 12 HOURS PRN
Status: DISCONTINUED | OUTPATIENT
Start: 2024-08-22 | End: 2024-08-29 | Stop reason: HOSPADM

## 2024-08-22 RX ORDER — BUPIVACAINE HYDROCHLORIDE 5 MG/ML
INJECTION, SOLUTION EPIDURAL; INTRACAUDAL PRN
Status: DISCONTINUED | OUTPATIENT
Start: 2024-08-22 | End: 2024-08-22 | Stop reason: ALTCHOICE

## 2024-08-22 RX ORDER — SODIUM CHLORIDE 9 MG/ML
INJECTION, SOLUTION INTRAVENOUS CONTINUOUS PRN
Status: DISCONTINUED | OUTPATIENT
Start: 2024-08-22 | End: 2024-08-22 | Stop reason: SDUPTHER

## 2024-08-22 RX ORDER — LIDOCAINE HYDROCHLORIDE 20 MG/ML
INJECTION, SOLUTION EPIDURAL; INFILTRATION; INTRACAUDAL; PERINEURAL PRN
Status: DISCONTINUED | OUTPATIENT
Start: 2024-08-22 | End: 2024-08-22 | Stop reason: SDUPTHER

## 2024-08-22 RX ORDER — TRAMADOL HYDROCHLORIDE 50 MG/1
50 TABLET ORAL EVERY 6 HOURS PRN
Status: DISCONTINUED | OUTPATIENT
Start: 2024-08-22 | End: 2024-08-22

## 2024-08-22 RX ORDER — ONDANSETRON 2 MG/ML
INJECTION INTRAMUSCULAR; INTRAVENOUS PRN
Status: DISCONTINUED | OUTPATIENT
Start: 2024-08-22 | End: 2024-08-22 | Stop reason: SDUPTHER

## 2024-08-22 RX ORDER — PROPOFOL 10 MG/ML
INJECTION, EMULSION INTRAVENOUS PRN
Status: DISCONTINUED | OUTPATIENT
Start: 2024-08-22 | End: 2024-08-22 | Stop reason: SDUPTHER

## 2024-08-22 RX ORDER — PROPOFOL 10 MG/ML
INJECTION, EMULSION INTRAVENOUS CONTINUOUS PRN
Status: DISCONTINUED | OUTPATIENT
Start: 2024-08-22 | End: 2024-08-22 | Stop reason: SDUPTHER

## 2024-08-22 RX ORDER — TRAMADOL HYDROCHLORIDE 50 MG/1
100 TABLET ORAL EVERY 6 HOURS PRN
Status: DISCONTINUED | OUTPATIENT
Start: 2024-08-22 | End: 2024-08-22

## 2024-08-22 RX ORDER — LIDOCAINE HYDROCHLORIDE 20 MG/ML
INJECTION, SOLUTION INFILTRATION; PERINEURAL PRN
Status: DISCONTINUED | OUTPATIENT
Start: 2024-08-22 | End: 2024-08-22 | Stop reason: HOSPADM

## 2024-08-22 RX ADMIN — PHENYLEPHRINE HYDROCHLORIDE 50 MCG: 10 INJECTION INTRAVENOUS at 15:58

## 2024-08-22 RX ADMIN — HEPARIN SODIUM 17.03 UNITS/KG/HR: 10000 INJECTION, SOLUTION INTRAVENOUS at 00:42

## 2024-08-22 RX ADMIN — PHENYLEPHRINE HYDROCHLORIDE 50 MCG: 10 INJECTION INTRAVENOUS at 14:58

## 2024-08-22 RX ADMIN — SODIUM CHLORIDE, PRESERVATIVE FREE 10 ML: 5 INJECTION INTRAVENOUS at 21:38

## 2024-08-22 RX ADMIN — PROPOFOL 30 MG: 10 INJECTION, EMULSION INTRAVENOUS at 14:39

## 2024-08-22 RX ADMIN — PROPOFOL 30 MG: 10 INJECTION, EMULSION INTRAVENOUS at 15:28

## 2024-08-22 RX ADMIN — ONDANSETRON 4 MG: 2 INJECTION INTRAMUSCULAR; INTRAVENOUS at 14:26

## 2024-08-22 RX ADMIN — FENTANYL CITRATE 25 MCG: 50 INJECTION, SOLUTION INTRAMUSCULAR; INTRAVENOUS at 14:36

## 2024-08-22 RX ADMIN — CARVEDILOL 6.25 MG: 6.25 TABLET, FILM COATED ORAL at 21:38

## 2024-08-22 RX ADMIN — PROPOFOL 40 MG: 10 INJECTION, EMULSION INTRAVENOUS at 14:26

## 2024-08-22 RX ADMIN — DEXMEDETOMIDINE HYDROCHLORIDE 4 MCG: 100 INJECTION, SOLUTION INTRAVENOUS at 15:35

## 2024-08-22 RX ADMIN — LIDOCAINE HYDROCHLORIDE 50 MG: 20 INJECTION, SOLUTION EPIDURAL; INFILTRATION; INTRACAUDAL; PERINEURAL at 14:26

## 2024-08-22 RX ADMIN — TRAMADOL HYDROCHLORIDE 100 MG: 50 TABLET ORAL at 21:38

## 2024-08-22 RX ADMIN — FIDAXOMICIN 200 MG: 200 TABLET, FILM COATED ORAL at 22:35

## 2024-08-22 RX ADMIN — MEROPENEM 500 MG: 500 INJECTION, POWDER, FOR SOLUTION INTRAVENOUS at 09:14

## 2024-08-22 RX ADMIN — SODIUM CHLORIDE: 9 INJECTION, SOLUTION INTRAVENOUS at 14:20

## 2024-08-22 RX ADMIN — FENTANYL CITRATE 25 MCG: 50 INJECTION, SOLUTION INTRAMUSCULAR; INTRAVENOUS at 14:26

## 2024-08-22 RX ADMIN — PROPOFOL 125 MCG/KG/MIN: 10 INJECTION, EMULSION INTRAVENOUS at 14:26

## 2024-08-22 ASSESSMENT — PAIN SCALES - GENERAL
PAINLEVEL_OUTOF10: 8
PAINLEVEL_OUTOF10: 0
PAINLEVEL_OUTOF10: 0
PAINLEVEL_OUTOF10: 9

## 2024-08-22 ASSESSMENT — PAIN DESCRIPTION - DESCRIPTORS
DESCRIPTORS: SHARP;SHOOTING
DESCRIPTORS: SHARP

## 2024-08-22 ASSESSMENT — PAIN DESCRIPTION - LOCATION
LOCATION: FOOT
LOCATION: FOOT

## 2024-08-22 ASSESSMENT — PAIN DESCRIPTION - ORIENTATION
ORIENTATION: RIGHT
ORIENTATION: RIGHT

## 2024-08-22 NOTE — PLAN OF CARE
General Internal Medicine Attending     Chart and data reviewed.  Patient seen and examined, case discussed with medical resident.   Physical exam repeated.  Labs and imaging studies reviewed.      Agree with documentation, assessment and plan as outlined            44 y.o. male with ESRD on HD, DM II, seizure dx, depression, left TMA and right foot OM S/P recent right TMA on 7/24/24 who presented on 8/15 from nursing home with lethargy.         Surgical site wound dehiscence, with Infected Rt foot wound with ESBL Klebsiella,  Rt foot osteomyelitis S/P TMA for diabetic foot ulcer with osteomyelitis TMA with UNRULY 7/24/24   PAD with recent intervention (RLE arteriogram with L CFA insertion site. Balloon angioplasty x2 7/22/24)   -S/P Rt foot I&D with removal of all non-viable tissue and bone 8/17.  -Culture is showed ESBL and anaerobes. Antibiotics switched to meropenem for now   - OR again per podiatry  8/22/24  - Antibx per ID     Acute on chronic anemia   - Acute anemia sec to acute infection and blood loss from surgery, heparin gtt on chronic anemia sec to ESRD     -Given a unit 8/15. Hb dropped after surgery and given another unit 8/17. Last Hb 8/18 ; appears as refusing labs     ESRD with non-adherence to dialysis   Hyperkalemia with EKG changes  resolved  -On kelma   -Dialysis per Nephrology      DM type 2, complicated by peripheral neuropathy, nephropathy: POA  - Optimize glycemic control      Elevated LFTs   -Hepatocellular pattern. It worsened initially but almost normalized now   -Acute hepatitis panel is negative . -RUQ US + doppler (hx of lupus, atrial clot) are ok   -Restart statin on discharge      Diarrhea/possible Cdiff infection - per report tested positive for Cdiff recently .Lab refused to test it here. Has been startled empirically on Dificid       Vit D deficiency - started on replacement      Hx of CVA  Hx of Rt atrial thrombus - seen on RUTHANN 2023  Hypercoagulable state, positive anticardiolipin

## 2024-08-22 NOTE — DISCHARGE INSTR - COC
Continuity of Care Form    Patient Name: Serafin Weaver   :  1980  MRN:  [id number]    Admit date:  8/15/2024  Discharge date:  24    Code Status Order: Full Code   Advance Directives:   Advance Care Flowsheet Documentation             Admitting Physician:  Santos Mathew MD  PCP: Venita Kendrick MD    Discharging Nurse:   Discharging Hospital Unit/Room#: 4321/4321-01  Discharging Unit Phone Number: [phone]    Emergency Contact:   Extended Emergency Contact Information  Primary Emergency Contact: OwenAnnie  Address: [address] [place] [address]            [place], [place] [id number] [place] [address]  Home Phone: [phone]  Relation: Parent  Secondary Emergency Contact: He Weaver           [place], [place] [id number] [place] [address]  Home Phone: [phone]  Relation: Parent    Past Surgical History:  Past Surgical History:   Procedure Laterality Date    ACHILLES TENDON SURGERY Right 2024    . performed by Kailee Olsen DPM at [facility] OR    AMPUTATION      left 3rd toe    FOOT AMPUTATION Left 2019    INCISION AND DRAINAGE, REVISION OF TRANSMETATARSAL AMPUTATION LEFT FOOT performed by Kailee Olsen DPM at [facility] OR    FOOT DEBRIDEMENT Right 2024    RIGHTFOOT INCISION AND DRAINAGE WITH PARTIAL FIFTH RAY RESECTION performed by Kailee Olsen DPM at [facility] OR    FOOT DEBRIDEMENT Right 2024    RIGHT FOOT INCISION AND DRAINAGE WITH REMOVAL OF ALL NONVIABLE TISSUE AND BONE performed by Roverto Tanner DPM at [facility] OR    INVASIVE VASCULAR N/A 2024    Angiography lower ext right performed by uHbert Mirza MD at [facility] CARDIAC CATH LAB    OTHER SURGICAL HISTORY  2015    incision and drainage scrotal abcess  wound vac on    TOE AMPUTATION Right     partial great toe    TOE AMPUTATION Right 2024    Transmetatarsal amputation with tendeno-achilles lengthening, Right foot performed by Kailee Olsen DPM at [facility] OR    VASCULAR SURGERY Right 2024    RIGHT ARM ARTERIOVENOUS GRAFT  daily    Physician Certification: I certify the above information and transfer of Serafin Weaver  is necessary for the continuing treatment of the diagnosis listed and that he requires Skilled Nursing Facility for less 30 days.     Update Admission H&P: No change in H&P    PHYSICIAN SIGNATURE:  Electronically signed by Inocencio Coles MD on 8/27/24 at 12:55 PM EDT

## 2024-08-22 NOTE — PLAN OF CARE
Problem: Discharge Planning  Goal: Discharge to home or other facility with appropriate resources  8/21/2024 2209 by Venita Correa, RN  Outcome: Progressing  Flowsheets (Taken 8/21/2024 1925)  Discharge to home or other facility with appropriate resources: Identify barriers to discharge with patient and caregiver  8/21/2024 1044 by Brooklyn Greenberg RN  Outcome: Progressing     Problem: Skin/Tissue Integrity  Goal: Absence of new skin breakdown  Description: 1.  Monitor for areas of redness and/or skin breakdown  2.  Assess vascular access sites hourly  3.  Every 4-6 hours minimum:  Change oxygen saturation probe site  4.  Every 4-6 hours:  If on nasal continuous positive airway pressure, respiratory therapy assess nares and determine need for appliance change or resting period.  8/21/2024 2209 by Venita Correa, RN  Outcome: Progressing  8/21/2024 1044 by Brooklyn Greenberg RN  Outcome: Progressing     Problem: Safety - Adult  Goal: Free from fall injury  Outcome: Progressing     Problem: Chronic Conditions and Co-morbidities  Goal: Patient's chronic conditions and co-morbidity symptoms are monitored and maintained or improved  Outcome: Progressing  Flowsheets (Taken 8/21/2024 1925)  Care Plan - Patient's Chronic Conditions and Co-Morbidity Symptoms are Monitored and Maintained or Improved: Monitor and assess patient's chronic conditions and comorbid symptoms for stability, deterioration, or improvement     Problem: ABCDS Injury Assessment  Goal: Absence of physical injury  Outcome: Progressing

## 2024-08-22 NOTE — PROGRESS NOTES
Pt received from OR to PACU # 10 via bed.     Post: Procedure(s):  RIGHT FOOT INCISION AND DRAINAGE WITH FIFTH METATARSAL RESECTION PERONEAL TENDON TRANSFER     Report received from OR RN, LUCILA Hartley and GISSELLE Dacosta CRNA.    Per report pt had low b/p even prior to OR.    Respirations reg and easy.  Pt is drowsy.       Attached to PACU monitoring system. Alarms and parameters set    Pain none noted and no complaints of nausea.

## 2024-08-22 NOTE — PROGRESS NOTES
Physician Progress Note      PATIENT:               DASHAWN MILES  [facility] #:                  [id number]  :                       1980  ADMIT DATE:       8/15/2024 3:35 PM  DISCH DATE:  RESPONDING  PROVIDER #:        DAISY MORALES          QUERY TEXT:    Patient admitted with osteomyelitis, sepsis. Per Op note dated    documentation of I&D.    To accurately reflect the procedure performed please further specify the depth   of tissue incised and drained:    The medical record reflects the following:  Risk Factors: 43 yo w/ DM, TMA amp   Clinical Indicators: Per op note : The wound was noted to tunnel along the   plantar aspect of the foot medially. Approximally 4 cc of purulent drainage   was expressed. At this time, an incision was made over the lateral aspect of   the remaining 5th metatarsal using a number 15 blade. A small stab incision   was made at the plantar medial midfoot to allow drainage.  Treatment: As above  Options provided:  -- Subcutaneous tissue  -- Fascia  -- Muscle  -- Bone  -- Other - I will add my own diagnosis  -- Disagree - Not applicable / Not valid  -- Disagree - Clinically unable to determine / Unknown  -- Refer to Clinical Documentation Reviewer    PROVIDER RESPONSE TEXT:    Addendum to  procedure note: The depth of the drainage to right foot was   down to and including bone.    Query created by: Karmen Gambino on 2024 10:45 AM      Electronically signed by:  DAISY MORALES 2024 1:25 PM

## 2024-08-22 NOTE — PROGRESS NOTES
Pt is stating the radiology tech was disrespectful and rude.  I witnessed the interaction and the tech was polite and just asked pt to hold his foot in for a particular view of x-ray for the picture needed.  Pt now refusing anymore x-rays and FSBS.      PACU Transfer to [facility]    Vitals:    08/22/24 1625   BP: 99/65   Pulse: 66   Resp: 15   Temp: 98.1 °F (36.7 °C)   SpO2: 98%         Intake/Output Summary (Last 24 hours) at 8/22/2024 1634  Last data filed at 8/22/2024 1625  Gross per 24 hour   Intake 270 ml   Output 30 ml   Net 240 ml       Pain assessment:  none  Pain Level: 0    Patient transferred via bed per Swati to care of Karla Brown PCU RN.

## 2024-08-22 NOTE — PROGRESS NOTES
Internal Medicine Progress Note    Patient Name: Serafin Weaver   Patient : 1980   Date: 2024   Admit Date: 8/15/2024     CC: One day fatigue s/p x3 missed HD sessions     Interval History     Patient wound vac not functioning properly this AM. Wound vac removed by podiatry and a wet to dry dressing was placed in anticipation for OR today. Hep gtt held this AM for surgery today. Patient agreeable to blood draw this AM. Tentative plan to restart home eliquis s/p OR if surgery is definitive.     Patient denies pain this AM. Denies NV. NPO since midnight. Diarrhea improving. Denies FC. Denies CP, SOB. Patient has dialysis tomorrow, he missed dialysis yesterday.      Objective     I/Os:  I/O last 3 completed shifts:  In: 600 [P.O.:600]  Out: -       Vital Signs:  Patient Vitals for the past 8 hrs:   BP Temp Temp src Pulse Resp SpO2   24 1347 138/73 98.4 °F (36.9 °C) Temporal 66 16 97 %   24 0756 (!) 147/72 98.1 °F (36.7 °C) Oral 68 18 97 %       Physical Exam  Vitals reviewed.   Constitutional:       General: He is not in acute distress.     Appearance: He is obese. He is ill-appearing.   HENT:      Head: Normocephalic.      Right Ear: External ear normal.      Left Ear: External ear normal.      Nose: Nose normal.      Mouth/Throat:      Mouth: Mucous membranes are moist.   Eyes:      Extraocular Movements: Extraocular movements intact.      Pupils: Pupils are equal, round, and reactive to light.   Cardiovascular:      Rate and Rhythm: Normal rate.      Heart sounds: Normal heart sounds.   Pulmonary:      Effort: Pulmonary effort is normal. No respiratory distress.      Breath sounds: Normal breath sounds.   Abdominal:      General: There is no distension.      Palpations: Abdomen is soft.      Tenderness: There is no abdominal tenderness. There is no guarding or rebound.      Comments: obese pannus   Musculoskeletal:         General: Deformity and signs of injury present.

## 2024-08-22 NOTE — ANESTHESIA POSTPROCEDURE EVALUATION
Department of Anesthesiology  Postprocedure Note    Patient: Serafin Weaver  MRN: [id number]  YOB: 1980  Date of evaluation: 8/22/2024    Procedure Summary       Date: 08/22/24 Room / Location: [address]    Anesthesia Start: 1420 Anesthesia Stop: 1601    Procedure: RIGHT FOOT INCISION AND DRAINAGE WITH FIFTH METATARSAL RESECTION PERONEAL TENDON TRANSFER (Right: Foot) Diagnosis:       Osteomyelitis of right foot, unspecified type (HCC)      (Osteomyelitis of right foot, unspecified type (HCC) [M86.9])    Surgeons: Kailee Olsen DPM Responsible Provider: Noemi Varela DO    Anesthesia Type: MAC ASA Status: 4            Anesthesia Type: No value filed.    Teresa Phase I: Teresa Score: 10    Teresa Phase II:      Anesthesia Post Evaluation    Patient location during evaluation: PACU  Patient participation: complete - patient participated  Level of consciousness: awake and alert  Airway patency: patent  Nausea & Vomiting: no nausea and no vomiting  Cardiovascular status: blood pressure returned to baseline  Respiratory status: acceptable  Hydration status: euvolemic  Pain management: adequate    No notable events documented.

## 2024-08-22 NOTE — FLOWSHEET NOTE
08/22/24 0345   Vital Signs   Temp 97.9 °F (36.6 °C)   Temp Source Axillary   Pulse 69   Heart Rate Source Monitor   Respirations 18   /71   MAP (Calculated) 92   BP Location Left upper arm   BP Method Automatic   Patient Position Semi fowlers   Pain Assessment   Pain Assessment None - Denies Pain   Oxygen Therapy   SpO2 95 %   Pulse Oximetry Type Intermittent   Pulse Oximeter Device Mode Intermittent   Pulse Oximeter Device Location Left;Finger   O2 Device None (Room air)   O2 Flow Rate (L/min) 0 L/min   Oxygen Therapy None (Room air)

## 2024-08-22 NOTE — DISCHARGE INSTRUCTIONS
Wound VAC dressing change instructions  -Please perform wound VAC dressing change every Monday, Wednesday, Friday  -Using adhesive sheet from wound Vac kit, cut to size that will be placed over the wound and surrounding soft tissue to \"window out\" the wound  -Next, using a scissors cut the adhesive sheet out that is overlying the wound bed  -Next, using the black foam from the wound VAC kit, cut to size the black foam to fit over the wound bed  -Next, using the adhesive sheet, place the black foam over the wound bed and then place the adhesive sheet over the black foam to hold in place  -Next, using a scissors cut a quarter size hole in the adhesive sheet/black foam for the wound VAC suction connector to be placed over  -Next, place the wound VAC suction connector over the cut out area on the black foam  -Next, hook up the connector to the other portion of the wound VAC canister  -Set wound VAC to VAC therapy and running continuously for 125 mmHg   -Ensure that a good seal is noted. If needed re-enforce areas with additional adhesive sheet stripes from the wound VAC kit  -Next, dress the area with gauze over the wound VAC area and place gauze along the top of the foot and ankle  -Next, using kerlix wrap from the toes up and just above the ankle  -Next, using Ace bandage, wrap from the toes up and just above the ankle with CARE to ensure wound VAC tubing is NOT in direct contact with the skin

## 2024-08-22 NOTE — BRIEF OP NOTE
Brief Postoperative Note      Patient: Serafin Weaver  YOB: 1980  MRN: [id number]    Date of Procedure: 8/22/2024    Pre-Op Diagnosis Codes:      * Osteomyelitis of right foot, unspecified type ([facility]) [M86.9]    Post-Op Diagnosis: Same       Procedure(s):  RIGHT FOOT INCISION AND DRAINAGE WITH FIFTH METATARSAL RESECTION PERONEAL TENDON TRANSFER    Surgeon(s):  Kailee Olsen DPM    Assistant:  Resident: Frida Hartley DPM    Anesthesia: Choice    Estimated Blood Loss (mL): less than 50     Hemostasis: anatomic dissection and electrocautery    Injectables: Pre-Op 20 cc of 2% Lidocaine plain and Post-Op 30 cc of 0.5 % Marcaine plain    Materials: 2-0, 3-0 Vicryl, 2-0, 3-0 Nylon    Implants: None     Complications: None    Specimens:   ID Type Source Tests Collected by Time Destination   1 : Right foot abscess Specimen Foot CULTURE, FUNGUS, CULTURE WITH SMEAR, ACID FAST BACILLIUS, CULTURE, ANAEROBIC AND AEROBIC Kailee Olsen DPM 8/22/2024 1514    A : A) fifth metatarsal Specimen Foot SURGICAL PATHOLOGY Kailee Olsen DPM 8/22/2024 1517        Implants:  * No implants in log *      Drains:   [REMOVED] Negative Pressure Wound Therapy Foot Right;Anterior (Removed)   Dressing Type Other (Comment) 08/21/24 1925   Target Pressure (mmHg) 125 08/21/24 1200   Output (ml) 100 ml 08/20/24 1600   Wound Assessment Other (Comment) 08/21/24 1925       Findings:  Infection Present At Time Of Surgery (PATOS) (choose all levels that have infection present):  - Organ Space infection (below fascia) present as evidenced by osteomyelitis  Other Findings: Approximately 4 cc of purulence appreciated in the plantar lateral foot. Entire 5th metatarsal was removed. Peroneal longus and brevis tendons were tenodesed together. Wound measuring 7 cm x 9 cm x 2 cm post procedure.     DISPO: S/P Right foot I&D with tendon transfer. Surgery was not felt to be definitive as some purulence was encountered. Recommend patient  continue IV antibiotics per ID. Podiatry to apply wound VAC tomorrow morning. Podiatry will continue to follow patient while in house.     Electronically signed by Frida Hartley DPM on 8/22/2024 at 3:55 PM

## 2024-08-22 NOTE — PERIOP NOTE
Radiology tech at bedside and X-ray being done now.   Pt is refusing anymore x-rays to be done after the RT got 2 views.

## 2024-08-22 NOTE — CARE COORDINATION
Case Management           Date/ Time of Note: 8/22/2024 11:28 AM  Note completed by: Karla Sanderson, MSW, LSW    If patient is discharged prior to next notation, then this note serves as note for discharge by case management.    Patient Name: Serafin Weaver  YOB: 1980    Diagnosis:Septicemia (HCC) [A41.9]  Transaminitis [R74.01]  ESRD (end stage renal disease) (HCC) [N18.6]  Sepsis (HCC) [A41.9]  Ulcer of right foot, unspecified ulcer stage (HCC) [L97.519]  Anemia due to chronic kidney disease, unspecified CKD stage [N18.9, D63.1]  Patient Admission Status: Inpatient  Date of Admission:8/15/2024  3:35 PM    Length of Stay: 7 GLOS: GMLOS: 4.4 Readmission Risk Score: Readmission Risk Score: 27.5    Current Plan of Care:   11:28 AM  Pt from [facility] and can return at discharge. Run sheets for HD approval will need to be sent prior to discharge - SW awaiting for pt to complete HD - refused yesterday.    EDUARDO spoke to Kevin with [employer] started precert for SNF level of care on 8/20/24 in case pt discharges on IV abx. Pt is able to return LTC without pre-cert. Kevin stated if pt needs iv abx/isolation they will need a private room for pt which will not be ready until Saturday. EDUARDO informed kevin pt will most likely require a wound vac as well. She will need vac orders / type to order vac for pt.  Iv abx are being determined post op podiatry is not sure if pt will need them at dc yet. EDUARDO will follow up with kevin w/HD run sheets, vac requirements, and iv abx determination.     OR planned for 1:30pm today.  MD anticipates DC in 1-2days depending on post op progress and HD compliance.   Pt will need PICC for Iv abx ID doesnt want abx through HD.       Referrals completed: SNF/LTC [facility]     Resources/ information provided: Not indicated at this time    IMM Status:        Serafin and his family were provided with choice of provider; he and his family are in agreement with the discharge  plan.    Electronically signed by IRLANDA Cueva, LSW on 8/22/2024 at 11:28 AM  [facility]  Case Management Department  Ph: [phone]

## 2024-08-22 NOTE — PROGRESS NOTES
Ph: [phone], Fax: [phone]           [facility]               Reason for admission:                 Admitted for lethargy    Brief Summary :     Serafin Weaver is being seen by nephrology for ESRD on hemodialysis.      Interval History and plan:      Blood pressure is well controlled   Labs were reviewed and are worse as he refusing dialysis   He is refusing a lot of his care    He refused dialysis yesterday     Plan:    Hemodialysis arranged per [facility] schedule.  HD arranged for today as he refused yesterday   Continue with renal diet.  Replace Vitamin D   Daily renal panels.  Continue with Retacrit with dialysis. Very high ferritin ( should not get iron with dialysis )  Please adjust antibiotics to GFR less than 10 mL/min.                     Assessment :     1.  ESRD:  Will plan HD per schedule.  He gets dialysis Monday, Wednesday and Friday.  He gets dialysis currently at [facility] under our care.  Access: AV graft  Daily weights  Fluid restriction: 1 L unless hypotensive  Nephrocap 1 tab PO daily    2.  Anemia:  Erythropoetin dose: He will receive Retacrit with dialysis to keep hemoglobin close to 10.  Hemoglobin admission was 6.9.  Ferritin is greater than 1000.    3.  Osteodystrophy:  Phosphate Binder: Will continue with Renvela 2 tabs p.o. 3 times daily with meals.  I will check PTH and vitamin D.  Daily renal panels.    4.  Hyperkalemia: Resolved.  He is on chronic Lokelma 10 g q. Tuesday, Thursday and Saturday on nondialysis days.    5.  Hypertension: well-controlled.  Continue with carvedilol.    6.  Wound infection: Podiatry consulted.  ID consulted.  He is on cefepime and vancomycin.  He is also getting oral vancomycin for recent C. difficile infection.             [facility]ph[facility] would like to thank Mine Arteaga MD   for opportunity to serve this patient      Please call with questions at-   24 Hrs Answering service [phone] or  7 am- 5 pm via

## 2024-08-22 NOTE — PLAN OF CARE
Podiatric Surgery Plan of Care Note  Serafin Weaver      Patients wound VAC alarming this AM with blockage in the instill tubing. Nursing and podiatry unable to resolve the error. Patients wound vac taken down ans switched to a wet to dry dressing as patient is scheduled for the OR this afternoon at 1:30.     Please have the patients wound VAC come down to the OR with him this afternoon to be reapplied.      Discussed with Dr. Kailee Olsen DPM.     Frida Hartley DPM   Podiatric Resident PGY2  [employer]  Pager number [phone]  8/22/2024, 8:11 AM

## 2024-08-22 NOTE — PROGRESS NOTES
ID Follow-up NOTE    CC:   Right foot wound status post TMA  Antibiotics:  meropenem    Admit Date: 8/15/2024  Hospital Day: 8    Subjective:     Patient denies any fevers overnight. Patient wound VAC alarming this morning with blockage.  Planning to go to the OR this afternoon with podiatry.  Plans to have wound VAC reapplied    Objective:     Patient Vitals for the past 8 hrs:   BP Temp Temp src Pulse Resp SpO2 Weight   08/22/24 0756 (!) 147/72 98.1 °F (36.7 °C) Oral 68 18 97 % --   08/22/24 0635 -- -- -- -- -- -- 134.3 kg (296 lb)   08/22/24 0600 -- -- -- -- -- -- 134.3 kg (296 lb)   08/22/24 0345 133/71 97.9 °F (36.6 °C) Axillary 69 18 95 % --     I/O last 3 completed shifts:  In: 600 [P.O.:600]  Out: -   No intake/output data recorded.    EXAM:  GENERAL: No apparent distress.    HEENT: Membranes moist, no oral lesion  NECK:  Supple, no lymphadenopathy  LUNGS: Clear b/l, no rales, no dullness  CARDIAC: RRR, no murmur appreciated  ABD:  + BS, soft / NT  EXT:  Left TMA site well-healed without evidence of ulceration.  The right TMA site with wound vac in place. See photo from 8/19:      NEURO: No focal neurologic findings  PSYCH: Orientation, sensorium, mood normal  LINES:  Peripheral iv, right arm AVF       Data Review:  Lab Results   Component Value Date    WBC 16.8 (H) 08/22/2024    HGB 8.2 (L) 08/22/2024    HCT 26.3 (L) 08/22/2024    MCV 93.5 08/22/2024     08/22/2024     Lab Results   Component Value Date    CREATININE 10.7 (HH) 08/22/2024    BUN 48 (H) 08/22/2024     08/22/2024    K 4.9 08/22/2024    CL 98 (L) 08/22/2024    CO2 27 08/22/2024       Hepatic Function Panel:   Lab Results   Component Value Date/Time    ALKPHOS 69 08/18/2024 02:44 PM    ALKPHOS 161 08/18/2024 02:44 PM    ALT 53 08/18/2024 02:44 PM     08/18/2024 02:44 PM    AST 24 08/18/2024 02:44 PM     08/18/2024 02:44 PM    BILITOT 1.2 08/18/2024 02:44 PM    BILITOT 0.7 08/18/2024 02:44 PM    BILIDIR 0.2 08/18/2024  02:44 PM    IBILI 1.0 08/18/2024 02:44 PM       MICRO:  Blood cultures x 2 8/15: neg  Wound cx right foot 8/16: ESBL Klebsiella oxytoca, heavy growth  Klebsiella oxytoca ESBL (1)    Antibiotic Interpretation Microscan  Method Status    ampicillin Resistant >=32 mcg/mL BACTERIAL SUSCEPTIBILITY PANEL BY VINOD     ampicillin-sulbactam Sensitive 4 mcg/mL BACTERIAL SUSCEPTIBILITY PANEL BY VINOD     ceFAZolin Resistant   BACTERIAL SUSCEPTIBILITY PANEL BY VIOND     cefepime Resistant   BACTERIAL SUSCEPTIBILITY PANEL BY VINOD     cefTRIAXone Resistant   BACTERIAL SUSCEPTIBILITY PANEL BY VINOD     ciprofloxacin Sensitive <=0.25 mcg/mL BACTERIAL SUSCEPTIBILITY PANEL BY VINOD     gentamicin Intermediate 8 mcg/mL BACTERIAL SUSCEPTIBILITY PANEL BY VINOD     levofloxacin Intermediate 1 mcg/mL BACTERIAL SUSCEPTIBILITY PANEL BY VINOD     tobramycin Intermediate 8 mcg/mL BACTERIAL SUSCEPTIBILITY PANEL BY VINOD     trimethoprim-sulfamethoxazole Sensitive <=20 mcg/mL BACTERIAL SUSCEPTIBILITY PANEL BY VINOD         IMAGING:I have independently reviewed the images and reports.       Vascular laser doppler right leg 8/20:  Bilateral foot perfusion adequate for healing of a spontaneous or surgical wound based on laser perfusion pressures.    Right foot x ray 8/15:    Interval transmetatarsal amputation proximal aspect first through fifth metatarsals. Minimal air within the soft tissues near the first metatarsal probably postoperative.. Some soft tissue swelling distally. No acute fracture. No osseous destructive lesions.     CT head 8/15:  1. Age-related cortical atrophy  2. No acute intracranial hemorrhage.     US Gallbladder 8/16:  Cholelithiasis without sonographic evidence of acute cholecystitis.  No evidence of portal vein thrombosis.       Scheduled Meds:   sodium hypochlorite   Irrigation Daily    Fidaxomicin  200 mg Oral BID    meropenem  500 mg IntraVENous Q24H    vitamin D  50,000 Units Oral Weekly    epoetin rae-epbx  10,000 Units IntraVENous  Vitreous hemorrhage (HCC)    Wrist sprain, left, initial encounter    Acute encephalopathy    Disorder of brain    Ischemic cerebrovascular accident (CVA) (HCC)    ESRD on dialysis (HCC)    Osteomyelitis of fifth toe of right foot (HCC)    Gas gangrene ([facility])    Infection with ESBL Klebsiella oxytoca    Sepsis ([facility])        Plan:   44 y.o. AA male with history of ESRD on HD, DM II, seizure dx, depression, left TMA and right foot OM S/P recent right TMA on 7/24/24 who presented on 8/15 from nursing home with lethargy.     Right foot ulcer with recent TMA:  - Pt s/p TMA for right foot OM on 7/24/2024.  - lateral aspect ulceration with positive probe to bone and necrosis at site.   - ESR/CRP was 104/269 respectively. He was started empirically on vanco, cefepime and metronidazole on admission.   - X ray showing soft tissue swelling and minimal air in soft tissues.   - The patient was eval by podiatry and is s/p OR 8/17 for debridement, bone bx path positive for acute osteomyelitis  -Laser Doppler of right lower extremity showing adequate perfusion for healing.   - bcx remains neg to date. Wound cx heavy growth of ESBL Klebsiella.  - on vanco, cefepime and metronidazole, stopped cefepime and metronidazole, switched to meropenem.  Without isolation of MRSA also stopped vancomycin.  - Discussed with podiatry, plan for OR today for tendon transfer and removal of fifth metatarsal base and possible wound VAC placement.  Anticipate surgery to be definitive and if so may not need a course of IV antibiotics on D/C, however will follow for Final antibiotic plan pending bone pathology and clearance of infection.     ESRD on HD:   - non compliance with HD with missed sessions  - nephro following  - renally dose adjust abx as needed.   - nephro ok for picc placement if needed for iv abx that cannot be administered post HD.     DM2:  - Good glycemic control to aide in healing and prevention of further infection  - A1c last month

## 2024-08-23 PROBLEM — M86.071 ACUTE HEMATOGENOUS OSTEOMYELITIS OF RIGHT FOOT (HCC): Status: ACTIVE | Noted: 2024-08-23

## 2024-08-23 LAB
ALBUMIN SERPL-MCNC: 2.8 G/DL (ref 3.4–5)
ALBUMIN SERPL-MCNC: 2.8 G/DL (ref 3.4–5)
ALP SERPL-CCNC: 94 U/L (ref 40–129)
ALT SERPL-CCNC: 40 U/L (ref 10–40)
ANION GAP SERPL CALCULATED.3IONS-SCNC: 13 MMOL/L (ref 3–16)
AST SERPL-CCNC: 26 U/L (ref 15–37)
BILIRUB DIRECT SERPL-MCNC: 0.2 MG/DL (ref 0–0.3)
BILIRUB INDIRECT SERPL-MCNC: 0.2 MG/DL (ref 0–1)
BILIRUB SERPL-MCNC: 0.4 MG/DL (ref 0–1)
BUN SERPL-MCNC: 62 MG/DL (ref 7–20)
CALCIUM SERPL-MCNC: 8.3 MG/DL (ref 8.3–10.6)
CHLORIDE SERPL-SCNC: 97 MMOL/L (ref 99–110)
CO2 SERPL-SCNC: 24 MMOL/L (ref 21–32)
CREAT SERPL-MCNC: 11.8 MG/DL (ref 0.9–1.3)
DEPRECATED RDW RBC AUTO: 16.2 % (ref 12.4–15.4)
GFR SERPLBLD CREATININE-BSD FMLA CKD-EPI: 5 ML/MIN/{1.73_M2}
GLUCOSE BLD-MCNC: 153 MG/DL (ref 70–99)
GLUCOSE BLD-MCNC: 169 MG/DL (ref 70–99)
GLUCOSE BLD-MCNC: 177 MG/DL (ref 70–99)
GLUCOSE SERPL-MCNC: 184 MG/DL (ref 70–99)
HCT VFR BLD AUTO: 22.3 % (ref 40.5–52.5)
HCT VFR BLD AUTO: 22.8 % (ref 40.5–52.5)
HCT VFR BLD AUTO: 23 % (ref 40.5–52.5)
HCT VFR BLD AUTO: 23.4 % (ref 40.5–52.5)
HGB BLD-MCNC: 7 G/DL (ref 13.5–17.5)
HGB BLD-MCNC: 7.1 G/DL (ref 13.5–17.5)
HGB BLD-MCNC: 7.2 G/DL (ref 13.5–17.5)
HGB BLD-MCNC: 7.2 G/DL (ref 13.5–17.5)
MAGNESIUM SERPL-MCNC: 2.5 MG/DL (ref 1.8–2.4)
MCH RBC QN AUTO: 29.3 PG (ref 26–34)
MCHC RBC AUTO-ENTMCNC: 31.1 G/DL (ref 31–36)
MCV RBC AUTO: 94.3 FL (ref 80–100)
PERFORMED ON: ABNORMAL
PHOSPHATE SERPL-MCNC: 6.2 MG/DL (ref 2.5–4.9)
PLATELET # BLD AUTO: 251 K/UL (ref 135–450)
PMV BLD AUTO: 8.4 FL (ref 5–10.5)
POTASSIUM SERPL-SCNC: 6 MMOL/L (ref 3.5–5.1)
POTASSIUM SERPL-SCNC: 6.2 MMOL/L (ref 3.5–5.1)
PROT SERPL-MCNC: 7.1 G/DL (ref 6.4–8.2)
RBC # BLD AUTO: 2.44 M/UL (ref 4.2–5.9)
SODIUM SERPL-SCNC: 134 MMOL/L (ref 136–145)
WBC # BLD AUTO: 16.4 K/UL (ref 4–11)

## 2024-08-23 PROCEDURE — 02HV33Z INSERTION OF INFUSION DEVICE INTO SUPERIOR VENA CAVA, PERCUTANEOUS APPROACH: ICD-10-PCS | Performed by: HOSPITALIST

## 2024-08-23 PROCEDURE — 6370000000 HC RX 637 (ALT 250 FOR IP)

## 2024-08-23 PROCEDURE — 6360000002 HC RX W HCPCS

## 2024-08-23 PROCEDURE — C1751 CATH, INF, PER/CENT/MIDLINE: HCPCS

## 2024-08-23 PROCEDURE — 6370000000 HC RX 637 (ALT 250 FOR IP): Performed by: INTERNAL MEDICINE

## 2024-08-23 PROCEDURE — 90935 HEMODIALYSIS ONE EVALUATION: CPT

## 2024-08-23 PROCEDURE — 85014 HEMATOCRIT: CPT

## 2024-08-23 PROCEDURE — 2580000003 HC RX 258

## 2024-08-23 PROCEDURE — 36415 COLL VENOUS BLD VENIPUNCTURE: CPT

## 2024-08-23 PROCEDURE — 85018 HEMOGLOBIN: CPT

## 2024-08-23 PROCEDURE — 80069 RENAL FUNCTION PANEL: CPT

## 2024-08-23 PROCEDURE — 36569 INSJ PICC 5 YR+ W/O IMAGING: CPT

## 2024-08-23 PROCEDURE — 80076 HEPATIC FUNCTION PANEL: CPT

## 2024-08-23 PROCEDURE — 2580000003 HC RX 258: Performed by: INTERNAL MEDICINE

## 2024-08-23 PROCEDURE — 1200000000 HC SEMI PRIVATE

## 2024-08-23 PROCEDURE — 85027 COMPLETE CBC AUTOMATED: CPT

## 2024-08-23 PROCEDURE — 99233 SBSQ HOSP IP/OBS HIGH 50: CPT | Performed by: INTERNAL MEDICINE

## 2024-08-23 PROCEDURE — 84132 ASSAY OF SERUM POTASSIUM: CPT

## 2024-08-23 PROCEDURE — 99221 1ST HOSP IP/OBS SF/LOW 40: CPT | Performed by: NURSE PRACTITIONER

## 2024-08-23 PROCEDURE — 83735 ASSAY OF MAGNESIUM: CPT

## 2024-08-23 RX ORDER — CALCIUM GLUCONATE 94 MG/ML
1000 INJECTION, SOLUTION INTRAVENOUS ONCE
Status: COMPLETED | OUTPATIENT
Start: 2024-08-23 | End: 2024-08-23

## 2024-08-23 RX ORDER — SODIUM CHLORIDE 0.9 % (FLUSH) 0.9 %
5-40 SYRINGE (ML) INJECTION EVERY 12 HOURS SCHEDULED
Status: DISCONTINUED | OUTPATIENT
Start: 2024-08-23 | End: 2024-08-29 | Stop reason: HOSPADM

## 2024-08-23 RX ORDER — LABETALOL HYDROCHLORIDE 5 MG/ML
10 INJECTION, SOLUTION INTRAVENOUS
Status: DISCONTINUED | OUTPATIENT
Start: 2024-08-23 | End: 2024-08-23

## 2024-08-23 RX ORDER — DEXTROSE MONOHYDRATE 25 G/50ML
50 INJECTION, SOLUTION INTRAVENOUS ONCE
Status: DISCONTINUED | OUTPATIENT
Start: 2024-08-23 | End: 2024-08-23

## 2024-08-23 RX ORDER — OXYCODONE HYDROCHLORIDE 5 MG/1
10 TABLET ORAL PRN
Status: DISCONTINUED | OUTPATIENT
Start: 2024-08-23 | End: 2024-08-23

## 2024-08-23 RX ORDER — SODIUM CHLORIDE 9 MG/ML
INJECTION, SOLUTION INTRAVENOUS PRN
Status: DISCONTINUED | OUTPATIENT
Start: 2024-08-23 | End: 2024-08-29 | Stop reason: HOSPADM

## 2024-08-23 RX ORDER — SODIUM CHLORIDE 0.9 % (FLUSH) 0.9 %
5-40 SYRINGE (ML) INJECTION EVERY 12 HOURS SCHEDULED
Status: DISCONTINUED | OUTPATIENT
Start: 2024-08-23 | End: 2024-08-23

## 2024-08-23 RX ORDER — FENTANYL CITRATE 50 UG/ML
25 INJECTION, SOLUTION INTRAMUSCULAR; INTRAVENOUS EVERY 5 MIN PRN
Status: DISCONTINUED | OUTPATIENT
Start: 2024-08-23 | End: 2024-08-23

## 2024-08-23 RX ORDER — LIDOCAINE HYDROCHLORIDE 10 MG/ML
50 INJECTION, SOLUTION EPIDURAL; INFILTRATION; INTRACAUDAL; PERINEURAL ONCE
Status: DISCONTINUED | OUTPATIENT
Start: 2024-08-23 | End: 2024-08-23

## 2024-08-23 RX ORDER — HYDRALAZINE HYDROCHLORIDE 20 MG/ML
10 INJECTION INTRAMUSCULAR; INTRAVENOUS
Status: DISCONTINUED | OUTPATIENT
Start: 2024-08-23 | End: 2024-08-23

## 2024-08-23 RX ORDER — SODIUM CHLORIDE 0.9 % (FLUSH) 0.9 %
5-40 SYRINGE (ML) INJECTION PRN
Status: DISCONTINUED | OUTPATIENT
Start: 2024-08-23 | End: 2024-08-23

## 2024-08-23 RX ORDER — ONDANSETRON 2 MG/ML
4 INJECTION INTRAMUSCULAR; INTRAVENOUS
Status: DISCONTINUED | OUTPATIENT
Start: 2024-08-23 | End: 2024-08-23

## 2024-08-23 RX ORDER — NALOXONE HYDROCHLORIDE 0.4 MG/ML
INJECTION, SOLUTION INTRAMUSCULAR; INTRAVENOUS; SUBCUTANEOUS PRN
Status: DISCONTINUED | OUTPATIENT
Start: 2024-08-23 | End: 2024-08-23

## 2024-08-23 RX ORDER — SODIUM CHLORIDE 9 MG/ML
INJECTION, SOLUTION INTRAVENOUS PRN
Status: DISCONTINUED | OUTPATIENT
Start: 2024-08-23 | End: 2024-08-27

## 2024-08-23 RX ORDER — MIDODRINE HYDROCHLORIDE 5 MG/1
10 TABLET ORAL
Status: DISCONTINUED | OUTPATIENT
Start: 2024-08-23 | End: 2024-08-24

## 2024-08-23 RX ORDER — LIDOCAINE HYDROCHLORIDE 10 MG/ML
50 INJECTION, SOLUTION EPIDURAL; INFILTRATION; INTRACAUDAL; PERINEURAL ONCE
Status: DISCONTINUED | OUTPATIENT
Start: 2024-08-23 | End: 2024-08-29 | Stop reason: HOSPADM

## 2024-08-23 RX ORDER — SODIUM CHLORIDE 0.9 % (FLUSH) 0.9 %
5-40 SYRINGE (ML) INJECTION PRN
Status: DISCONTINUED | OUTPATIENT
Start: 2024-08-23 | End: 2024-08-29 | Stop reason: HOSPADM

## 2024-08-23 RX ORDER — SODIUM CHLORIDE 9 MG/ML
INJECTION, SOLUTION INTRAVENOUS PRN
Status: DISCONTINUED | OUTPATIENT
Start: 2024-08-23 | End: 2024-08-23

## 2024-08-23 RX ORDER — OXYCODONE HYDROCHLORIDE 5 MG/1
5 TABLET ORAL PRN
Status: DISCONTINUED | OUTPATIENT
Start: 2024-08-23 | End: 2024-08-23

## 2024-08-23 RX ORDER — HYDROMORPHONE HYDROCHLORIDE 1 MG/ML
0.5 INJECTION, SOLUTION INTRAMUSCULAR; INTRAVENOUS; SUBCUTANEOUS EVERY 5 MIN PRN
Status: DISCONTINUED | OUTPATIENT
Start: 2024-08-23 | End: 2024-08-23

## 2024-08-23 RX ORDER — PROCHLORPERAZINE EDISYLATE 5 MG/ML
5 INJECTION INTRAMUSCULAR; INTRAVENOUS
Status: DISCONTINUED | OUTPATIENT
Start: 2024-08-23 | End: 2024-08-23

## 2024-08-23 RX ADMIN — CARVEDILOL 6.25 MG: 6.25 TABLET, FILM COATED ORAL at 20:59

## 2024-08-23 RX ADMIN — FIDAXOMICIN 200 MG: 200 TABLET, FILM COATED ORAL at 10:23

## 2024-08-23 RX ADMIN — SODIUM HYPOCHLORITE: 1.25 SOLUTION TOPICAL at 12:50

## 2024-08-23 RX ADMIN — DEXTROSE MONOHYDRATE 250 ML: 100 INJECTION, SOLUTION INTRAVENOUS at 10:32

## 2024-08-23 RX ADMIN — SODIUM CHLORIDE, PRESERVATIVE FREE 10 ML: 5 INJECTION INTRAVENOUS at 21:05

## 2024-08-23 RX ADMIN — SODIUM CHLORIDE, PRESERVATIVE FREE 10 ML: 5 INJECTION INTRAVENOUS at 17:43

## 2024-08-23 RX ADMIN — PREGABALIN 75 MG: 75 CAPSULE ORAL at 17:42

## 2024-08-23 RX ADMIN — SEVELAMER CARBONATE 1600 MG: 800 TABLET, FILM COATED ORAL at 17:41

## 2024-08-23 RX ADMIN — FIDAXOMICIN 200 MG: 200 TABLET, FILM COATED ORAL at 20:59

## 2024-08-23 RX ADMIN — FLUOXETINE HYDROCHLORIDE 10 MG: 10 CAPSULE ORAL at 17:41

## 2024-08-23 RX ADMIN — CALCIUM GLUCONATE 1000 MG: 98 INJECTION, SOLUTION INTRAVENOUS at 10:23

## 2024-08-23 RX ADMIN — EPOETIN ALFA-EPBX 10000 UNITS: 10000 INJECTION, SOLUTION INTRAVENOUS; SUBCUTANEOUS at 18:05

## 2024-08-23 RX ADMIN — MIDODRINE HYDROCHLORIDE 10 MG: 5 TABLET ORAL at 10:23

## 2024-08-23 RX ADMIN — MIDODRINE HYDROCHLORIDE 10 MG: 5 TABLET ORAL at 13:20

## 2024-08-23 RX ADMIN — CARVEDILOL 6.25 MG: 6.25 TABLET, FILM COATED ORAL at 17:41

## 2024-08-23 RX ADMIN — MIDODRINE HYDROCHLORIDE 10 MG: 5 TABLET ORAL at 17:41

## 2024-08-23 RX ADMIN — MEROPENEM 500 MG: 500 INJECTION, POWDER, FOR SOLUTION INTRAVENOUS at 18:09

## 2024-08-23 RX ADMIN — SEVELAMER CARBONATE 1600 MG: 800 TABLET, FILM COATED ORAL at 13:20

## 2024-08-23 RX ADMIN — ATORVASTATIN CALCIUM 80 MG: 80 TABLET, FILM COATED ORAL at 20:59

## 2024-08-23 RX ADMIN — SEVELAMER CARBONATE 1600 MG: 800 TABLET, FILM COATED ORAL at 10:23

## 2024-08-23 RX ADMIN — INSULIN HUMAN 10 UNITS: 100 INJECTION, SOLUTION PARENTERAL at 10:42

## 2024-08-23 ASSESSMENT — PAIN SCALES - GENERAL
PAINLEVEL_OUTOF10: 8
PAINLEVEL_OUTOF10: 6
PAINLEVEL_OUTOF10: 0
PAINLEVEL_OUTOF10: 0

## 2024-08-23 NOTE — PROGRESS NOTES
Comprehensive Nutrition Assessment    RECOMMENDATIONS:  PO Diet: Continue renal diet  Nutrition Supplement: start Nepro BID  Nutrition Education: No recommendation at this time     NUTRITION ASSESSMENT:   Nutritional summary & status: LOS evaluation. Patient is s/p I&D of Rt foot with resection of 5th metatarsal now with wound vac. Pt in procedure at time of visit, hx obtained via chart review. Per EMR notes patient has been refusing care including HD this am resulting in altered labs. PO is varied , RD will order ONS and continue to monitor nutritional status.   Admission // PMH: Sepsis // ESRD, DM2    MALNUTRITION ASSESSMENT  Context of Malnutrition: Chronic Illness   Malnutrition Status: At risk for malnutrition (Comment)  Findings of the 6 clinical characteristics of malnutrition (Minimum of 2 out of 6 clinical characteristics is required to make the diagnosis of moderate or severe Protein Calorie Malnutrition based on AND/ASPEN Guidelines):  Energy Intake:  Mild decrease in energy intake (Comment)  Weight Loss:  No significant weight loss     Body Fat Loss:  Unable to assess (Pt in procedure)     Muscle Mass Loss:  Unable to assess    Fluid Accumulation:  No significant fluid accumulation     Strength:  Not Performed    NUTRITION DIAGNOSIS   Increased nutrient needs related to catabolic illness as evidenced by wounds, dialysis    Nutrition Monitoring and Evaluation:   Food/Nutrient Intake Outcomes:  Food and Nutrient Intake, Supplement Intake  Physical Signs/Symptoms Outcomes:  Biochemical Data, GI Status, Nutrition Focused Physical Findings, Skin, Weight     OBJECTIVE DATA: Significant to nutrition assessment  Nutrition Related Findings: , A1C 5.8, Na+ 134, Phos 6.2, K+ 6.2  Wounds: Multiple  Nutrition Goals: PO intake 75% or greater, by next RD assessment     CURRENT NUTRITION THERAPIES  ADULT ORAL NUTRITION SUPPLEMENT; Breakfast, Lunch, Dinner; Wound Healing Oral Supplement  ADULT DIET; Regular;

## 2024-08-23 NOTE — PROGRESS NOTES
Podiatric Surgery Daily Progress Note  Serafin Weaver      Subjective :   Patient seen and examined this am at the bedside. Patient denies any overnight events. S/P I&D right foot with resection of 5th metatarsal with tendon transfer (DOS 8/22/24). Patient denies any pedal complaints. Patient denies any N/V/F/SOB/CP.    Review of Systems: A 12 point review of symptoms is unremarkable with the exception of the chief complaint. Patient specifically denies nausea, fever, vomiting, chills, shortness of breath, chest pain, abdominal pain, constipation or difficulty urinating.       Objective     BP 98/62   Pulse 74   Temp 99.7 °F (37.6 °C) (Oral)   Resp 20   Ht 2.032 m (6' 8\")   Wt 134.3 kg (296 lb) Comment: removed wound vac pump  SpO2 95%   BMI 32.52 kg/m²      I/O:  Intake/Output Summary (Last 24 hours) at 8/23/2024 0725  Last data filed at 8/22/2024 1809  Gross per 24 hour   Intake 630 ml   Output 30 ml   Net 600 ml              Wt Readings from Last 3 Encounters:   08/22/24 134.3 kg (296 lb)   08/08/24 (!) 139.3 kg (307 lb 3.2 oz)   07/30/24 (!) 153.5 kg (338 lb 6.5 oz)       LABS:    Recent Labs     08/20/24  1029 08/22/24  0500   WBC 17.0* 16.8*   HGB 8.1* 8.2*   HCT 26.5* 26.3*    254        Recent Labs     08/22/24  0500      K 4.9   CL 98*   CO2 27   PHOS 5.2*   BUN 48*   CREATININE 10.7*        Recent Labs     08/21/24  1520   INR 1.27*           LOWER EXTREMITY EXAMINATION    VASCULAR:  DP and PT pulses are faintly palpable +1/4 B/L.  Upon previous hand-held Doppler examination, DP and PT pulses are monophasic.  CFT is brisk to TMA stumps bilateral. Skin temperature is warm to warm from proximal to distal with no focal calor.  Mild nonpitting edema noted to right lower extremity 2/2 post operative status. No pain with calf compression b/l.      NEUROLOGIC: Gross and epicritic sensation is diminished b/l. Protective sensation is diminished at all pedal sites b/l.    supplement to increase wound healing potential.   -Images reviewed, impression noted above  -TCPO2 with good wound healing potential 2.74  -Wound culture: Klebsiella Oxytoca ESBL   -Surgical pathology (8/17/24): fifth metetarsal postive for OM  -Surgical culture (8/22/24): pending  -Continue IV antibiotics per ID: Meropenem  -Recommend patient continue on IV antibiotics per ID as procedure was not felt to be definitve and patient will likely require PICC   -Right LE wound vac applied today  -Prevalon boots reapplied. Patient is to wear at all times while in bed to prevent further deep tissue injury.  -Non-weightbearing to right LE      DISPO:S/P I&D right foot with removal of fifth metatarsal and tendon transfer (DOS 8/22/24). Recommend patient continue on IV antibiotics per ID; meropenem. Procedure was not felt to be difinitive and recommend patient continue on antibiotics per ID. Patient will require wound VAC at discharge; all paperwork placed in patients chart. Podiatry will continue to follow patient while in house. Wound VAC change MWF.    Patient was seen and evaluated at bedside with LUCILA Tovar DPM   Podiatric Resident PGY2  [facility]un[facility]  8/23/2024, 7:25 AM

## 2024-08-23 NOTE — PLAN OF CARE
Problem: Skin/Tissue Integrity  Goal: Absence of new skin breakdown  Description: 1.  Monitor for areas of redness and/or skin breakdown  2.  Assess vascular access sites hourly  3.  Every 4-6 hours minimum:  Change oxygen saturation probe site  4.  Every 4-6 hours:  If on nasal continuous positive airway pressure, respiratory therapy assess nares and determine need for appliance change or resting period.  Outcome: Progressing  q 2 hours turns offered. Pillows in place.  Problem: Safety - Adult  Goal: Free from fall injury  Outcome: Progressing  Call light and belongings placed within reach. Bed alarm is active. Bed is at lowest position.   Problem: Pain  Goal: Verbalizes/displays adequate comfort level or baseline comfort level  Outcome: Progressing     Pt able to voice needs. Pt denies any pain. Pt uses the appropriate 0-10  pain scale .

## 2024-08-23 NOTE — CONSULTS
[facility]  Palliative Medicine Consultation Note      Date Of Admission:8/15/2024  Date of consult: 08/23/24  Seen by PC in the past:  No    Recommendations:        Met with pt and introduced palliative care. Discussed how he has been tolerating dialysis and he says he sometimes skips dialysis because \"I just don't want to be there.\" I emphasized with his feelings and also explained what happens when dialysis is skipped, and expressed concern about a shorter life expectancy with dialysis noncompliance. He is agreeable to do dialysis this afternoon, but did not commit to being compliant with it at his facility. The pt was agreeable to completing a Health Care Power of  today. He named his mother Annie as his agent and son James (who is 22 years old) as the alternate agent. I called Annie with pt's permission, and emailed her a copy of the document. I informed Annie of our conversation today and let her know he'll go to dialysis at 1:30. She reports he has been living in the nursing facility for almost a year now, prior to that he was living independently, working, etc. She said they've had multiple conversations about dialysis compliance, and that she and the pt's father try their best to support and encourage him.    1. Goals of Care/Advanced Care planning/Code status: Full Code, did not discuss. The pt is agreeable to continue dialysis.  2. Pain: denies pain. He has dilaudid, oxycodone, and tramadol available prn. He received tramadol once last night.  3. Lethargy: pt p/w lethargy from his nursing facility, thought to be due to dialysis noncompliance and/or sepsis secondary to foot infection. He is alert and oriented, appears to be back to his baseline.  4. Disposition: d/c to [facility] when medically ready    Reason for Consult:         [x]  Goals of Care  []  Code Status Discussion/Advanced Care Planning   []  Psychosocial/Family Support  []  Symptom Management  []

## 2024-08-23 NOTE — CARE COORDINATION
Case Management           Date/ Time of Note: 8/23/2024 10:09 AM  Note completed by: Karla Sanderson, MSW, LSW    If patient is discharged prior to next notation, then this note serves as note for discharge by case management.    Patient Name: Serafin Weaver  YOB: 1980    Diagnosis:Septicemia (HCC) [A41.9]  Transaminitis [R74.01]  ESRD (end stage renal disease) (HCC) [N18.6]  Sepsis (HCC) [A41.9]  Ulcer of right foot, unspecified ulcer stage (HCC) [L97.519]  Anemia due to chronic kidney disease, unspecified CKD stage [N18.9, D63.1]  Patient Admission Status: Inpatient  Date of Admission:8/15/2024  3:35 PM    Length of Stay: 8 GLOS: GMLOS: 6.9 Readmission Risk Score: Readmission Risk Score: 27.5    Current Plan of Care: .  10:09 AM    Pt from [facility] and can return at discharge. Run sheets for HD approval will need to be sent prior to discharge - EDUARDO awaiting for pt to complete HD - pt refused Wed, unable to get yesterday due to surgery, refusing again today.    Kevin with [facility] started precert for SNF level of care on 8/20/24 due to poss IV abx needs. They will have a bed for pt tomorrow but they need HD run sheets before they will admit pt. Podiatry has determined today that pt will need IV abx. Awaiting ID final recs.     EDUARDO confirmed with podiatry pt will be sent on a normal WV with black/white foam.     EDUARDO spoke to kevin and informed her of the abx needs (waiting on final recs) and WV type needed. EDUARDO updated her that pt refused HD again.  EDUARDO waiting for HD run sheet and ID final recs.     Pt MUST have PICC for IV abx ID doesnt want abx through HD.  - PICC has been ordered today.     EDUARDO Perfect served attending regarding dc timing and pt's HD refusal.     Referrals completed: SNF/LTC [facility]     Resources/ information provided: Not indicated at this time    IMM Status:        Serafin and his family were provided with choice of provider; he and his family are in agreement with the

## 2024-08-23 NOTE — NURSE NAVIGATOR
SINGLE PICC PROCEDURE NOTE  Chart reviewed for allergies, diagnosis, labs, known contraindications, reason for line placement and planned length of treatment.  Informed consent noted to be signed and on chart.  Insertion procedure discussed with patient/family member.  Three patient identifiers - Patient name,   and MRN -  completed &  confirmed verbally.         Time out performed Hat, mask and eye shield donned.  PICC site cleaned with chlorhexidine wipes then scrubbed with Chloraprep  One-Step applicator for 30 seconds x 1.   Hand Hygiene  performed with 3% Chlorhexidine surgical scrub x1 min prior to  sterile gloves, sterile gown being donned.  Patient draped using maximal sterile barrier technique ( head to toe ).  PICC site scrubbed a 2nd time with Chloraprep One-Step applicator x 30 sec. Modified Seldinger technique/ultrasound assisted and tip locating system utilized for insertion and 1% Lidocaine 5 ml injected intradermal pre-insertion.  PICC tip location in the SVC confirmed by ECG technology.   Positive brisk blood return obtained from lumen.  Valve applied to lumen and flushed with 10 mls  0.9% Sterile Sodium Chloride.  All lumens flush easily with no resistance.  Skin prep applied to site.  Catheter secured with non-sutured locking device per hospital protocol. Bio-patch/CHG impregnated sterile tegaderm dressing applied.  Alcohol Swab Cap applied to valve.  Sterile field maintained during procedure.  PICC insertion, rhythm and positioning wire (utilized prn) accounted for post procedure and disposed of in sharps.  Appearance of site is Clean dry and intact without bleeding or edema. All edges of Tegaderm occlusive.   Site marked with date and initials of RN placing line. Teaching performed to pt/family and noted in education section.   Bed placed in low position post-procedure. Top 2 side rails in up position call button in reach.Pt.  Response to procedure tolerated well.  RN notified of all of the above.  A Single Lumen Power Injectable PICC was trimmed at 51 CM and placed ANNE MARIE per vein, 2 cm showing from insertion site.

## 2024-08-23 NOTE — OP NOTE
[address] [address]                            OPERATIVE REPORT      PATIENT NAME: DASHAWN MILES          : 1980  MED REC NO: [id number]                      ROOM: 4321  ACCOUNT NO: [id number]                       ADMIT DATE: 08/15/2024  PROVIDER: Kailee Olsen DPM      DATE OF PROCEDURE:  2024    SURGEON:  Kailee Olsen DPM    ASSISTANT:  Frida Hartley DPM, PGY-2.    PREOPERATIVE DIAGNOSES:    1. Osteomyelitis, right foot.  2. Diabetes mellitus with peripheral neuropathy and peripheral vascular disease.  3. Grade 4 ulceration, right foot.    PATHOLOGY:  Resected fifth metatarsal sent to pathology for gross microscopic examination.  A culture swab was sent from drainage encountered during the procedure.    HEMOSTASIS:  Anatomic dissection with electrocauterization.    ESTIMATED BLOOD LOSS:  Approximately 100 mL.    INDICATIONS FOR SURGERY:  The patient presented to the hospital with sepsis and undergone an emergent incision and drainage.  The bone biopsies taken at that time came back positive for osteomyelitis.  So, the patient was brought back to the operating room for further removal of infected bone with transfer of the peroneus brevis tendon to the peroneus longus.  All risks versus benefits of the planned procedure were explained to the patient and his mother.    Intraoperative findings included intraoperative findings which included a pocket of approximately 3 mL of hazel purulent drainage just proximal to the fifth metatarsal that was evacuated.    DESCRIPTION OF PROCEDURE:  The patient brought from the preoperative area and placed on the operating table in the supine position.  Following induction of IV sedation, a local block consisting of a total of 20 mL of 2% lidocaine plain.  Upon completion of this, the right lower extremity was scrubbed, prepped, and draped in usual sterile manner.  A

## 2024-08-23 NOTE — PROGRESS NOTES
Pt refused dialysis MD notified. Pt k+ 6.1  not hemolyzed Md notified. Electronically signed by Gabi Reina RN on 8/23/2024 at 10:08 AM

## 2024-08-23 NOTE — PROGRESS NOTES
Treatment time: 2 hours  Net UF: 700 ml     Pre weight: 134.3 kg  Post weight:133.6 kg    Access used: RAVG    Access function: good with  ml/min     Medications or blood products given: Retacrit 16648h     Regular outpatient schedule: MWF     Summary of response to treatment: Patient tolerated treatment well and without any complications.  Copy of dialysis treatment record placed in chart, to be scanned into EMR.

## 2024-08-23 NOTE — PLAN OF CARE
General Internal Medicine Attending     Chart and data reviewed.  Patient seen and examined, case discussed with medical resident.   Physical exam repeated.  Labs and imaging studies reviewed.      Agree with documentation, assessment and plan as outlined            44 y.o. male with ESRD on HD, DM II, seizure dx, depression, left TMA and right foot OM S/P recent right TMA on 7/24/24 who presented on 8/15 from nursing home with lethargy.         Surgical site wound dehiscence, with Infected Rt foot wound with ESBL Klebsiella,  Rt foot osteomyelitis S/P TMA for diabetic foot ulcer with osteomyelitis TMA with UNRULY 7/24/24   PAD with recent intervention (RLE arteriogram with L CFA insertion site. Balloon angioplasty x2 7/22/24)   -S/P Rt foot I&D with removal of all non-viable tissue and bone 8/17.  -Culture is showed ESBL and anaerobes. Antibiotics switched to meropenem for now   - OR again per podiatry  8/22/24: S/P I&D right foot with removal of fifth metatarsal and tendon transfer (DOS 8/22/24).   - Procedure not felt to be definitive: hence will need o/p parenteral antibx:  Antibx per ID     Acute on chronic anemia   - Acute anemia sec to acute infection and blood loss from surgery, heparin gtt on chronic anemia sec to ESRD   - Given a unit 8/15. Hb dropped after surgery and given another unit 8/17. 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 Appears he had a Rt atrial clot on RUTHANN but not mentioned in the discharge summary. He did follow with hematology after that   -Heparin drip periop, with plans to switch back to Eliquis when done with surgery/ when ok with Podiatry: We will co-ordinate lester-op  anticoagulation plan with Podiatry to optimize outcome and cont to weigh risk/benefits of AC at each time. .      ESRD with non-adherence to dialysis   Hyperkalemia with EKG changes  resolved  -Lokelma   -Dialysis per Nephrology      DM type 2, complicated by peripheral neuropathy, nephropathy: POA  - Optimize glycemic control      Elevated LFTs   -Hepatocellular pattern. It worsened initially but almost normalized now   -Acute hepatitis panel is negative . -RUQ US + doppler (hx of lupus, atrial clot) are ok   -Restart statin on discharge      Diarrhea/possible Cdiff infection - per report tested positive for Cdiff recently .Lab refused to test it here. Has been startled empirically on Dificid       Vit D deficiency - started on replacement        Discussed with patient, SW      Rest as per resident's note.        Disposition:   Recent discharge 7/30 post amp for OM    Pt from [facility] and can return at discharge     Antibx plan: O/p IV antibx. CORINA completed    Possible DC in about 2 days:            Mine Arteaga MD

## 2024-08-23 NOTE — PROGRESS NOTES
ID Follow-up NOTE    CC:   Right foot wound status post TMA  Antibiotics:  meropenem    Admit Date: 8/15/2024  Hospital Day: 9    Subjective:     Patient had Tmax 99.7 overnight, denies any fevers, has right foot pain. Has been refusing HD in hospital as well. Status post right foot fifth metatarsal resection with tendon transfer on 8/22, yesterday.  Podiatry felt retained infection post op and would need course of iv abx.     Objective:     Patient Vitals for the past 8 hrs:   BP Temp Temp src Pulse Resp SpO2   08/23/24 0439 102/67 99.7 °F (37.6 °C) Oral 74 20 94 %     I/O last 3 completed shifts:  In: 750 [P.O.:600; I.V.:150]  Out: 30 [Blood:30]  No intake/output data recorded.    EXAM:  GENERAL: No apparent distress.    HEENT: Membranes moist, no oral lesion  NECK:  Supple, no lymphadenopathy  LUNGS: Clear b/l, no rales, no dullness  CARDIAC: RRR, no murmur appreciated  ABD:  + BS, soft / NT  EXT:  Left TMA site well-healed without evidence of ulceration.  The right TMA site with wound vac in place. See photo from 8/23:        NEURO: No focal neurologic findings  PSYCH: Orientation, sensorium, mood normal  LINES:  Peripheral iv, right arm AVF       Data Review:  Lab Results   Component Value Date    WBC 16.4 (H) 08/23/2024    HGB 7.1 (L) 08/23/2024    HCT 23.0 (L) 08/23/2024    MCV 94.3 08/23/2024     08/23/2024     Lab Results   Component Value Date    CREATININE 11.8 (HH) 08/23/2024    BUN 62 (H) 08/23/2024     (L) 08/23/2024    K 6.2 (HH) 08/23/2024    CL 97 (L) 08/23/2024    CO2 24 08/23/2024       Hepatic Function Panel:   Lab Results   Component Value Date/Time    ALKPHOS 94 08/23/2024 08:29 AM    ALT 40 08/23/2024 08:29 AM    AST 26 08/23/2024 08:29 AM    BILITOT 0.4 08/23/2024 08:29 AM    BILIDIR 0.2 08/23/2024 08:29 AM    IBILI 0.2 08/23/2024 08:29 AM       MICRO:  OR culture right foot 8/22: Pending  Blood cultures x 2 8/15: neg  Wound cx right foot 8/16: ESBL Klebsiella oxytoca, heavy  growth  Klebsiella oxytoca ESBL (1)    Antibiotic Interpretation Microscan  Method Status    ampicillin Resistant >=32 mcg/mL BACTERIAL SUSCEPTIBILITY PANEL BY VINOD     ampicillin-sulbactam Sensitive 4 mcg/mL BACTERIAL SUSCEPTIBILITY PANEL BY VINOD     ceFAZolin Resistant   BACTERIAL SUSCEPTIBILITY PANEL BY VINOD     cefepime Resistant   BACTERIAL SUSCEPTIBILITY PANEL BY VINOD     cefTRIAXone Resistant   BACTERIAL SUSCEPTIBILITY PANEL BY VINOD     ciprofloxacin Sensitive <=0.25 mcg/mL BACTERIAL SUSCEPTIBILITY PANEL BY VINOD     gentamicin Intermediate 8 mcg/mL BACTERIAL SUSCEPTIBILITY PANEL BY VINOD     levofloxacin Intermediate 1 mcg/mL BACTERIAL SUSCEPTIBILITY PANEL BY VINOD     tobramycin Intermediate 8 mcg/mL BACTERIAL SUSCEPTIBILITY PANEL BY VINOD     trimethoprim-sulfamethoxazole Sensitive <=20 mcg/mL BACTERIAL SUSCEPTIBILITY PANEL BY VINOD         IMAGING:I have independently reviewed the images and reports.       Vascular laser doppler right leg 8/20:  Bilateral foot perfusion adequate for healing of a spontaneous or surgical wound based on laser perfusion pressures.    Right foot x ray 8/15:    Interval transmetatarsal amputation proximal aspect first through fifth metatarsals. Minimal air within the soft tissues near the first metatarsal probably postoperative.. Some soft tissue swelling distally. No acute fracture. No osseous destructive lesions.     CT head 8/15:  1. Age-related cortical atrophy  2. No acute intracranial hemorrhage.     US Gallbladder 8/16:  Cholelithiasis without sonographic evidence of acute cholecystitis.  No evidence of portal vein thrombosis.       Scheduled Meds:   midodrine  10 mg Oral TID WC    sodium chloride flush  5-40 mL IntraVENous 2 times per day    lidocaine 1 % injection  50 mg IntraDERmal Once    sodium hypochlorite   Irrigation Daily    Fidaxomicin  200 mg Oral BID    meropenem  500 mg IntraVENous Q24H    vitamin D  50,000 Units Oral Weekly    epoetin rae-epbx  10,000 Units IntraVENous  tendonitis    Scapulothoracic syndrome    SLAP tear of shoulder    Vitreous hemorrhage (HCC)    Wrist sprain, left, initial encounter    Acute encephalopathy    Disorder of brain    Ischemic cerebrovascular accident (CVA) (HCC)    ESRD on dialysis (HCC)    Osteomyelitis of fifth toe of right foot (HCC)    Gas gangrene (HCC)    Infection with ESBL Klebsiella oxytoca    Sepsis (HCC)        Plan:   44 y.o. AA male with history of ESRD on HD, DM II, seizure dx, depression, left TMA and right foot OM S/P recent right TMA on 7/24/24 who presented on 8/15 from nursing home with lethargy.     Right foot ulcer with recent TMA:  - Pt s/p TMA for right foot OM on 7/24/2024.  - lateral aspect ulceration with positive probe to bone and necrosis at site.   - ESR/CRP was 104/269 respectively. He was started empirically on vanco, cefepime and metronidazole on admission.   - X ray showing soft tissue swelling and minimal air in soft tissues.   - The patient was eval by podiatry and is s/p OR 8/17 for debridement, bone bx path positive for acute osteomyelitis  -Laser Doppler of right lower extremity showing adequate perfusion for healing.   - bcx remains neg to date. Wound cx heavy growth of ESBL Klebsiella.  - on vanco, cefepime and metronidazole, stopped cefepime and metronidazole, switched to meropenem.  Without isolation of MRSA also stopped vancomycin.  - s/p OR 8/22 for tendon transfer and removal of fifth metatarsal base and possible wound VAC placement.  Per podiatry, procedure was felt not to be definitive.  New cultures from OR on 8/22 pending.  -Will plan PICC line placement given isolation of ESBL Klebsiella and no available antibiotic with HD that can be administered for treatment, will need meropenem 500mg q24h, see CORINA.      ESRD on HD:   - non compliance with HD with missed sessions, also refusing HD here. Palliative care to eval.   - nephro following  - renally dose adjust abx as needed.   - nephro ok for picc

## 2024-08-23 NOTE — PROGRESS NOTES
Treatment time: *** hours  Net UF: *** ml     Pre weight: *** kg  Post weight:*** kg  EDW: *** kg    Access used: ***    Access function: *** with BFR *** ml/min     Medications or blood products given: ***     Regular outpatient schedule: ***     Summary of response to treatment: Patient tolerated treatment *** and without any complications. Report given to ***.  Copy of dialysis treatment record placed in chart, to be scanned into EMR.

## 2024-08-23 NOTE — PROGRESS NOTES
Internal Medicine Progress Note    Patient Name: Serafin Weaver   Patient : 1980   Date: 2024   Admit Date: 8/15/2024     CC: One day fatigue s/p x3 missed HD sessions     Interval History     Patient postop day 1 status post I&D of right foot with resection of fifth metatarsal with tendon transfer.  Patient started on postop pain medication.  Started back on diet.  Anticoagulation held overnight per podiatry for concerns of IntraOp bleeding.  Hemoglobin this morning 7.1 from 8.2.  Continuing to hold anticoagulation at this time.  Potassium 6.2 from 4.9.  Initially refusing hemodialysis this morning, now agreeable.    Currently denying pain.  Denies nausea and vomiting.  Tolerating diet.  Denies diarrhea, voiding spontaneously.  Nonambulatory.  Denies fever or chills.  Denies chest pain or shortness of breath.      Objective     I/Os:  I/O last 3 completed shifts:  In: 750 [P.O.:600; I.V.:150]  Out: 30 [Blood:30]      Vital Signs:  Patient Vitals for the past 8 hrs:   BP Temp Temp src Pulse Resp SpO2   24 0439 102/67 99.7 °F (37.6 °C) Oral 74 20 94 %       Physical Exam  Vitals reviewed.   Constitutional:       General: He is not in acute distress.     Appearance: He is obese. He is ill-appearing.   HENT:      Head: Normocephalic.      Right Ear: External ear normal.      Left Ear: External ear normal.      Nose: Nose normal.      Mouth/Throat:      Mouth: Mucous membranes are moist.   Eyes:      Extraocular Movements: Extraocular movements intact.      Pupils: Pupils are equal, round, and reactive to light.   Cardiovascular:      Rate and Rhythm: Normal rate.      Heart sounds: Normal heart sounds.   Pulmonary:      Effort: Pulmonary effort is normal. No respiratory distress.      Breath sounds: Normal breath sounds.   Abdominal:      General: There is no distension.      Palpations: Abdomen is soft.      Tenderness: There is no abdominal tenderness. There is no guarding or rebound.

## 2024-08-23 NOTE — PROGRESS NOTES
Pt c/o 8/10 surgical pain in right foot/RLE; pt does not have any PRN pain medication available, (has held PACU orders that cannot be used on floor).     Also, pt heparin gtt has been on hold due to anemia, MAR alert flagged that hold needs to be reviewed. Internal medicine note mentioned plan to bridge from heparin gtt to eliquis but no mention about hold.     Reached out to internal medicine resident for prn pain medicine and for clarity on plan for anticoagulation. Advised contacting podiatry for clarification on pain management and anticoagulation, page sent to on-call podiatry.  Spoke with Oneida GAYTAN who stated heparin is good to stay off until tomorrow and will order PRN pain coverage

## 2024-08-23 NOTE — PROGRESS NOTES
Spoke with nurse Gabi patient now refusing HD after being agreeable to come down after he ate his breakfast, discussed with Dr. Hernandez

## 2024-08-23 NOTE — PROGRESS NOTES
Ph: [phone], Fax: [phone]           [facility]               Reason for admission:                 Admitted for lethargy    Brief Summary :     Serafin Weaver is being seen by nephrology for ESRD on hemodialysis.      Interval History and plan:      Blood pressure is lower   Labs are pending from today   He is refusing a lot of his care    He is post I&D right foot with resection of 5th metatarsal with tendon transfer (DOS 8/22/24)        Plan:    Hemodialysis arranged per [facility] schedule.  Start midodrine TID  Continue with renal diet.  Replace Vitamin D   Daily renal panels.  Continue with Retacrit with dialysis. Very high ferritin ( should not get iron with dialysis )  Please adjust antibiotics to GFR less than 10 mL/min.                     Assessment :     1.  ESRD:  Will plan HD per schedule.  He gets dialysis Monday, Wednesday and Friday.  He gets dialysis currently at [facility] under our care.  Access: AV graft  Daily weights  Fluid restriction: 1 L unless hypotensive  Nephrocap 1 tab PO daily    2.  Anemia:  Erythropoetin dose: He will receive Retacrit with dialysis to keep hemoglobin close to 10.  Hemoglobin admission was 6.9.  Ferritin is greater than 1000.    3.  Osteodystrophy:  Phosphate Binder: Will continue with Renvela 2 tabs p.o. 3 times daily with meals.  I will check PTH and vitamin D.  Daily renal panels.    4.  Hyperkalemia: Resolved.  He is on chronic Lokelma 10 g q. Tuesday, Thursday and Saturday on nondialysis days.    5.  Hypertension: well-controlled.  Continue with carvedilol.    6.  Wound infection: Podiatry consulted.  ID consulted.  He is on cefepime and vancomycin.  He is also getting oral vancomycin for recent C. difficile infection.             [facility] would like to thank Mine Arteaga MD   for opportunity to serve this patient      Please call with questions at-   24 Hrs Answering service [phone] or  7 am- 5 pm via

## 2024-08-24 LAB
ALBUMIN SERPL-MCNC: 2.4 G/DL (ref 3.4–5)
ANION GAP SERPL CALCULATED.3IONS-SCNC: 9 MMOL/L (ref 3–16)
ANTI-XA UNFRAC HEPARIN: 0.24 IU/ML (ref 0.3–0.7)
BLOOD BANK DISPENSE STATUS: NORMAL
BLOOD BANK PRODUCT CODE: NORMAL
BPU ID: NORMAL
BUN SERPL-MCNC: 39 MG/DL (ref 7–20)
CALCIUM SERPL-MCNC: 7.5 MG/DL (ref 8.3–10.6)
CHLORIDE SERPL-SCNC: 103 MMOL/L (ref 99–110)
CO2 SERPL-SCNC: 25 MMOL/L (ref 21–32)
CREAT SERPL-MCNC: 8.9 MG/DL (ref 0.9–1.3)
DEPRECATED RDW RBC AUTO: 16 % (ref 12.4–15.4)
DESCRIPTION BLOOD BANK: NORMAL
GFR SERPLBLD CREATININE-BSD FMLA CKD-EPI: 7 ML/MIN/{1.73_M2}
GLUCOSE BLD-MCNC: 117 MG/DL (ref 70–99)
GLUCOSE BLD-MCNC: 119 MG/DL (ref 70–99)
GLUCOSE BLD-MCNC: 141 MG/DL (ref 70–99)
GLUCOSE BLD-MCNC: 180 MG/DL (ref 70–99)
GLUCOSE BLD-MCNC: 202 MG/DL (ref 70–99)
GLUCOSE SERPL-MCNC: 104 MG/DL (ref 70–99)
HCT VFR BLD AUTO: 20.5 % (ref 40.5–52.5)
HCT VFR BLD AUTO: 23.7 % (ref 40.5–52.5)
HGB BLD-MCNC: 6.4 G/DL (ref 13.5–17.5)
HGB BLD-MCNC: 7.4 G/DL (ref 13.5–17.5)
LOEFFLER MB STN SPEC: NORMAL
MAGNESIUM SERPL-MCNC: 2 MG/DL (ref 1.8–2.4)
MCH RBC QN AUTO: 29.1 PG (ref 26–34)
MCHC RBC AUTO-ENTMCNC: 31.1 G/DL (ref 31–36)
MCV RBC AUTO: 93.6 FL (ref 80–100)
PERFORMED ON: ABNORMAL
PHOSPHATE SERPL-MCNC: 5.7 MG/DL (ref 2.5–4.9)
PLATELET # BLD AUTO: 237 K/UL (ref 135–450)
PMV BLD AUTO: 7.6 FL (ref 5–10.5)
POTASSIUM SERPL-SCNC: 4.4 MMOL/L (ref 3.5–5.1)
POTASSIUM SERPL-SCNC: 4.6 MMOL/L (ref 3.5–5.1)
RBC # BLD AUTO: 2.19 M/UL (ref 4.2–5.9)
SODIUM SERPL-SCNC: 137 MMOL/L (ref 136–145)
WBC # BLD AUTO: 14 K/UL (ref 4–11)

## 2024-08-24 PROCEDURE — 2580000003 HC RX 258

## 2024-08-24 PROCEDURE — 84132 ASSAY OF SERUM POTASSIUM: CPT

## 2024-08-24 PROCEDURE — 99253 IP/OBS CNSLTJ NEW/EST LOW 45: CPT | Performed by: SURGERY

## 2024-08-24 PROCEDURE — 36430 TRANSFUSION BLD/BLD COMPNT: CPT

## 2024-08-24 PROCEDURE — 6370000000 HC RX 637 (ALT 250 FOR IP)

## 2024-08-24 PROCEDURE — 85014 HEMATOCRIT: CPT

## 2024-08-24 PROCEDURE — 85520 HEPARIN ASSAY: CPT

## 2024-08-24 PROCEDURE — 83735 ASSAY OF MAGNESIUM: CPT

## 2024-08-24 PROCEDURE — 6360000002 HC RX W HCPCS

## 2024-08-24 PROCEDURE — 85018 HEMOGLOBIN: CPT

## 2024-08-24 PROCEDURE — 2580000003 HC RX 258: Performed by: INTERNAL MEDICINE

## 2024-08-24 PROCEDURE — 80069 RENAL FUNCTION PANEL: CPT

## 2024-08-24 PROCEDURE — 85027 COMPLETE CBC AUTOMATED: CPT

## 2024-08-24 PROCEDURE — 1200000000 HC SEMI PRIVATE

## 2024-08-24 RX ORDER — MIDODRINE HYDROCHLORIDE 5 MG/1
5 TABLET ORAL
Status: DISCONTINUED | OUTPATIENT
Start: 2024-08-24 | End: 2024-08-29 | Stop reason: HOSPADM

## 2024-08-24 RX ORDER — SODIUM CHLORIDE 9 MG/ML
INJECTION, SOLUTION INTRAVENOUS PRN
Status: DISCONTINUED | OUTPATIENT
Start: 2024-08-24 | End: 2024-08-29 | Stop reason: HOSPADM

## 2024-08-24 RX ADMIN — SODIUM CHLORIDE, PRESERVATIVE FREE 10 ML: 5 INJECTION INTRAVENOUS at 21:00

## 2024-08-24 RX ADMIN — SODIUM ZIRCONIUM CYCLOSILICATE 10 G: 10 POWDER, FOR SUSPENSION ORAL at 13:13

## 2024-08-24 RX ADMIN — SEVELAMER CARBONATE 1600 MG: 800 TABLET, FILM COATED ORAL at 11:04

## 2024-08-24 RX ADMIN — PREGABALIN 75 MG: 75 CAPSULE ORAL at 11:01

## 2024-08-24 RX ADMIN — CARVEDILOL 6.25 MG: 6.25 TABLET, FILM COATED ORAL at 11:01

## 2024-08-24 RX ADMIN — HEPARIN SODIUM 5000 UNITS: 1000 INJECTION INTRAVENOUS; SUBCUTANEOUS at 04:11

## 2024-08-24 RX ADMIN — SEVELAMER CARBONATE 1600 MG: 800 TABLET, FILM COATED ORAL at 17:27

## 2024-08-24 RX ADMIN — ATORVASTATIN CALCIUM 80 MG: 80 TABLET, FILM COATED ORAL at 20:54

## 2024-08-24 RX ADMIN — SODIUM CHLORIDE, PRESERVATIVE FREE 10 ML: 5 INJECTION INTRAVENOUS at 13:19

## 2024-08-24 RX ADMIN — CARVEDILOL 6.25 MG: 6.25 TABLET, FILM COATED ORAL at 20:55

## 2024-08-24 RX ADMIN — SODIUM HYPOCHLORITE: 1.25 SOLUTION TOPICAL at 07:30

## 2024-08-24 RX ADMIN — SODIUM CHLORIDE, PRESERVATIVE FREE 10 ML: 5 INJECTION INTRAVENOUS at 20:56

## 2024-08-24 RX ADMIN — FLUOXETINE HYDROCHLORIDE 10 MG: 10 CAPSULE ORAL at 11:02

## 2024-08-24 RX ADMIN — SODIUM CHLORIDE, PRESERVATIVE FREE 10 ML: 5 INJECTION INTRAVENOUS at 13:21

## 2024-08-24 RX ADMIN — ERGOCALCIFEROL 50000 UNITS: 1.25 CAPSULE ORAL at 11:01

## 2024-08-24 RX ADMIN — FIDAXOMICIN 200 MG: 200 TABLET, FILM COATED ORAL at 11:20

## 2024-08-24 RX ADMIN — SODIUM CHLORIDE, PRESERVATIVE FREE 10 ML: 5 INJECTION INTRAVENOUS at 13:22

## 2024-08-24 RX ADMIN — MEROPENEM 500 MG: 500 INJECTION, POWDER, FOR SOLUTION INTRAVENOUS at 13:15

## 2024-08-24 RX ADMIN — FIDAXOMICIN 200 MG: 200 TABLET, FILM COATED ORAL at 20:54

## 2024-08-24 ASSESSMENT — PAIN SCALES - GENERAL
PAINLEVEL_OUTOF10: 0
PAINLEVEL_OUTOF10: 0

## 2024-08-24 NOTE — PROGRESS NOTES
Podiatric Surgery Daily Progress Note  Serafin Weaver      Subjective :   Patient seen and examined this am at the bedside. Patient denies any overnight events. S/P I&D right foot with resection of 5th metatarsal with tendon transfer (DOS 8/22/24). Patient continues to refuse dialysis. Patient denies any pedal complaints. Patient denies any N/V/F/SOB/CP.    Review of Systems: A 12 point review of symptoms is unremarkable with the exception of the chief complaint. Patient specifically denies nausea, fever, vomiting, chills, shortness of breath, chest pain, abdominal pain, constipation or difficulty urinating.       Objective     OWER EXTREMITY EXAMINATION     Dressing to right lower extremity left clean, dry, and intact.  No strikethrough noted to external dressing.     Sensation intact proximal to dressing bilateral.  No pain with calf compression bilateral.  Patient able to perform active range of motion at ankle joints bilateral.     Wound vac noted to right, left clean, dry, and intact. Excellent seal noted with suction set to 125 mmHg. Wound vac seal intact with excellent suction noted.     /76   Pulse 65   Temp 98.7 °F (37.1 °C) (Axillary)   Resp 16   Ht 2.032 m (6' 8\")   Wt (!) 136.3 kg (300 lb 7.8 oz) Comment: wound vac pump removed  SpO2 97%   BMI 33.01 kg/m²      I/O:  Intake/Output Summary (Last 24 hours) at 8/24/2024 1011  Last data filed at 8/24/2024 0859  Gross per 24 hour   Intake 820 ml   Output 0 ml   Net 820 ml              Wt Readings from Last 3 Encounters:   08/24/24 (!) 136.3 kg (300 lb 7.8 oz)   08/08/24 (!) 139.3 kg (307 lb 3.2 oz)   07/30/24 (!) 153.5 kg (338 lb 6.5 oz)       LABS:    Recent Labs     08/23/24  0829 08/23/24  1016 08/23/24  1900 08/24/24  0600   WBC 16.4*  --   --  14.0*   HGB 7.1*   < > 7.2* 6.4*   HCT 23.0*   < > 23.4* 20.5*     --   --  237    < > = values in this interval not displayed.        Recent Labs     08/24/24  0600      K 4.6   CL  7/22/24)  -T2DM with periphral neuropathy   -Hx TMA left LLE    -Patient seen and examined at bedside   -Hypertensive otherwise VSS, Leukocytosis noted (WBC 14.0)  - and .6  -lactic acid 1.1 (8/15)  -HbA1c 5.8 (7/17/24)  -Prealbumin: 6.1. Oral wound healing supplement ordered. Recommend patient take wound healing supplement to increase wound healing potential.   -Images reviewed, impression noted above  -TCPO2 with good wound healing potential 2.74  -Wound culture: Klebsiella Oxytoca ESBL   -Surgical pathology (8/17/24): fifth metetarsal postive for OM  -Surgical culture (8/22/24): NGTD  -Continue IV antibiotics per ID: Meropenem  -Recommend patient continue on IV antibiotics per ID as procedure was not felt to be definitve and patient will likely require PICC   -Prevalon boots reapplied. Patient is to wear at all times while in bed to prevent further deep tissue injury.  -Non-weightbearing to right LE      DISPO:S/P I&D right foot with removal of fifth metatarsal and tendon transfer (DOS 8/22/24). Recommend patient continue on IV antibiotics per ID; meropenem. Procedure was not felt to be difinitive and recommend patient continue on antibiotics per ID. Patient will require wound VAC at discharge; all paperwork placed in patients chart. Podiatry will continue to follow patient while in house. Wound VAC change MWF.    Patient was seen and evaluated at bedside with LUCILA Tovar DPM, MS  Podiatric Resident PGY-1  [place]  Pager #[phone]  8/24/2024, 10:14 AM

## 2024-08-24 NOTE — CONSULTS
Vascular Surgery   Resident Consult Note    Reason for Consult: Non healing RLE wound    History of Present Illness:   Serafin Weaver is a 44 y.o. 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     Patient reports no pain, does not ambulate. Vitals WNL, Physical exam is reported below. Labs are significant for leukocytosis at 14, and Hgb at 7.4 from 6.4 s/p 2 units pRBCs. Patient is on Plavix last taken 8/23 and Heparin gtt last taken 8/24.        Past Medical History:        Diagnosis Date    Amputation of third toe, left, traumatic (HCC)     Anesthesia complication     has history of being aggitated and \"fights\"    Blood circulation, collateral     Cellulitis     Depression     Diabetic polyneuropathy associated with type 2 diabetes mellitus ([facility]) 02/06/2019    Hypertension     MRSA infection 02/04/2019    foot wound    Seizures ([facility])     Type II or unspecified type diabetes mellitus without mention of complication, not stated as uncontrolled     Unspecified cerebral artery occlusion with cerebral infarction     pt states at 14 years of age       Past Surgical History:        Procedure Laterality Date    ACHILLES TENDON SURGERY Right 7/24/2024    . performed by Kailee Olsen DPM at [facility] OR    AMPUTATION      left 3rd toe    FOOT AMPUTATION Left 2/7/2019    INCISION AND DRAINAGE, REVISION OF TRANSMETATARSAL AMPUTATION LEFT FOOT performed by Kailee Olsen DPM at [facility] OR    FOOT DEBRIDEMENT Right 7/18/2024    RIGHTFOOT INCISION AND DRAINAGE WITH PARTIAL FIFTH RAY RESECTION performed by Kailee Olsen DPM at [facility] OR    FOOT DEBRIDEMENT Right 8/17/2024    RIGHT FOOT INCISION AND  Thursday, Saturday      apixaban (ELIQUIS) 2.5 MG TABS tablet Take 1 tablet by mouth daily      aspirin 81 MG EC tablet Take 1 tablet by mouth daily 30 tablet 3       Current Meds  0.9 % sodium chloride infusion, PRN  midodrine (PROAMATINE) tablet 5 mg, TID WC  sodium chloride flush 0.9 % injection 5-40 mL, 2 times per day  sodium chloride flush 0.9 % injection 5-40 mL, PRN  0.9 % sodium chloride infusion, PRN  sodium chloride flush 0.9 % injection 5-40 mL, 2 times per day  sodium chloride flush 0.9 % injection 5-40 mL, PRN  0.9 % sodium chloride infusion, PRN  lidocaine PF 1 % injection 50 mg, Once  traMADol (ULTRAM) tablet 50 mg, Q12H PRN   Or  traMADol (ULTRAM) tablet 100 mg, Q12H PRN  sodium hypochlorite (DAKINS) 0.125 % external solution- 473 ml- dispense in BAG for wound vac, Daily  Fidaxomicin (DIFICID) tablet 200 mg, BID  meropenem (MERREM) 500 mg in sodium chloride 0.9 % 100 mL IVPB (mini-bag), Q24H  vitamin D (ERGOCALCIFEROL) capsule 50,000 Units, Weekly  epoetin rae-epbx (RETACRIT) injection 10,000 Units, Once per day on Monday Wednesday Friday  sodium chloride 0.9 % bolus 100 mL, PRN  sodium zirconium cyclosilicate (LOKELMA) oral suspension 10 g, Once per day on Tuesday Thursday Saturday  heparin (porcine) injection 10,000 Units, PRN  heparin (porcine) injection 5,000 Units, PRN  [Held by provider] heparin 25,000 units in dextrose 5% 250 mL (premix) infusion, Continuous  0.9 % sodium chloride infusion, PRN  glucose chewable tablet 16 g, PRN  dextrose bolus 10% 125 mL, PRN   Or  dextrose bolus 10% 250 mL, PRN  glucagon injection 1 mg, PRN  dextrose 10 % infusion, Continuous PRN  atorvastatin (LIPITOR) tablet 80 mg, QHS  carvedilol (COREG) tablet 6.25 mg, BID  [Held by provider] clopidogrel (PLAVIX) tablet 75 mg, Daily  FLUoxetine (PROZAC) capsule 10 mg, Daily  pregabalin (LYRICA) capsule 75 mg, Daily  sevelamer (RENVELA) tablet 1,600 mg, TID WC  sodium chloride flush 0.9 % injection 5-40 mL, 2 times per  day  sodium chloride flush 0.9 % injection 5-40 mL, PRN  0.9 % sodium chloride infusion, PRN  ondansetron (ZOFRAN-ODT) disintegrating tablet 4 mg, Q8H PRN   Or  ondansetron (ZOFRAN) injection 4 mg, Q6H PRN  polyethylene glycol (GLYCOLAX) packet 17 g, Daily PRN        Family History:   Family History   Problem Relation Age of Onset    Diabetes Mother     High Blood Pressure Mother     High Cholesterol Mother     Mental Illness Father     High Blood Pressure Father     Diabetes Father     Diabetes Paternal Grandfather        Social History:   TOBACCO:   reports that he has been smoking cigarettes and cigars. He has a 4.3 pack-year smoking history. He has never used smokeless tobacco.  ETOH:   reports that he does not currently use alcohol.  DRUGS:   reports current drug use. Drug: Marijuana (Weed).    Review of Systems:   A 14 point review of systems was conducted, significant findings as noted in HPI. All other systems negative.     Physical exam:    Vitals:    08/24/24 0826 08/24/24 0926 08/24/24 1026 08/24/24 1133   BP: 138/76 127/68 134/80 115/70   Pulse: 65 67 69 71   Resp: 16 18 18 16   Temp: 98.7 °F (37.1 °C) 98.7 °F (37.1 °C) 97.9 °F (36.6 °C) 97.9 °F (36.6 °C)   TempSrc: Axillary Oral Oral Oral   SpO2: 97% 97% 98% 97%   Weight:       Height:           General appearance: alert, no acute distress  Eyes: No scleral icterus, EOM grossly intact  Neck: trachea midline, no JVD  Chest/Lungs: Normal effort with no accessory muscle use on RA  Cardiovascular: RRR, perfused  Skin: warm and dry, no rashes  Extremities: no edema, no cyanosis. RLE wound vac in place and suctioning. LLE well healed stump. Bilateral radial, femoral pulses palpated. Bilateral popliteal, DP PT dopplerable. Non tender.  Neuro: A&Ox3, sensation intact    Labs:    CBC:   Recent Labs     08/22/24  0500 08/23/24  0829 08/23/24  1016 08/23/24  1340 08/23/24  1900 08/24/24  0600   WBC 16.8* 16.4*  --   --   --  14.0*   HGB 8.2* 7.1*   < > 7.0* 7.2*

## 2024-08-24 NOTE — PROGRESS NOTES
Ph: [phone], Fax: [phone]           [facility]               Reason for admission:                 Admitted for lethargy    Brief Summary :     Serafin Weaver is being seen by nephrology for ESRD on hemodialysis.      Interval History and plan:   Patient seen at bedside with nurse  Comfortable  /77  On room air and denied SOB  Had 2 hour HD yesterday.   Lytes stable today.   Hb low 6.4  Patient refusing lot of care per report.         Offered extra HD today as patient got only for 2 hours yesterday but patient refused. No urgency for today though.   Hemodialysis per MWF schedule unless urgency on labs.   Decrease midodrine dose as BP is elevated and added holding parameter  Renal diet.  Replace Vitamin D   Daily renal panels.  Continue with Retacrit with dialysis. Very high ferritin ( should not get iron with dialysis )  Need blood transfusion as Hb is very low.   Please adjust antibiotics to GFR less than 10 mL/min.                     Assessment :     1.  ESRD:  Will plan HD per schedule.  He gets dialysis Monday, Wednesday and Friday.  He gets dialysis currently at [facility] under our care.  Access: AV graft  Daily weights  Fluid restriction: 1 L unless hypotensive  Nephrocap 1 tab PO daily    2.  Anemia:  Erythropoetin dose: He will receive Retacrit with dialysis to keep hemoglobin close to 10.  Hemoglobin admission was 6.9.  Ferritin is greater than 1000.    3.  Osteodystrophy:  Phosphate Binder: Will continue with Renvela 2 tabs p.o. 3 times daily with meals.  I will check PTH and vitamin D.  Daily renal panels.    4.  Hyperkalemia: Resolved.  He is on chronic Lokelma 10 g q. Tuesday, Thursday and Saturday on nondialysis days.    5.  Hypertension: well-controlled.  Continue with carvedilol.    6.  Wound infection: Podiatry consulted.  ID consulted.  He is on cefepime and vancomycin.  He is also getting oral vancomycin for recent C. difficile infection.            ----------------------------------------------------------  Please call with questions at      24 Hrs Answering service [phone]  Perfect serve, or cell phone 7 am - 5pm  Jordin Kaur MD   [email or web address]

## 2024-08-24 NOTE — PLAN OF CARE
Problem: Skin/Tissue Integrity  Goal: Absence of new skin breakdown  Description: 1.  Monitor for areas of redness and/or skin breakdown  2.  Assess vascular access sites hourly  3.  Every 4-6 hours minimum:  Change oxygen saturation probe site  4.  Every 4-6 hours:  If on nasal continuous positive airway pressure, respiratory therapy assess nares and determine need for appliance change or resting period.  8/24/2024 1423 by Gabi Reina RN  Outcome: Progressing     Problem: Safety - Adult  Goal: Free from fall injury  8/24/2024 1423 by Gabi Reina RN  Outcome: Progressing  Flowsheets  Taken 8/24/2024 0849 by Gabi Reina RN  Free From Fall Injury: Instruct family/caregiver on patient safety   Based on caregiver fall risk screen, instruct family/caregiver to ask for assistance with transferring infant if caregiver noted to have fall risk factors   Instruct family/caregiver on patient safety      Problem: Pain  Goal: Verbalizes/displays adequate comfort level or baseline comfort level  8/24/2024 1423 by Gabi Reina RN  Outcome: Progressing  Flowsheets (Taken 8/24/2024 0434 by Dex Marlow, RN)  Verbalizes/displays adequate comfort level or baseline comfort level:   Encourage patient to monitor pain and request assistance   Assess pain using appropriate pain scale   Administer analgesics based on type and severity of pain and evaluate response   Implement non-pharmacological measures as appropriate and evaluate response   Consider cultural and social influences on pain and pain management   Notify Licensed Independent Practitioner if interventions unsuccessful or patient reports new pain      Problem: Chronic Conditions and Co-morbidities  Goal: Patient's chronic conditions and co-morbidity symptoms are monitored and maintained or improved  8/24/2024 1423 by Gabi Reina RN       Outcome: Progressing  Flowsheets (Taken 8/24/2024 0434)  Care Plan - Patient's Chronic Conditions and Co-Morbidity  Symptoms are Monitored and Maintained or Improved:   Monitor and assess patient's chronic conditions and comorbid symptoms for stability, deterioration, or improvement   Collaborate with multidisciplinary team to address chronic and comorbid conditions and prevent exacerbation or deterioration   Update acute care plan with appropriate goals if chronic or comorbid symptoms are exacerbated and prevent overall improvement and discharge

## 2024-08-24 NOTE — PROGRESS NOTES
Internal Medicine Progress Note    Patient Name: Serafin Weaver   Patient : 1980   Date: 2024   Admit Date: 8/15/2024     CC: One day fatigue s/p x3 missed HD sessions     Interval History   Patient examined and evaluated at bedside today.  Patient feeling well.  Alert, oriented x 3.  Mentation seemed improved today.  Discussed risk benefits of blood transfusion.     PM, Hb stable, heparin drip started.   4 AM, Hb dropped from 7.2-6.4..  Heparin drip stopped.  Blood transfusion started.    Discussed with podiatry, no acute bleeding,, patient may undergo surgery on Monday.  Vascular surgery consulted.      Objective     I/Os:  I/O last 3 completed shifts:  In: 580 [P.O.:580]  Out: 0       Vital Signs:  Patient Vitals for the past 8 hrs:   BP Temp Temp src Pulse Resp SpO2 Weight   24 0926 127/68 -- -- -- 18 97 % --   24 0826 138/76 98.7 °F (37.1 °C) Axillary 65 16 97 % --   24 0816 (!) 146/72 98.8 °F (37.1 °C) Axillary 65 18 97 % --   24 0747 (!) 148/77 98.4 °F (36.9 °C) Oral 65 18 100 % --   24 0548 -- -- -- -- -- -- (!) 136.3 kg (300 lb 7.8 oz)   24 0328 122/64 98 °F (36.7 °C) Axillary 63 16 94 % --       Physical Exam  Vitals reviewed.   Constitutional:       General: He is not in acute distress.     Appearance: He is obese. He is ill-appearing.      Comments: No sings of acute hemorrhage  Minimal bloody discharge in wound vac   HENT:      Head: Normocephalic and atraumatic.      Right Ear: External ear normal.      Left Ear: External ear normal.      Nose: Nose normal.      Mouth/Throat:      Mouth: Mucous membranes are moist.   Eyes:      Extraocular Movements: Extraocular movements intact.      Pupils: Pupils are equal, round, and reactive to light.   Neck:      Comments: PICC in place; No acute signs of infection  Cardiovascular:      Rate and Rhythm: Normal rate and regular rhythm.      Heart sounds: Normal heart sounds. No murmur

## 2024-08-24 NOTE — PLAN OF CARE
Problem: Discharge Planning  Goal: Discharge to home or other facility with appropriate resources  Outcome: Progressing  Flowsheets (Taken 8/24/2024 0434)  Discharge to home or other facility with appropriate resources:   Identify barriers to discharge with patient and caregiver   Arrange for needed discharge resources and transportation as appropriate   Identify discharge learning needs (meds, wound care, etc)   Arrange for interpreters to assist at discharge as needed   Refer to discharge planning if patient needs post-hospital services based on physician order or complex needs related to functional status, cognitive ability or social support system     Problem: Skin/Tissue Integrity  Goal: Absence of new skin breakdown  Description: 1.  Monitor for areas of redness and/or skin breakdown  2.  Assess vascular access sites hourly  3.  Every 4-6 hours minimum:  Change oxygen saturation probe site  4.  Every 4-6 hours:  If on nasal continuous positive airway pressure, respiratory therapy assess nares and determine need for appliance change or resting period.  8/24/2024 0434 by Dex Marlow RN  Outcome: Progressing     Problem: Safety - Adult  Goal: Free from fall injury  8/24/2024 0434 by Dex Marlow RN  Outcome: Progressing  Flowsheets (Taken 8/24/2024 0434)  Free From Fall Injury:   Based on caregiver fall risk screen, instruct family/caregiver to ask for assistance with transferring infant if caregiver noted to have fall risk factors   Instruct family/caregiver on patient safety     Problem: Chronic Conditions and Co-morbidities  Goal: Patient's chronic conditions and co-morbidity symptoms are monitored and maintained or improved  Outcome: Progressing  Flowsheets (Taken 8/24/2024 0434)  Care Plan - Patient's Chronic Conditions and Co-Morbidity Symptoms are Monitored and Maintained or Improved:   Monitor and assess patient's chronic conditions and comorbid symptoms for stability, deterioration, or

## 2024-08-25 ENCOUNTER — APPOINTMENT (OUTPATIENT)
Dept: CT IMAGING | Age: 44
End: 2024-08-25
Payer: COMMERCIAL

## 2024-08-25 LAB
ABO + RH BLD: NORMAL
ALBUMIN SERPL-MCNC: 2.1 G/DL (ref 3.4–5)
ANION GAP SERPL CALCULATED.3IONS-SCNC: 12 MMOL/L (ref 3–16)
BLD GP AB SCN SERPL QL: NORMAL
BLOOD BANK DISPENSE STATUS: NORMAL
BLOOD BANK PRODUCT CODE: NORMAL
BPU ID: NORMAL
BUN SERPL-MCNC: 43 MG/DL (ref 7–20)
CALCIUM SERPL-MCNC: 7 MG/DL (ref 8.3–10.6)
CHLORIDE SERPL-SCNC: 104 MMOL/L (ref 99–110)
CO2 SERPL-SCNC: 21 MMOL/L (ref 21–32)
CREAT SERPL-MCNC: 9.4 MG/DL (ref 0.9–1.3)
CRP SERPL-MCNC: 30.7 MG/L (ref 0–5.1)
DEPRECATED RDW RBC AUTO: 16.7 % (ref 12.4–15.4)
DESCRIPTION BLOOD BANK: NORMAL
ERYTHROCYTE [SEDIMENTATION RATE] IN BLOOD BY WESTERGREN METHOD: 32 MM/HR (ref 0–15)
FERRITIN SERPL IA-MCNC: 1521 NG/ML (ref 30–400)
GFR SERPLBLD CREATININE-BSD FMLA CKD-EPI: 6 ML/MIN/{1.73_M2}
GLUCOSE BLD-MCNC: 105 MG/DL (ref 70–99)
GLUCOSE BLD-MCNC: 131 MG/DL (ref 70–99)
GLUCOSE BLD-MCNC: 145 MG/DL (ref 70–99)
GLUCOSE BLD-MCNC: 156 MG/DL (ref 70–99)
GLUCOSE SERPL-MCNC: 78 MG/DL (ref 70–99)
HCT VFR BLD AUTO: 20.6 % (ref 40.5–52.5)
HCT VFR BLD AUTO: 26.9 % (ref 40.5–52.5)
HGB BLD-MCNC: 6.8 G/DL (ref 13.5–17.5)
HGB BLD-MCNC: 8.4 G/DL (ref 13.5–17.5)
MAGNESIUM SERPL-MCNC: 2 MG/DL (ref 1.8–2.4)
MCH RBC QN AUTO: 30.5 PG (ref 26–34)
MCHC RBC AUTO-ENTMCNC: 32.9 G/DL (ref 31–36)
MCV RBC AUTO: 92.6 FL (ref 80–100)
PERFORMED ON: ABNORMAL
PHOSPHATE SERPL-MCNC: 6.1 MG/DL (ref 2.5–4.9)
PLATELET # BLD AUTO: 228 K/UL (ref 135–450)
PMV BLD AUTO: 7.7 FL (ref 5–10.5)
POTASSIUM SERPL-SCNC: 4.6 MMOL/L (ref 3.5–5.1)
RBC # BLD AUTO: 2.22 M/UL (ref 4.2–5.9)
SODIUM SERPL-SCNC: 137 MMOL/L (ref 136–145)
WBC # BLD AUTO: 11.9 K/UL (ref 4–11)

## 2024-08-25 PROCEDURE — 6370000000 HC RX 637 (ALT 250 FOR IP)

## 2024-08-25 PROCEDURE — 86900 BLOOD TYPING SEROLOGIC ABO: CPT

## 2024-08-25 PROCEDURE — 1200000000 HC SEMI PRIVATE

## 2024-08-25 PROCEDURE — 6370000000 HC RX 637 (ALT 250 FOR IP): Performed by: STUDENT IN AN ORGANIZED HEALTH CARE EDUCATION/TRAINING PROGRAM

## 2024-08-25 PROCEDURE — 85014 HEMATOCRIT: CPT

## 2024-08-25 PROCEDURE — 2580000003 HC RX 258

## 2024-08-25 PROCEDURE — 2580000003 HC RX 258: Performed by: INTERNAL MEDICINE

## 2024-08-25 PROCEDURE — P9016 RBC LEUKOCYTES REDUCED: HCPCS

## 2024-08-25 PROCEDURE — 82728 ASSAY OF FERRITIN: CPT

## 2024-08-25 PROCEDURE — 83735 ASSAY OF MAGNESIUM: CPT

## 2024-08-25 PROCEDURE — 80069 RENAL FUNCTION PANEL: CPT

## 2024-08-25 PROCEDURE — 36430 TRANSFUSION BLD/BLD COMPNT: CPT

## 2024-08-25 PROCEDURE — 86140 C-REACTIVE PROTEIN: CPT

## 2024-08-25 PROCEDURE — 36415 COLL VENOUS BLD VENIPUNCTURE: CPT

## 2024-08-25 PROCEDURE — 86901 BLOOD TYPING SEROLOGIC RH(D): CPT

## 2024-08-25 PROCEDURE — 85652 RBC SED RATE AUTOMATED: CPT

## 2024-08-25 PROCEDURE — 85027 COMPLETE CBC AUTOMATED: CPT

## 2024-08-25 PROCEDURE — 86850 RBC ANTIBODY SCREEN: CPT

## 2024-08-25 PROCEDURE — 86923 COMPATIBILITY TEST ELECTRIC: CPT

## 2024-08-25 PROCEDURE — 74176 CT ABD & PELVIS W/O CONTRAST: CPT

## 2024-08-25 PROCEDURE — 6360000002 HC RX W HCPCS

## 2024-08-25 PROCEDURE — 85018 HEMOGLOBIN: CPT

## 2024-08-25 RX ORDER — SODIUM CHLORIDE 9 MG/ML
INJECTION, SOLUTION INTRAVENOUS PRN
Status: DISCONTINUED | OUTPATIENT
Start: 2024-08-25 | End: 2024-08-27

## 2024-08-25 RX ADMIN — SODIUM CHLORIDE, PRESERVATIVE FREE 10 ML: 5 INJECTION INTRAVENOUS at 21:22

## 2024-08-25 RX ADMIN — FIDAXOMICIN 200 MG: 200 TABLET, FILM COATED ORAL at 09:02

## 2024-08-25 RX ADMIN — PREGABALIN 75 MG: 75 CAPSULE ORAL at 09:02

## 2024-08-25 RX ADMIN — SEVELAMER CARBONATE 1600 MG: 800 TABLET, FILM COATED ORAL at 09:02

## 2024-08-25 RX ADMIN — MIDODRINE HYDROCHLORIDE 5 MG: 5 TABLET ORAL at 12:30

## 2024-08-25 RX ADMIN — SODIUM CHLORIDE, PRESERVATIVE FREE 10 ML: 5 INJECTION INTRAVENOUS at 12:26

## 2024-08-25 RX ADMIN — FLUOXETINE HYDROCHLORIDE 10 MG: 10 CAPSULE ORAL at 09:02

## 2024-08-25 RX ADMIN — CARVEDILOL 6.25 MG: 6.25 TABLET, FILM COATED ORAL at 09:02

## 2024-08-25 RX ADMIN — CARVEDILOL 6.25 MG: 6.25 TABLET, FILM COATED ORAL at 21:14

## 2024-08-25 RX ADMIN — ATORVASTATIN CALCIUM 80 MG: 80 TABLET, FILM COATED ORAL at 21:14

## 2024-08-25 RX ADMIN — FIDAXOMICIN 200 MG: 200 TABLET, FILM COATED ORAL at 21:14

## 2024-08-25 RX ADMIN — MEROPENEM 500 MG: 500 INJECTION, POWDER, FOR SOLUTION INTRAVENOUS at 09:15

## 2024-08-25 RX ADMIN — SODIUM CHLORIDE, PRESERVATIVE FREE 10 ML: 5 INJECTION INTRAVENOUS at 12:27

## 2024-08-25 RX ADMIN — TRAMADOL HYDROCHLORIDE 100 MG: 50 TABLET ORAL at 09:21

## 2024-08-25 RX ADMIN — SODIUM CHLORIDE, PRESERVATIVE FREE 5 ML: 5 INJECTION INTRAVENOUS at 21:22

## 2024-08-25 RX ADMIN — SEVELAMER CARBONATE 1600 MG: 800 TABLET, FILM COATED ORAL at 12:30

## 2024-08-25 RX ADMIN — SODIUM CHLORIDE, PRESERVATIVE FREE 10 ML: 5 INJECTION INTRAVENOUS at 21:21

## 2024-08-25 RX ADMIN — SEVELAMER CARBONATE 1600 MG: 800 TABLET, FILM COATED ORAL at 17:27

## 2024-08-25 ASSESSMENT — PAIN SCALES - GENERAL
PAINLEVEL_OUTOF10: 0
PAINLEVEL_OUTOF10: 8
PAINLEVEL_OUTOF10: 0

## 2024-08-25 ASSESSMENT — PAIN - FUNCTIONAL ASSESSMENT: PAIN_FUNCTIONAL_ASSESSMENT: ACTIVITIES ARE NOT PREVENTED

## 2024-08-25 ASSESSMENT — PAIN DESCRIPTION - FREQUENCY: FREQUENCY: INTERMITTENT

## 2024-08-25 ASSESSMENT — PAIN DESCRIPTION - ONSET: ONSET: GRADUAL

## 2024-08-25 ASSESSMENT — PAIN DESCRIPTION - DESCRIPTORS: DESCRIPTORS: DISCOMFORT

## 2024-08-25 ASSESSMENT — PAIN DESCRIPTION - PAIN TYPE: TYPE: ACUTE PAIN

## 2024-08-25 ASSESSMENT — PAIN DESCRIPTION - LOCATION: LOCATION: BACK

## 2024-08-25 ASSESSMENT — PAIN DESCRIPTION - ORIENTATION: ORIENTATION: POSTERIOR

## 2024-08-25 NOTE — PLAN OF CARE
Problem: Discharge Planning  Goal: Discharge to home or other facility with appropriate resources  Outcome: Progressing  Flowsheets (Taken 8/25/2024 0209)  Discharge to home or other facility with appropriate resources:   Identify barriers to discharge with patient and caregiver   Arrange for needed discharge resources and transportation as appropriate   Identify discharge learning needs (meds, wound care, etc)   Arrange for interpreters to assist at discharge as needed   Refer to discharge planning if patient needs post-hospital services based on physician order or complex needs related to functional status, cognitive ability or social support system     Problem: Skin/Tissue Integrity  Goal: Absence of new skin breakdown  Description: 1.  Monitor for areas of redness and/or skin breakdown  2.  Assess vascular access sites hourly  3.  Every 4-6 hours minimum:  Change oxygen saturation probe site  4.  Every 4-6 hours:  If on nasal continuous positive airway pressure, respiratory therapy assess nares and determine need for appliance change or resting period.  8/25/2024 0209 by Dex Marlow RN  Outcome: Progressing     Problem: Safety - Adult  Goal: Free from fall injury  8/25/2024 0209 by Dex Marlow RN  Outcome: Progressing  Flowsheets (Taken 8/25/2024 0209)  Free From Fall Injury:   Instruct family/caregiver on patient safety   Based on caregiver fall risk screen, instruct family/caregiver to ask for assistance with transferring infant if caregiver noted to have fall risk factors     Problem: Chronic Conditions and Co-morbidities  Goal: Patient's chronic conditions and co-morbidity symptoms are monitored and maintained or improved  8/25/2024 0209 by Dex Marlow, RN  Outcome: Progressing  Flowsheets (Taken 8/25/2024 0209)  Care Plan - Patient's Chronic Conditions and Co-Morbidity Symptoms are Monitored and Maintained or Improved:   Monitor and assess patient's chronic conditions and comorbid  symptoms for stability, deterioration, or improvement   Collaborate with multidisciplinary team to address chronic and comorbid conditions and prevent exacerbation or deterioration   Update acute care plan with appropriate goals if chronic or comorbid symptoms are exacerbated and prevent overall improvement and discharge     Problem: ABCDS Injury Assessment  Goal: Absence of physical injury  Outcome: Progressing  Flowsheets (Taken 8/25/2024 0209)  Absence of Physical Injury: Implement safety measures based on patient assessment     Problem: Pain  Goal: Verbalizes/displays adequate comfort level or baseline comfort level  8/25/2024 0209 by Dex Marlow RN  Outcome: Progressing  Flowsheets (Taken 8/25/2024 0209)  Verbalizes/displays adequate comfort level or baseline comfort level:   Encourage patient to monitor pain and request assistance   Assess pain using appropriate pain scale   Administer analgesics based on type and severity of pain and evaluate response   Implement non-pharmacological measures as appropriate and evaluate response   Consider cultural and social influences on pain and pain management   Notify Licensed Independent Practitioner if interventions unsuccessful or patient reports new pain

## 2024-08-25 NOTE — CONSENT
Informed Consent for Blood Component Transfusion Note    I have discussed with the mother the rationale for blood component transfusion; its benefits in treating or preventing fatigue, organ damage, or death; and its risk which includes mild transfusion reactions, rare risk of blood borne infection, or more serious but rare reactions. I have discussed the alternatives to transfusion, including the risk and consequences of not receiving transfusion. The mother had an opportunity to ask questions and had agreed to proceed with transfusion of blood components.    Electronically signed by Franco Coyle MD on 8/25/24 at 7:32 AM EDT

## 2024-08-25 NOTE — PLAN OF CARE
Problem: Safety - Adult  Goal: Free from fall injury  8/25/2024 1233 by Gabi Reina RN  Outcome: Progressing  Flowsheets (Taken 8/25/2024 1222)  Free From Fall Injury:   Instruct family/caregiver on patient safety   Based on caregiver fall risk screen, instruct family/caregiver to ask for assistance with transferring infant if caregiver noted to have fall risk factors    Problem: Pain  Goal: Verbalizes/displays adequate comfort level or baseline comfort level  8/25/2024 1233 by Gabi Reina, RN  Outcome: Progressing  Flowsheets (Taken 8/25/2024 0209)  Verbalizes/displays adequate comfort level or baseline comfort level:   Encourage patient to monitor pain and request assistance   Assess pain using appropriate pain scale   Administer analgesics based on type and severity of pain and evaluate response   Implement non-pharmacological measures as appropriate and evaluate response   Consider cultural and social influences on pain and pain management   Notify Licensed Independent Practitioner if interventions unsuccessful or patient reports new pain    Problem: Chronic Conditions and Co-morbidities  Goal: Patient's chronic conditions and co-morbidity symptoms are monitored and maintained or improved    Outcome: Progressing  Flowsheets (Taken 8/25/2024 0209)  Care Plan - Patient's Chronic Conditions and Co-Morbidity Symptoms are Monitored and Maintained or Improved:   Monitor and assess patient's chronic conditions and comorbid symptoms for stability, deterioration, or improvement   Collaborate with multidisciplinary team to address chronic and comorbid conditions and prevent exacerbation or deterioration   Update acute care plan with appropriate goals if chronic or comorbid symptoms are exacerbated and prevent overall improvement and discharge

## 2024-08-25 NOTE — PROGRESS NOTES
Podiatric Surgery Daily Progress Note  Serafin Weaver      Subjective :   Patient seen and examined this am at the bedside. Patient denies any overnight events. S/P I&D right foot with resection of 5th metatarsal with tendon transfer (DOS 8/22/24). Patient is having pain in his shoulders and back. Patient denies any pedal complaints. Patient denies any N/V/F/SOB/CP.    Review of Systems: A 12 point review of symptoms is unremarkable with the exception of the chief complaint. Patient specifically denies nausea, fever, vomiting, chills, shortness of breath, chest pain, abdominal pain, constipation or difficulty urinating.       Objective   BP (!) 144/76   Pulse 65   Temp 97.3 °F (36.3 °C) (Axillary)   Resp 18   Ht 2.032 m (6' 8\")   Wt 135.6 kg (298 lb 15.1 oz)   SpO2 97%   BMI 32.84 kg/m²      I/O:  Intake/Output Summary (Last 24 hours) at 8/25/2024 1020  Last data filed at 8/25/2024 0710  Gross per 24 hour   Intake 753 ml   Output 0 ml   Net 753 ml              Wt Readings from Last 3 Encounters:   08/25/24 135.6 kg (298 lb 15.1 oz)   08/08/24 (!) 139.3 kg (307 lb 3.2 oz)   07/30/24 (!) 153.5 kg (338 lb 6.5 oz)       LABS:    Recent Labs     08/24/24  0600 08/24/24  1330 08/25/24  0613   WBC 14.0*  --  11.9*   HGB 6.4* 7.4* 6.8*   HCT 20.5* 23.7* 20.6*     --  228        Recent Labs     08/25/24  0613      K 4.6      CO2 21   PHOS 6.1*   BUN 43*   CREATININE 9.4*        No results for input(s): \"INR\", \"APTT\" in the last 72 hours.    Invalid input(s): \"PROT\"    LOWER EXTREMITY EXAMINATION     Dressing to right lower was changed with gauze, kerlix, ace. No strikethrough noted to external dressing.     Sensation intact proximal to dressing. No pain with calf compression bilateral. Patient able to perform active range of motion at ankle joints bilateral.     Wound vac noted to right, left clean, dry, and intact. Excellent seal noted with suction set to 125 mmHg. Wound vac seal intact with  percent stenosis may be underestimated,  Unable to obtain blood pressure on right arm due to fistula.   Left Lower Arterial     Proximal Common Femoral Artery: Multiphasic (normal) Doppler waveforms.   Distal Common Femoral Artery: Multiphasic (normal) Doppler waveforms.   Profunda Artery: Multiphasic (normal) Doppler waveforms. .   Proximal Superficial Femoral Artery: Multiphasic (normal) Doppler waveforms.   Middle Superficial Femoral Artery: Multiphasic (normal) Doppler waveforms.   Distal Superficial Femoral Artery: Multiphasic (normal) Doppler waveforms.   Proximal Popliteal Artery: Multiphasic (normal) Doppler waveforms.   Distal Popliteal Artery: Multiphasic (normal) Doppler waveforms.   Anterior Tibial Artery: Multiphasic (normal) Doppler waveforms.   Tibial/Peroneal Trunk: Multiphasic (normal) Doppler waveforms.   Posterior Tibial Artery: Multiphasic (normal) Doppler waveforms.   Peroneal Artery: Multiphasic (normal) Doppler waveforms.     The ankle/brachial index is 0.91 (DP 98, PT 100mmHg).  Normal multiphasic flow in the common femoral artery indicates no significant aorto-iliac inflow disease.   Multiphasic flow throughout the left lower extremity without any evidence of significant focal lesions or stenosis.     XR right foot (7/18):  Findings:  Patient is status post fifth transmetatarsal amputation, in addition to chronic  amputations of the first and second digit. There is no evidence of complication.  No radiopaque foreign body is seen.  IMPRESSION:  Status post fifth transmetatarsal amputation.    ASSESSMENT/PLAN  -S/P I&D right foot with resection of 5th metatarsal with tendon transfer (DOS 8/22/24)  - I&D right foot with removal of bone (8/17/24)  -Full thickness ulceration, right lower extremity from surgical site dehiscence ([place] 3)  -TMA with UNRULY (DOS 7/24/24)   -PAD with recent intervention (RLE arteriogram with L CFA insertion site. Balloon angioplasty x2 7/22/24)  -T2DM with periphral

## 2024-08-25 NOTE — PROGRESS NOTES
Internal Medicine Progress Note    Patient Name: Serafin Weaver   Patient : 1980   Date: 2024   Admit Date: 8/15/2024     CC: One day fatigue s/p x3 missed HD sessions     Interval History   Patient examined and evaluated at bedside today, patient doing well.  Patient got 1 unit of RBC yesterday for hemoglobin 6.4.  Discussed with patient's mother, who makes his decisions, obtained consent for blood transfusion.  Posttransfusion Hb 7.4.   AM labs resulted Hb 6.8.  Will order 1 unit PRBC for transfusion.  Vascular surgery consulted yesterday, recommended duplex on Monday, likely followed by arteriogram.  Vitals stable.  Will follow posttransfusion H&H.  Objective     I/Os:  I/O last 3 completed shifts:  In: 893 [P.O.:593; Blood:300]  Out: 0       Vital Signs:  Patient Vitals for the past 8 hrs:   BP Temp Temp src Pulse Resp SpO2 Weight   24 0609 -- -- -- -- -- -- 135.6 kg (298 lb 15.1 oz)   24 0306 121/73 97.6 °F (36.4 °C) Axillary 60 18 98 % --       Physical Exam  Vitals reviewed.   Constitutional:       General: He is not in acute distress.     Appearance: He is obese. He is ill-appearing.      Comments: No sings of acute hemorrhage  Minimal bloody discharge in wound vac   HENT:      Head: Normocephalic and atraumatic.      Right Ear: External ear normal.      Left Ear: External ear normal.      Nose: Nose normal.      Mouth/Throat:      Mouth: Mucous membranes are moist.   Eyes:      Extraocular Movements: Extraocular movements intact.      Pupils: Pupils are equal, round, and reactive to light.   Neck:      Comments: PICC in place; No acute signs of infection  Cardiovascular:      Rate and Rhythm: Normal rate and regular rhythm.      Heart sounds: Normal heart sounds. No murmur heard.  Pulmonary:      Effort: Pulmonary effort is normal. No respiratory distress.      Breath sounds: Normal breath sounds. No wheezing or rales.   Abdominal:      General: Abdomen is flat. There  150, stable at 149 on repeat  -Elevated Trop with PMH ESRD, no plans to continue trending  -EKG without ischemic changes    Palliative care consulted  -[facility] Care Power of     Chronic conditions  Type II DM   -Hg A1c 5.8 07/2024  -Blood glucose on admission 247  -SSI with glucose checks  -Hypoglycemic protocol in place    Hypertension  -On home Carvedilol 6.25 mg    HLD  -Home atorvastatin 80 mg p.o. bedtime held for transaminitis    DVT Ppx: Held (due to post -op drop in Hb)  Diet:  ADULT DIET; Regular; 3 carb choices (45 gm/meal); Low Fat/Low Chol/High Fiber/2 gm Na; Low Phosphorus (Less than 1000 mg); 60 to 80 gm  ADULT ORAL NUTRITION SUPPLEMENT; Lunch, Dinner; Renal Oral Supplement   Code status:  Full Code     ELOS: 3  Barriers to discharge:  - Preadmission: SNF  - Current: Inpatient  - Upon discharge: TBD      Will discuss with attending physician Krishna Bazan MD.     Franco Colye MD, PGY-1  Internal Medicine Resident  Contact via [employer]

## 2024-08-25 NOTE — PROGRESS NOTES
Vascular Surgery   Daily Progress Note  Patient: Serafin Weaver      CC:  Non healing RLE wound     SUBJECTIVE:   No acute events overnight    OBJECTIVE:    PHYSICAL EXAM:  General appearance: alert, no acute distress  Eyes: No scleral icterus, EOM grossly intact  Neck: Trachea midline, no JVD  Chest/Lungs: Normal effort with no accessory muscle use on RA  Cardiovascular: RRR, perfused  Skin: warm and dry, no rashes  Extremities: no edema, no cyanosis. RLE wound vac in place and suctioning. LLE well healed stump. Bilateral radial, femoral pulses palpated. Bilateral popliteal, DP PT dopplerable. Non tender.  Neuro: A&Ox3, sensation intact       ASSESSMENT & PLAN:   Serafin Weaver is a 44 y.o. 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      - Arterial duplex of bilateral lower extremities tomorrow for additional surveillance after angiogram  - Possible arteriogram pending Duplex results  - Future OR take backs per podiatry      Emma De Los Santos DO, [place]  PGY-3, General Surgery  08/25/24  6:00 AM  [place]

## 2024-08-25 NOTE — PROGRESS NOTES
Ph: [phone], Fax: [phone]           [employer]               Reason for admission:                 Admitted for lethargy    Brief Summary :     Serafin Weaver is being seen by nephrology for ESRD on hemodialysis.      Interval History and plan:   Patient seen at bedside   Comfortable  /73  On room air and denied SOB  Lytes stable today.   Hb low         Hemodialysis per MWF schedule. No urgency today.   Decrease midodrine dose as BP is elevated and added holding parameter  Renal diet.  Replace Vitamin D   Daily renal panels.  Continue with Retacrit with dialysis. Very high ferritin ( should not get iron with dialysis )  Need blood transfusion as Hb is very low.   Please adjust antibiotics to GFR less than 10 mL/min.                     Assessment :     1.  ESRD:  Will plan HD per schedule.  He gets dialysis Monday, Wednesday and Friday.  He gets dialysis currently at [facility] under our care.  Access: AV graft  Daily weights  Fluid restriction: 1 L unless hypotensive  Nephrocap 1 tab PO daily    2.  Anemia:  Erythropoetin dose: He will receive Retacrit with dialysis to keep hemoglobin close to 10.  Hemoglobin admission was 6.9.  Ferritin is greater than 1000.    3.  Osteodystrophy:  Phosphate Binder: Will continue with Renvela 2 tabs p.o. 3 times daily with meals.  I will check PTH and vitamin D.  Daily renal panels.    4.  Hyperkalemia: Resolved.  He is on chronic Lokelma 10 g q. Tuesday, Thursday and Saturday on nondialysis days.    5.  Hypertension: well-controlled.  Continue with carvedilol.    6.  Wound infection: Podiatry consulted.  ID consulted.  He is on cefepime and vancomycin.  He is also getting oral vancomycin for recent C. difficile infection.             [facility] would like to thank Krishna Obando MD   for opportunity to serve this patient      Please call with questions at-   24 Hrs Answering service [phone] or  7 am- 5 pm via  WC  sodium chloride flush 0.9 % injection 5-40 mL, 5-40 mL, IntraVENous, 2 times per day  sodium chloride flush 0.9 % injection 5-40 mL, 5-40 mL, IntraVENous, PRN  0.9 % sodium chloride infusion, , IntraVENous, PRN  ondansetron (ZOFRAN-ODT) disintegrating tablet 4 mg, 4 mg, Oral, Q8H PRN **OR** ondansetron (ZOFRAN) injection 4 mg, 4 mg, IntraVENous, Q6H PRN  polyethylene glycol (GLYCOLAX) packet 17 g, 17 g, Oral, Daily PRN    Vitals :     Vitals:    08/25/24 0306   BP: 121/73   Pulse: 60   Resp: 18   Temp: 97.6 °F (36.4 °C)   SpO2: 98%          Physical Examination :     appearance: Alert, disoriented  Respiratory: no distress on room air  Cardiovascular:  Trace edema.   Abdomen: -  soft  Other relevant findings:      Labs :     CBC:   Recent Labs     08/23/24  0829 08/23/24  1016 08/24/24  0600 08/24/24  1330 08/25/24  0613   WBC 16.4*  --  14.0*  --  11.9*   HGB 7.1*   < > 6.4* 7.4* 6.8*   HCT 23.0*   < > 20.5* 23.7* 20.6*     --  237  --  228    < > = values in this interval not displayed.     BMP:    Recent Labs     08/23/24  0829 08/23/24  1340 08/24/24  0053 08/24/24  0600 08/25/24  0613   *  --   --  137 137   K 6.2*   < > 4.4 4.6 4.6   CL 97*  --   --  103 104   CO2 24  --   --  25 21   BUN 62*  --   --  39* 43*   CREATININE 11.8*  --   --  8.9* 9.4*   GLUCOSE 184*  --   --  104* 78   MG 2.50*  --   --  2.00 2.00   PHOS 6.2*  --   --  5.7* 6.1*    < > = values in this interval not displayed.     Lab Results   Component Value Date/Time    COLORU Yellow 04/24/2023 04:48 PM    NITRU Negative 04/24/2023 04:48 PM    GLUCOSEU 250 04/24/2023 04:48 PM    KETUA 15 04/24/2023 04:48 PM    UROBILINOGEN 0.2 04/24/2023 04:48 PM    BILIRUBINUR Negative 04/24/2023 04:48 PM        ----------------------------------------------------------  Please call with questions at      24 Hrs Answering service [phone]  Perfect [employer], or cell phone 7 am - 5pm  Jordin Kaur MD   [email or web address]

## 2024-08-26 ENCOUNTER — APPOINTMENT (OUTPATIENT)
Dept: VASCULAR LAB | Age: 44
End: 2024-08-26
Payer: COMMERCIAL

## 2024-08-26 LAB
ALBUMIN SERPL-MCNC: 2.8 G/DL (ref 3.4–5)
ANION GAP SERPL CALCULATED.3IONS-SCNC: 13 MMOL/L (ref 3–16)
ANTI-XA UNFRAC HEPARIN: 0.27 IU/ML (ref 0.3–0.7)
BUN SERPL-MCNC: 61 MG/DL (ref 7–20)
CALCIUM SERPL-MCNC: 8.4 MG/DL (ref 8.3–10.6)
CHLORIDE SERPL-SCNC: 96 MMOL/L (ref 99–110)
CO2 SERPL-SCNC: 25 MMOL/L (ref 21–32)
CREAT SERPL-MCNC: 11.8 MG/DL (ref 0.9–1.3)
DEPRECATED RDW RBC AUTO: 16.7 % (ref 12.4–15.4)
GFR SERPLBLD CREATININE-BSD FMLA CKD-EPI: 5 ML/MIN/{1.73_M2}
GLUCOSE BLD-MCNC: 169 MG/DL (ref 70–99)
GLUCOSE BLD-MCNC: 208 MG/DL (ref 70–99)
GLUCOSE SERPL-MCNC: 134 MG/DL (ref 70–99)
HCT VFR BLD AUTO: 25.1 % (ref 40.5–52.5)
HCT VFR BLD AUTO: 26.7 % (ref 40.5–52.5)
HGB BLD-MCNC: 8.3 G/DL (ref 13.5–17.5)
HGB BLD-MCNC: 8.5 G/DL (ref 13.5–17.5)
MAGNESIUM SERPL-MCNC: 2.6 MG/DL (ref 1.8–2.4)
MCH RBC QN AUTO: 30.4 PG (ref 26–34)
MCHC RBC AUTO-ENTMCNC: 33.2 G/DL (ref 31–36)
MCV RBC AUTO: 91.6 FL (ref 80–100)
PERFORMED ON: ABNORMAL
PERFORMED ON: ABNORMAL
PHOSPHATE SERPL-MCNC: 8.1 MG/DL (ref 2.5–4.9)
PLATELET # BLD AUTO: 261 K/UL (ref 135–450)
PMV BLD AUTO: 7.4 FL (ref 5–10.5)
POTASSIUM SERPL-SCNC: 4.4 MMOL/L (ref 3.5–5.1)
POTASSIUM SERPL-SCNC: 6.2 MMOL/L (ref 3.5–5.1)
RBC # BLD AUTO: 2.74 M/UL (ref 4.2–5.9)
SODIUM SERPL-SCNC: 134 MMOL/L (ref 136–145)
WBC # BLD AUTO: 12.7 K/UL (ref 4–11)

## 2024-08-26 PROCEDURE — 85027 COMPLETE CBC AUTOMATED: CPT

## 2024-08-26 PROCEDURE — 6370000000 HC RX 637 (ALT 250 FOR IP)

## 2024-08-26 PROCEDURE — 6370000000 HC RX 637 (ALT 250 FOR IP): Performed by: INTERNAL MEDICINE

## 2024-08-26 PROCEDURE — 84132 ASSAY OF SERUM POTASSIUM: CPT

## 2024-08-26 PROCEDURE — 2580000003 HC RX 258: Performed by: INTERNAL MEDICINE

## 2024-08-26 PROCEDURE — 80069 RENAL FUNCTION PANEL: CPT

## 2024-08-26 PROCEDURE — 1200000000 HC SEMI PRIVATE

## 2024-08-26 PROCEDURE — 6360000002 HC RX W HCPCS

## 2024-08-26 PROCEDURE — 83735 ASSAY OF MAGNESIUM: CPT

## 2024-08-26 PROCEDURE — 99232 SBSQ HOSP IP/OBS MODERATE 35: CPT | Performed by: INTERNAL MEDICINE

## 2024-08-26 PROCEDURE — 85520 HEPARIN ASSAY: CPT

## 2024-08-26 PROCEDURE — 36592 COLLECT BLOOD FROM PICC: CPT

## 2024-08-26 PROCEDURE — 85018 HEMOGLOBIN: CPT

## 2024-08-26 PROCEDURE — 2580000003 HC RX 258

## 2024-08-26 PROCEDURE — 6370000000 HC RX 637 (ALT 250 FOR IP): Performed by: STUDENT IN AN ORGANIZED HEALTH CARE EDUCATION/TRAINING PROGRAM

## 2024-08-26 PROCEDURE — 93925 LOWER EXTREMITY STUDY: CPT

## 2024-08-26 PROCEDURE — 85014 HEMATOCRIT: CPT

## 2024-08-26 PROCEDURE — 90935 HEMODIALYSIS ONE EVALUATION: CPT

## 2024-08-26 RX ORDER — DEXTROSE MONOHYDRATE 25 G/50ML
25 INJECTION, SOLUTION INTRAVENOUS ONCE
Status: DISCONTINUED | OUTPATIENT
Start: 2024-08-26 | End: 2024-08-26

## 2024-08-26 RX ORDER — CALCIUM GLUCONATE 10 MG/ML
1000 INJECTION, SOLUTION INTRAVENOUS ONCE
Status: DISCONTINUED | OUTPATIENT
Start: 2024-08-26 | End: 2024-08-26

## 2024-08-26 RX ORDER — NEPHROCAP 1 MG
1 CAP ORAL DAILY
Status: DISCONTINUED | OUTPATIENT
Start: 2024-08-26 | End: 2024-08-29 | Stop reason: HOSPADM

## 2024-08-26 RX ADMIN — HEPARIN SODIUM 19 UNITS/KG/HR: 10000 INJECTION, SOLUTION INTRAVENOUS at 23:07

## 2024-08-26 RX ADMIN — Medication 1 MG: at 12:49

## 2024-08-26 RX ADMIN — SEVELAMER CARBONATE 1600 MG: 800 TABLET, FILM COATED ORAL at 12:49

## 2024-08-26 RX ADMIN — SODIUM CHLORIDE, PRESERVATIVE FREE 10 ML: 5 INJECTION INTRAVENOUS at 12:50

## 2024-08-26 RX ADMIN — HEPARIN SODIUM 5000 UNITS: 1000 INJECTION INTRAVENOUS; SUBCUTANEOUS at 23:04

## 2024-08-26 RX ADMIN — SODIUM CHLORIDE, PRESERVATIVE FREE 10 ML: 5 INJECTION INTRAVENOUS at 12:49

## 2024-08-26 RX ADMIN — FIDAXOMICIN 200 MG: 200 TABLET, FILM COATED ORAL at 19:49

## 2024-08-26 RX ADMIN — MEROPENEM 500 MG: 500 INJECTION, POWDER, FOR SOLUTION INTRAVENOUS at 13:01

## 2024-08-26 RX ADMIN — SODIUM CHLORIDE, PRESERVATIVE FREE 10 ML: 5 INJECTION INTRAVENOUS at 19:49

## 2024-08-26 RX ADMIN — EPOETIN ALFA-EPBX 10000 UNITS: 10000 INJECTION, SOLUTION INTRAVENOUS; SUBCUTANEOUS at 08:36

## 2024-08-26 RX ADMIN — FLUOXETINE HYDROCHLORIDE 10 MG: 10 CAPSULE ORAL at 12:48

## 2024-08-26 RX ADMIN — MIDODRINE HYDROCHLORIDE 5 MG: 5 TABLET ORAL at 17:36

## 2024-08-26 RX ADMIN — ATORVASTATIN CALCIUM 80 MG: 80 TABLET, FILM COATED ORAL at 19:48

## 2024-08-26 RX ADMIN — FIDAXOMICIN 200 MG: 200 TABLET, FILM COATED ORAL at 12:49

## 2024-08-26 RX ADMIN — PREGABALIN 75 MG: 75 CAPSULE ORAL at 12:48

## 2024-08-26 RX ADMIN — SEVELAMER CARBONATE 1600 MG: 800 TABLET, FILM COATED ORAL at 17:36

## 2024-08-26 RX ADMIN — MIDODRINE HYDROCHLORIDE 5 MG: 5 TABLET ORAL at 12:49

## 2024-08-26 RX ADMIN — CARVEDILOL 6.25 MG: 6.25 TABLET, FILM COATED ORAL at 12:48

## 2024-08-26 RX ADMIN — CARVEDILOL 6.25 MG: 6.25 TABLET, FILM COATED ORAL at 19:48

## 2024-08-26 NOTE — PROGRESS NOTES
Podiatric Surgery Daily Progress Note  Serafin Weaver      Subjective :   Patient seen and examined this am at the bedside. Patient denies any overnight events. S/P I&D right foot with resection of 5th metatarsal with tendon transfer (DOS 8/22/24). Patient is having pain in his shoulders and back. Patient denies any pedal complaints. Patient denies any N/V/F/SOB/CP.    Review of Systems: A 12 point review of symptoms is unremarkable with the exception of the chief complaint. Patient specifically denies nausea, fever, vomiting, chills, shortness of breath, chest pain, abdominal pain, constipation or difficulty urinating.       Objective   /76   Pulse 61   Temp 97.6 °F (36.4 °C) (Axillary)   Resp 14   Ht 2.032 m (6' 8\")   Wt 135.8 kg (299 lb 6.2 oz)   SpO2 95%   BMI 32.89 kg/m²      I/O:  Intake/Output Summary (Last 24 hours) at 8/26/2024 0540  Last data filed at 8/25/2024 2200  Gross per 24 hour   Intake 873 ml   Output 0 ml   Net 873 ml              Wt Readings from Last 3 Encounters:   08/26/24 135.8 kg (299 lb 6.2 oz)   08/08/24 (!) 139.3 kg (307 lb 3.2 oz)   07/30/24 (!) 153.5 kg (338 lb 6.5 oz)       LABS:    Recent Labs     08/24/24  0600 08/24/24  1330 08/25/24  0613 08/25/24  1657   WBC 14.0*  --  11.9*  --    HGB 6.4*   < > 6.8* 8.4*   HCT 20.5*   < > 20.6* 26.9*     --  228  --     < > = values in this interval not displayed.        Recent Labs     08/25/24  0613      K 4.6      CO2 21   PHOS 6.1*   BUN 43*   CREATININE 9.4*        No results for input(s): \"INR\", \"APTT\" in the last 72 hours.    Invalid input(s): \"PROT\"    LOWER EXTREMITY EXAMINATION     Wound VAC application:   At this time a piece of non-adherent dressing was placed over the incision site. Next, a [employer] Wound Vac was placed over the patient's incision site using the manufacturers instructions. Tegaderm was used to cover and protect the surrounding soft tissues from maceration. The white and black foam was  angioplasty x2 7/22/24)  -T2DM with periphral neuropathy   -Hx TMA left LLE  -Arterial duplex pending    -Patient seen and examined at bedside   -Hypertensive otherwise VSS, Leukocytosis noted (WBC 12.7)  -ESR 32 and CRP 30.7 (8/25)  -lactic acid 1.1 (8/15)  -HbA1c 5.8 (7/17/24)  -Prealbumin: 6.1.  -Images reviewed, impression noted above  -TCPO2 with good wound healing potential 2.74  -Wound culture: Klebsiella Oxytoca   -Surgical pathology (8/17/24): fifth metetarsal postive for OM  -Surgical culture (8/22/24): Achrombacter species  -Continue IV antibiotics per ID: Fidaxomicin, Meropenem  -Recommend patient continue on IV antibiotics per ID as procedure was not felt to be definitve and patient will likely require PICC   -Prevalon boots. Patient is to wear at all times while in bed to prevent further deep tissue injury.  -Non-weightbearing to right LE  -Arterial duplex pending    -Plan: Wound vac change today. Continue antibiotics per ID. Keep feet elevated with prevalon boots.     DISPO:S/P I&D right foot with removal of fifth metatarsal and tendon transfer (DOS 8/22/24). Recommend patient continue on IV antibiotics per ID; Fidaxomicin and meropenem. Vascular planning arterial duplex. Procedure was not felt to be difinitive and recommend patient continue on antibiotics per ID. Patient will require wound VAC at discharge; all paperwork placed in patients chart. Podiatry will continue to follow patient while in house. Wound VAC change MWF.    Plan was discussed with LUCILA Tovar DPM, MS  Podiatric Resident PGY-1  [employer]  Pager #[phone]  8/26/2024, 5:40 AM

## 2024-08-26 NOTE — PROGRESS NOTES
Vascular Surgery   Daily Progress Note  Patient: Serafin Weaver      CC:  Non healing RLE wound     SUBJECTIVE:   No acute events overnight. Resting comfortably in bed. Pain controlled to RLE wound.     OBJECTIVE:    PHYSICAL EXAM:  General appearance: alert, no acute distress  Eyes: No scleral icterus, EOM grossly intact  Neck: Trachea midline, no JVD  Chest/Lungs: Normal effort with no accessory muscle use on RA  Cardiovascular: RRR, perfused  Skin: warm and dry, no rashes  Extremities: no edema, no cyanosis. RLE wound vac in place and suctioning. LLE well healed stump, dressing c/d/i.   Neuro: A&Ox3, sensation intact     ASSESSMENT & PLAN:   Serafin Weaver is a 44 y.o. 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      - Vascular duplex bilateral LE ordered today. Pending results.   - Will discuss with vascular surgery team on further management after imaging resulted.   - Rest of care per primary team .    Mera Wynne DO  PGY1, General Surgery  08/26/24  8:12 AM  [phone]

## 2024-08-26 NOTE — PROGRESS NOTES
Ph: [phone], Fax: [phone]           [facility]               Reason for admission:                 Admitted for lethargy    Brief Summary :     Serafin Weaver is being seen by nephrology for ESRD on hemodialysis.      Interval History and plan:     BP is well controlled .  Labs reviewed with high K  He is very non complaint and refused several HD session while inpatient. Palliative care was consulted to assess wether he would like to quit dialysis. So far, he is intermittently going to dialysis.         Hemodialysis per [facility] schedule. HD scheduled for today with low K bath   Decrease midodrine dose as BP is elevated and added holding parameter  Renal diet.  Replace Vitamin D   Daily renal panels.  Continue with Retacrit with dialysis. Very high ferritin ( should not get iron with dialysis )  Need blood transfusion as Hb is very low.   Please adjust antibiotics to GFR less than 10 mL/min.                     Assessment :     1.  ESRD:  Will plan HD per schedule.  He gets dialysis Monday, Wednesday and Friday.  He gets dialysis currently at [facility] under our care.  Access: AV graft  Daily weights  Fluid restriction: 1 L unless hypotensive  Nephrocap 1 tab PO daily    2.  Anemia:  Erythropoetin dose: He will receive Retacrit with dialysis to keep hemoglobin close to 10.  Hemoglobin admission was 6.9.  Ferritin is greater than 1000.    3.  Osteodystrophy:  Phosphate Binder: Will continue with Renvela 2 tabs p.o. 3 times daily with meals.  I will check PTH and vitamin D.  Daily renal panels.    4.  Hyperkalemia: Resolved.  He is on chronic Lokelma 10 g q. Tuesday, Thursday and Saturday on nondialysis days.    5.  Hypertension: well-controlled.  Continue with carvedilol.    6.  Wound infection: Podiatry consulted.  ID consulted.  He is on cefepime and vancomycin.  He is also getting oral vancomycin for recent C. difficile infection.             [facility] would         ----------------------------------------------------------  Please call with questions at      24 Hrs Answering service [phone]  Perfect serve, or cell phone 7 am - 5pm  Jordin Kaur MD   [email or web address]

## 2024-08-26 NOTE — PLAN OF CARE
Podiatric Surgery Plan of Care Note  Serafin Weaver    Podiatry will return to change patient's wound vac once he returns from dialysis.    Bnejamin Weaver DPM, MS  Podiatric Resident PGY-1  [facility]  Pager #[phone]  8/26/2024, 9:11 AM

## 2024-08-26 NOTE — PROGRESS NOTES
Treatment time: 3.5 hours  Net UF: 1200 ml     Pre weight: 135.8 kg  Post weight:134.6 kg       Access used: RADHA AVG    Access function: well with  ml/min     Medications or blood products given: Retacrit 10,000 units     Regular outpatient schedule: MWF     Summary of response to treatment: Patient tolerated treatment well and without any complications. Patient remained stable throughout entire treatment.

## 2024-08-26 NOTE — PROGRESS NOTES
ID Follow-up NOTE    CC:   Right foot wound status post TMA  Antibiotics:  meropenem    Admit Date: 8/15/2024  Hospital Day: 12    Subjective:     Patient had no fevers overnight, HD today.     Objective:     Patient Vitals for the past 8 hrs:   BP Temp Temp src Pulse Resp SpO2 Weight   08/26/24 1539 111/71 97.9 °F (36.6 °C) Oral 67 16 98 % --   08/26/24 1430 -- -- -- 63 -- -- --   08/26/24 1246 114/71 97.9 °F (36.6 °C) Oral 64 18 96 % --   08/26/24 1148 (!) 113/54 98.1 °F (36.7 °C) Oral 65 16 -- 134.6 kg (296 lb 11.8 oz)     I/O last 3 completed shifts:  In: 973 [P.O.:745; Blood:228]  Out: 0   I/O this shift:  In: 240 [P.O.:240]  Out: -     EXAM:  GENERAL: No apparent distress.    HEENT: Membranes moist, no oral lesion  NECK:  Supple, no lymphadenopathy  LUNGS: Clear b/l, no rales, no dullness  CARDIAC: RRR, no murmur appreciated  ABD:  + BS, soft / NT  EXT:  Left TMA site well-healed without evidence of ulceration.  The right TMA site with wound vac in place. See photo from 8/26:      NEURO: No focal neurologic findings  PSYCH: Orientation, sensorium, mood normal  LINES:  Peripheral iv, right arm AVF       Data Review:  Lab Results   Component Value Date    WBC 12.7 (H) 08/26/2024    HGB 8.5 (L) 08/26/2024    HCT 26.7 (L) 08/26/2024    MCV 91.6 08/26/2024     08/26/2024     Lab Results   Component Value Date    CREATININE 11.8 (HH) 08/26/2024    BUN 61 (H) 08/26/2024     (L) 08/26/2024    K 4.4 08/26/2024    CL 96 (L) 08/26/2024    CO2 25 08/26/2024       Hepatic Function Panel:   Lab Results   Component Value Date/Time    ALKPHOS 94 08/23/2024 08:29 AM    ALT 40 08/23/2024 08:29 AM    AST 26 08/23/2024 08:29 AM    BILITOT 0.4 08/23/2024 08:29 AM    BILIDIR 0.2 08/23/2024 08:29 AM    IBILI 0.2 08/23/2024 08:29 AM       MICRO:  OR culture right foot 8/22: Achromobacter species isolated from broth  Achromobacter (1)    Antibiotic Interpretation Microscan  Method Status    amikacin Resistant >32 mcg/mL  transmetatarsal amputation proximal aspect first through fifth metatarsals. Minimal air within the soft tissues near the first metatarsal probably postoperative.. Some soft tissue swelling distally. No acute fracture. No osseous destructive lesions.     CT head 8/15:  1. Age-related cortical atrophy  2. No acute intracranial hemorrhage.     US Gallbladder 8/16:  Cholelithiasis without sonographic evidence of acute cholecystitis.  No evidence of portal vein thrombosis.       Scheduled Meds:   sodium zirconium cyclosilicate  10 g Oral Once    Virt-Caps  1 capsule Oral Daily    midodrine  5 mg Oral TID WC    sodium chloride flush  5-40 mL IntraVENous 2 times per day    sodium chloride flush  5-40 mL IntraVENous 2 times per day    lidocaine 1 % injection  50 mg IntraDERmal Once    Fidaxomicin  200 mg Oral BID    meropenem  500 mg IntraVENous Q24H    vitamin D  50,000 Units Oral Weekly    epoetin rae-epbx  10,000 Units IntraVENous Once per day on Monday Wednesday Friday    sodium zirconium cyclosilicate  10 g Oral Once per day on Tuesday Thursday Saturday    atorvastatin  80 mg Oral QHS    carvedilol  6.25 mg Oral BID    [Held by provider] clopidogrel  75 mg Oral Daily    FLUoxetine  10 mg Oral Daily    pregabalin  75 mg Oral Daily    sevelamer  1,600 mg Oral TID WC    sodium chloride flush  5-40 mL IntraVENous 2 times per day       Continuous Infusions:   sodium chloride      sodium chloride      sodium chloride      sodium chloride      heparin (PORCINE) Infusion Stopped (08/24/24 0634)    sodium chloride      dextrose      sodium chloride         PRN Meds:  sodium chloride, sodium chloride, sodium chloride flush, sodium chloride, sodium chloride flush, sodium chloride, traMADol **OR** traMADol, sodium chloride, heparin (porcine), heparin (porcine), sodium chloride, glucose, dextrose bolus **OR** dextrose bolus, glucagon (rDNA), dextrose, sodium chloride flush, sodium chloride, ondansetron **OR** ondansetron,

## 2024-08-26 NOTE — PLAN OF CARE
Problem: Discharge Planning  Goal: Discharge to home or other facility with appropriate resources  Outcome: Progressing  Flowsheets (Taken 8/26/2024 0429)  Discharge to home or other facility with appropriate resources:   Identify barriers to discharge with patient and caregiver   Arrange for needed discharge resources and transportation as appropriate   Identify discharge learning needs (meds, wound care, etc)   Arrange for interpreters to assist at discharge as needed   Refer to discharge planning if patient needs post-hospital services based on physician order or complex needs related to functional status, cognitive ability or social support system     Problem: Skin/Tissue Integrity  Goal: Absence of new skin breakdown  Description: 1.  Monitor for areas of redness and/or skin breakdown  2.  Assess vascular access sites hourly  3.  Every 4-6 hours minimum:  Change oxygen saturation probe site  4.  Every 4-6 hours:  If on nasal continuous positive airway pressure, respiratory therapy assess nares and determine need for appliance change or resting period.  Outcome: Progressing     Problem: Safety - Adult  Goal: Free from fall injury  Outcome: Progressing  Flowsheets (Taken 8/26/2024 0429)  Free From Fall Injury:   Based on caregiver fall risk screen, instruct family/caregiver to ask for assistance with transferring infant if caregiver noted to have fall risk factors   Instruct family/caregiver on patient safety     Problem: Chronic Conditions and Co-morbidities  Goal: Patient's chronic conditions and co-morbidity symptoms are monitored and maintained or improved  Outcome: Progressing  Flowsheets (Taken 8/26/2024 0429)  Care Plan - Patient's Chronic Conditions and Co-Morbidity Symptoms are Monitored and Maintained or Improved:   Monitor and assess patient's chronic conditions and comorbid symptoms for stability, deterioration, or improvement   Collaborate with multidisciplinary team to address chronic and comorbid

## 2024-08-26 NOTE — PROGRESS NOTES
Internal Medicine Progress Note    Date: 8/26/2024   Patient: Serafin Weaver   [facility] Day: 11      CC: Fatigue       Interval Hx     NAEON  AC held due to drop in Hb yesterday, I unit transfused yesterday  Hyperkalemic in the AM at 6.2 w/o symptoms/EKG changes, HD done with 1.2L UF  Patient feels ok        Objective     Vital Signs:  Patient Vitals for the past 8 hrs:   BP Temp Temp src Pulse Resp SpO2 Weight   08/26/24 1246 114/71 97.9 °F (36.6 °C) Oral 64 18 96 % --   08/26/24 1148 (!) 113/54 98.1 °F (36.7 °C) Oral 65 16 -- 134.6 kg (296 lb 11.8 oz)   08/26/24 0800 (!) 159/68 98.3 °F (36.8 °C) Oral 62 16 -- 135.8 kg (299 lb 6.2 oz)       Physical Exam   Physical Exam  Vitals reviewed.   Constitutional:       General: He is not in acute distress.     Appearance: He is obese. He is ill-appearing.      Comments: No sings of acute hemorrhage  Minimal bloody discharge in wound vac   HENT:      Head: Normocephalic and atraumatic.      Right Ear: External ear normal.      Left Ear: External ear normal.      Nose: Nose normal.      Mouth/Throat:      Mouth: Mucous membranes are moist.   Eyes:      Extraocular Movements: Extraocular movements intact.      Pupils: Pupils are equal, round, and reactive to light.   Neck:      Comments: PICC in place; No acute signs of infection  Cardiovascular:      Rate and Rhythm: Normal rate and regular rhythm.      Heart sounds: Normal heart sounds. No murmur heard.  Pulmonary:      Effort: Pulmonary effort is normal. No respiratory distress.      Breath sounds: Normal breath sounds. No wheezing or rales.   Abdominal:      General: Abdomen is flat. There is no distension.      Palpations: Abdomen is soft.      Tenderness: There is no abdominal tenderness. There is no guarding or rebound.      Comments: obese pannus   Musculoskeletal:         General: Deformity (Left foot TMA; Right foot TMA (wrapped in bandage; wound vac placed)) and signs of injury present.      Cervical  back: Neck supple.      Right lower leg: No edema.      Left lower leg: No edema.      Comments: Diminished sensation bilateral lower extremities (at baseline)  Bilateral lower extremities in boots  RLE: Right foot in compression bandage - no strikethrough, wound vac to suction in place  LLE: Partial foot amputation, no drainage     RUE: AV fistula   Skin:     General: Skin is warm and dry.      Capillary Refill: Capillary refill takes less than 2 seconds.      Findings: Lesion present.   Neurological:      Mental Status: He is alert and oriented to person, place, and time. Mental status is at baseline.      Sensory: Sensory deficit present.   Psychiatric:      Comments: Poor judgment, abnormal behavior and thought content (baseline)                           Wound Care Image: Wound   Right foot   08/15/2024 20:00      Media Information      Document Information     Wound Care Image: Wound       08/19/2024 08:38   Attached To:   Hospital Encounter on 8/15/24   Source Information    Labs:  CBC:   Recent Labs     08/24/24  0600 08/24/24  1330 08/25/24  0613 08/25/24  1657 08/26/24  0553   WBC 14.0*  --  11.9*  --  12.7*   HGB 6.4*   < > 6.8* 8.4* 8.3*   HCT 20.5*   < > 20.6* 26.9* 25.1*     --  228  --  261    < > = values in this interval not displayed.       BMP:   Recent Labs     08/24/24  0600 08/25/24  0613 08/26/24  0553    137 134*   K 4.6 4.6 6.2*    104 96*   CO2 25 21 25   BUN 39* 43* 61*   CREATININE 8.9* 9.4* 11.8*   GLUCOSE 104* 78 134*   PHOS 5.7* 6.1* 8.1*     Magnesium:   Recent Labs     08/24/24  0600 08/25/24  0613 08/26/24  0553   MG 2.00 2.00 2.60*     LFT's: No results for input(s): \"AST\", \"ALT\", \"BILITOT\", \"ALKPHOS\" in the last 72 hours.    Invalid input(s): \"ALB\"      U/A: No results for input(s): \"NITRITE\", \"COLORU\", \"PHUR\", \"LABCAST\", \"WBCUA\", \"RBCUA\", \"MUCUS\", \"TRICHOMONAS\", \"YEAST\", \"BACTERIA\", \"CLARITYU\", \"SPECGRAV\", \"LEUKOCYTESUR\", \"UROBILINOGEN\", \"BILIRUBINUR\",  anticoagulation right now considering Hb drop and anticipated surgery on Monday  - CT A/P; no acute signs of bleeding  -Continue with retacrit during HD to keep Hg closer to 10 per nephrology  -Ferritin 23,989, TS 52%, suspected 2/2 sepsis, rec f/u outpatient when discharged to rule out hemochromatosis   -Hb drop yesterday and 1 unit transfused  AC held  PLAN:   -Repeat h&h at 3pm   -Unhold heparin if Hb stable    Hyperkalemia, ESRD on HD (MWF) with poor compliance  History of ESRD on HD MWF with poor compliance presented with lethargy from SNF after missing last 3 dialysis sessions.   -Nephrology consulted to manage inpatient HD, recommendations              HD MWF with 1000mg retacrit to keep Hg closer to 10              Renal diet              F/u daily RFP and weights              Adjust antibiotics for GFR<10cc/min              Fluid restrict 1L unless hypotensive              Nephrocap 1 tab PO daily  -8/23: AM Potassium 6.2 from 4.8, treated with calcium gluconate and IV insulin.  Patient underwent HD.  -8/24: Potassium stable today  -8/26: Hyperkalemia at 6.2. s/p HD now  -Strict I and Os, record and monitor q shift  PLAN:  Repeat K levels     Sepsis related acute hepatocellular transaminitis, (resolved)  -Patient denies history of alcohol use  -Acetaminophen level <5, Salicyclic acid <0.3  -Stopped tylenol and statins  -UDS negative  -PT/INR 1.42/17.6 on admission, home eliquis on hold  -ALT and AST nearly within normal limits 8/18, no longer trending  -Hepatitis panel nonreactive  -RUQ US cholelithiasis, no cholecystitis  -F/u US Abdomen Pelvis Retro Scrotal negative for hepatic vein thrombosis     Elevated troponin, asymptomatic (resolved)  -Trop on presentation 150, stable at 149 on repeat  -Elevated Trop with PMH ESRD, no plans to continue trending  -EKG without ischemic changes     Palliative care consulted  -[facility] Power of      Chronic conditions  Type II DM   -Hg A1c

## 2024-08-26 NOTE — PLAN OF CARE
Problem: Discharge Planning  Goal: Discharge to home or other facility with appropriate resources  8/26/2024 0959 by Li Jean RN  Outcome: Progressing  Flowsheets (Taken 8/26/2024 0959)  Discharge to home or other facility with appropriate resources: Identify barriers to discharge with patient and caregiver     Problem: Skin/Tissue Integrity  Goal: Absence of new skin breakdown  Description: 1.  Monitor for areas of redness and/or skin breakdown  2.  Assess vascular access sites hourly  3.  Every 4-6 hours minimum:  Change oxygen saturation probe site  4.  Every 4-6 hours:  If on nasal continuous positive airway pressure, respiratory therapy assess nares and determine need for appliance change or resting period.  8/26/2024 0959 by Li Jean RN  Outcome: Progressing     Problem: Safety - Adult  Goal: Free from fall injury  8/26/2024 0959 by Li Jean RN  Outcome: Progressing  Flowsheets (Taken 8/26/2024 0959)  Free From Fall Injury: Instruct family/caregiver on patient safety     Problem: Chronic Conditions and Co-morbidities  Goal: Patient's chronic conditions and co-morbidity symptoms are monitored and maintained or improved  8/26/2024 0959 by Li Jean RN  Outcome: Progressing  Flowsheets (Taken 8/26/2024 0959)  Care Plan - Patient's Chronic Conditions and Co-Morbidity Symptoms are Monitored and Maintained or Improved:   Monitor and assess patient's chronic conditions and comorbid symptoms for stability, deterioration, or improvement   Collaborate with multidisciplinary team to address chronic and comorbid conditions and prevent exacerbation or deterioration   Update acute care plan with appropriate goals if chronic or comorbid symptoms are exacerbated and prevent overall improvement and discharge     Problem: ABCDS Injury Assessment  Goal: Absence of physical injury  8/26/2024 0959 by Li Jean RN  Outcome: Progressing  Flowsheets (Taken 8/26/2024 0959)  Absence of Physical Injury:

## 2024-08-27 LAB
ACID FAST STN SPEC QL: NORMAL
ALBUMIN SERPL-MCNC: 2.6 G/DL (ref 3.4–5)
ANION GAP SERPL CALCULATED.3IONS-SCNC: 10 MMOL/L (ref 3–16)
ANTI-XA UNFRAC HEPARIN: 0.61 IU/ML (ref 0.3–0.7)
ANTI-XA UNFRAC HEPARIN: 0.64 IU/ML (ref 0.3–0.7)
BACTERIA SPEC AEROBE CULT: ABNORMAL
BACTERIA SPEC ANAEROBE CULT: ABNORMAL
BUN SERPL-MCNC: 36 MG/DL (ref 7–20)
CALCIUM SERPL-MCNC: 8.2 MG/DL (ref 8.3–10.6)
CHLORIDE SERPL-SCNC: 98 MMOL/L (ref 99–110)
CO2 SERPL-SCNC: 27 MMOL/L (ref 21–32)
CREAT SERPL-MCNC: 8.4 MG/DL (ref 0.9–1.3)
DEPRECATED RDW RBC AUTO: 16.5 % (ref 12.4–15.4)
EKG ATRIAL RATE: 70 BPM
EKG DIAGNOSIS: NORMAL
EKG P AXIS: 54 DEGREES
EKG P-R INTERVAL: 174 MS
EKG Q-T INTERVAL: 408 MS
EKG QRS DURATION: 86 MS
EKG QTC CALCULATION (BAZETT): 440 MS
EKG R AXIS: 12 DEGREES
EKG T AXIS: 40 DEGREES
EKG VENTRICULAR RATE: 70 BPM
GFR SERPLBLD CREATININE-BSD FMLA CKD-EPI: 7 ML/MIN/{1.73_M2}
GLUCOSE BLD-MCNC: 103 MG/DL (ref 70–99)
GLUCOSE BLD-MCNC: 141 MG/DL (ref 70–99)
GLUCOSE BLD-MCNC: 147 MG/DL (ref 70–99)
GLUCOSE SERPL-MCNC: 176 MG/DL (ref 70–99)
GRAM STN SPEC: ABNORMAL
HCT VFR BLD AUTO: 26.8 % (ref 40.5–52.5)
HGB BLD-MCNC: 8.3 G/DL (ref 13.5–17.5)
MAGNESIUM SERPL-MCNC: 2.4 MG/DL (ref 1.8–2.4)
MCH RBC QN AUTO: 29 PG (ref 26–34)
MCHC RBC AUTO-ENTMCNC: 31.2 G/DL (ref 31–36)
MCV RBC AUTO: 93 FL (ref 80–100)
MYCOBACTERIUM SPEC CULT: NORMAL
ORGANISM: ABNORMAL
PERFORMED ON: ABNORMAL
PHOSPHATE SERPL-MCNC: 5.9 MG/DL (ref 2.5–4.9)
PLATELET # BLD AUTO: 254 K/UL (ref 135–450)
PMV BLD AUTO: 8 FL (ref 5–10.5)
POTASSIUM SERPL-SCNC: 5.3 MMOL/L (ref 3.5–5.1)
RBC # BLD AUTO: 2.88 M/UL (ref 4.2–5.9)
SODIUM SERPL-SCNC: 135 MMOL/L (ref 136–145)
WBC # BLD AUTO: 13 K/UL (ref 4–11)

## 2024-08-27 PROCEDURE — 2580000003 HC RX 258: Performed by: INTERNAL MEDICINE

## 2024-08-27 PROCEDURE — 6370000000 HC RX 637 (ALT 250 FOR IP)

## 2024-08-27 PROCEDURE — 1200000000 HC SEMI PRIVATE

## 2024-08-27 PROCEDURE — 6370000000 HC RX 637 (ALT 250 FOR IP): Performed by: STUDENT IN AN ORGANIZED HEALTH CARE EDUCATION/TRAINING PROGRAM

## 2024-08-27 PROCEDURE — 93005 ELECTROCARDIOGRAM TRACING: CPT

## 2024-08-27 PROCEDURE — 80069 RENAL FUNCTION PANEL: CPT

## 2024-08-27 PROCEDURE — 83735 ASSAY OF MAGNESIUM: CPT

## 2024-08-27 PROCEDURE — 36592 COLLECT BLOOD FROM PICC: CPT

## 2024-08-27 PROCEDURE — 85520 HEPARIN ASSAY: CPT

## 2024-08-27 PROCEDURE — 2580000003 HC RX 258

## 2024-08-27 PROCEDURE — 93010 ELECTROCARDIOGRAM REPORT: CPT | Performed by: INTERNAL MEDICINE

## 2024-08-27 PROCEDURE — 6370000000 HC RX 637 (ALT 250 FOR IP): Performed by: INTERNAL MEDICINE

## 2024-08-27 PROCEDURE — 99232 SBSQ HOSP IP/OBS MODERATE 35: CPT | Performed by: INTERNAL MEDICINE

## 2024-08-27 PROCEDURE — 85027 COMPLETE CBC AUTOMATED: CPT

## 2024-08-27 PROCEDURE — 99233 SBSQ HOSP IP/OBS HIGH 50: CPT | Performed by: SURGERY

## 2024-08-27 PROCEDURE — 6360000002 HC RX W HCPCS

## 2024-08-27 RX ORDER — GLUCAGON 1 MG/ML
1 KIT INJECTION PRN
Status: DISCONTINUED | OUTPATIENT
Start: 2024-08-27 | End: 2024-08-27 | Stop reason: SDUPTHER

## 2024-08-27 RX ORDER — DEXTROSE MONOHYDRATE 100 MG/ML
INJECTION, SOLUTION INTRAVENOUS CONTINUOUS PRN
Status: DISCONTINUED | OUTPATIENT
Start: 2024-08-27 | End: 2024-08-29 | Stop reason: HOSPADM

## 2024-08-27 RX ORDER — ERGOCALCIFEROL 1.25 MG/1
50000 CAPSULE ORAL WEEKLY
Qty: 5 CAPSULE | Refills: 0 | Status: SHIPPED | OUTPATIENT
Start: 2024-08-31 | End: 2024-09-30

## 2024-08-27 RX ORDER — MIDODRINE HYDROCHLORIDE 5 MG/1
5 TABLET ORAL
Qty: 90 TABLET | Refills: 3 | Status: SHIPPED | OUTPATIENT
Start: 2024-08-27

## 2024-08-27 RX ORDER — INSULIN LISPRO 100 [IU]/ML
0-4 INJECTION, SOLUTION INTRAVENOUS; SUBCUTANEOUS
Status: DISCONTINUED | OUTPATIENT
Start: 2024-08-27 | End: 2024-08-29 | Stop reason: HOSPADM

## 2024-08-27 RX ORDER — GLUCAGON 1 MG/ML
1 KIT INJECTION PRN
Status: DISCONTINUED | OUTPATIENT
Start: 2024-08-27 | End: 2024-08-29 | Stop reason: HOSPADM

## 2024-08-27 RX ORDER — DEXTROSE MONOHYDRATE 100 MG/ML
INJECTION, SOLUTION INTRAVENOUS CONTINUOUS PRN
Status: DISCONTINUED | OUTPATIENT
Start: 2024-08-27 | End: 2024-08-27

## 2024-08-27 RX ORDER — INSULIN LISPRO 100 [IU]/ML
0-4 INJECTION, SOLUTION INTRAVENOUS; SUBCUTANEOUS NIGHTLY
Status: DISCONTINUED | OUTPATIENT
Start: 2024-08-27 | End: 2024-08-29 | Stop reason: HOSPADM

## 2024-08-27 RX ADMIN — CARVEDILOL 6.25 MG: 6.25 TABLET, FILM COATED ORAL at 10:26

## 2024-08-27 RX ADMIN — SODIUM CHLORIDE 25 ML: 9 INJECTION, SOLUTION INTRAVENOUS at 13:45

## 2024-08-27 RX ADMIN — ATORVASTATIN CALCIUM 80 MG: 80 TABLET, FILM COATED ORAL at 20:59

## 2024-08-27 RX ADMIN — Medication 1 MG: at 10:26

## 2024-08-27 RX ADMIN — SODIUM CHLORIDE, PRESERVATIVE FREE 10 ML: 5 INJECTION INTRAVENOUS at 21:00

## 2024-08-27 RX ADMIN — FIDAXOMICIN 200 MG: 200 TABLET, FILM COATED ORAL at 20:59

## 2024-08-27 RX ADMIN — FLUOXETINE HYDROCHLORIDE 10 MG: 10 CAPSULE ORAL at 10:27

## 2024-08-27 RX ADMIN — PREGABALIN 75 MG: 75 CAPSULE ORAL at 10:27

## 2024-08-27 RX ADMIN — SEVELAMER CARBONATE 1600 MG: 800 TABLET, FILM COATED ORAL at 10:27

## 2024-08-27 RX ADMIN — MEROPENEM 500 MG: 500 INJECTION, POWDER, FOR SOLUTION INTRAVENOUS at 13:46

## 2024-08-27 RX ADMIN — FIDAXOMICIN 200 MG: 200 TABLET, FILM COATED ORAL at 10:26

## 2024-08-27 RX ADMIN — HEPARIN SODIUM 19 UNITS/KG/HR: 10000 INJECTION, SOLUTION INTRAVENOUS at 02:21

## 2024-08-27 RX ADMIN — CARVEDILOL 6.25 MG: 6.25 TABLET, FILM COATED ORAL at 20:59

## 2024-08-27 RX ADMIN — MIDODRINE HYDROCHLORIDE 5 MG: 5 TABLET ORAL at 10:26

## 2024-08-27 ASSESSMENT — PAIN SCALES - GENERAL
PAINLEVEL_OUTOF10: 0
PAINLEVEL_OUTOF10: 0

## 2024-08-27 NOTE — PROGRESS NOTES
Ph: [phone], Fax: [phone]           [facility]               Reason for admission:                 Admitted for lethargy    Brief Summary :     Serafin Weaver is being seen by nephrology for ESRD on hemodialysis.      Interval History and plan:     BP is well controlled .  Labs reviewed with high K  He is very non complaint and refused several HD session while inpatient. Palliative care was consulted to assess wether he would like to quit dialysis. So far, he is intermittently going to dialysis.     I discussed with him about immediate mortality from being non complaint and after some discussion he agreed and completed 3.5 hrs of dialysis yesterday        Hemodialysis per MWF schedule.   Decrease midodrine dose as BP is better   Renal diet.  Continue Lokelma on non dialysis days   Replace Vitamin D   Daily renal panels.  Continue with Retacrit with dialysis. Very high ferritin ( should not get iron with dialysis )  Please adjust antibiotics to GFR less than 10 mL/min.                     Assessment :     1.  ESRD:  Will plan HD per schedule.  He gets dialysis Monday, Wednesday and Friday.  He gets dialysis currently at [facility] under our care.  Access: AV graft  Daily weights  Fluid restriction: 1 L unless hypotensive  Nephrocap 1 tab PO daily    2.  Anemia:  Erythropoetin dose: He will receive Retacrit with dialysis to keep hemoglobin close to 10.  Hemoglobin admission was 6.9.  Ferritin is greater than 1000.    3.  Osteodystrophy:  Phosphate Binder: Will continue with Renvela 2 tabs p.o. 3 times daily with meals.  I will check PTH and vitamin D.  Daily renal panels.    4.  Hyperkalemia: Resolved.  He is on chronic Lokelma 10 g q. Tuesday, Thursday and Saturday on nondialysis days.    5.  Hypertension: well-controlled.  Continue with carvedilol.    6.  Wound infection: Podiatry consulted.  ID consulted.  He is on cefepime and vancomycin.  He is also getting oral  (COREG) tablet 6.25 mg, 6.25 mg, Oral, BID  [Held by provider] clopidogrel (PLAVIX) tablet 75 mg, 75 mg, Oral, Daily  FLUoxetine (PROZAC) capsule 10 mg, 10 mg, Oral, Daily  pregabalin (LYRICA) capsule 75 mg, 75 mg, Oral, Daily  sevelamer (RENVELA) tablet 1,600 mg, 1,600 mg, Oral, TID WC  sodium chloride flush 0.9 % injection 5-40 mL, 5-40 mL, IntraVENous, 2 times per day  sodium chloride flush 0.9 % injection 5-40 mL, 5-40 mL, IntraVENous, PRN  0.9 % sodium chloride infusion, , IntraVENous, PRN  ondansetron (ZOFRAN-ODT) disintegrating tablet 4 mg, 4 mg, Oral, Q8H PRN **OR** ondansetron (ZOFRAN) injection 4 mg, 4 mg, IntraVENous, Q6H PRN  polyethylene glycol (GLYCOLAX) packet 17 g, 17 g, Oral, Daily PRN    Vitals :     Vitals:    08/27/24 0440   BP: 131/70   Pulse: 69   Resp: 18   Temp: 98.3 °F (36.8 °C)   SpO2: 97%          Physical Examination :     appearance: Alert, disoriented  Respiratory: no distress on room air  Cardiovascular:  Trace edema.   Abdomen: -  soft  Other relevant findings:      Labs :     CBC:   Recent Labs     08/25/24  0613 08/25/24  1657 08/26/24  0553 08/26/24  1439 08/27/24  0500   WBC 11.9*  --  12.7*  --  13.0*   HGB 6.8*   < > 8.3* 8.5* 8.3*   HCT 20.6*   < > 25.1* 26.7* 26.8*     --  261  --  254    < > = values in this interval not displayed.     BMP:    Recent Labs     08/25/24  0613 08/26/24  0553 08/26/24  1439 08/27/24  0500    134*  --  135*   K 4.6 6.2* 4.4 5.3*    96*  --  98*   CO2 21 25  --  27   BUN 43* 61*  --  36*   CREATININE 9.4* 11.8*  --  8.4*   GLUCOSE 78 134*  --  176*   MG 2.00 2.60*  --  2.40   PHOS 6.1* 8.1*  --  5.9*     Lab Results   Component Value Date/Time    COLORU Yellow 04/24/2023 04:48 PM    NITRU Negative 04/24/2023 04:48 PM    GLUCOSEU 250 04/24/2023 04:48 PM    KETUA 15 04/24/2023 04:48 PM    UROBILINOGEN 0.2 04/24/2023 04:48 PM    BILIRUBINUR Negative 04/24/2023 04:48 PM         ----------------------------------------------------------  Please call with questions at      24 Hrs Answering service [phone]  Perfect serve, or cell phone 7 am - 5pm  Jordin Kaur MD   [email or web address]

## 2024-08-27 NOTE — PROGRESS NOTES
Pt evening shift assessment complete. VSS and medication given as ordered.  Wound vac to RLE in place. Pt assessed for comfort needs.  Pt does not express any additional needs at this time, resting comfortably in bed.

## 2024-08-27 NOTE — PROGRESS NOTES
Vascular Surgery   Daily Progress Note  Patient: Serafin Weaver      CC:  Non healing RLE wound     SUBJECTIVE:   No acute events overnight. Resting comfortably in bed. Pain controlled. Tolerating diet. No other complaints at this time.      OBJECTIVE:    PHYSICAL EXAM:  General appearance: alert, no acute distress  Eyes: No scleral icterus, EOM grossly intact  Neck: Trachea midline, no JVD  Chest/Lungs: Normal effort with no accessory muscle use on RA  Cardiovascular: RRR, perfused  Skin: warm and dry, no rashes  Extremities: no edema, no cyanosis. RLE wound vac in place and suctioning. LLE well healed stump, dressing c/d/i. Palpable bilateral femoral pulses. Bilateral popliteal, right AT, and right PT dopplerable. Non tender.   Neuro: A&Ox3, sensation intact     ASSESSMENT & PLAN:   Serafin Weaver is a 44 y.o. 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      - Vascular duplex bilateral LE resulted.  - Will discuss with vascular surgery team on further management after reviewing images.   - Rest of care per primary team .    Mera Wynne DO  PGY1, General Surgery  08/27/24  7:11 AM  [phone]

## 2024-08-27 NOTE — PROGRESS NOTES
Internal Medicine Progress Note    Date: 8/27/2024   Patient: Serafin Weaver   [facility] Day: 12      CC: Fatigue       Interval Hx     NAEON, vitals stable  Underwent HD yesterday  This AM, K+ 5.3. Received 1 dose of lokelma  No complaints this morning, does not engage significantly in conversation        Objective     Vital Signs:  Patient Vitals for the past 8 hrs:   BP Temp Temp src Pulse Resp SpO2   08/27/24 1115 120/73 98.2 °F (36.8 °C) Oral 68 16 100 %   08/27/24 0756 114/66 98.5 °F (36.9 °C) Oral 71 18 100 %       Physical Exam  Vitals reviewed.   Constitutional:       General: He is not in acute distress.     Appearance: He is obese. He is ill-appearing.      Comments: No sings of acute hemorrhage  Minimal bloody discharge in wound vac   HENT:      Head: Normocephalic and atraumatic.      Right Ear: External ear normal.      Left Ear: External ear normal.      Nose: Nose normal.      Mouth/Throat:      Mouth: Mucous membranes are moist.   Eyes:      Extraocular Movements: Extraocular movements intact.      Pupils: Pupils are equal, round, and reactive to light.   Neck:      Comments: PICC in place; No acute signs of infection  Cardiovascular:      Rate and Rhythm: Normal rate and regular rhythm.      Heart sounds: Normal heart sounds. No murmur heard.  Pulmonary:      Effort: Pulmonary effort is normal. No respiratory distress.      Breath sounds: Normal breath sounds. No wheezing or rales.   Abdominal:      General: Abdomen is flat. There is no distension.      Palpations: Abdomen is soft.      Tenderness: There is no abdominal tenderness. There is no guarding or rebound.      Comments: obese pannus   Musculoskeletal:         General: Deformity (Right foot TMA (wrapped in bandage; wound vac placed)) and signs of injury present.      Cervical back: Neck supple.      Right lower leg: No edema.      Left lower leg: No edema.      Comments: Diminished sensation bilateral lower extremities (at    Cholelithiasis without sonographic evidence of acute cholecystitis.      No evidence of portal vein thrombosis.      Electronically signed by Yoni Calabrese MD      CT HEAD WO CONTRAST   Final Result      1. Age-related cortical atrophy   2. No acute intracranial hemorrhage.      Electronically signed by Jessica Patiño      XR FOOT RIGHT (MIN 3 VIEWS)   Final Result   Interval amputation.      Electronically signed by Jessica Patiño      XR CHEST PORTABLE   Final Result   No acute cardiopulmonary abnormality.      Electronically signed by Mark Atif            Assessment & Plan   Serafin Weaver is a 44 y.o. with PMH of ESRD on HD (MWF), DM type II, right foot osteomyelitis s/p TMA and left foot partial amputation who presented from nursing home with lethargy for 1 day s/p x3 missed HD sessions        Infected right foot wound with recent C. Difficile infection  History of right foot osteomyelitis s/p TMA 07/24 with reported purulent discharge from wound on presentation. Patient was also on oral vancomycin for recent C. Dificile infection. Wound cultures with ESBL Klebsiella and Achromobacter grown. Currently being treated with Merrem. PICC in place. Blood cultures negative. Podiatry has seen with no additional recommendations for surgical intervention. At this time, doppler is currently pending. Will discuss results of BLE doppler with vascular team. Can likely discharge if no additional intervention is required.  - Follow duplex of b/l legs. Vascular recs after duplex  - Continue Merrem renally dosed  - Continue oral fidaxomicin     # Acute on chronic anemia s/p 3U pRBC (likely 2/2 oozing from right foot wound)  # History of hypercoagulable state(anticardiolipin antibody positive)  # hx of ischemic strokes, RA thrombus  The patient presented with Hg 6.9 from baseline of 8-10  Patient on Eliquis at home for hx of hypercoagulable state(anticardiolipin positive, hx of strokes, RA thrombus). Pt  was started  on heparin drip for anticoagulation before surgery.  Patient underwent surgery for fifth metatarsal resection by podiatry on 8/22.  Heparin drip was held during surgery.  Drop in Hb from 8-7 after surgery.  Hb has remained stable for several days. Was started oh heparin gtt yesterday. Will transition to oral eliquis today  -Continue with retacrit during HD to keep Hg closer to 10 per nephrology  -Ferritin 23,989, TS 52%, suspected 2/2 sepsis, rec f/u outpatient when discharged to rule out hemochromatosis   - Stop heparin drip. Start Eliquis 2.5 mg dailly    Hyperkalemia, ESRD on HD (MWF) with poor compliance  History of ESRD on HD MWF with poor compliance presented with lethargy from SNF after missing last 3 dialysis sessions. K+ 5.3 this AM. Received 1 dose of lokelma.  -Nephrology consulted to manage inpatient HD, recommendations              HD MWF with 1000mg retacrit to keep Hg closer to 10              Renal diet              F/u daily RFP and weights              Adjust antibiotics for GFR<10cc/min              Fluid restrict 1L unless hypotensive              Nephrocap 1 tab PO daily  -Daily BMP. Continue lokelma T, Th, Sat      Sepsis related acute hepatocellular transaminitis, (resolved)\\    Elevated troponin, asymptomatic (resolved)    Palliative care consulted  -[facility] Power of      Chronic conditions  Type II DM - LDSSI  Hypertension - On home Carvedilol 6.25 mg  HLD - Home atorvastatin 80 mg p.o. bedtime held for transaminitis    DVT PPx: Eliquis  Diet: ADULT DIET; Regular; Low Sodium (2 gm); Low Potassium (Less than 3000 mg/day); Low Phosphorus (Less than 1000 mg); 60 to 80 gm   Code status:  Full Code     ELOS: 1 day  Disposition  - Preadmission: SNF  - Current: Pending vascular recs  - Upon discharge: SNF    Will discuss with attending physician Krishna Bazan MD     _____________________  Inocencio Coles MD   Internal Medicine Resident, PGY-1

## 2024-08-27 NOTE — CARE COORDINATION
Case Management Assessment            Discharge Note                    Date / Time of Note: 8/27/2024 12:44 PM                  Discharge Note Completed by: Laura Nam    Patient Name: Serafin Weaver   YOB: 1980  Diagnosis: Septicemia (HCC) [A41.9]  Transaminitis [R74.01]  ESRD (end stage renal disease) (HCC) [N18.6]  Sepsis (HCC) [A41.9]  Ulcer of right foot, unspecified ulcer stage (HCC) [L97.519]  Anemia due to chronic kidney disease, unspecified CKD stage [N18.9, D63.1]   Date / Time: 8/15/2024  3:35 PM    Current PCP: Venita Kendrick MD  Clinic patient: No    Hospitalization in the last 30 days: Yes  Readmission Assessment  Number of Days since last admission?: 8-30 days  Previous Disposition: Long Term Care  Who is being Interviewed: Caregiver  What was the patient's/caregiver's perception as to why they think they needed to return back to the hospital?: Other (Comment) (refused dialysis)  Did you visit your Primary Care Physician after you left the hospital, before you returned this time?: Yes  Did you see a specialist, such as Cardiac, Pulmonary, Orthopedic Physician, etc. after you left the hospital?: No  Who advised the patient to return to the hospital?: Other (Comment) (LTC)  Does the patient report anything that got in the way of taking their medications?: No  In our efforts to provide the best possible care to you and others like you, can you think of anything that we could have done to help you after you left the hospital the first time, so that you might not have needed to return so soon?: Other (Comment) (n/a)    Advance Directives:  Code Status: Full Code  [place] DNR form completed and on chart: Not Indicated    Financial:  Payor: AETNA / Plan: AET[employer] HMO / Product Type: *No Product type* /      Pharmacy:    [facility] [address] ([place]), [place] - [address] - P [phone] - F [phone]  [id number] [facility] ([place]) [place]

## 2024-08-27 NOTE — PLAN OF CARE
Problem: Discharge Planning  Goal: Discharge to home or other facility with appropriate resources  8/26/2024 2025 by Allison Barron, RN  Outcome: Progressing  Flowsheets (Taken 8/26/2024 1948)  Discharge to home or other facility with appropriate resources: Identify barriers to discharge with patient and caregiver  8/26/2024 0959 by Li Jean RN  Outcome: Progressing  Flowsheets (Taken 8/26/2024 0959)  Discharge to home or other facility with appropriate resources: Identify barriers to discharge with patient and caregiver     Problem: Skin/Tissue Integrity  Goal: Absence of new skin breakdown  Description: 1.  Monitor for areas of redness and/or skin breakdown  2.  Assess vascular access sites hourly  3.  Every 4-6 hours minimum:  Change oxygen saturation probe site  4.  Every 4-6 hours:  If on nasal continuous positive airway pressure, respiratory therapy assess nares and determine need for appliance change or resting period.  8/26/2024 2025 by Allison Barron, RN  Outcome: Progressing  8/26/2024 0959 by Li Jean RN  Outcome: Progressing

## 2024-08-27 NOTE — PROGRESS NOTES
ID Follow-up NOTE    CC:   Right foot wound status post TMA  Antibiotics:  meropenem    Admit Date: 8/15/2024  Hospital Day: 13    Subjective:     Patient had no fevers overnight, vascular surgery to eval for poor wound healing.     Objective:     Patient Vitals for the past 8 hrs:   BP Temp Temp src Pulse Resp SpO2 Weight   08/27/24 1115 120/73 98.2 °F (36.8 °C) Oral 68 16 100 % --   08/27/24 0756 114/66 98.5 °F (36.9 °C) Oral 71 18 100 % --   08/27/24 0443 -- -- -- -- -- -- (!) 139.7 kg (307 lb 15.7 oz)   08/27/24 0440 131/70 98.3 °F (36.8 °C) Oral 69 18 97 % --     I/O last 3 completed shifts:  In: 910 [P.O.:900; I.V.:10]  Out: 0   No intake/output data recorded.    EXAM:  GENERAL: No apparent distress.    HEENT: Membranes moist, no oral lesion  NECK:  Supple, no lymphadenopathy  LUNGS: Clear b/l, no rales, no dullness  CARDIAC: RRR, no murmur appreciated  ABD:  + BS, soft / NT  EXT:  Left TMA site well-healed without evidence of ulceration.  The right TMA site with wound vac in place. See photo from 8/26:      NEURO: No focal neurologic findings  PSYCH: Orientation, sensorium, mood normal  LINES:  Peripheral iv, right arm AVF       Data Review:  Lab Results   Component Value Date    WBC 13.0 (H) 08/27/2024    HGB 8.3 (L) 08/27/2024    HCT 26.8 (L) 08/27/2024    MCV 93.0 08/27/2024     08/27/2024     Lab Results   Component Value Date    CREATININE 8.4 (HH) 08/27/2024    BUN 36 (H) 08/27/2024     (L) 08/27/2024    K 5.3 (H) 08/27/2024    CL 98 (L) 08/27/2024    CO2 27 08/27/2024       Hepatic Function Panel:   Lab Results   Component Value Date/Time    ALKPHOS 94 08/23/2024 08:29 AM    ALT 40 08/23/2024 08:29 AM    AST 26 08/23/2024 08:29 AM    BILITOT 0.4 08/23/2024 08:29 AM    BILIDIR 0.2 08/23/2024 08:29 AM    IBILI 0.2 08/23/2024 08:29 AM       MICRO:  OR culture right foot 8/22: Achromobacter species isolated from broth  Achromobacter (1)    Antibiotic Interpretation Microscan  Method Status   traMADol, sodium chloride, heparin (porcine), heparin (porcine), sodium chloride, dextrose, sodium chloride flush, sodium chloride, ondansetron **OR** ondansetron, polyethylene glycol      Assessment:       Patient Active Problem List   Diagnosis    Amputation of third toe, left, traumatic (HCC)    Type 2 diabetes mellitus with other specified complication ([facility])    BMI 37.0-37.9, adult    DKA (diabetic ketoacidoses)    Scrotal abscess    Perineal abscess    Sepsis due to Streptococcus agalactiae (HCC)    Morbid obesity due to excess calories (HCC)    Hypertension    Cellulitis of foot, left    Diabetic foot infection (HCC)    Diabetic ulcer of left foot associated with type 2 diabetes mellitus, with bone involvement without evidence of necrosis (HCC)    Diabetic polyneuropathy associated with type 2 diabetes mellitus (HCC)    Uncontrolled type 2 diabetes mellitus with hyperglycemia (HCC)    Ulcer of left foot, with fat layer exposed (HCC)    Acute osteomyelitis (HCC)    Cellulitis of second toe, right    Abscess of second toe, right    Vision loss of right eye    Non-intractable vomiting    H/O arteriovenous malformation (AVM)    Hypertensive urgency    Rotator cuff tendonitis    Scapulothoracic syndrome    SLAP tear of shoulder    Vitreous hemorrhage (HCC)    Wrist sprain, left, initial encounter    Acute encephalopathy    Disorder of brain    Ischemic cerebrovascular accident (CVA) (HCC)    ESRD on dialysis (HCC)    Osteomyelitis of fifth toe of right foot (HCC)    Gas gangrene (HCC)    Infection with ESBL Klebsiella oxytoca    Sepsis (HCC)    Acute hematogenous osteomyelitis of right foot ([facility])        Plan:   44 y.o. AA male with history of ESRD on HD, DM II, seizure dx, depression, left TMA and right foot OM S/P recent right TMA on 7/24/24 who presented on 8/15 from nursing home with lethargy.     Right foot ulcer with recent TMA:  - Pt s/p TMA for right foot OM on 7/24/2024.  - lateral aspect ulceration with

## 2024-08-27 NOTE — PLAN OF CARE
Problem: Discharge Planning  Goal: Discharge to home or other facility with appropriate resources  Outcome: Progressing  Flowsheets (Taken 8/27/2024 1829)  Discharge to home or other facility with appropriate resources:   Identify barriers to discharge with patient and caregiver   Identify discharge learning needs (meds, wound care, etc)  Note: Possible discharge tomorrow, pending [facility] recs     Problem: Safety - Adult  Goal: Free from fall injury  Outcome: Progressing  Flowsheets (Taken 8/27/2024 1829)  Free From Fall Injury: Instruct family/caregiver on patient safety  Note: Pt will remain free from accidental injury this shift. Pt has fall risk measures (fall risk bracelet, fall risk sign, fall risk cloth, non-slip socks, dome light on) in place. Pt bed is in low position, bed alarm on, bed wheels locked, call light within reach, bedside table within reach, chair wheels locked, chair alarm on.        Problem: Chronic Conditions and Co-morbidities  Goal: Patient's chronic conditions and co-morbidity symptoms are monitored and maintained or improved  Outcome: Progressing  Flowsheets (Taken 8/27/2024 1829)  Care Plan - Patient's Chronic Conditions and Co-Morbidity Symptoms are Monitored and Maintained or Improved:   Monitor and assess patient's chronic conditions and comorbid symptoms for stability, deterioration, or improvement   Collaborate with multidisciplinary team to address chronic and comorbid conditions and prevent exacerbation or deterioration  Note: Patient aware of own co-morbidities      Problem: ABCDS Injury Assessment  Goal: Absence of physical injury  Outcome: Progressing  Flowsheets (Taken 8/27/2024 1829)  Absence of Physical Injury: Implement safety measures based on patient assessment  Note: Fall precautions in place and call light within patient reach     Problem: Pain  Goal: Verbalizes/displays adequate comfort level or baseline comfort level  Outcome: Progressing  Flowsheets  Taken 8/27/2024  1829 by Gloria Mathias, JOSE  Verbalizes/displays adequate comfort level or baseline comfort level:   Encourage patient to monitor pain and request assistance   Administer analgesics based on type and severity of pain and evaluate response   Consider cultural and social influences on pain and pain management  Taken 8/27/2024 0440 by Allison Barron, RN  Verbalizes/displays adequate comfort level or baseline comfort level: Encourage patient to monitor pain and request assistance  Note: Patient utilizing pharmacological and non-pharmacological interventions for pain management as needed. Will continue to monitor patient pain levels and apply interventions as requested. Patient satisfied at this time.

## 2024-08-27 NOTE — PROGRESS NOTES
Post op  FINDINGS:  Status post transmetatarsal amputation of the foot. There is adjacent soft  tissue swelling. A bandage is present overlying the foot.    XR Right foot (8/16/24):  Findings:   Interval transmetatarsal amputation proximal aspect first through fifth metatarsals. Minimal air within the soft tissues near the first metatarsal probably postoperative. Some soft tissue swelling distally. No acute fracture. No osseous destructive   lesions.  IMPRESSION:  Interval amputation.     XR right Foot (07/24/24)  FINDINGS/  IMPRESSION:  Status post 1-5th digit transmetatarsal amputation with soft tissue swelling and placement of a brace.     B/L LE Art Dup (7/18):  Right Lower Arterial     Proximal Common Femoral Artery: Multiphasic (normal) Doppler waveforms.   Distal Common Femoral Artery: Multiphasic (normal) Doppler waveforms.   Profunda Artery: Multiphasic (normal) Doppler waveforms.   Proximal Superficial Femoral Artery: Multiphasic (normal) Doppler waveforms.   Middle Superficial Femoral Artery: Multiphasic (normal) Doppler waveforms.   Distal Superficial Femoral Artery: Monophasic Doppler waveforms.   Proximal Popliteal Artery: Monophasic Doppler waveforms.   Distal Popliteal Artery: Monophasic Doppler waveforms.   Anterior Tibial Artery: Monophasic Doppler waveforms.   Tibial/Peroneal Trunk: Monophasic Doppler waveforms.   Posterior Tibial Artery: Monophasic Doppler waveforms.   Peroneal Artery: monophasic Doppler waveforms.     The ankle/brachial index is 0.53 (DP 58, PT 40mmHg).  Normal multiphasic flow in the common femoral artery indicates no significant aorto-iliac inflow disease.   Atherosclerotic plaque and velocities indicate >50% stenosis in the right distal superficial femoral artery (58.1 to 229 cm/s).  Calcific shadowing in the distal superficial femoral artery limits visibility, the percent stenosis may be underestimated,  Unable to obtain blood pressure on right arm due to fistula.   Left  Lower Arterial     Proximal Common Femoral Artery: Multiphasic (normal) Doppler waveforms.   Distal Common Femoral Artery: Multiphasic (normal) Doppler waveforms.   Profunda Artery: Multiphasic (normal) Doppler waveforms. .   Proximal Superficial Femoral Artery: Multiphasic (normal) Doppler waveforms.   Middle Superficial Femoral Artery: Multiphasic (normal) Doppler waveforms.   Distal Superficial Femoral Artery: Multiphasic (normal) Doppler waveforms.   Proximal Popliteal Artery: Multiphasic (normal) Doppler waveforms.   Distal Popliteal Artery: Multiphasic (normal) Doppler waveforms.   Anterior Tibial Artery: Multiphasic (normal) Doppler waveforms.   Tibial/Peroneal Trunk: Multiphasic (normal) Doppler waveforms.   Posterior Tibial Artery: Multiphasic (normal) Doppler waveforms.   Peroneal Artery: Multiphasic (normal) Doppler waveforms.     The ankle/brachial index is 0.91 (DP 98, PT 100mmHg).  Normal multiphasic flow in the common femoral artery indicates no significant aorto-iliac inflow disease.   Multiphasic flow throughout the left lower extremity without any evidence of significant focal lesions or stenosis.     XR right foot (7/18):  Findings:  Patient is status post fifth transmetatarsal amputation, in addition to chronic  amputations of the first and second digit. There is no evidence of complication.  No radiopaque foreign body is seen.  IMPRESSION:  Status post fifth transmetatarsal amputation.    ASSESSMENT/PLAN  -S/P I&D right foot with resection of 5th metatarsal with tendon transfer (DOS 8/22/24)  - I&D right foot with removal of bone (8/17/24)  -Full thickness ulceration, right lower extremity from surgical site dehiscence ([place] 3)  -TMA with UNRULY (DOS 7/24/24)   -PAD with recent intervention (RLE arteriogram with L CFA insertion site. Balloon angioplasty x2 7/22/24)  -T2DM with periphral neuropathy   -Hx TMA left LLE  -Osteomyelitis of the right foot    -Patient seen and examined at bedside   -  VSS, Leukocytosis noted (WBC 13.0)  -ESR 32 and CRP 30.7 (8/25)  -lactic acid 1.1 (8/15)  -HbA1c 5.8 (7/17/24)  -Prealbumin: 6.1.  -Images reviewed, impression noted above  -Arterial duplex results above  -TCPO2 with good wound healing potential 2.74  -Wound culture: Klebsiella Oxytoca   -Surgical pathology (8/17/24): fifth metetarsal postive for OM  -Surgical culture (8/22/24): Achrombacter species  -Continue IV antibiotics per ID: Fidaxomicin, Meropenem  -Recommend patient continue on IV antibiotics per ID as procedure was not felt to be definitve and patient will likely require PICC   -Patient will required wound vac in outpatient.   -Prevalon boots. Patient is to wear at all times while in bed to prevent further deep tissue injury.  -Non-weightbearing to right LE    -Plan: Continue antibiotics per ID. Keep feet elevated with prevalon boots.     DISPO:S/P I&D right foot with removal of fifth metatarsal and tendon transfer (DOS 8/22/24) with wound vac. Recommend patient continue on IV antibiotics per ID; Fidaxomicin and meropenem. Vascular duplex show CHIRAG of 0.53 of the right ankle and 0.91 of the left ankle. Procedure was not felt to be difinitive and recommend patient continue on antibiotics per ID. Patient will require wound VAC at discharge; all paperwork placed in patients chart. Podiatry will continue to follow patient while in house. Wound VAC change MWF.    Assessment and Plan was discussed with LUCILA Tovar DPM, MS  Podiatric Resident PGY-1  [place]  Pager #[phone]  8/27/2024, 6:26 AM

## 2024-08-28 LAB
ALBUMIN SERPL-MCNC: 2.7 G/DL (ref 3.4–5)
ANION GAP SERPL CALCULATED.3IONS-SCNC: 13 MMOL/L (ref 3–16)
ANTI-XA UNFRAC HEPARIN: 0.65 IU/ML (ref 0.3–0.7)
BUN SERPL-MCNC: 46 MG/DL (ref 7–20)
CALCIUM SERPL-MCNC: 8.4 MG/DL (ref 8.3–10.6)
CHLORIDE SERPL-SCNC: 100 MMOL/L (ref 99–110)
CO2 SERPL-SCNC: 26 MMOL/L (ref 21–32)
CREAT SERPL-MCNC: 10.2 MG/DL (ref 0.9–1.3)
DEPRECATED RDW RBC AUTO: 16.5 % (ref 12.4–15.4)
GFR SERPLBLD CREATININE-BSD FMLA CKD-EPI: 6 ML/MIN/{1.73_M2}
GLUCOSE BLD-MCNC: 100 MG/DL (ref 70–99)
GLUCOSE BLD-MCNC: 136 MG/DL (ref 70–99)
GLUCOSE BLD-MCNC: 174 MG/DL (ref 70–99)
GLUCOSE BLD-MCNC: 179 MG/DL (ref 70–99)
GLUCOSE SERPL-MCNC: 111 MG/DL (ref 70–99)
HCT VFR BLD AUTO: 25.8 % (ref 40.5–52.5)
HGB BLD-MCNC: 8.4 G/DL (ref 13.5–17.5)
MAGNESIUM SERPL-MCNC: 2.5 MG/DL (ref 1.8–2.4)
MCH RBC QN AUTO: 30.5 PG (ref 26–34)
MCHC RBC AUTO-ENTMCNC: 32.6 G/DL (ref 31–36)
MCV RBC AUTO: 93.4 FL (ref 80–100)
PERFORMED ON: ABNORMAL
PHOSPHATE SERPL-MCNC: 6.4 MG/DL (ref 2.5–4.9)
PLATELET # BLD AUTO: 252 K/UL (ref 135–450)
PMV BLD AUTO: 7.9 FL (ref 5–10.5)
POTASSIUM SERPL-SCNC: 5.2 MMOL/L (ref 3.5–5.1)
RBC # BLD AUTO: 2.76 M/UL (ref 4.2–5.9)
SODIUM SERPL-SCNC: 139 MMOL/L (ref 136–145)
WBC # BLD AUTO: 13.6 K/UL (ref 4–11)

## 2024-08-28 PROCEDURE — 6370000000 HC RX 637 (ALT 250 FOR IP)

## 2024-08-28 PROCEDURE — 83735 ASSAY OF MAGNESIUM: CPT

## 2024-08-28 PROCEDURE — 99232 SBSQ HOSP IP/OBS MODERATE 35: CPT | Performed by: INTERNAL MEDICINE

## 2024-08-28 PROCEDURE — 85520 HEPARIN ASSAY: CPT

## 2024-08-28 PROCEDURE — 6360000002 HC RX W HCPCS

## 2024-08-28 PROCEDURE — 1200000000 HC SEMI PRIVATE

## 2024-08-28 PROCEDURE — 90935 HEMODIALYSIS ONE EVALUATION: CPT

## 2024-08-28 PROCEDURE — 2580000003 HC RX 258

## 2024-08-28 PROCEDURE — 85027 COMPLETE CBC AUTOMATED: CPT

## 2024-08-28 PROCEDURE — 80069 RENAL FUNCTION PANEL: CPT

## 2024-08-28 RX ADMIN — HEPARIN SODIUM 19 UNITS/KG/HR: 10000 INJECTION, SOLUTION INTRAVENOUS at 00:53

## 2024-08-28 RX ADMIN — CARVEDILOL 6.25 MG: 6.25 TABLET, FILM COATED ORAL at 20:38

## 2024-08-28 RX ADMIN — FIDAXOMICIN 200 MG: 200 TABLET, FILM COATED ORAL at 20:39

## 2024-08-28 RX ADMIN — ATORVASTATIN CALCIUM 80 MG: 80 TABLET, FILM COATED ORAL at 20:38

## 2024-08-28 RX ADMIN — HEPARIN SODIUM 19 UNITS/KG/HR: 10000 INJECTION, SOLUTION INTRAVENOUS at 17:39

## 2024-08-28 RX ADMIN — EPOETIN ALFA-EPBX 10000 UNITS: 10000 INJECTION, SOLUTION INTRAVENOUS; SUBCUTANEOUS at 14:06

## 2024-08-28 RX ADMIN — SODIUM CHLORIDE, PRESERVATIVE FREE 10 ML: 5 INJECTION INTRAVENOUS at 20:42

## 2024-08-28 ASSESSMENT — PAIN SCALES - GENERAL: PAINLEVEL_OUTOF10: 0

## 2024-08-28 NOTE — PROGRESS NOTES
Attempted to transport patient to dialysis. Patient refused stating \"he was not going early in the morning anymore.\" Called dialysis RN and confirmed there was a later time within the next 2 hours. Will pass on to oncoming RN. Electronically signed by Caty Garcia RN on 8/28/2024 at 6:56 AM

## 2024-08-28 NOTE — DIALYSIS
Treatment time: 3.5 hours  Net UF: 1200 ml     Pre weight: 138.1 kg  Post weight:136.9 kg  EDW: tbd kg    Access used: RUAF     Access function: ok with  ml/min     Medications or blood products given: N/A     Regular outpatient schedule: MWF     Summary of response to treatment: Patient tolerated treatment well and without any complications. Report given to RN.  Copy of dialysis treatment record placed in chart, to be scanned into EMR.

## 2024-08-28 NOTE — CARE COORDINATION
CM noted DC order- pt has been cleared by vascular. Precert remains pending for return to [place]. Noemi in admissions has been instructed that pt cannot return to facility without precert, as pt is out of bed hold days, therefore will have to be approved for skilled services before admission. Precert was started last week, remains pending at this time.    Thank you  Cat Cyril GARCIA, BSN, CM  PCU   [phone]

## 2024-08-28 NOTE — PLAN OF CARE
Problem: Discharge Planning  Goal: Discharge to home or other facility with appropriate resources  8/28/2024 1656 by Gloria Mathias, RN  Outcome: Progressing  Flowsheets (Taken 8/28/2024 1656)  Discharge to home or other facility with appropriate resources:   Identify barriers to discharge with patient and caregiver   Identify discharge learning needs (meds, wound care, etc)   Refer to discharge planning if patient needs post-hospital services based on physician order or complex needs related to functional status, cognitive ability or social support system  Note: Awaiting precert for patient to return to [facility], possible discharge tomorrow      Problem: Safety - Adult  Goal: Free from fall injury  8/28/2024 1656 by Gloria Mathias RN  Outcome: Progressing  Flowsheets (Taken 8/28/2024 1656)  Free From Fall Injury: Instruct family/caregiver on patient safety  Note: Pt will remain free from accidental injury this shift. Pt has fall risk measures (fall risk bracelet, fall risk sign, fall risk cloth, non-slip socks, dome light on) in place. Pt bed is in low position, bed alarm on, bed wheels locked, call light within reach, bedside table within reach, chair wheels locked, chair alarm on.        Problem: Chronic Conditions and Co-morbidities  Goal: Patient's chronic conditions and co-morbidity symptoms are monitored and maintained or improved  8/28/2024 1656 by Gloria Mathias RN  Outcome: Progressing  Flowsheets (Taken 8/28/2024 1656)  Care Plan - Patient's Chronic Conditions and Co-Morbidity Symptoms are Monitored and Maintained or Improved:   Monitor and assess patient's chronic conditions and comorbid symptoms for stability, deterioration, or improvement   Collaborate with multidisciplinary team to address chronic and comorbid conditions and prevent exacerbation or deterioration  Note: Patient aware of own co-morbidities      Problem: ABCDS Injury Assessment  Goal: Absence of physical injury  Outcome:  Progressing  Flowsheets (Taken 8/28/2024 0506)  Absence of Physical Injury: Implement safety measures based on patient assessment  Note: Call light within reach and fall precautions in place

## 2024-08-28 NOTE — PROGRESS NOTES
Internal Medicine Progress Note    Date: 8/28/2024   Patient: Serafin Weaver   [facility] Day: 13      CC: Fatigue       Interval Hx   NAEON  Dialysis to be done       Objective     Vital Signs:  Patient Vitals for the past 8 hrs:   BP Temp Temp src Pulse Resp SpO2 Weight   08/28/24 1028 (!) 154/71 98 °F (36.7 °C) -- 72 17 98 % (!) 138.1 kg (304 lb 7.3 oz)   08/28/24 0752 126/68 98 °F (36.7 °C) Axillary 67 17 98 % --       Physical Exam   Vitals reviewed.   Constitutional:       General: He is not in acute distress.     Appearance: He is obese. He is ill-appearing.      Comments: No sings of acute hemorrhage  Minimal bloody discharge in wound vac   HENT:      Head: Normocephalic and atraumatic.      Right Ear: External ear normal.      Left Ear: External ear normal.      Nose: Nose normal.      Mouth/Throat:      Mouth: Mucous membranes are moist.   Eyes:      Extraocular Movements: Extraocular movements intact.      Pupils: Pupils are equal, round, and reactive to light.   Neck:      Comments: PICC in place; No acute signs of infection  Cardiovascular:      Rate and Rhythm: Normal rate and regular rhythm.      Heart sounds: Normal heart sounds. No murmur heard.  Pulmonary:      Effort: Pulmonary effort is normal. No respiratory distress.      Breath sounds: Normal breath sounds. No wheezing or rales.   Abdominal:      General: Abdomen is flat. There is no distension.      Palpations: Abdomen is soft.      Tenderness: There is no abdominal tenderness. There is no guarding or rebound.      Comments: obese pannus   Musculoskeletal:         General: Deformity (Right foot TMA (wrapped in bandage; wound vac placed)) and signs of injury present.      Cervical back: Neck supple.      Right lower leg: No edema.      Left lower leg: No edema.      Comments: Diminished sensation bilateral lower extremities (at baseline)  RUE: AV fistula   Skin:     General: Skin is warm and dry.      Capillary Refill: Capillary refill

## 2024-08-28 NOTE — PROGRESS NOTES
Vascular Surgery   Daily Progress Note  Patient: Serafin Weaver      CC:  Non healing RLE wound     SUBJECTIVE:   NAEON. Patient resting comfortably in bed. Pain controlled. Denies of any new changes to his wound. Tolerating diet. No other complaints reported at this time.     OBJECTIVE:    PHYSICAL EXAM:  General appearance: alert, no acute distress  Eyes: No scleral icterus, EOM grossly intact  Neck: Trachea midline, no JVD  Chest/Lungs: Normal effort with no accessory muscle use on RA  Cardiovascular: RRR, perfused  Skin: warm and dry, no rashes  Extremities: no edema, no cyanosis. RLE wound vac in place and suctioning. LLE well healed stump, dressing c/d/i. Palpable bilateral femoral pulses. Bilateral popliteal, right AT, and right PT dopplerable. Non tender.   Neuro: A&Ox3, sensation intact     ASSESSMENT & PLAN:   Serafin Weaver is a 44 y.o. 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      - Vascular Duplex of bilateral LE arteries reviewed with vascular team.  - No vascular intervention needed at this time.   - Recommend further work-up with Podiatry  - Rest of care per primary team.   - Vascular surgery will sign off    Please call vascular team for any further questions or concerns. Thank you!    Mera Wynne DO  PGY1, General Surgery  08/28/24  10:46 AM  [phone]

## 2024-08-28 NOTE — DISCHARGE SUMMARY
INTERNAL MEDICINE DEPARTMENT AT THE [facility]  DISCHARGE SUMMARY    Patient ID: Serafin Weaver                                             Discharge Date: 8/28/2024   Patient's PCP: Venita Kendrick MD                                          Discharge Physician: Niko Cuello MD MD  Admit Date: 8/15/2024   Admitting Physician: Santos Mathew MD    PROBLEMS DURING HOSPITALIZATION:  Present on Admission:   Sepsis (HCC)   Type 2 diabetes mellitus with other specified complication (HCC)   Acute hematogenous osteomyelitis of right foot (HCC)      HPI:    Serafin Weaver is a 44 y.o. with PMH of ESRD on iHD, DM type II, right foot osteomyelitis S/P TMA who presented from nursing home with lethargy for 1 day.      Patient was not cooperative, most of the hx was gotten from the chart review.   Patient presented from his nursing facility with lethargy. He has reportedly missed 3 session of dialysis as he reports it makes him tired. On my evaluation, patient denied any chest pain, SOB, abdominal pain, N/V, fever. Per ED report, patient has been having diarrhea and purulent discharge from his right leg wound.  Wound was cleaned and dressed while he was in the ED.    Right lower extremity revealed large ulcer with surrounding purulence suggestive of superficial infection so patient was started on broad-spectrum antibiotics vancomycin, cefepime, and Flagyl.  Fluid was not given due to ESRD history .  Workup showed anemia with hemoglobin of 6.9 from 7.7 likely due to chronic disease therefore 1 unit of packed RBC was given.  CT head ruled out encephalopathy as a cause of his lethargy.   BMP showed potassium 5.6 with peaked T wave on EKG. Patient was treated with cardiac calcium, insulin/dextrose, and Lokelma. Additionally, the laboratory results showed stable troponin level at 150. AST and ALT levels were elevated with no clear cause; however, low concern for acute cholecystitis, gallstone pancreatitis,

## 2024-08-28 NOTE — PLAN OF CARE
Problem: Discharge Planning  Goal: Discharge to home or other facility with appropriate resources  8/28/2024 0431 by Caty Garcia RN  Outcome: Progressing  Flowsheets (Taken 8/27/2024 1829 by Gloria Mathias, RN)  Discharge to home or other facility with appropriate resources:   Identify barriers to discharge with patient and caregiver   Identify discharge learning needs (meds, wound care, etc)     Problem: Skin/Tissue Integrity  Goal: Absence of new skin breakdown  Description: 1.  Monitor for areas of redness and/or skin breakdown  2.  Assess vascular access sites hourly  3.  Every 4-6 hours minimum:  Change oxygen saturation probe site  4.  Every 4-6 hours:  If on nasal continuous positive airway pressure, respiratory therapy assess nares and determine need for appliance change or resting period.  Outcome: Progressing     Problem: Safety - Adult  Goal: Free from fall injury  8/28/2024 0431 by Caty Garcia, RN  Outcome: Progressing  Flowsheets (Taken 8/27/2024 1829 by Gloria Mathias, RN)  Free From Fall Injury: Instruct family/caregiver on patient safety     Problem: Chronic Conditions and Co-morbidities  Goal: Patient's chronic conditions and co-morbidity symptoms are monitored and maintained or improved  8/28/2024 0431 by Caty Garcia RN  Outcome: Progressing  Flowsheets (Taken 8/27/2024 1829 by Gloria Mathias, RN)  Care Plan - Patient's Chronic Conditions and Co-Morbidity Symptoms are Monitored and Maintained or Improved:   Monitor and assess patient's chronic conditions and comorbid symptoms for stability, deterioration, or improvement   Collaborate with multidisciplinary team to address chronic and comorbid conditions and prevent exacerbation or deterioration     Problem: Metabolic/Fluid and Electrolytes - Adult  Goal: Glucose maintained within prescribed range  Outcome: Progressing  Flowsheets (Taken 8/28/2024 0431)  Glucose maintained within prescribed range:   Monitor blood glucose as ordered   Administer  ordered medications to maintain glucose within target range   Instruct patient on self management of diabetes and initiate consult as needed   Assess barriers to adequate nutritional intake and initiate nutrition consult as needed   Assess for signs and symptoms of hyperglycemia and hypoglycemia     Problem: Hematologic - Adult  Goal: Maintains hematologic stability  Outcome: Progressing  Flowsheets (Taken 8/28/2024 0251)  Maintains hematologic stability:   Assess for signs and symptoms of bleeding or hemorrhage   Administer blood products/factors as ordered   Monitor labs for bleeding or clotting disorders  Note: Heparin infusing. Anti-xa within therapeutic range.

## 2024-08-28 NOTE — PROGRESS NOTES
ID Follow-up NOTE    CC:   Right foot wound status post TMA  Antibiotics:  meropenem    Admit Date: 8/15/2024  Hospital Day: 14    Subjective:     Patient had no fevers overnight, no planned vascular surgery intervention.     Objective:     Patient Vitals for the past 8 hrs:   BP Temp Temp src Pulse Resp SpO2 Weight   08/28/24 1028 (!) 154/71 98 °F (36.7 °C) -- 72 17 98 % (!) 138.1 kg (304 lb 7.3 oz)   08/28/24 0752 126/68 98 °F (36.7 °C) Axillary 67 17 98 % --     I/O last 3 completed shifts:  In: 4158.2 [P.O.:540; I.V.:2760; IV Piggyback:858.2]  Out: -   I/O this shift:  In: 420 [P.O.:420]  Out: -     EXAM:  GENERAL: No apparent distress.    HEENT: Membranes moist, no oral lesion  NECK:  Supple, no lymphadenopathy  LUNGS: Clear b/l, no rales, no dullness  CARDIAC: RRR, no murmur appreciated  ABD:  + BS, soft / NT  EXT:  Left TMA site well-healed without evidence of ulceration.  The right TMA site with wound vac in place. See photo from 8/26:      NEURO: No focal neurologic findings  PSYCH: Orientation, sensorium, mood normal  LINES:  Peripheral iv, right arm AVF       Data Review:  Lab Results   Component Value Date    WBC 13.6 (H) 08/28/2024    HGB 8.4 (L) 08/28/2024    HCT 25.8 (L) 08/28/2024    MCV 93.4 08/28/2024     08/28/2024     Lab Results   Component Value Date    CREATININE 10.2 (HH) 08/28/2024    BUN 46 (H) 08/28/2024     08/28/2024    K 5.2 (H) 08/28/2024     08/28/2024    CO2 26 08/28/2024       Hepatic Function Panel:   Lab Results   Component Value Date/Time    ALKPHOS 94 08/23/2024 08:29 AM    ALT 40 08/23/2024 08:29 AM    AST 26 08/23/2024 08:29 AM    BILITOT 0.4 08/23/2024 08:29 AM    BILIDIR 0.2 08/23/2024 08:29 AM    IBILI 0.2 08/23/2024 08:29 AM       MICRO:  OR culture right foot 8/22: Achromobacter species isolated from broth  Achromobacter (1)    Antibiotic Interpretation Microscan  Method Status    amikacin Resistant >32 mcg/mL BACTERIAL SUSCEPTIBILITY PANEL BY VINOD

## 2024-08-28 NOTE — PROGRESS NOTES
Ph: [phone], Fax: [phone]           [facility]write[facility] Center[facility]               Reason for admission:                 Admitted for lethargy    Brief Summary :     Serafin Weaver is being seen by nephrology for ESRD on hemodialysis.      Interval History and plan:     BP is well controlled .  Labs reviewed with high K  He is very non complaint and refused several HD session while inpatient. Palliative care was consulted to assess wether he would like to quit dialysis. So far, he is intermittently going to dialysis.     I discussed with him about immediate mortality from being non complaint .       Hemodialysis per MWF schedule. HD arranged for today   Decrease midodrine dose as BP is better   Renal diet.  Continue Lokelma on non dialysis days   Replace Vitamin D   Daily renal panels.  Continue with Retacrit with dialysis. Very high ferritin ( should not get iron with dialysis )  Please adjust antibiotics to GFR less than 10 mL/min.                     Assessment :     1.  ESRD:  Will plan HD per schedule.  He gets dialysis Monday, Wednesday and Friday.  He gets dialysis currently at [facility] under our care.  Access: AV graft  Daily weights  Fluid restriction: 1 L unless hypotensive  Nephrocap 1 tab PO daily    2.  Anemia:  Erythropoetin dose: He will receive Retacrit with dialysis to keep hemoglobin close to 10.  Hemoglobin admission was 6.9.  Ferritin is greater than 1000.    3.  Osteodystrophy:  Phosphate Binder: Will continue with Renvela 2 tabs p.o. 3 times daily with meals.  I will check PTH and vitamin D.  Daily renal panels.    4.  Hyperkalemia: Resolved.  He is on chronic Lokelma 10 g q. Tuesday, Thursday and Saturday on nondialysis days.    5.  Hypertension: well-controlled.  Continue with carvedilol.    6.  Wound infection: Podiatry consulted.  ID consulted.  He is on cefepime and vancomycin.  He is also getting oral vancomycin for recent C. difficile infection.             Mt

## 2024-08-28 NOTE — PROGRESS NOTES
Podiatric Surgery Daily Progress Note  Serafin Weaver      Subjective :   Patient seen and examined this am at the bedside. S/P I&D right foot with resection of 5th metatarsal with tendon transfer (DOS 8/22/24). Patient expresses desire to not have wound vac changes and would like to stop, dicussed with patient the importance of continuing this care.     Review of Systems: A 12 point review of symptoms is unremarkable with the exception of the chief complaint. Patient specifically denies nausea, fever, vomiting, chills, shortness of breath, chest pain, abdominal pain, constipation or difficulty urinating.       Objective   /64   Pulse 74   Temp 97.6 °F (36.4 °C) (Axillary)   Resp 18   Ht 2.032 m (6' 8\")   Wt (!) 139.7 kg (307 lb 15.7 oz)   SpO2 97%   BMI 33.83 kg/m²      I/O:  Intake/Output Summary (Last 24 hours) at 8/28/2024 0531  Last data filed at 8/27/2024 1820  Gross per 24 hour   Intake 360 ml   Output --   Net 360 ml              Wt Readings from Last 3 Encounters:   08/27/24 (!) 139.7 kg (307 lb 15.7 oz)   08/08/24 (!) 139.3 kg (307 lb 3.2 oz)   07/30/24 (!) 153.5 kg (338 lb 6.5 oz)       LABS:    Recent Labs     08/26/24  0553 08/26/24  1439 08/27/24  0500   WBC 12.7*  --  13.0*   HGB 8.3* 8.5* 8.3*   HCT 25.1* 26.7* 26.8*     --  254        Recent Labs     08/27/24  0500   *   K 5.3*   CL 98*   CO2 27   PHOS 5.9*   BUN 36*   CREATININE 8.4*        No results for input(s): \"INR\", \"APTT\" in the last 72 hours.    Invalid input(s): \"PROT\"    LOWER RIGHT EXTREMITY EXAMINATION    Wound VAC application:   At this time a piece of non-adherent dressing was placed over the incision site. Next, a [employer] Wound Vac was placed over the patient's incision site using the manufacturers instructions. Tegaderm was used to cover and protect the surrounding soft tissues from maceration. The white and black foam was cut to size and placed over the incision site. This was then covered with clear  (normal) Doppler waveforms.   Profunda Artery: Multiphasic (normal) Doppler waveforms. .   Proximal Superficial Femoral Artery: Multiphasic (normal) Doppler waveforms.   Middle Superficial Femoral Artery: Multiphasic (normal) Doppler waveforms.   Distal Superficial Femoral Artery: Multiphasic (normal) Doppler waveforms.   Proximal Popliteal Artery: Multiphasic (normal) Doppler waveforms.   Distal Popliteal Artery: Multiphasic (normal) Doppler waveforms.   Anterior Tibial Artery: Multiphasic (normal) Doppler waveforms.   Tibial/Peroneal Trunk: Multiphasic (normal) Doppler waveforms.   Posterior Tibial Artery: Multiphasic (normal) Doppler waveforms.   Peroneal Artery: Multiphasic (normal) Doppler waveforms.     The ankle/brachial index is 0.91 (DP 98, PT 100mmHg).  Normal multiphasic flow in the common femoral artery indicates no significant aorto-iliac inflow disease.   Multiphasic flow throughout the left lower extremity without any evidence of significant focal lesions or stenosis.     XR right foot (7/18):  Findings:  Patient is status post fifth transmetatarsal amputation, in addition to chronic  amputations of the first and second digit. There is no evidence of complication.  No radiopaque foreign body is seen.  IMPRESSION:  Status post fifth transmetatarsal amputation.    ASSESSMENT/PLAN  -S/P I&D right foot with resection of 5th metatarsal with tendon transfer (DOS 8/22/24)  - I&D right foot with removal of bone (8/17/24)  -Full thickness ulceration, right lower extremity from surgical site dehiscence ([place] 3)  -TMA with UNRULY (DOS 7/24/24)   -PAD with recent intervention (RLE arteriogram with L CFA insertion site. Balloon angioplasty x2 7/22/24)  -T2DM with periphral neuropathy   -Hx TMA left LLE  -Osteomyelitis of the right foot    -Patient seen and examined at bedside   - VSS, Leukocytosis noted (WBC 13.6)  -ESR 32 and CRP 30.7 (8/25)  -lactic acid 1.1 (8/15)  -HbA1c 5.8 (7/17/24)  -Prealbumin:

## 2024-08-29 VITALS
TEMPERATURE: 97.9 F | WEIGHT: 313.27 LBS | RESPIRATION RATE: 18 BRPM | HEIGHT: 78 IN | HEART RATE: 73 BPM | OXYGEN SATURATION: 99 % | BODY MASS INDEX: 36.25 KG/M2 | DIASTOLIC BLOOD PRESSURE: 71 MMHG | SYSTOLIC BLOOD PRESSURE: 178 MMHG

## 2024-08-29 PROBLEM — L97.519 ULCER OF RIGHT FOOT (HCC): Status: ACTIVE | Noted: 2024-08-29

## 2024-08-29 LAB
ALBUMIN SERPL-MCNC: 2.9 G/DL (ref 3.4–5)
ANION GAP SERPL CALCULATED.3IONS-SCNC: 9 MMOL/L (ref 3–16)
ANTI-XA UNFRAC HEPARIN: 0.21 IU/ML (ref 0.3–0.7)
ANTI-XA UNFRAC HEPARIN: 0.55 IU/ML (ref 0.3–0.7)
ANTI-XA UNFRAC HEPARIN: 0.61 IU/ML (ref 0.3–0.7)
BUN SERPL-MCNC: 29 MG/DL (ref 7–20)
CALCIUM SERPL-MCNC: 8.8 MG/DL (ref 8.3–10.6)
CHLORIDE SERPL-SCNC: 100 MMOL/L (ref 99–110)
CO2 SERPL-SCNC: 28 MMOL/L (ref 21–32)
CREAT SERPL-MCNC: 8.4 MG/DL (ref 0.9–1.3)
DEPRECATED RDW RBC AUTO: 16.9 % (ref 12.4–15.4)
GFR SERPLBLD CREATININE-BSD FMLA CKD-EPI: 7 ML/MIN/{1.73_M2}
GLUCOSE BLD-MCNC: 141 MG/DL (ref 70–99)
GLUCOSE BLD-MCNC: 201 MG/DL (ref 70–99)
GLUCOSE BLD-MCNC: 91 MG/DL (ref 70–99)
GLUCOSE SERPL-MCNC: 102 MG/DL (ref 70–99)
HCT VFR BLD AUTO: 27.4 % (ref 40.5–52.5)
HGB BLD-MCNC: 8.6 G/DL (ref 13.5–17.5)
MAGNESIUM SERPL-MCNC: 2.3 MG/DL (ref 1.8–2.4)
MCH RBC QN AUTO: 29.3 PG (ref 26–34)
MCHC RBC AUTO-ENTMCNC: 31.2 G/DL (ref 31–36)
MCV RBC AUTO: 93.7 FL (ref 80–100)
PERFORMED ON: ABNORMAL
PERFORMED ON: ABNORMAL
PERFORMED ON: NORMAL
PHOSPHATE SERPL-MCNC: 5.6 MG/DL (ref 2.5–4.9)
PLATELET # BLD AUTO: 241 K/UL (ref 135–450)
PMV BLD AUTO: 8 FL (ref 5–10.5)
POTASSIUM SERPL-SCNC: 4.8 MMOL/L (ref 3.5–5.1)
RBC # BLD AUTO: 2.92 M/UL (ref 4.2–5.9)
SODIUM SERPL-SCNC: 137 MMOL/L (ref 136–145)
WBC # BLD AUTO: 11 K/UL (ref 4–11)

## 2024-08-29 PROCEDURE — 6370000000 HC RX 637 (ALT 250 FOR IP)

## 2024-08-29 PROCEDURE — 2580000003 HC RX 258

## 2024-08-29 PROCEDURE — 85520 HEPARIN ASSAY: CPT

## 2024-08-29 PROCEDURE — 2580000003 HC RX 258: Performed by: INTERNAL MEDICINE

## 2024-08-29 PROCEDURE — 6360000002 HC RX W HCPCS

## 2024-08-29 PROCEDURE — 83735 ASSAY OF MAGNESIUM: CPT

## 2024-08-29 PROCEDURE — 80069 RENAL FUNCTION PANEL: CPT

## 2024-08-29 PROCEDURE — 6370000000 HC RX 637 (ALT 250 FOR IP): Performed by: STUDENT IN AN ORGANIZED HEALTH CARE EDUCATION/TRAINING PROGRAM

## 2024-08-29 PROCEDURE — 85027 COMPLETE CBC AUTOMATED: CPT

## 2024-08-29 PROCEDURE — 6370000000 HC RX 637 (ALT 250 FOR IP): Performed by: INTERNAL MEDICINE

## 2024-08-29 RX ADMIN — HEPARIN SODIUM 21 UNITS/KG/HR: 10000 INJECTION, SOLUTION INTRAVENOUS at 02:41

## 2024-08-29 RX ADMIN — MIDODRINE HYDROCHLORIDE 5 MG: 5 TABLET ORAL at 13:31

## 2024-08-29 RX ADMIN — SODIUM ZIRCONIUM CYCLOSILICATE 10 G: 10 POWDER, FOR SUSPENSION ORAL at 08:27

## 2024-08-29 RX ADMIN — FIDAXOMICIN 200 MG: 200 TABLET, FILM COATED ORAL at 10:11

## 2024-08-29 RX ADMIN — CARVEDILOL 6.25 MG: 6.25 TABLET, FILM COATED ORAL at 08:29

## 2024-08-29 RX ADMIN — SEVELAMER CARBONATE 1600 MG: 800 TABLET, FILM COATED ORAL at 13:31

## 2024-08-29 RX ADMIN — SODIUM CHLORIDE, PRESERVATIVE FREE 10 ML: 5 INJECTION INTRAVENOUS at 08:15

## 2024-08-29 RX ADMIN — FLUOXETINE HYDROCHLORIDE 10 MG: 10 CAPSULE ORAL at 08:29

## 2024-08-29 RX ADMIN — Medication 1 MG: at 08:29

## 2024-08-29 RX ADMIN — HEPARIN SODIUM 21 UNITS/KG/HR: 10000 INJECTION, SOLUTION INTRAVENOUS at 14:22

## 2024-08-29 RX ADMIN — SEVELAMER CARBONATE 1600 MG: 800 TABLET, FILM COATED ORAL at 08:30

## 2024-08-29 RX ADMIN — INSULIN LISPRO 1 UNITS: 100 INJECTION, SOLUTION INTRAVENOUS; SUBCUTANEOUS at 13:30

## 2024-08-29 RX ADMIN — MEROPENEM 500 MG: 500 INJECTION, POWDER, FOR SOLUTION INTRAVENOUS at 13:30

## 2024-08-29 RX ADMIN — SEVELAMER CARBONATE 1600 MG: 800 TABLET, FILM COATED ORAL at 18:09

## 2024-08-29 RX ADMIN — PREGABALIN 75 MG: 75 CAPSULE ORAL at 08:29

## 2024-08-29 RX ADMIN — HEPARIN SODIUM 5000 UNITS: 1000 INJECTION INTRAVENOUS; SUBCUTANEOUS at 00:20

## 2024-08-29 NOTE — PLAN OF CARE
Problem: Discharge Planning  Goal: Discharge to home or other facility with appropriate resources  8/28/2024 2115 by Marian Bal RN  Outcome: Progressing  Flowsheets (Taken 8/28/2024 1656 by Gloria Mathias, RN)  Discharge to home or other facility with appropriate resources:   Identify barriers to discharge with patient and caregiver   Identify discharge learning needs (meds, wound care, etc)   Refer to discharge planning if patient needs post-hospital services based on physician order or complex needs related to functional status, cognitive ability or social support system  8/28/2024 1656 by Gloria Mathias, RN  Outcome: Progressing  Flowsheets (Taken 8/28/2024 1656)  Discharge to home or other facility with appropriate resources:   Identify barriers to discharge with patient and caregiver   Identify discharge learning needs (meds, wound care, etc)   Refer to discharge planning if patient needs post-hospital services based on physician order or complex needs related to functional status, cognitive ability or social support system  Note: Awaiting precert for patient to return to [facility], possible discharge tomorrow   8/28/2024 1239 by Gloria Mathias RN  Outcome: Progressing     Problem: Skin/Tissue Integrity  Goal: Absence of new skin breakdown  Description: 1.  Monitor for areas of redness and/or skin breakdown  2.  Assess vascular access sites hourly  3.  Every 4-6 hours minimum:  Change oxygen saturation probe site  4.  Every 4-6 hours:  If on nasal continuous positive airway pressure, respiratory therapy assess nares and determine need for appliance change or resting period.  Outcome: Progressing     Problem: Safety - Adult  Goal: Free from fall injury  8/28/2024 2115 by Marian Bal, RN  Outcome: Progressing  Flowsheets (Taken 8/28/2024 1656 by Gloria Mathias, RN)  Free From Fall Injury: Instruct family/caregiver on patient safety  8/28/2024 1656 by Gloria Mathias RN  Outcome: Progressing  Flowsheets (Taken

## 2024-08-29 NOTE — CARE COORDINATION
Spoke to Noemi at [facility] and they are still awaiting precert. Electronically signed by Claire Patrick RN on 8/29/2024 at 1:48 PM

## 2024-08-29 NOTE — PROGRESS NOTES
Internal Medicine Progress Note    Date: 8/29/2024   Patient: Serafin Weaver   [facility] Day: 14      CC: Fatigue       Interval Hx   NAEON  Placement pending      Objective     Vital Signs:  Patient Vitals for the past 8 hrs:   BP Temp Temp src Pulse Resp SpO2 Weight   08/29/24 0801 (!) 147/78 98.1 °F (36.7 °C) Oral 67 18 96 % --   08/29/24 0600 -- -- -- -- -- -- (!) 142.1 kg (313 lb 4.4 oz)       Physical Exam   Vitals reviewed.   Constitutional:       General: He is not in acute distress.     Appearance: He is obese. He is ill-appearing.      Comments: No sings of acute hemorrhage  Minimal bloody discharge in wound vac   HENT:      Head: Normocephalic and atraumatic.      Right Ear: External ear normal.      Left Ear: External ear normal.      Nose: Nose normal.      Mouth/Throat:      Mouth: Mucous membranes are moist.   Eyes:      Extraocular Movements: Extraocular movements intact.      Pupils: Pupils are equal, round, and reactive to light.   Neck:      Comments: PICC in place; No acute signs of infection  Cardiovascular:      Rate and Rhythm: Normal rate and regular rhythm.      Heart sounds: Normal heart sounds. No murmur heard.  Pulmonary:      Effort: Pulmonary effort is normal. No respiratory distress.      Breath sounds: Normal breath sounds. No wheezing or rales.   Abdominal:      General: Abdomen is flat. There is no distension.      Palpations: Abdomen is soft.      Tenderness: There is no abdominal tenderness. There is no guarding or rebound.      Comments: obese pannus   Musculoskeletal:         General: Deformity (Right foot TMA (wrapped in bandage; wound vac placed)) and signs of injury present.      Cervical back: Neck supple.      Right lower leg: No edema.      Left lower leg: No edema.      Comments: Diminished sensation bilateral lower extremities (at baseline)  RUE: AV fistula   Skin:     General: Skin is warm and dry.      Capillary Refill: Capillary refill takes less than 2  signed by Yoni Calabrese MD      US DUP ABD PEL RETRO SCROT COMPLETE   Final Result      Cholelithiasis without sonographic evidence of acute cholecystitis.      No evidence of portal vein thrombosis.      Electronically signed by Yoni Calabrese MD      CT HEAD WO CONTRAST   Final Result      1. Age-related cortical atrophy   2. No acute intracranial hemorrhage.      Electronically signed by Jessica Patiño      XR FOOT RIGHT (MIN 3 VIEWS)   Final Result   Interval amputation.      Electronically signed by Jessica Patiño      XR CHEST PORTABLE   Final Result   No acute cardiopulmonary abnormality.      Electronically signed by Mark Atif            Assessment & Plan   Serafin Weaver is a 44 y.o. with PMH of ESRD on HD (MWF), DM type II, right foot osteomyelitis s/p TMA and left foot partial amputation who presented from nursing home with lethargy for 1 day s/p x3 missed HD sessions        Infected right foot wound with recent C. Difficile infection  History of right foot osteomyelitis s/p TMA 07/24 with reported purulent discharge from wound on presentation. Patient was also on oral vancomycin for recent C. Dificile infection. Wound cultures with ESBL Klebsiella and Achromobacter grown. Currently being treated with Merrem. PICC in place. Blood cultures negative. Podiatry has seen with no additional recommendations for surgical intervention. At this time, doppler is currently pending. Will discuss results of BLE doppler with vascular team. Can likely discharge if no additional intervention is required.  - Follow duplex of b/l legs. Vascular recs after duplex  - Continue Merrem renally dosed  -Fidoxamicin stopped.   -Will check C diff antigen at LTC  .     # Acute on chronic anemia s/p 3U pRBC (likely 2/2 oozing from right foot wound)  # History of hypercoagulable state(anticardiolipin antibody positive)  # hx of ischemic strokes, RA thrombus  The patient presented with Hg 6.9 from baseline of 8-10  Patient on

## 2024-08-29 NOTE — PROGRESS NOTES
Wound vac disconnected from pt. Wet to dry dressing in place. Pt to be placed on wound vac at facility. Pt compliant

## 2024-08-29 NOTE — CARE COORDINATION
Faxed AVS and discharge packet to [facility]. Electronically signed by Claire Patrick RN on 8/29/2024 at 4:25 PM

## 2024-08-29 NOTE — PROGRESS NOTES
Pt arrived to room 6324. Pt alert and oriented x4. VSS. Pt states no pain. Pt oriented to floor. Pt educated on safety, call light use. All belongings at bedside, including dentures. Heparin drip signed off. Wound vac in place in E.

## 2024-08-29 NOTE — CARE COORDINATION
Precert approved. To [facility] at 6:30 pm today by [facility].  NR: [phone]  F: [phone]  Electronically signed by Claire Patrick RN on 8/29/2024 at 3:51 PM

## 2024-08-29 NOTE — CARE COORDINATION
Case Management Assessment            Discharge Note                    Date / Time of Note: 8/29/2024 3:44 PM                  Discharge Note Completed by: Claire Patrick RN    Patient Name: Serafin Weaver   YOB: 1980  Diagnosis: Septicemia (HCC) [A41.9]  Transaminitis [R74.01]  ESRD (end stage renal disease) (HCC) [N18.6]  Sepsis (HCC) [A41.9]  Ulcer of right foot, unspecified ulcer stage (HCC) [L97.519]  Anemia due to chronic kidney disease, unspecified CKD stage [N18.9, D63.1]   Date / Time: 8/15/2024  3:35 PM    Current PCP: Venita Kendrick MD  Clinic patient: No    Hospitalization in the last 30 days: Yes  Readmission Assessment  Number of Days since last admission?: 8-30 days  Previous Disposition: Long Term Care  Who is being Interviewed: Caregiver  What was the patient's/caregiver's perception as to why they think they needed to return back to the hospital?: Other (Comment) (refused dialysis)  Did you visit your Primary Care Physician after you left the hospital, before you returned this time?: Yes  Did you see a specialist, such as Cardiac, Pulmonary, Orthopedic Physician, etc. after you left the hospital?: No  Who advised the patient to return to the hospital?: Other (Comment) (LTC)  Does the patient report anything that got in the way of taking their medications?: No  In our efforts to provide the best possible care to you and others like you, can you think of anything that we could have done to help you after you left the hospital the first time, so that you might not have needed to return so soon?: Other (Comment) (n/a)    Advance Directives:  Code Status: Full Code  [place] DNR form completed and on chart: Not Indicated    Financial:  Payor: AETNA / Plan: AETNA HMO / Product Type: *No Product type* /      Pharmacy:    [facility] ([place]), [place] - [address] - P [phone] - F [phone]  [address] [facility] ([place]) [place]  [address]  Phone: [phone] Fax: [phone]    [id number] - Closed - [place], [place] - [address] - P [phone] - F [phone]  [address]  [facility] [address]  Phone: [phone] Fax: [phone]    [id number] - [place], [place] - [address] - P [phone] - F [phone]  [address]  [facility] [address]  Phone: [phone] Fax: [phone]      Assistance purchasing medications?:    Assistance provided by Case Management: None at this time    Does patient want to participate in local refill/ meds to beds program?:      Meds To Beds General Rules:  1. Can ONLY be done Monday- Friday between 8:30am-5pm  2. Prescription(s) must be in pharmacy by 3pm to be filled same day  3.Copy of patient's insurance/ prescription drug card and patient face sheet must be sent along with the prescription(s)  4. Cost of Rx cannot be added to hospital bill. If financial assistance is needed, please contact unit  or ;  or  CANNOT provide pharmacy voucher for patients co-pays  5. Patients can then  the prescription on their way out of the hospital at discharge, or pharmacy can deliver to the bedside if staff is available. (payment due at time of pick-up or delivery - cash, check, or card accepted)     Able to afford home medications/ co-pay costs: LTC    ADLS:  Current PT AM-PAC Score:   /24  Current OT AM-PAC Score:   /24    DISCHARGE Disposition: Long Term Care Facility (LTC): [facility]  Phone: [phone]  Fax: [phone]    LOC at discharge: Long Term Care  CORINA Completed: Yes    Notification completed in HENS/PAS?:  Not Applicable    IMM Completed:   Not Indicated due to: commercial insurance         Transportation:  Transportation PLAN for discharge: EMS transportation   Mode of Transport: Ambulance stretcher - BLS  Reason for medical transport: Other: Cannot pivot due to BLE wounds and wound vac and Special handling en

## 2024-08-29 NOTE — PROGRESS NOTES
Pt discharged back to [facility] via EMS transport. Report called to JOSE Regan. Pt discharged [facility] [place] [facility]. Wound dressed with wet to dry dressing. IV discontinued with no complications. Tele discontinued. Pt alert and Ox4, VSS at d/c. All belongings collected and given to pt (including dentures).

## 2024-08-29 NOTE — PROGRESS NOTES
Ph: [phone], Fax: [phone]           [facility]               Reason for admission:                 Admitted for lethargy    Brief Summary :     Serafin Weaver is being seen by nephrology for ESRD on hemodialysis.      Interval History and plan:     BP is well controlled .  Labs reviewed from today   He is very non complaint and refused several HD session while inpatient. Palliative care was consulted to assess wether he would like to quit dialysis. So far, he is intermittently going to dialysis.     HD yesterday for 3.5 hrs and 1200 ml removed        Hemodialysis per MWF schedule. HD arranged for am   Decrease midodrine dose as BP is better   Renal diet.  Continue Lokelma on non dialysis days   Replace Vitamin D   Daily renal panels.  Continue with Retacrit with dialysis.   Please adjust antibiotics to GFR less than 10 mL/min.                     Assessment :     1.  ESRD:  Will plan HD per schedule.  He gets dialysis Monday, Wednesday and Friday.  He gets dialysis currently at [facility] under our care.  Access: AV graft  Daily weights  Fluid restriction: 1 L unless hypotensive  Nephrocap 1 tab PO daily    2.  Anemia:  Erythropoetin dose: He will receive Retacrit with dialysis to keep hemoglobin close to 10.  Hemoglobin admission was 6.9.  Ferritin is greater than 1000.    3.  Osteodystrophy:  Phosphate Binder: Will continue with Renvela 2 tabs p.o. 3 times daily with meals.  I will check PTH and vitamin D.  Daily renal panels.    4.  Hyperkalemia: Resolved.  He is on chronic Lokelma 10 g q. Tuesday, Thursday and Saturday on nondialysis days.    5.  Hypertension: well-controlled.  Continue with carvedilol.    6.  Wound infection: Podiatry consulted.  ID consulted.  He is on cefepime and vancomycin.  He is also getting oral vancomycin for recent C. difficile infection.             [facility] would like to thank Krishna Obando MD   for opportunity to serve this  125 mL, 125 mL, IntraVENous, PRN **OR** dextrose bolus 10% 250 mL, 250 mL, IntraVENous, PRN  glucagon injection 1 mg, 1 mg, SubCUTAneous, PRN  dextrose 10 % infusion, , IntraVENous, Continuous PRN  Virt-Caps 1 mg, 1 capsule, Oral, Daily  0.9 % sodium chloride infusion, , IntraVENous, PRN  midodrine (PROAMATINE) tablet 5 mg, 5 mg, Oral, TID WC  sodium chloride flush 0.9 % injection 5-40 mL, 5-40 mL, IntraVENous, 2 times per day  sodium chloride flush 0.9 % injection 5-40 mL, 5-40 mL, IntraVENous, PRN  0.9 % sodium chloride infusion, , IntraVENous, PRN  sodium chloride flush 0.9 % injection 5-40 mL, 5-40 mL, IntraVENous, 2 times per day  sodium chloride flush 0.9 % injection 5-40 mL, 5-40 mL, IntraVENous, PRN  lidocaine PF 1 % injection 50 mg, 50 mg, IntraDERmal, Once  traMADol (ULTRAM) tablet 50 mg, 50 mg, Oral, Q12H PRN **OR** traMADol (ULTRAM) tablet 100 mg, 100 mg, Oral, Q12H PRN  Fidaxomicin (DIFICID) tablet 200 mg, 200 mg, Oral, BID  meropenem (MERREM) 500 mg in sodium chloride 0.9 % 100 mL IVPB (mini-bag), 500 mg, IntraVENous, Q24H  vitamin D (ERGOCALCIFEROL) capsule 50,000 Units, 50,000 Units, Oral, Weekly  epoetin rae-epbx (RETACRIT) injection 10,000 Units, 10,000 Units, IntraVENous, Once per day on Monday Wednesday Friday  sodium chloride 0.9 % bolus 100 mL, 100 mL, IntraVENous, PRN  sodium zirconium cyclosilicate (LOKELMA) oral suspension 10 g, 10 g, Oral, Once per day on Tuesday Thursday Saturday  heparin (porcine) injection 10,000 Units, 10,000 Units, IntraVENous, PRN  heparin (porcine) injection 5,000 Units, 5,000 Units, IntraVENous, PRN  heparin 25,000 units in dextrose 5% 250 mL (premix) infusion, 5-30 Units/kg/hr, IntraVENous, Continuous  0.9 % sodium chloride infusion, , IntraVENous, PRN  dextrose 10 % infusion, , IntraVENous, Continuous PRN  atorvastatin (LIPITOR) tablet 80 mg, 80 mg, Oral, QHS  carvedilol (COREG) tablet 6.25 mg, 6.25 mg, Oral, BID  [Held by provider] clopidogrel (PLAVIX) tablet 75  mg, 75 mg, Oral, Daily  FLUoxetine (PROZAC) capsule 10 mg, 10 mg, Oral, Daily  pregabalin (LYRICA) capsule 75 mg, 75 mg, Oral, Daily  sevelamer (RENVELA) tablet 1,600 mg, 1,600 mg, Oral, TID WC  sodium chloride flush 0.9 % injection 5-40 mL, 5-40 mL, IntraVENous, 2 times per day  sodium chloride flush 0.9 % injection 5-40 mL, 5-40 mL, IntraVENous, PRN  0.9 % sodium chloride infusion, , IntraVENous, PRN  ondansetron (ZOFRAN-ODT) disintegrating tablet 4 mg, 4 mg, Oral, Q8H PRN **OR** ondansetron (ZOFRAN) injection 4 mg, 4 mg, IntraVENous, Q6H PRN  polyethylene glycol (GLYCOLAX) packet 17 g, 17 g, Oral, Daily PRN    Vitals :     Vitals:    08/29/24 0801   BP: (!) 147/78   Pulse: 67   Resp: 18   Temp: 98.1 °F (36.7 °C)   SpO2: 96%          Physical Examination :     appearance: Alert, disoriented  Respiratory: no distress on room air  Cardiovascular:  Trace edema.   Abdomen: -  soft  Other relevant findings:      Labs :     CBC:   Recent Labs     08/27/24  0500 08/28/24  0545 08/29/24  0612   WBC 13.0* 13.6* 11.0   HGB 8.3* 8.4* 8.6*   HCT 26.8* 25.8* 27.4*    252 241     BMP:    Recent Labs     08/27/24  0500 08/28/24  0545 08/29/24  0612   * 139 137   K 5.3* 5.2* 4.8   CL 98* 100 100   CO2 27 26 28   BUN 36* 46* 29*   CREATININE 8.4* 10.2* 8.4*   GLUCOSE 176* 111* 102*   MG 2.40 2.50* 2.30   PHOS 5.9* 6.4* 5.6*     Lab Results   Component Value Date/Time    COLORU Yellow 04/24/2023 04:48 PM    NITRU Negative 04/24/2023 04:48 PM    GLUCOSEU 250 04/24/2023 04:48 PM    KETUA 15 04/24/2023 04:48 PM    UROBILINOGEN 0.2 04/24/2023 04:48 PM    BILIRUBINUR Negative 04/24/2023 04:48 PM        ----------------------------------------------------------  Please call with questions at      24 Hrs Answering service [phone]  Perfect serve, or cell phone 7 am - 5pm  Jordin Kaur MD   [email or web address]

## 2024-08-29 NOTE — PROGRESS NOTES
4 Eyes Skin Assessment     NAME:  Serafin Weaver  YOB: 1980  MEDICAL RECORD NUMBER:  [id number]    The patient is being assessed for  Transfer to New Unit    I agree that at least one RN has performed a thorough Head to Toe Skin Assessment on the patient. ALL assessment sites listed below have been assessed.      Areas assessed by both nurses:    Head, Face, Ears, Shoulders, Back, Chest, Arms, Elbows, Hands, Sacrum. Buttock, Coccyx, Ischium, Legs. Feet and Heels, and Under Medical Devices         Does the Patient have a Wound? Yes wound(s) were present on assessment. LDA wound assessment was Initiated and completed by RN     Healing wound with wound vac in place on L foot   Scattered scarring    Alf Prevention initiated by RN: Yes  Wound Care Orders initiated by RN: No  Already in place    Pressure Injury (Stage 3,4, Unstageable, DTI, NWPT, and Complex wounds) if present, place Wound referral order by RN under : No    New Ostomies, if present place, Ostomy referral order under : No     Nurse 1 eSignature: Electronically signed by Jenise Morales RN on 8/29/24 at 8:11 AM EDT    **SHARE this note so that the co-signing nurse can place an eSignature**    Nurse 2 eSignature: Electronically signed by Leslie Leal RN on 8/29/24 at 4:40 PM EDT

## 2024-08-29 NOTE — PROGRESS NOTES
Podiatric Surgery Daily Progress Note  Serafin Weaver      Subjective :   Patient seen and examined this am at the bedside. Patient denies any foot pain at this time. No other complaints.     Review of Systems: A 12 point review of symptoms is unremarkable with the exception of the chief complaint. Patient specifically denies nausea, fever, vomiting, chills, shortness of breath, chest pain, abdominal pain, constipation or difficulty urinating.       Objective   BP (!) 148/76   Pulse 69   Temp 97.9 °F (36.6 °C) (Oral)   Resp 18   Ht 2.032 m (6' 8\")   Wt (!) 138.1 kg (304 lb 7.3 oz)   SpO2 99%   BMI 33.45 kg/m²      I/O:  Intake/Output Summary (Last 24 hours) at 8/29/2024 0443  Last data filed at 8/28/2024 1756  Gross per 24 hour   Intake 4508.24 ml   Output --   Net 4508.24 ml              Wt Readings from Last 3 Encounters:   08/28/24 (!) 138.1 kg (304 lb 7.3 oz)   08/08/24 (!) 139.3 kg (307 lb 3.2 oz)   07/30/24 (!) 153.5 kg (338 lb 6.5 oz)       LABS:    Recent Labs     08/27/24  0500 08/28/24  0545   WBC 13.0* 13.6*   HGB 8.3* 8.4*   HCT 26.8* 25.8*    252        Recent Labs     08/28/24  0545      K 5.2*      CO2 26   PHOS 6.4*   BUN 46*   CREATININE 10.2*        No results for input(s): \"INR\", \"APTT\" in the last 72 hours.    Invalid input(s): \"PROT\"    LOWER RIGHT EXTREMITY EXAMINATION    No strikethrough noted to external dressing.     Sensation intact proximal to dressing. No pain with calf compression bilateral. Patient able to perform active range of motion at ankle joints bilateral.     Wound vac noted to right, left clean, dry, and intact. Excellent seal noted with suction set to 150 mmHg. Wound vac seal intact with excellent suction noted    IMAGING:  Vascular arterial duplex  The right ankle brachial index was 0.53. The left ankle brachial index was 0.91     XR right foot (8/17/24): Post op  FINDINGS:  Status post transmetatarsal amputation of the foot. There is adjacent  soft  tissue swelling. A bandage is present overlying the foot.    XR Right foot (8/16/24):  Findings:   Interval transmetatarsal amputation proximal aspect first through fifth metatarsals. Minimal air within the soft tissues near the first metatarsal probably postoperative. Some soft tissue swelling distally. No acute fracture. No osseous destructive   lesions.  IMPRESSION:  Interval amputation.     XR right Foot (07/24/24)  FINDINGS/  IMPRESSION:  Status post 1-5th digit transmetatarsal amputation with soft tissue swelling and placement of a brace.     B/L LE Art Dup (7/18):  Right Lower Arterial     Proximal Common Femoral Artery: Multiphasic (normal) Doppler waveforms.   Distal Common Femoral Artery: Multiphasic (normal) Doppler waveforms.   Profunda Artery: Multiphasic (normal) Doppler waveforms.   Proximal Superficial Femoral Artery: Multiphasic (normal) Doppler waveforms.   Middle Superficial Femoral Artery: Multiphasic (normal) Doppler waveforms.   Distal Superficial Femoral Artery: Monophasic Doppler waveforms.   Proximal Popliteal Artery: Monophasic Doppler waveforms.   Distal Popliteal Artery: Monophasic Doppler waveforms.   Anterior Tibial Artery: Monophasic Doppler waveforms.   Tibial/Peroneal Trunk: Monophasic Doppler waveforms.   Posterior Tibial Artery: Monophasic Doppler waveforms.   Peroneal Artery: monophasic Doppler waveforms.     The ankle/brachial index is 0.53 (DP 58, PT 40mmHg).  Normal multiphasic flow in the common femoral artery indicates no significant aorto-iliac inflow disease.   Atherosclerotic plaque and velocities indicate >50% stenosis in the right distal superficial femoral artery (58.1 to 229 cm/s).  Calcific shadowing in the distal superficial femoral artery limits visibility, the percent stenosis may be underestimated,  Unable to obtain blood pressure on right arm due to fistula.   Left Lower Arterial     Proximal Common Femoral Artery: Multiphasic (normal) Doppler waveforms.

## 2024-08-30 ENCOUNTER — TELEPHONE (OUTPATIENT)
Dept: INFECTIOUS DISEASES | Age: 44
End: 2024-08-30

## 2024-08-30 ENCOUNTER — CLINICAL DOCUMENTATION (OUTPATIENT)
Dept: INFECTIOUS DISEASES | Age: 44
End: 2024-08-30

## 2024-08-30 DIAGNOSIS — M86.071 ACUTE HEMATOGENOUS OSTEOMYELITIS OF RIGHT FOOT (HCC): Primary | ICD-10-CM

## 2024-08-30 LAB
ECHO BSA: 2.74 M2
VAS LEFT ATA DIST PSV: 110 CM/S
VAS LEFT CFA DIST PSV: 78.5 CM/S
VAS LEFT CFA PROX PSV: 102 CM/S
VAS LEFT PERONEAL MID PSV: 80.6 CM/S
VAS LEFT PFA PROX PSV: 96 CM/S
VAS LEFT POP A DIST PSV: 68.7 CM/S
VAS LEFT POP A PROX PSV: 106 CM/S
VAS LEFT POP A PROX VEL RATIO: 1.08
VAS LEFT PTA DIST PSV: 99.7 CM/S
VAS LEFT PTA MID PSV: 43.6 CM/S
VAS LEFT SFA DIST PSV: 98.1 CM/S
VAS LEFT SFA DIST VEL RATIO: 0.83
VAS LEFT SFA MID PSV: 118 CM/S
VAS LEFT SFA MID VEL RATIO: 1.54
VAS LEFT SFA PROX PSV: 76.4 CM/S
VAS LEFT SFA PROX VEL RATIO: 0.75
VAS RIGHT ATA DIST PSV: 28.4 CM/S
VAS RIGHT CFA DIST PSV: 105 CM/S
VAS RIGHT CFA PROX PSV: 75.7 CM/S
VAS RIGHT PERONEAL MID PSV: 106 CM/S
VAS RIGHT PFA PROX PSV: 79.9 CM/S
VAS RIGHT POP A DIST PSV: 119 CM/S
VAS RIGHT POP A PROX PSV: 123 CM/S
VAS RIGHT POP A PROX VEL RATIO: 1.21
VAS RIGHT PTA DIST PSV: 95.3 CM/S
VAS RIGHT PTA MID PSV: 75.7 CM/S
VAS RIGHT SFA DIST PSV: 102 CM/S
VAS RIGHT SFA DIST VEL RATIO: 0.77
VAS RIGHT SFA MID PSV: 133 CM/S
VAS RIGHT SFA MID VEL RATIO: 1.1
VAS RIGHT SFA PROX PSV: 118 CM/S
VAS RIGHT SFA PROX VEL RATIO: 1.1

## 2024-08-30 PROCEDURE — 93925 LOWER EXTREMITY STUDY: CPT | Performed by: SURGERY

## 2024-08-30 NOTE — TELEPHONE ENCOUNTER
LM with [facility] to return call to verify OPAT orders.  Office contact information provided.

## 2024-08-30 NOTE — PROGRESS NOTES
He has placed a referral order for pharmacist to manage Outpatient Parental Antimicrobial Therapy (OPAT) pursuant the ID Collaborative Practice Agreement.     Pertinent PMH and HPI:  PMH htn, CVA, T2DM, scrotal abscess, obesity, medical non-compliance, L TMA    R AVG creation 24  R TMA 24    Presents 8/15 with lethargy + open ulceration on R TMA                Pertinent Objective Data:    Wt Readings from Last 1 Encounters:   24 (!) 142.1 kg (313 lb 4.4 oz)      BMI Readings from Last 1 Encounters:   24 34.41 kg/m²      Serum creatinine: 8.4 mg/dL (HH) 24 0612  Estimated creatinine clearance: 18 mL/min (A)  ESRD on HD MWF if patient is cooperative     Lab Results   Component Value Date    CRP 30.7 (H) 2024       Lab Results   Component Value Date    SEDRATE 32 (H) 2024       Lab Results   Component Value Date    CKTOTAL 122 2023       Imagin/20 duplex:  Bilateral foot perfusion adequate for healing of a spontaneous or surgical wound based on laser perfusion pressures.        Micro:    wound culture swab: ESBL kleb oxytoca   Klebsiella oxytoca ESBL (1)      Antibiotic Interpretation Microscan    ampicillin Resistant >=32 mcg/mL   ampicillin-sulbactam Sensitive 4 mcg/mL   ceFAZolin Resistant     cefepime Resistant     cefOXitin Sensitive <=4 mcg/mL   cefTRIAXone Resistant     cefuroxime Resistant     ciprofloxacin Sensitive <=0.25 mcg/mL   gentamicin Intermediate 8 mcg/mL   levofloxacin Intermediate 1 mcg/mL   meropenem Sensitive <=0.25 mcg/mL   tobramycin Intermediate 8 mcg/mL   trimethoprim-sulfamethoxazole Sensitive <=20 mcg/mL      surgical culture: achromobacter   Achromobacter (1)    Antibiotic Interpretation Microscan    amikacin Resistant >32 mcg/mL   cefepime Resistant >16 mcg/mL   cefTRIAXone Resistant >32 mcg/mL   ciprofloxacin Resistant >2 mcg/mL   gentamicin Resistant >8 mcg/mL   levofloxacin Sensitive 2 mcg/mL   meropenem Sensitive 4 mcg/mL

## 2024-09-02 LAB
FUNGUS SPEC CULT: NORMAL
LOEFFLER MB STN SPEC: NORMAL

## 2024-09-03 ENCOUNTER — TELEPHONE (OUTPATIENT)
Dept: INFECTIOUS DISEASES | Age: 44
End: 2024-09-03

## 2024-09-03 LAB
ACID FAST STN SPEC QL: NORMAL
ALBUMIN: 2.9 G/DL
ALP BLD-CCNC: 62 U/L
ALT SERPL-CCNC: 12 U/L
ANION GAP SERPL CALCULATED.3IONS-SCNC: ABNORMAL MMOL/L
AST SERPL-CCNC: 17 U/L
BASOPHILS ABSOLUTE: 0.1 /ΜL
BASOPHILS RELATIVE PERCENT: 1.3 %
BILIRUB SERPL-MCNC: 0.6 MG/DL (ref 0.1–1.4)
BUN BLDV-MCNC: 41 MG/DL
C-REACTIVE PROTEIN: 20
CALCIUM SERPL-MCNC: 8.8 MG/DL
CHLORIDE BLD-SCNC: 107 MMOL/L
CO2: 27 MMOL/L
CREAT SERPL-MCNC: 11.9 MG/DL
EOSINOPHILS ABSOLUTE: 0.3 /ΜL
EOSINOPHILS RELATIVE PERCENT: 3.5 %
GFR, ESTIMATED: 6
GLUCOSE BLD-MCNC: 94 MG/DL
HCT VFR BLD CALC: 31.2 % (ref 41–53)
HEMOGLOBIN: 9.8 G/DL (ref 13.5–17.5)
LYMPHOCYTES ABSOLUTE: 1.7 /ΜL
LYMPHOCYTES RELATIVE PERCENT: 20.5 %
MCH RBC QN AUTO: 29.6 PG
MCHC RBC AUTO-ENTMCNC: 31.2 G/DL
MCV RBC AUTO: 94.7 FL
MONOCYTES ABSOLUTE: 0.6 /ΜL
MONOCYTES RELATIVE PERCENT: 6.5 %
MYCOBACTERIUM SPEC CULT: NORMAL
NEUTROPHILS ABSOLUTE: 5.8 /ΜL
NEUTROPHILS RELATIVE PERCENT: 68.2 %
PDW BLD-RTO: 16.7 %
PLATELET # BLD: 185 K/ΜL
PMV BLD AUTO: 8.3 FL
POTASSIUM SERPL-SCNC: ABNORMAL MMOL/L
RBC # BLD: 3.3 10^6/ΜL
SED RATE, AUTOMATED: 94
SODIUM BLD-SCNC: 144 MMOL/L
TOTAL PROTEIN: 7.2 G/DL (ref 6.4–8.2)
WBC # BLD: 8.5 10^3/ML

## 2024-09-03 NOTE — TELEPHONE ENCOUNTER
OPAT Nurse Coordinator Weekly Update Note    Current OPAT plan:   IV meropenem 500 mg every 24 hours after HD on HD days  - Planned End date: 10/3/2024    Diagnosis:  Right foot ESBL Klebsiella osteomyelitis    Assessment:  Spoke with Radha GARCIA at [facility]. Verified OPAT orders: IV meropenem 500 mg every 24 hours after HD on HD days - Planned End date: 10/3/2024 and CBC w diff, CMP, ESR, CRP  She states patient is doing good and is tolerating the wound vac.  Labs will be drawn tomorrow.  She denies fevers.     Follow up appts:  Dr. Olsen 9/5/24

## 2024-09-04 DIAGNOSIS — M86.071 ACUTE HEMATOGENOUS OSTEOMYELITIS OF RIGHT FOOT (HCC): ICD-10-CM

## 2024-09-09 LAB
FUNGUS SPEC CULT: NORMAL
LOEFFLER MB STN SPEC: NORMAL

## 2024-09-10 NOTE — PROGRESS NOTES
Physician Progress Note      PATIENT:               DASHAWN MILES  [facility] #:                  [id number]  :                       1980  ADMIT DATE:       8/15/2024 3:35 PM  DISCH DATE:        2024 7:04 PM  RESPONDING  PROVIDER #:        Krishna Obando MD          QUERY TEXT:    Pt admitted with sepsis. Pt noted to have dehiscence, wound infection. If   possible, please document in progress notes and discharge summary;  ?  The medical record reflects the following:  Risk Factors: 43 yo S/P right foot transmetatarsal amputation 24.  Clinical Indicators: Per DC summary: Infected right foot wound. right foot   osteomyelitis s/p TMA  with reported purulent discharge from wound on   presentation.  Treatment: Podiatry consult, ID consult, Vascular consult, IV Merrem, I&D, 5th   metatarsal resection  Options provided:  -- Deep surgical site infection of the left foot POA  -- Infection of the amputation site of the left foot POA  -- Other - I will add my own diagnosis  -- Disagree - Not applicable / Not valid  -- Disagree - Clinically unable to determine / Unknown  -- Refer to Clinical Documentation Reviewer    PROVIDER RESPONSE TEXT:    Patient has deep surgical site infection of the left foot POA .    Query created by: Karmen Gambino on 2024 12:02 PM      Electronically signed by:  Krishna Obando MD 9/10/2024 7:03 AM

## 2024-09-12 ENCOUNTER — TELEPHONE (OUTPATIENT)
Dept: INFECTIOUS DISEASES | Age: 44
End: 2024-09-12

## 2024-09-13 ENCOUNTER — HOSPITAL ENCOUNTER (OUTPATIENT)
Dept: WOUND CARE | Age: 44
Discharge: HOME | End: 2024-09-13
Attending: PODIATRIST
Payer: COMMERCIAL

## 2024-09-13 VITALS
HEIGHT: 78 IN | TEMPERATURE: 98.2 F | SYSTOLIC BLOOD PRESSURE: 114 MMHG | WEIGHT: 313.05 LBS | DIASTOLIC BLOOD PRESSURE: 74 MMHG | RESPIRATION RATE: 78 BRPM | BODY MASS INDEX: 36.22 KG/M2

## 2024-09-13 DIAGNOSIS — T81.89XA NONHEALING SURGICAL WOUND, INITIAL ENCOUNTER: Primary | ICD-10-CM

## 2024-09-13 PROCEDURE — 11045 DBRDMT SUBQ TISS EACH ADDL: CPT

## 2024-09-13 PROCEDURE — 99214 OFFICE O/P EST MOD 30 MIN: CPT

## 2024-09-13 PROCEDURE — 11042 DBRDMT SUBQ TIS 1ST 20SQCM/<: CPT

## 2024-09-13 RX ORDER — SILVER SULFADIAZINE 10 MG/G
CREAM TOPICAL ONCE
OUTPATIENT
Start: 2024-09-13 | End: 2024-09-13

## 2024-09-13 RX ORDER — BACITRACIN ZINC AND POLYMYXIN B SULFATE 500; 1000 [USP'U]/G; [USP'U]/G
OINTMENT TOPICAL ONCE
OUTPATIENT
Start: 2024-09-13 | End: 2024-09-13

## 2024-09-13 RX ORDER — BETAMETHASONE DIPROPIONATE 0.5 MG/G
CREAM TOPICAL ONCE
OUTPATIENT
Start: 2024-09-13 | End: 2024-09-13

## 2024-09-13 RX ORDER — MUPIROCIN 20 MG/G
OINTMENT TOPICAL ONCE
OUTPATIENT
Start: 2024-09-13 | End: 2024-09-13

## 2024-09-13 RX ORDER — LIDOCAINE 40 MG/G
CREAM TOPICAL ONCE
OUTPATIENT
Start: 2024-09-13 | End: 2024-09-13

## 2024-09-13 RX ORDER — LIDOCAINE 50 MG/G
OINTMENT TOPICAL ONCE
OUTPATIENT
Start: 2024-09-13 | End: 2024-09-13

## 2024-09-13 RX ORDER — LIDOCAINE HYDROCHLORIDE 20 MG/ML
JELLY TOPICAL ONCE
OUTPATIENT
Start: 2024-09-13 | End: 2024-09-13

## 2024-09-13 RX ORDER — GINSENG 100 MG
CAPSULE ORAL ONCE
OUTPATIENT
Start: 2024-09-13 | End: 2024-09-13

## 2024-09-13 RX ORDER — SODIUM CHLOR/HYPOCHLOROUS ACID 0.033 %
SOLUTION, IRRIGATION IRRIGATION ONCE
OUTPATIENT
Start: 2024-09-13 | End: 2024-09-13

## 2024-09-13 RX ORDER — CLOBETASOL PROPIONATE 0.5 MG/G
OINTMENT TOPICAL ONCE
OUTPATIENT
Start: 2024-09-13 | End: 2024-09-13

## 2024-09-13 RX ORDER — NEOMYCIN/BACITRACIN/POLYMYXINB 3.5-400-5K
OINTMENT (GRAM) TOPICAL ONCE
OUTPATIENT
Start: 2024-09-13 | End: 2024-09-13

## 2024-09-13 RX ORDER — LIDOCAINE HYDROCHLORIDE 40 MG/ML
SOLUTION TOPICAL ONCE
OUTPATIENT
Start: 2024-09-13 | End: 2024-09-13

## 2024-09-13 RX ORDER — TRIAMCINOLONE ACETONIDE 1 MG/G
OINTMENT TOPICAL ONCE
OUTPATIENT
Start: 2024-09-13 | End: 2024-09-13

## 2024-09-13 RX ORDER — GENTAMICIN SULFATE 1 MG/G
OINTMENT TOPICAL ONCE
OUTPATIENT
Start: 2024-09-13 | End: 2024-09-13

## 2024-09-13 ASSESSMENT — PAIN SCALES - GENERAL: PAINLEVEL_OUTOF10: 0

## 2024-09-16 LAB
FUNGUS SPEC CULT: NORMAL
LOEFFLER MB STN SPEC: NORMAL

## 2024-09-17 ENCOUNTER — TELEPHONE (OUTPATIENT)
Dept: INFECTIOUS DISEASES | Age: 44
End: 2024-09-17

## 2024-09-17 ENCOUNTER — HOSPITAL ENCOUNTER (OUTPATIENT)
Dept: WOUND CARE | Age: 44
Discharge: HOME | End: 2024-09-17
Attending: PODIATRIST | Admitting: PODIATRIST
Payer: COMMERCIAL

## 2024-09-17 VITALS
TEMPERATURE: 98 F | SYSTOLIC BLOOD PRESSURE: 142 MMHG | HEART RATE: 70 BPM | RESPIRATION RATE: 16 BRPM | DIASTOLIC BLOOD PRESSURE: 73 MMHG

## 2024-09-17 DIAGNOSIS — T81.89XA NONHEALING SURGICAL WOUND, INITIAL ENCOUNTER: Primary | ICD-10-CM

## 2024-09-17 PROCEDURE — 11045 DBRDMT SUBQ TISS EACH ADDL: CPT

## 2024-09-17 PROCEDURE — 6370000000 HC RX 637 (ALT 250 FOR IP): Performed by: SPECIALIST

## 2024-09-17 PROCEDURE — 11042 DBRDMT SUBQ TIS 1ST 20SQCM/<: CPT

## 2024-09-17 RX ORDER — LIDOCAINE HYDROCHLORIDE 20 MG/ML
JELLY TOPICAL ONCE
OUTPATIENT
Start: 2024-09-17 | End: 2024-09-17

## 2024-09-17 RX ORDER — SODIUM CHLOR/HYPOCHLOROUS ACID 0.033 %
SOLUTION, IRRIGATION IRRIGATION ONCE
OUTPATIENT
Start: 2024-09-17 | End: 2024-09-17

## 2024-09-17 RX ORDER — BACITRACIN ZINC AND POLYMYXIN B SULFATE 500; 1000 [USP'U]/G; [USP'U]/G
OINTMENT TOPICAL ONCE
OUTPATIENT
Start: 2024-09-17 | End: 2024-09-17

## 2024-09-17 RX ORDER — MUPIROCIN 20 MG/G
OINTMENT TOPICAL ONCE
OUTPATIENT
Start: 2024-09-17 | End: 2024-09-17

## 2024-09-17 RX ORDER — BETAMETHASONE DIPROPIONATE 0.5 MG/G
CREAM TOPICAL ONCE
OUTPATIENT
Start: 2024-09-17 | End: 2024-09-17

## 2024-09-17 RX ORDER — GINSENG 100 MG
CAPSULE ORAL ONCE
OUTPATIENT
Start: 2024-09-17 | End: 2024-09-17

## 2024-09-17 RX ORDER — LIDOCAINE HYDROCHLORIDE 40 MG/ML
SOLUTION TOPICAL ONCE
Status: COMPLETED | OUTPATIENT
Start: 2024-09-17 | End: 2024-09-17

## 2024-09-17 RX ORDER — GENTAMICIN SULFATE 1 MG/G
OINTMENT TOPICAL ONCE
OUTPATIENT
Start: 2024-09-17 | End: 2024-09-17

## 2024-09-17 RX ORDER — TRIAMCINOLONE ACETONIDE 1 MG/G
OINTMENT TOPICAL ONCE
OUTPATIENT
Start: 2024-09-17 | End: 2024-09-17

## 2024-09-17 RX ORDER — SILVER SULFADIAZINE 10 MG/G
CREAM TOPICAL ONCE
OUTPATIENT
Start: 2024-09-17 | End: 2024-09-17

## 2024-09-17 RX ORDER — CLOBETASOL PROPIONATE 0.5 MG/G
OINTMENT TOPICAL ONCE
OUTPATIENT
Start: 2024-09-17 | End: 2024-09-17

## 2024-09-17 RX ORDER — SODIUM CHLOR/HYPOCHLOROUS ACID 0.033 %
SOLUTION, IRRIGATION IRRIGATION ONCE
Status: COMPLETED | OUTPATIENT
Start: 2024-09-17 | End: 2024-09-17

## 2024-09-17 RX ORDER — NEOMYCIN/BACITRACIN/POLYMYXINB 3.5-400-5K
OINTMENT (GRAM) TOPICAL ONCE
OUTPATIENT
Start: 2024-09-17 | End: 2024-09-17

## 2024-09-17 RX ORDER — LIDOCAINE 50 MG/G
OINTMENT TOPICAL ONCE
OUTPATIENT
Start: 2024-09-17 | End: 2024-09-17

## 2024-09-17 RX ORDER — LIDOCAINE HYDROCHLORIDE 40 MG/ML
SOLUTION TOPICAL ONCE
OUTPATIENT
Start: 2024-09-17 | End: 2024-09-17

## 2024-09-17 RX ORDER — LIDOCAINE 40 MG/G
CREAM TOPICAL ONCE
OUTPATIENT
Start: 2024-09-17 | End: 2024-09-17

## 2024-09-17 RX ADMIN — Medication: at 15:35

## 2024-09-17 RX ADMIN — LIDOCAINE HYDROCHLORIDE: 40 SOLUTION TOPICAL at 15:00

## 2024-09-18 ENCOUNTER — TELEPHONE (OUTPATIENT)
Dept: INFECTIOUS DISEASES | Age: 44
End: 2024-09-18

## 2024-09-23 LAB
FUNGUS SPEC CULT: NORMAL
LOEFFLER MB STN SPEC: NORMAL

## 2024-09-24 ENCOUNTER — HOSPITAL ENCOUNTER (OUTPATIENT)
Dept: WOUND CARE | Age: 44
Discharge: HOME | End: 2024-09-24
Attending: PODIATRIST
Payer: COMMERCIAL

## 2024-09-24 VITALS
RESPIRATION RATE: 16 BRPM | HEART RATE: 72 BPM | DIASTOLIC BLOOD PRESSURE: 88 MMHG | SYSTOLIC BLOOD PRESSURE: 155 MMHG | TEMPERATURE: 96 F

## 2024-09-24 DIAGNOSIS — T81.89XA NONHEALING SURGICAL WOUND, INITIAL ENCOUNTER: Primary | ICD-10-CM

## 2024-09-24 PROCEDURE — 11045 DBRDMT SUBQ TISS EACH ADDL: CPT

## 2024-09-24 PROCEDURE — 6370000000 HC RX 637 (ALT 250 FOR IP): Performed by: SPECIALIST

## 2024-09-24 PROCEDURE — 11042 DBRDMT SUBQ TIS 1ST 20SQCM/<: CPT

## 2024-09-24 RX ORDER — CLOBETASOL PROPIONATE 0.5 MG/G
OINTMENT TOPICAL ONCE
OUTPATIENT
Start: 2024-09-24 | End: 2024-09-24

## 2024-09-24 RX ORDER — LIDOCAINE HYDROCHLORIDE 20 MG/ML
JELLY TOPICAL ONCE
OUTPATIENT
Start: 2024-09-24 | End: 2024-09-24

## 2024-09-24 RX ORDER — NEOMYCIN/BACITRACIN/POLYMYXINB 3.5-400-5K
OINTMENT (GRAM) TOPICAL ONCE
OUTPATIENT
Start: 2024-09-24 | End: 2024-09-24

## 2024-09-24 RX ORDER — BETAMETHASONE DIPROPIONATE 0.5 MG/G
CREAM TOPICAL ONCE
OUTPATIENT
Start: 2024-09-24 | End: 2024-09-24

## 2024-09-24 RX ORDER — LIDOCAINE HYDROCHLORIDE 40 MG/ML
SOLUTION TOPICAL ONCE
Status: COMPLETED | OUTPATIENT
Start: 2024-09-24 | End: 2024-09-24

## 2024-09-24 RX ORDER — BACITRACIN ZINC AND POLYMYXIN B SULFATE 500; 1000 [USP'U]/G; [USP'U]/G
OINTMENT TOPICAL ONCE
OUTPATIENT
Start: 2024-09-24 | End: 2024-09-24

## 2024-09-24 RX ORDER — LIDOCAINE 40 MG/G
CREAM TOPICAL ONCE
OUTPATIENT
Start: 2024-09-24 | End: 2024-09-24

## 2024-09-24 RX ORDER — GENTAMICIN SULFATE 1 MG/G
OINTMENT TOPICAL ONCE
OUTPATIENT
Start: 2024-09-24 | End: 2024-09-24

## 2024-09-24 RX ORDER — MUPIROCIN 20 MG/G
OINTMENT TOPICAL ONCE
OUTPATIENT
Start: 2024-09-24 | End: 2024-09-24

## 2024-09-24 RX ORDER — LIDOCAINE HYDROCHLORIDE 40 MG/ML
SOLUTION TOPICAL ONCE
OUTPATIENT
Start: 2024-09-24 | End: 2024-09-24

## 2024-09-24 RX ORDER — SILVER SULFADIAZINE 10 MG/G
CREAM TOPICAL ONCE
OUTPATIENT
Start: 2024-09-24 | End: 2024-09-24

## 2024-09-24 RX ORDER — SODIUM CHLOR/HYPOCHLOROUS ACID 0.033 %
SOLUTION, IRRIGATION IRRIGATION ONCE
OUTPATIENT
Start: 2024-09-24 | End: 2024-09-24

## 2024-09-24 RX ORDER — LIDOCAINE 50 MG/G
OINTMENT TOPICAL ONCE
OUTPATIENT
Start: 2024-09-24 | End: 2024-09-24

## 2024-09-24 RX ORDER — GINSENG 100 MG
CAPSULE ORAL ONCE
OUTPATIENT
Start: 2024-09-24 | End: 2024-09-24

## 2024-09-24 RX ORDER — SODIUM CHLOR/HYPOCHLOROUS ACID 0.033 %
SOLUTION, IRRIGATION IRRIGATION ONCE
Status: COMPLETED | OUTPATIENT
Start: 2024-09-24 | End: 2024-09-24

## 2024-09-24 RX ORDER — TRIAMCINOLONE ACETONIDE 1 MG/G
OINTMENT TOPICAL ONCE
OUTPATIENT
Start: 2024-09-24 | End: 2024-09-24

## 2024-09-24 RX ADMIN — LIDOCAINE HYDROCHLORIDE: 40 SOLUTION TOPICAL at 14:06

## 2024-09-24 RX ADMIN — Medication: at 15:07

## 2024-10-01 ENCOUNTER — HOSPITAL ENCOUNTER (OUTPATIENT)
Dept: WOUND CARE | Age: 44
Discharge: HOME | End: 2024-10-01
Attending: PODIATRIST
Payer: COMMERCIAL

## 2024-10-01 VITALS
DIASTOLIC BLOOD PRESSURE: 55 MMHG | SYSTOLIC BLOOD PRESSURE: 95 MMHG | TEMPERATURE: 98.2 F | RESPIRATION RATE: 18 BRPM | HEART RATE: 81 BPM

## 2024-10-01 DIAGNOSIS — T81.89XA NONHEALING SURGICAL WOUND, INITIAL ENCOUNTER: Primary | ICD-10-CM

## 2024-10-01 PROCEDURE — 11042 DBRDMT SUBQ TIS 1ST 20SQCM/<: CPT

## 2024-10-01 PROCEDURE — 11045 DBRDMT SUBQ TISS EACH ADDL: CPT

## 2024-10-01 RX ORDER — BACITRACIN ZINC AND POLYMYXIN B SULFATE 500; 1000 [USP'U]/G; [USP'U]/G
OINTMENT TOPICAL ONCE
OUTPATIENT
Start: 2024-10-01 | End: 2024-10-01

## 2024-10-01 RX ORDER — BETAMETHASONE DIPROPIONATE 0.5 MG/G
CREAM TOPICAL ONCE
OUTPATIENT
Start: 2024-10-01 | End: 2024-10-01

## 2024-10-01 RX ORDER — SILVER SULFADIAZINE 10 MG/G
CREAM TOPICAL ONCE
OUTPATIENT
Start: 2024-10-01 | End: 2024-10-01

## 2024-10-01 RX ORDER — LIDOCAINE HYDROCHLORIDE 40 MG/ML
SOLUTION TOPICAL ONCE
OUTPATIENT
Start: 2024-10-01 | End: 2024-10-01

## 2024-10-01 RX ORDER — LIDOCAINE HYDROCHLORIDE 20 MG/ML
JELLY TOPICAL ONCE
OUTPATIENT
Start: 2024-10-01 | End: 2024-10-01

## 2024-10-01 RX ORDER — MUPIROCIN 20 MG/G
OINTMENT TOPICAL ONCE
OUTPATIENT
Start: 2024-10-01 | End: 2024-10-01

## 2024-10-01 RX ORDER — SODIUM CHLOR/HYPOCHLOROUS ACID 0.033 %
SOLUTION, IRRIGATION IRRIGATION ONCE
OUTPATIENT
Start: 2024-10-01 | End: 2024-10-01

## 2024-10-01 RX ORDER — LIDOCAINE 40 MG/G
CREAM TOPICAL ONCE
OUTPATIENT
Start: 2024-10-01 | End: 2024-10-01

## 2024-10-01 RX ORDER — LIDOCAINE 50 MG/G
OINTMENT TOPICAL ONCE
OUTPATIENT
Start: 2024-10-01 | End: 2024-10-01

## 2024-10-01 RX ORDER — NEOMYCIN/BACITRACIN/POLYMYXINB 3.5-400-5K
OINTMENT (GRAM) TOPICAL ONCE
OUTPATIENT
Start: 2024-10-01 | End: 2024-10-01

## 2024-10-01 RX ORDER — GENTAMICIN SULFATE 1 MG/G
OINTMENT TOPICAL ONCE
OUTPATIENT
Start: 2024-10-01 | End: 2024-10-01

## 2024-10-01 RX ORDER — CLOBETASOL PROPIONATE 0.5 MG/G
OINTMENT TOPICAL ONCE
OUTPATIENT
Start: 2024-10-01 | End: 2024-10-01

## 2024-10-01 RX ORDER — GINSENG 100 MG
CAPSULE ORAL ONCE
OUTPATIENT
Start: 2024-10-01 | End: 2024-10-01

## 2024-10-01 RX ORDER — TRIAMCINOLONE ACETONIDE 1 MG/G
OINTMENT TOPICAL ONCE
OUTPATIENT
Start: 2024-10-01 | End: 2024-10-01

## 2024-10-01 NOTE — PROGRESS NOTES
thickness 10/01/24 1412   Number of days: 0     Incision 09/17/24 Foot Right (Active)   Incision Cleansed Cleansed with saline 09/24/24 1417   Closure Sutures 10/01/24 1412   Margins Approximated 09/24/24 1417   Incision Assessment Dry 09/24/24 1417   Drainage Amount None (dry) 10/01/24 1412   Darlin-incision Assessment Dry/flaky;Intact 09/24/24 1417   Number of days: 14             Electronically signed by Kailee Olsen DPM on 10/1/2024 at 3:19 PM

## 2024-10-11 ENCOUNTER — HOSPITAL ENCOUNTER (OUTPATIENT)
Dept: WOUND CARE | Age: 44
Discharge: HOME | End: 2024-10-11
Attending: PODIATRIST
Payer: COMMERCIAL

## 2024-10-11 VITALS
SYSTOLIC BLOOD PRESSURE: 103 MMHG | HEART RATE: 73 BPM | DIASTOLIC BLOOD PRESSURE: 62 MMHG | RESPIRATION RATE: 16 BRPM | TEMPERATURE: 99 F

## 2024-10-11 DIAGNOSIS — T81.89XA NONHEALING SURGICAL WOUND, INITIAL ENCOUNTER: Primary | ICD-10-CM

## 2024-10-11 PROCEDURE — 11045 DBRDMT SUBQ TISS EACH ADDL: CPT

## 2024-10-11 PROCEDURE — 11042 DBRDMT SUBQ TIS 1ST 20SQCM/<: CPT

## 2024-10-11 PROCEDURE — 6370000000 HC RX 637 (ALT 250 FOR IP): Performed by: SPECIALIST

## 2024-10-11 RX ORDER — BACITRACIN ZINC AND POLYMYXIN B SULFATE 500; 1000 [USP'U]/G; [USP'U]/G
OINTMENT TOPICAL ONCE
OUTPATIENT
Start: 2024-10-11 | End: 2024-10-11

## 2024-10-11 RX ORDER — LIDOCAINE HYDROCHLORIDE 20 MG/ML
JELLY TOPICAL ONCE
OUTPATIENT
Start: 2024-10-11 | End: 2024-10-11

## 2024-10-11 RX ORDER — MUPIROCIN 20 MG/G
OINTMENT TOPICAL ONCE
OUTPATIENT
Start: 2024-10-11 | End: 2024-10-11

## 2024-10-11 RX ORDER — LACTOSE-REDUCED FOOD
LIQUID (ML) ORAL DAILY
COMMUNITY

## 2024-10-11 RX ORDER — LIDOCAINE 40 MG/G
CREAM TOPICAL ONCE
OUTPATIENT
Start: 2024-10-11 | End: 2024-10-11

## 2024-10-11 RX ORDER — LIDOCAINE 50 MG/G
OINTMENT TOPICAL ONCE
OUTPATIENT
Start: 2024-10-11 | End: 2024-10-11

## 2024-10-11 RX ORDER — LIDOCAINE HYDROCHLORIDE 40 MG/ML
SOLUTION TOPICAL ONCE
Status: COMPLETED | OUTPATIENT
Start: 2024-10-11 | End: 2024-10-11

## 2024-10-11 RX ORDER — GINSENG 100 MG
CAPSULE ORAL ONCE
OUTPATIENT
Start: 2024-10-11 | End: 2024-10-11

## 2024-10-11 RX ORDER — NEOMYCIN/BACITRACIN/POLYMYXINB 3.5-400-5K
OINTMENT (GRAM) TOPICAL ONCE
OUTPATIENT
Start: 2024-10-11 | End: 2024-10-11

## 2024-10-11 RX ORDER — TRIAMCINOLONE ACETONIDE 1 MG/G
OINTMENT TOPICAL ONCE
OUTPATIENT
Start: 2024-10-11 | End: 2024-10-11

## 2024-10-11 RX ORDER — BETAMETHASONE DIPROPIONATE 0.5 MG/G
CREAM TOPICAL ONCE
OUTPATIENT
Start: 2024-10-11 | End: 2024-10-11

## 2024-10-11 RX ORDER — FIDAXOMICIN 200 MG/1
200 TABLET, FILM COATED ORAL DAILY
COMMUNITY
Start: 2024-09-18

## 2024-10-11 RX ORDER — GENTAMICIN SULFATE 1 MG/G
OINTMENT TOPICAL ONCE
OUTPATIENT
Start: 2024-10-11 | End: 2024-10-11

## 2024-10-11 RX ORDER — LIDOCAINE HYDROCHLORIDE 40 MG/ML
SOLUTION TOPICAL ONCE
OUTPATIENT
Start: 2024-10-11 | End: 2024-10-11

## 2024-10-11 RX ORDER — CLOBETASOL PROPIONATE 0.5 MG/G
OINTMENT TOPICAL ONCE
OUTPATIENT
Start: 2024-10-11 | End: 2024-10-11

## 2024-10-11 RX ORDER — SILVER SULFADIAZINE 10 MG/G
CREAM TOPICAL ONCE
OUTPATIENT
Start: 2024-10-11 | End: 2024-10-11

## 2024-10-11 RX ORDER — SODIUM CHLOR/HYPOCHLOROUS ACID 0.033 %
SOLUTION, IRRIGATION IRRIGATION ONCE
OUTPATIENT
Start: 2024-10-11 | End: 2024-10-11

## 2024-10-11 RX ADMIN — LIDOCAINE HYDROCHLORIDE: 40 SOLUTION TOPICAL at 10:47

## 2024-10-11 NOTE — PATIENT INSTRUCTIONS
Wound Care Center Physician Orders and Discharge Instructions  [facility]   [address]  Telephone: [phone] FAX [phone]    NAME:  Serafin Weaver  YOB: 1980  MEDICAL RECORD NUMBER:  [id number]  DATE: 10/11/2024    Return Appointment:  Return Appointment: With Kailee Olsen DPM  on 10/22/24  Schedule weekly for the rest of the month? Yes    Future Appointments   Date Time Provider Department Center   10/18/2024 10:45 AM Isael Hawk MD [facility] WOUND [facility]   10/25/2024 10:45 AM Isael Hawk MD [facility] WOUND [facility]   11/1/2024 10:15 AM Isael Hawk MD [facility]      **PLEASE ASSESS BED THAT FOOT IS NOT RESTING AGAINST BED BOARD; PLEASE ADD FOOT BOARD EXTENSION OR REMOVE IT IS APPROPRIATE**     Skilled Nursing Facility: [facility]    Change dressing?: Yes    Wound Cleansing: Vashe wash - Apply enough Vashe to soak a piece of gauze and place on wound bed for 5-10 minutes. No need to rinse after soaking.    Darlin Wound Topical Treatments: No-Sting barrier film with wound vac on right foot       Dressings:           Wound Location: left distal foot     Primary Dressing to wound bed: Collagen (I.e. Puracol)      Cover and Secure with:  4X4 gauze pad and Bulky roll gauze     Change dressing: Daily      Dressings:           Wound Location: Left Heel     Primary Dressing to wound bed: Paint eschar with betadine      Cover and Secure with:  4X4 gauze pad and Bulky roll gauze     Change dressing: Daily       [x]Negative Pressure:           Wound Location: right foot ( wet to dry dressing with vashe until patient returns to skilled nursing facility and reapply wound vac today)    Use No-Sting Barrier film to darlin wound with each dressing change (DO NOT USE if Dermatac Drape from [employer] is used).   Pressure @ 125 mmHg continuous using black foam.     []Apply White foam on exposed bone and/or

## 2024-10-11 NOTE — PROGRESS NOTES
[facility]  Progress Note and Procedure Note      Serafin Weaver  MEDICAL RECORD NUMBER:  [id number]  AGE: 44 y.o.   GENDER: male  : 1980  EPISODE DATE:  10/11/2024    Subjective:     Chief Complaint   Patient presents with    Wound Check     RIGHT AND LEFT LOWER LEG         HISTORY of PRESENT ILLNESS HPI     Serafin Weaver is a 44 y.o. male who presents today for wound/ulcer evaluation.   History of Wound Context:  Patient of Dr. Kailee Olsen who is seen in wound care having been recently hospitalized at [facility] for right foot infection and osteomyelitis that required I&D and removal of fifth metatarsal right foot with tendon transfer on 2024.  Postoperatively there was partial dehiscence of the wound.  He was seen in consultation both by infectious disease who placed him on the appropriate antibiotics as well as vascular surgery who felt there was no need for any type of intervention for his peripheral vascular disease at this point in time.  The patient is on dialysis.  He was recently discharged to nursing facility where a wound VAC has been in place.  Patient does have a history of hypercoagulable state with positive anticardiolipin IgM   Wound/Ulcer Pain Timing/Severity: none  Quality of pain: N/A  Severity:  0 / 10   Modifying Factors: None  Associated Signs/Symptoms: edema and drainage    Ulcer Identification:  Ulcer Type: diabetic, pressure, and non-healing surgical    Contributing Factors: diabetes, poor glucose control, chronic pressure, decreased mobility, obesity, and arterial insufficiency    Acute Wound: N/A not an acute wound    PAST MEDICAL HISTORY        Diagnosis Date    Amputation of third toe, left, traumatic (HCC)     Anesthesia complication     has history of being aggitated and \"fights\"    Blood circulation, collateral     Cellulitis     Depression     Diabetic polyneuropathy associated with type 2 diabetes mellitus (HCC) 2019    Hypertension

## 2024-10-11 NOTE — PROGRESS NOTES
Insurance Verification Request      Ordering Center:     [facility]  [facility] [address]  Wound Center Phone Number: [phone]  Fax Number: [phone]    Facility NPI: [id number]  Facility Tax ID [id number]    Provider Information:      Provider's Name: Dr. Hawk   Provider's NPI: Isael Hawk MD,  NPI: [id number]    Patient Information:     Serafin Miles  [address] [facility] [address]   [phone]   : 1980  AGE: 44 y.o.     GENDER: male   TODAYS DATE:  10/11/2024    Insurance:     PRIMARY INSURANCE:  Plan: AETNA HMO  Coverage: AETNA  Effective Date: 2024  Group Number: [unfilled]  Subscriber Number: [id number] - (Commercial)    Payer/Plan Subscr  Sex Relation Sub. Ins. ID Effective Group Num   1. AETNA - AETNA* JOSE MILES* 1980 Male Self [id number]                                   [id number]   2. AETNA - AETNA* JDJOSE* 1980 Male Self [id number]                                   [id number]       Application/product Information:     Benefit Verification Request:    Reason for Request: New Wound    [employer] ([employer]) FAX# [phone]    Face/Hands/Feet 00150 (1st 25 sq cm)    TheraSkin    Has patient been treated with any other Skin Substitute on this Wound:     No        WOUND #: 1    Total Square CM: 29.75    Procedure setting: Hospital Outpatient Department    Is the Patient currently in a skilled nursing facility: No     Is the wound work related: No    Procedure date: 10/22/2024    Patient Information:     Additional Dx Codes: T81.89XA, E11.628      ICD-10-CM    1. Nonhealing surgical wound, initial encounter  T81.89XA Initiate Outpatient Wound Care Protocol     lidocaine (XYLOCAINE) 4 % external solution     Initiate Outpatient Wound Care Protocol          Wound Care Treatment Summary  Initial visit in OP Wound?: No (10/11/2024 12:00 PM)  Is Wound Greater than

## 2024-10-14 ENCOUNTER — TELEPHONE (OUTPATIENT)
Dept: WOUND CARE | Age: 44
End: 2024-10-14

## 2024-10-18 ENCOUNTER — HOSPITAL ENCOUNTER (OUTPATIENT)
Dept: WOUND CARE | Age: 44
Discharge: HOME | End: 2024-10-18
Attending: PODIATRIST
Payer: COMMERCIAL

## 2024-10-18 VITALS
SYSTOLIC BLOOD PRESSURE: 152 MMHG | DIASTOLIC BLOOD PRESSURE: 81 MMHG | RESPIRATION RATE: 16 BRPM | HEART RATE: 76 BPM | TEMPERATURE: 96.8 F

## 2024-10-18 DIAGNOSIS — T81.89XA NONHEALING SURGICAL WOUND, INITIAL ENCOUNTER: Primary | ICD-10-CM

## 2024-10-18 PROCEDURE — 11042 DBRDMT SUBQ TIS 1ST 20SQCM/<: CPT

## 2024-10-18 PROCEDURE — 11045 DBRDMT SUBQ TISS EACH ADDL: CPT

## 2024-10-18 PROCEDURE — 6370000000 HC RX 637 (ALT 250 FOR IP): Performed by: SPECIALIST

## 2024-10-18 RX ORDER — LIDOCAINE 40 MG/G
CREAM TOPICAL ONCE
OUTPATIENT
Start: 2024-10-18 | End: 2024-10-18

## 2024-10-18 RX ORDER — GINSENG 100 MG
CAPSULE ORAL ONCE
OUTPATIENT
Start: 2024-10-18 | End: 2024-10-18

## 2024-10-18 RX ORDER — LIDOCAINE HYDROCHLORIDE 20 MG/ML
JELLY TOPICAL ONCE
OUTPATIENT
Start: 2024-10-18 | End: 2024-10-18

## 2024-10-18 RX ORDER — NEOMYCIN/BACITRACIN/POLYMYXINB 3.5-400-5K
OINTMENT (GRAM) TOPICAL ONCE
OUTPATIENT
Start: 2024-10-18 | End: 2024-10-18

## 2024-10-18 RX ORDER — LIDOCAINE HYDROCHLORIDE 40 MG/ML
SOLUTION TOPICAL ONCE
Status: COMPLETED | OUTPATIENT
Start: 2024-10-18 | End: 2024-10-18

## 2024-10-18 RX ORDER — LIDOCAINE 50 MG/G
OINTMENT TOPICAL ONCE
OUTPATIENT
Start: 2024-10-18 | End: 2024-10-18

## 2024-10-18 RX ORDER — BACITRACIN ZINC AND POLYMYXIN B SULFATE 500; 1000 [USP'U]/G; [USP'U]/G
OINTMENT TOPICAL ONCE
OUTPATIENT
Start: 2024-10-18 | End: 2024-10-18

## 2024-10-18 RX ORDER — SILVER SULFADIAZINE 10 MG/G
CREAM TOPICAL ONCE
OUTPATIENT
Start: 2024-10-18 | End: 2024-10-18

## 2024-10-18 RX ORDER — MUPIROCIN 20 MG/G
OINTMENT TOPICAL ONCE
OUTPATIENT
Start: 2024-10-18 | End: 2024-10-18

## 2024-10-18 RX ORDER — LIDOCAINE HYDROCHLORIDE 40 MG/ML
SOLUTION TOPICAL ONCE
OUTPATIENT
Start: 2024-10-18 | End: 2024-10-18

## 2024-10-18 RX ORDER — TRIAMCINOLONE ACETONIDE 1 MG/G
OINTMENT TOPICAL ONCE
OUTPATIENT
Start: 2024-10-18 | End: 2024-10-18

## 2024-10-18 RX ORDER — CLOBETASOL PROPIONATE 0.5 MG/G
OINTMENT TOPICAL ONCE
OUTPATIENT
Start: 2024-10-18 | End: 2024-10-18

## 2024-10-18 RX ORDER — BETAMETHASONE DIPROPIONATE 0.5 MG/G
CREAM TOPICAL ONCE
OUTPATIENT
Start: 2024-10-18 | End: 2024-10-18

## 2024-10-18 RX ORDER — GENTAMICIN SULFATE 1 MG/G
OINTMENT TOPICAL ONCE
OUTPATIENT
Start: 2024-10-18 | End: 2024-10-18

## 2024-10-18 RX ORDER — SODIUM CHLOR/HYPOCHLOROUS ACID 0.033 %
SOLUTION, IRRIGATION IRRIGATION ONCE
OUTPATIENT
Start: 2024-10-18 | End: 2024-10-18

## 2024-10-18 RX ADMIN — LIDOCAINE HYDROCHLORIDE: 40 SOLUTION TOPICAL at 11:30

## 2024-10-18 NOTE — PATIENT INSTRUCTIONS
Wound Care Center Physician Orders and Discharge Instructions  [facility]   [address]  Telephone: [phone] FAX [phone]    NAME:  Serafin Weaver  YOB: 1980  MEDICAL RECORD NUMBER:  [id number]  DATE: 10/18/2024    Return Appointment:  Return Appointment: With Kailee Olsen DPM  on 10/22/24  Schedule weekly for the rest of the month? Yes    Future Appointments   Date Time Provider Department Center   10/25/2024 10:45 AM Isael Hawk MD [facility]   11/1/2024 10:15 AM Isael Hawk MD [facility]OUND [facility]      **PLEASE ASSESS BED THAT FOOT IS NOT RESTING AGAINST BED BOARD; PLEASE ADD FOOT BOARD EXTENSION OR REMOVE IT IS APPROPRIATE**     Skilled Nursing Facility: [facility]    Change dressing?: Yes    Wound Cleansing: Vashe wash - Apply enough Vashe to soak a piece of gauze and place on wound bed for 5-10 minutes. No need to rinse after soaking.    Darlin Wound Topical Treatments: No-Sting barrier film with wound vac on right foot       Dressings:           Wound Location: left distal foot     **HEALED**    Dressings:           Wound Location: Left Heel     Primary Dressing to wound bed: Paint eschar with betadine      Cover and Secure with:  4X4 gauze pad and Bulky roll gauze     Change dressing: Daily       [x]Negative Pressure:           Wound Location: right foot ( wet to dry dressing with vashe until patient returns to skilled nursing facility and reapply wound vac today)    Use No-Sting Barrier film to darlin wound with each dressing change (DO NOT USE if Dermatac Drape from [employer] is used).   Pressure @ 125 mmHg continuous using black foam.     []Apply White foam on exposed bone and/or tunnels:   Change dressing: Monday/Wednesday/Friday  Make sure that tubing is not against the skin by using gauze and/or Kerlix.  [x]Wound clinic ONLY: Apply saline wet to dry dressing with vashe until SNF can place

## 2024-10-18 NOTE — PROGRESS NOTES
Size % (l*w) -75 10/18/24 1109   Wound Volume (cm^3) 0 cm^3 10/18/24 1109   Wound Healing % 100 10/18/24 1109   Post-Procedure Length (cm) 0.6 cm 09/17/24 1524   Post-Procedure Width (cm) 0.6 cm 09/17/24 1524   Post-Procedure Depth (cm) 0.3 cm 09/17/24 1524   Post-Procedure Surface Area (cm^2) 0.36 cm^2 09/17/24 1524   Post-Procedure Volume (cm^3) 0.108 cm^3 09/17/24 1524   Distance Tunneling (cm) 0 cm 10/18/24 1109   Tunneling Position ___ O'Clock 0 10/01/24 1412   Undermining Starts ___ O'Clock 0 10/01/24 1412   Undermining Ends___ O'Clock 0 10/01/24 1412   Undermining Maxium Distance (cm) 0 10/18/24 1109   Wound Assessment Eschar dry 10/18/24 1109   Drainage Amount None (dry) 10/18/24 1109   Drainage Description Brown 09/24/24 1417   Odor None 10/18/24 1109   Darlin-wound Assessment Dry/flaky 10/18/24 1109   Margins Defined edges 10/18/24 1109   Number of days: 35       Wound 10/01/24 Foot Left #4 (Active)   Wound Image   10/01/24 1412   Wound Etiology Diabetic Wilburn 1 10/01/24 1412   Wound Cleansed Cleansed with saline 10/18/24 1109   Dressing/Treatment Collagen 10/11/24 1130   Offloading for Diabetic Foot Ulcers Offloading ordered 10/11/24 1130   Wound Length (cm) 0.8 cm 10/18/24 1109   Wound Width (cm) 0.8 cm 10/18/24 1109   Wound Depth (cm) 0.1 cm 10/18/24 1109   Wound Surface Area (cm^2) 0.64 cm^2 10/18/24 1109   Change in Wound Size % (l*w) 52.59 10/18/24 1109   Wound Volume (cm^3) 0.064 cm^3 10/18/24 1109   Wound Healing % 53 10/18/24 1109   Post-Procedure Length (cm) 1.3 cm 10/11/24 1118   Post-Procedure Width (cm) 1.6 cm 10/11/24 1118   Post-Procedure Depth (cm) 0.3 cm 10/11/24 1118   Post-Procedure Surface Area (cm^2) 2.08 cm^2 10/11/24 1118   Post-Procedure Volume (cm^3) 0.624 cm^3 10/11/24 1118   Distance Tunneling (cm) 0 cm 10/01/24 1412   Tunneling Position ___ O'Clock 0 10/01/24 1412   Undermining Starts ___ O'Clock 0 10/01/24 1412   Undermining Ends___ O'Clock 0 10/01/24 1412   Undermining Maxium

## 2024-10-25 ENCOUNTER — HOSPITAL ENCOUNTER (OUTPATIENT)
Dept: WOUND CARE | Age: 44
Discharge: HOME | End: 2024-10-25
Attending: PODIATRIST
Payer: COMMERCIAL

## 2024-10-25 VITALS
DIASTOLIC BLOOD PRESSURE: 81 MMHG | HEART RATE: 71 BPM | TEMPERATURE: 98.1 F | RESPIRATION RATE: 16 BRPM | SYSTOLIC BLOOD PRESSURE: 149 MMHG

## 2024-10-25 DIAGNOSIS — T81.89XA NONHEALING SURGICAL WOUND, INITIAL ENCOUNTER: Primary | ICD-10-CM

## 2024-10-25 PROCEDURE — 11042 DBRDMT SUBQ TIS 1ST 20SQCM/<: CPT

## 2024-10-25 PROCEDURE — 15276 SKIN SUB GRAFT F/N/HF/G ADDL: CPT

## 2024-10-25 PROCEDURE — 15275 SKIN SUB GRAFT FACE/NK/HF/G: CPT

## 2024-10-25 RX ORDER — MUPIROCIN 20 MG/G
OINTMENT TOPICAL ONCE
OUTPATIENT
Start: 2024-10-25 | End: 2024-10-25

## 2024-10-25 RX ORDER — LIDOCAINE 40 MG/G
CREAM TOPICAL ONCE
OUTPATIENT
Start: 2024-10-25 | End: 2024-10-25

## 2024-10-25 RX ORDER — SILVER SULFADIAZINE 10 MG/G
CREAM TOPICAL ONCE
OUTPATIENT
Start: 2024-10-25 | End: 2024-10-25

## 2024-10-25 RX ORDER — TRIAMCINOLONE ACETONIDE 1 MG/G
OINTMENT TOPICAL ONCE
OUTPATIENT
Start: 2024-10-25 | End: 2024-10-25

## 2024-10-25 RX ORDER — GINSENG 100 MG
CAPSULE ORAL ONCE
OUTPATIENT
Start: 2024-10-25 | End: 2024-10-25

## 2024-10-25 RX ORDER — CLOBETASOL PROPIONATE 0.5 MG/G
OINTMENT TOPICAL ONCE
OUTPATIENT
Start: 2024-10-25 | End: 2024-10-25

## 2024-10-25 RX ORDER — LIDOCAINE HYDROCHLORIDE 20 MG/ML
JELLY TOPICAL ONCE
OUTPATIENT
Start: 2024-10-25 | End: 2024-10-25

## 2024-10-25 RX ORDER — LIDOCAINE 50 MG/G
OINTMENT TOPICAL ONCE
OUTPATIENT
Start: 2024-10-25 | End: 2024-10-25

## 2024-10-25 RX ORDER — SODIUM CHLOR/HYPOCHLOROUS ACID 0.033 %
SOLUTION, IRRIGATION IRRIGATION ONCE
OUTPATIENT
Start: 2024-10-25 | End: 2024-10-25

## 2024-10-25 RX ORDER — LIDOCAINE HYDROCHLORIDE 40 MG/ML
SOLUTION TOPICAL ONCE
Status: DISCONTINUED | OUTPATIENT
Start: 2024-10-25 | End: 2024-10-26 | Stop reason: HOSPADM

## 2024-10-25 RX ORDER — BACITRACIN ZINC AND POLYMYXIN B SULFATE 500; 1000 [USP'U]/G; [USP'U]/G
OINTMENT TOPICAL ONCE
OUTPATIENT
Start: 2024-10-25 | End: 2024-10-25

## 2024-10-25 RX ORDER — BETAMETHASONE DIPROPIONATE 0.5 MG/G
CREAM TOPICAL ONCE
OUTPATIENT
Start: 2024-10-25 | End: 2024-10-25

## 2024-10-25 RX ORDER — LIDOCAINE HYDROCHLORIDE 40 MG/ML
SOLUTION TOPICAL ONCE
OUTPATIENT
Start: 2024-10-25 | End: 2024-10-25

## 2024-10-25 RX ORDER — GENTAMICIN SULFATE 1 MG/G
OINTMENT TOPICAL ONCE
OUTPATIENT
Start: 2024-10-25 | End: 2024-10-25

## 2024-10-25 RX ORDER — NEOMYCIN/BACITRACIN/POLYMYXINB 3.5-400-5K
OINTMENT (GRAM) TOPICAL ONCE
OUTPATIENT
Start: 2024-10-25 | End: 2024-10-25

## 2024-10-25 ASSESSMENT — PAIN SCALES - GENERAL: PAINLEVEL_OUTOF10: 0

## 2024-10-25 NOTE — PATIENT INSTRUCTIONS
and/or when sitting.   Avoid prolonged standing in one place.       Patient may participate in therapy with the following restrictions for off-Loading:   [x]Off Loading(Pressure Reduction) When: Walking  and in bed  Weight Bearing Status: Total Non-Weight bearing Right;  Offloading devices: Heel Protector Soft Boot: Left - DO NOT WALK IN WHILE WEARING  []No Offloading required    Dietary:   Important dietary reminders:  1. Increase Protein intake (i.e. Lean meats, fish, eggs, legumes, and yogurt)  2. No added salt  3. If diabetic, follow a diabetic diet and check glucose prior to meals or as instructed by your physician.    [] SNF: Please have dietician evaluate patient for nutritional needs    Dietary Supplements: [] Bertin  [] 30ml ProMod/ProStat [] 60ml Expedite [] Other:   Take supplements twice a day or as directed as followed:       Your nurse  is:  Alee     Electronically signed by Alee Hare RN on 10/25/2024 at 12:22 PM     Wound Care Center Information: Should you experience any significant changes in your wound(s) or have questions about your wound care, please contact the [facility] at [phone].     Hours of operation  Mon:  8AM - 2PM  Tue: 11AM - 5PM  Wed: CLOSED  Thur: 8AM - 4:30PM  Fri:  8AM - 4:30PM    Please give us 24-48 business hours to return your call.  These hours of operation are subject to change. The office is closed on all major holidays.   If you need help with your wounds and cannot wait until we are available, contact your PCP or go to your preferred emergency room.     Physician Signature:_______________________    Date: ___________ Time:  ____________        Negative Pressure Wound Therapy Right;Plantar (Active)   Wound Type Surgical 10/25/24 1108   Dressing Type Black Foam 10/25/24 1108   Number of pieces removed 1 10/25/24 1108   Cycle Continuous 10/25/24 1108   Target Pressure (mmHg) 125 10/25/24 1108   Canister changed? No 10/25/24 1108

## 2024-10-25 NOTE — PROGRESS NOTES
Assessment Bleeding;Fibrin;Pink/red 10/25/24 1108   Drainage Amount Moderate (25-50%) 10/25/24 1108   Drainage Description Serosanguinous 10/25/24 1108   Odor Moderate 10/25/24 1108   Darlin-wound Assessment Intact 10/25/24 1108   Margins Defined edges 10/25/24 1108   Wound Thickness Description not for Pressure Injury Full thickness 10/25/24 1108   Number of days: 77       Wound 09/13/24 Heel Left #3 (Active)   Wound Image   10/01/24 1412   Wound Etiology Pressure Stage 2 09/13/24 0927   Wound Cleansed Cleansed with saline 10/18/24 1109   Dressing/Treatment Betadine swabs/povidone iodine 10/18/24 1437   Wound Length (cm) 2.5 cm 10/25/24 1108   Wound Width (cm) 3.5 cm 10/25/24 1108   Wound Depth (cm) 0 cm 10/25/24 1108   Wound Surface Area (cm^2) 8.75 cm^2 10/25/24 1108   Change in Wound Size % (l*w) -45.83 10/25/24 1108   Wound Volume (cm^3) 0 cm^3 10/25/24 1108   Wound Healing % 100 10/25/24 1108   Post-Procedure Length (cm) 0.6 cm 09/17/24 1524   Post-Procedure Width (cm) 0.6 cm 09/17/24 1524   Post-Procedure Depth (cm) 0.3 cm 09/17/24 1524   Post-Procedure Surface Area (cm^2) 0.36 cm^2 09/17/24 1524   Post-Procedure Volume (cm^3) 0.108 cm^3 09/17/24 1524   Distance Tunneling (cm) 0 cm 10/18/24 1109   Tunneling Position ___ O'Clock 0 10/01/24 1412   Undermining Starts ___ O'Clock 0 10/01/24 1412   Undermining Ends___ O'Clock 0 10/01/24 1412   Undermining Maxium Distance (cm) 0 10/18/24 1109   Wound Assessment Eschar dry 10/25/24 1108   Drainage Amount None (dry) 10/25/24 1108   Drainage Description Brown 09/24/24 1417   Odor None 10/25/24 1108   Darlin-wound Assessment Dry/flaky 10/25/24 1108   Margins Defined edges 10/25/24 1108   Number of days: 42       Wound 10/01/24 Foot Left #4 (Active)   Wound Image   10/01/24 1412   Wound Etiology Diabetic Wilburn 1 10/01/24 1412   Wound Cleansed Cleansed with saline 10/18/24 1109   Dressing/Treatment Alginate with Ag;Silicone border 10/18/24 1437   Offloading for Diabetic

## 2024-11-01 ENCOUNTER — HOSPITAL ENCOUNTER (OUTPATIENT)
Dept: WOUND CARE | Age: 44
Discharge: HOME | End: 2024-11-01
Attending: PODIATRIST
Payer: COMMERCIAL

## 2024-11-01 VITALS
SYSTOLIC BLOOD PRESSURE: 149 MMHG | DIASTOLIC BLOOD PRESSURE: 78 MMHG | HEART RATE: 70 BPM | TEMPERATURE: 98 F | RESPIRATION RATE: 16 BRPM

## 2024-11-01 DIAGNOSIS — T81.89XA NONHEALING SURGICAL WOUND, INITIAL ENCOUNTER: Primary | ICD-10-CM

## 2024-11-01 PROCEDURE — 99213 OFFICE O/P EST LOW 20 MIN: CPT

## 2024-11-01 PROCEDURE — 99213 OFFICE O/P EST LOW 20 MIN: CPT | Performed by: SPECIALIST

## 2024-11-01 PROCEDURE — 6370000000 HC RX 637 (ALT 250 FOR IP): Performed by: SPECIALIST

## 2024-11-01 RX ORDER — CLOBETASOL PROPIONATE 0.5 MG/G
OINTMENT TOPICAL ONCE
OUTPATIENT
Start: 2024-11-01 | End: 2024-11-01

## 2024-11-01 RX ORDER — BACITRACIN ZINC AND POLYMYXIN B SULFATE 500; 1000 [USP'U]/G; [USP'U]/G
OINTMENT TOPICAL ONCE
OUTPATIENT
Start: 2024-11-01 | End: 2024-11-01

## 2024-11-01 RX ORDER — LIDOCAINE HYDROCHLORIDE 20 MG/ML
JELLY TOPICAL ONCE
OUTPATIENT
Start: 2024-11-01 | End: 2024-11-01

## 2024-11-01 RX ORDER — TRIAMCINOLONE ACETONIDE 1 MG/G
OINTMENT TOPICAL ONCE
OUTPATIENT
Start: 2024-11-01 | End: 2024-11-01

## 2024-11-01 RX ORDER — SODIUM CHLOR/HYPOCHLOROUS ACID 0.033 %
SOLUTION, IRRIGATION IRRIGATION ONCE
OUTPATIENT
Start: 2024-11-01 | End: 2024-11-01

## 2024-11-01 RX ORDER — GENTAMICIN SULFATE 1 MG/G
OINTMENT TOPICAL ONCE
OUTPATIENT
Start: 2024-11-01 | End: 2024-11-01

## 2024-11-01 RX ORDER — LIDOCAINE 50 MG/G
OINTMENT TOPICAL ONCE
OUTPATIENT
Start: 2024-11-01 | End: 2024-11-01

## 2024-11-01 RX ORDER — LIDOCAINE HYDROCHLORIDE 40 MG/ML
SOLUTION TOPICAL ONCE
OUTPATIENT
Start: 2024-11-01 | End: 2024-11-01

## 2024-11-01 RX ORDER — GINSENG 100 MG
CAPSULE ORAL ONCE
OUTPATIENT
Start: 2024-11-01 | End: 2024-11-01

## 2024-11-01 RX ORDER — SILVER SULFADIAZINE 10 MG/G
CREAM TOPICAL ONCE
OUTPATIENT
Start: 2024-11-01 | End: 2024-11-01

## 2024-11-01 RX ORDER — MUPIROCIN 20 MG/G
OINTMENT TOPICAL ONCE
OUTPATIENT
Start: 2024-11-01 | End: 2024-11-01

## 2024-11-01 RX ORDER — NEOMYCIN/BACITRACIN/POLYMYXINB 3.5-400-5K
OINTMENT (GRAM) TOPICAL ONCE
OUTPATIENT
Start: 2024-11-01 | End: 2024-11-01

## 2024-11-01 RX ORDER — LIDOCAINE 40 MG/G
CREAM TOPICAL ONCE
OUTPATIENT
Start: 2024-11-01 | End: 2024-11-01

## 2024-11-01 RX ORDER — LIDOCAINE HYDROCHLORIDE 40 MG/ML
SOLUTION TOPICAL ONCE
Status: COMPLETED | OUTPATIENT
Start: 2024-11-01 | End: 2024-11-01

## 2024-11-01 RX ORDER — BETAMETHASONE DIPROPIONATE 0.5 MG/G
CREAM TOPICAL ONCE
OUTPATIENT
Start: 2024-11-01 | End: 2024-11-01

## 2024-11-01 RX ADMIN — LIDOCAINE HYDROCHLORIDE: 40 SOLUTION TOPICAL at 11:21

## 2024-11-01 NOTE — PATIENT INSTRUCTIONS
Wound Care Center Physician Orders and Discharge Instructions  [facility]   [address]  Telephone: [phone] FAX [phone]    NAME:  Serafin Weaver  YOB: 1980  MEDICAL RECORD NUMBER:  [id number]  DATE: 2024    Return Appointment:  Return Appointment: With Isael Hawk MD  in  1 Week(s).  Schedule weekly for the rest of the month? Yes    Future Appointments   Date Time Provider Department Center   2024  9:30 AM Isael Hawk MD [facility] WOUND [facility]   11/15/2024  9:30 AM Isael Hawk MD [facility] WOUND [facility]   2024  9:30 AM Isael Hawk MD [facility] WOUND [facility]           Skilled Nursing Facility: [facility]    Change dressing?: Yes only to left heel    Wound Cleansin.9% normal saline    Darlin Wound Topical Treatments: Nothing         Dressings:           Wound Location: left heel     Primary Dressing to wound bed:  paint daily with betadine. May leave open to air unless there is drainage.      Cover and Secure with:  Open to air       Change dressing: Daily      Dressings:           Wound Location:  right foot wound     Primary Dressing to wound bed:  collagen applied by Dr. Hawk     Cover and Secure with:   Gauze and kerlix, ace bandage     Change dressing: DO NOT CHANGE           [x]Negative Pressure:           Wound Location: hold wound vac for 1 week on right foot       Edema Control:  Elevate leg(s) above the level of the heart for 30 minutes 4-5 times a day and/or when sitting.   Avoid prolonged standing in one place.       Patient may participate in therapy with the following restrictions for off-Loading:   [x]Off Loading(Pressure Reduction) When: Walking  and in bed  Weight Bearing Status: Total Non-Weight bearing Right;  Offloading devices: Heel Protector Soft Boot: Left - DO NOT WALK IN WHILE WEARING  []No Offloading required    Dietary:   Important dietary

## 2024-11-01 NOTE — PROGRESS NOTES
[facility]ound Care Center  Progress Note and Procedure Note      Serafin Weaver  AGE: 44 y.o.   GENDER: male  : 1980  EPISODE DATE:  2024      Subjective:     Chief Complaint   Patient presents with    Wound Check     Right foot         HISTORY of PRESENT ILLNESS HPI     Serafin Weaver is a 44 y.o. male who presents today for wound evaluation.   History of Wound Context: Patient of Dr. Kailee Olsen who is seen in wound care having been recently hospitalized at [facility] for right foot infection and osteomyelitis that required I&D and removal of fifth metatarsal right foot with tendon transfer on 2024. Postoperatively there was partial dehiscence of the wound. He was seen in consultation both by infectious disease who placed him on the appropriate antibiotics as well as vascular surgery who felt there was no need for any type of intervention for his peripheral vascular disease at this point in time. The patient is on dialysis. He was recently discharged to nursing facility where a wound VAC has been in place. Patient does have a history of hypercoagulable state with positive anticardiolipin IgM.  He is now 1 week post application of TheraSkin allograft  Wound Pain Timing/Severity: none  Quality of pain: N/A  Severity:  0 / 10   Modifying Factors: None  Associated Signs/Symptoms: edema    Wound Identification:  Wound Type: diabetic and non-healing surgical  Contributing Factors: diabetes, poor glucose control, chronic pressure, decreased mobility, and arterial insufficiency        PAST MEDICAL HISTORY        Diagnosis Date    Amputation of third toe, left, traumatic (HCC)     Anesthesia complication     has history of being aggitated and \"fights\"    Blood circulation, collateral     Cellulitis     Depression     Diabetic polyneuropathy associated with type 2 diabetes mellitus (HCC) 2019    Hypertension     MRSA infection 2019    foot wound    Seizures (HCC)     Type II or

## 2024-11-08 ENCOUNTER — HOSPITAL ENCOUNTER (OUTPATIENT)
Dept: WOUND CARE | Age: 44
Discharge: HOME | End: 2024-11-08
Attending: PODIATRIST
Payer: COMMERCIAL

## 2024-11-08 VITALS
HEART RATE: 74 BPM | TEMPERATURE: 98 F | DIASTOLIC BLOOD PRESSURE: 88 MMHG | SYSTOLIC BLOOD PRESSURE: 174 MMHG | RESPIRATION RATE: 18 BRPM

## 2024-11-08 DIAGNOSIS — T81.89XA NONHEALING SURGICAL WOUND, INITIAL ENCOUNTER: Primary | ICD-10-CM

## 2024-11-08 PROCEDURE — 87077 CULTURE AEROBIC IDENTIFY: CPT

## 2024-11-08 PROCEDURE — 87184 SC STD DISK METHOD PER PLATE: CPT

## 2024-11-08 PROCEDURE — 11042 DBRDMT SUBQ TIS 1ST 20SQCM/<: CPT

## 2024-11-08 PROCEDURE — 87186 SC STD MICRODIL/AGAR DIL: CPT

## 2024-11-08 PROCEDURE — 87075 CULTR BACTERIA EXCEPT BLOOD: CPT

## 2024-11-08 PROCEDURE — 87205 SMEAR GRAM STAIN: CPT

## 2024-11-08 PROCEDURE — 6370000000 HC RX 637 (ALT 250 FOR IP): Performed by: SPECIALIST

## 2024-11-08 PROCEDURE — 87070 CULTURE OTHR SPECIMN AEROBIC: CPT

## 2024-11-08 RX ORDER — GINSENG 100 MG
CAPSULE ORAL ONCE
OUTPATIENT
Start: 2024-11-08 | End: 2024-11-08

## 2024-11-08 RX ORDER — LIDOCAINE 40 MG/G
CREAM TOPICAL ONCE
Status: COMPLETED | OUTPATIENT
Start: 2024-11-08 | End: 2024-11-08

## 2024-11-08 RX ORDER — SODIUM CHLOR/HYPOCHLOROUS ACID 0.033 %
SOLUTION, IRRIGATION IRRIGATION ONCE
Status: COMPLETED | OUTPATIENT
Start: 2024-11-08 | End: 2024-11-08

## 2024-11-08 RX ORDER — NEOMYCIN/BACITRACIN/POLYMYXINB 3.5-400-5K
OINTMENT (GRAM) TOPICAL ONCE
OUTPATIENT
Start: 2024-11-08 | End: 2024-11-08

## 2024-11-08 RX ORDER — MUPIROCIN 20 MG/G
OINTMENT TOPICAL ONCE
OUTPATIENT
Start: 2024-11-08 | End: 2024-11-08

## 2024-11-08 RX ORDER — BACITRACIN ZINC AND POLYMYXIN B SULFATE 500; 1000 [USP'U]/G; [USP'U]/G
OINTMENT TOPICAL ONCE
OUTPATIENT
Start: 2024-11-08 | End: 2024-11-08

## 2024-11-08 RX ORDER — LIDOCAINE HYDROCHLORIDE 20 MG/ML
JELLY TOPICAL ONCE
OUTPATIENT
Start: 2024-11-08 | End: 2024-11-08

## 2024-11-08 RX ORDER — CLOBETASOL PROPIONATE 0.5 MG/G
OINTMENT TOPICAL ONCE
OUTPATIENT
Start: 2024-11-08 | End: 2024-11-08

## 2024-11-08 RX ORDER — SILVER SULFADIAZINE 10 MG/G
CREAM TOPICAL ONCE
OUTPATIENT
Start: 2024-11-08 | End: 2024-11-08

## 2024-11-08 RX ORDER — GENTAMICIN SULFATE 1 MG/G
OINTMENT TOPICAL ONCE
OUTPATIENT
Start: 2024-11-08 | End: 2024-11-08

## 2024-11-08 RX ORDER — LIDOCAINE HYDROCHLORIDE 40 MG/ML
SOLUTION TOPICAL ONCE
OUTPATIENT
Start: 2024-11-08 | End: 2024-11-08

## 2024-11-08 RX ORDER — BETAMETHASONE DIPROPIONATE 0.5 MG/G
CREAM TOPICAL ONCE
OUTPATIENT
Start: 2024-11-08 | End: 2024-11-08

## 2024-11-08 RX ORDER — LIDOCAINE 50 MG/G
OINTMENT TOPICAL ONCE
OUTPATIENT
Start: 2024-11-08 | End: 2024-11-08

## 2024-11-08 RX ORDER — TRIAMCINOLONE ACETONIDE 1 MG/G
OINTMENT TOPICAL ONCE
OUTPATIENT
Start: 2024-11-08 | End: 2024-11-08

## 2024-11-08 RX ORDER — SODIUM CHLOR/HYPOCHLOROUS ACID 0.033 %
SOLUTION, IRRIGATION IRRIGATION ONCE
OUTPATIENT
Start: 2024-11-08 | End: 2024-11-08

## 2024-11-08 RX ORDER — LIDOCAINE 40 MG/G
CREAM TOPICAL ONCE
OUTPATIENT
Start: 2024-11-08 | End: 2024-11-08

## 2024-11-08 RX ADMIN — LIDOCAINE: 40 CREAM TOPICAL at 10:13

## 2024-11-08 RX ADMIN — Medication: at 11:15

## 2024-11-08 NOTE — PROGRESS NOTES
[facility] Care Center  Progress Note and Procedure Note      Serafin Weaver  MEDICAL RECORD NUMBER:  [id number]  AGE: 44 y.o.   GENDER: male  : 1980  EPISODE DATE:  2024    Subjective:     Chief Complaint   Patient presents with    Wound Check     Bilateral feet           HISTORY of PRESENT ILLNESS HPI     Serafin Weaver is a 44 y.o. male who presents today for wound/ulcer evaluation.   History of Wound Context: Patient of Dr. Kailee Olsen who is seen in wound care having been recently hospitalized at [facility] for right foot infection and osteomyelitis that required I&D and removal of fifth metatarsal right foot with tendon transfer on 2024. Postoperatively there was partial dehiscence of the wound. He was seen in consultation both by infectious disease who placed him on the appropriate antibiotics as well as vascular surgery who felt there was no need for any type of intervention for his peripheral vascular disease at this point in time. The patient is on dialysis. He was recently discharged to nursing facility where a wound VAC has been in place. Patient does have a history of hypercoagulable state with positive anticardiolipin IgM.  He is now 2 weeks post application of TheraSkin allograft   Wound/Ulcer Pain Timing/Severity: none  Quality of pain: N/A  Severity:  0 / 10   Modifying Factors: None  Associated Signs/Symptoms: edema    Ulcer Identification:  Ulcer Type: diabetic and non-healing surgical    Contributing Factors: diabetes, poor glucose control, chronic pressure, decreased mobility, and arterial insufficiency    Acute Wound: N/A not an acute wound    PAST MEDICAL HISTORY        Diagnosis Date    Amputation of third toe, left, traumatic (HCC)     Anesthesia complication     has history of being aggitated and \"fights\"    Blood circulation, collateral     Cellulitis     Depression     Diabetic polyneuropathy associated with type 2 diabetes mellitus (HCC) 2019

## 2024-11-08 NOTE — PATIENT INSTRUCTIONS
(cm) 0.6 cm 09/17/24 1524   Post-Procedure Width (cm) 0.6 cm 09/17/24 1524   Post-Procedure Depth (cm) 0.3 cm 09/17/24 1524   Post-Procedure Surface Area (cm^2) 0.36 cm^2 09/17/24 1524   Post-Procedure Volume (cm^3) 0.108 cm^3 09/17/24 1524   Distance Tunneling (cm) 0 cm 11/08/24 1014   Tunneling Position ___ O'Clock 0 10/01/24 1412   Undermining Starts ___ O'Clock 0 10/01/24 1412   Undermining Ends___ O'Clock 0 10/01/24 1412   Undermining Maxium Distance (cm) 0 11/08/24 1014   Wound Assessment Eschar dry 11/08/24 1014   Drainage Amount None (dry) 11/08/24 1014   Drainage Description Brown 09/24/24 1417   Odor None 11/08/24 1014   Darlin-wound Assessment Dry/flaky;Hyperkeratosis (callous) 11/08/24 1014   Margins Defined edges 11/08/24 1014   Number of days: 56       Wound 11/08/24 Foot Right;Medial #5 (Active)   Wound Image   11/08/24 1014   Wound Length (cm) 1 cm 11/08/24 1014   Wound Width (cm) 0.7 cm 11/08/24 1014   Wound Depth (cm) 0.1 cm 11/08/24 1014   Wound Surface Area (cm^2) 0.7 cm^2 11/08/24 1014   Wound Volume (cm^3) 0.07 cm^3 11/08/24 1014   Distance Tunneling (cm) 0 cm 11/08/24 1014   Undermining Maxium Distance (cm) 0 11/08/24 1014   Wound Assessment Pink/red;Hyper granulation tissue 11/08/24 1014   Drainage Amount Small (< 25%) 11/08/24 1014   Drainage Description Serosanguinous 11/08/24 1014   Odor None 11/08/24 1014   Darlin-wound Assessment Intact 11/08/24 1014   Margins Defined edges 11/08/24 1014   Wound Thickness Description not for Pressure Injury Full thickness 11/08/24 1014   Number of days: 0

## 2024-11-10 LAB
BACTERIA SPEC AEROBE CULT: ABNORMAL
BACTERIA SPEC ANAEROBE CULT: ABNORMAL
GRAM STN SPEC: ABNORMAL
ORGANISM: ABNORMAL

## 2024-11-22 ENCOUNTER — HOSPITAL ENCOUNTER (OUTPATIENT)
Dept: WOUND CARE | Age: 44
Discharge: HOME | End: 2024-11-22
Attending: PODIATRIST
Payer: COMMERCIAL

## 2024-11-22 DIAGNOSIS — T81.89XA NONHEALING SURGICAL WOUND, INITIAL ENCOUNTER: Primary | ICD-10-CM

## 2024-11-22 PROCEDURE — 11045 DBRDMT SUBQ TISS EACH ADDL: CPT

## 2024-11-22 PROCEDURE — 6370000000 HC RX 637 (ALT 250 FOR IP): Performed by: SPECIALIST

## 2024-11-22 PROCEDURE — 11042 DBRDMT SUBQ TIS 1ST 20SQCM/<: CPT

## 2024-11-22 RX ORDER — LIDOCAINE HYDROCHLORIDE 20 MG/ML
JELLY TOPICAL ONCE
OUTPATIENT
Start: 2024-11-22 | End: 2024-11-22

## 2024-11-22 RX ORDER — GINSENG 100 MG
CAPSULE ORAL ONCE
OUTPATIENT
Start: 2024-11-22 | End: 2024-11-22

## 2024-11-22 RX ORDER — LIDOCAINE 50 MG/G
OINTMENT TOPICAL ONCE
OUTPATIENT
Start: 2024-11-22 | End: 2024-11-22

## 2024-11-22 RX ORDER — LIDOCAINE 40 MG/G
CREAM TOPICAL ONCE
OUTPATIENT
Start: 2024-11-22 | End: 2024-11-22

## 2024-11-22 RX ORDER — NEOMYCIN/BACITRACIN/POLYMYXINB 3.5-400-5K
OINTMENT (GRAM) TOPICAL ONCE
OUTPATIENT
Start: 2024-11-22 | End: 2024-11-22

## 2024-11-22 RX ORDER — LIDOCAINE HYDROCHLORIDE 40 MG/ML
SOLUTION TOPICAL ONCE
Status: COMPLETED | OUTPATIENT
Start: 2024-11-22 | End: 2024-11-22

## 2024-11-22 RX ORDER — GENTAMICIN SULFATE 1 MG/G
OINTMENT TOPICAL ONCE
OUTPATIENT
Start: 2024-11-22 | End: 2024-11-22

## 2024-11-22 RX ORDER — CLOBETASOL PROPIONATE 0.5 MG/G
OINTMENT TOPICAL ONCE
OUTPATIENT
Start: 2024-11-22 | End: 2024-11-22

## 2024-11-22 RX ORDER — BETAMETHASONE DIPROPIONATE 0.5 MG/G
CREAM TOPICAL ONCE
OUTPATIENT
Start: 2024-11-22 | End: 2024-11-22

## 2024-11-22 RX ORDER — TRIAMCINOLONE ACETONIDE 1 MG/G
OINTMENT TOPICAL ONCE
OUTPATIENT
Start: 2024-11-22 | End: 2024-11-22

## 2024-11-22 RX ORDER — SODIUM CHLOR/HYPOCHLOROUS ACID 0.033 %
SOLUTION, IRRIGATION IRRIGATION ONCE
OUTPATIENT
Start: 2024-11-22 | End: 2024-11-22

## 2024-11-22 RX ORDER — BACITRACIN ZINC AND POLYMYXIN B SULFATE 500; 1000 [USP'U]/G; [USP'U]/G
OINTMENT TOPICAL ONCE
OUTPATIENT
Start: 2024-11-22 | End: 2024-11-22

## 2024-11-22 RX ORDER — SILVER SULFADIAZINE 10 MG/G
CREAM TOPICAL ONCE
OUTPATIENT
Start: 2024-11-22 | End: 2024-11-22

## 2024-11-22 RX ORDER — LIDOCAINE HYDROCHLORIDE 40 MG/ML
SOLUTION TOPICAL ONCE
OUTPATIENT
Start: 2024-11-22 | End: 2024-11-22

## 2024-11-22 RX ORDER — MUPIROCIN 20 MG/G
OINTMENT TOPICAL ONCE
OUTPATIENT
Start: 2024-11-22 | End: 2024-11-22

## 2024-11-22 RX ADMIN — LIDOCAINE HYDROCHLORIDE: 40 SOLUTION TOPICAL at 09:23

## 2024-11-22 NOTE — PROGRESS NOTES
cm 11/22/24 0908   Undermining Maxium Distance (cm) 0 11/22/24 0908   Wound Assessment Pink/red;Fibrin 11/22/24 0908   Drainage Amount Small (< 25%) 11/22/24 0908   Drainage Description Serosanguinous 11/22/24 0908   Odor None 11/22/24 0908   Darlin-wound Assessment Intact 11/22/24 0908   Margins Defined edges 11/22/24 0908   Wound Thickness Description not for Pressure Injury Full thickness 11/22/24 0908   Number of days: 14                  Electronically signed by TJ SMITH MD on 11/22/2024 at 10:43 AM

## 2024-11-22 NOTE — PATIENT INSTRUCTIONS
Wound Care Center Physician Orders and Discharge Instructions  [facility]   [address] [place]  Telephone: [phone] FAX [phone]    NAME:  Serafin Weaver  YOB: 1980  MEDICAL RECORD NUMBER:  [id number]  DATE: 11/22/2024    Return Appointment:  Return Appointment: With Kailee Olsen DPM  in  12/3/24 Week(s).  Schedule weekly for the rest of the month? No    No future appointments.        Skilled Nursing Facility: [facility]    Change dressing?: Yes    Wound Cleansing: Vashe wash - Apply enough Vashe to soak a piece of gauze and place on wound bed for 5-10 minutes. No need to rinse after soaking.    Darlin Wound Topical Treatments: Nothing       Dressings:           Wound Location: left heel and right medial foot wounds     Primary Dressing to wound bed: Collagen (I.e. Puracol)      Cover and Secure with:  4X4 gauze pad and Bulky roll gauze     Change dressing: 3 times per week:Monday/Wednesday/Friday         [x]Negative Pressure:           Wound Location: right lateral foot wound    Use No-Sting Barrier film to darlin wound with each dressing change (DO NOT USE if Dermatac Drape from [employer] is used).   Pressure @ 125 mmHg continuous using black foam.     [x]Apply White foam on exposed bone and/or tunnels: to tunnel area at 9631-5942   Change dressing: Monday/Wednesday/Friday by [facility]  Make sure that tubing is not against the skin by using gauze and/or Kerlix.  [x]Wound clinic ONLY: Apply saline wet to dry dressing until SNF can place wound vac on.        Patient may participate in therapy with the following restrictions for off-Loading:   [x]Off Loading(Pressure Reduction) When: Walking and Sitting  Weight Bearing Status: Total Non-Weight bearing Right;  Offloading devices: Heel Protector Soft Boot: Left - DO NOT WALK IN WHILE WEARING, wheelchair  []No Offloading required    Dietary:   Important dietary

## 2024-12-03 ENCOUNTER — HOSPITAL ENCOUNTER (OUTPATIENT)
Dept: WOUND CARE | Age: 44
Discharge: HOME | End: 2024-12-03
Attending: PODIATRIST
Payer: COMMERCIAL

## 2024-12-03 DIAGNOSIS — T81.89XA NONHEALING SURGICAL WOUND, INITIAL ENCOUNTER: Primary | ICD-10-CM

## 2024-12-03 PROCEDURE — 11045 DBRDMT SUBQ TISS EACH ADDL: CPT

## 2024-12-03 PROCEDURE — 87075 CULTR BACTERIA EXCEPT BLOOD: CPT

## 2024-12-03 PROCEDURE — 87186 SC STD MICRODIL/AGAR DIL: CPT

## 2024-12-03 PROCEDURE — 87070 CULTURE OTHR SPECIMN AEROBIC: CPT

## 2024-12-03 PROCEDURE — 6370000000 HC RX 637 (ALT 250 FOR IP): Performed by: SPECIALIST

## 2024-12-03 PROCEDURE — 87077 CULTURE AEROBIC IDENTIFY: CPT

## 2024-12-03 PROCEDURE — 11042 DBRDMT SUBQ TIS 1ST 20SQCM/<: CPT

## 2024-12-03 PROCEDURE — 87184 SC STD DISK METHOD PER PLATE: CPT

## 2024-12-03 PROCEDURE — 87205 SMEAR GRAM STAIN: CPT

## 2024-12-03 RX ORDER — MUPIROCIN 20 MG/G
OINTMENT TOPICAL ONCE
OUTPATIENT
Start: 2024-12-03 | End: 2024-12-03

## 2024-12-03 RX ORDER — LIDOCAINE 40 MG/G
CREAM TOPICAL ONCE
OUTPATIENT
Start: 2024-12-03 | End: 2024-12-03

## 2024-12-03 RX ORDER — AMOXICILLIN AND CLAVULANATE POTASSIUM 500; 125 MG/1; MG/1
1 TABLET, FILM COATED ORAL 3 TIMES DAILY
Qty: 42 TABLET | Refills: 0 | Status: ON HOLD | OUTPATIENT
Start: 2024-12-03 | End: 2024-12-17

## 2024-12-03 RX ORDER — TRIAMCINOLONE ACETONIDE 1 MG/G
OINTMENT TOPICAL ONCE
OUTPATIENT
Start: 2024-12-03 | End: 2024-12-03

## 2024-12-03 RX ORDER — LIDOCAINE HYDROCHLORIDE 20 MG/ML
JELLY TOPICAL ONCE
OUTPATIENT
Start: 2024-12-03 | End: 2024-12-03

## 2024-12-03 RX ORDER — NEOMYCIN/BACITRACIN/POLYMYXINB 3.5-400-5K
OINTMENT (GRAM) TOPICAL ONCE
OUTPATIENT
Start: 2024-12-03 | End: 2024-12-03

## 2024-12-03 RX ORDER — LIDOCAINE 50 MG/G
OINTMENT TOPICAL ONCE
OUTPATIENT
Start: 2024-12-03 | End: 2024-12-03

## 2024-12-03 RX ORDER — GINSENG 100 MG
CAPSULE ORAL ONCE
OUTPATIENT
Start: 2024-12-03 | End: 2024-12-03

## 2024-12-03 RX ORDER — GENTAMICIN SULFATE 1 MG/G
OINTMENT TOPICAL ONCE
OUTPATIENT
Start: 2024-12-03 | End: 2024-12-03

## 2024-12-03 RX ORDER — BACITRACIN ZINC AND POLYMYXIN B SULFATE 500; 1000 [USP'U]/G; [USP'U]/G
OINTMENT TOPICAL ONCE
OUTPATIENT
Start: 2024-12-03 | End: 2024-12-03

## 2024-12-03 RX ORDER — SILVER SULFADIAZINE 10 MG/G
CREAM TOPICAL ONCE
OUTPATIENT
Start: 2024-12-03 | End: 2024-12-03

## 2024-12-03 RX ORDER — SODIUM CHLOR/HYPOCHLOROUS ACID 0.033 %
SOLUTION, IRRIGATION IRRIGATION ONCE
Status: COMPLETED | OUTPATIENT
Start: 2024-12-03 | End: 2024-12-03

## 2024-12-03 RX ORDER — BETAMETHASONE DIPROPIONATE 0.5 MG/G
CREAM TOPICAL ONCE
OUTPATIENT
Start: 2024-12-03 | End: 2024-12-03

## 2024-12-03 RX ORDER — LIDOCAINE 40 MG/G
CREAM TOPICAL ONCE
Status: COMPLETED | OUTPATIENT
Start: 2024-12-03 | End: 2024-12-03

## 2024-12-03 RX ORDER — SODIUM CHLOR/HYPOCHLOROUS ACID 0.033 %
SOLUTION, IRRIGATION IRRIGATION ONCE
OUTPATIENT
Start: 2024-12-03 | End: 2024-12-03

## 2024-12-03 RX ORDER — LIDOCAINE HYDROCHLORIDE 40 MG/ML
SOLUTION TOPICAL ONCE
OUTPATIENT
Start: 2024-12-03 | End: 2024-12-03

## 2024-12-03 RX ORDER — CLOBETASOL PROPIONATE 0.5 MG/G
OINTMENT TOPICAL ONCE
OUTPATIENT
Start: 2024-12-03 | End: 2024-12-03

## 2024-12-03 RX ADMIN — LIDOCAINE: 40 CREAM TOPICAL at 14:42

## 2024-12-03 RX ADMIN — Medication: at 15:22

## 2024-12-03 NOTE — PATIENT INSTRUCTIONS
(cm^2) 7.56 cm^2 12/03/24 1505   Post-Procedure Volume (cm^3) 2.268 cm^3 12/03/24 1505   Distance Tunneling (cm) 0 cm 12/03/24 1413   Tunneling Position ___ O'Clock 0 12/03/24 1413   Undermining Starts ___ O'Clock 0 12/03/24 1413   Undermining Ends___ O'Clock 0 12/03/24 1413   Undermining Maxium Distance (cm) 0 12/03/24 1413   Wound Assessment Fibrin;Pink/red 12/03/24 1413   Drainage Amount Small (< 25%) 12/03/24 1413   Drainage Description Serosanguinous 12/03/24 1413   Odor None 12/03/24 1413   Darlin-wound Assessment Intact 12/03/24 1413   Margins Defined edges 12/03/24 1413   Number of days: 81       Wound 11/08/24 Foot Right;Medial #5 (Active)   Wound Image   12/03/24 1413   Wound Cleansed Cleansed with saline 12/03/24 1413   Dressing/Treatment Moist to dry 11/22/24 0950   Wound Length (cm) 0.5 cm 12/03/24 1413   Wound Width (cm) 0.6 cm 12/03/24 1413   Wound Depth (cm) 0 cm 12/03/24 1413   Wound Surface Area (cm^2) 0.3 cm^2 12/03/24 1413   Change in Wound Size % (l*w) 57.14 12/03/24 1413   Wound Volume (cm^3) 0 cm^3 12/03/24 1413   Wound Healing % 100 12/03/24 1413   Post-Procedure Length (cm) 0.6 cm 12/03/24 1505   Post-Procedure Width (cm) 0.7 cm 12/03/24 1505   Post-Procedure Depth (cm) 0.2 cm 12/03/24 1505   Post-Procedure Surface Area (cm^2) 0.42 cm^2 12/03/24 1505   Post-Procedure Volume (cm^3) 0.084 cm^3 12/03/24 1505   Distance Tunneling (cm) 0 cm 12/03/24 1413   Tunneling Position ___ O'Clock 0 12/03/24 1413   Undermining Starts ___ O'Clock 0 12/03/24 1413   Undermining Ends___ O'Clock 0 12/03/24 1413   Undermining Maxium Distance (cm) 0 12/03/24 1413   Wound Assessment Hyper granulation tissue 12/03/24 1413   Drainage Amount Small (< 25%) 12/03/24 1413   Drainage Description Serosanguinous 12/03/24 1413   Odor None 12/03/24 1413   Darlin-wound Assessment Intact 12/03/24 1413   Margins Defined edges 12/03/24 1413   Wound Thickness Description not for Pressure Injury Full thickness 12/03/24 1413   Number of

## 2024-12-03 NOTE — PROGRESS NOTES
Serosanguinous 12/03/24 1413   Odor None 12/03/24 1413   Darlin-wound Assessment Intact 12/03/24 1413   Margins Defined edges 12/03/24 1413   Number of days: 81       Wound 11/08/24 Foot Right;Medial #5 (Active)   Wound Image   12/03/24 1413   Wound Cleansed Cleansed with saline 12/03/24 1413   Dressing/Treatment Moist to dry 11/22/24 0950   Wound Length (cm) 0.5 cm 12/03/24 1413   Wound Width (cm) 0.6 cm 12/03/24 1413   Wound Depth (cm) 0 cm 12/03/24 1413   Wound Surface Area (cm^2) 0.3 cm^2 12/03/24 1413   Change in Wound Size % (l*w) 57.14 12/03/24 1413   Wound Volume (cm^3) 0 cm^3 12/03/24 1413   Wound Healing % 100 12/03/24 1413   Post-Procedure Length (cm) 0.6 cm 12/03/24 1505   Post-Procedure Width (cm) 0.7 cm 12/03/24 1505   Post-Procedure Depth (cm) 0.2 cm 12/03/24 1505   Post-Procedure Surface Area (cm^2) 0.42 cm^2 12/03/24 1505   Post-Procedure Volume (cm^3) 0.084 cm^3 12/03/24 1505   Distance Tunneling (cm) 0 cm 12/03/24 1413   Tunneling Position ___ O'Clock 0 12/03/24 1413   Undermining Starts ___ O'Clock 0 12/03/24 1413   Undermining Ends___ O'Clock 0 12/03/24 1413   Undermining Maxium Distance (cm) 0 12/03/24 1413   Wound Assessment Hyper granulation tissue 12/03/24 1413   Drainage Amount Small (< 25%) 12/03/24 1413   Drainage Description Serosanguinous 12/03/24 1413   Odor None 12/03/24 1413   Darlin-wound Assessment Intact 12/03/24 1413   Margins Defined edges 12/03/24 1413   Wound Thickness Description not for Pressure Injury Full thickness 12/03/24 1413   Number of days: 25       Wound 12/03/24 Foot Left;Dorsal #6 (Active)   Wound Image   12/03/24 1413   Wound Cleansed Cleansed with saline 12/03/24 1413   Wound Length (cm) 0.5 cm 12/03/24 1413   Wound Width (cm) 0.6 cm 12/03/24 1413   Wound Depth (cm) 0.4 cm 12/03/24 1413   Wound Surface Area (cm^2) 0.3 cm^2 12/03/24 1413   Wound Volume (cm^3) 0.12 cm^3 12/03/24 1413   Post-Procedure Length (cm) 0.6 cm 12/03/24 1505   Post-Procedure Width (cm) 0.7 cm

## 2024-12-07 ENCOUNTER — HOSPITAL ENCOUNTER (INPATIENT)
Age: 44
LOS: 3 days | Discharge: NURSING, REHAB OR LONG-TERM CARE | DRG: 640 | End: 2024-12-10
Attending: STUDENT IN AN ORGANIZED HEALTH CARE EDUCATION/TRAINING PROGRAM | Admitting: INTERNAL MEDICINE
Payer: COMMERCIAL

## 2024-12-07 DIAGNOSIS — E87.5 HYPERKALEMIA: Primary | ICD-10-CM

## 2024-12-07 LAB
ANION GAP SERPL CALCULATED.3IONS-SCNC: 13 MMOL/L (ref 3–16)
BASOPHILS # BLD: 0.1 K/UL (ref 0–0.2)
BASOPHILS NFR BLD: 1.1 %
BUN SERPL-MCNC: 79 MG/DL (ref 7–20)
CALCIUM SERPL-MCNC: 8.7 MG/DL (ref 8.3–10.6)
CHLORIDE SERPL-SCNC: 106 MMOL/L (ref 99–110)
CHP ED QC CHECK: YES
CO2 SERPL-SCNC: 22 MMOL/L (ref 21–32)
CREAT SERPL-MCNC: 12.2 MG/DL (ref 0.9–1.3)
DEPRECATED RDW RBC AUTO: 17.9 % (ref 12.4–15.4)
EOSINOPHIL # BLD: 0.2 K/UL (ref 0–0.6)
EOSINOPHIL NFR BLD: 3.5 %
GFR SERPLBLD CREATININE-BSD FMLA CKD-EPI: 5 ML/MIN/{1.73_M2}
GLUCOSE BLD-MCNC: 102 MG/DL (ref 70–99)
GLUCOSE BLD-MCNC: 103 MG/DL
GLUCOSE BLD-MCNC: 103 MG/DL (ref 70–99)
GLUCOSE BLD-MCNC: 112 MG/DL (ref 70–99)
GLUCOSE BLD-MCNC: 89 MG/DL (ref 70–99)
GLUCOSE BLD-MCNC: 92 MG/DL (ref 70–99)
GLUCOSE SERPL-MCNC: 96 MG/DL (ref 70–99)
HCT VFR BLD AUTO: 32.5 % (ref 40.5–52.5)
HGB BLD-MCNC: 10.4 G/DL (ref 13.5–17.5)
LYMPHOCYTES # BLD: 1.6 K/UL (ref 1–5.1)
LYMPHOCYTES NFR BLD: 22.7 %
MCH RBC QN AUTO: 31 PG (ref 26–34)
MCHC RBC AUTO-ENTMCNC: 32.1 G/DL (ref 31–36)
MCV RBC AUTO: 96.5 FL (ref 80–100)
MONOCYTES # BLD: 0.6 K/UL (ref 0–1.3)
MONOCYTES NFR BLD: 8.5 %
NEUTROPHILS # BLD: 4.5 K/UL (ref 1.7–7.7)
NEUTROPHILS NFR BLD: 64.2 %
PERFORMED ON: ABNORMAL
PERFORMED ON: NORMAL
PERFORMED ON: NORMAL
PLATELET # BLD AUTO: 195 K/UL (ref 135–450)
PMV BLD AUTO: 8 FL (ref 5–10.5)
POTASSIUM SERPL-SCNC: 6.8 MMOL/L (ref 3.5–5.1)
POTASSIUM SERPL-SCNC: 8.9 MMOL/L (ref 3.5–5.1)
RBC # BLD AUTO: 3.37 M/UL (ref 4.2–5.9)
SODIUM SERPL-SCNC: 141 MMOL/L (ref 136–145)
WBC # BLD AUTO: 7.1 K/UL (ref 4–11)

## 2024-12-07 PROCEDURE — 85025 COMPLETE CBC W/AUTO DIFF WBC: CPT

## 2024-12-07 PROCEDURE — 93005 ELECTROCARDIOGRAM TRACING: CPT | Performed by: STUDENT IN AN ORGANIZED HEALTH CARE EDUCATION/TRAINING PROGRAM

## 2024-12-07 PROCEDURE — 84132 ASSAY OF SERUM POTASSIUM: CPT

## 2024-12-07 PROCEDURE — 6370000000 HC RX 637 (ALT 250 FOR IP): Performed by: STUDENT IN AN ORGANIZED HEALTH CARE EDUCATION/TRAINING PROGRAM

## 2024-12-07 PROCEDURE — 6360000002 HC RX W HCPCS

## 2024-12-07 PROCEDURE — 36415 COLL VENOUS BLD VENIPUNCTURE: CPT

## 2024-12-07 PROCEDURE — 90935 HEMODIALYSIS ONE EVALUATION: CPT

## 2024-12-07 PROCEDURE — 80048 BASIC METABOLIC PNL TOTAL CA: CPT

## 2024-12-07 PROCEDURE — 83036 HEMOGLOBIN GLYCOSYLATED A1C: CPT

## 2024-12-07 PROCEDURE — 2580000003 HC RX 258: Performed by: STUDENT IN AN ORGANIZED HEALTH CARE EDUCATION/TRAINING PROGRAM

## 2024-12-07 PROCEDURE — 5A1D70Z PERFORMANCE OF URINARY FILTRATION, INTERMITTENT, LESS THAN 6 HOURS PER DAY: ICD-10-PCS | Performed by: INTERNAL MEDICINE

## 2024-12-07 PROCEDURE — 96374 THER/PROPH/DIAG INJ IV PUSH: CPT

## 2024-12-07 PROCEDURE — 6360000002 HC RX W HCPCS: Performed by: STUDENT IN AN ORGANIZED HEALTH CARE EDUCATION/TRAINING PROGRAM

## 2024-12-07 PROCEDURE — 99285 EMERGENCY DEPT VISIT HI MDM: CPT

## 2024-12-07 PROCEDURE — 2060000000 HC ICU INTERMEDIATE R&B

## 2024-12-07 RX ORDER — SODIUM CHLORIDE 0.9 % (FLUSH) 0.9 %
5-40 SYRINGE (ML) INJECTION EVERY 12 HOURS SCHEDULED
Status: CANCELLED | OUTPATIENT
Start: 2024-12-07

## 2024-12-07 RX ORDER — DEXTROSE MONOHYDRATE 100 MG/ML
INJECTION, SOLUTION INTRAVENOUS CONTINUOUS PRN
Status: DISCONTINUED | OUTPATIENT
Start: 2024-12-07 | End: 2024-12-08

## 2024-12-07 RX ORDER — SODIUM CHLORIDE 0.9 % (FLUSH) 0.9 %
5-40 SYRINGE (ML) INJECTION PRN
Status: CANCELLED | OUTPATIENT
Start: 2024-12-07

## 2024-12-07 RX ORDER — ACETAMINOPHEN 325 MG/1
650 TABLET ORAL EVERY 6 HOURS PRN
Status: CANCELLED | OUTPATIENT
Start: 2024-12-07

## 2024-12-07 RX ORDER — FUROSEMIDE 10 MG/ML
40 INJECTION INTRAMUSCULAR; INTRAVENOUS ONCE
Status: COMPLETED | OUTPATIENT
Start: 2024-12-07 | End: 2024-12-07

## 2024-12-07 RX ORDER — CALCIUM GLUCONATE 20 MG/ML
1000 INJECTION, SOLUTION INTRAVENOUS ONCE
Status: COMPLETED | OUTPATIENT
Start: 2024-12-07 | End: 2024-12-07

## 2024-12-07 RX ORDER — DEXTROSE MONOHYDRATE 100 MG/ML
INJECTION, SOLUTION INTRAVENOUS CONTINUOUS PRN
Status: CANCELLED | OUTPATIENT
Start: 2024-12-07

## 2024-12-07 RX ORDER — ACETAMINOPHEN 650 MG/1
650 SUPPOSITORY RECTAL EVERY 6 HOURS PRN
Status: CANCELLED | OUTPATIENT
Start: 2024-12-07

## 2024-12-07 RX ORDER — GLUCAGON 1 MG/ML
1 KIT INJECTION PRN
Status: DISCONTINUED | OUTPATIENT
Start: 2024-12-07 | End: 2024-12-10 | Stop reason: HOSPADM

## 2024-12-07 RX ORDER — POTASSIUM CHLORIDE 29.8 MG/ML
20 INJECTION INTRAVENOUS PRN
Status: CANCELLED | OUTPATIENT
Start: 2024-12-07

## 2024-12-07 RX ORDER — POTASSIUM CHLORIDE 7.45 MG/ML
10 INJECTION INTRAVENOUS PRN
Status: CANCELLED | OUTPATIENT
Start: 2024-12-07

## 2024-12-07 RX ORDER — HEPARIN SODIUM 5000 [USP'U]/ML
5000 INJECTION, SOLUTION INTRAVENOUS; SUBCUTANEOUS EVERY 8 HOURS SCHEDULED
Status: CANCELLED | OUTPATIENT
Start: 2024-12-07

## 2024-12-07 RX ORDER — DEXTROSE MONOHYDRATE 100 MG/ML
INJECTION, SOLUTION INTRAVENOUS CONTINUOUS PRN
Status: DISCONTINUED | OUTPATIENT
Start: 2024-12-07 | End: 2024-12-07

## 2024-12-07 RX ORDER — MAGNESIUM SULFATE IN WATER 40 MG/ML
2000 INJECTION, SOLUTION INTRAVENOUS PRN
Status: CANCELLED | OUTPATIENT
Start: 2024-12-07

## 2024-12-07 RX ADMIN — INSULIN HUMAN 10 UNITS: 100 INJECTION, SOLUTION PARENTERAL at 19:15

## 2024-12-07 RX ADMIN — FUROSEMIDE 40 MG: 10 INJECTION, SOLUTION INTRAMUSCULAR; INTRAVENOUS at 20:18

## 2024-12-07 RX ADMIN — DEXTROSE 250 ML: 10 SOLUTION INTRAVENOUS at 18:58

## 2024-12-07 RX ADMIN — SODIUM ZIRCONIUM CYCLOSILICATE 10 G: 5 POWDER, FOR SUSPENSION ORAL at 18:52

## 2024-12-07 RX ADMIN — CALCIUM GLUCONATE 1000 MG: 20 INJECTION, SOLUTION INTRAVENOUS at 18:54

## 2024-12-07 ASSESSMENT — PAIN - FUNCTIONAL ASSESSMENT: PAIN_FUNCTIONAL_ASSESSMENT: NONE - DENIES PAIN

## 2024-12-07 NOTE — ED PROVIDER NOTES
THE [facility]  EMERGENCY DEPARTMENT ENCOUNTER          ATTENDING PHYSICIAN NOTE       Date of evaluation: 12/7/2024    Chief Complaint     Other (Pt from [facility] RN from facility called 911 for pt for suspected high potassium. Pt at this time is asymptomatic. Denies Chest pain, dizziness, light headed and chest pressure.)      History of Present Illness     Serafin Weaver is a 44 y.o. male who presents to the emergency department for hyperkalemia.  Patient resides in a nursing facility and has a history of ESRD, diabetes, MDRO infections and presents emergency department after he had a blood draw today that showed an elevated potassium.  Patient denies any symptoms.  Last dialysis was performed on Wednesday.    ASSESSMENT / PLAN  (MEDICAL DECISION MAKING)     INITIAL VITALS: BP: (!) 181/82, Temp: 98.4 °F (36.9 °C), Pulse: 78, Respirations: 16, SpO2: 99 %      Serafin Weaver is a 44 y.o. male's medical history of ESRD on dialysis, diabetes, obesity, hypertension presents emergency department with concern for hyperkalemia after being sent in by his nursing facility.  Patient had dialysis last on Wednesday.  He today had blood drawn that showed that he had hyperkalemia level unknown.  Patient had no symptoms on presentation.  On examination he has clear lung sounds and minimal edema peripherally.  He is obese but does not show signs of significant fluid overload on my examination.  Obtained EKG that shows some slight peaking of T waves in the precordial leads.  Obtain laboratory testing that shows a potassium elevated at 8.9.  Patient given calcium gluconate as well as shifted with insulin and dextrose and patient was given Lokelma as a binding agent.  Patient follows with nephrology at [facility] and thus consulted our nephrologist for urgent/emergent dialysis.  Will plan on admission for dialysis via his fistula.    Is this patient to be included in the SEP-1 core measure? No

## 2024-12-08 ENCOUNTER — APPOINTMENT (OUTPATIENT)
Dept: GENERAL RADIOLOGY | Age: 44
DRG: 640 | End: 2024-12-08
Payer: COMMERCIAL

## 2024-12-08 LAB
ALBUMIN SERPL-MCNC: 3.2 G/DL (ref 3.4–5)
ANION GAP SERPL CALCULATED.3IONS-SCNC: 11 MMOL/L (ref 3–16)
ANION GAP SERPL CALCULATED.3IONS-SCNC: 14 MMOL/L (ref 3–16)
BACTERIA SPEC AEROBE CULT: ABNORMAL
BACTERIA SPEC ANAEROBE CULT: ABNORMAL
BASOPHILS # BLD: 0 K/UL (ref 0–0.2)
BASOPHILS NFR BLD: 0.5 %
BUN SERPL-MCNC: 35 MG/DL (ref 7–20)
BUN SERPL-MCNC: 58 MG/DL (ref 7–20)
CALCIUM SERPL-MCNC: 8.4 MG/DL (ref 8.3–10.6)
CALCIUM SERPL-MCNC: 8.5 MG/DL (ref 8.3–10.6)
CHLORIDE SERPL-SCNC: 102 MMOL/L (ref 99–110)
CHLORIDE SERPL-SCNC: 98 MMOL/L (ref 99–110)
CO2 SERPL-SCNC: 27 MMOL/L (ref 21–32)
CO2 SERPL-SCNC: 27 MMOL/L (ref 21–32)
CREAT SERPL-MCNC: 6.7 MG/DL (ref 0.9–1.3)
CREAT SERPL-MCNC: 9.6 MG/DL (ref 0.9–1.3)
CRP SERPL-MCNC: 29 MG/L (ref 0–5.1)
DEPRECATED RDW RBC AUTO: 18 % (ref 12.4–15.4)
EKG ATRIAL RATE: 75 BPM
EKG ATRIAL RATE: 80 BPM
EKG DIAGNOSIS: NORMAL
EKG DIAGNOSIS: NORMAL
EKG P AXIS: 47 DEGREES
EKG P AXIS: 51 DEGREES
EKG P-R INTERVAL: 160 MS
EKG P-R INTERVAL: 162 MS
EKG Q-T INTERVAL: 386 MS
EKG Q-T INTERVAL: 386 MS
EKG QRS DURATION: 78 MS
EKG QRS DURATION: 80 MS
EKG QTC CALCULATION (BAZETT): 445 MS
EKG QTC CALCULATION (BAZETT): 469 MS
EKG R AXIS: 25 DEGREES
EKG R AXIS: 50 DEGREES
EKG T AXIS: -5 DEGREES
EKG T AXIS: 53 DEGREES
EKG VENTRICULAR RATE: 80 BPM
EKG VENTRICULAR RATE: 89 BPM
EOSINOPHIL # BLD: 0.2 K/UL (ref 0–0.6)
EOSINOPHIL NFR BLD: 2.9 %
ERYTHROCYTE [SEDIMENTATION RATE] IN BLOOD BY WESTERGREN METHOD: 96 MM/HR (ref 0–15)
EST. AVERAGE GLUCOSE BLD GHB EST-MCNC: 102.5 MG/DL
GFR SERPLBLD CREATININE-BSD FMLA CKD-EPI: 10 ML/MIN/{1.73_M2}
GFR SERPLBLD CREATININE-BSD FMLA CKD-EPI: 6 ML/MIN/{1.73_M2}
GLUCOSE BLD-MCNC: 131 MG/DL (ref 70–99)
GLUCOSE BLD-MCNC: 194 MG/DL (ref 70–99)
GLUCOSE BLD-MCNC: 208 MG/DL (ref 70–99)
GLUCOSE BLD-MCNC: 73 MG/DL (ref 70–99)
GLUCOSE SERPL-MCNC: 150 MG/DL (ref 70–99)
GLUCOSE SERPL-MCNC: 71 MG/DL (ref 70–99)
GRAM STN SPEC: ABNORMAL
HBA1C MFR BLD: 5.2 %
HBV SURFACE AB SERPL IA-ACNC: >1000 MIU/ML
HBV SURFACE AG SERPL QL IA: NORMAL
HCT VFR BLD AUTO: 30.6 % (ref 40.5–52.5)
HGB BLD-MCNC: 9.4 G/DL (ref 13.5–17.5)
LYMPHOCYTES # BLD: 1.7 K/UL (ref 1–5.1)
LYMPHOCYTES NFR BLD: 29.2 %
MAGNESIUM SERPL-MCNC: 2.89 MG/DL (ref 1.8–2.4)
MCH RBC QN AUTO: 29.3 PG (ref 26–34)
MCHC RBC AUTO-ENTMCNC: 30.7 G/DL (ref 31–36)
MCV RBC AUTO: 95.4 FL (ref 80–100)
MONOCYTES # BLD: 0.5 K/UL (ref 0–1.3)
MONOCYTES NFR BLD: 8.9 %
NEUTROPHILS # BLD: 3.4 K/UL (ref 1.7–7.7)
NEUTROPHILS NFR BLD: 58.5 %
ORGANISM: ABNORMAL
PERFORMED ON: ABNORMAL
PERFORMED ON: NORMAL
PHOSPHATE SERPL-MCNC: 5.1 MG/DL (ref 2.5–4.9)
PLATELET # BLD AUTO: 168 K/UL (ref 135–450)
PMV BLD AUTO: 7.6 FL (ref 5–10.5)
POTASSIUM SERPL-SCNC: 4.5 MMOL/L (ref 3.5–5.1)
POTASSIUM SERPL-SCNC: 6.3 MMOL/L (ref 3.5–5.1)
POTASSIUM SERPL-SCNC: 7 MMOL/L (ref 3.5–5.1)
RBC # BLD AUTO: 3.2 M/UL (ref 4.2–5.9)
SODIUM SERPL-SCNC: 139 MMOL/L (ref 136–145)
SODIUM SERPL-SCNC: 140 MMOL/L (ref 136–145)
WBC # BLD AUTO: 5.7 K/UL (ref 4–11)

## 2024-12-08 PROCEDURE — 87340 HEPATITIS B SURFACE AG IA: CPT

## 2024-12-08 PROCEDURE — 93010 ELECTROCARDIOGRAM REPORT: CPT | Performed by: INTERNAL MEDICINE

## 2024-12-08 PROCEDURE — 86706 HEP B SURFACE ANTIBODY: CPT

## 2024-12-08 PROCEDURE — 36415 COLL VENOUS BLD VENIPUNCTURE: CPT

## 2024-12-08 PROCEDURE — 84132 ASSAY OF SERUM POTASSIUM: CPT

## 2024-12-08 PROCEDURE — 2580000003 HC RX 258: Performed by: INTERNAL MEDICINE

## 2024-12-08 PROCEDURE — 80069 RENAL FUNCTION PANEL: CPT

## 2024-12-08 PROCEDURE — 6370000000 HC RX 637 (ALT 250 FOR IP): Performed by: INTERNAL MEDICINE

## 2024-12-08 PROCEDURE — 2060000000 HC ICU INTERMEDIATE R&B

## 2024-12-08 PROCEDURE — 93005 ELECTROCARDIOGRAM TRACING: CPT | Performed by: INTERNAL MEDICINE

## 2024-12-08 PROCEDURE — 73630 X-RAY EXAM OF FOOT: CPT

## 2024-12-08 PROCEDURE — 86140 C-REACTIVE PROTEIN: CPT

## 2024-12-08 PROCEDURE — 84134 ASSAY OF PREALBUMIN: CPT

## 2024-12-08 PROCEDURE — 90935 HEMODIALYSIS ONE EVALUATION: CPT

## 2024-12-08 PROCEDURE — 6370000000 HC RX 637 (ALT 250 FOR IP)

## 2024-12-08 PROCEDURE — 85025 COMPLETE CBC W/AUTO DIFF WBC: CPT

## 2024-12-08 PROCEDURE — 85652 RBC SED RATE AUTOMATED: CPT

## 2024-12-08 PROCEDURE — 83735 ASSAY OF MAGNESIUM: CPT

## 2024-12-08 RX ORDER — ACETAMINOPHEN 650 MG/1
650 SUPPOSITORY RECTAL EVERY 6 HOURS PRN
Status: DISCONTINUED | OUTPATIENT
Start: 2024-12-08 | End: 2024-12-10 | Stop reason: HOSPADM

## 2024-12-08 RX ORDER — SEVELAMER CARBONATE 800 MG/1
1600 TABLET, FILM COATED ORAL
Status: DISCONTINUED | OUTPATIENT
Start: 2024-12-08 | End: 2024-12-10 | Stop reason: HOSPADM

## 2024-12-08 RX ORDER — ACETAMINOPHEN 325 MG/1
650 TABLET ORAL EVERY 6 HOURS PRN
Status: DISCONTINUED | OUTPATIENT
Start: 2024-12-08 | End: 2024-12-10 | Stop reason: HOSPADM

## 2024-12-08 RX ORDER — SODIUM CHLORIDE 9 MG/ML
INJECTION, SOLUTION INTRAVENOUS PRN
Status: DISCONTINUED | OUTPATIENT
Start: 2024-12-08 | End: 2024-12-10 | Stop reason: HOSPADM

## 2024-12-08 RX ORDER — FLUOXETINE 10 MG/1
10 CAPSULE ORAL DAILY
Status: DISCONTINUED | OUTPATIENT
Start: 2024-12-08 | End: 2024-12-10 | Stop reason: HOSPADM

## 2024-12-08 RX ORDER — SACCHAROMYCES BOULARDII 250 MG
250 CAPSULE ORAL 2 TIMES DAILY
Status: DISCONTINUED | OUTPATIENT
Start: 2024-12-08 | End: 2024-12-10 | Stop reason: HOSPADM

## 2024-12-08 RX ORDER — CARVEDILOL 6.25 MG/1
6.25 TABLET ORAL 2 TIMES DAILY
Status: DISCONTINUED | OUTPATIENT
Start: 2024-12-08 | End: 2024-12-10 | Stop reason: HOSPADM

## 2024-12-08 RX ORDER — ONDANSETRON 2 MG/ML
4 INJECTION INTRAMUSCULAR; INTRAVENOUS EVERY 6 HOURS PRN
Status: DISCONTINUED | OUTPATIENT
Start: 2024-12-08 | End: 2024-12-10 | Stop reason: HOSPADM

## 2024-12-08 RX ORDER — CLOPIDOGREL BISULFATE 75 MG/1
75 TABLET ORAL DAILY
Status: DISCONTINUED | OUTPATIENT
Start: 2024-12-08 | End: 2024-12-10 | Stop reason: HOSPADM

## 2024-12-08 RX ORDER — MIDODRINE HYDROCHLORIDE 5 MG/1
5 TABLET ORAL
Status: DISCONTINUED | OUTPATIENT
Start: 2024-12-08 | End: 2024-12-10 | Stop reason: HOSPADM

## 2024-12-08 RX ORDER — ATORVASTATIN CALCIUM 80 MG/1
80 TABLET, FILM COATED ORAL
Status: DISCONTINUED | OUTPATIENT
Start: 2024-12-08 | End: 2024-12-10 | Stop reason: HOSPADM

## 2024-12-08 RX ORDER — ONDANSETRON 4 MG/1
4 TABLET, ORALLY DISINTEGRATING ORAL EVERY 8 HOURS PRN
Status: DISCONTINUED | OUTPATIENT
Start: 2024-12-08 | End: 2024-12-10 | Stop reason: HOSPADM

## 2024-12-08 RX ORDER — ASPIRIN 81 MG/1
81 TABLET ORAL DAILY
Status: DISCONTINUED | OUTPATIENT
Start: 2024-12-08 | End: 2024-12-10 | Stop reason: HOSPADM

## 2024-12-08 RX ORDER — POLYETHYLENE GLYCOL 3350 17 G/17G
17 POWDER, FOR SOLUTION ORAL DAILY PRN
Status: DISCONTINUED | OUTPATIENT
Start: 2024-12-08 | End: 2024-12-10 | Stop reason: HOSPADM

## 2024-12-08 RX ORDER — AMOXICILLIN AND CLAVULANATE POTASSIUM 500; 125 MG/1; MG/1
1 TABLET, FILM COATED ORAL EVERY 12 HOURS SCHEDULED
Status: DISCONTINUED | OUTPATIENT
Start: 2024-12-08 | End: 2024-12-09

## 2024-12-08 RX ORDER — PREGABALIN 75 MG/1
75 CAPSULE ORAL DAILY
Status: DISCONTINUED | OUTPATIENT
Start: 2024-12-08 | End: 2024-12-10 | Stop reason: HOSPADM

## 2024-12-08 RX ORDER — CIPROFLOXACIN 500 MG/1
500 TABLET, FILM COATED ORAL EVERY 24 HOURS
Status: DISCONTINUED | OUTPATIENT
Start: 2024-12-08 | End: 2024-12-10

## 2024-12-08 RX ORDER — INSULIN LISPRO 100 [IU]/ML
0-4 INJECTION, SOLUTION INTRAVENOUS; SUBCUTANEOUS
Status: DISCONTINUED | OUTPATIENT
Start: 2024-12-08 | End: 2024-12-10 | Stop reason: HOSPADM

## 2024-12-08 RX ORDER — SODIUM CHLORIDE 0.9 % (FLUSH) 0.9 %
5-40 SYRINGE (ML) INJECTION PRN
Status: DISCONTINUED | OUTPATIENT
Start: 2024-12-08 | End: 2024-12-10 | Stop reason: HOSPADM

## 2024-12-08 RX ADMIN — Medication 250 MG: at 21:25

## 2024-12-08 RX ADMIN — Medication 250 MG: at 01:31

## 2024-12-08 RX ADMIN — AMOXICILLIN AND CLAVULANATE POTASSIUM 1 TABLET: 500; 125 TABLET, FILM COATED ORAL at 21:26

## 2024-12-08 RX ADMIN — FLUOXETINE HYDROCHLORIDE 10 MG: 10 CAPSULE ORAL at 11:00

## 2024-12-08 RX ADMIN — CARVEDILOL 6.25 MG: 6.25 TABLET, FILM COATED ORAL at 21:25

## 2024-12-08 RX ADMIN — PREGABALIN 75 MG: 75 CAPSULE ORAL at 11:00

## 2024-12-08 RX ADMIN — SODIUM CHLORIDE, PRESERVATIVE FREE 10 ML: 5 INJECTION INTRAVENOUS at 11:19

## 2024-12-08 RX ADMIN — CIPROFLOXACIN HYDROCHLORIDE 500 MG: 500 TABLET, FILM COATED ORAL at 11:18

## 2024-12-08 RX ADMIN — APIXABAN 2.5 MG: 2.5 TABLET, FILM COATED ORAL at 11:00

## 2024-12-08 RX ADMIN — SODIUM CHLORIDE, PRESERVATIVE FREE 10 ML: 5 INJECTION INTRAVENOUS at 21:26

## 2024-12-08 RX ADMIN — ASPIRIN 81 MG: 81 TABLET, COATED ORAL at 11:00

## 2024-12-08 RX ADMIN — SEVELAMER CARBONATE 1600 MG: 800 TABLET, FILM COATED ORAL at 13:33

## 2024-12-08 RX ADMIN — ATORVASTATIN CALCIUM 80 MG: 80 TABLET, FILM COATED ORAL at 21:25

## 2024-12-08 RX ADMIN — CARVEDILOL 6.25 MG: 6.25 TABLET, FILM COATED ORAL at 01:27

## 2024-12-08 RX ADMIN — CARVEDILOL 6.25 MG: 6.25 TABLET, FILM COATED ORAL at 11:00

## 2024-12-08 RX ADMIN — AMOXICILLIN AND CLAVULANATE POTASSIUM 1 TABLET: 500; 125 TABLET, FILM COATED ORAL at 11:00

## 2024-12-08 RX ADMIN — ONDANSETRON 4 MG: 4 TABLET, ORALLY DISINTEGRATING ORAL at 11:00

## 2024-12-08 RX ADMIN — Medication 250 MG: at 11:00

## 2024-12-08 RX ADMIN — APIXABAN 2.5 MG: 2.5 TABLET, FILM COATED ORAL at 21:25

## 2024-12-08 RX ADMIN — CLOPIDOGREL BISULFATE 75 MG: 75 TABLET ORAL at 11:00

## 2024-12-08 RX ADMIN — SEVELAMER CARBONATE 1600 MG: 800 TABLET, FILM COATED ORAL at 11:00

## 2024-12-08 RX ADMIN — ATORVASTATIN CALCIUM 80 MG: 80 TABLET, FILM COATED ORAL at 01:27

## 2024-12-08 RX ADMIN — INSULIN LISPRO 1 UNITS: 100 INJECTION, SOLUTION INTRAVENOUS; SUBCUTANEOUS at 01:39

## 2024-12-08 RX ADMIN — SODIUM ZIRCONIUM CYCLOSILICATE 10 G: 10 POWDER, FOR SUSPENSION ORAL at 13:32

## 2024-12-08 ASSESSMENT — PAIN SCALES - GENERAL
PAINLEVEL_OUTOF10: 0

## 2024-12-08 NOTE — H&P
V2.0  History and Physical      Name:  Serafin Weaver /Age/Sex: 1980  (44 y.o. male)   MRN & CSN:  [id number] & [id number] Encounter Date/Time: 2024 8:21 PM EST   [facility] PCP: Venita Kendrick MD       Hospital Day: 1    Assessment and Plan:   Serafin Weaver is a 44 y.o. male with a pmh of essential hypertension, DM-2, ESRD on HD M/W/F, metatarsal amputation of bilateral feet, obesity who was sent from his nursing facility secondary to elevated serum potassium levels.    Hospital Problems             Last Modified POA    * (Principal) Hyperkalemia 2024 Yes   #Hyperkalemia (8.9) with EKG changes-peaked T waves  #ESRD on HD M/W/.  Last session on Wednesday.  Patient's Friday session was not completed as patient was having diarrhea  -Patient received 1 g calcium gluconate, Lasix 40 mg IV, 10 units of IV regular insulin x 1 with D10 and Lokelma 10 g  -Repeat BMP pending  -Will manage conservatively until patient receives HD emergently  -On-call nephrologist has been consulted by ED physician and emergent hemodialysis will be arranged  -Monitor BMP closely  -Repeat EKG after completion of dialysis  -Continue nursing home medications appropriately    #DM-2 complicated with peripheral neuropathy, status post metatarsal amputation bilateral feet  -Patient is not on any medications at the nursing facility  -Started on low-dose SSI with hypoglycemia protocol, follow A1c    #Essential hypertension with elevated blood pressures  -Continued on his nursing home antihypertensives    #Chronic medical conditions as mentioned in PMH  -Continue nursing home medications as ordered    -Wound care/right foot.  Patient is on oral antibiotics at the nursing home which is continued here.  On daily Eliquis 2.5 mg for DVT prophylaxis  -PT/OT  -Social service and case management consults      Total critical care time was 35 minutes, excluding separately reportable procedures. Services included in

## 2024-12-08 NOTE — PLAN OF CARE
Problem: Discharge Planning  Goal: Discharge to home or other facility with appropriate resources  Outcome: Progressing  Flowsheets  Taken 12/8/2024 0202  Discharge to home or other facility with appropriate resources:   Identify barriers to discharge with patient and caregiver   Arrange for needed discharge resources and transportation as appropriate   Identify discharge learning needs (meds, wound care, etc)   Refer to discharge planning if patient needs post-hospital services based on physician order or complex needs related to functional status, cognitive ability or social support system  Taken 12/8/2024 0022  Discharge to home or other facility with appropriate resources:   Identify barriers to discharge with patient and caregiver   Identify discharge learning needs (meds, wound care, etc)   Arrange for needed discharge resources and transportation as appropriate   Refer to discharge planning if patient needs post-hospital services based on physician order or complex needs related to functional status, cognitive ability or social support system     Problem: Skin/Tissue Integrity  Goal: Absence of new skin breakdown  Description: 1.  Monitor for areas of redness and/or skin breakdown  2.  Assess vascular access sites hourly  3.  Every 4-6 hours minimum:  Change oxygen saturation probe site  4.  Every 4-6 hours:  If on nasal continuous positive airway pressure, respiratory therapy assess nares and determine need for appliance change or resting period.  Outcome: Progressing     Problem: Safety - Adult  Goal: Free from fall injury  Outcome: Progressing  Flowsheets (Taken 12/8/2024 0202)  Free From Fall Injury: Instruct family/caregiver on patient safety  Note: Fall protocol in place      Problem: Chronic Conditions and Co-morbidities  Goal: Patient's chronic conditions and co-morbidity symptoms are monitored and maintained or improved  Flowsheets (Taken 12/8/2024 0202)  Care Plan - Patient's Chronic Conditions

## 2024-12-09 LAB
ALBUMIN SERPL-MCNC: 3.3 G/DL (ref 3.4–5)
ALBUMIN SERPL-MCNC: 3.4 G/DL (ref 3.4–5)
ANION GAP SERPL CALCULATED.3IONS-SCNC: 11 MMOL/L (ref 3–16)
ANION GAP SERPL CALCULATED.3IONS-SCNC: 14 MMOL/L (ref 3–16)
BUN SERPL-MCNC: 39 MG/DL (ref 7–20)
BUN SERPL-MCNC: 40 MG/DL (ref 7–20)
CALCIUM SERPL-MCNC: 8.9 MG/DL (ref 8.3–10.6)
CALCIUM SERPL-MCNC: 9 MG/DL (ref 8.3–10.6)
CHLORIDE SERPL-SCNC: 98 MMOL/L (ref 99–110)
CHLORIDE SERPL-SCNC: 99 MMOL/L (ref 99–110)
CO2 SERPL-SCNC: 27 MMOL/L (ref 21–32)
CO2 SERPL-SCNC: 29 MMOL/L (ref 21–32)
CREAT SERPL-MCNC: 7.4 MG/DL (ref 0.9–1.3)
CREAT SERPL-MCNC: 7.7 MG/DL (ref 0.9–1.3)
GFR SERPLBLD CREATININE-BSD FMLA CKD-EPI: 8 ML/MIN/{1.73_M2}
GFR SERPLBLD CREATININE-BSD FMLA CKD-EPI: 8 ML/MIN/{1.73_M2}
GLUCOSE BLD-MCNC: 140 MG/DL (ref 70–99)
GLUCOSE BLD-MCNC: 82 MG/DL (ref 70–99)
GLUCOSE BLD-MCNC: 91 MG/DL (ref 70–99)
GLUCOSE SERPL-MCNC: 101 MG/DL (ref 70–99)
GLUCOSE SERPL-MCNC: 79 MG/DL (ref 70–99)
MAGNESIUM SERPL-MCNC: 2.68 MG/DL (ref 1.8–2.4)
PERFORMED ON: ABNORMAL
PERFORMED ON: NORMAL
PERFORMED ON: NORMAL
PHOSPHATE SERPL-MCNC: 6.7 MG/DL (ref 2.5–4.9)
PHOSPHATE SERPL-MCNC: 7.3 MG/DL (ref 2.5–4.9)
POTASSIUM SERPL-SCNC: 5 MMOL/L (ref 3.5–5.1)
POTASSIUM SERPL-SCNC: 5.5 MMOL/L (ref 3.5–5.1)
PREALB SERPL-MCNC: 17.4 MG/DL (ref 20–40)
SODIUM SERPL-SCNC: 139 MMOL/L (ref 136–145)
SODIUM SERPL-SCNC: 139 MMOL/L (ref 136–145)

## 2024-12-09 PROCEDURE — 97530 THERAPEUTIC ACTIVITIES: CPT

## 2024-12-09 PROCEDURE — 90935 HEMODIALYSIS ONE EVALUATION: CPT

## 2024-12-09 PROCEDURE — 99255 IP/OBS CONSLTJ NEW/EST HI 80: CPT | Performed by: INTERNAL MEDICINE

## 2024-12-09 PROCEDURE — 2580000003 HC RX 258: Performed by: INTERNAL MEDICINE

## 2024-12-09 PROCEDURE — 97166 OT EVAL MOD COMPLEX 45 MIN: CPT

## 2024-12-09 PROCEDURE — 6370000000 HC RX 637 (ALT 250 FOR IP)

## 2024-12-09 PROCEDURE — 97162 PT EVAL MOD COMPLEX 30 MIN: CPT

## 2024-12-09 PROCEDURE — 80069 RENAL FUNCTION PANEL: CPT

## 2024-12-09 PROCEDURE — 2060000000 HC ICU INTERMEDIATE R&B

## 2024-12-09 PROCEDURE — 83735 ASSAY OF MAGNESIUM: CPT

## 2024-12-09 PROCEDURE — 97535 SELF CARE MNGMENT TRAINING: CPT

## 2024-12-09 PROCEDURE — 6370000000 HC RX 637 (ALT 250 FOR IP): Performed by: INTERNAL MEDICINE

## 2024-12-09 PROCEDURE — 97110 THERAPEUTIC EXERCISES: CPT

## 2024-12-09 PROCEDURE — 36415 COLL VENOUS BLD VENIPUNCTURE: CPT

## 2024-12-09 RX ORDER — LOPERAMIDE HYDROCHLORIDE 2 MG/1
2 CAPSULE ORAL 4 TIMES DAILY PRN
COMMUNITY

## 2024-12-09 RX ORDER — AMOXICILLIN AND CLAVULANATE POTASSIUM 500; 125 MG/1; MG/1
1 TABLET, FILM COATED ORAL
Status: DISCONTINUED | OUTPATIENT
Start: 2024-12-09 | End: 2024-12-10

## 2024-12-09 RX ADMIN — ATORVASTATIN CALCIUM 80 MG: 80 TABLET, FILM COATED ORAL at 21:30

## 2024-12-09 RX ADMIN — SODIUM ZIRCONIUM CYCLOSILICATE 10 G: 10 POWDER, FOR SUSPENSION ORAL at 11:12

## 2024-12-09 RX ADMIN — APIXABAN 2.5 MG: 2.5 TABLET, FILM COATED ORAL at 10:55

## 2024-12-09 RX ADMIN — SODIUM CHLORIDE, PRESERVATIVE FREE 10 ML: 5 INJECTION INTRAVENOUS at 11:00

## 2024-12-09 RX ADMIN — Medication 250 MG: at 10:57

## 2024-12-09 RX ADMIN — AMOXICILLIN AND CLAVULANATE POTASSIUM 1 TABLET: 500; 125 TABLET, FILM COATED ORAL at 21:00

## 2024-12-09 RX ADMIN — COLLAGENASE SANTYL: 250 OINTMENT TOPICAL at 10:56

## 2024-12-09 RX ADMIN — Medication 250 MG: at 21:30

## 2024-12-09 RX ADMIN — PREGABALIN 75 MG: 75 CAPSULE ORAL at 10:55

## 2024-12-09 RX ADMIN — ASPIRIN 81 MG: 81 TABLET, COATED ORAL at 10:53

## 2024-12-09 RX ADMIN — CARVEDILOL 6.25 MG: 6.25 TABLET, FILM COATED ORAL at 21:30

## 2024-12-09 RX ADMIN — SEVELAMER CARBONATE 1600 MG: 800 TABLET, FILM COATED ORAL at 14:36

## 2024-12-09 RX ADMIN — SEVELAMER CARBONATE 1600 MG: 800 TABLET, FILM COATED ORAL at 10:54

## 2024-12-09 RX ADMIN — CLOPIDOGREL BISULFATE 75 MG: 75 TABLET ORAL at 10:55

## 2024-12-09 RX ADMIN — CARVEDILOL 6.25 MG: 6.25 TABLET, FILM COATED ORAL at 10:55

## 2024-12-09 RX ADMIN — SODIUM CHLORIDE, PRESERVATIVE FREE 10 ML: 5 INJECTION INTRAVENOUS at 21:00

## 2024-12-09 RX ADMIN — CIPROFLOXACIN HYDROCHLORIDE 500 MG: 500 TABLET, FILM COATED ORAL at 10:55

## 2024-12-09 RX ADMIN — APIXABAN 2.5 MG: 2.5 TABLET, FILM COATED ORAL at 21:00

## 2024-12-09 RX ADMIN — SODIUM CHLORIDE, PRESERVATIVE FREE 10 ML: 5 INJECTION INTRAVENOUS at 10:58

## 2024-12-09 RX ADMIN — FLUOXETINE HYDROCHLORIDE 10 MG: 10 CAPSULE ORAL at 10:55

## 2024-12-09 ASSESSMENT — PAIN SCALES - GENERAL
PAINLEVEL_OUTOF10: 0

## 2024-12-09 NOTE — FLOWSHEET NOTE
12/08/24 1656 12/08/24 2010   Vital Signs   BP (!) 171/84 135/71   Temp 97.9 °F (36.6 °C) 98.6 °F (37 °C)   Pulse 68 80   Respirations 16 16       Treatment time: 2.5 hours  Net UF: 1 L    Pre weight: 138.5 kg (bed scale)  Post weight: 137.5 kg (bed scale)  EDW: TBD    Access used: RADHA AVF  Access function: Difficulty with arterial cannulation, venous cannulated without difficulty.    Medications or blood products given: None    Regular outpatient schedule: [facility] Monday through Friday.    Summary of response to treatment: Emergent 2.5 hour HD tx for K 7, tolerated without difficulty.  Net UF 1 L.  Vital signs stable throughout tx.  SR with occasional PVC's.     Copy of dialysis treatment record placed in chart, to be scanned into EMR.    Report called to Iona Schaefer RN

## 2024-12-09 NOTE — PLAN OF CARE
Problem: Chronic Conditions and Co-morbidities  Goal: Patient's chronic conditions and co-morbidity symptoms are monitored and maintained or improved  12/9/2024 1050 by Gabi Reina RN  Outcome: Progressing     Problem: Discharge Planning  Goal: Discharge to home or other facility with appropriate resources  12/9/2024 1050 by Gabi Reina RN  Outcome: Progressing       Problem: Pain  Goal: Verbalizes/displays adequate comfort level or baseline comfort level  12/9/2024 1050 by Gabi Reina RN  Outcome: Progressing     Problem: Skin/Tissue Integrity  Goal: Absence of new skin breakdown  Description: 1.  Monitor for areas of redness and/or skin breakdown  2.  Assess vascular access sites hourly  3.  Every 4-6 hours minimum:  Change oxygen saturation probe site  4.  Every 4-6 hours:  If on nasal continuous positive airway pressure, respiratory therapy assess nares and determine need for appliance change or resting period.  12/9/2024 1050 by Gabi Reina RN  Outcome: Progressing     Problem: Safety - Adult  Goal: Free from fall injury  12/9/2024 1050 by Gabi Reina RN  Outcome: Progressing

## 2024-12-09 NOTE — CONSULTS
Ph: [phone], Fax: [phone]           [facility]               Reason for admission:                 Admitted for lethargy    Brief Summary :     Serafin Weaver is being seen by nephrology for ESRD on hemodialysis.      Interval History and plan:      Feels okay    Plan:      Patient was dialyzed yesterday.Emergently   Potassium is still high.  Cont to go high with lokelma   Give few doses of Lokelma.  Will plan for another treatment today   On call team made aware   Pt informed  Follow the blood pressure trend.                     Assessment :     1.  ESRD:  Will plan HD per schedule.  He gets dialysis Monday, Wednesday and Friday.  He gets dialysis currently at [facility] under our care.  Access: AV graft  Daily weights  Fluid restriction: 1 L unless hypotensive  Nephrocap 1 tab PO daily    2.  Anemia:  Erythropoetin dose: He will receive Retacrit with dialysis to keep hemoglobin close to 10.  Hemoglobin admission was 6.9.  Ferritin is greater than 1000.    3.  Osteodystrophy:  Phosphate Binder: Will continue with Renvela 2 tabs p.o. 3 times daily with meals.  I will check PTH and vitamin D.  Daily renal panels.    4.  Hyperkalemia: Resolved.  He is on chronic Lokelma 10 g q. Tuesday, Thursday and Saturday on nondialysis days.    5.  Hypertension: well-controlled.  Continue with carvedilol.             [facility] would like to thank Feliciano Bhatt MD   for opportunity to serve this patient      Please call with questions at-   24 Hrs Answering service [phone] or  7 am- 5 pm via Perfect serve or cell phone  Dr.Farhan LODIA Wagoner MD       HPI :     Serafin Weaver is a 44 y.o. male presented to   the hospital on 12/7/2024 with hyperkalemia.    He has past medical history of amputation of the third toe, peripheral vascular disease, diabetes with complications including nephropathy, retinopathy and peripheral vascular disease, depression, hypertension, right 
Infectious Diseases Inpatient Consult Note    Reason for Consult:   R diabetic foot infection  Requesting Physician:   Dr Bhatt  Primary Care Physician:  OVenita Duran MD  History Obtained From:   Pt, EPIC    Admit Date: 12/7/2024  Hospital Day: 3    CHIEF COMPLAINT:       Chief Complaint   Patient presents with    Other     Pt from [facility] RN from facility called 911 for pt for suspected high potassium. Pt at this time is asymptomatic. Denies Chest pain, dizziness, light headed and chest pressure.       HISTORY OF PRESENT ILLNESS:      43 yo man   CRF on HD, DM, CVA, PAD  Hx L TMA  Hx R TMA 7/24/24     Admit 7/17/24 with R foot gas grangrene  R foot debridement 7/17 (R 5th partial ray resection, poor bleeding)  R SFA, popliteal a angioplasty 7/22  R TMA 7/24     Has had f/u [facility], Dr FLETCHER lOsen.  Has had VAC.  Recently prescribed Augmentin    Presents with high potassium from ECF.   In ED 12/7 Afebrile.  K 8.9, Cr 12/2, HCO2 22  Admit, had HD  Seen by Podiatry 12/8 - R heel wound assessed (saw picture)    Today 12/9 - afeb  No foot pain / sx.      Past Medical History:    Past Medical History:   Diagnosis Date    Amputation of third toe, left, traumatic (HCC)     Anesthesia complication     has history of being aggitated and \"fights\"    Blood circulation, collateral     Cellulitis     Depression     Diabetic polyneuropathy associated with type 2 diabetes mellitus (HCC) 02/06/2019    Hypertension     MRSA infection 02/04/2019    foot wound    Seizures (HCC)     Type II or unspecified type diabetes mellitus without mention of complication, not stated as uncontrolled     Unspecified cerebral artery occlusion with cerebral infarction     pt states at 14 years of age       Past Surgical History:    Past Surgical History:   Procedure Laterality Date    ACHILLES TENDON SURGERY Right 7/24/2024    . performed by Kailee Olsen DPM at [facility] OR    AMPUTATION      left 3rd toe    FOOT AMPUTATION Left 2/7/2019 
LUCILA Nam  Podiatric Resident PGY-3  Pager [phone] or Perfect Serve

## 2024-12-10 VITALS
TEMPERATURE: 98 F | WEIGHT: 297.4 LBS | DIASTOLIC BLOOD PRESSURE: 64 MMHG | BODY MASS INDEX: 32.67 KG/M2 | RESPIRATION RATE: 18 BRPM | SYSTOLIC BLOOD PRESSURE: 115 MMHG | OXYGEN SATURATION: 96 % | HEART RATE: 71 BPM

## 2024-12-10 LAB
ALBUMIN SERPL-MCNC: 3.4 G/DL (ref 3.4–5)
ANION GAP SERPL CALCULATED.3IONS-SCNC: 13 MMOL/L (ref 3–16)
BUN SERPL-MCNC: 24 MG/DL (ref 7–20)
CALCIUM SERPL-MCNC: 9.3 MG/DL (ref 8.3–10.6)
CHLORIDE SERPL-SCNC: 93 MMOL/L (ref 99–110)
CO2 SERPL-SCNC: 28 MMOL/L (ref 21–32)
CREAT SERPL-MCNC: 5.6 MG/DL (ref 0.9–1.3)
GFR SERPLBLD CREATININE-BSD FMLA CKD-EPI: 12 ML/MIN/{1.73_M2}
GLUCOSE BLD-MCNC: 110 MG/DL (ref 70–99)
GLUCOSE BLD-MCNC: 138 MG/DL (ref 70–99)
GLUCOSE BLD-MCNC: 59 MG/DL (ref 70–99)
GLUCOSE BLD-MCNC: 96 MG/DL (ref 70–99)
GLUCOSE SERPL-MCNC: 100 MG/DL (ref 70–99)
MAGNESIUM SERPL-MCNC: 2.35 MG/DL (ref 1.8–2.4)
PERFORMED ON: ABNORMAL
PERFORMED ON: NORMAL
PHOSPHATE SERPL-MCNC: 6.1 MG/DL (ref 2.5–4.9)
POTASSIUM SERPL-SCNC: 4.4 MMOL/L (ref 3.5–5.1)
SODIUM SERPL-SCNC: 134 MMOL/L (ref 136–145)

## 2024-12-10 PROCEDURE — 36415 COLL VENOUS BLD VENIPUNCTURE: CPT

## 2024-12-10 PROCEDURE — 83735 ASSAY OF MAGNESIUM: CPT

## 2024-12-10 PROCEDURE — 80069 RENAL FUNCTION PANEL: CPT

## 2024-12-10 PROCEDURE — 6370000000 HC RX 637 (ALT 250 FOR IP): Performed by: INTERNAL MEDICINE

## 2024-12-10 PROCEDURE — 99232 SBSQ HOSP IP/OBS MODERATE 35: CPT | Performed by: INTERNAL MEDICINE

## 2024-12-10 RX ADMIN — SEVELAMER CARBONATE 1600 MG: 800 TABLET, FILM COATED ORAL at 11:45

## 2024-12-10 RX ADMIN — APIXABAN 2.5 MG: 2.5 TABLET, FILM COATED ORAL at 11:44

## 2024-12-10 RX ADMIN — Medication 250 MG: at 11:47

## 2024-12-10 RX ADMIN — CLOPIDOGREL BISULFATE 75 MG: 75 TABLET ORAL at 11:44

## 2024-12-10 RX ADMIN — CARVEDILOL 6.25 MG: 6.25 TABLET, FILM COATED ORAL at 11:45

## 2024-12-10 RX ADMIN — FLUOXETINE HYDROCHLORIDE 10 MG: 10 CAPSULE ORAL at 11:44

## 2024-12-10 RX ADMIN — ASPIRIN 81 MG: 81 TABLET, COATED ORAL at 11:44

## 2024-12-10 RX ADMIN — COLLAGENASE SANTYL: 250 OINTMENT TOPICAL at 11:46

## 2024-12-10 RX ADMIN — PREGABALIN 75 MG: 75 CAPSULE ORAL at 11:43

## 2024-12-10 ASSESSMENT — PAIN SCALES - GENERAL
PAINLEVEL_OUTOF10: 0
PAINLEVEL_OUTOF10: 0

## 2024-12-10 NOTE — DISCHARGE INSTRUCTIONS
Wound care instructions:  Wound VAC instructions:  -Please perform wound VAC dressing change every Monday, Wednesday, Friday  -Using adhesive sheet from wound Vac kit, cut to size that will be placed over the wound and surrounding soft tissue to \"window out\" the wound  -Next, using a scissors cut the adhesive sheet out that is overlying the wound bed  -Next, using the black foam from the wound VAC kit, cut to size the black foam to fit over the wound bed  -Next, using the adhesive sheet, place the black foam over the wound bed and then place the adhesive sheet over the black foam to hold in place  -Next, using a scissors cut a quarter size hole in the adhesive sheet/black foam for the wound VAC suction connector to be placed over  -Next, place the wound VAC suction connector over the cut out area on the black foam  -Next, hook up the connector to the other portion of the wound VAC canister  -Set wound VAC to VAC therapy and running continuously for 125 mmHg   -Ensure that a good seal is noted. If needed re-enforce areas with additional adhesive sheet stripes from the wound VAC kit  -Next, dress the area with gauze over the wound VAC area and place gauze along the top of the foot and ankle  -Next, using kerlix wrap from the toes up and just above the ankle  -Next, using Ace bandage, wrap from the toes up and just above the ankle with CARE to ensure wound VAC tubing is NOT in direct contact with the skin     Right lower extremity:  -Please apply the wound VAC as described above  -Please cover anterior aspect of leg with dry sterile gauze  -Wrap up to the level of the mid calf with Kerlix taking care to roll lightly  -Apply overlying Ace wrap    Left lower extremity:  -Apply Santyl with overlying saline moistened gauze  -Next apply gauze to the top of the left foot, ankle, and leg. This step is critical as kerlix is abrasive and will rub wounds at the front of the ankle   -Next loosely wrap the left lower extremity

## 2024-12-10 NOTE — PLAN OF CARE
Problem: Chronic Conditions and Co-morbidities  Goal: Patient's chronic conditions and co-morbidity symptoms are monitored and maintained or improved  12/10/2024 0005 by Kathie Schaefer RN  Outcome: Progressing     Problem: Discharge Planning  Goal: Discharge to home or other facility with appropriate resources  12/10/2024 0005 by Kathie Schaefer RN  Outcome: Progressing     Problem: Pain  Goal: Verbalizes/displays adequate comfort level or baseline comfort level  12/10/2024 0005 by Kathie Schaefer RN  Outcome: Progressing     Problem: Skin/Tissue Integrity  Goal: Absence of new skin breakdown  Description: 1.  Monitor for areas of redness and/or skin breakdown  2.  Assess vascular access sites hourly  3.  Every 4-6 hours minimum:  Change oxygen saturation probe site  4.  Every 4-6 hours:  If on nasal continuous positive airway pressure, respiratory therapy assess nares and determine need for appliance change or resting period.  12/10/2024 0005 by Kathie Schaefer RN  Outcome: Progressing     Problem: Safety - Adult  Goal: Free from fall injury  12/10/2024 0005 by Kathie Schaefer RN  Outcome: Progressing

## 2024-12-10 NOTE — DISCHARGE SUMMARY
STOP taking these medications      amoxicillin-clavulanate 500-125 MG per tablet  Commonly known as: Augmentin     Vitamin D (Ergocalciferol) 64642 units Caps             Objective Findings at Discharge:   /64   Pulse 71   Temp 98 °F (36.7 °C) (Oral)   Resp 18   Wt 134.9 kg (297 lb 6.4 oz)   SpO2 96%   BMI 32.67 kg/m²       Physical Exam:     General: NAD  Eyes: EOMI  ENT: neck supple  Cardiovascular: Regular rate.  Respiratory: Clear to auscultation  Gastrointestinal: Soft, non tender  Genitourinary: no suprapubic tenderness  Musculoskeletal: Bilateral foot with dressing intact  Skin: warm, dry  Neuro: Alert.  Psych: Mood appropriate.         Labs and Imaging   XR FOOT RIGHT (MIN 3 VIEWS)    Result Date: 12/8/2024  Examination: XR FOOT RIGHT (MIN 3 VIEWS) Exam time: 12/8/2024 14:23 EST Clinical history: Diabetic wound Comparison: 22nd of August 2024 Technique: AP, oblique and lateral views of XR FOOT RIGHT (MIN 3 VIEWS)foot were obtained.  Findings:  Amputation across the base of the metatarsals. Complete removal of the fifth metatarsal. Post operative changes. No definite osseous destructive lesion. Extensive vascular calcifications     Postoperative findings from transmetatarsal amputation. No acute bony abnormality. Electronically signed by Jessica Patiño      CBC:   Recent Labs     12/07/24  1807 12/08/24  0936   WBC 7.1 5.7   HGB 10.4* 9.4*    168     BMP:    Recent Labs     12/09/24  0552 12/09/24  1138 12/10/24  0608    139 134*   K 5.0 5.5* 4.4   CL 99 98* 93*   CO2 29 27 28   BUN 39* 40* 24*   CREATININE 7.4* 7.7* 5.6*   GLUCOSE 101* 79 100*     Hepatic: No results for input(s): \"AST\", \"ALT\", \"BILITOT\", \"ALKPHOS\" in the last 72 hours.    Invalid input(s): \"ALB\"  Lipids:   Lab Results   Component Value Date/Time    CHOL 77 08/16/2024 04:07 PM    HDL 7 08/16/2024 04:07 PM    TRIG 333 08/16/2024 04:07 PM     Hemoglobin A1C:   Lab Results   Component Value Date/Time    LABA1C

## 2024-12-10 NOTE — DISCHARGE INSTR - COC
Continuity of Care Form    Patient Name: Serafin Weaver   :  1980  MRN:  [id number]    Admit date:  2024  Discharge date:  ***    Code Status Order: Full Code   Advance Directives:   Advance Care Flowsheet Documentation             Admitting Physician:  Sheri Canales MD  PCP: Venita Kendrick MD    Discharging Nurse: ***  Discharging Hospital Unit/Room#: 4452/4452-01  Discharging Unit Phone Number: ***    Emergency Contact:   Extended Emergency Contact Information  Primary Emergency Contact: Annie Weaver  Address: [address] [place] [address]            [place], [place] [id number] [place] [address]  Home Phone: [phone]  Relation: Parent  Secondary Emergency Contact: He Weaver           [place], [place] [address] [place] [address]  Home Phone: [phone]  Relation: Parent    Past Surgical History:  Past Surgical History:   Procedure Laterality Date    ACHILLES TENDON SURGERY Right 2024    . performed by Kailee Olsen DPM at [facility] OR    AMPUTATION      left 3rd toe    FOOT AMPUTATION Left 2019    INCISION AND DRAINAGE, REVISION OF TRANSMETATARSAL AMPUTATION LEFT FOOT performed by Kailee Olsen DPM at [facility] OR    FOOT DEBRIDEMENT Right 2024    RIGHTFOOT INCISION AND DRAINAGE WITH PARTIAL FIFTH RAY RESECTION performed by Kailee Olsen DPM at [facility] OR    FOOT DEBRIDEMENT Right 2024    RIGHT FOOT INCISION AND DRAINAGE WITH REMOVAL OF ALL NONVIABLE TISSUE AND BONE performed by Roverto Tanner DPM at [facility] OR    FOOT DEBRIDEMENT Right 2024    RIGHT FOOT INCISION AND DRAINAGE WITH FIFTH METATARSAL RESECTION PERONEAL TENDON TRANSFER performed by Kailee Olsen DPM at [facility] OR    INVASIVE VASCULAR N/A 2024    Angiography lower ext right performed by Hubert Mirza MD at [facility] CARDIAC CATH LAB    OTHER SURGICAL HISTORY  2015    incision and drainage scrotal abcess  wound vac on    TOE AMPUTATION Right     partial great toe    TOE AMPUTATION Right 2024

## 2024-12-10 NOTE — PROGRESS NOTES
Ph: [phone], Fax: [phone]           [facility]               Reason for admission:                 Admitted for lethargy    Brief Summary :     Serafin Weaver is being seen by nephrology for ESRD on hemodialysis.      Interval History and plan:      More conversant today  He did dialysis but refused to do more than 2 hours yesterday    He says that he does dialysis 5 days a week dialysis at the nursing home      Plan:    Explained to him that the dialysis machine at the nursing home and he had are different and will do 3 times a week only here  He is agreeable to do 3 and half hours dialysis tomorrow  Potassium is back to normal  Okay to check once a day  Continue on low potassium diet                     Assessment :     1.  ESRD:  Will plan HD per schedule.  He gets dialysis Monday, Wednesday and Friday.  He gets dialysis currently at [facility] under our care.  Access: AV graft  Daily weights  Fluid restriction: 1 L unless hypotensive  Nephrocap 1 tab PO daily    2.  Anemia:  Erythropoetin dose: He will receive Retacrit with dialysis to keep hemoglobin close to 10.  Hemoglobin admission was 6.9.  Ferritin is greater than 1000.    3.  Osteodystrophy:  Phosphate Binder: Will continue with Renvela 2 tabs p.o. 3 times daily with meals.  I will check PTH and vitamin D.  Daily renal panels.    4.  Hyperkalemia: Resolved.  He is on chronic Lokelma 10 g q. Tuesday, Thursday and Saturday on nondialysis days.    5.  Hypertension: well-controlled.  Continue with carvedilol.             [facility] would like to thank Taniya Modi,*   for opportunity to serve this patient      Please call with questions at-   24 Hrs Answering service [phone] or  7 am- 5 pm via Perfect serve or cell phone  Dr.Sudhir Vincent MD       HPI :     Serafin Weaver is a 44 y.o. male presented to   the hospital on 12/7/2024 with hyperkalemia.    He has past medical history 
   Treatment time: 3.5 hours  Net UF: 3000 ml     Pre weight: 140.3 kg  Post weight:137.3 kg  EDW: TBD     Access used: RADHA AVF    Access function: Difficulty with venous cannulation, arterial cannulated without difficulty.  ml/min with pressures within limits     Medications or blood products given: none     Regular outpatient schedule: [facility] Monday through Friday      Summary of response to treatment: Patient tolerated treatment well and without any complications. Patient remained stable throughout entire treatment and upon the exiting the dialysis suite via transport.     Copy of dialysis treatment record placed in chart, to be scanned into EMR  
  Hospital Medicine Progress Note      Date of Admission: 12/7/2024  Hospital Day: 2    Chief Admission Complaint:  hyperkalemia with ECG changes     Subjective:  Patient is without complaints. No adverse interval events.  Somnolent and not interested in talking, otherwise doing fine.    Presenting Admission History:       Serafin Weaver is a 44 y.o. male with pmh as mentioned above was sent from his nursing facility for elevated serum potassium levels.  Patient currently has no complaints except chills.  Denies any chest pain, SOB, orthopnea, leg swelling.  Patient's serum potassium was 8.9 in ED and was treated with calcium gluconate, IV insulin, D10, Lokelma  On-call nephrology consulted for emergent hemodialysis  Patient is on hemodialysis Monday, Wednesday, Friday.  Last hemodialysis session was on Wednesday, as patient was having diarrhea on Friday no hemodialysis was performed.    Assessment/Plan:      Current Principal Problem:  Hyperkalemia    #Hyperkalemia (8.9) with EKG changes-peaked T waves  #ESRD on HD M/W/F.  Last session on Wednesday.  Patient's Friday session was not completed as patient was having diarrhea  -Patient received 1 g calcium gluconate, Lasix 40 mg IV, 10 units of IV regular insulin x 1 with D10 and Lokelma 10 g  -Repeat BMP pending  -Will manage conservatively until patient receives HD emergently  - s/p HD session 12/8  -Monitor BMP closely  -K still elevated to 6.7 after HD, defer to nephrology on whether additional HD is required today  -Continue nursing home medications appropriately     #DM-2 complicated with peripheral neuropathy, status post metatarsal amputation bilateral feet  -Patient is not on any medications at the nursing facility  -Started on low-dose SSI with hypoglycemia protocol, follow A1c     #Essential hypertension with elevated blood pressures  -Continued on his nursing home antihypertensives     #Chronic medical conditions as mentioned in PMH  -Continue nursing 
4 Eyes Admission Assessment     I agree as the admission nurse that 2 RN's have performed a thorough Head to Toe Skin Assessment on the patient. ALL assessment sites listed below have been assessed on admission.       Areas assessed by both nurses:   [x]   Head, Face, and Ears   [x]   Shoulders, Back, and Chest  [x]   Arms, Elbows, and Hands   [x]   Coccyx, Sacrum, and Ischium  [x]   Legs, Feet, and Heels        Does the Patient have Skin Breakdown?      Ulceration bilateral heels, wound vac tubing in place on RLE, oozing top of left foot.    Blanchable redness sacrum.     Scattered abrasions/erythema        Alf Prevention initiated:  Yes   Wound Care Orders initiated:        WOC nurse consulted for Pressure Injury (Stage 3,4, Unstageable, DTI, NWPT, and Complex wounds) or Alf score 18 or lower:  Yes      Nurse 1 eSignature: Electronically signed by Dereck Duran RN on 12/8/24 at 12:52 AM EST    **SHARE this note so that the co-signing nurse is able to place an eSignature**    Nurse 2 eSignature: Electronically signed by Brandie Montilla RN on 12/8/24 at 1:50 AM EST    
Ciprofloxacin 500 mg q12 hours ordered for patient.  This medication is renally eliminated.  Will change to 500 mg q24 hours per renal dose adjustment policy.     Estimated Creatinine Clearance: 15 mL/min (A) (based on SCr of 9.6 mg/dL (HH)).     Pharmacy will continue to monitor renal function and adjust dose as necessary.    Please call with any questions.    Thanks  Abram Gracia, PharmD 12/8/2024, 10:34 AM      
Clinical Pharmacy Progress Note  Medication History     Admit Date: 12/7/2024    List of current medications patient is taking is complete. Home Medication list in EPIC updated to reflect changes noted below.    Source of information: order summary report; list dated 12/7/24    Changes made to medication list:   Medications added:   Loperamide  Medication doses adjusted:   Apixaban - listed as 2.5mg po daily on facility paperwork  Other notes:   Augmentin 500/125mg 1 tab TID x 14 days (12/4-12/18/24)    Current Outpatient Medications   Medication Instructions    acetaminophen (TYLENOL) 650 mg, Oral, EVERY 6 HOURS PRN    amoxicillin-clavulanate (AUGMENTIN) 500-125 MG per tablet 1 tablet, Oral, 3 TIMES DAILY    apixaban (ELIQUIS) 2.5 mg, Oral, 2 TIMES DAILY    aspirin 81 mg, Oral, DAILY    atorvastatin (LIPITOR) 80 mg, Oral, EVERY BEDTIME    carvedilol (COREG) 6.25 mg, Oral, 2 TIMES DAILY    clopidogrel (PLAVIX) 75 mg, Oral, DAILY    FLUoxetine (PROZAC) 10 mg, Oral, DAILY    midodrine (PROAMATINE) 5 mg, Oral, 3 TIMES DAILY WITH MEALS    Nutritional Supplements (NEPRO/CARBSTEADY) LIQD Oral, DAILY    pregabalin (LYRICA) 75 mg, Oral, DAILY    SACCHAROMYCES BOULARDII  mg, Oral, 2 times daily    sevelamer (RENVELA) 1,600 mg, Oral, 3 TIMES DAILY WITH MEALS    sodium zirconium cyclosilicate (LOKELMA) 10 g, Oral, SEE ADMIN INSTRUCTIONS, Every Tuesday, Thursday, Saturday    Vitamin D (Ergocalciferol) 50,000 Units, Oral, WEEKLY       Please call with questions--  Lisset Blanc, PharmD, [facility]  Wireless: [phone]   12/9/2024 8:04 AM      
Got a message from ER physician, called and spoke with the ER physician  The patient potassium level was 8.7  There are some ECG changes  The patient already got a hyperkalemic cocktail in the ER  I have called [facility] on-call nurse  They will come to dialysis patient tonight emergently  The order has been placed      
ID Follow-up NOTE    CC:   DM foot ulcer  Antibiotics: Augmentin, Ciprofloxacin - will stop today 12/10    Admit Date: 12/7/2024  Hospital Day: 4    Subjective:     Patient reports NO foot pain  No complaint    Objective:     Patient Vitals for the past 8 hrs:   BP Temp Temp src Pulse Resp SpO2 Weight   12/10/24 0600 -- -- -- 71 -- -- --   12/10/24 0536 110/70 98 °F (36.7 °C) Oral 70 19 97 % 134.9 kg (297 lb 6.4 oz)   12/10/24 0400 -- -- -- 69 -- -- --   12/10/24 0200 -- -- -- 75 -- -- --     I/O last 3 completed shifts:  In: 605 [P.O.:605]  Out: 200 [Urine:200]  No intake/output data recorded.    EXAM:  GENERAL: No apparent distress.  RA  HEENT: Membranes moist, no oral lesion  NECK:  Supple, no lymphadenopathy  LUNGS: Clear b/l, no rales, no dullness  CARDIAC: RRR, no murmur appreciated  ABD:  + BS, soft / NT  EXT:  No rash, no edema, no lesions   Bilateral foot with dressing / ACE  NEURO: No focal neurologic findings  PSYCH: Orientation, sensorium, mood normal  LINES:  Peripheral iv  R AVF in place    Data Review:  Lab Results   Component Value Date    WBC 5.7 12/08/2024    HGB 9.4 (L) 12/08/2024    HCT 30.6 (L) 12/08/2024    MCV 95.4 12/08/2024     12/08/2024     Lab Results   Component Value Date    CREATININE 5.6 (HH) 12/10/2024    BUN 24 (H) 12/10/2024     (L) 12/10/2024    K 4.4 12/10/2024    CL 93 (L) 12/10/2024    CO2 28 12/10/2024       Hepatic Function Panel:   Lab Results   Component Value Date/Time    ALKPHOS 62 09/03/2024 12:00 AM    ALT 12 09/03/2024 12:00 AM    AST 17 09/03/2024 12:00 AM    BILITOT 0.6 09/03/2024 12:00 AM    BILIDIR 0.2 08/23/2024 08:29 AM    IBILI 0.2 08/23/2024 08:29 AM       Micro:  7/18 R foot wound cult - heavy B fragilis, mod K oxytoca ESBL, light mixed skin mary  Klebsiella oxytoca ESBL   Antibiotic Interpretation Microscan     ampicillin Resistant >=32 mcg/mL   ampicillin-sulbactam Sensitive 8 mcg/mL   ceFAZolin Resistant >=64 mcg/mL   cefepime Resistant     
Messaged the resident on call about the   
Podiatric Surgery Daily Progress Note  Serafin Weaver      Subjective :   Patient seen and examined this am at the bedside. Patient denies any acute overnight events. Patient denies N/V/F/C/SOB. Patient denies calf pain, thigh pain, chest pain.     Review of Systems: A 12 point review of symptoms is unremarkable with the exception of the chief complaint. Patient specifically denies nausea, fever, vomiting, chills, shortness of breath, chest pain, abdominal pain, constipation or difficulty urinating.       Objective     BP (!) 108/52   Pulse 68   Temp 97.5 °F (36.4 °C) (Axillary)   Resp 17   Wt (!) 138 kg (304 lb 3.8 oz)   SpO2 96%   BMI 33.42 kg/m²      I/O:  Intake/Output Summary (Last 24 hours) at 12/9/2024 0855  Last data filed at 12/9/2024 0700  Gross per 24 hour   Intake 180 ml   Output 150 ml   Net 30 ml              Wt Readings from Last 3 Encounters:   12/09/24 (!) 138 kg (304 lb 3.8 oz)   09/13/24 (!) 142 kg (313 lb 0.9 oz)   08/29/24 (!) 142.1 kg (313 lb 4.4 oz)       LABS:    Recent Labs     12/07/24  1807 12/08/24  0936   WBC 7.1 5.7   HGB 10.4* 9.4*   HCT 32.5* 30.6*    168        Recent Labs     12/09/24  0552      K 5.0   CL 99   CO2 29   PHOS 6.7*   BUN 39*   CREATININE 7.4*      No results for input(s): \"INR\", \"APTT\" in the last 72 hours.    Invalid input(s): \"PROT\"        LOWER EXTREMITY EXAMINATION    Dressing to bilateral LE intact. No strikethrough noted to the external dressing.  Scant serous sanguinous drainage noted to the internal layers of the dressing.     VASCULAR: DP and PT pulses are lightly palpable +1/4.  Upon hand-held Doppler examination, DP and PT pulses are monophasic. CFT is brisk to TMA stumps b/l. Skin temperature is warm to cool from proximal to distal with no focal calor noted. No edema noted. No pain with calf compression b/l.     NEUROLOGIC: Gross and epicritic sensation is diminished b/l. Protective sensation is diminished at all pedal sites b/l.   
Pt refused morning VS and Head to Toe. Pt \" states it's to early\".   
Report called to [facility]. Transportation to arrive at 3:30pm.  
[facility] - Clinical Pharmacy Note - Renal Dosing    Augmentin ordered for treatment of SSTI/foot ulcer. Per [facility] Renal Dose Adjustment Policy, Augmentin will be changed to 500-125 mg po daily.     Estimated Creatinine Clearance: Estimated Creatinine Clearance: 20 mL/min (A) (based on SCr of 7.4 mg/dL (HH)).  Dialysis Status, MICHELLE, CKD: ESRD on HD  BMI: Body mass index is 33.42 kg/m².    Rationale for Adjustment: Agent is renally eliminated.    Pharmacy will continue to monitor renal function and adjust dose as necessary.      Please call with questions--  Lisset Blanc, PharmD, BCPS  Wireless: [phone]   12/9/2024 8:16 AM      
Treatment time: 1 hours 44 minutes  Net UF: 1200 ml     Unable to obtain weight.  The patient was not weighed in the ED.  He could not stand and the stretcher did not have a scale.    Access used: RADHA AVF    Access function: well with  ml/min     Medications or blood products given: NA     Regular outpatient schedule: [id number]     Summary of response to treatment: Patient tolerated treatment fair.   The patient became hypotensive and symptomatic.  Treatment was terminated 16 minutes early.  Post B/P  was 122/79  
  DERMATOLOGIC:  Verbal consent obtained for photos today:       Full-thickness ulceration noted to the posterior aspect of heel.  Overlying hyperkeratosis noted.  Wound base is noted to be granular.  Wound does not probe to bone, tunnel, or track.  No purulence, fluctuance, crepitus, or malodor noted.  No erythema noted.     MUSCULOSKELETAL: Muscle strength is 5/5 for all pedal groups tested. No pain with palpation of the foot or ankle. Ankle joint ROM is decreased in dorsiflexion with the knee extended.  B/L TMA is noted.    RIGHT LOWER EXTREMITY:  Wound vac noted to right, left clean, dry, and intact. Excellent seal noted with suction set to 125 mmHg. less than 10 cc of sanguinous drainage noted to cannister.        IMAGING:  Right foot XR (12/8/24):  IMPRESSION:  Postoperative findings from transmetatarsal amputation.  No acute bony abnormality     B/L LE arterial duplex (8/30/2024):    Ankle/brachial index is not calculated due to fistula in right arm and IV lines in left arm.    In comparison to the prior exam prior to angioplasty, the patient previously had superficial femoral artery occlusion and now has a focal area of moderate stenosis. Overall, the waveforms are much improved to the distal lower extremity and indicate only mild arterial insufficiency of the right leg.    No evidence of clinically significant arterial insufficiency of the left lower extremity    ASSESSMENT/PLAN  -Full-thickness ulceration right plantar lateral foot (Wilburn 3)  -Full-thickness ulceration right plantar medial foot (Wilburn 2)  -Full-thickness decubitus ulceration posterior left heel (NPIAP stage 3)  -Type 2 diabetes mellitus complicated by peripheral neuropathy  -Peripheral vascular disease  -History of bilateral TMA's     -Patient examined and evaluated at the bedside   -All VSS.  No updated CBC this a.m. however last CBC shows no leukocytosis noted (WBC 5.7)  -ESR 96 CRP 29  -A1c 5.2  -Prealbumin 17.4  -Imaging reviewed, 
[]No    ------------------------------------------------------------------------------------------------------------------------------------------------------------------------    [facility] - level 3  [x] High (any 2):    A. Problems (any 1):  [x] Acute/Chronic Illness/injury posing threat to life or bodily function:  severe hyperkalemia requiring emergent HD  [] Severe exacerbation of chronic illness:    ---------------------------------------------------------------------  B. Risk of Treatment (any 1):   [x] Drugs/treatments that requiring intensive monitoring for toxicity:    [] IV ABX requiring serial renal monitoring for nephrotoxicity:     [] IV Narcotic analgesia for adverse drug reaction  [] IV diuresis requiring serial monitoring for renal impairment and electrolyte derangements  [] Critical electrolyte abnormalities requiring IV replacement and close serial monitoring  [x] Insulin - monitoring serial glucose measurements for hypoglycemia  [] Anticoagulation requiring serial monitoring of coagulation factors  [] Other -  [] Change in code status:    [] Decision to de-escalate care this DOS due to change in treatment goals:    [] Decision to escalate care to:   [] Major surgery/procedure with associated risk factors (any 1):     [] Elective procedure with patient risk factors:    []  Emergent procedure:  [] IV/IM controlled substances (any 1):   [] One-time Order including Drug Name/Route, Reason Ordered:    [] Scheduled Order including Drug Name/Route, Reason Ordered or Continued:    [] PRN Order including Drug Name/Route, Reason Ordered or Continued:   ----------------------------------------------------------------------  C. Data (any 2)  [x] Discussion of Management with External Professional (any 1):   [x] Discussed current management and discharge planning options with Case Management.   [x] Discussed management of the case with: nephrology    [] Study interpreted by me (any 1):   [] Telemetry personally 
Answering service [phone]  Perfect serve, or cell phone 7 am - 5pm  Jordin Kaur MD   [email or web address]    
Score : 33.86  Mobility Inpatient CMS 0-100% Score: 72.57  Mobility Inpatient CMS G-Code Modifier : CL    Goals  Short Term Goals  Time Frame for Short Term Goals: By discharge  Short Term Goal 1: Pt will perform bed mobility with modified independence  Short Term Goal 2: Pt will transfer sit to and from stand with min assist of 2 with R LE NWB to AD  Short Term Goal 3: Pt will transfer bed to and from chair with min assist of 2 with R LE NWB    Education  Patient Education  Education Given To: Patient  Education Provided: Role of Therapy;Plan of Care  Education Outcome: Verbalized understanding    Therapy Time   Individual Concurrent Group Co-treatment   Time In 1306         Time Out 1416         Minutes 70             Timed Code Treatment Minutes:   55    Total Treatment Minutes:   70    Faith Reyes PT         
grooming?: A Little  How much help for eating meals?: None  AM-PAC Inpatient Daily Activity Raw Score: 16  AM-PAC Inpatient ADL T-Scale Score : 35.96  ADL Inpatient CMS 0-100% Score: 53.32  ADL Inpatient CMS G-Code Modifier : CK      Goals  Short Term Goals  Time Frame for Short Term Goals: DC  Short Term Goal 1: LB dressing Marilyn  Short Term Goal 2: BUE therex x10 reps for improved strength/endurance in prep for t/fs  Short Term Goal 3: BSC vs chair t/f with Marilyn      Therapy Time   Individual Concurrent Group Co-treatment   Time In 1310         Time Out 1349         Minutes 39             Timed Code Treatment Minutes:   24 mins    Total Treatment Minutes:  39 mins      RICA Gramajo/DAVID

## 2024-12-10 NOTE — CARE COORDINATION
8:40 AM  HD run sheets sent to Noemi in admissions at [facility] for HD approval. Cert started today for return.     Electronically signed by Raj Horton RN, CM on 12/10/2024 at 8:40 AM.  Phone: [phone]  Fax: [phone]    
Case Management Assessment  Initial Evaluation    Date/Time of Evaluation: 12/9/2024 10:37 AM  Assessment Completed by: Raj Horton RN    If patient is discharged prior to next notation, then this note serves as note for discharge by case management.    Patient Name: Serafin Weaver                   YOB: 1980  Diagnosis: Hyperkalemia [E87.5]                   Date / Time: 12/7/2024  5:16 PM    Patient Admission Status: Inpatient   Readmission Risk (Low < 19, Mod (19-27), High > 27): Readmission Risk Score: 23.1    Current PCP: Venita Kendrick MD  PCP verified by CM? Yes    Chart Reviewed: Yes      History Provided by: Patient, Medical Record  Patient Orientation: Alert and Oriented    Patient Cognition: Alert    Hospitalization in the last 30 days (Readmission):  No    If yes, Readmission Assessment in CM Navigator will be completed and shown below.          Advance Directives:      Code Status: Full Code   Patient's Primary Decision Maker is: Named in Scanned ACP Document    Primary Decision Maker: OwenAnnie - Parent - [phone]    Secondary Decision Maker: James Olsen - Child - [phone]    Discharge Planning:    Patient lives with: Alone Type of Home: Long-Term Care  Primary Care Giver: Self  Patient Support Systems include: Children, Family Members, Parent   Current Financial resources:    Current community resources:    Current services prior to admission: Extended Care Facility            Current DME:              Type of Home Care services:  None    ADLS  Prior functional level: Assistance with the following:, Bathing, Dressing, Toileting, Cooking, Housework, Shopping, Mobility  Current functional level: Assistance with the following:, Bathing, Toileting, Cooking, Housework, Shopping, Mobility    PT AM-PAC:   /24  OT AM-PAC:   /24    Family can provide assistance at DC: No  Would you like Case Management to discuss the discharge plan with any other family members/significant 
[phone]

## 2024-12-20 ENCOUNTER — HOSPITAL ENCOUNTER (OUTPATIENT)
Dept: WOUND CARE | Age: 44
Discharge: HOME | End: 2024-12-20
Attending: PODIATRIST
Payer: COMMERCIAL

## 2024-12-20 VITALS
RESPIRATION RATE: 16 BRPM | DIASTOLIC BLOOD PRESSURE: 91 MMHG | HEART RATE: 66 BPM | TEMPERATURE: 98 F | SYSTOLIC BLOOD PRESSURE: 147 MMHG

## 2024-12-20 DIAGNOSIS — T81.89XA NONHEALING SURGICAL WOUND, INITIAL ENCOUNTER: Primary | ICD-10-CM

## 2024-12-20 PROCEDURE — 11042 DBRDMT SUBQ TIS 1ST 20SQCM/<: CPT

## 2024-12-20 PROCEDURE — 6370000000 HC RX 637 (ALT 250 FOR IP): Performed by: SPECIALIST

## 2024-12-20 RX ORDER — MUPIROCIN 20 MG/G
OINTMENT TOPICAL ONCE
OUTPATIENT
Start: 2024-12-20 | End: 2024-12-20

## 2024-12-20 RX ORDER — GENTAMICIN SULFATE 1 MG/G
OINTMENT TOPICAL ONCE
OUTPATIENT
Start: 2024-12-20 | End: 2024-12-20

## 2024-12-20 RX ORDER — LIDOCAINE HYDROCHLORIDE 20 MG/ML
JELLY TOPICAL ONCE
OUTPATIENT
Start: 2024-12-20 | End: 2024-12-20

## 2024-12-20 RX ORDER — LIDOCAINE 40 MG/G
CREAM TOPICAL ONCE
OUTPATIENT
Start: 2024-12-20 | End: 2024-12-20

## 2024-12-20 RX ORDER — NEOMYCIN/BACITRACIN/POLYMYXINB 3.5-400-5K
OINTMENT (GRAM) TOPICAL ONCE
OUTPATIENT
Start: 2024-12-20 | End: 2024-12-20

## 2024-12-20 RX ORDER — SILVER SULFADIAZINE 10 MG/G
CREAM TOPICAL ONCE
OUTPATIENT
Start: 2024-12-20 | End: 2024-12-20

## 2024-12-20 RX ORDER — LIDOCAINE 50 MG/G
OINTMENT TOPICAL ONCE
OUTPATIENT
Start: 2024-12-20 | End: 2024-12-20

## 2024-12-20 RX ORDER — BACITRACIN ZINC AND POLYMYXIN B SULFATE 500; 1000 [USP'U]/G; [USP'U]/G
OINTMENT TOPICAL ONCE
OUTPATIENT
Start: 2024-12-20 | End: 2024-12-20

## 2024-12-20 RX ORDER — LIDOCAINE HYDROCHLORIDE 40 MG/ML
SOLUTION TOPICAL ONCE
OUTPATIENT
Start: 2024-12-20 | End: 2024-12-20

## 2024-12-20 RX ORDER — SODIUM CHLOR/HYPOCHLOROUS ACID 0.033 %
SOLUTION, IRRIGATION IRRIGATION ONCE
OUTPATIENT
Start: 2024-12-20 | End: 2024-12-20

## 2024-12-20 RX ORDER — LIDOCAINE HYDROCHLORIDE 40 MG/ML
SOLUTION TOPICAL ONCE
Status: COMPLETED | OUTPATIENT
Start: 2024-12-20 | End: 2024-12-20

## 2024-12-20 RX ORDER — GINSENG 100 MG
CAPSULE ORAL ONCE
OUTPATIENT
Start: 2024-12-20 | End: 2024-12-20

## 2024-12-20 RX ORDER — TRIAMCINOLONE ACETONIDE 1 MG/G
OINTMENT TOPICAL ONCE
OUTPATIENT
Start: 2024-12-20 | End: 2024-12-20

## 2024-12-20 RX ORDER — BETAMETHASONE DIPROPIONATE 0.5 MG/G
CREAM TOPICAL ONCE
OUTPATIENT
Start: 2024-12-20 | End: 2024-12-20

## 2024-12-20 RX ORDER — CLOBETASOL PROPIONATE 0.5 MG/G
OINTMENT TOPICAL ONCE
OUTPATIENT
Start: 2024-12-20 | End: 2024-12-20

## 2024-12-20 RX ADMIN — LIDOCAINE HYDROCHLORIDE: 40 SOLUTION TOPICAL at 10:59

## 2024-12-20 NOTE — PATIENT INSTRUCTIONS
Wound Care Center Physician Orders and Discharge Instructions  [facility]   [address] [place]  Telephone: [phone] FAX [phone]    NAME:  Serafin Weaver  YOB: 1980  MEDICAL RECORD NUMBER:  [id number]  DATE: 12/20/2024    Return Appointment:  Return Appointment: With Dr. Kailee Olsen on 12/31/24  Schedule weekly for the rest of the month? Yes    Future Appointments   Date Time Provider Department Center   12/31/2024  2:30 PM Kailee Olsen DPM [facility] WOUND [facility]           Skilled Nursing Facility: [facility]    Change dressing?: Yes    Wound Cleansing: Vashe wash - Apply enough Vashe to soak a piece of gauze and place on wound bed for 5-10 minutes. No need to rinse after soaking.    Darlin Wound Topical Treatments: Nothing       Dressings:           Wound Location:  right lateral foot and left heel     Primary Dressing to wound bed: Collagen (I.e. Puracol)      Cover and Secure with:  4X4 gauze pad, Bulky roll gauze, and Ace wrap     Change dressing: 3 times per week:Monday/Wednesday/Friday       [x]Negative Pressure:           Wound Location: HOLD WOUND VAC TO RIGHT FOOT FOR  1 1/2 WEEKS UNTIL PATIENT FOLLOWS UP IN WOUND CARE CENTER ON 12/31/24        Patient may participate in therapy with the following restrictions for off-Loading:   [x]Off Loading(Pressure Reduction) When: Walking  Weight Bearing Status: Total Non-Weight bearing Right;  Offloading devices: Wheelchair, KEEP PRESSURE OFF LEFT HEEL- WEAR HEEL PROTECTOR  []No Offloading required    Dietary:   Important dietary reminders:  1. Increase Protein intake (i.e. Lean meats, fish, eggs, legumes, and yogurt)  2. No added salt  3. If diabetic, follow a diabetic diet and check glucose prior to meals or as instructed by your physician.    [] SNF: Please have dietician evaluate patient for nutritional needs    Dietary Supplements: [] Bertin  [] 30ml ProMod/ProStat [] 60ml

## 2024-12-20 NOTE — PROGRESS NOTES
[facility]  Progress Note and Procedure Note      Serafin Weaver  MEDICAL RECORD NUMBER:  [id number]  AGE: 44 y.o.   GENDER: male  : 1980  EPISODE DATE:  2024    Subjective:     Chief Complaint   Patient presents with    Wound Check     BLE         HISTORY of PRESENT ILLNESS HPI     Serafin Weaver is a 44 y.o. male who presents today for wound/ulcer evaluation.   History of Wound Context: Patient Dr. Kailee Olsen being treated for bilateral foot ulcers having originally been admitted back in July of this year for right foot gas gangrene.  He was recently readmitted for hyperkalemia where once again his wounds were evaluated and seen by infectious disease  Wound/Ulcer Pain Timing/Severity: none  Quality of pain: N/A  Severity:  0 / 10   Modifying Factors: None  Associated Signs/Symptoms: edema    Ulcer Identification:  Ulcer Type: diabetic and non-healing surgical    Contributing Factors: diabetes, poor glucose control, chronic pressure, and decreased mobility    Acute Wound: N/A not an acute wound    PAST MEDICAL HISTORY        Diagnosis Date    Amputation of third toe, left, traumatic (HCC)     Anesthesia complication     has history of being aggitated and \"fights\"    Blood circulation, collateral     Cellulitis     Depression     Diabetic polyneuropathy associated with type 2 diabetes mellitus (HCC) 2019    Hypertension     MRSA infection 2019    foot wound    Seizures ([facility])     Type II or unspecified type diabetes mellitus without mention of complication, not stated as uncontrolled     Unspecified cerebral artery occlusion with cerebral infarction     pt states at 14 years of age       PAST SURGICAL HISTORY    Past Surgical History:   Procedure Laterality Date    ACHILLES TENDON SURGERY Right 2024    . performed by Kailee Olsen DPM at [facility] OR    AMPUTATION      left 3rd toe    FOOT AMPUTATION Left 2019    INCISION AND DRAINAGE, REVISION OF

## 2024-12-31 ENCOUNTER — HOSPITAL ENCOUNTER (OUTPATIENT)
Dept: WOUND CARE | Age: 44
Discharge: HOME | End: 2024-12-31
Attending: PODIATRIST
Payer: COMMERCIAL

## 2024-12-31 VITALS
HEART RATE: 75 BPM | TEMPERATURE: 97.5 F | RESPIRATION RATE: 18 BRPM | DIASTOLIC BLOOD PRESSURE: 73 MMHG | SYSTOLIC BLOOD PRESSURE: 121 MMHG

## 2024-12-31 DIAGNOSIS — T81.89XA NONHEALING SURGICAL WOUND, INITIAL ENCOUNTER: Primary | ICD-10-CM

## 2024-12-31 PROCEDURE — 11042 DBRDMT SUBQ TIS 1ST 20SQCM/<: CPT

## 2024-12-31 RX ORDER — BETAMETHASONE DIPROPIONATE 0.5 MG/G
CREAM TOPICAL ONCE
OUTPATIENT
Start: 2024-12-31 | End: 2024-12-31

## 2024-12-31 RX ORDER — CLOBETASOL PROPIONATE 0.5 MG/G
OINTMENT TOPICAL ONCE
OUTPATIENT
Start: 2024-12-31 | End: 2024-12-31

## 2024-12-31 RX ORDER — LIDOCAINE HYDROCHLORIDE 20 MG/ML
JELLY TOPICAL ONCE
OUTPATIENT
Start: 2024-12-31 | End: 2024-12-31

## 2024-12-31 RX ORDER — GENTAMICIN SULFATE 1 MG/G
OINTMENT TOPICAL ONCE
OUTPATIENT
Start: 2024-12-31 | End: 2024-12-31

## 2024-12-31 RX ORDER — MUPIROCIN 20 MG/G
OINTMENT TOPICAL ONCE
OUTPATIENT
Start: 2024-12-31 | End: 2024-12-31

## 2024-12-31 RX ORDER — LIDOCAINE 50 MG/G
OINTMENT TOPICAL ONCE
OUTPATIENT
Start: 2024-12-31 | End: 2024-12-31

## 2024-12-31 RX ORDER — BACITRACIN ZINC AND POLYMYXIN B SULFATE 500; 1000 [USP'U]/G; [USP'U]/G
OINTMENT TOPICAL ONCE
OUTPATIENT
Start: 2024-12-31 | End: 2024-12-31

## 2024-12-31 RX ORDER — LIDOCAINE HYDROCHLORIDE 40 MG/ML
SOLUTION TOPICAL ONCE
OUTPATIENT
Start: 2024-12-31 | End: 2024-12-31

## 2024-12-31 RX ORDER — TRIAMCINOLONE ACETONIDE 1 MG/G
OINTMENT TOPICAL ONCE
OUTPATIENT
Start: 2024-12-31 | End: 2024-12-31

## 2024-12-31 RX ORDER — GINSENG 100 MG
CAPSULE ORAL ONCE
OUTPATIENT
Start: 2024-12-31 | End: 2024-12-31

## 2024-12-31 RX ORDER — LIDOCAINE 40 MG/G
CREAM TOPICAL ONCE
OUTPATIENT
Start: 2024-12-31 | End: 2024-12-31

## 2024-12-31 RX ORDER — SODIUM CHLOR/HYPOCHLOROUS ACID 0.033 %
SOLUTION, IRRIGATION IRRIGATION ONCE
OUTPATIENT
Start: 2024-12-31 | End: 2024-12-31

## 2024-12-31 RX ORDER — NEOMYCIN/BACITRACIN/POLYMYXINB 3.5-400-5K
OINTMENT (GRAM) TOPICAL ONCE
OUTPATIENT
Start: 2024-12-31 | End: 2024-12-31

## 2024-12-31 RX ORDER — SILVER SULFADIAZINE 10 MG/G
CREAM TOPICAL ONCE
OUTPATIENT
Start: 2024-12-31 | End: 2024-12-31

## 2024-12-31 RX ORDER — LIDOCAINE 40 MG/G
CREAM TOPICAL ONCE
Status: COMPLETED | OUTPATIENT
Start: 2024-12-31 | End: 2024-12-31

## 2024-12-31 RX ADMIN — LIDOCAINE: 40 CREAM TOPICAL at 15:28

## 2024-12-31 NOTE — PROGRESS NOTES
dressing: 3 times per week:Monday/Wednesday/Friday       [x]Negative Pressure:           Wound Location: DISCONTINUE WOUND VAC        Patient may participate in therapy with the following restrictions for off-Loading:   [x]Off Loading(Pressure Reduction) When: Walking  Weight Bearing Status: Total Non-Weight bearing Right;  Offloading devices: Wheelchair, KEEP PRESSURE OFF LEFT HEEL- WEAR HEEL PROTECTOR  []No Offloading required    Dietary:   Important dietary reminders:  1. Increase Protein intake (i.e. Lean meats, fish, eggs, legumes, and yogurt)  2. No added salt  3. If diabetic, follow a diabetic diet and check glucose prior to meals or as instructed by your physician.    [] SNF: Please have dietician evaluate patient for nutritional needs    Dietary Supplements: [] Bertin  [] 30ml ProMod/ProStat [] 60ml Expedite [] Other:   Take supplements twice a day or as directed as followed:       Your nurse  is:  Mike     Electronically signed by Mike Trevizo RN on 12/31/2024 at 3:32 PM     Wound Care Center Information: Should you experience any significant changes in your wound(s) or have questions about your wound care, please contact the [facility] at [phone].     Hours of operation  Mon:  8AM - 2PM  Tue: 11AM - 5PM  Wed: CLOSED  Thur: 8AM - 4:30PM  Fri:  8AM - 4:30PM    Please give us 24-48 business hours to return your call.  These hours of operation are subject to change. The office is closed on all major holidays.   If you need help with your wounds and cannot wait until we are available, contact your PCP or go to your preferred emergency room.     Physician Signature:_______________________    Date: ___________ Time:  ____________        Negative Pressure Wound Therapy Right;Plantar (Active)   Canister changed? No 10/25/24 1108   Mode Continuous 10/25/24 1108   Target Pressure (mmHg) 125 10/25/24 1108   Dressing Type Black Foam 10/25/24 1108   Number of days:        Wound

## 2024-12-31 NOTE — PATIENT INSTRUCTIONS
Wound Care Center Physician Orders and Discharge Instructions  [facility]   [address] [place]  Telephone: [phone] FAX [phone]    NAME:  Serafin Weaver  YOB: 1980  MEDICAL RECORD NUMBER:  [id number]  DATE: 12/31/2024    Return Appointment:  Return Appointment: With Dr. Kailee Olsen IN 1 WEEK  Schedule weekly for the rest of the month? Yes    No future appointments.          Skilled Nursing Facility: [facility]    Change dressing?: Yes    Wound Cleansing: Vashe wash - Apply enough Vashe to soak a piece of gauze and place on wound bed for 5-10 minutes. No need to rinse after soaking.    Darlin Wound Topical Treatments: Nothing       Dressings:           Wound Location:  right lateral foot and left heel     Primary Dressing to wound bed: Collagen (I.e. Puracol)      Cover and Secure with:  4X4 gauze pad, Bulky roll gauze, and Ace wrap     Change dressing: 3 times per week:Monday/Wednesday/Friday       [x]Negative Pressure:           Wound Location: DISCONTINUE WOUND VAC        Patient may participate in therapy with the following restrictions for off-Loading:   [x]Off Loading(Pressure Reduction) When: Walking  Weight Bearing Status: Total Non-Weight bearing Right;  Offloading devices: Wheelchair, KEEP PRESSURE OFF LEFT HEEL- WEAR HEEL PROTECTOR  []No Offloading required    Dietary:   Important dietary reminders:  1. Increase Protein intake (i.e. Lean meats, fish, eggs, legumes, and yogurt)  2. No added salt  3. If diabetic, follow a diabetic diet and check glucose prior to meals or as instructed by your physician.    [] SNF: Please have dietician evaluate patient for nutritional needs    Dietary Supplements: [] Bertin  [] 30ml ProMod/ProStat [] 60ml Expedite [] Other:   Take supplements twice a day or as directed as followed:       Your nurse  is:  Mike     Electronically signed by Mike Trevizo RN on 12/31/2024 at

## 2025-01-07 ENCOUNTER — HOSPITAL ENCOUNTER (OUTPATIENT)
Dept: WOUND CARE | Age: 45
Discharge: HOME | End: 2025-01-07
Attending: PODIATRIST
Payer: COMMERCIAL

## 2025-01-07 VITALS
HEART RATE: 73 BPM | SYSTOLIC BLOOD PRESSURE: 100 MMHG | DIASTOLIC BLOOD PRESSURE: 67 MMHG | TEMPERATURE: 97.3 F | RESPIRATION RATE: 18 BRPM

## 2025-01-07 DIAGNOSIS — T81.89XA NONHEALING SURGICAL WOUND, INITIAL ENCOUNTER: Primary | ICD-10-CM

## 2025-01-07 PROCEDURE — 6370000000 HC RX 637 (ALT 250 FOR IP): Performed by: SPECIALIST

## 2025-01-07 PROCEDURE — 11042 DBRDMT SUBQ TIS 1ST 20SQCM/<: CPT

## 2025-01-07 RX ORDER — LIDOCAINE 40 MG/G
CREAM TOPICAL ONCE
OUTPATIENT
Start: 2025-01-07 | End: 2025-01-07

## 2025-01-07 RX ORDER — TRIAMCINOLONE ACETONIDE 1 MG/G
OINTMENT TOPICAL ONCE
OUTPATIENT
Start: 2025-01-07 | End: 2025-01-07

## 2025-01-07 RX ORDER — SILVER SULFADIAZINE 10 MG/G
CREAM TOPICAL ONCE
OUTPATIENT
Start: 2025-01-07 | End: 2025-01-07

## 2025-01-07 RX ORDER — LIDOCAINE 50 MG/G
OINTMENT TOPICAL ONCE
OUTPATIENT
Start: 2025-01-07 | End: 2025-01-07

## 2025-01-07 RX ORDER — LIDOCAINE HYDROCHLORIDE 40 MG/ML
SOLUTION TOPICAL ONCE
OUTPATIENT
Start: 2025-01-07 | End: 2025-01-07

## 2025-01-07 RX ORDER — CLOBETASOL PROPIONATE 0.5 MG/G
OINTMENT TOPICAL ONCE
OUTPATIENT
Start: 2025-01-07 | End: 2025-01-07

## 2025-01-07 RX ORDER — BACITRACIN ZINC AND POLYMYXIN B SULFATE 500; 1000 [USP'U]/G; [USP'U]/G
OINTMENT TOPICAL ONCE
OUTPATIENT
Start: 2025-01-07 | End: 2025-01-07

## 2025-01-07 RX ORDER — GINSENG 100 MG
CAPSULE ORAL ONCE
OUTPATIENT
Start: 2025-01-07 | End: 2025-01-07

## 2025-01-07 RX ORDER — NEOMYCIN/BACITRACIN/POLYMYXINB 3.5-400-5K
OINTMENT (GRAM) TOPICAL ONCE
OUTPATIENT
Start: 2025-01-07 | End: 2025-01-07

## 2025-01-07 RX ORDER — LIDOCAINE HYDROCHLORIDE 20 MG/ML
JELLY TOPICAL ONCE
OUTPATIENT
Start: 2025-01-07 | End: 2025-01-07

## 2025-01-07 RX ORDER — GENTAMICIN SULFATE 1 MG/G
OINTMENT TOPICAL ONCE
OUTPATIENT
Start: 2025-01-07 | End: 2025-01-07

## 2025-01-07 RX ORDER — SODIUM CHLOR/HYPOCHLOROUS ACID 0.033 %
SOLUTION, IRRIGATION IRRIGATION ONCE
OUTPATIENT
Start: 2025-01-07 | End: 2025-01-07

## 2025-01-07 RX ORDER — LIDOCAINE 40 MG/G
CREAM TOPICAL ONCE
Status: COMPLETED | OUTPATIENT
Start: 2025-01-07 | End: 2025-01-07

## 2025-01-07 RX ORDER — BETAMETHASONE DIPROPIONATE 0.5 MG/G
CREAM TOPICAL ONCE
OUTPATIENT
Start: 2025-01-07 | End: 2025-01-07

## 2025-01-07 RX ORDER — MUPIROCIN 20 MG/G
OINTMENT TOPICAL ONCE
OUTPATIENT
Start: 2025-01-07 | End: 2025-01-07

## 2025-01-07 RX ADMIN — LIDOCAINE: 40 CREAM TOPICAL at 15:15

## 2025-01-07 ASSESSMENT — PAIN DESCRIPTION - LOCATION: LOCATION: FOOT

## 2025-01-07 ASSESSMENT — PAIN SCALES - GENERAL: PAINLEVEL_OUTOF10: 8

## 2025-01-07 ASSESSMENT — PAIN DESCRIPTION - ORIENTATION: ORIENTATION: RIGHT

## 2025-01-07 NOTE — PROGRESS NOTES
[facility]  Progress Note and Procedure Note      Serafin Weaver  MEDICAL RECORD NUMBER:  [id number]  AGE: 44 y.o.   GENDER: male  : 1980  EPISODE DATE:  2025    Subjective:     Chief Complaint   Patient presents with    Wound Check     Ble feet          HISTORY of PRESENT ILLNESS HPI     Serafin Weavre is a 44 y.o. male who presents today for wound/ulcer evaluation.   History of Wound Context:  Patient is seen in wound care having been recently hospitalized at [facility] for right foot infection and osteomyelitis that required I&D and removal of fifth metatarsal right foot with tendon transfer on 2024. Postoperatively there was partial dehiscence of the wound. He was seen in consultation both by infectious disease who placed him on the appropriate antibiotics as well as vascular surgery who felt there was no need for any type of intervention for his peripheral vascular disease at this point in time. The patient is on dialysis. He was recently discharged to nursing facility where a wound VAC has been in place. Patient does have a history of hypercoagulable state with positive anticardiolipin IgM.  .  Wound/Ulcer Pain Timing/Severity: none  Quality of pain: N/A  Severity:  0 / 10   Modifying Factors: None  Associated Signs/Symptoms: edema    Ulcer Identification:  Ulcer Type: diabetic and non-healing surgical    Contributing Factors: diabetes, poor glucose control, chronic pressure, and decreased mobility    Acute Wound: N/A not an acute wound    PAST MEDICAL HISTORY        Diagnosis Date    Amputation of third toe, left, traumatic (HCC)     Anesthesia complication     has history of being aggitated and \"fights\"    Blood circulation, collateral     Cellulitis     Depression     Diabetic polyneuropathy associated with type 2 diabetes mellitus (HCC) 2019    Hypertension     MRSA infection 2019    foot wound    Seizures (HCC)     Type II or unspecified type

## 2025-01-07 NOTE — PATIENT INSTRUCTIONS
(cm) 0.5 cm 01/07/25 1440   Tunneling Position ___ O'Clock 0300 01/07/25 1440   Undermining Starts ___ O'Clock 0400 01/07/25 1440   Undermining Ends___ O'Clock 0600 01/07/25 1440   Undermining Maxium Distance (cm) .5 01/07/25 1440   Wound Assessment Fibrin;Pink/red 01/07/25 1440   Drainage Amount Moderate (25-50%) 01/07/25 1440   Drainage Description Sanguinous 01/07/25 1440   Odor Moderate 01/07/25 1440   Darlin-wound Assessment Hyperpigmented 01/07/25 1440   Margins Defined edges 01/07/25 1440   Wound Thickness Description not for Pressure Injury Full thickness 01/07/25 1440   Number of days: 152       Wound 09/13/24 Heel Left #3 (Active)   Wound Image   12/20/24 1059   Wound Etiology Pressure Stage 2 09/13/24 0927   Dressing Status Old drainage noted 01/07/25 1440   Wound Cleansed Cleansed with saline 01/07/25 1440   Dressing/Treatment Ace wrap;Collagen;Roll gauze 12/31/24 1532   Offloading for Diabetic Foot Ulcers Offloading ordered;Offloading boot 01/07/25 1440   Dressing Change Due 12/09/24 12/08/24 1001   Wound Length (cm) 0.4 cm 01/07/25 1440   Wound Width (cm) 0.3 cm 01/07/25 1440   Wound Depth (cm) 0.1 cm 01/07/25 1440   Wound Surface Area (cm^2) 0.12 cm^2 01/07/25 1440   Change in Wound Size % (l*w) 98 01/07/25 1440   Wound Volume (cm^3) 0.012 cm^3 01/07/25 1440   Wound Healing % 98 01/07/25 1440   Post-Procedure Length (cm) 0.5 cm 01/07/25 1532   Post-Procedure Width (cm) 0.4 cm 01/07/25 1532   Post-Procedure Depth (cm) 0.3 cm 01/07/25 1532   Post-Procedure Surface Area (cm^2) 0.2 cm^2 01/07/25 1532   Post-Procedure Volume (cm^3) 0.06 cm^3 01/07/25 1532   Distance Tunneling (cm) 0 cm 12/03/24 1413   Tunneling Position ___ O'Clock 0 12/03/24 1413   Undermining Starts ___ O'Clock 0 12/03/24 1413   Undermining Ends___ O'Clock 0 12/03/24 1413   Undermining Maxium Distance (cm) 0 12/03/24 1413   Wound Assessment Fibrin;Pink/red 01/07/25 1440   Drainage Amount Small (< 25%) 01/07/25 1440   Drainage Description

## 2025-01-13 ENCOUNTER — HOSPITAL ENCOUNTER (OUTPATIENT)
Dept: WOUND CARE | Age: 45
Discharge: HOME | End: 2025-01-13
Attending: PODIATRIST
Payer: COMMERCIAL

## 2025-01-13 DIAGNOSIS — T81.89XA NONHEALING SURGICAL WOUND, INITIAL ENCOUNTER: Primary | ICD-10-CM

## 2025-01-13 PROCEDURE — 11042 DBRDMT SUBQ TIS 1ST 20SQCM/<: CPT | Performed by: SPECIALIST

## 2025-01-13 PROCEDURE — 16020 DRESS/DEBRID P-THICK BURN S: CPT

## 2025-01-13 PROCEDURE — 6370000000 HC RX 637 (ALT 250 FOR IP): Performed by: SPECIALIST

## 2025-01-13 PROCEDURE — 11042 DBRDMT SUBQ TIS 1ST 20SQCM/<: CPT

## 2025-01-13 RX ORDER — BACITRACIN ZINC AND POLYMYXIN B SULFATE 500; 1000 [USP'U]/G; [USP'U]/G
OINTMENT TOPICAL ONCE
OUTPATIENT
Start: 2025-01-13 | End: 2025-01-13

## 2025-01-13 RX ORDER — TRIAMCINOLONE ACETONIDE 1 MG/G
OINTMENT TOPICAL ONCE
OUTPATIENT
Start: 2025-01-13 | End: 2025-01-13

## 2025-01-13 RX ORDER — LIDOCAINE 50 MG/G
OINTMENT TOPICAL ONCE
OUTPATIENT
Start: 2025-01-13 | End: 2025-01-13

## 2025-01-13 RX ORDER — LIDOCAINE HYDROCHLORIDE 40 MG/ML
SOLUTION TOPICAL ONCE
OUTPATIENT
Start: 2025-01-13 | End: 2025-01-13

## 2025-01-13 RX ORDER — LIDOCAINE HYDROCHLORIDE 20 MG/ML
JELLY TOPICAL ONCE
OUTPATIENT
Start: 2025-01-13 | End: 2025-01-13

## 2025-01-13 RX ORDER — SODIUM CHLOR/HYPOCHLOROUS ACID 0.033 %
SOLUTION, IRRIGATION IRRIGATION ONCE
OUTPATIENT
Start: 2025-01-13 | End: 2025-01-13

## 2025-01-13 RX ORDER — GENTAMICIN SULFATE 1 MG/G
OINTMENT TOPICAL ONCE
OUTPATIENT
Start: 2025-01-13 | End: 2025-01-13

## 2025-01-13 RX ORDER — NEOMYCIN/BACITRACIN/POLYMYXINB 3.5-400-5K
OINTMENT (GRAM) TOPICAL ONCE
OUTPATIENT
Start: 2025-01-13 | End: 2025-01-13

## 2025-01-13 RX ORDER — GINSENG 100 MG
CAPSULE ORAL ONCE
OUTPATIENT
Start: 2025-01-13 | End: 2025-01-13

## 2025-01-13 RX ORDER — LIDOCAINE 40 MG/G
CREAM TOPICAL ONCE
OUTPATIENT
Start: 2025-01-13 | End: 2025-01-13

## 2025-01-13 RX ORDER — MUPIROCIN 20 MG/G
OINTMENT TOPICAL ONCE
OUTPATIENT
Start: 2025-01-13 | End: 2025-01-13

## 2025-01-13 RX ORDER — SILVER SULFADIAZINE 10 MG/G
CREAM TOPICAL ONCE
OUTPATIENT
Start: 2025-01-13 | End: 2025-01-13

## 2025-01-13 RX ORDER — LIDOCAINE 40 MG/G
CREAM TOPICAL ONCE
Status: COMPLETED | OUTPATIENT
Start: 2025-01-13 | End: 2025-01-13

## 2025-01-13 RX ORDER — BETAMETHASONE DIPROPIONATE 0.5 MG/G
CREAM TOPICAL ONCE
OUTPATIENT
Start: 2025-01-13 | End: 2025-01-13

## 2025-01-13 RX ORDER — CLOBETASOL PROPIONATE 0.5 MG/G
OINTMENT TOPICAL ONCE
OUTPATIENT
Start: 2025-01-13 | End: 2025-01-13

## 2025-01-13 RX ADMIN — LIDOCAINE: 40 CREAM TOPICAL at 09:21

## 2025-01-13 NOTE — PROGRESS NOTES
Week(s).  Schedule weekly for the rest of the month? Yes    Future Appointments   Date Time Provider Department Center   2025  2:15 PM Kailee Olsen DPM [facility] WOUND [facility]   2025  2:30 PM Kailee Olsen DPM [facility] WOUND [facility]           Skilled Nursing Facility: [facility]    Change dressing?: Yes    Wound Cleansin.9% normal saline    Darlin Wound Topical Treatments: Moisturizing lotion       Dressings:           Wound Location: right lateral foot and left heel     Primary Dressing to wound bed: Collagen (I.e. Puracol)      Cover and Secure with:  4X4 gauze pad and Bulky roll gauze     Change dressing: 3 times per week:Monday/Wednesday/Friday      Patient may participate in therapy with the following restrictions for off-Loading:   [x]Off Loading(Pressure Reduction) When: Walking, Sitting, and In Bed  Weight Bearing Status: Total Non-Weight bearing Right;  Offloading devices: Heel Protector Soft Boot: Left - DO NOT WALK IN WHILE WEARING  []No Offloading required    Dietary:   Important dietary reminders:  1. Increase Protein intake (i.e. Lean meats, fish, eggs, legumes, and yogurt)  2. No added salt  3. If diabetic, follow a diabetic diet and check glucose prior to meals or as instructed by your physician.    [] SNF: Please have dietician evaluate patient for nutritional needs    Dietary Supplements: [] Bertin  [] 30ml ProMod/ProStat [] 60ml Expedite [] Other:   Take supplements twice a day or as directed as followed:       Your nurse  is:  Mike     Electronically signed by Alee Hare RN on 2025 at 9:38 AM     Wound Care Center Information: Should you experience any significant changes in your wound(s) or have questions about your wound care, please contact the [facility] at [phone].     Hours of operation  Mon:  8AM - 2PM  Tue: 11AM - 5PM  Wed: CLOSED  Thur: 8AM - 4:30PM  Fri:  8AM - 4:30PM    Please give us 24-48 business hours to

## 2025-01-13 NOTE — PATIENT INSTRUCTIONS
Wound Care Center Physician Orders and Discharge Instructions  [facility]   [address] [place]  Telephone: [phone] FAX [phone]    NAME:  Serafin Weaver  YOB: 1980  MEDICAL RECORD NUMBER:  [id number]  DATE: 2025    Return Appointment:  Return Appointment: With Kailee Olsen DPM  in  1 Week(s).  Schedule weekly for the rest of the month? Yes    Future Appointments   Date Time Provider Department Center   2025  2:15 PM Kailee Olsen DPM [facility] WOUND [facility]   2025  2:30 PM Kailee Olsen DPM [facility] WOUND [facility]           Skilled Nursing Facility: [facility]    Change dressing?: Yes    Wound Cleansin.9% normal saline    Darlin Wound Topical Treatments: Moisturizing lotion       Dressings:           Wound Location: right lateral foot and left heel     Primary Dressing to wound bed: Collagen (I.e. Puracol)      Cover and Secure with:  4X4 gauze pad and Bulky roll gauze     Change dressing: 3 times per week:Monday/Wednesday/Friday      Patient may participate in therapy with the following restrictions for off-Loading:   [x]Off Loading(Pressure Reduction) When: Walking, Sitting, and In Bed  Weight Bearing Status: Total Non-Weight bearing Right;  Offloading devices: Heel Protector Soft Boot: Left - DO NOT WALK IN WHILE WEARING  []No Offloading required    Dietary:   Important dietary reminders:  1. Increase Protein intake (i.e. Lean meats, fish, eggs, legumes, and yogurt)  2. No added salt  3. If diabetic, follow a diabetic diet and check glucose prior to meals or as instructed by your physician.    [] SNF: Please have dietician evaluate patient for nutritional needs    Dietary Supplements: [] Bertin  [] 30ml ProMod/ProStat [] 60ml Expedite [] Other:   Take supplements twice a day or as directed as followed:       Your nurse  is:  Mike     Electronically signed by Alee Hare RN on 2025 at 9:38

## 2025-01-21 ENCOUNTER — HOSPITAL ENCOUNTER (OUTPATIENT)
Dept: WOUND CARE | Age: 45
Discharge: HOME | End: 2025-01-21
Attending: PODIATRIST
Payer: COMMERCIAL

## 2025-01-21 VITALS
DIASTOLIC BLOOD PRESSURE: 68 MMHG | RESPIRATION RATE: 18 BRPM | TEMPERATURE: 97.8 F | SYSTOLIC BLOOD PRESSURE: 90 MMHG | HEART RATE: 69 BPM

## 2025-01-21 DIAGNOSIS — T81.89XA NONHEALING SURGICAL WOUND, INITIAL ENCOUNTER: Primary | ICD-10-CM

## 2025-01-21 PROCEDURE — 6370000000 HC RX 637 (ALT 250 FOR IP): Performed by: SPECIALIST

## 2025-01-21 PROCEDURE — 11042 DBRDMT SUBQ TIS 1ST 20SQCM/<: CPT

## 2025-01-21 RX ORDER — GENTAMICIN SULFATE 1 MG/G
OINTMENT TOPICAL ONCE
OUTPATIENT
Start: 2025-01-21 | End: 2025-01-21

## 2025-01-21 RX ORDER — SODIUM CHLOR/HYPOCHLOROUS ACID 0.033 %
SOLUTION, IRRIGATION IRRIGATION ONCE
OUTPATIENT
Start: 2025-01-21 | End: 2025-01-21

## 2025-01-21 RX ORDER — LIDOCAINE 50 MG/G
OINTMENT TOPICAL ONCE
OUTPATIENT
Start: 2025-01-21 | End: 2025-01-21

## 2025-01-21 RX ORDER — LIDOCAINE HYDROCHLORIDE 40 MG/ML
SOLUTION TOPICAL ONCE
OUTPATIENT
Start: 2025-01-21 | End: 2025-01-21

## 2025-01-21 RX ORDER — BETAMETHASONE DIPROPIONATE 0.5 MG/G
CREAM TOPICAL ONCE
OUTPATIENT
Start: 2025-01-21 | End: 2025-01-21

## 2025-01-21 RX ORDER — SILVER SULFADIAZINE 10 MG/G
CREAM TOPICAL ONCE
OUTPATIENT
Start: 2025-01-21 | End: 2025-01-21

## 2025-01-21 RX ORDER — TRIAMCINOLONE ACETONIDE 1 MG/G
OINTMENT TOPICAL ONCE
OUTPATIENT
Start: 2025-01-21 | End: 2025-01-21

## 2025-01-21 RX ORDER — NEOMYCIN/BACITRACIN/POLYMYXINB 3.5-400-5K
OINTMENT (GRAM) TOPICAL ONCE
OUTPATIENT
Start: 2025-01-21 | End: 2025-01-21

## 2025-01-21 RX ORDER — GINSENG 100 MG
CAPSULE ORAL ONCE
OUTPATIENT
Start: 2025-01-21 | End: 2025-01-21

## 2025-01-21 RX ORDER — BACITRACIN ZINC AND POLYMYXIN B SULFATE 500; 1000 [USP'U]/G; [USP'U]/G
OINTMENT TOPICAL ONCE
OUTPATIENT
Start: 2025-01-21 | End: 2025-01-21

## 2025-01-21 RX ORDER — LIDOCAINE HYDROCHLORIDE 20 MG/ML
JELLY TOPICAL ONCE
OUTPATIENT
Start: 2025-01-21 | End: 2025-01-21

## 2025-01-21 RX ORDER — LIDOCAINE 40 MG/G
CREAM TOPICAL ONCE
OUTPATIENT
Start: 2025-01-21 | End: 2025-01-21

## 2025-01-21 RX ORDER — CLOBETASOL PROPIONATE 0.5 MG/G
OINTMENT TOPICAL ONCE
OUTPATIENT
Start: 2025-01-21 | End: 2025-01-21

## 2025-01-21 RX ORDER — MUPIROCIN 20 MG/G
OINTMENT TOPICAL ONCE
OUTPATIENT
Start: 2025-01-21 | End: 2025-01-21

## 2025-01-21 RX ORDER — LIDOCAINE HYDROCHLORIDE 40 MG/ML
SOLUTION TOPICAL ONCE
Status: COMPLETED | OUTPATIENT
Start: 2025-01-21 | End: 2025-01-21

## 2025-01-21 RX ADMIN — LIDOCAINE HYDROCHLORIDE: 40 SOLUTION TOPICAL at 14:58

## 2025-01-21 ASSESSMENT — PAIN DESCRIPTION - ORIENTATION: ORIENTATION: LEFT;RIGHT

## 2025-01-21 ASSESSMENT — PAIN DESCRIPTION - LOCATION: LOCATION: FOOT

## 2025-01-21 ASSESSMENT — PAIN SCALES - GENERAL: PAINLEVEL_OUTOF10: 0

## 2025-01-21 NOTE — PROGRESS NOTES
uncontrolled     Unspecified cerebral artery occlusion with cerebral infarction     pt states at 14 years of age       PAST SURGICAL HISTORY    Past Surgical History:   Procedure Laterality Date    ACHILLES TENDON SURGERY Right 7/24/2024    . performed by Kailee Olsen DPM at [facility] OR    AMPUTATION      left 3rd toe    FOOT AMPUTATION Left 2/7/2019    INCISION AND DRAINAGE, REVISION OF TRANSMETATARSAL AMPUTATION LEFT FOOT performed by Kailee Olsen DPM at [facility] OR    FOOT DEBRIDEMENT Right 7/18/2024    RIGHTFOOT INCISION AND DRAINAGE WITH PARTIAL FIFTH RAY RESECTION performed by Kailee Olsen DPM at [facility] OR    FOOT DEBRIDEMENT Right 8/17/2024    RIGHT FOOT INCISION AND DRAINAGE WITH REMOVAL OF ALL NONVIABLE TISSUE AND BONE performed by Roverto Tanner DPM at [facility] OR    FOOT DEBRIDEMENT Right 8/22/2024    RIGHT FOOT INCISION AND DRAINAGE WITH FIFTH METATARSAL RESECTION PERONEAL TENDON TRANSFER performed by Kailee Olsen DPM at [facility] OR    INVASIVE VASCULAR N/A 7/22/2024    Angiography lower ext right performed by Hubert Mirza MD at [facility] CARDIAC CATH LAB    OTHER SURGICAL HISTORY  8-    incision and drainage scrotal abcess  wound vac on    TOE AMPUTATION Right     partial great toe    TOE AMPUTATION Right 7/24/2024    Transmetatarsal amputation with tendeno-achilles lengthening, Right foot performed by Kailee Olsen DPM at [facility] OR    VASCULAR SURGERY Right 4/11/2024    RIGHT ARM ARTERIOVENOUS GRAFT performed by Reddy Howe II, MD at [facility] OR       FAMILY HISTORY    Family History   Problem Relation Age of Onset    Diabetes Mother     High Blood Pressure Mother     High Cholesterol Mother     Mental Illness Father     High Blood Pressure Father     Diabetes Father     Diabetes Paternal Grandfather        SOCIAL HISTORY    Social History     Tobacco Use    Smoking status: Former     Current packs/day: 0.25     Average packs/day: 0.3 packs/day for 17.0 years (4.3 ttl pk-yrs)     Types: Cigarettes, Cigars

## 2025-01-21 NOTE — PATIENT INSTRUCTIONS
or as directed as followed:       Your nurse  is:  Mike     Electronically signed by Mike Trevizo RN on 1/21/2025 at 3:16 PM     Wound Care Center Information: Should you experience any significant changes in your wound(s) or have questions about your wound care, please contact the [facility] at [phone].     Hours of operation  Mon:  8AM - 2PM  Tue: 11AM - 5PM  Wed: CLOSED  Thur: 8AM - 4:30PM  Fri:  8AM - 4:30PM    Please give us 24-48 business hours to return your call.  These hours of operation are subject to change. The office is closed on all major holidays.   If you need help with your wounds and cannot wait until we are available, contact your PCP or go to your preferred emergency room.     Physician Signature:_______________________    Date: ___________ Time:  ____________        Wound 08/08/24 Foot Lateral;Plantar;Right #1 (Active)   Wound Image   01/07/25 1440   Wound Etiology Non-Healing Surgical 09/13/24 0927   Dressing Status Old drainage noted 01/07/25 1440   Wound Cleansed Cleansed with saline 01/13/25 0858   Dressing/Treatment Ace wrap;Collagen;Roll gauze 01/07/25 1440   Offloading for Diabetic Foot Ulcers Offloading ordered;Offloading boot 01/07/25 1440   Wound Length (cm) 0.2 cm 01/13/25 0858   Wound Width (cm) 1.2 cm 01/13/25 0858   Wound Depth (cm) 0.2 cm 01/13/25 0858   Wound Surface Area (cm^2) 0.24 cm^2 01/13/25 0858   Change in Wound Size % (l*w) 98.5 01/13/25 0858   Wound Volume (cm^3) 0.048 cm^3 01/13/25 0858   Wound Healing % 99 01/13/25 0858   Post-Procedure Length (cm) 0.5 cm 01/13/25 0933   Post-Procedure Width (cm) 1.4 cm 01/13/25 0933   Post-Procedure Depth (cm) 0.9 cm 01/13/25 0933   Post-Procedure Surface Area (cm^2) 0.7 cm^2 01/13/25 0933   Post-Procedure Volume (cm^3) 0.63 cm^3 01/13/25 0933   Distance Tunneling (cm) 0.5 cm 01/07/25 1440   Tunneling Position ___ O'Clock 0300 01/07/25 1440   Undermining Starts ___ O'Clock 1200 01/13/25 0933

## 2025-02-04 ENCOUNTER — HOSPITAL ENCOUNTER (OUTPATIENT)
Dept: WOUND CARE | Age: 45
Discharge: HOME | End: 2025-02-04
Attending: PODIATRIST
Payer: COMMERCIAL

## 2025-02-04 VITALS
HEART RATE: 63 BPM | TEMPERATURE: 96.9 F | RESPIRATION RATE: 18 BRPM | SYSTOLIC BLOOD PRESSURE: 121 MMHG | DIASTOLIC BLOOD PRESSURE: 70 MMHG

## 2025-02-04 DIAGNOSIS — T81.89XA NONHEALING SURGICAL WOUND, INITIAL ENCOUNTER: Primary | ICD-10-CM

## 2025-02-04 DIAGNOSIS — L97.522 ULCER OF LEFT FOOT, WITH FAT LAYER EXPOSED (HCC): ICD-10-CM

## 2025-02-04 PROCEDURE — 6370000000 HC RX 637 (ALT 250 FOR IP): Performed by: SPECIALIST

## 2025-02-04 PROCEDURE — 11042 DBRDMT SUBQ TIS 1ST 20SQCM/<: CPT

## 2025-02-04 RX ORDER — TRIAMCINOLONE ACETONIDE 1 MG/G
OINTMENT TOPICAL ONCE
OUTPATIENT
Start: 2025-02-04 | End: 2025-02-04

## 2025-02-04 RX ORDER — SODIUM CHLOR/HYPOCHLOROUS ACID 0.033 %
SOLUTION, IRRIGATION IRRIGATION ONCE
OUTPATIENT
Start: 2025-02-04 | End: 2025-02-04

## 2025-02-04 RX ORDER — GINSENG 100 MG
CAPSULE ORAL ONCE
OUTPATIENT
Start: 2025-02-04 | End: 2025-02-04

## 2025-02-04 RX ORDER — NEOMYCIN/BACITRACIN/POLYMYXINB 3.5-400-5K
OINTMENT (GRAM) TOPICAL ONCE
OUTPATIENT
Start: 2025-02-04 | End: 2025-02-04

## 2025-02-04 RX ORDER — LIDOCAINE 40 MG/G
CREAM TOPICAL ONCE
OUTPATIENT
Start: 2025-02-04 | End: 2025-02-04

## 2025-02-04 RX ORDER — BACITRACIN ZINC AND POLYMYXIN B SULFATE 500; 1000 [USP'U]/G; [USP'U]/G
OINTMENT TOPICAL ONCE
OUTPATIENT
Start: 2025-02-04 | End: 2025-02-04

## 2025-02-04 RX ORDER — SILVER SULFADIAZINE 10 MG/G
CREAM TOPICAL ONCE
OUTPATIENT
Start: 2025-02-04 | End: 2025-02-04

## 2025-02-04 RX ORDER — CLOBETASOL PROPIONATE 0.5 MG/G
OINTMENT TOPICAL ONCE
OUTPATIENT
Start: 2025-02-04 | End: 2025-02-04

## 2025-02-04 RX ORDER — BETAMETHASONE DIPROPIONATE 0.5 MG/G
CREAM TOPICAL ONCE
OUTPATIENT
Start: 2025-02-04 | End: 2025-02-04

## 2025-02-04 RX ORDER — MUPIROCIN 20 MG/G
OINTMENT TOPICAL ONCE
OUTPATIENT
Start: 2025-02-04 | End: 2025-02-04

## 2025-02-04 RX ORDER — LEVOFLOXACIN 500 MG/1
500 TABLET, FILM COATED ORAL DAILY
Qty: 10 TABLET | Refills: 0 | Status: SHIPPED | OUTPATIENT
Start: 2025-02-04 | End: 2025-02-14

## 2025-02-04 RX ORDER — LIDOCAINE HYDROCHLORIDE 20 MG/ML
JELLY TOPICAL ONCE
OUTPATIENT
Start: 2025-02-04 | End: 2025-02-04

## 2025-02-04 RX ORDER — LIDOCAINE HYDROCHLORIDE 40 MG/ML
SOLUTION TOPICAL ONCE
OUTPATIENT
Start: 2025-02-04 | End: 2025-02-04

## 2025-02-04 RX ORDER — LIDOCAINE HYDROCHLORIDE 40 MG/ML
SOLUTION TOPICAL ONCE
Status: COMPLETED | OUTPATIENT
Start: 2025-02-04 | End: 2025-02-04

## 2025-02-04 RX ORDER — GENTAMICIN SULFATE 1 MG/G
OINTMENT TOPICAL ONCE
OUTPATIENT
Start: 2025-02-04 | End: 2025-02-04

## 2025-02-04 RX ORDER — LIDOCAINE 50 MG/G
OINTMENT TOPICAL ONCE
OUTPATIENT
Start: 2025-02-04 | End: 2025-02-04

## 2025-02-04 RX ADMIN — LIDOCAINE HYDROCHLORIDE: 40 SOLUTION TOPICAL at 14:50

## 2025-02-04 NOTE — PROGRESS NOTES
[facility]  Progress Note and Procedure Note      Serafin Weaver  MEDICAL RECORD NUMBER:  [id number]  AGE: 44 y.o.   GENDER: male  : 1980  EPISODE DATE:  2025    Subjective:     No chief complaint on file.        HISTORY of PRESENT ILLNESS HPI     Serafin Weaver is a 44 y.o. male who presents today for wound/ulcer evaluation.   History of Wound Context:  Patient is seen in wound care having been recently hospitalized at [facility] for right foot infection and osteomyelitis that required I&D and removal of fifth metatarsal right foot with tendon transfer on 2024. Postoperatively there was partial dehiscence of the wound. He was seen in consultation both by infectious disease who placed him on the appropriate antibiotics as well as vascular surgery who felt there was no need for any type of intervention for his peripheral vascular disease at this point in time. The patient is on dialysis. Patient does have a history of hypercoagulable state with positive anticardiolipin IgM.  .  Wound/Ulcer Pain Timing/Severity: none  Quality of pain: N/A  Severity:  0 / 10   Modifying Factors: None  Associated Signs/Symptoms: edema    Ulcer Identification:  Ulcer Type: diabetic and non-healing surgical    Contributing Factors: diabetes, poor glucose control, chronic pressure, and decreased mobility    Acute Wound: N/A not an acute wound    PAST MEDICAL HISTORY        Diagnosis Date    Amputation of third toe, left, traumatic (HCC)     Anesthesia complication     has history of being aggitated and \"fights\"    Blood circulation, collateral     Cellulitis     Depression     Diabetic polyneuropathy associated with type 2 diabetes mellitus (HCC) 2019    Hypertension     MRSA infection 2019    foot wound    Seizures (HCC)     Type II or unspecified type diabetes mellitus without mention of complication, not stated as uncontrolled     Unspecified cerebral artery occlusion with

## 2025-02-04 NOTE — PATIENT INSTRUCTIONS
Wound Care Center Physician Orders and Discharge Instructions  [facility]   [address]  Telephone: [phone] FAX [phone]    NAME:  Serafin Weaver  YOB: 1980  MEDICAL RECORD NUMBER:  [id number]  DATE: 2025    Return Appointment:  Return Appointment: With Kailee Olsen DPM  in  1 Week(s).  Schedule weekly for the rest of the month? Yes    No future appointments.       Skilled Nursing Facility: [facility]    Change dressing?: Yes    Wound Cleansin.9% normal saline    Darlin Wound Topical Treatments: Moisturizing lotion       Dressings:           Wound Location: Left Foot (new spot opened at distal end)     Primary Dressing to wound bed: Collagen (I.e. Puracol)      Cover and Secure with:  4X4 gauze pad and Bulky roll gauze     Change dressing: 3 times per week:Monday/Wednesday/Friday    **RIGHT FOOT HEALED**    Patient may participate in therapy with the following restrictions for off-Loading:   [x]Off Loading(Pressure Reduction) When: Walking, Sitting, and In Bed  Weight Bearing Status: Total Non-Weight bearing Left; and No Weight bearing restrictions Right;  Offloading devices: Post op shoe/ Surgical shoe: Right, Heel Protector Soft Boot: Left - DO NOT WALK IN WHILE WEARING, Walker, and Wheelchair  []No Offloading required    Dietary:   Important dietary reminders:  1. Increase Protein intake (i.e. Lean meats, fish, eggs, legumes, and yogurt)  2. No added salt  3. If diabetic, follow a diabetic diet and check glucose prior to meals or as instructed by your physician.    [] SNF: Please have dietician evaluate patient for nutritional needs    Dietary Supplements: [] Bertin  [] 30ml ProMod/ProStat [] 60ml Expedite [] Other:   Take supplements twice a day or as directed as followed:       Your nurse  is:  Mike     Electronically signed by Mike Trevizo RN on 2025 at 3:11 PM     Wound Care Center

## 2025-02-18 ENCOUNTER — HOSPITAL ENCOUNTER (OUTPATIENT)
Dept: WOUND CARE | Age: 45
Discharge: HOME | End: 2025-02-18
Attending: PODIATRIST
Payer: COMMERCIAL

## 2025-02-18 VITALS — HEART RATE: 71 BPM | TEMPERATURE: 96.9 F | DIASTOLIC BLOOD PRESSURE: 61 MMHG | SYSTOLIC BLOOD PRESSURE: 119 MMHG

## 2025-02-18 DIAGNOSIS — T81.89XA NONHEALING SURGICAL WOUND, INITIAL ENCOUNTER: Primary | ICD-10-CM

## 2025-02-18 PROCEDURE — 11042 DBRDMT SUBQ TIS 1ST 20SQCM/<: CPT

## 2025-02-18 PROCEDURE — 6370000000 HC RX 637 (ALT 250 FOR IP): Performed by: SPECIALIST

## 2025-02-18 RX ORDER — LIDOCAINE HYDROCHLORIDE 20 MG/ML
JELLY TOPICAL ONCE
OUTPATIENT
Start: 2025-02-18 | End: 2025-02-18

## 2025-02-18 RX ORDER — BETAMETHASONE DIPROPIONATE 0.5 MG/G
CREAM TOPICAL ONCE
OUTPATIENT
Start: 2025-02-18 | End: 2025-02-18

## 2025-02-18 RX ORDER — CLOBETASOL PROPIONATE 0.5 MG/G
OINTMENT TOPICAL ONCE
OUTPATIENT
Start: 2025-02-18 | End: 2025-02-18

## 2025-02-18 RX ORDER — LIDOCAINE HYDROCHLORIDE 40 MG/ML
SOLUTION TOPICAL ONCE
OUTPATIENT
Start: 2025-02-18 | End: 2025-02-18

## 2025-02-18 RX ORDER — GENTAMICIN SULFATE 1 MG/G
OINTMENT TOPICAL ONCE
OUTPATIENT
Start: 2025-02-18 | End: 2025-02-18

## 2025-02-18 RX ORDER — LIDOCAINE 50 MG/G
OINTMENT TOPICAL ONCE
OUTPATIENT
Start: 2025-02-18 | End: 2025-02-18

## 2025-02-18 RX ORDER — LIDOCAINE 40 MG/G
CREAM TOPICAL ONCE
OUTPATIENT
Start: 2025-02-18 | End: 2025-02-18

## 2025-02-18 RX ORDER — SODIUM CHLOR/HYPOCHLOROUS ACID 0.033 %
SOLUTION, IRRIGATION IRRIGATION ONCE
OUTPATIENT
Start: 2025-02-18 | End: 2025-02-18

## 2025-02-18 RX ORDER — SILVER SULFADIAZINE 10 MG/G
CREAM TOPICAL ONCE
OUTPATIENT
Start: 2025-02-18 | End: 2025-02-18

## 2025-02-18 RX ORDER — NEOMYCIN/BACITRACIN/POLYMYXINB 3.5-400-5K
OINTMENT (GRAM) TOPICAL ONCE
OUTPATIENT
Start: 2025-02-18 | End: 2025-02-18

## 2025-02-18 RX ORDER — TRIAMCINOLONE ACETONIDE 1 MG/G
OINTMENT TOPICAL ONCE
OUTPATIENT
Start: 2025-02-18 | End: 2025-02-18

## 2025-02-18 RX ORDER — MUPIROCIN 20 MG/G
OINTMENT TOPICAL ONCE
OUTPATIENT
Start: 2025-02-18 | End: 2025-02-18

## 2025-02-18 RX ORDER — LIDOCAINE 40 MG/G
CREAM TOPICAL ONCE
Status: COMPLETED | OUTPATIENT
Start: 2025-02-18 | End: 2025-02-18

## 2025-02-18 RX ORDER — BACITRACIN ZINC AND POLYMYXIN B SULFATE 500; 1000 [USP'U]/G; [USP'U]/G
OINTMENT TOPICAL ONCE
OUTPATIENT
Start: 2025-02-18 | End: 2025-02-18

## 2025-02-18 RX ORDER — GINSENG 100 MG
CAPSULE ORAL ONCE
OUTPATIENT
Start: 2025-02-18 | End: 2025-02-18

## 2025-02-18 RX ADMIN — LIDOCAINE: 40 CREAM TOPICAL at 14:33

## 2025-02-18 ASSESSMENT — PAIN SCALES - GENERAL: PAINLEVEL_OUTOF10: 0

## 2025-02-18 NOTE — PATIENT INSTRUCTIONS
Wound Care Center Physician Orders and Discharge Instructions  [facility]   [address]  Telephone: [phone] FAX [phone]    NAME:  Serafin Weaver  YOB: 1980  MEDICAL RECORD NUMBER:  [id number]  DATE: 2025    Return Appointment:  Return Appointment: With Kailee Olsen DPM  in  1 Week(s).  Schedule weekly for the rest of the month? Yes    Future Appointments   Date Time Provider Department Center   2025  2:30 PM Kailee Olsen DPM [facility]          Skilled Nursing Facility: [facility]    Change dressing?: Yes    Wound Cleansin.9% normal saline    Darlin Wound Topical Treatments: Moisturizing lotion       Dressings:           Wound Location: Left Foot (new spot opened at distal end)     Primary Dressing to wound bed: Collagen (I.e. Puracol)      Cover and Secure with:  4X4 gauze pad and Bulky roll gauze     Change dressing: 3 times per week:Monday/Wednesday/Friday    **NEW ORDERS/PLEASE READ BELOW**  Patient may participate in therapy with the following restrictions for off-Loading:   [x]Off Loading(Pressure Reduction) When: Walking, Sitting, and In Bed  Weight Bearing Status: Total Non-Weight bearing Left; and No Weight bearing restrictions Right;  Offloading devices: Post op shoe/ Surgical shoe: Right, Heel Protector Soft Boot: Left - DO NOT WALK IN WHILE WEARING, Walker, and Wheelchair  []No Offloading required    Dietary:   Important dietary reminders:  1. Increase Protein intake (i.e. Lean meats, fish, eggs, legumes, and yogurt)  2. No added salt  3. If diabetic, follow a diabetic diet and check glucose prior to meals or as instructed by your physician.    [] SNF: Please have dietician evaluate patient for nutritional needs    Dietary Supplements: [] Bertin  [] 30ml ProMod/ProStat [] 60ml Expedite [] Other:   Take supplements twice a day or as directed as followed:       Your nurse case

## 2025-02-18 NOTE — PROGRESS NOTES
[facility]  Progress Note and Procedure Note      Serafin Weaver  MEDICAL RECORD NUMBER:  [id number]  AGE: 44 y.o.   GENDER: male  : 1980  EPISODE DATE:  2025    Subjective:     Chief Complaint   Patient presents with    Wound Check         HISTORY of PRESENT ILLNESS HPI     Serafin Weaver is a 44 y.o. male who presents today for wound/ulcer evaluation.   History of Wound Context:  Patient is seen in wound care having been recently hospitalized at [facility] for right foot infection and osteomyelitis that required I&D and removal of fifth metatarsal right foot with tendon transfer on 2024. Postoperatively there was partial dehiscence of the wound. He was seen in consultation both by infectious disease who placed him on the appropriate antibiotics as well as vascular surgery who felt there was no need for any type of intervention for his peripheral vascular disease at this point in time. The patient is on dialysis. Patient does have a history of hypercoagulable state with positive anticardiolipin IgM.  .  Wound/Ulcer Pain Timing/Severity: none  Quality of pain: N/A  Severity:  0 / 10   Modifying Factors: None  Associated Signs/Symptoms: edema    Ulcer Identification:  Ulcer Type: diabetic and non-healing surgical    Contributing Factors: diabetes, poor glucose control, chronic pressure, and decreased mobility    Acute Wound: N/A not an acute wound    PAST MEDICAL HISTORY        Diagnosis Date    Amputation of third toe, left, traumatic (HCC)     Anesthesia complication     has history of being aggitated and \"fights\"    Blood circulation, collateral     Cellulitis     Depression     Diabetic polyneuropathy associated with type 2 diabetes mellitus (HCC) 2019    Hypertension     MRSA infection 2019    foot wound    Seizures (HCC)     Type II or unspecified type diabetes mellitus without mention of complication, not stated as uncontrolled     Unspecified

## 2025-02-25 ENCOUNTER — HOSPITAL ENCOUNTER (OUTPATIENT)
Dept: WOUND CARE | Age: 45
Discharge: HOME | End: 2025-02-25
Attending: PODIATRIST
Payer: COMMERCIAL

## 2025-02-25 VITALS
SYSTOLIC BLOOD PRESSURE: 157 MMHG | RESPIRATION RATE: 18 BRPM | DIASTOLIC BLOOD PRESSURE: 84 MMHG | HEART RATE: 71 BPM | TEMPERATURE: 98 F

## 2025-02-25 DIAGNOSIS — T81.89XA NONHEALING SURGICAL WOUND, INITIAL ENCOUNTER: Primary | ICD-10-CM

## 2025-02-25 PROCEDURE — 11042 DBRDMT SUBQ TIS 1ST 20SQCM/<: CPT

## 2025-02-25 PROCEDURE — 6370000000 HC RX 637 (ALT 250 FOR IP): Performed by: SPECIALIST

## 2025-02-25 RX ORDER — BETAMETHASONE DIPROPIONATE 0.5 MG/G
CREAM TOPICAL ONCE
OUTPATIENT
Start: 2025-02-25 | End: 2025-02-25

## 2025-02-25 RX ORDER — NEOMYCIN/BACITRACIN/POLYMYXINB 3.5-400-5K
OINTMENT (GRAM) TOPICAL ONCE
OUTPATIENT
Start: 2025-02-25 | End: 2025-02-25

## 2025-02-25 RX ORDER — LIDOCAINE 50 MG/G
OINTMENT TOPICAL ONCE
OUTPATIENT
Start: 2025-02-25 | End: 2025-02-25

## 2025-02-25 RX ORDER — BACITRACIN ZINC AND POLYMYXIN B SULFATE 500; 1000 [USP'U]/G; [USP'U]/G
OINTMENT TOPICAL ONCE
OUTPATIENT
Start: 2025-02-25 | End: 2025-02-25

## 2025-02-25 RX ORDER — GENTAMICIN SULFATE 1 MG/G
OINTMENT TOPICAL ONCE
OUTPATIENT
Start: 2025-02-25 | End: 2025-02-25

## 2025-02-25 RX ORDER — SILVER SULFADIAZINE 10 MG/G
CREAM TOPICAL ONCE
OUTPATIENT
Start: 2025-02-25 | End: 2025-02-25

## 2025-02-25 RX ORDER — SODIUM CHLOR/HYPOCHLOROUS ACID 0.033 %
SOLUTION, IRRIGATION IRRIGATION ONCE
OUTPATIENT
Start: 2025-02-25 | End: 2025-02-25

## 2025-02-25 RX ORDER — CLOBETASOL PROPIONATE 0.5 MG/G
OINTMENT TOPICAL ONCE
OUTPATIENT
Start: 2025-02-25 | End: 2025-02-25

## 2025-02-25 RX ORDER — LIDOCAINE 40 MG/G
CREAM TOPICAL ONCE
OUTPATIENT
Start: 2025-02-25 | End: 2025-02-25

## 2025-02-25 RX ORDER — MUPIROCIN 20 MG/G
OINTMENT TOPICAL ONCE
OUTPATIENT
Start: 2025-02-25 | End: 2025-02-25

## 2025-02-25 RX ORDER — LIDOCAINE HYDROCHLORIDE 20 MG/ML
JELLY TOPICAL ONCE
OUTPATIENT
Start: 2025-02-25 | End: 2025-02-25

## 2025-02-25 RX ORDER — GINSENG 100 MG
CAPSULE ORAL ONCE
OUTPATIENT
Start: 2025-02-25 | End: 2025-02-25

## 2025-02-25 RX ORDER — TRIAMCINOLONE ACETONIDE 1 MG/G
OINTMENT TOPICAL ONCE
OUTPATIENT
Start: 2025-02-25 | End: 2025-02-25

## 2025-02-25 RX ORDER — LIDOCAINE HYDROCHLORIDE 40 MG/ML
SOLUTION TOPICAL ONCE
OUTPATIENT
Start: 2025-02-25 | End: 2025-02-25

## 2025-02-25 RX ORDER — LIDOCAINE 40 MG/G
CREAM TOPICAL ONCE
Status: COMPLETED | OUTPATIENT
Start: 2025-02-25 | End: 2025-02-25

## 2025-02-25 RX ADMIN — LIDOCAINE: 40 CREAM TOPICAL at 15:31

## 2025-02-25 ASSESSMENT — PAIN SCALES - GENERAL: PAINLEVEL_OUTOF10: 0

## 2025-02-25 NOTE — PROGRESS NOTES
facility-administered medications on file prior to encounter.       REVIEW OF SYSTEMS  Review of Systems    Pertinent items are noted in HPI.    Objective:      BP (!) 157/84   Pulse 71   Temp 98 °F (36.7 °C) (Temporal)   Resp 18     Wt Readings from Last 3 Encounters:   12/10/24 134.9 kg (297 lb 6.4 oz)   09/13/24 (!) 142 kg (313 lb 0.9 oz)   08/29/24 (!) 142.1 kg (313 lb 4.4 oz)       PHYSICAL EXAM    General Appearance: in no acute distress  The previously noted ulceration on the right plantar midfoot has epithelialized.  Ulceration noted on the posterior aspect of the left heel and on the distal aspect of the left transmetatarsal amputation site.    Wound has fibrotic and nonviable tissue that extends down through and includes the subcutaneous tissue.  After debridement the wound has a granular base.  There is no surrounding erythema, edema, warmth or malodor noted.  The distal stump ulceration tracks about 1 cm proximally.     Assessment:      1. Nonhealing surgical wound, initial encounter         Procedure Note  Indications:  Based on my examination of this patient's wound(s)/ulcer(s) today, debridement is required to promote healing and evaluate the wound base.    Performed by: Kailee Olsen DPM    Consent obtained:  Yes    Time out taken:  Yes    Pain Control: Anesthetic  Anesthetic: 4% Lidocaine Cream       Debridement: Excisional Debridement    Using curette the wound(s)/ulcer(s) was/were debrided down through and including the removal of epidermis, dermis, and subcutaneous tissue.        Devitalized Tissue Debrided:  fibrin and slough    Pre Debridement Measurements:  Are located in the Wound/Ulcer Documentation Flow Sheet    Diabetic/Pressure/Non Pressure Ulcers only:  Ulcer: Diabetic ulcer, fat layer exposed     Wound/Ulcer #: 3 and 4    Post Debridement Measurements:  Wound/Ulcer Descriptions are Pre Debridement except measurements:    Wound 09/13/24 Heel Left #3 (Active)   Wound Image   02/04/25 1423

## 2025-02-25 NOTE — PATIENT INSTRUCTIONS
Wound Care Center Physician Orders and Discharge Instructions  [facility] [place] [facility]   [address]  Telephone: [phone] FAX [phone]    NAME:  Serafin Weaver  YOB: 1980  MEDICAL RECORD NUMBER:  [id number]  DATE: 2025    Return Appointment:  Return Appointment: With Kailee Olsen DPM  in  1 Week(s).  Schedule weekly for the rest of the month? Yes    No future appointments.       Skilled Nursing Facility: [facility]    Change dressing?: Yes    Wound Cleansin.9% normal saline    Darlin Wound Topical Treatments: Moisturizing lotion       **NEW ORDERS**  **NEW ORDERS**    Dressings:           Wound Location: Left Foot Distal   Primary Dressing to wound bed: Loosely pack with Iodoform    Cover and Secure with:  4X4 gauze pad and Bulky roll gauze     Change dressing: 3 times per week:Monday/Wednesday/Friday     Dressings:           Wound Location: Left Heel     Primary Dressing to wound bed: Collagen (I.e. Puracol)      Cover and Secure with:  4X4 gauze pad and Bulky roll gauze     Change dressing: 3 times per week:Monday/Wednesday/Friday    *Patient may participate in therapy with the following restrictions for off-Loading:   [x]Off Loading(Pressure Reduction) When: Walking, Sitting, and In Bed  Weight Bearing Status: Total Non-Weight bearing Left; and No Weight bearing restrictions Right;  Offloading devices: Post op shoe/ Surgical shoe: Right, Heel Protector Soft Boot: Left - DO NOT WALK IN WHILE WEARING, Walker, and Wheelchair  []No Offloading required    Dietary:   Important dietary reminders:  1. Increase Protein intake (i.e. Lean meats, fish, eggs, legumes, and yogurt)  2. No added salt  3. If diabetic, follow a diabetic diet and check glucose prior to meals or as instructed by your physician.    [] SNF: Please have dietician evaluate patient for nutritional needs    Dietary Supplements: [] Bertin  [] 30ml ProMod/ProStat []

## 2025-03-03 ENCOUNTER — TELEPHONE (OUTPATIENT)
Dept: WOUND CARE | Age: 45
End: 2025-03-03

## 2025-06-17 ENCOUNTER — HOSPITAL ENCOUNTER (EMERGENCY)
Age: 45
Discharge: HOME | End: 2025-06-18
Attending: EMERGENCY MEDICINE
Payer: COMMERCIAL

## 2025-06-17 ENCOUNTER — APPOINTMENT (OUTPATIENT)
Dept: MRI IMAGING | Age: 45
End: 2025-06-17
Payer: COMMERCIAL

## 2025-06-17 ENCOUNTER — APPOINTMENT (OUTPATIENT)
Dept: CT IMAGING | Age: 45
End: 2025-06-17
Payer: COMMERCIAL

## 2025-06-17 DIAGNOSIS — R20.0 NUMBNESS: Primary | ICD-10-CM

## 2025-06-17 LAB
ANION GAP SERPL CALCULATED.3IONS-SCNC: 17 MMOL/L (ref 3–16)
ANTI-XA UNFRAC HEPARIN: 0.25 IU/ML (ref 0.3–0.7)
BASOPHILS # BLD: 0 K/UL (ref 0–0.2)
BASOPHILS NFR BLD: 0.7 %
BUN SERPL-MCNC: 51 MG/DL (ref 7–20)
CALCIUM SERPL-MCNC: 9 MG/DL (ref 8.3–10.6)
CHLORIDE SERPL-SCNC: 90 MMOL/L (ref 99–110)
CO2 SERPL-SCNC: 21 MMOL/L (ref 21–32)
CREAT SERPL-MCNC: 10 MG/DL (ref 0.9–1.3)
DEPRECATED RDW RBC AUTO: 16.1 % (ref 12.4–15.4)
EOSINOPHIL # BLD: 0.2 K/UL (ref 0–0.6)
EOSINOPHIL NFR BLD: 2.7 %
GFR SERPLBLD CREATININE-BSD FMLA CKD-EPI: 6 ML/MIN/{1.73_M2}
GLUCOSE SERPL-MCNC: 178 MG/DL (ref 70–99)
HCT VFR BLD AUTO: 35 % (ref 40.5–52.5)
HGB BLD-MCNC: 11.6 G/DL (ref 13.5–17.5)
LYMPHOCYTES # BLD: 1.7 K/UL (ref 1–5.1)
LYMPHOCYTES NFR BLD: 29.4 %
MCH RBC QN AUTO: 31 PG (ref 26–34)
MCHC RBC AUTO-ENTMCNC: 33.1 G/DL (ref 31–36)
MCV RBC AUTO: 93.6 FL (ref 80–100)
MONOCYTES # BLD: 0.6 K/UL (ref 0–1.3)
MONOCYTES NFR BLD: 10 %
NEUTROPHILS # BLD: 3.4 K/UL (ref 1.7–7.7)
NEUTROPHILS NFR BLD: 57.2 %
PHOSPHATE SERPL-MCNC: 5.2 MG/DL (ref 2.5–4.9)
PLATELET # BLD AUTO: 129 K/UL (ref 135–450)
PMV BLD AUTO: 8.1 FL (ref 5–10.5)
POTASSIUM SERPL-SCNC: 5.1 MMOL/L (ref 3.5–5.1)
POTASSIUM SERPL-SCNC: 5.7 MMOL/L (ref 3.5–5.1)
RBC # BLD AUTO: 3.74 M/UL (ref 4.2–5.9)
SODIUM SERPL-SCNC: 128 MMOL/L (ref 136–145)
WBC # BLD AUTO: 5.9 K/UL (ref 4–11)

## 2025-06-17 PROCEDURE — 84100 ASSAY OF PHOSPHORUS: CPT

## 2025-06-17 PROCEDURE — 84132 ASSAY OF SERUM POTASSIUM: CPT

## 2025-06-17 PROCEDURE — 80048 BASIC METABOLIC PNL TOTAL CA: CPT

## 2025-06-17 PROCEDURE — 70551 MRI BRAIN STEM W/O DYE: CPT

## 2025-06-17 PROCEDURE — 85025 COMPLETE CBC W/AUTO DIFF WBC: CPT

## 2025-06-17 PROCEDURE — 99284 EMERGENCY DEPT VISIT MOD MDM: CPT

## 2025-06-17 PROCEDURE — 70450 CT HEAD/BRAIN W/O DYE: CPT

## 2025-06-17 PROCEDURE — 85520 HEPARIN ASSAY: CPT

## 2025-06-17 ASSESSMENT — PAIN - FUNCTIONAL ASSESSMENT: PAIN_FUNCTIONAL_ASSESSMENT: NONE - DENIES PAIN

## 2025-06-17 NOTE — ED TRIAGE NOTES
Pt reports R hand numbness for a few weeks. Pt denies weakness to R arm. Pt has dialysis fistula to R arm. Fistula was used today in dialysis without issue. Pt alert and oriented x4.

## 2025-06-17 NOTE — ED PROVIDER NOTES
THE [place]  EMERGENCY DEPARTMENT ENCOUNTER          ATTENDING PHYSICIAN NOTE       Date of evaluation: 6/17/2025      Assessment/ Medical Decion Making     MDM: Serafin Weaver is a 44 y.o. male with history of diabetes, stroke, end-stage renal disease on dialysis who presents for evaluation of left hand numbness.    Given that his symptoms are in the entirety of his left hand and not proximal to the wrist, overall suspicion for CNS cause is low.  Could be MS but believe peripheral cause is more likely.  His neurological exam is reassuring here and he is well outside of any window for any acute intervention should this be related to central process.    CT head is without evidence of intracranial hemorrhage.  MRI is without evidence of acute stroke.  Counseled about need for outpatient follow-up.    Labs obtained which were remarkable for hyponatremia, hyperkalemia though potassium is hemolyzed.  Since he has had dialysis today would expect potassium to be normal.  We discussed admission for correction of these electrolyte derangements and where he can have neuro evaluation for left hand numbness.  He politely declined stating he would rather go home and follow-up as an outpatient.  If repeat potassium not significantly elevated will discharge to home.  He is due for dialysis in 2 days and was instructed to call his nephrologist tomorrow to discuss his ED visit.    Medical Decision Making  Amount and/or Complexity of Data Reviewed  Labs: ordered.  Radiology: ordered.        Paola London MD  11:32 PM    Clinical Impression     1. Numbness        Disposition     DISPOSITION  06/17/2025 09:24:25 PM         Signed out to oncoming ED provider        Chief Complaint     Numbness (R hand)      History of Present Illness     Serafin Weaver is a 44 y.o. male with a past medical history inclusive of diabetes, stroke, ESRD on dialysis    He presents for evaluation of left hand numbness (of note, triage note

## 2025-06-18 VITALS
BODY MASS INDEX: 36.45 KG/M2 | OXYGEN SATURATION: 100 % | WEIGHT: 315 LBS | HEIGHT: 78 IN | TEMPERATURE: 97.9 F | RESPIRATION RATE: 18 BRPM | SYSTOLIC BLOOD PRESSURE: 170 MMHG | HEART RATE: 65 BPM | DIASTOLIC BLOOD PRESSURE: 78 MMHG

## 2025-06-18 ASSESSMENT — PAIN - FUNCTIONAL ASSESSMENT: PAIN_FUNCTIONAL_ASSESSMENT: NONE - DENIES PAIN

## 2025-06-18 NOTE — ED NOTES
MRI checklist done, MRI tech made aware. Patient changed into gown and taken by transporter to MRI     Nina Girard, JOSE  06/17/25 3358

## 2025-06-18 NOTE — ED PROVIDER NOTES
THE [facility]  EMERGENCY DEPARTMENT ENCOUNTER          EM ATTENDING NOTE     Date of evaluation: 6/17/2025    ADDENDUM:      Care of this patient was assumed from Dr. London.  The patient was seen for Numbness (R hand)  .  The patient's initial evaluation and plan have been discussed with the prior provider who initially evaluated the patient.  Nursing Notes, Past Medical Hx, Past Surgical Hx, Social Hx, Allergies, and Family Hx were all reviewed.    Diagnostic Results       RADIOLOGY:  MRI BRAIN WO CONTRAST   Final Result      1.  No acute intracranial findings.   2.  Mild chronic small vessel ischemic disease, unchanged from prior.      Electronically signed by Mark Srivastava MD      CT HEAD WO CONTRAST   Final Result      No acute intracranial hemorrhage or mass effect.      Electronically signed by Mark Srivastava MD          LABS:   Results for orders placed or performed during the hospital encounter of 06/17/25   CBC with Auto Differential   Result Value Ref Range    WBC 5.9 4.0 - 11.0 K/uL    RBC 3.74 (L) 4.20 - 5.90 M/uL    Hemoglobin 11.6 (L) 13.5 - 17.5 g/dL    Hematocrit 35.0 (L) 40.5 - 52.5 %    MCV 93.6 80.0 - 100.0 fL    MCH 31.0 26.0 - 34.0 pg    MCHC 33.1 31.0 - 36.0 g/dL    RDW 16.1 (H) 12.4 - 15.4 %    Platelets 129 (L) 135 - 450 K/uL    MPV 8.1 5.0 - 10.5 fL    Neutrophils % 57.2 %    Lymphocytes % 29.4 %    Monocytes % 10.0 %    Eosinophils % 2.7 %    Basophils % 0.7 %    Neutrophils Absolute 3.4 1.7 - 7.7 K/uL    Lymphocytes Absolute 1.7 1.0 - 5.1 K/uL    Monocytes Absolute 0.6 0.0 - 1.3 K/uL    Eosinophils Absolute 0.2 0.0 - 0.6 K/uL    Basophils Absolute 0.0 0.0 - 0.2 K/uL   Basic Metabolic Panel w/ Reflex to MG   Result Value Ref Range    Sodium 128 (L) 136 - 145 mmol/L    Potassium reflex Magnesium 5.7 (H) 3.5 - 5.1 mmol/L    Chloride 90 (L) 99 - 110 mmol/L    CO2 21 21 - 32 mmol/L    Anion Gap 17 (H) 3 - 16    Glucose 178 (H) 70 - 99 mg/dL    BUN 51 (H) 7 - 20 mg/dL    Creatinine 10.0 (HH) 0.9

## 2025-06-18 NOTE — ED NOTES
Patient reviewed and discharged by MD. After visit summary instructed to patient and verbalized understanding. Patient awaiting for transportation back at [facility] eta will be at 0230.     Nina Girard RN  06/18/25 0055

## 2025-06-18 NOTE — DISCHARGE INSTRUCTIONS
We saw you in the hospital for left hand numbness. Your MRI did not show signs of stroke today. You must go to dialysis tomorrow.    You need to see your regular doctor in 2-3 days to be checked. Please contact your regular doctor and schedule an appointment.    You should return to the emergency department if your symptoms worsen or do not resolve. In addition, return if:  - You have a fever (greater than 101 degrees)  - You have chest pain, shortness of breath, abdominal pain, or uncontrollable vomiting  - You are unable to eat or drink  - You pass out  - You have difficulty moving your arms or legs   - You have difficulty speaking or slurred speech  - Or you have any concern that you feel needs immediate physician evaluation.

## 2025-07-14 ENCOUNTER — APPOINTMENT (OUTPATIENT)
Dept: GENERAL RADIOLOGY | Age: 45
DRG: 425 | End: 2025-07-14
Payer: COMMERCIAL

## 2025-07-14 ENCOUNTER — HOSPITAL ENCOUNTER (INPATIENT)
Age: 45
LOS: 8 days | Discharge: NURSING, REHAB OR LONG-TERM CARE | DRG: 425 | End: 2025-07-22
Attending: EMERGENCY MEDICINE | Admitting: INTERNAL MEDICINE
Payer: COMMERCIAL

## 2025-07-14 DIAGNOSIS — I95.9 HYPOTENSION, UNSPECIFIED HYPOTENSION TYPE: ICD-10-CM

## 2025-07-14 DIAGNOSIS — E87.5 HYPERKALEMIA: Primary | ICD-10-CM

## 2025-07-14 DIAGNOSIS — N19 UREMIA: ICD-10-CM

## 2025-07-14 DIAGNOSIS — G89.29 OTHER CHRONIC PAIN: ICD-10-CM

## 2025-07-14 DIAGNOSIS — E87.20 METABOLIC ACIDOSIS: ICD-10-CM

## 2025-07-14 PROBLEM — N18.6 ESRD (END STAGE RENAL DISEASE) (HCC): Status: ACTIVE | Noted: 2025-07-14

## 2025-07-14 LAB
ALBUMIN SERPL-MCNC: 4.2 G/DL (ref 3.4–5)
ANION GAP SERPL CALCULATED.3IONS-SCNC: 36 MMOL/L (ref 3–16)
ANION GAP SERPL CALCULATED.3IONS-SCNC: 37 MMOL/L (ref 3–16)
BASE EXCESS BLDV CALC-SCNC: -12.1 MMOL/L (ref -2–3)
BASOPHILS # BLD: 0 K/UL (ref 0–0.2)
BASOPHILS NFR BLD: 0.2 %
BUN SERPL-MCNC: 197 MG/DL (ref 7–20)
BUN SERPL-MCNC: 206 MG/DL (ref 7–20)
CALCIUM SERPL-MCNC: 8.9 MG/DL (ref 8.3–10.6)
CALCIUM SERPL-MCNC: 9.2 MG/DL (ref 8.3–10.6)
CHLORIDE SERPL-SCNC: 82 MMOL/L (ref 99–110)
CHLORIDE SERPL-SCNC: 85 MMOL/L (ref 99–110)
CO2 BLDV-SCNC: 18 MMOL/L
CO2 SERPL-SCNC: 11 MMOL/L (ref 21–32)
CO2 SERPL-SCNC: 12 MMOL/L (ref 21–32)
COHGB MFR BLDV: 0.9 % (ref 0–1.5)
CREAT SERPL-MCNC: 35 MG/DL (ref 0.9–1.3)
CREAT SERPL-MCNC: 36.5 MG/DL (ref 0.9–1.3)
DEPRECATED RDW RBC AUTO: 14.4 % (ref 12.4–15.4)
DO-HGB MFR BLDV: 42.1 %
EKG ATRIAL RATE: 81 BPM
EKG DIAGNOSIS: NORMAL
EKG P AXIS: 56 DEGREES
EKG P-R INTERVAL: 176 MS
EKG Q-T INTERVAL: 360 MS
EKG QRS DURATION: 114 MS
EKG QTC CALCULATION (BAZETT): 418 MS
EKG R AXIS: 79 DEGREES
EKG T AXIS: 6 DEGREES
EKG VENTRICULAR RATE: 81 BPM
EOSINOPHIL # BLD: 0.1 K/UL (ref 0–0.6)
EOSINOPHIL NFR BLD: 0.5 %
GFR SERPLBLD CREATININE-BSD FMLA CKD-EPI: 1 ML/MIN/{1.73_M2}
GFR SERPLBLD CREATININE-BSD FMLA CKD-EPI: 1 ML/MIN/{1.73_M2}
GLUCOSE BLD-MCNC: 117 MG/DL (ref 70–99)
GLUCOSE BLD-MCNC: 176 MG/DL (ref 70–99)
GLUCOSE SERPL-MCNC: 91 MG/DL (ref 70–99)
GLUCOSE SERPL-MCNC: 95 MG/DL (ref 70–99)
HCO3 BLDV-SCNC: 16.6 MMOL/L (ref 24–28)
HCT VFR BLD AUTO: 44.2 % (ref 40.5–52.5)
HGB BLD-MCNC: 14.7 G/DL (ref 13.5–17.5)
INR PPP: 1.21 (ref 0.86–1.14)
LACTATE BLDV-SCNC: 0.7 MMOL/L (ref 0.4–2)
LACTATE BLDV-SCNC: 1 MMOL/L (ref 0.4–1.9)
LACTATE BLDV-SCNC: 1.1 MMOL/L (ref 0.4–1.9)
LYMPHOCYTES # BLD: 2.5 K/UL (ref 1–5.1)
LYMPHOCYTES NFR BLD: 17.5 %
MCH RBC QN AUTO: 30.8 PG (ref 26–34)
MCHC RBC AUTO-ENTMCNC: 33.2 G/DL (ref 31–36)
MCV RBC AUTO: 92.9 FL (ref 80–100)
METHGB MFR BLDV: 0.4 % (ref 0–1.5)
MONOCYTES # BLD: 0.9 K/UL (ref 0–1.3)
MONOCYTES NFR BLD: 6 %
NEUTROPHILS # BLD: 11 K/UL (ref 1.7–7.7)
NEUTROPHILS NFR BLD: 75.8 %
NT-PROBNP SERPL-MCNC: 465 PG/ML (ref 0–124)
PCO2 BLDV: 46.9 MMHG (ref 41–51)
PERFORMED ON: ABNORMAL
PERFORMED ON: ABNORMAL
PH BLDV: 7.16 [PH] (ref 7.35–7.45)
PHOSPHATE SERPL-MCNC: 16.9 MG/DL (ref 2.5–4.9)
PLATELET # BLD AUTO: 211 K/UL (ref 135–450)
PMV BLD AUTO: 8.7 FL (ref 5–10.5)
PO2 BLDV: 41.2 MMHG (ref 25–40)
POTASSIUM SERPL-SCNC: 8.4 MMOL/L (ref 3.5–5.1)
POTASSIUM SERPL-SCNC: 9.1 MMOL/L (ref 3.5–5.1)
POTASSIUM SERPL-SCNC: ABNORMAL MMOL/L (ref 3.5–5.1)
PROTHROMBIN TIME: 15.6 SEC (ref 12.1–14.9)
RBC # BLD AUTO: 4.75 M/UL (ref 4.2–5.9)
SAO2 % BLDV: 57 %
SODIUM SERPL-SCNC: 130 MMOL/L (ref 136–145)
SODIUM SERPL-SCNC: 133 MMOL/L (ref 136–145)
TROPONIN, HIGH SENSITIVITY: 115 NG/L (ref 0–22)
TROPONIN, HIGH SENSITIVITY: 117 NG/L (ref 0–22)
WBC # BLD AUTO: 14.6 K/UL (ref 4–11)

## 2025-07-14 PROCEDURE — 82803 BLOOD GASES ANY COMBINATION: CPT

## 2025-07-14 PROCEDURE — 2580000003 HC RX 258

## 2025-07-14 PROCEDURE — 6370000000 HC RX 637 (ALT 250 FOR IP)

## 2025-07-14 PROCEDURE — 84484 ASSAY OF TROPONIN QUANT: CPT

## 2025-07-14 PROCEDURE — 87040 BLOOD CULTURE FOR BACTERIA: CPT

## 2025-07-14 PROCEDURE — P9047 ALBUMIN (HUMAN), 25%, 50ML: HCPCS

## 2025-07-14 PROCEDURE — 71045 X-RAY EXAM CHEST 1 VIEW: CPT

## 2025-07-14 PROCEDURE — 2580000003 HC RX 258: Performed by: EMERGENCY MEDICINE

## 2025-07-14 PROCEDURE — 83880 ASSAY OF NATRIURETIC PEPTIDE: CPT

## 2025-07-14 PROCEDURE — 83735 ASSAY OF MAGNESIUM: CPT

## 2025-07-14 PROCEDURE — 80069 RENAL FUNCTION PANEL: CPT

## 2025-07-14 PROCEDURE — 6360000002 HC RX W HCPCS

## 2025-07-14 PROCEDURE — 2000000000 HC ICU R&B

## 2025-07-14 PROCEDURE — 96375 TX/PRO/DX INJ NEW DRUG ADDON: CPT

## 2025-07-14 PROCEDURE — 2500000003 HC RX 250 WO HCPCS

## 2025-07-14 PROCEDURE — 90935 HEMODIALYSIS ONE EVALUATION: CPT

## 2025-07-14 PROCEDURE — 94761 N-INVAS EAR/PLS OXIMETRY MLT: CPT

## 2025-07-14 PROCEDURE — 96365 THER/PROPH/DIAG IV INF INIT: CPT

## 2025-07-14 PROCEDURE — 85025 COMPLETE CBC W/AUTO DIFF WBC: CPT

## 2025-07-14 PROCEDURE — 6360000002 HC RX W HCPCS: Performed by: EMERGENCY MEDICINE

## 2025-07-14 PROCEDURE — 6370000000 HC RX 637 (ALT 250 FOR IP): Performed by: STUDENT IN AN ORGANIZED HEALTH CARE EDUCATION/TRAINING PROGRAM

## 2025-07-14 PROCEDURE — 93005 ELECTROCARDIOGRAM TRACING: CPT | Performed by: EMERGENCY MEDICINE

## 2025-07-14 PROCEDURE — 83605 ASSAY OF LACTIC ACID: CPT

## 2025-07-14 PROCEDURE — 36415 COLL VENOUS BLD VENIPUNCTURE: CPT

## 2025-07-14 PROCEDURE — 6370000000 HC RX 637 (ALT 250 FOR IP): Performed by: EMERGENCY MEDICINE

## 2025-07-14 PROCEDURE — 85610 PROTHROMBIN TIME: CPT

## 2025-07-14 PROCEDURE — 1200000000 HC SEMI PRIVATE

## 2025-07-14 PROCEDURE — 2580000003 HC RX 258: Performed by: INTERNAL MEDICINE

## 2025-07-14 PROCEDURE — 84132 ASSAY OF SERUM POTASSIUM: CPT

## 2025-07-14 PROCEDURE — 99285 EMERGENCY DEPT VISIT HI MDM: CPT

## 2025-07-14 PROCEDURE — 94640 AIRWAY INHALATION TREATMENT: CPT

## 2025-07-14 RX ORDER — HEPARIN SODIUM 1000 [USP'U]/ML
INJECTION, SOLUTION INTRAVENOUS; SUBCUTANEOUS PRN
Status: DISCONTINUED | OUTPATIENT
Start: 2025-07-14 | End: 2025-07-20

## 2025-07-14 RX ORDER — CALCIUM GLUCONATE 20 MG/ML
1000 INJECTION, SOLUTION INTRAVENOUS ONCE
Status: COMPLETED | OUTPATIENT
Start: 2025-07-14 | End: 2025-07-14

## 2025-07-14 RX ORDER — ALBUTEROL SULFATE 0.83 MG/ML
10 SOLUTION RESPIRATORY (INHALATION) ONCE
Status: COMPLETED | OUTPATIENT
Start: 2025-07-14 | End: 2025-07-14

## 2025-07-14 RX ORDER — DEXTROSE MONOHYDRATE 100 MG/ML
INJECTION, SOLUTION INTRAVENOUS CONTINUOUS PRN
Status: DISCONTINUED | OUTPATIENT
Start: 2025-07-14 | End: 2025-07-22 | Stop reason: HOSPADM

## 2025-07-14 RX ORDER — POLYETHYLENE GLYCOL 3350 17 G/17G
17 POWDER, FOR SOLUTION ORAL DAILY PRN
COMMUNITY

## 2025-07-14 RX ORDER — 0.9 % SODIUM CHLORIDE 0.9 %
100 INTRAVENOUS SOLUTION INTRAVENOUS PRN
Status: DISCONTINUED | OUTPATIENT
Start: 2025-07-14 | End: 2025-07-22 | Stop reason: HOSPADM

## 2025-07-14 RX ORDER — HEPARIN SODIUM 1000 [USP'U]/ML
INJECTION, SOLUTION INTRAVENOUS; SUBCUTANEOUS PRN
Status: DISCONTINUED | OUTPATIENT
Start: 2025-07-14 | End: 2025-07-17 | Stop reason: SDUPTHER

## 2025-07-14 RX ORDER — CALCIUM CHLORIDE, MAGNESIUM CHLORIDE, DEXTROSE MONOHYDRATE, LACTIC ACID, SODIUM CHLORIDE, SODIUM BICARBONATE AND POTASSIUM CHLORIDE 5.15; 2.03; 22; 5.4; 6.46; 3.09; .157 G/L; G/L; G/L; G/L; G/L; G/L; G/L
INJECTION INTRAVENOUS CONTINUOUS
Status: DISCONTINUED | OUTPATIENT
Start: 2025-07-14 | End: 2025-07-16

## 2025-07-14 RX ORDER — ALBUMIN (HUMAN) 12.5 G/50ML
50 SOLUTION INTRAVENOUS ONCE
Status: COMPLETED | OUTPATIENT
Start: 2025-07-14 | End: 2025-07-14

## 2025-07-14 RX ORDER — MIDODRINE HYDROCHLORIDE 5 MG/1
5 TABLET ORAL 3 TIMES DAILY PRN
Status: DISCONTINUED | OUTPATIENT
Start: 2025-07-14 | End: 2025-07-15

## 2025-07-14 RX ORDER — CALCIUM GLUCONATE 20 MG/ML
2000 INJECTION, SOLUTION INTRAVENOUS ONCE
Status: DISCONTINUED | OUTPATIENT
Start: 2025-07-14 | End: 2025-07-14 | Stop reason: SDUPTHER

## 2025-07-14 RX ORDER — MICONAZOLE NITRATE 2 G/100G
CREAM TOPICAL 2 TIMES DAILY
Status: DISCONTINUED | OUTPATIENT
Start: 2025-07-14 | End: 2025-07-22 | Stop reason: HOSPADM

## 2025-07-14 RX ORDER — HEPARIN SODIUM 1000 [USP'U]/ML
1000 INJECTION, SOLUTION INTRAVENOUS; SUBCUTANEOUS ONCE
Status: DISCONTINUED | OUTPATIENT
Start: 2025-07-14 | End: 2025-07-14

## 2025-07-14 RX ORDER — GLUCAGON 1 MG/ML
1 KIT INJECTION PRN
Status: DISCONTINUED | OUTPATIENT
Start: 2025-07-14 | End: 2025-07-22 | Stop reason: HOSPADM

## 2025-07-14 RX ORDER — PREGABALIN 75 MG/1
75 CAPSULE ORAL DAILY
Status: ON HOLD | COMMUNITY
End: 2025-07-22

## 2025-07-14 RX ORDER — HEPARIN SODIUM 1000 [USP'U]/ML
10000 INJECTION, SOLUTION INTRAVENOUS; SUBCUTANEOUS ONCE
Status: DISCONTINUED | OUTPATIENT
Start: 2025-07-14 | End: 2025-07-22 | Stop reason: HOSPADM

## 2025-07-14 RX ORDER — PHENYLEPHRINE HCL IN 0.9% NACL 1 MG/10 ML
SYRINGE (ML) INTRAVENOUS
Status: DISPENSED
Start: 2025-07-14 | End: 2025-07-15

## 2025-07-14 RX ORDER — MIDODRINE HYDROCHLORIDE 5 MG/1
10 TABLET ORAL
Status: DISCONTINUED | OUTPATIENT
Start: 2025-07-15 | End: 2025-07-14

## 2025-07-14 RX ORDER — MIDODRINE HYDROCHLORIDE 5 MG/1
5 TABLET ORAL
Status: DISCONTINUED | OUTPATIENT
Start: 2025-07-14 | End: 2025-07-15

## 2025-07-14 RX ORDER — TRAMADOL HYDROCHLORIDE 50 MG/1
25 TABLET ORAL EVERY 6 HOURS PRN
Status: ON HOLD | COMMUNITY
End: 2025-07-22

## 2025-07-14 RX ORDER — POTASSIUM CHLORIDE 29.8 MG/ML
20 INJECTION INTRAVENOUS PRN
Status: DISCONTINUED | OUTPATIENT
Start: 2025-07-14 | End: 2025-07-20

## 2025-07-14 RX ORDER — MAGNESIUM SULFATE 1 G/100ML
1000 INJECTION INTRAVENOUS PRN
Status: DISCONTINUED | OUTPATIENT
Start: 2025-07-14 | End: 2025-07-20

## 2025-07-14 RX ORDER — MIDODRINE HYDROCHLORIDE 5 MG/1
10 TABLET ORAL ONCE
Status: COMPLETED | OUTPATIENT
Start: 2025-07-14 | End: 2025-07-14

## 2025-07-14 RX ORDER — CALCIUM GLUCONATE 98 MG/ML
2000 INJECTION, SOLUTION INTRAVENOUS ONCE
Status: COMPLETED | OUTPATIENT
Start: 2025-07-14 | End: 2025-07-14

## 2025-07-14 RX ORDER — CALCIUM GLUCONATE 20 MG/ML
2000 INJECTION, SOLUTION INTRAVENOUS PRN
Status: DISCONTINUED | OUTPATIENT
Start: 2025-07-14 | End: 2025-07-20

## 2025-07-14 RX ORDER — CALCIUM GLUCONATE 20 MG/ML
1000 INJECTION, SOLUTION INTRAVENOUS PRN
Status: DISCONTINUED | OUTPATIENT
Start: 2025-07-14 | End: 2025-07-20

## 2025-07-14 RX ADMIN — MIDODRINE HYDROCHLORIDE 10 MG: 5 TABLET ORAL at 22:26

## 2025-07-14 RX ADMIN — MICONAZOLE NITRATE: 20 CREAM TOPICAL at 20:55

## 2025-07-14 RX ADMIN — DEXTROSE MONOHYDRATE 250 ML: 100 INJECTION, SOLUTION INTRAVENOUS at 20:21

## 2025-07-14 RX ADMIN — ALBUTEROL SULFATE 10 MG: 2.5 SOLUTION RESPIRATORY (INHALATION) at 13:04

## 2025-07-14 RX ADMIN — INSULIN HUMAN 5 UNITS: 100 INJECTION, SOLUTION PARENTERAL at 14:40

## 2025-07-14 RX ADMIN — SODIUM CHLORIDE 100 ML: 0.9 INJECTION, SOLUTION INTRAVENOUS at 19:41

## 2025-07-14 RX ADMIN — MEROPENEM 1000 MG: 1 INJECTION INTRAVENOUS at 14:56

## 2025-07-14 RX ADMIN — INSULIN HUMAN 5 UNITS: 100 INJECTION, SOLUTION PARENTERAL at 20:17

## 2025-07-14 RX ADMIN — DEXTROSE 250 ML: 10 SOLUTION INTRAVENOUS at 14:09

## 2025-07-14 RX ADMIN — NOREPINEPHRINE BITARTRATE 5 MCG/MIN: 0.03 INJECTION, SOLUTION INTRAVENOUS at 21:23

## 2025-07-14 RX ADMIN — ALBUMIN (HUMAN) 50 G: 0.25 INJECTION, SOLUTION INTRAVENOUS at 21:22

## 2025-07-14 RX ADMIN — CALCIUM GLUCONATE 2000 MG: 98 INJECTION, SOLUTION INTRAVENOUS at 13:48

## 2025-07-14 RX ADMIN — CALCIUM GLUCONATE 1000 MG: 20 INJECTION, SOLUTION INTRAVENOUS at 19:16

## 2025-07-14 ASSESSMENT — PAIN SCALES - GENERAL: PAINLEVEL_OUTOF10: 0

## 2025-07-14 ASSESSMENT — PAIN - FUNCTIONAL ASSESSMENT: PAIN_FUNCTIONAL_ASSESSMENT: 0-10

## 2025-07-14 NOTE — ED NOTES
Changed pt into gown, brief and cheikh pad. Provided pt fresh blankets. Pt was incontinent of stool. PT has scattered bed ulcers in her lester area.      Anita Hahn, RN  07/14/25 1250

## 2025-07-14 NOTE — ED NOTES
RN called dialysis per MD request. Dialysis department did not answer.      Anita Hahn, RN  07/14/25 1917

## 2025-07-14 NOTE — ED PROVIDER NOTES
MEDICATIONS:  New Prescriptions    No medications on file          Paola London MD  07/14/25 2132

## 2025-07-14 NOTE — H&P
ICU HISTORY & PHYSICAL       Admit Date:  7/14/2025                            Hospital Day: 1  ICU Day:         CC: Fatigue and weakness     History obtained from:  chart review and the patient    SUBJECTIVE   HPI:  Mr. Serafin Weaver is a 45 y.o. male with a medical hx significant for DM type 2, Diabetic polyneuropathy, MRSA, ESRD on dialysis, otherwise as listed in the MHx table below, who presented from nephrology to the ED for emergent hemodialysis, Hyperkalemia of 9.1 and Peaking T-waves.  BP is a little low and patient is slightly fatigued and slow in obtaining history.  Patient reports he usually receives hemodialysis every Monday, Tuesday, Wednesday, Thursday, Friday.  Patient reported his last dialysis was last Monday 1 week ago. Patient reports he did not attend dialysis due to pain/wound on his groin for the past week. Likely intertrigo  Patient denies any fever, chills. Night sweats, chest pain, shortness of breath, faintness, palpitations.  Patient reports main concern is the groin pain and overall fatigue.  Patient is somewhat difficult to obtain history, patient would sometimes trail off and questions often had to be repeated.         Past Medical History:   Diagnosis Date    Amputation of third toe, left, traumatic     Anesthesia complication     has history of being aggitated and \"fights\"    Blood circulation, collateral     Cellulitis     Depression     Diabetic polyneuropathy associated with type 2 diabetes mellitus (HCC) 02/06/2019    Hypertension     MRSA infection 02/04/2019    foot wound    Seizures ([facility])     Type II or unspecified type diabetes mellitus without mention of complication, not stated as uncontrolled     Unspecified cerebral artery occlusion with cerebral infarction     pt states at 14 years of age       Past Surgical History:   Procedure Laterality Date    ACHILLES TENDON SURGERY Right 7/24/2024    . performed by Kailee Olsne DPM at [facility] OR    AMPUTATION      left 3rd

## 2025-07-14 NOTE — ED NOTES
ICU HUC called to get update on patient transfer - no answer.  ICU charge RN called to get update on delay of patient transfer.      Raj Odonnell, JOSE  07/14/25 1326

## 2025-07-14 NOTE — ED NOTES
RN called house supervisor regarding bed issue with ICU. RN expressed concerned over patient staying in ED while RN has 3 other patients. House supervisor aware of RN concerns, house supervisor to call Charge ICU.     Anita Hahn RN  07/14/25 6825

## 2025-07-14 NOTE — CONSULTS
Ph: [phone], Fax: [phone]           [facility]               Reason for admission:                 Missed hemodialysis    Brief Summary :     Serafin Weaver is being seen by nephrology for ESRD on hemodialysis.      Interval History and plan:      Blood pressure is relatively low.  He is confused and cannot give me any reliable history.  Does not require oxygen.    Plan:    I will arrange for dialysis today.  Will do daily small treatments to prevent dialysis discussed syndrome.  Continue renal diet.  Start phosphorus binders.  Hold Procrit as hemoglobin is greater than 10.                   Assessment :          1.  ESRD:  Will plan HD per schedule.  He gets 5 days a week dialysis nursing home but refuses most of the time.  He came with severe azotemia and will do daily small treatments to prevent dialysis disequilibrium syndrome.    Access: AV fistula with good bruit and thrill.  Daily weights  Fluid restriction: 1 L.  Nephrocap 1 tab PO daily    2.  Anemia:  Erythropoetin dose: I will continue Procrit with dialysis to keep hemoglobin between 10-11.    3.  Osteodystrophy:  Phosphate Binder: I will check PTH, vitamin D and renal panel.  Continue phosphorus binders, renal diet and daily dialysis.    4.  Severe hyperkalemia, anion gap acidosis: Will arrange for urgent dialysis patient was told that he is at higher mortality risk.  He was given medical management in the ER.             [facility] would like to thank Paola London MD   for opportunity to serve this patient      Please call with questions at-   24 Hrs Answering service [phone] or  7 am- 5 pm via Perfect serve or cell phone  Dr.Muhammad Rizwana Hernandez MD       HPI :     Serafin Weaver is a 45 y.o. male presented to   the hospital on 7/14/2025 with missed hemodialysis.    He has past medical history of type 2 diabetes with complications including neuropathy, peripheral vascular disease and ESRD.  He

## 2025-07-14 NOTE — ED NOTES
Charge ICU called regarding patient transfer status. Charge ICU told this RN room not ready, bed coming from via transport. Rn suggest to take pt in ED [facility]. Charge ICU stated no.      Anita Hahn RN  07/14/25 1925

## 2025-07-14 NOTE — ED NOTES
PT difficult stick. Attempted x1 PIV and x1 UGPIV. Nephro and ED MD aware.      Anita Hahn, RN  07/14/25 6706

## 2025-07-14 NOTE — ED NOTES
Clinical Pharmacy Note  Medication History     Encounter Date: 7/14/2025 2:14 PM    List of current medications patient is taking is complete. Home Medication list in Epic updated to reflect changes noted below.    Source of information: Transfer documents provided by patient's nursing facility ([facility])    Patient's home pharmacy:   [facility] [place], [facility] - [place], [place] - [address] - P [phone] - F [phone]  [address] [address]  Phone: [phone] Fax: [phone]     Changes made to medication list:   Medications removed: (include reason, ex: therapy completed, patient no longer taking, etc.)  Fluoxetine - no longer taking per nursing facility documentation  Nutritional supplement - no longer taking per nursing facility documentation  Saccharomyces probiotic supplement - no longer taking per nursing facility documentation  Medications added:   Natural balance ophth solution  Polyethylene glycol powder  Tramadol  Medication doses adjusted:   Midodrine - dose adjusted to 5 mg PO TID PRN  Sevelamer carbonate - dose adjusted to 1 tablet (800 mg) PO TID WC  Other notes:   None at this time    Updated Prior to Admission Medication List (as of 7/14/2025 2:14 PM):   Current Outpatient Medications   Medication Instructions    acetaminophen (TYLENOL) 650 mg, EVERY 6 HOURS PRN    apixaban (ELIQUIS) 2.5 mg, DAILY    aspirin 81 mg, Oral, DAILY    atorvastatin (LIPITOR) 80 mg, EVERY BEDTIME    carvedilol (COREG) 6.25 mg, Oral, 2 TIMES DAILY    clopidogrel (PLAVIX) 75 mg, Oral, DAILY    Dextran 70-Hypromellose (NATURAL BALANCE TEARS OP) 1 drop, Both Eyes, EVERY 4 HOURS PRN    midodrine (PROAMATINE) 5 mg, Oral, 3 TIMES DAILY WITH MEALS PRN    polyethylene glycol (GLYCOLAX) 17 g, DAILY PRN    pregabalin (LYRICA) 75 mg, Oral, DAILY    sevelamer (RENVELA) 800 mg, Oral, 3 TIMES DAILY WITH MEALS    sodium zirconium cyclosilicate (LOKELMA) 10 g, THREE TIMES WEEKLY    traMADol

## 2025-07-15 PROBLEM — R57.9 SHOCK (HCC): Status: ACTIVE | Noted: 2025-07-15

## 2025-07-15 PROBLEM — E87.20 METABOLIC ACIDOSIS: Status: ACTIVE | Noted: 2025-07-15

## 2025-07-15 PROBLEM — N19 UREMIA: Status: ACTIVE | Noted: 2025-07-15

## 2025-07-15 LAB
ALBUMIN SERPL-MCNC: 4.1 G/DL (ref 3.4–5)
ALBUMIN SERPL-MCNC: 4.4 G/DL (ref 3.4–5)
ALBUMIN SERPL-MCNC: 4.5 G/DL (ref 3.4–5)
ANION GAP SERPL CALCULATED.3IONS-SCNC: 18 MMOL/L (ref 3–16)
ANION GAP SERPL CALCULATED.3IONS-SCNC: 26 MMOL/L (ref 3–16)
ANION GAP SERPL CALCULATED.3IONS-SCNC: 31 MMOL/L (ref 3–16)
BASOPHILS # BLD: 0 K/UL (ref 0–0.2)
BASOPHILS # BLD: 0 K/UL (ref 0–0.2)
BASOPHILS NFR BLD: 0.2 %
BASOPHILS NFR BLD: 0.3 %
BUN SERPL-MCNC: 106 MG/DL (ref 7–20)
BUN SERPL-MCNC: 152 MG/DL (ref 7–20)
BUN SERPL-MCNC: 66 MG/DL (ref 7–20)
CA-I BLD-SCNC: 1.04 MMOL/L (ref 1.12–1.32)
CA-I BLD-SCNC: 1.15 MMOL/L (ref 1.12–1.32)
CA-I BLD-SCNC: 1.16 MMOL/L (ref 1.12–1.32)
CALCIUM SERPL-MCNC: 10.5 MG/DL (ref 8.3–10.6)
CALCIUM SERPL-MCNC: 9.5 MG/DL (ref 8.3–10.6)
CALCIUM SERPL-MCNC: 9.9 MG/DL (ref 8.3–10.6)
CHLORIDE SERPL-SCNC: 89 MMOL/L (ref 99–110)
CHLORIDE SERPL-SCNC: 93 MMOL/L (ref 99–110)
CHLORIDE SERPL-SCNC: 97 MMOL/L (ref 99–110)
CO2 SERPL-SCNC: 13 MMOL/L (ref 21–32)
CO2 SERPL-SCNC: 15 MMOL/L (ref 21–32)
CO2 SERPL-SCNC: 18 MMOL/L (ref 21–32)
CREAT SERPL-MCNC: 10.3 MG/DL (ref 0.9–1.3)
CREAT SERPL-MCNC: 15.8 MG/DL (ref 0.9–1.3)
CREAT SERPL-MCNC: 23.8 MG/DL (ref 0.9–1.3)
DEPRECATED RDW RBC AUTO: 14.1 % (ref 12.4–15.4)
DEPRECATED RDW RBC AUTO: 14.4 % (ref 12.4–15.4)
EKG ATRIAL RATE: 95 BPM
EKG DIAGNOSIS: NORMAL
EKG P AXIS: 54 DEGREES
EKG P-R INTERVAL: 156 MS
EKG Q-T INTERVAL: 314 MS
EKG QRS DURATION: 90 MS
EKG QTC CALCULATION (BAZETT): 394 MS
EKG R AXIS: 49 DEGREES
EKG T AXIS: 62 DEGREES
EKG VENTRICULAR RATE: 95 BPM
EOSINOPHIL # BLD: 0.1 K/UL (ref 0–0.6)
EOSINOPHIL # BLD: 0.1 K/UL (ref 0–0.6)
EOSINOPHIL NFR BLD: 0.5 %
EOSINOPHIL NFR BLD: 0.5 %
GFR SERPLBLD CREATININE-BSD FMLA CKD-EPI: 2 ML/MIN/{1.73_M2}
GFR SERPLBLD CREATININE-BSD FMLA CKD-EPI: 3 ML/MIN/{1.73_M2}
GFR SERPLBLD CREATININE-BSD FMLA CKD-EPI: 6 ML/MIN/{1.73_M2}
GLUCOSE BLD-MCNC: 75 MG/DL (ref 70–99)
GLUCOSE BLD-MCNC: 77 MG/DL (ref 70–99)
GLUCOSE SERPL-MCNC: 198 MG/DL (ref 70–99)
GLUCOSE SERPL-MCNC: 79 MG/DL (ref 70–99)
GLUCOSE SERPL-MCNC: 82 MG/DL (ref 70–99)
HCT VFR BLD AUTO: 40.9 % (ref 40.5–52.5)
HCT VFR BLD AUTO: 40.9 % (ref 40.5–52.5)
HGB BLD-MCNC: 13.2 G/DL (ref 13.5–17.5)
HGB BLD-MCNC: 13.4 G/DL (ref 13.5–17.5)
LYMPHOCYTES # BLD: 2.8 K/UL (ref 1–5.1)
LYMPHOCYTES # BLD: 3.7 K/UL (ref 1–5.1)
LYMPHOCYTES NFR BLD: 19.9 %
LYMPHOCYTES NFR BLD: 20.3 %
MAGNESIUM SERPL-MCNC: 2.11 MG/DL (ref 1.8–2.4)
MAGNESIUM SERPL-MCNC: 2.37 MG/DL (ref 1.8–2.4)
MAGNESIUM SERPL-MCNC: 2.84 MG/DL (ref 1.8–2.4)
MAGNESIUM SERPL-MCNC: 3.24 MG/DL (ref 1.8–2.4)
MCH RBC QN AUTO: 30 PG (ref 26–34)
MCH RBC QN AUTO: 30.6 PG (ref 26–34)
MCHC RBC AUTO-ENTMCNC: 32.4 G/DL (ref 31–36)
MCHC RBC AUTO-ENTMCNC: 32.8 G/DL (ref 31–36)
MCV RBC AUTO: 92.7 FL (ref 80–100)
MCV RBC AUTO: 93.1 FL (ref 80–100)
MONOCYTES # BLD: 0.8 K/UL (ref 0–1.3)
MONOCYTES # BLD: 1.4 K/UL (ref 0–1.3)
MONOCYTES NFR BLD: 5.9 %
MONOCYTES NFR BLD: 7.6 %
NEUTROPHILS # BLD: 10 K/UL (ref 1.7–7.7)
NEUTROPHILS # BLD: 13.2 K/UL (ref 1.7–7.7)
NEUTROPHILS NFR BLD: 71.7 %
NEUTROPHILS NFR BLD: 73.1 %
PATH INTERP BLD-IMP: NO
PERFORMED ON: NORMAL
PERFORMED ON: NORMAL
PH VENOUS: 7.24 (ref 7.35–7.45)
PH VENOUS: 7.35 (ref 7.35–7.45)
PH VENOUS: 7.38 (ref 7.35–7.45)
PHOSPHATE SERPL-MCNC: 10.8 MG/DL (ref 2.5–4.9)
PHOSPHATE SERPL-MCNC: 5.2 MG/DL (ref 2.5–4.9)
PHOSPHATE SERPL-MCNC: 5.9 MG/DL (ref 2.5–4.9)
PLATELET # BLD AUTO: 182 K/UL (ref 135–450)
PLATELET # BLD AUTO: 207 K/UL (ref 135–450)
PLATELET BLD QL SMEAR: ADEQUATE
PMV BLD AUTO: 8.3 FL (ref 5–10.5)
PMV BLD AUTO: 8.3 FL (ref 5–10.5)
POTASSIUM SERPL-SCNC: 4.3 MMOL/L (ref 3.5–5.1)
POTASSIUM SERPL-SCNC: 5.3 MMOL/L (ref 3.5–5.1)
POTASSIUM SERPL-SCNC: 6.7 MMOL/L (ref 3.5–5.1)
RBC # BLD AUTO: 4.39 M/UL (ref 4.2–5.9)
RBC # BLD AUTO: 4.41 M/UL (ref 4.2–5.9)
SLIDE REVIEW: ABNORMAL
SODIUM SERPL-SCNC: 133 MMOL/L (ref 136–145)
SODIUM SERPL-SCNC: 133 MMOL/L (ref 136–145)
SODIUM SERPL-SCNC: 134 MMOL/L (ref 136–145)
WBC # BLD AUTO: 13.6 K/UL (ref 4–11)
WBC # BLD AUTO: 18.5 K/UL (ref 4–11)

## 2025-07-15 PROCEDURE — 02HV33Z INSERTION OF INFUSION DEVICE INTO SUPERIOR VENA CAVA, PERCUTANEOUS APPROACH: ICD-10-PCS | Performed by: HOSPITALIST

## 2025-07-15 PROCEDURE — 90945 DIALYSIS ONE EVALUATION: CPT

## 2025-07-15 PROCEDURE — 3E043XZ INTRODUCTION OF VASOPRESSOR INTO CENTRAL VEIN, PERCUTANEOUS APPROACH: ICD-10-PCS | Performed by: INTERNAL MEDICINE

## 2025-07-15 PROCEDURE — 99291 CRITICAL CARE FIRST HOUR: CPT | Performed by: INTERNAL MEDICINE

## 2025-07-15 PROCEDURE — 2500000003 HC RX 250 WO HCPCS

## 2025-07-15 PROCEDURE — 06HY33Z INSERTION OF INFUSION DEVICE INTO LOWER VEIN, PERCUTANEOUS APPROACH: ICD-10-PCS | Performed by: HOSPITALIST

## 2025-07-15 PROCEDURE — 03HY32Z INSERTION OF MONITORING DEVICE INTO UPPER ARTERY, PERCUTANEOUS APPROACH: ICD-10-PCS

## 2025-07-15 PROCEDURE — 5A1D90Z PERFORMANCE OF URINARY FILTRATION, CONTINUOUS, GREATER THAN 18 HOURS PER DAY: ICD-10-PCS | Performed by: INTERNAL MEDICINE

## 2025-07-15 PROCEDURE — 6370000000 HC RX 637 (ALT 250 FOR IP)

## 2025-07-15 PROCEDURE — 2000000000 HC ICU R&B

## 2025-07-15 PROCEDURE — 85025 COMPLETE CBC W/AUTO DIFF WBC: CPT

## 2025-07-15 PROCEDURE — 6360000002 HC RX W HCPCS: Performed by: INTERNAL MEDICINE

## 2025-07-15 PROCEDURE — 83036 HEMOGLOBIN GLYCOSYLATED A1C: CPT

## 2025-07-15 PROCEDURE — 37799 UNLISTED PX VASCULAR SURGERY: CPT

## 2025-07-15 PROCEDURE — 82330 ASSAY OF CALCIUM: CPT

## 2025-07-15 PROCEDURE — 36620 INSERTION CATHETER ARTERY: CPT

## 2025-07-15 PROCEDURE — 93005 ELECTROCARDIOGRAM TRACING: CPT

## 2025-07-15 PROCEDURE — 2580000003 HC RX 258: Performed by: INTERNAL MEDICINE

## 2025-07-15 PROCEDURE — 83735 ASSAY OF MAGNESIUM: CPT

## 2025-07-15 PROCEDURE — 6370000000 HC RX 637 (ALT 250 FOR IP): Performed by: INTERNAL MEDICINE

## 2025-07-15 PROCEDURE — 36556 INSERT NON-TUNNEL CV CATH: CPT

## 2025-07-15 PROCEDURE — 80069 RENAL FUNCTION PANEL: CPT

## 2025-07-15 PROCEDURE — 93010 ELECTROCARDIOGRAM REPORT: CPT | Performed by: INTERNAL MEDICINE

## 2025-07-15 PROCEDURE — 36415 COLL VENOUS BLD VENIPUNCTURE: CPT

## 2025-07-15 RX ORDER — POLYETHYLENE GLYCOL 3350 17 G/17G
17 POWDER, FOR SOLUTION ORAL DAILY PRN
Status: DISCONTINUED | OUTPATIENT
Start: 2025-07-15 | End: 2025-07-17

## 2025-07-15 RX ORDER — MIDODRINE HYDROCHLORIDE 5 MG/1
5 TABLET ORAL 3 TIMES DAILY PRN
Status: DISCONTINUED | OUTPATIENT
Start: 2025-07-15 | End: 2025-07-22 | Stop reason: HOSPADM

## 2025-07-15 RX ORDER — ACETAMINOPHEN 325 MG/1
650 TABLET ORAL EVERY 6 HOURS PRN
Status: DISCONTINUED | OUTPATIENT
Start: 2025-07-15 | End: 2025-07-22 | Stop reason: HOSPADM

## 2025-07-15 RX ORDER — ATORVASTATIN CALCIUM 80 MG/1
80 TABLET, FILM COATED ORAL
Status: DISCONTINUED | OUTPATIENT
Start: 2025-07-15 | End: 2025-07-22 | Stop reason: HOSPADM

## 2025-07-15 RX ORDER — PREGABALIN 75 MG/1
75 CAPSULE ORAL DAILY
Status: DISCONTINUED | OUTPATIENT
Start: 2025-07-15 | End: 2025-07-22 | Stop reason: HOSPADM

## 2025-07-15 RX ORDER — CLOPIDOGREL BISULFATE 75 MG/1
75 TABLET ORAL DAILY
Status: DISCONTINUED | OUTPATIENT
Start: 2025-07-15 | End: 2025-07-22 | Stop reason: HOSPADM

## 2025-07-15 RX ORDER — CARVEDILOL 6.25 MG/1
6.25 TABLET ORAL 2 TIMES DAILY
Status: DISCONTINUED | OUTPATIENT
Start: 2025-07-15 | End: 2025-07-20

## 2025-07-15 RX ORDER — TRAMADOL HYDROCHLORIDE 50 MG/1
25 TABLET ORAL EVERY 6 HOURS PRN
Status: DISCONTINUED | OUTPATIENT
Start: 2025-07-15 | End: 2025-07-22 | Stop reason: HOSPADM

## 2025-07-15 RX ORDER — SEVELAMER CARBONATE 800 MG/1
800 TABLET, FILM COATED ORAL
Status: DISCONTINUED | OUTPATIENT
Start: 2025-07-15 | End: 2025-07-22 | Stop reason: HOSPADM

## 2025-07-15 RX ORDER — ASPIRIN 81 MG/1
81 TABLET ORAL DAILY
Status: DISCONTINUED | OUTPATIENT
Start: 2025-07-15 | End: 2025-07-22 | Stop reason: HOSPADM

## 2025-07-15 RX ORDER — CASTOR OIL AND BALSAM, PERU 788; 87 MG/G; MG/G
OINTMENT TOPICAL 2 TIMES DAILY
Status: DISCONTINUED | OUTPATIENT
Start: 2025-07-15 | End: 2025-07-22 | Stop reason: HOSPADM

## 2025-07-15 RX ADMIN — MICONAZOLE NITRATE: 20 CREAM TOPICAL at 20:15

## 2025-07-15 RX ADMIN — MICONAZOLE NITRATE: 20 CREAM TOPICAL at 08:00

## 2025-07-15 RX ADMIN — Medication: at 20:15

## 2025-07-15 RX ADMIN — CALCIUM CHLORIDE, MAGNESIUM CHLORIDE, DEXTROSE MONOHYDRATE, LACTIC ACID, SODIUM CHLORIDE, SODIUM BICARBONATE AND POTASSIUM CHLORIDE: 5.15; 2.03; 22; 5.4; 6.46; 3.09; .157 INJECTION INTRAVENOUS at 20:16

## 2025-07-15 RX ADMIN — CALCIUM CHLORIDE, MAGNESIUM CHLORIDE, DEXTROSE MONOHYDRATE, LACTIC ACID, SODIUM CHLORIDE, SODIUM BICARBONATE AND POTASSIUM CHLORIDE: 5.15; 2.03; 22; 5.4; 6.46; 3.09; .157 INJECTION INTRAVENOUS at 06:40

## 2025-07-15 RX ADMIN — CALCIUM CHLORIDE, MAGNESIUM CHLORIDE, DEXTROSE MONOHYDRATE, LACTIC ACID, SODIUM CHLORIDE, SODIUM BICARBONATE AND POTASSIUM CHLORIDE: 5.15; 2.03; 22; 5.4; 6.46; 3.09; .157 INJECTION INTRAVENOUS at 14:18

## 2025-07-15 RX ADMIN — CLOPIDOGREL BISULFATE 75 MG: 75 TABLET, FILM COATED ORAL at 11:15

## 2025-07-15 RX ADMIN — CALCIUM CHLORIDE, MAGNESIUM CHLORIDE, DEXTROSE MONOHYDRATE, LACTIC ACID, SODIUM CHLORIDE, SODIUM BICARBONATE AND POTASSIUM CHLORIDE: 5.15; 2.03; 22; 5.4; 6.46; 3.09; .157 INJECTION INTRAVENOUS at 22:53

## 2025-07-15 RX ADMIN — CALCIUM CHLORIDE, MAGNESIUM CHLORIDE, DEXTROSE MONOHYDRATE, LACTIC ACID, SODIUM CHLORIDE, SODIUM BICARBONATE AND POTASSIUM CHLORIDE: 5.15; 2.03; 22; 5.4; 6.46; 3.09; .157 INJECTION INTRAVENOUS at 11:30

## 2025-07-15 RX ADMIN — SEVELAMER CARBONATE 800 MG: 800 TABLET, FILM COATED ORAL at 18:32

## 2025-07-15 RX ADMIN — Medication: at 04:21

## 2025-07-15 RX ADMIN — CALCIUM CHLORIDE, MAGNESIUM CHLORIDE, DEXTROSE MONOHYDRATE, LACTIC ACID, SODIUM CHLORIDE, SODIUM BICARBONATE AND POTASSIUM CHLORIDE: 5.15; 2.03; 22; 5.4; 6.46; 3.09; .157 INJECTION INTRAVENOUS at 01:26

## 2025-07-15 RX ADMIN — NOREPINEPHRINE BITARTRATE 8 MCG/MIN: 0.03 INJECTION, SOLUTION INTRAVENOUS at 18:39

## 2025-07-15 RX ADMIN — PREGABALIN 75 MG: 75 CAPSULE ORAL at 11:25

## 2025-07-15 RX ADMIN — ATORVASTATIN CALCIUM 80 MG: 80 TABLET, FILM COATED ORAL at 20:14

## 2025-07-15 RX ADMIN — CALCIUM CHLORIDE, MAGNESIUM CHLORIDE, DEXTROSE MONOHYDRATE, LACTIC ACID, SODIUM CHLORIDE, SODIUM BICARBONATE AND POTASSIUM CHLORIDE: 5.15; 2.03; 22; 5.4; 6.46; 3.09; .157 INJECTION INTRAVENOUS at 04:03

## 2025-07-15 RX ADMIN — APIXABAN 2.5 MG: 5 TABLET, FILM COATED ORAL at 11:15

## 2025-07-15 RX ADMIN — CALCIUM CHLORIDE, MAGNESIUM CHLORIDE, DEXTROSE MONOHYDRATE, LACTIC ACID, SODIUM CHLORIDE, SODIUM BICARBONATE AND POTASSIUM CHLORIDE: 5.15; 2.03; 22; 5.4; 6.46; 3.09; .157 INJECTION INTRAVENOUS at 09:00

## 2025-07-15 RX ADMIN — ASPIRIN 81 MG: 81 TABLET, COATED ORAL at 11:15

## 2025-07-15 RX ADMIN — SEVELAMER CARBONATE 800 MG: 800 TABLET, FILM COATED ORAL at 11:15

## 2025-07-15 RX ADMIN — CALCIUM GLUCONATE 1000 MG: 20 INJECTION, SOLUTION INTRAVENOUS at 05:16

## 2025-07-15 RX ADMIN — APIXABAN 2.5 MG: 5 TABLET, FILM COATED ORAL at 20:14

## 2025-07-15 RX ADMIN — Medication: at 09:07

## 2025-07-15 ASSESSMENT — PAIN SCALES - GENERAL
PAINLEVEL_OUTOF10: 0
PAINLEVEL_OUTOF10: 0

## 2025-07-15 NOTE — CONSULTS
ICU CONSULT       Date: 7/15/2025   Patient: Serafin Weaver   : 1980   Admit Date: 2025  Hospital Day:   ICU Day: 11h                                                           Code:Full Code    PCP: Shelia Hernandez MD                                  CC: Fatigue and Weakness    HISTORY OF PRESENT ILLNESS:   Mr. Serafin Weaver is a 45 y.o. male with a medical hx significant for DM type 2, Diabetic polyneuropathy, MRSA, ESRD on dialysis, otherwise as listed in the MHx table below, who presented from nephrology to the ED for emergent hemodialysis, Hyperkalemia of 9.1 and Peaking T-waves.  BP is a was low and patient was slightly fatigued and slow in obtaining history.  Patient reports he usually receives hemodialysis every Monday, Tuesday, Wednesday, Thursday, Friday.  Patient reported his last dialysis was last Monday 1 week ago. Patient reports he did not attend dialysis due to pain/wound on his groin for the past week. Likely intertrigo  Patient denies any fever, chills. Night sweats, chest pain, shortness of breath, faintness, palpitations.  Patient reports main concern is the groin pain and overall fatigue.  Patient is somewhat difficult to obtain history, patient would sometimes trail off and questions often had to be repeated.     Interval History:  Patient reports to overall be feeling better today.  Patient was receiving CRRT at time of examination and discussion.  Patient reports that groin pain is improved, denies any shortness of breath, cough, wheeze, chest pain, palpitations, lightheadedness, fever, chills, and night sweats..  Patient reports his fatigue/weakness is about the same as when first admitted.  Patient reported no new issues or concerns.  Patient last night blood pressures were all over the place and not able to have dialysis performed, nephrology was notified and CRRT was initiated instead . BP have been better this morning.        PAST HISTORY:     Past Medical

## 2025-07-15 NOTE — PLAN OF CARE
Problem: Chronic Conditions and Co-morbidities  Goal: Patient's chronic conditions and co-morbidity symptoms are monitored and maintained or improved  7/15/2025 1031 by Wyatt James RN  Outcome: Progressing  7/15/2025 0229 by Debra Aleman RN  Outcome: Progressing     Problem: Discharge Planning  Goal: Discharge to home or other facility with appropriate resources  7/15/2025 1031 by Wyatt James RN  Outcome: Progressing  7/15/2025 0229 by Debra Aleman RN  Outcome: Progressing     Problem: Pain  Goal: Verbalizes/displays adequate comfort level or baseline comfort level  7/15/2025 1031 by Wyatt James RN  Outcome: Progressing  7/15/2025 0229 by Debra Aleman RN  Outcome: Progressing     Problem: Skin/Tissue Integrity  Goal: Skin integrity remains intact  Description: 1.  Monitor for areas of redness and/or skin breakdown  2.  Assess vascular access sites hourly  3.  Every 4-6 hours minimum:  Change oxygen saturation probe site  4.  Every 4-6 hours:  If on nasal continuous positive airway pressure, respiratory therapy assess nares and determine need for appliance change or resting period  Outcome: Progressing     Problem: ABCDS Injury Assessment  Goal: Absence of physical injury  Outcome: Progressing     Problem: Safety - Adult  Goal: Free from fall injury  Outcome: Progressing

## 2025-07-15 NOTE — CONSULTS
Department of General Surgery  Surgical Service    Line Consultation    Reason for Consult: VASCATH for CRRT and arterial line placement    [place]:  DASHAWN MILES is a 45 y.o.  male recently admitted on 7/14 with hyperkalemia.  Patient requires Vascath line placement and arterial line placement.  Therefore general surgery has been consulted for assistance with access.     Previous central line attempts this admission: R and L radial arterial line  Central line attempt prior to this admission: unknown  Current Access: PIVs  Anticoagulation:  Eliquis (last dose one day prior per patient)  Antiplatelet: ASA, Plavix (last dose one day prior per patient)  INR: 1.21  Platelet Count : 182  AV Access:RUE fistula    Past Medical History:   has a past medical history of Amputation of third toe, left, traumatic, Anesthesia complication, Blood circulation, collateral, Cellulitis, Depression, Diabetic polyneuropathy associated with type 2 diabetes mellitus (HCC), Hypertension, MRSA infection, Seizures (HCC), Type II or unspecified type diabetes mellitus without mention of complication, not stated as uncontrolled, and Unspecified cerebral artery occlusion with cerebral infarction.     Past Surgical History:   has a past surgical history that includes amputation; Toe amputation (Right); other surgical history (8-); Foot Amputation (Left, 2/7/2019); vascular surgery (Right, 4/11/2024); Foot Debridement (Right, 7/18/2024); invasive vascular (N/A, 7/22/2024); Toe amputation (Right, 7/24/2024); Achilles tendon surgery (Right, 7/24/2024); Foot Debridement (Right, 8/17/2024); and Foot Debridement (Right, 8/22/2024).     Medications:  Prior to Admission medications    Medication Sig Start Date End Date Taking? Authorizing Provider   Dextran 70-Hypromellose (NATURAL BALANCE TEARS OP) Place 1 drop into both eyes every 4 hours as needed (dry eyes)   Yes Provider, MD Heavenly   pregabalin (LYRICA) 75 MG capsule

## 2025-07-15 NOTE — CARE COORDINATION
Case Management Assessment  Initial Evaluation    Date/Time of Evaluation: 7/15/2025 3:33 PM  Assessment Completed by: IRLANDA Martinez    If patient is discharged prior to next notation, then this note serves as note for discharge by case management.    Patient Name: Serafin Weaver                   YOB: 1980  Diagnosis: Hyperkalemia [E87.5]  Metabolic acidosis [E87.20]  Uremia [N19]  ESRD (end stage renal disease) (HCC) [N18.6]                   Date / Time: 7/14/2025 12:10 PM    Patient Admission Status: Inpatient   Readmission Risk (Low < 19, Mod (19-27), High > 27): Readmission Risk Score: 20    Current PCP: Shelia Hernandez MD  PCP verified by CM? Yes    Chart Reviewed: Yes      History Provided by: Medical Record, Other (see comment) (LTC Staff)  Patient Orientation: Unresponsive    Patient Cognition: Severely Impaired    Hospitalization in the last 30 days (Readmission):  No    If yes, Readmission Assessment in  Navigator will be completed.    Advance Directives:      Code Status: Full Code   Patient's Primary Decision Maker is: Legal Next of Kin    Primary Decision Maker: Annie Weaver - Parent - [phone]    Secondary Decision Maker: James Olsen  Child - [phone]    Discharge Planning:    Patient lives with: Alone Type of Home: Long-Term Care  Primary Care Giver: Other (Comment) (LT Staff)  Patient Support Systems include: Family Members   Current Financial resources: None  Current community resources: None  Current services prior to admission: None            Current DME:              Type of Home Care services:  None    ADLS  Prior functional level: Assistance with the following:, Mobility  Current functional level: Assistance with the following:, Mobility    PT AM-PAC:   /24  OT AM-PAC:   /24    Family can provide assistance at DC: Yes  Would you like Case Management to discuss the discharge plan with any other family members/significant others, and if so, who?

## 2025-07-15 NOTE — PROCEDURES
PROCEDURE NOTE  Date: 7/15/2025   Name: Serafin Weaver  YOB: 1980    Insert Arterial Line    Date/Time: 7/15/2025 1:42 AM    Performed by: Serafin Cardenas DO  Authorized by: Rufus Hernandez DO  Consent: Verbal consent obtained. Written consent obtained.  Risks and benefits: risks, benefits and alternatives were discussed  Consent given by: patient  Patient understanding: patient states understanding of the procedure being performed  Patient consent: the patient's understanding of the procedure matches consent given  Procedure consent: procedure consent matches procedure scheduled  Relevant documents: relevant documents present and verified  Test results: test results available and properly labeled  Site marked: the operative site was marked  Imaging studies: imaging studies available  Required items: required blood products, implants, devices, and special equipment available  Patient identity confirmed: verbally with patient, arm band and hospital-assigned identification number  Time out: Immediately prior to procedure a \"time out\" was called to verify the correct patient, procedure, equipment, support staff and site/side marked as required.  Preparation: Patient was prepped and draped in the usual sterile fashion.  Indications: hemodynamic monitoring  Location: left femoral    Anesthesia:  Local Anesthetic: lidocaine 1% without epinephrine    Sedation:  Patient sedated: no    Needle gauge: 18  Seldinger technique: Seldinger technique used  Number of attempts: 1  Post-procedure: line sutured and dressing applied  Post-procedure CMS: unchanged  Patient tolerance: patient tolerated the procedure well with no immediate complications  Comments: EBL: 5cc      Serafin Cardenas DO  PGY-2, General Surgery Resident  07/15/25 1:43 AM  [phone]

## 2025-07-15 NOTE — PROCEDURES
PROCEDURE NOTE  Date: 7/15/2025   Name: Serafin Weaver  YOB: 1980    CENTRAL LINE    Date/Time: 7/15/2025 1:40 AM    Performed by: Serafin Cardenas DO  Authorized by: Rufus Hernandez DO  Consent: Verbal consent obtained. Written consent obtained.  Risks and benefits: risks, benefits and alternatives were discussed  Consent given by: patient  Patient understanding: patient states understanding of the procedure being performed  Patient consent: the patient's understanding of the procedure matches consent given  Procedure consent: procedure consent matches procedure scheduled  Relevant documents: relevant documents present and verified  Test results: test results available and properly labeled  Site marked: the operative site was marked  Imaging studies: imaging studies available  Required items: required blood products, implants, devices, and special equipment available  Patient identity confirmed: arm band, verbally with patient and hospital-assigned identification number  Time out: Immediately prior to procedure a \"time out\" was called to verify the correct patient, procedure, equipment, support staff and site/side marked as required.  Indications: vascular access    Anesthesia:  Local Anesthetic: lidocaine 1% without epinephrine    Sedation:  Patient sedated: no    Preparation: skin prepped with ChloraPrep  Skin prep agent dried: skin prep agent completely dried prior to procedure  Sterile barriers: all five maximum sterile barriers used - cap, mask, sterile gown, sterile gloves, and large sterile sheet  Hand hygiene: hand hygiene performed prior to central venous catheter insertion  Location details: left femoral  Patient position: reverse Trendelenburg  Catheter type: triple lumen  Catheter size: 13f.  Pre-procedure: landmarks identified  Ultrasound guidance: yes  Sterile ultrasound techniques: sterile gel and sterile probe covers were used  Number of attempts: 1  Successful placement:

## 2025-07-15 NOTE — PROCEDURES
PROCEDURE NOTE  Date: 7/15/2025   Name: Serafin Weaver  YOB: 1980    Insert Arterial Line    Date/Time: 7/14/2025 9:00 AM    Performed by: Krish Triana MD  Authorized by: Krish Triana MD  Consent: Verbal consent obtained. Written consent obtained.  Risks and benefits: risks, benefits and alternatives were discussed  Consent given by: patient  Patient understanding: patient states understanding of the procedure being performed  Patient consent: the patient's understanding of the procedure matches consent given  Procedure consent: procedure consent matches procedure scheduled  Relevant documents: relevant documents present and verified  Test results: test results available and properly labeled  Site marked: the operative site was marked  Imaging studies: imaging studies available  Patient identity confirmed: arm band  Time out: Immediately prior to procedure a \"time out\" was called to verify the correct patient, procedure, equipment, support staff and site/side marked as required.  Preparation: Patient was prepped and draped in the usual sterile fashion.  Indications: hemodynamic monitoring  Location: left radial  Anesthesia: see MAR for details    Sedation:  Patient sedated: no    Venu's test normal: yes  Number of attempts: 1  Comments: Attempted but unable to gain access of right radial artery       EBL: 6 mL

## 2025-07-15 NOTE — DIALYSIS
Treatment time: 7 minutes due to hypotension   Net UF: +470 ml     Pre weight: 134 kg  Post weight:134.4 kg    Access used: L AVG    Access function: fair with  ml/min, clots noted upon accessing patient and dialyzer with clots post HD      Medications or blood products given: None      Regular outpatient schedule: 5 days a week at nursing home      Summary of response to treatment: Patient tolerated treatment poorly.  Levophed increased to 10 mcg and siddhartha bolus 100 mcg given. Per Dr. Bates terminate HD. HD terminated and Dr. Hernandez updated. Patient to start CRRT.      Report given to Debra Aleman RN and copy of dialysis treatment record placed in chart, to be scanned into EMR.

## 2025-07-16 LAB
ALBUMIN SERPL-MCNC: 3.8 G/DL (ref 3.4–5)
ALBUMIN SERPL-MCNC: 4 G/DL (ref 3.4–5)
ALBUMIN SERPL-MCNC: 4 G/DL (ref 3.4–5)
ANION GAP SERPL CALCULATED.3IONS-SCNC: 11 MMOL/L (ref 3–16)
ANION GAP SERPL CALCULATED.3IONS-SCNC: 14 MMOL/L (ref 3–16)
ANION GAP SERPL CALCULATED.3IONS-SCNC: 18 MMOL/L (ref 3–16)
BASOPHILS # BLD: 0 K/UL (ref 0–0.2)
BASOPHILS NFR BLD: 0.2 %
BUN SERPL-MCNC: 31 MG/DL (ref 7–20)
BUN SERPL-MCNC: 39 MG/DL (ref 7–20)
BUN SERPL-MCNC: 51 MG/DL (ref 7–20)
CA-I BLD-SCNC: 1.11 MMOL/L (ref 1.12–1.32)
CA-I BLD-SCNC: 1.14 MMOL/L (ref 1.12–1.32)
CALCIUM SERPL-MCNC: 9 MG/DL (ref 8.3–10.6)
CALCIUM SERPL-MCNC: 9.5 MG/DL (ref 8.3–10.6)
CALCIUM SERPL-MCNC: 9.8 MG/DL (ref 8.3–10.6)
CHLORIDE SERPL-SCNC: 100 MMOL/L (ref 99–110)
CHLORIDE SERPL-SCNC: 102 MMOL/L (ref 99–110)
CHLORIDE SERPL-SCNC: 97 MMOL/L (ref 99–110)
CO2 SERPL-SCNC: 19 MMOL/L (ref 21–32)
CO2 SERPL-SCNC: 20 MMOL/L (ref 21–32)
CO2 SERPL-SCNC: 22 MMOL/L (ref 21–32)
CREAT SERPL-MCNC: 5.3 MG/DL (ref 0.9–1.3)
CREAT SERPL-MCNC: 6.5 MG/DL (ref 0.9–1.3)
CREAT SERPL-MCNC: 8.1 MG/DL (ref 0.9–1.3)
DEPRECATED RDW RBC AUTO: 14.1 % (ref 12.4–15.4)
EOSINOPHIL # BLD: 0.1 K/UL (ref 0–0.6)
EOSINOPHIL NFR BLD: 0.7 %
EST. AVERAGE GLUCOSE BLD GHB EST-MCNC: 137 MG/DL
GFR SERPLBLD CREATININE-BSD FMLA CKD-EPI: 10 ML/MIN/{1.73_M2}
GFR SERPLBLD CREATININE-BSD FMLA CKD-EPI: 13 ML/MIN/{1.73_M2}
GFR SERPLBLD CREATININE-BSD FMLA CKD-EPI: 8 ML/MIN/{1.73_M2}
GLUCOSE SERPL-MCNC: 166 MG/DL (ref 70–99)
GLUCOSE SERPL-MCNC: 174 MG/DL (ref 70–99)
GLUCOSE SERPL-MCNC: 195 MG/DL (ref 70–99)
HBA1C MFR BLD: 6.4 %
HCT VFR BLD AUTO: 37.6 % (ref 40.5–52.5)
HGB BLD-MCNC: 12.3 G/DL (ref 13.5–17.5)
LYMPHOCYTES # BLD: 2.7 K/UL (ref 1–5.1)
LYMPHOCYTES NFR BLD: 22.2 %
MAGNESIUM SERPL-MCNC: 2.12 MG/DL (ref 1.8–2.4)
MAGNESIUM SERPL-MCNC: 2.25 MG/DL (ref 1.8–2.4)
MAGNESIUM SERPL-MCNC: 2.33 MG/DL (ref 1.8–2.4)
MCH RBC QN AUTO: 30.2 PG (ref 26–34)
MCHC RBC AUTO-ENTMCNC: 32.6 G/DL (ref 31–36)
MCV RBC AUTO: 92.7 FL (ref 80–100)
MONOCYTES # BLD: 1.1 K/UL (ref 0–1.3)
MONOCYTES NFR BLD: 9 %
NEUTROPHILS # BLD: 8.2 K/UL (ref 1.7–7.7)
NEUTROPHILS NFR BLD: 67.9 %
PH VENOUS: 7.38 (ref 7.35–7.45)
PH VENOUS: 7.39 (ref 7.35–7.45)
PHOSPHATE SERPL-MCNC: 2.8 MG/DL (ref 2.5–4.9)
PHOSPHATE SERPL-MCNC: 3.4 MG/DL (ref 2.5–4.9)
PHOSPHATE SERPL-MCNC: 4.3 MG/DL (ref 2.5–4.9)
PLATELET # BLD AUTO: 177 K/UL (ref 135–450)
PMV BLD AUTO: 8 FL (ref 5–10.5)
POTASSIUM SERPL-SCNC: 4.2 MMOL/L (ref 3.5–5.1)
POTASSIUM SERPL-SCNC: 4.2 MMOL/L (ref 3.5–5.1)
POTASSIUM SERPL-SCNC: 4.3 MMOL/L (ref 3.5–5.1)
RBC # BLD AUTO: 4.06 M/UL (ref 4.2–5.9)
SODIUM SERPL-SCNC: 134 MMOL/L (ref 136–145)
SODIUM SERPL-SCNC: 134 MMOL/L (ref 136–145)
SODIUM SERPL-SCNC: 135 MMOL/L (ref 136–145)
WBC # BLD AUTO: 12 K/UL (ref 4–11)

## 2025-07-16 PROCEDURE — 2500000003 HC RX 250 WO HCPCS

## 2025-07-16 PROCEDURE — 6370000000 HC RX 637 (ALT 250 FOR IP): Performed by: INTERNAL MEDICINE

## 2025-07-16 PROCEDURE — 83735 ASSAY OF MAGNESIUM: CPT

## 2025-07-16 PROCEDURE — 2580000003 HC RX 258: Performed by: STUDENT IN AN ORGANIZED HEALTH CARE EDUCATION/TRAINING PROGRAM

## 2025-07-16 PROCEDURE — 83036 HEMOGLOBIN GLYCOSYLATED A1C: CPT

## 2025-07-16 PROCEDURE — 2580000003 HC RX 258: Performed by: INTERNAL MEDICINE

## 2025-07-16 PROCEDURE — 99291 CRITICAL CARE FIRST HOUR: CPT | Performed by: INTERNAL MEDICINE

## 2025-07-16 PROCEDURE — 2000000000 HC ICU R&B

## 2025-07-16 PROCEDURE — 37799 UNLISTED PX VASCULAR SURGERY: CPT

## 2025-07-16 PROCEDURE — 6360000002 HC RX W HCPCS: Performed by: STUDENT IN AN ORGANIZED HEALTH CARE EDUCATION/TRAINING PROGRAM

## 2025-07-16 PROCEDURE — 36415 COLL VENOUS BLD VENIPUNCTURE: CPT

## 2025-07-16 PROCEDURE — 85025 COMPLETE CBC W/AUTO DIFF WBC: CPT

## 2025-07-16 PROCEDURE — 82330 ASSAY OF CALCIUM: CPT

## 2025-07-16 PROCEDURE — 80069 RENAL FUNCTION PANEL: CPT

## 2025-07-16 PROCEDURE — 6370000000 HC RX 637 (ALT 250 FOR IP)

## 2025-07-16 RX ORDER — MIDODRINE HYDROCHLORIDE 5 MG/1
5 TABLET ORAL
Status: DISCONTINUED | OUTPATIENT
Start: 2025-07-16 | End: 2025-07-16

## 2025-07-16 RX ORDER — MIDODRINE HYDROCHLORIDE 5 MG/1
10 TABLET ORAL
Status: DISCONTINUED | OUTPATIENT
Start: 2025-07-16 | End: 2025-07-18

## 2025-07-16 RX ADMIN — MIDODRINE HYDROCHLORIDE 5 MG: 5 TABLET ORAL at 12:05

## 2025-07-16 RX ADMIN — Medication: at 22:51

## 2025-07-16 RX ADMIN — HEPARIN SODIUM 5000 UNITS: 5000 INJECTION, SOLUTION INTRAVENOUS; SUBCUTANEOUS at 17:47

## 2025-07-16 RX ADMIN — Medication: at 20:31

## 2025-07-16 RX ADMIN — CALCIUM CHLORIDE, MAGNESIUM CHLORIDE, DEXTROSE MONOHYDRATE, LACTIC ACID, SODIUM CHLORIDE, SODIUM BICARBONATE AND POTASSIUM CHLORIDE: 5.15; 2.03; 22; 5.4; 6.46; 3.09; .157 INJECTION INTRAVENOUS at 01:14

## 2025-07-16 RX ADMIN — Medication: at 04:45

## 2025-07-16 RX ADMIN — Medication: at 12:05

## 2025-07-16 RX ADMIN — Medication: at 02:23

## 2025-07-16 RX ADMIN — PREGABALIN 75 MG: 75 CAPSULE ORAL at 08:26

## 2025-07-16 RX ADMIN — SEVELAMER CARBONATE 800 MG: 800 TABLET, FILM COATED ORAL at 17:46

## 2025-07-16 RX ADMIN — HEPARIN SODIUM 5000 UNITS: 5000 INJECTION, SOLUTION INTRAVENOUS; SUBCUTANEOUS at 17:46

## 2025-07-16 RX ADMIN — ASPIRIN 81 MG: 81 TABLET, COATED ORAL at 08:26

## 2025-07-16 RX ADMIN — Medication: at 08:26

## 2025-07-16 RX ADMIN — NOREPINEPHRINE BITARTRATE 6 MCG/MIN: 0.03 INJECTION, SOLUTION INTRAVENOUS at 12:09

## 2025-07-16 RX ADMIN — Medication: at 07:13

## 2025-07-16 RX ADMIN — SEVELAMER CARBONATE 800 MG: 800 TABLET, FILM COATED ORAL at 12:05

## 2025-07-16 RX ADMIN — NOREPINEPHRINE BITARTRATE 6 MCG/MIN: 0.03 INJECTION, SOLUTION INTRAVENOUS at 09:33

## 2025-07-16 RX ADMIN — APIXABAN 2.5 MG: 5 TABLET, FILM COATED ORAL at 08:26

## 2025-07-16 RX ADMIN — MICONAZOLE NITRATE: 20 CREAM TOPICAL at 20:31

## 2025-07-16 RX ADMIN — MIDODRINE HYDROCHLORIDE 10 MG: 5 TABLET ORAL at 17:46

## 2025-07-16 RX ADMIN — ATORVASTATIN CALCIUM 80 MG: 80 TABLET, FILM COATED ORAL at 19:43

## 2025-07-16 RX ADMIN — SEVELAMER CARBONATE 800 MG: 800 TABLET, FILM COATED ORAL at 08:26

## 2025-07-16 RX ADMIN — MICONAZOLE NITRATE: 20 CREAM TOPICAL at 08:27

## 2025-07-16 RX ADMIN — CLOPIDOGREL BISULFATE 75 MG: 75 TABLET, FILM COATED ORAL at 08:26

## 2025-07-16 RX ADMIN — Medication: at 07:12

## 2025-07-16 RX ADMIN — APIXABAN 2.5 MG: 5 TABLET, FILM COATED ORAL at 19:43

## 2025-07-16 ASSESSMENT — PAIN SCALES - GENERAL
PAINLEVEL_OUTOF10: 0

## 2025-07-16 NOTE — PLAN OF CARE
Problem: Chronic Conditions and Co-morbidities  Goal: Patient's chronic conditions and co-morbidity symptoms are monitored and maintained or improved  7/16/2025 0736 by Wyatt James RN  Outcome: Progressing  7/15/2025 2116 by Debra Aleman RN  Outcome: Progressing     Problem: Discharge Planning  Goal: Discharge to home or other facility with appropriate resources  7/16/2025 0736 by Wyatt James RN  Outcome: Progressing  7/15/2025 2116 by Debra Aleman RN  Outcome: Progressing     Problem: Pain  Goal: Verbalizes/displays adequate comfort level or baseline comfort level  7/16/2025 0736 by Wyatt James RN  Outcome: Progressing  7/15/2025 2116 by Debra Aleman RN  Outcome: Progressing     Problem: ABCDS Injury Assessment  Goal: Absence of physical injury  7/16/2025 0736 by Wyatt James RN  Outcome: Progressing  7/15/2025 2116 by Debra Aleman RN  Outcome: Progressing     Problem: Safety - Adult  Goal: Free from fall injury  7/16/2025 0736 by Wyatt James RN  Outcome: Progressing  7/15/2025 2116 by Debra Aleman RN  Outcome: Progressing

## 2025-07-16 NOTE — CARE COORDINATION
DISCHARGE PLANNING:  Chart reviewed.  Patient is from [facility] as a long term care resident with onsite HD 5 days a week (refuses frequently).     Current discharge plan is to return to [facility] as a LTC resident. No pre-cert needed to return.  He will need HD run sheets sent closer to discharge to get dialysis approval again at the facility.    Currently on CRRT with Nephrology following.    CM team will continue to follow.  Mitra Dee RN Case Manager  [phone]

## 2025-07-16 NOTE — PLAN OF CARE
Problem: Chronic Conditions and Co-morbidities  Goal: Patient's chronic conditions and co-morbidity symptoms are monitored and maintained or improved  7/15/2025 2116 by Debra Aleman RN  Outcome: Progressing  7/15/2025 1031 by Wyatt James RN  Outcome: Progressing     Problem: Discharge Planning  Goal: Discharge to home or other facility with appropriate resources  7/15/2025 2116 by Debra Aleman RN  Outcome: Progressing  7/15/2025 1031 by Wyatt James RN  Outcome: Progressing     Problem: Pain  Goal: Verbalizes/displays adequate comfort level or baseline comfort level  7/15/2025 2116 by Debra Aleman RN  Outcome: Progressing  7/15/2025 1031 by Wyatt James RN  Outcome: Progressing     Problem: Skin/Tissue Integrity  Goal: Skin integrity remains intact  Description: 1.  Monitor for areas of redness and/or skin breakdown  2.  Assess vascular access sites hourly  3.  Every 4-6 hours minimum:  Change oxygen saturation probe site  4.  Every 4-6 hours:  If on nasal continuous positive airway pressure, respiratory therapy assess nares and determine need for appliance change or resting period  7/15/2025 2116 by Debra Aleman RN  Outcome: Progressing  7/15/2025 1031 by Wyatt James RN  Outcome: Progressing     Problem: ABCDS Injury Assessment  Goal: Absence of physical injury  7/15/2025 2116 by Debra Aleman RN  Outcome: Progressing  7/15/2025 1031 by Wyatt James RN  Outcome: Progressing     Problem: Safety - Adult  Goal: Free from fall injury  7/15/2025 2116 by Debra Aleman RN  Outcome: Progressing  7/15/2025 1031 by Wyatt James RN  Outcome: Progressing

## 2025-07-17 LAB
ALBUMIN SERPL-MCNC: 3.7 G/DL (ref 3.4–5)
ALBUMIN SERPL-MCNC: 3.8 G/DL (ref 3.4–5)
ANION GAP SERPL CALCULATED.3IONS-SCNC: 10 MMOL/L (ref 3–16)
ANION GAP SERPL CALCULATED.3IONS-SCNC: 11 MMOL/L (ref 3–16)
BASOPHILS # BLD: 0.1 K/UL (ref 0–0.2)
BASOPHILS NFR BLD: 0.6 %
BUN SERPL-MCNC: 19 MG/DL (ref 7–20)
BUN SERPL-MCNC: 21 MG/DL (ref 7–20)
CA-I BLD-SCNC: 1.1 MMOL/L (ref 1.12–1.32)
CA-I BLD-SCNC: 1.11 MMOL/L (ref 1.12–1.32)
CALCIUM SERPL-MCNC: 8.9 MG/DL (ref 8.3–10.6)
CALCIUM SERPL-MCNC: 9 MG/DL (ref 8.3–10.6)
CHLORIDE SERPL-SCNC: 102 MMOL/L (ref 99–110)
CHLORIDE SERPL-SCNC: 103 MMOL/L (ref 99–110)
CO2 SERPL-SCNC: 23 MMOL/L (ref 21–32)
CO2 SERPL-SCNC: 23 MMOL/L (ref 21–32)
CREAT SERPL-MCNC: 3.6 MG/DL (ref 0.9–1.3)
CREAT SERPL-MCNC: 4 MG/DL (ref 0.9–1.3)
DEPRECATED RDW RBC AUTO: 14.3 % (ref 12.4–15.4)
EOSINOPHIL # BLD: 0.1 K/UL (ref 0–0.6)
EOSINOPHIL NFR BLD: 0.8 %
EST. AVERAGE GLUCOSE BLD GHB EST-MCNC: 139.9 MG/DL
GFR SERPLBLD CREATININE-BSD FMLA CKD-EPI: 18 ML/MIN/{1.73_M2}
GFR SERPLBLD CREATININE-BSD FMLA CKD-EPI: 20 ML/MIN/{1.73_M2}
GLUCOSE BLD-MCNC: 140 MG/DL (ref 70–99)
GLUCOSE BLD-MCNC: 142 MG/DL (ref 70–99)
GLUCOSE BLD-MCNC: 148 MG/DL (ref 70–99)
GLUCOSE BLD-MCNC: 322 MG/DL (ref 70–99)
GLUCOSE SERPL-MCNC: 179 MG/DL (ref 70–99)
GLUCOSE SERPL-MCNC: 264 MG/DL (ref 70–99)
HBA1C MFR BLD: 6.5 %
HCT VFR BLD AUTO: 34.5 % (ref 40.5–52.5)
HGB BLD-MCNC: 11.5 G/DL (ref 13.5–17.5)
LYMPHOCYTES # BLD: 2.6 K/UL (ref 1–5.1)
LYMPHOCYTES NFR BLD: 22.7 %
MAGNESIUM SERPL-MCNC: 2.3 MG/DL (ref 1.8–2.4)
MAGNESIUM SERPL-MCNC: 2.35 MG/DL (ref 1.8–2.4)
MAGNESIUM SERPL-MCNC: 2.42 MG/DL (ref 1.8–2.4)
MCH RBC QN AUTO: 31.1 PG (ref 26–34)
MCHC RBC AUTO-ENTMCNC: 33.3 G/DL (ref 31–36)
MCV RBC AUTO: 93.6 FL (ref 80–100)
MONOCYTES # BLD: 1 K/UL (ref 0–1.3)
MONOCYTES NFR BLD: 8.8 %
NEUTROPHILS # BLD: 7.6 K/UL (ref 1.7–7.7)
NEUTROPHILS NFR BLD: 67.1 %
PERFORMED ON: ABNORMAL
PH VENOUS: 7.4 (ref 7.35–7.45)
PH VENOUS: 7.41 (ref 7.35–7.45)
PHOSPHATE SERPL-MCNC: 2.3 MG/DL (ref 2.5–4.9)
PHOSPHATE SERPL-MCNC: 2.5 MG/DL (ref 2.5–4.9)
PLATELET # BLD AUTO: 154 K/UL (ref 135–450)
PMV BLD AUTO: 8.1 FL (ref 5–10.5)
POTASSIUM SERPL-SCNC: 4.3 MMOL/L (ref 3.5–5.1)
POTASSIUM SERPL-SCNC: 4.4 MMOL/L (ref 3.5–5.1)
RBC # BLD AUTO: 3.69 M/UL (ref 4.2–5.9)
SODIUM SERPL-SCNC: 136 MMOL/L (ref 136–145)
SODIUM SERPL-SCNC: 136 MMOL/L (ref 136–145)
WBC # BLD AUTO: 11.4 K/UL (ref 4–11)

## 2025-07-17 PROCEDURE — 2000000000 HC ICU R&B

## 2025-07-17 PROCEDURE — 82330 ASSAY OF CALCIUM: CPT

## 2025-07-17 PROCEDURE — 2500000003 HC RX 250 WO HCPCS: Performed by: INTERNAL MEDICINE

## 2025-07-17 PROCEDURE — 6370000000 HC RX 637 (ALT 250 FOR IP)

## 2025-07-17 PROCEDURE — 83735 ASSAY OF MAGNESIUM: CPT

## 2025-07-17 PROCEDURE — 2500000003 HC RX 250 WO HCPCS

## 2025-07-17 PROCEDURE — 2580000003 HC RX 258

## 2025-07-17 PROCEDURE — 6360000002 HC RX W HCPCS

## 2025-07-17 PROCEDURE — 6360000002 HC RX W HCPCS: Performed by: INTERNAL MEDICINE

## 2025-07-17 PROCEDURE — 2580000003 HC RX 258: Performed by: INTERNAL MEDICINE

## 2025-07-17 PROCEDURE — 2580000003 HC RX 258: Performed by: STUDENT IN AN ORGANIZED HEALTH CARE EDUCATION/TRAINING PROGRAM

## 2025-07-17 PROCEDURE — 99291 CRITICAL CARE FIRST HOUR: CPT | Performed by: INTERNAL MEDICINE

## 2025-07-17 PROCEDURE — 85025 COMPLETE CBC W/AUTO DIFF WBC: CPT

## 2025-07-17 PROCEDURE — 80069 RENAL FUNCTION PANEL: CPT

## 2025-07-17 RX ORDER — SENNOSIDES 8.6 MG/1
1 TABLET ORAL NIGHTLY
Status: DISCONTINUED | OUTPATIENT
Start: 2025-07-17 | End: 2025-07-17

## 2025-07-17 RX ORDER — POLYETHYLENE GLYCOL 3350 17 G/17G
17 POWDER, FOR SOLUTION ORAL DAILY
Status: DISCONTINUED | OUTPATIENT
Start: 2025-07-17 | End: 2025-07-17

## 2025-07-17 RX ORDER — SENNA AND DOCUSATE SODIUM 50; 8.6 MG/1; MG/1
2 TABLET, FILM COATED ORAL DAILY
Status: DISCONTINUED | OUTPATIENT
Start: 2025-07-17 | End: 2025-07-22 | Stop reason: HOSPADM

## 2025-07-17 RX ORDER — ONDANSETRON 2 MG/ML
4 INJECTION INTRAMUSCULAR; INTRAVENOUS EVERY 6 HOURS PRN
Status: DISCONTINUED | OUTPATIENT
Start: 2025-07-17 | End: 2025-07-22 | Stop reason: HOSPADM

## 2025-07-17 RX ORDER — ONDANSETRON 2 MG/ML
INJECTION INTRAMUSCULAR; INTRAVENOUS
Status: COMPLETED
Start: 2025-07-17 | End: 2025-07-17

## 2025-07-17 RX ORDER — INSULIN LISPRO 100 [IU]/ML
0-8 INJECTION, SOLUTION INTRAVENOUS; SUBCUTANEOUS
Status: DISCONTINUED | OUTPATIENT
Start: 2025-07-17 | End: 2025-07-22 | Stop reason: HOSPADM

## 2025-07-17 RX ADMIN — Medication: at 23:48

## 2025-07-17 RX ADMIN — CALCIUM GLUCONATE 1000 MG: 20 INJECTION, SOLUTION INTRAVENOUS at 12:20

## 2025-07-17 RX ADMIN — Medication: at 08:45

## 2025-07-17 RX ADMIN — Medication: at 20:15

## 2025-07-17 RX ADMIN — Medication: at 03:35

## 2025-07-17 RX ADMIN — ATORVASTATIN CALCIUM 80 MG: 80 TABLET, FILM COATED ORAL at 20:14

## 2025-07-17 RX ADMIN — NOREPINEPHRINE BITARTRATE 2 MCG/MIN: 0.03 INJECTION, SOLUTION INTRAVENOUS at 23:14

## 2025-07-17 RX ADMIN — Medication: at 18:41

## 2025-07-17 RX ADMIN — Medication: at 08:46

## 2025-07-17 RX ADMIN — MICONAZOLE NITRATE: 20 CREAM TOPICAL at 20:15

## 2025-07-17 RX ADMIN — APIXABAN 2.5 MG: 5 TABLET, FILM COATED ORAL at 20:14

## 2025-07-17 RX ADMIN — SEVELAMER CARBONATE 800 MG: 800 TABLET, FILM COATED ORAL at 12:20

## 2025-07-17 RX ADMIN — Medication: at 13:47

## 2025-07-17 RX ADMIN — MIDODRINE HYDROCHLORIDE 10 MG: 5 TABLET ORAL at 17:37

## 2025-07-17 RX ADMIN — APIXABAN 2.5 MG: 5 TABLET, FILM COATED ORAL at 08:45

## 2025-07-17 RX ADMIN — PREGABALIN 75 MG: 75 CAPSULE ORAL at 08:46

## 2025-07-17 RX ADMIN — SEVELAMER CARBONATE 800 MG: 800 TABLET, FILM COATED ORAL at 08:45

## 2025-07-17 RX ADMIN — CLOPIDOGREL BISULFATE 75 MG: 75 TABLET, FILM COATED ORAL at 08:46

## 2025-07-17 RX ADMIN — MIDODRINE HYDROCHLORIDE 10 MG: 5 TABLET ORAL at 12:20

## 2025-07-17 RX ADMIN — MICONAZOLE NITRATE: 20 CREAM TOPICAL at 08:49

## 2025-07-17 RX ADMIN — SODIUM PHOSPHATE, MONOBASIC, MONOHYDRATE AND SODIUM PHOSPHATE, DIBASIC, ANHYDROUS 6 MMOL: 142; 276 INJECTION, SOLUTION INTRAVENOUS at 13:48

## 2025-07-17 RX ADMIN — CALCIUM GLUCONATE 1000 MG: 20 INJECTION, SOLUTION INTRAVENOUS at 00:09

## 2025-07-17 RX ADMIN — ONDANSETRON: 2 INJECTION, SOLUTION INTRAMUSCULAR; INTRAVENOUS at 02:01

## 2025-07-17 RX ADMIN — CALCIUM GLUCONATE 1000 MG: 20 INJECTION, SOLUTION INTRAVENOUS at 23:36

## 2025-07-17 RX ADMIN — SENNOSIDES, DOCUSATE SODIUM 2 TABLET: 50; 8.6 TABLET, FILM COATED ORAL at 12:20

## 2025-07-17 RX ADMIN — ASPIRIN 81 MG: 81 TABLET, COATED ORAL at 08:45

## 2025-07-17 RX ADMIN — SEVELAMER CARBONATE 800 MG: 800 TABLET, FILM COATED ORAL at 17:37

## 2025-07-17 RX ADMIN — MIDODRINE HYDROCHLORIDE 10 MG: 5 TABLET ORAL at 08:46

## 2025-07-17 ASSESSMENT — PAIN SCALES - GENERAL
PAINLEVEL_OUTOF10: 0

## 2025-07-17 NOTE — CARE COORDINATION
Patient is from [facility] as a long term care resident with onsite HD 5 days a week (refuses frequently).   Current discharge plan is to return to [facility] as a LTC resident. No pre-cert needed to return.  He will need HD run sheets sent closer to discharge to get dialysis approval again at the facility. On CRRT at present time. CM will continue to follow patient until discharge.  Electronically signed by Claire Patrick RN on 7/17/2025 at 2:07 PM

## 2025-07-17 NOTE — PLAN OF CARE
Problem: Chronic Conditions and Co-morbidities  Goal: Patient's chronic conditions and co-morbidity symptoms are monitored and maintained or improved  7/17/2025 0831 by Jenise Real RN  Outcome: Progressing  7/17/2025 0536 by Debra Aleman RN  Outcome: Progressing     Problem: Pain  Goal: Verbalizes/displays adequate comfort level or baseline comfort level  7/17/2025 0831 by Jenise Real RN  Outcome: Progressing  7/17/2025 0536 by Debra Aleman RN  Outcome: Progressing     Problem: Skin/Tissue Integrity  Goal: Skin integrity remains intact  Description: 1.  Monitor for areas of redness and/or skin breakdown  2.  Assess vascular access sites hourly  3.  Every 4-6 hours minimum:  Change oxygen saturation probe site  4.  Every 4-6 hours:  If on nasal continuous positive airway pressure, respiratory therapy assess nares and determine need for appliance change or resting period  7/17/2025 0831 by Jenise Real RN  Outcome: Progressing  7/17/2025 0536 by Debra Aleman RN  Outcome: Progressing     Problem: ABCDS Injury Assessment  Goal: Absence of physical injury  7/17/2025 0831 by Jenise Real RN  Outcome: Progressing  7/17/2025 0536 by Debra Aleman RN  Outcome: Progressing     Problem: Safety - Adult  Goal: Free from fall injury  7/17/2025 0831 by Jenise Real RN  Outcome: Progressing  7/17/2025 0536 by Debra Aleman RN  Outcome: Progressing     Problem: Discharge Planning  Goal: Discharge to home or other facility with appropriate resources  7/17/2025 0831 by Jenise Real RN  Outcome: Not Progressing  7/17/2025 0536 by Debra Aleman RN  Outcome: Progressing

## 2025-07-18 LAB
ALBUMIN SERPL-MCNC: 3.1 G/DL (ref 3.4–5)
ALBUMIN SERPL-MCNC: 3.6 G/DL (ref 3.4–5)
ANION GAP SERPL CALCULATED.3IONS-SCNC: 8 MMOL/L (ref 3–16)
ANION GAP SERPL CALCULATED.3IONS-SCNC: 8 MMOL/L (ref 3–16)
BACTERIA BLD CULT ORG #2: NORMAL
BACTERIA BLD CULT: NORMAL
BASOPHILS # BLD: 0.1 K/UL (ref 0–0.2)
BASOPHILS NFR BLD: 0.6 %
BUN SERPL-MCNC: 13 MG/DL (ref 7–20)
BUN SERPL-MCNC: 14 MG/DL (ref 7–20)
CA-I BLD-SCNC: 1.08 MMOL/L (ref 1.12–1.32)
CA-I BLD-SCNC: 1.12 MMOL/L (ref 1.12–1.32)
CALCIUM SERPL-MCNC: 7.3 MG/DL (ref 8.3–10.6)
CALCIUM SERPL-MCNC: 8.9 MG/DL (ref 8.3–10.6)
CHLORIDE SERPL-SCNC: 103 MMOL/L (ref 99–110)
CHLORIDE SERPL-SCNC: 109 MMOL/L (ref 99–110)
CO2 SERPL-SCNC: 20 MMOL/L (ref 21–32)
CO2 SERPL-SCNC: 25 MMOL/L (ref 21–32)
CREAT SERPL-MCNC: 2.5 MG/DL (ref 0.9–1.3)
CREAT SERPL-MCNC: 3.1 MG/DL (ref 0.9–1.3)
DEPRECATED RDW RBC AUTO: 14.3 % (ref 12.4–15.4)
EOSINOPHIL # BLD: 0.1 K/UL (ref 0–0.6)
EOSINOPHIL NFR BLD: 1 %
GFR SERPLBLD CREATININE-BSD FMLA CKD-EPI: 24 ML/MIN/{1.73_M2}
GFR SERPLBLD CREATININE-BSD FMLA CKD-EPI: 31 ML/MIN/{1.73_M2}
GLUCOSE BLD-MCNC: 116 MG/DL (ref 70–99)
GLUCOSE BLD-MCNC: 118 MG/DL (ref 70–99)
GLUCOSE BLD-MCNC: 149 MG/DL (ref 70–99)
GLUCOSE SERPL-MCNC: 144 MG/DL (ref 70–99)
GLUCOSE SERPL-MCNC: 179 MG/DL (ref 70–99)
HCT VFR BLD AUTO: 32.5 % (ref 40.5–52.5)
HGB BLD-MCNC: 10.5 G/DL (ref 13.5–17.5)
LYMPHOCYTES # BLD: 3 K/UL (ref 1–5.1)
LYMPHOCYTES NFR BLD: 24.9 %
MAGNESIUM SERPL-MCNC: 2.08 MG/DL (ref 1.8–2.4)
MAGNESIUM SERPL-MCNC: 2.4 MG/DL (ref 1.8–2.4)
MCH RBC QN AUTO: 30.3 PG (ref 26–34)
MCHC RBC AUTO-ENTMCNC: 32.2 G/DL (ref 31–36)
MCV RBC AUTO: 94 FL (ref 80–100)
MONOCYTES # BLD: 0.6 K/UL (ref 0–1.3)
MONOCYTES NFR BLD: 5.4 %
NEUTROPHILS # BLD: 8.1 K/UL (ref 1.7–7.7)
NEUTROPHILS NFR BLD: 68.1 %
PERFORMED ON: ABNORMAL
PH VENOUS: 7.4 (ref 7.35–7.45)
PH VENOUS: 7.42 (ref 7.35–7.45)
PHOSPHATE SERPL-MCNC: 1.8 MG/DL (ref 2.5–4.9)
PHOSPHATE SERPL-MCNC: 2 MG/DL (ref 2.5–4.9)
PLATELET # BLD AUTO: 160 K/UL (ref 135–450)
PMV BLD AUTO: 8.6 FL (ref 5–10.5)
POTASSIUM SERPL-SCNC: 3.9 MMOL/L (ref 3.5–5.1)
POTASSIUM SERPL-SCNC: 4.4 MMOL/L (ref 3.5–5.1)
RBC # BLD AUTO: 3.46 M/UL (ref 4.2–5.9)
SODIUM SERPL-SCNC: 136 MMOL/L (ref 136–145)
SODIUM SERPL-SCNC: 137 MMOL/L (ref 136–145)
WBC # BLD AUTO: 11.9 K/UL (ref 4–11)

## 2025-07-18 PROCEDURE — 2580000003 HC RX 258: Performed by: INTERNAL MEDICINE

## 2025-07-18 PROCEDURE — 6370000000 HC RX 637 (ALT 250 FOR IP)

## 2025-07-18 PROCEDURE — 80069 RENAL FUNCTION PANEL: CPT

## 2025-07-18 PROCEDURE — 82330 ASSAY OF CALCIUM: CPT

## 2025-07-18 PROCEDURE — 6360000002 HC RX W HCPCS: Performed by: INTERNAL MEDICINE

## 2025-07-18 PROCEDURE — 99233 SBSQ HOSP IP/OBS HIGH 50: CPT | Performed by: INTERNAL MEDICINE

## 2025-07-18 PROCEDURE — 37799 UNLISTED PX VASCULAR SURGERY: CPT

## 2025-07-18 PROCEDURE — 6370000000 HC RX 637 (ALT 250 FOR IP): Performed by: INTERNAL MEDICINE

## 2025-07-18 PROCEDURE — 2500000003 HC RX 250 WO HCPCS: Performed by: INTERNAL MEDICINE

## 2025-07-18 PROCEDURE — 2580000003 HC RX 258: Performed by: STUDENT IN AN ORGANIZED HEALTH CARE EDUCATION/TRAINING PROGRAM

## 2025-07-18 PROCEDURE — 2000000000 HC ICU R&B

## 2025-07-18 PROCEDURE — 6360000002 HC RX W HCPCS: Performed by: STUDENT IN AN ORGANIZED HEALTH CARE EDUCATION/TRAINING PROGRAM

## 2025-07-18 PROCEDURE — 85025 COMPLETE CBC W/AUTO DIFF WBC: CPT

## 2025-07-18 PROCEDURE — 83735 ASSAY OF MAGNESIUM: CPT

## 2025-07-18 RX ORDER — NEOMYCIN SULFATE, POLYMYXIN B SULFATE AND DEXAMETHASONE 3.5; 10000; 1 MG/ML; [USP'U]/ML; MG/ML
1 SUSPENSION/ DROPS OPHTHALMIC EVERY 6 HOURS SCHEDULED
Status: DISCONTINUED | OUTPATIENT
Start: 2025-07-18 | End: 2025-07-21

## 2025-07-18 RX ORDER — MIDODRINE HYDROCHLORIDE 5 MG/1
15 TABLET ORAL
Status: DISCONTINUED | OUTPATIENT
Start: 2025-07-18 | End: 2025-07-22 | Stop reason: HOSPADM

## 2025-07-18 RX ORDER — TETRAHYDROZOLINE HCL 0.05 %
1 DROPS OPHTHALMIC (EYE) 3 TIMES DAILY
Status: DISCONTINUED | OUTPATIENT
Start: 2025-07-18 | End: 2025-07-22 | Stop reason: HOSPADM

## 2025-07-18 RX ORDER — KETOTIFEN FUMARATE 0.35 MG/ML
1 SOLUTION/ DROPS OPHTHALMIC 2 TIMES DAILY
Status: CANCELLED | OUTPATIENT
Start: 2025-07-18

## 2025-07-18 RX ADMIN — CLOPIDOGREL BISULFATE 75 MG: 75 TABLET, FILM COATED ORAL at 08:47

## 2025-07-18 RX ADMIN — MIDODRINE HYDROCHLORIDE 15 MG: 5 TABLET ORAL at 17:00

## 2025-07-18 RX ADMIN — SODIUM PHOSPHATE, MONOBASIC, MONOHYDRATE AND SODIUM PHOSPHATE, DIBASIC, ANHYDROUS 6 MMOL: 142; 276 INJECTION, SOLUTION INTRAVENOUS at 13:56

## 2025-07-18 RX ADMIN — NEOMYCIN SULFATE, POLYMYXIN B SULFATE AND DEXAMETHASONE 1 DROP: 3.5; 10000; 1 SUSPENSION OPHTHALMIC at 23:56

## 2025-07-18 RX ADMIN — MICONAZOLE NITRATE: 20 CREAM TOPICAL at 20:00

## 2025-07-18 RX ADMIN — SEVELAMER CARBONATE 800 MG: 800 TABLET, FILM COATED ORAL at 08:47

## 2025-07-18 RX ADMIN — APIXABAN 2.5 MG: 5 TABLET, FILM COATED ORAL at 20:12

## 2025-07-18 RX ADMIN — ATORVASTATIN CALCIUM 80 MG: 80 TABLET, FILM COATED ORAL at 20:12

## 2025-07-18 RX ADMIN — MIDODRINE HYDROCHLORIDE 10 MG: 5 TABLET ORAL at 08:44

## 2025-07-18 RX ADMIN — Medication: at 20:07

## 2025-07-18 RX ADMIN — Medication: at 15:25

## 2025-07-18 RX ADMIN — CALCIUM GLUCONATE 1000 MG: 20 INJECTION, SOLUTION INTRAVENOUS at 12:27

## 2025-07-18 RX ADMIN — Medication: at 20:00

## 2025-07-18 RX ADMIN — ASPIRIN 81 MG: 81 TABLET, COATED ORAL at 08:46

## 2025-07-18 RX ADMIN — TRAMADOL HYDROCHLORIDE 25 MG: 50 TABLET, COATED ORAL at 08:45

## 2025-07-18 RX ADMIN — Medication 1 DROP: at 19:59

## 2025-07-18 RX ADMIN — Medication 1 DROP: at 16:10

## 2025-07-18 RX ADMIN — SENNOSIDES, DOCUSATE SODIUM 2 TABLET: 50; 8.6 TABLET, FILM COATED ORAL at 08:46

## 2025-07-18 RX ADMIN — Medication: at 10:06

## 2025-07-18 RX ADMIN — HEPARIN SODIUM 5000 UNITS: 5000 INJECTION, SOLUTION INTRAVENOUS; SUBCUTANEOUS at 01:48

## 2025-07-18 RX ADMIN — CALCIUM GLUCONATE 1000 MG: 20 INJECTION, SOLUTION INTRAVENOUS at 23:58

## 2025-07-18 RX ADMIN — Medication: at 05:10

## 2025-07-18 RX ADMIN — APIXABAN 2.5 MG: 5 TABLET, FILM COATED ORAL at 08:47

## 2025-07-18 RX ADMIN — Medication: at 20:08

## 2025-07-18 RX ADMIN — Medication: at 10:10

## 2025-07-18 RX ADMIN — MICONAZOLE NITRATE: 20 CREAM TOPICAL at 10:06

## 2025-07-18 RX ADMIN — NEOMYCIN SULFATE, POLYMYXIN B SULFATE AND DEXAMETHASONE 1 DROP: 3.5; 10000; 1 SUSPENSION OPHTHALMIC at 19:59

## 2025-07-18 RX ADMIN — MIDODRINE HYDROCHLORIDE 15 MG: 5 TABLET ORAL at 12:21

## 2025-07-18 RX ADMIN — PREGABALIN 75 MG: 75 CAPSULE ORAL at 08:47

## 2025-07-18 RX ADMIN — HEPARIN SODIUM 5000 UNITS: 5000 INJECTION, SOLUTION INTRAVENOUS; SUBCUTANEOUS at 01:49

## 2025-07-18 ASSESSMENT — PAIN DESCRIPTION - DESCRIPTORS: DESCRIPTORS: ACHING

## 2025-07-18 ASSESSMENT — PAIN - FUNCTIONAL ASSESSMENT: PAIN_FUNCTIONAL_ASSESSMENT: ACTIVITIES ARE NOT PREVENTED

## 2025-07-18 ASSESSMENT — PAIN DESCRIPTION - LOCATION: LOCATION: FOOT

## 2025-07-18 ASSESSMENT — PAIN DESCRIPTION - ORIENTATION: ORIENTATION: RIGHT

## 2025-07-18 ASSESSMENT — PAIN SCALES - GENERAL
PAINLEVEL_OUTOF10: 0
PAINLEVEL_OUTOF10: 7

## 2025-07-18 NOTE — PLAN OF CARE
Problem: Chronic Conditions and Co-morbidities  Goal: Patient's chronic conditions and co-morbidity symptoms are monitored and maintained or improved  7/18/2025 0411 by Annie Diehl, RN  Outcome: Progressing  Flowsheets (Taken 7/17/2025 2000)  Care Plan - Patient's Chronic Conditions and Co-Morbidity Symptoms are Monitored and Maintained or Improved:   Monitor and assess patient's chronic conditions and comorbid symptoms for stability, deterioration, or improvement   Collaborate with multidisciplinary team to address chronic and comorbid conditions and prevent exacerbation or deterioration   Update acute care plan with appropriate goals if chronic or comorbid symptoms are exacerbated and prevent overall improvement and discharge     Problem: Pain  Goal: Verbalizes/displays adequate comfort level or baseline comfort level  Recent Flowsheet Documentation  Taken 7/18/2025 0800 by Sierra Nogueira RN  Verbalizes/displays adequate comfort level or baseline comfort level: Encourage patient to monitor pain and request assistance     Problem: Skin/Tissue Integrity  Goal: Skin integrity remains intact  Description: 1.  Monitor for areas of redness and/or skin breakdown  2.  Assess vascular access sites hourly  3.  Every 4-6 hours minimum:  Change oxygen saturation probe site  4.  Every 4-6 hours:  If on nasal continuous positive airway pressure, respiratory therapy assess nares and determine need for appliance change or resting period  7/18/2025 0411 by Annie Diehl, RN  Outcome: Progressing  Flowsheets (Taken 7/17/2025 2000)  Skin Integrity Remains Intact:   Monitor for areas of redness and/or skin breakdown   Every 4-6 hours minimum:  Change oxygen saturation probe site   Monitor skin under medical devices   Turn and reposition as indicated     Problem: ABCDS Injury Assessment  Goal: Absence of physical injury  7/18/2025 0411 by Annie Diehl, RN  Outcome: Progressing  Flowsheets (Taken

## 2025-07-18 NOTE — PLAN OF CARE
Problem: Chronic Conditions and Co-morbidities  Goal: Patient's chronic conditions and co-morbidity symptoms are monitored and maintained or improved  Outcome: Progressing  Flowsheets (Taken 7/17/2025 2000)  Care Plan - Patient's Chronic Conditions and Co-Morbidity Symptoms are Monitored and Maintained or Improved:   Monitor and assess patient's chronic conditions and comorbid symptoms for stability, deterioration, or improvement   Collaborate with multidisciplinary team to address chronic and comorbid conditions and prevent exacerbation or deterioration   Update acute care plan with appropriate goals if chronic or comorbid symptoms are exacerbated and prevent overall improvement and discharge     Problem: Pain  Goal: Verbalizes/displays adequate comfort level or baseline comfort level  Outcome: Progressing  Flowsheets (Taken 7/17/2025 2000)  Verbalizes/displays adequate comfort level or baseline comfort level:   Encourage patient to monitor pain and request assistance   Assess pain using appropriate pain scale   Administer analgesics based on type and severity of pain and evaluate response   Implement non-pharmacological measures as appropriate and evaluate response   Consider cultural and social influences on pain and pain management   Notify Licensed Independent Practitioner if interventions unsuccessful or patient reports new pain     Problem: Skin/Tissue Integrity  Goal: Skin integrity remains intact  Description: 1.  Monitor for areas of redness and/or skin breakdown  2.  Assess vascular access sites hourly  3.  Every 4-6 hours minimum:  Change oxygen saturation probe site  4.  Every 4-6 hours:  If on nasal continuous positive airway pressure, respiratory therapy assess nares and determine need for appliance change or resting period  Outcome: Progressing  Flowsheets (Taken 7/17/2025 2000)  Skin Integrity Remains Intact:   Monitor for areas of redness and/or skin breakdown   Every 4-6 hours minimum:  Change

## 2025-07-18 NOTE — CARE COORDINATION
Patient is from [facility] as a long term care resident with onsite HD 5 days a week (refuses frequently).   Current discharge plan is to return to [facility] as a LTC resident. No pre-cert needed to return.  He will need HD run sheets sent closer to discharge to get dialysis approval again at the facility. Continuing on CRRT at present time. CM will continue to follow patient until discharge. Electronically signed by Claire Patrick RN on 7/18/2025 at 3:09 PM

## 2025-07-19 LAB
ALBUMIN SERPL-MCNC: 3.5 G/DL (ref 3.4–5)
ANION GAP SERPL CALCULATED.3IONS-SCNC: 9 MMOL/L (ref 3–16)
BUN SERPL-MCNC: 17 MG/DL (ref 7–20)
CA-I BLD-SCNC: 1.16 MMOL/L (ref 1.12–1.32)
CA-I BLD-SCNC: 1.16 MMOL/L (ref 1.12–1.32)
CALCIUM SERPL-MCNC: 9.1 MG/DL (ref 8.3–10.6)
CHLORIDE SERPL-SCNC: 102 MMOL/L (ref 99–110)
CO2 SERPL-SCNC: 25 MMOL/L (ref 21–32)
CREAT SERPL-MCNC: 3.7 MG/DL (ref 0.9–1.3)
GFR SERPLBLD CREATININE-BSD FMLA CKD-EPI: 19 ML/MIN/{1.73_M2}
GLUCOSE BLD-MCNC: 105 MG/DL (ref 70–99)
GLUCOSE BLD-MCNC: 126 MG/DL (ref 70–99)
GLUCOSE BLD-MCNC: 161 MG/DL (ref 70–99)
GLUCOSE BLD-MCNC: 161 MG/DL (ref 70–99)
GLUCOSE SERPL-MCNC: 173 MG/DL (ref 70–99)
MAGNESIUM SERPL-MCNC: 2.46 MG/DL (ref 1.8–2.4)
PERFORMED ON: ABNORMAL
PH VENOUS: 7.32 (ref 7.35–7.45)
PH VENOUS: 7.39 (ref 7.35–7.45)
PHOSPHATE SERPL-MCNC: 2.9 MG/DL (ref 2.5–4.9)
POTASSIUM SERPL-SCNC: 4.3 MMOL/L (ref 3.5–5.1)
SODIUM SERPL-SCNC: 136 MMOL/L (ref 136–145)

## 2025-07-19 PROCEDURE — 1200000000 HC SEMI PRIVATE

## 2025-07-19 PROCEDURE — 2580000003 HC RX 258: Performed by: INTERNAL MEDICINE

## 2025-07-19 PROCEDURE — 99232 SBSQ HOSP IP/OBS MODERATE 35: CPT | Performed by: INTERNAL MEDICINE

## 2025-07-19 PROCEDURE — 6370000000 HC RX 637 (ALT 250 FOR IP): Performed by: INTERNAL MEDICINE

## 2025-07-19 PROCEDURE — 6360000002 HC RX W HCPCS: Performed by: STUDENT IN AN ORGANIZED HEALTH CARE EDUCATION/TRAINING PROGRAM

## 2025-07-19 PROCEDURE — 6370000000 HC RX 637 (ALT 250 FOR IP)

## 2025-07-19 PROCEDURE — 82330 ASSAY OF CALCIUM: CPT

## 2025-07-19 PROCEDURE — 37799 UNLISTED PX VASCULAR SURGERY: CPT

## 2025-07-19 PROCEDURE — 36592 COLLECT BLOOD FROM PICC: CPT

## 2025-07-19 PROCEDURE — 80069 RENAL FUNCTION PANEL: CPT

## 2025-07-19 PROCEDURE — 83735 ASSAY OF MAGNESIUM: CPT

## 2025-07-19 PROCEDURE — 2580000003 HC RX 258: Performed by: STUDENT IN AN ORGANIZED HEALTH CARE EDUCATION/TRAINING PROGRAM

## 2025-07-19 PROCEDURE — 2500000003 HC RX 250 WO HCPCS: Performed by: INTERNAL MEDICINE

## 2025-07-19 RX ADMIN — Medication 1 DROP: at 10:20

## 2025-07-19 RX ADMIN — HEPARIN SODIUM 1600 UNITS: 1000 INJECTION INTRAVENOUS; SUBCUTANEOUS at 07:14

## 2025-07-19 RX ADMIN — SENNOSIDES, DOCUSATE SODIUM 2 TABLET: 50; 8.6 TABLET, FILM COATED ORAL at 10:18

## 2025-07-19 RX ADMIN — ASPIRIN 81 MG: 81 TABLET, COATED ORAL at 10:18

## 2025-07-19 RX ADMIN — Medication: at 19:51

## 2025-07-19 RX ADMIN — APIXABAN 2.5 MG: 5 TABLET, FILM COATED ORAL at 10:18

## 2025-07-19 RX ADMIN — MICONAZOLE NITRATE: 20 CREAM TOPICAL at 19:54

## 2025-07-19 RX ADMIN — NEOMYCIN SULFATE, POLYMYXIN B SULFATE AND DEXAMETHASONE 1 DROP: 3.5; 10000; 1 SUSPENSION OPHTHALMIC at 17:51

## 2025-07-19 RX ADMIN — NEOMYCIN SULFATE, POLYMYXIN B SULFATE AND DEXAMETHASONE 1 DROP: 3.5; 10000; 1 SUSPENSION OPHTHALMIC at 13:32

## 2025-07-19 RX ADMIN — MIDODRINE HYDROCHLORIDE 15 MG: 5 TABLET ORAL at 10:18

## 2025-07-19 RX ADMIN — NEOMYCIN SULFATE, POLYMYXIN B SULFATE AND DEXAMETHASONE 1 DROP: 3.5; 10000; 1 SUSPENSION OPHTHALMIC at 05:39

## 2025-07-19 RX ADMIN — MIDODRINE HYDROCHLORIDE 15 MG: 5 TABLET ORAL at 13:56

## 2025-07-19 RX ADMIN — SODIUM PHOSPHATE, MONOBASIC, MONOHYDRATE AND SODIUM PHOSPHATE, DIBASIC, ANHYDROUS 12 MMOL: 142; 276 INJECTION, SOLUTION INTRAVENOUS at 01:03

## 2025-07-19 RX ADMIN — Medication: at 01:05

## 2025-07-19 RX ADMIN — MICONAZOLE NITRATE: 20 CREAM TOPICAL at 12:06

## 2025-07-19 RX ADMIN — Medication: at 05:47

## 2025-07-19 RX ADMIN — NEOMYCIN SULFATE, POLYMYXIN B SULFATE AND DEXAMETHASONE 1 DROP: 3.5; 10000; 1 SUSPENSION OPHTHALMIC at 19:51

## 2025-07-19 RX ADMIN — Medication: at 10:19

## 2025-07-19 RX ADMIN — Medication 1 DROP: at 15:00

## 2025-07-19 RX ADMIN — CLOPIDOGREL BISULFATE 75 MG: 75 TABLET, FILM COATED ORAL at 10:18

## 2025-07-19 RX ADMIN — Medication 1 DROP: at 19:53

## 2025-07-19 RX ADMIN — ATORVASTATIN CALCIUM 80 MG: 80 TABLET, FILM COATED ORAL at 19:50

## 2025-07-19 RX ADMIN — HEPARIN SODIUM 1600 UNITS: 1000 INJECTION INTRAVENOUS; SUBCUTANEOUS at 07:12

## 2025-07-19 RX ADMIN — APIXABAN 2.5 MG: 5 TABLET, FILM COATED ORAL at 19:50

## 2025-07-19 RX ADMIN — PREGABALIN 75 MG: 75 CAPSULE ORAL at 10:18

## 2025-07-19 NOTE — PLAN OF CARE
Problem: Chronic Conditions and Co-morbidities  Goal: Patient's chronic conditions and co-morbidity symptoms are monitored and maintained or improved  Outcome: Progressing  Flowsheets (Taken 7/18/2025 2000)  Care Plan - Patient's Chronic Conditions and Co-Morbidity Symptoms are Monitored and Maintained or Improved:   Monitor and assess patient's chronic conditions and comorbid symptoms for stability, deterioration, or improvement   Collaborate with multidisciplinary team to address chronic and comorbid conditions and prevent exacerbation or deterioration   Update acute care plan with appropriate goals if chronic or comorbid symptoms are exacerbated and prevent overall improvement and discharge     Problem: Discharge Planning  Goal: Discharge to home or other facility with appropriate resources  Outcome: Progressing  Flowsheets (Taken 7/18/2025 2000)  Discharge to home or other facility with appropriate resources:   Identify barriers to discharge with patient and caregiver   Arrange for needed discharge resources and transportation as appropriate   Identify discharge learning needs (meds, wound care, etc)   Refer to discharge planning if patient needs post-hospital services based on physician order or complex needs related to functional status, cognitive ability or social support system     Problem: Pain  Goal: Verbalizes/displays adequate comfort level or baseline comfort level  Outcome: Progressing  Flowsheets (Taken 7/18/2025 2000)  Verbalizes/displays adequate comfort level or baseline comfort level:   Encourage patient to monitor pain and request assistance   Assess pain using appropriate pain scale   Administer analgesics based on type and severity of pain and evaluate response   Implement non-pharmacological measures as appropriate and evaluate response   Consider cultural and social influences on pain and pain management   Notify Licensed Independent Practitioner if interventions unsuccessful or patient

## 2025-07-19 NOTE — PLAN OF CARE
Problem: Chronic Conditions and Co-morbidities  Goal: Patient's chronic conditions and co-morbidity symptoms are monitored and maintained or improved  7/19/2025 1516 by Liyah Bartlett RN  Outcome: Progressing  Flowsheets (Taken 7/18/2025 2000 by Annie Diehl, RN)  Care Plan - Patient's Chronic Conditions and Co-Morbidity Symptoms are Monitored and Maintained or Improved:   Monitor and assess patient's chronic conditions and comorbid symptoms for stability, deterioration, or improvement   Collaborate with multidisciplinary team to address chronic and comorbid conditions and prevent exacerbation or deterioration   Update acute care plan with appropriate goals if chronic or comorbid symptoms are exacerbated and prevent overall improvement and discharge  7/19/2025 0547 by Annie Diehl RN  Outcome: Progressing  Flowsheets (Taken 7/18/2025 2000)  Care Plan - Patient's Chronic Conditions and Co-Morbidity Symptoms are Monitored and Maintained or Improved:   Monitor and assess patient's chronic conditions and comorbid symptoms for stability, deterioration, or improvement   Collaborate with multidisciplinary team to address chronic and comorbid conditions and prevent exacerbation or deterioration   Update acute care plan with appropriate goals if chronic or comorbid symptoms are exacerbated and prevent overall improvement and discharge     Problem: Discharge Planning  Goal: Discharge to home or other facility with appropriate resources  7/19/2025 1516 by Liyah Bartlett, RN  Outcome: Progressing  Flowsheets (Taken 7/18/2025 2000 by Annie Diehl, RN)  Discharge to home or other facility with appropriate resources:   Identify barriers to discharge with patient and caregiver   Arrange for needed discharge resources and transportation as appropriate   Identify discharge learning needs (meds, wound care, etc)   Refer to discharge planning if patient needs post-hospital services based

## 2025-07-20 LAB
ALBUMIN SERPL-MCNC: 3.7 G/DL (ref 3.4–5)
ALBUMIN SERPL-MCNC: 3.7 G/DL (ref 3.4–5)
ALBUMIN SERPL-MCNC: 4.1 G/DL (ref 3.4–5)
ANION GAP SERPL CALCULATED.3IONS-SCNC: 10 MMOL/L (ref 3–16)
ANION GAP SERPL CALCULATED.3IONS-SCNC: 14 MMOL/L (ref 3–16)
ANION GAP SERPL CALCULATED.3IONS-SCNC: 9 MMOL/L (ref 3–16)
BUN SERPL-MCNC: 26 MG/DL (ref 7–20)
BUN SERPL-MCNC: 31 MG/DL (ref 7–20)
BUN SERPL-MCNC: 38 MG/DL (ref 7–20)
CA-I BLD-SCNC: 1.07 MMOL/L (ref 1.12–1.32)
CA-I BLD-SCNC: 1.14 MMOL/L (ref 1.12–1.32)
CALCIUM SERPL-MCNC: 9.2 MG/DL (ref 8.3–10.6)
CALCIUM SERPL-MCNC: 9.2 MG/DL (ref 8.3–10.6)
CALCIUM SERPL-MCNC: 9.5 MG/DL (ref 8.3–10.6)
CHLORIDE SERPL-SCNC: 102 MMOL/L (ref 99–110)
CHLORIDE SERPL-SCNC: 103 MMOL/L (ref 99–110)
CHLORIDE SERPL-SCNC: 103 MMOL/L (ref 99–110)
CO2 SERPL-SCNC: 21 MMOL/L (ref 21–32)
CO2 SERPL-SCNC: 23 MMOL/L (ref 21–32)
CO2 SERPL-SCNC: 25 MMOL/L (ref 21–32)
CREAT SERPL-MCNC: 5.1 MG/DL (ref 0.9–1.3)
CREAT SERPL-MCNC: 6.2 MG/DL (ref 0.9–1.3)
CREAT SERPL-MCNC: 7.1 MG/DL (ref 0.9–1.3)
GFR SERPLBLD CREATININE-BSD FMLA CKD-EPI: 10 ML/MIN/{1.73_M2}
GFR SERPLBLD CREATININE-BSD FMLA CKD-EPI: 13 ML/MIN/{1.73_M2}
GFR SERPLBLD CREATININE-BSD FMLA CKD-EPI: 9 ML/MIN/{1.73_M2}
GLUCOSE BLD-MCNC: 105 MG/DL (ref 70–99)
GLUCOSE BLD-MCNC: 115 MG/DL (ref 70–99)
GLUCOSE BLD-MCNC: 133 MG/DL (ref 70–99)
GLUCOSE BLD-MCNC: 135 MG/DL (ref 70–99)
GLUCOSE SERPL-MCNC: 148 MG/DL (ref 70–99)
GLUCOSE SERPL-MCNC: 180 MG/DL (ref 70–99)
GLUCOSE SERPL-MCNC: 182 MG/DL (ref 70–99)
MAGNESIUM SERPL-MCNC: 2.66 MG/DL (ref 1.8–2.4)
MAGNESIUM SERPL-MCNC: 2.7 MG/DL (ref 1.8–2.4)
MAGNESIUM SERPL-MCNC: 2.88 MG/DL (ref 1.8–2.4)
PERFORMED ON: ABNORMAL
PH VENOUS: 7.31 (ref 7.35–7.45)
PH VENOUS: 7.35 (ref 7.35–7.45)
PHOSPHATE SERPL-MCNC: 3.4 MG/DL (ref 2.5–4.9)
PHOSPHATE SERPL-MCNC: 3.8 MG/DL (ref 2.5–4.9)
PHOSPHATE SERPL-MCNC: 4.3 MG/DL (ref 2.5–4.9)
POTASSIUM SERPL-SCNC: 4.6 MMOL/L (ref 3.5–5.1)
POTASSIUM SERPL-SCNC: 4.8 MMOL/L (ref 3.5–5.1)
POTASSIUM SERPL-SCNC: 5.2 MMOL/L (ref 3.5–5.1)
SODIUM SERPL-SCNC: 136 MMOL/L (ref 136–145)
SODIUM SERPL-SCNC: 136 MMOL/L (ref 136–145)
SODIUM SERPL-SCNC: 138 MMOL/L (ref 136–145)

## 2025-07-20 PROCEDURE — 36592 COLLECT BLOOD FROM PICC: CPT

## 2025-07-20 PROCEDURE — 6370000000 HC RX 637 (ALT 250 FOR IP)

## 2025-07-20 PROCEDURE — 82330 ASSAY OF CALCIUM: CPT

## 2025-07-20 PROCEDURE — 1200000000 HC SEMI PRIVATE

## 2025-07-20 PROCEDURE — 80069 RENAL FUNCTION PANEL: CPT

## 2025-07-20 PROCEDURE — 36415 COLL VENOUS BLD VENIPUNCTURE: CPT

## 2025-07-20 PROCEDURE — 83735 ASSAY OF MAGNESIUM: CPT

## 2025-07-20 PROCEDURE — 6370000000 HC RX 637 (ALT 250 FOR IP): Performed by: INTERNAL MEDICINE

## 2025-07-20 RX ADMIN — CLOPIDOGREL BISULFATE 75 MG: 75 TABLET, FILM COATED ORAL at 08:24

## 2025-07-20 RX ADMIN — SODIUM ZIRCONIUM CYCLOSILICATE 10 G: 10 POWDER, FOR SUSPENSION ORAL at 05:44

## 2025-07-20 RX ADMIN — Medication 1 DROP: at 19:57

## 2025-07-20 RX ADMIN — PREGABALIN 75 MG: 75 CAPSULE ORAL at 08:24

## 2025-07-20 RX ADMIN — ATORVASTATIN CALCIUM 80 MG: 80 TABLET, FILM COATED ORAL at 20:06

## 2025-07-20 RX ADMIN — Medication: at 08:28

## 2025-07-20 RX ADMIN — NEOMYCIN SULFATE, POLYMYXIN B SULFATE AND DEXAMETHASONE 1 DROP: 3.5; 10000; 1 SUSPENSION OPHTHALMIC at 00:24

## 2025-07-20 RX ADMIN — NEOMYCIN SULFATE, POLYMYXIN B SULFATE AND DEXAMETHASONE 1 DROP: 3.5; 10000; 1 SUSPENSION OPHTHALMIC at 23:36

## 2025-07-20 RX ADMIN — APIXABAN 2.5 MG: 5 TABLET, FILM COATED ORAL at 20:06

## 2025-07-20 RX ADMIN — MICONAZOLE NITRATE: 20 CREAM TOPICAL at 19:58

## 2025-07-20 RX ADMIN — MIDODRINE HYDROCHLORIDE 15 MG: 5 TABLET ORAL at 08:24

## 2025-07-20 RX ADMIN — MIDODRINE HYDROCHLORIDE 15 MG: 5 TABLET ORAL at 14:19

## 2025-07-20 RX ADMIN — APIXABAN 2.5 MG: 5 TABLET, FILM COATED ORAL at 08:24

## 2025-07-20 RX ADMIN — NEOMYCIN SULFATE, POLYMYXIN B SULFATE AND DEXAMETHASONE 1 DROP: 3.5; 10000; 1 SUSPENSION OPHTHALMIC at 05:44

## 2025-07-20 RX ADMIN — Medication: at 19:59

## 2025-07-20 RX ADMIN — ASPIRIN 81 MG: 81 TABLET, COATED ORAL at 08:24

## 2025-07-20 ASSESSMENT — PAIN SCALES - GENERAL
PAINLEVEL_OUTOF10: 0
PAINLEVEL_OUTOF10: 0

## 2025-07-20 NOTE — PLAN OF CARE
Problem: Chronic Conditions and Co-morbidities  Goal: Patient's chronic conditions and co-morbidity symptoms are monitored and maintained or improved  7/20/2025 0839 by Carline Bartlett RN  Outcome: Progressing  Flowsheets (Taken 7/20/2025 0820)  Care Plan - Patient's Chronic Conditions and Co-Morbidity Symptoms are Monitored and Maintained or Improved: Monitor and assess patient's chronic conditions and comorbid symptoms for stability, deterioration, or improvement  7/20/2025 0502 by Gerald Lomeli RN  Outcome: Progressing     Problem: Discharge Planning  Goal: Discharge to home or other facility with appropriate resources  7/20/2025 0839 by Carline Bartlett RN  Outcome: Progressing  Flowsheets (Taken 7/20/2025 0820)  Discharge to home or other facility with appropriate resources: Refer to discharge planning if patient needs post-hospital services based on physician order or complex needs related to functional status, cognitive ability or social support system  7/20/2025 0502 by Gerald Lomeli RN  Outcome: Progressing     Problem: Pain  Goal: Verbalizes/displays adequate comfort level or baseline comfort level  7/20/2025 0839 by Carline Bartlett RN  Outcome: Progressing  7/20/2025 0502 by Gerald Lomeli RN  Outcome: Progressing     Problem: Skin/Tissue Integrity  Goal: Skin integrity remains intact  Description: 1.  Monitor for areas of redness and/or skin breakdown  2.  Assess vascular access sites hourly  3.  Every 4-6 hours minimum:  Change oxygen saturation probe site  4.  Every 4-6 hours:  If on nasal continuous positive airway pressure, respiratory therapy assess nares and determine need for appliance change or resting period  7/20/2025 0839 by Carline Bartlett RN  Outcome: Progressing  Flowsheets (Taken 7/20/2025 0820)  Skin Integrity Remains Intact:   Monitor for areas of redness and/or skin breakdown   Assess vascular access sites hourly   Every 4-6 hours minimum:  Change oxygen saturation probe site   Every

## 2025-07-20 NOTE — FLOWSHEET NOTE
07/20/25 1111   Psychosocial   Patient Behaviors (S)  Sexually inappropriate (comment)  (pt is asking this RN if he can \"provide oral sex\" and saying that this RN is \"making him hard\". Charge nurse notified.)     Security up to talk to patient regarding his inappropriate comments.

## 2025-07-20 NOTE — PLAN OF CARE
Problem: Chronic Conditions and Co-morbidities  Goal: Patient's chronic conditions and co-morbidity symptoms are monitored and maintained or improved  7/20/2025 0502 by Gerald Lomeli RN  Outcome: Progressing     Problem: Discharge Planning  Goal: Discharge to home or other facility with appropriate resources  7/20/2025 0502 by Gerald Lomeli RN  Outcome: Progressing     Problem: Pain  Goal: Verbalizes/displays adequate comfort level or baseline comfort level  7/20/2025 0502 by Gerald Lomeli RN  Outcome: Progressing     Problem: Skin/Tissue Integrity  Goal: Skin integrity remains intact  Description: 1.  Monitor for areas of redness and/or skin breakdown  2.  Assess vascular access sites hourly  3.  Every 4-6 hours minimum:  Change oxygen saturation probe site  4.  Every 4-6 hours:  If on nasal continuous positive airway pressure, respiratory therapy assess nares and determine need for appliance change or resting period  7/20/2025 0502 by Gerald Lomeli RN  Outcome: Progressing     Problem: ABCDS Injury Assessment  Goal: Absence of physical injury  7/20/2025 0502 by Gerald Lomeli RN  Outcome: Progressing     Problem: Safety - Adult  Goal: Free from fall injury  7/20/2025 0502 by Gerald Lomeli RN  Outcome: Progressing     Problem: Skin/Tissue Integrity - Adult  Goal: Skin integrity remains intact  Description: 1.  Monitor for areas of redness and/or skin breakdown  2.  Assess vascular access sites hourly  3.  Every 4-6 hours minimum:  Change oxygen saturation probe site  4.  Every 4-6 hours:  If on nasal continuous positive airway pressure, respiratory therapy assess nares and determine need for appliance change or resting period  7/20/2025 0502 by Gerald Lomeli RN  Outcome: Progressing     Problem: Skin/Tissue Integrity - Adult  Goal: Incisions, wounds, or drain sites healing without S/S of infection  7/20/2025 0502 by Gerald Lomeli RN  Outcome: Progressing     Problem: Skin/Tissue Integrity -

## 2025-07-21 LAB
GLUCOSE BLD-MCNC: 168 MG/DL (ref 70–99)
GLUCOSE BLD-MCNC: 174 MG/DL (ref 70–99)
GLUCOSE BLD-MCNC: 84 MG/DL (ref 70–99)
PERFORMED ON: ABNORMAL
PERFORMED ON: ABNORMAL
PERFORMED ON: NORMAL

## 2025-07-21 PROCEDURE — 90935 HEMODIALYSIS ONE EVALUATION: CPT

## 2025-07-21 PROCEDURE — 6370000000 HC RX 637 (ALT 250 FOR IP)

## 2025-07-21 PROCEDURE — 6370000000 HC RX 637 (ALT 250 FOR IP): Performed by: INTERNAL MEDICINE

## 2025-07-21 PROCEDURE — 1200000000 HC SEMI PRIVATE

## 2025-07-21 RX ADMIN — MIDODRINE HYDROCHLORIDE 15 MG: 5 TABLET ORAL at 08:53

## 2025-07-21 RX ADMIN — ATORVASTATIN CALCIUM 80 MG: 80 TABLET, FILM COATED ORAL at 20:50

## 2025-07-21 RX ADMIN — MICONAZOLE NITRATE: 20 CREAM TOPICAL at 08:54

## 2025-07-21 RX ADMIN — MIDODRINE HYDROCHLORIDE 15 MG: 5 TABLET ORAL at 17:35

## 2025-07-21 RX ADMIN — MIDODRINE HYDROCHLORIDE 5 MG: 5 TABLET ORAL at 14:00

## 2025-07-21 RX ADMIN — Medication: at 20:52

## 2025-07-21 RX ADMIN — ASPIRIN 81 MG: 81 TABLET, COATED ORAL at 08:53

## 2025-07-21 RX ADMIN — PREGABALIN 75 MG: 75 CAPSULE ORAL at 08:54

## 2025-07-21 RX ADMIN — CLOPIDOGREL BISULFATE 75 MG: 75 TABLET, FILM COATED ORAL at 08:53

## 2025-07-21 RX ADMIN — MICONAZOLE NITRATE: 20 CREAM TOPICAL at 20:52

## 2025-07-21 RX ADMIN — Medication: at 08:54

## 2025-07-21 RX ADMIN — APIXABAN 2.5 MG: 5 TABLET, FILM COATED ORAL at 08:53

## 2025-07-21 RX ADMIN — APIXABAN 2.5 MG: 5 TABLET, FILM COATED ORAL at 20:50

## 2025-07-21 NOTE — CARE COORDINATION
Patient is on room air, now. Has HD 5 days a week. Will have HD here today. Nephrology stating the need for MWF from now on. Expected to discharge back to LTC facility at [facility] with no precert needed. CM will continue to follow patient until discharge.  Electronically signed by Claire Patrick RN on 7/21/2025 at 12:09 PM

## 2025-07-21 NOTE — PLAN OF CARE
Problem: Discharge Planning  Goal: Discharge to home or other facility with appropriate resources  Outcome: Progressing  Flowsheets (Taken 7/21/2025 0112)  Discharge to home or other facility with appropriate resources: Identify barriers to discharge with patient and caregiver  Note: Case management following for d/c needs.       Problem: Pain  Goal: Verbalizes/displays adequate comfort level or baseline comfort level  Outcome: Progressing  Flowsheets (Taken 7/21/2025 0112)  Verbalizes/displays adequate comfort level or baseline comfort level:   Encourage patient to monitor pain and request assistance   Assess pain using appropriate pain scale  Note: Denies pain.       Problem: Skin/Tissue Integrity  Goal: Skin integrity remains intact  Description: 1.  Monitor for areas of redness and/or skin breakdown  2.  Assess vascular access sites hourly  3.  Every 4-6 hours minimum:  Change oxygen saturation probe site  4.  Every 4-6 hours:  If on nasal continuous positive airway pressure, respiratory therapy assess nares and determine need for appliance change or resting period  Outcome: Progressing  Flowsheets (Taken 7/21/2025 0112)  Skin Integrity Remains Intact:   Monitor for areas of redness and/or skin breakdown   Turn and reposition as indicated  Note: Multiple skin tears, wounds to buttocks, R groin, and bilateral feet.  Patient repositions self.  Venelex and barrier cream applied.  Will monitor.       Problem: Safety - Adult  Goal: Free from fall injury  Outcome: Progressing  Note: Fall precautions in place, bed alarm activated.  Call light and belongings within reach.  Patient encouraged to call with needs and concerns.  Continue to monitor safety.       Problem: Metabolic/Fluid and Electrolytes - Adult  Goal: Electrolytes maintained within normal limits  Outcome: Progressing  Note: Monitoring daily and prn.

## 2025-07-21 NOTE — PLAN OF CARE
Problem: Chronic Conditions and Co-morbidities  Goal: Patient's chronic conditions and co-morbidity symptoms are monitored and maintained or improved  7/21/2025 1716 by Ade Wise RN  Outcome: Progressing  7/21/2025 1413 by Ade Wise RN  Outcome: Progressing     Problem: Discharge Planning  Goal: Discharge to home or other facility with appropriate resources  7/21/2025 1716 by Ade Wise RN  Outcome: Progressing  7/21/2025 1413 by Ade Wise RN  Outcome: Progressing  Flowsheets (Taken 7/21/2025 0850)  Discharge to home or other facility with appropriate resources: Identify barriers to discharge with patient and caregiver     Problem: Pain  Goal: Verbalizes/displays adequate comfort level or baseline comfort level  7/21/2025 1716 by Ade Wise RN  Outcome: Progressing  7/21/2025 1413 by Ade Wise RN  Outcome: Progressing     Problem: Skin/Tissue Integrity  Goal: Skin integrity remains intact  Description: 1.  Monitor for areas of redness and/or skin breakdown  2.  Assess vascular access sites hourly  3.  Every 4-6 hours minimum:  Change oxygen saturation probe site  4.  Every 4-6 hours:  If on nasal continuous positive airway pressure, respiratory therapy assess nares and determine need for appliance change or resting period  7/21/2025 1716 by Ade Wise RN  Outcome: Progressing  7/21/2025 1413 by Ade Wise RN  Outcome: Progressing  Flowsheets (Taken 7/21/2025 0850)  Skin Integrity Remains Intact: Monitor for areas of redness and/or skin breakdown     Problem: ABCDS Injury Assessment  Goal: Absence of physical injury  7/21/2025 1716 by Ade Wise RN  Outcome: Progressing  7/21/2025 1413 by Ade Wise RN  Outcome: Progressing     Problem: Safety - Adult  Goal: Free from fall injury  7/21/2025 1716 by Ade Wise RN  Outcome: Progressing  7/21/2025 1413 by Ade Wise RN  Outcome: Progressing     Problem: Skin/Tissue Integrity - Adult  Goal: Skin integrity remains

## 2025-07-21 NOTE — PLAN OF CARE
Problem: Chronic Conditions and Co-morbidities  Goal: Patient's chronic conditions and co-morbidity symptoms are monitored and maintained or improved  Outcome: Progressing     Problem: Discharge Planning  Goal: Discharge to home or other facility with appropriate resources  7/21/2025 1413 by Ade Wise RN  Outcome: Progressing  Flowsheets (Taken 7/21/2025 0850)  Discharge to home or other facility with appropriate resources: Identify barriers to discharge with patient and caregiver  7/21/2025 0112 by Sabine Daniels RN  Outcome: Progressing  Flowsheets (Taken 7/21/2025 0112)  Discharge to home or other facility with appropriate resources: Identify barriers to discharge with patient and caregiver  Note: Case management following for d/c needs.       Problem: Pain  Goal: Verbalizes/displays adequate comfort level or baseline comfort level  7/21/2025 1413 by Ade Wise RN  Outcome: Progressing  7/21/2025 0112 by Sabine Daniels RN  Outcome: Progressing  Flowsheets (Taken 7/21/2025 0112)  Verbalizes/displays adequate comfort level or baseline comfort level:   Encourage patient to monitor pain and request assistance   Assess pain using appropriate pain scale  Note: Denies pain.       Problem: Skin/Tissue Integrity  Goal: Skin integrity remains intact  Description: 1.  Monitor for areas of redness and/or skin breakdown  2.  Assess vascular access sites hourly  3.  Every 4-6 hours minimum:  Change oxygen saturation probe site  4.  Every 4-6 hours:  If on nasal continuous positive airway pressure, respiratory therapy assess nares and determine need for appliance change or resting period  7/21/2025 1413 by Ade Wise RN  Outcome: Progressing  Flowsheets (Taken 7/21/2025 0850)  Skin Integrity Remains Intact: Monitor for areas of redness and/or skin breakdown  7/21/2025 0112 by Sabine Daniels RN  Outcome: Progressing  Flowsheets (Taken 7/21/2025 0112)  Skin Integrity Remains Intact:   Monitor for areas of

## 2025-07-22 VITALS
HEART RATE: 67 BPM | OXYGEN SATURATION: 92 % | BODY MASS INDEX: 35.2 KG/M2 | SYSTOLIC BLOOD PRESSURE: 114 MMHG | RESPIRATION RATE: 18 BRPM | DIASTOLIC BLOOD PRESSURE: 74 MMHG | TEMPERATURE: 97.4 F | WEIGHT: 304.24 LBS | HEIGHT: 78 IN

## 2025-07-22 PROCEDURE — 6370000000 HC RX 637 (ALT 250 FOR IP): Performed by: INTERNAL MEDICINE

## 2025-07-22 PROCEDURE — 6370000000 HC RX 637 (ALT 250 FOR IP)

## 2025-07-22 RX ORDER — PREGABALIN 75 MG/1
75 CAPSULE ORAL DAILY
Qty: 60 CAPSULE | Refills: 3 | Status: SHIPPED | OUTPATIENT
Start: 2025-07-22 | End: 2025-07-22

## 2025-07-22 RX ORDER — PREGABALIN 75 MG/1
75 CAPSULE ORAL DAILY
Qty: 60 CAPSULE | Refills: 3 | Status: SHIPPED | OUTPATIENT
Start: 2025-07-22 | End: 2025-08-21

## 2025-07-22 RX ORDER — TRAMADOL HYDROCHLORIDE 50 MG/1
25 TABLET ORAL EVERY 6 HOURS PRN
Qty: 9 TABLET | Refills: 0 | Status: SHIPPED | OUTPATIENT
Start: 2025-07-22 | End: 2025-07-25

## 2025-07-22 RX ORDER — TRAMADOL HYDROCHLORIDE 50 MG/1
25 TABLET ORAL EVERY 6 HOURS PRN
Qty: 9 TABLET | Refills: 0 | Status: SHIPPED | OUTPATIENT
Start: 2025-07-22 | End: 2025-07-22

## 2025-07-22 RX ORDER — MIDODRINE HYDROCHLORIDE 5 MG/1
15 TABLET ORAL
Qty: 90 TABLET | Refills: 3 | Status: SHIPPED | OUTPATIENT
Start: 2025-07-22

## 2025-07-22 RX ORDER — SEVELAMER CARBONATE 800 MG/1
1 TABLET, FILM COATED ORAL
Qty: 90 TABLET | Refills: 0
Start: 2025-07-22 | End: 2025-08-21

## 2025-07-22 RX ADMIN — ASPIRIN 81 MG: 81 TABLET, COATED ORAL at 08:39

## 2025-07-22 RX ADMIN — APIXABAN 2.5 MG: 5 TABLET, FILM COATED ORAL at 08:39

## 2025-07-22 RX ADMIN — Medication: at 08:40

## 2025-07-22 RX ADMIN — MICONAZOLE NITRATE: 20 CREAM TOPICAL at 08:40

## 2025-07-22 RX ADMIN — CLOPIDOGREL BISULFATE 75 MG: 75 TABLET, FILM COATED ORAL at 08:39

## 2025-07-22 RX ADMIN — MIDODRINE HYDROCHLORIDE 15 MG: 5 TABLET ORAL at 08:39

## 2025-07-22 RX ADMIN — PREGABALIN 75 MG: 75 CAPSULE ORAL at 08:40

## 2025-07-22 RX ADMIN — MIDODRINE HYDROCHLORIDE 15 MG: 5 TABLET ORAL at 12:40

## 2025-07-22 NOTE — DISCHARGE INSTR - COC
Continuity of Care Form    Patient Name: Serafin Weaver   :  1980  MRN:  [id number]    Admit date:  2025  Discharge date:  25    Code Status Order: Full Code   Advance Directives:     Admitting Physician:  Santos Mathew MD  PCP: Shelia Hernandez MD    Discharging Nurse: Ade GARCIA  Discharging Hospital Unit/Room#: 6316/6316-01  Discharging Unit Phone Number: [phone]     Emergency Contact:   Extended Emergency Contact Information  Primary Emergency Contact: OwenAnnie  Address: [address] [place] [address]            [address]  Home Phone: [phone]  Relation: Parent  Secondary Emergency Contact: Ghislaine Meyer  Mobile Phone: [phone]  Relation: Other  Preferred language: English   needed? No    Past Surgical History:  Past Surgical History:   Procedure Laterality Date    ACHILLES TENDON SURGERY Right 2024    . performed by Kailee Olsen DPM at [facility] OR    AMPUTATION      left 3rd toe    FOOT AMPUTATION Left 2019    INCISION AND DRAINAGE, REVISION OF TRANSMETATARSAL AMPUTATION LEFT FOOT performed by Kailee Olsen DPM at [facility] OR    FOOT DEBRIDEMENT Right 2024    RIGHTFOOT INCISION AND DRAINAGE WITH PARTIAL FIFTH RAY RESECTION performed by Kailee Olsen DPM at [facility] OR    FOOT DEBRIDEMENT Right 2024    RIGHT FOOT INCISION AND DRAINAGE WITH REMOVAL OF ALL NONVIABLE TISSUE AND BONE performed by Roverto Tanner DPM at [facility] OR    FOOT DEBRIDEMENT Right 2024    RIGHT FOOT INCISION AND DRAINAGE WITH FIFTH METATARSAL RESECTION PERONEAL TENDON TRANSFER performed by Kailee Olsen DPM at [facility] OR    INVASIVE VASCULAR N/A 2024    Angiography lower ext right performed by Hubert Mirza MD at [facility] CARDIAC CATH LAB    OTHER SURGICAL HISTORY  2015    incision and drainage scrotal abcess  wound vac on    TOE AMPUTATION Right     partial great toe    TOE AMPUTATION Right 2024    Transmetatarsal amputation with tendeno-achilles

## 2025-07-22 NOTE — DISCHARGE SUMMARY
V2.0  Discharge Summary    Name:  Serafin Weaver /Age/Sex: 1980 (45 y.o. male)   Admit Date: 2025  Discharge Date: 25    MRN & CSN:  [id number] & [id number] Encounter Date and Time 25 10:36 AM EDT    Attending:  Flor Flores MD Discharging Provider: Flor Flores MD       Hospital Course:     Hospital course:     Serafin Weaver is a 45 y.o. male with pmh of diabetes, MRSA ESRD who presents with hyperkalemia in the setting of missing ESRD.  Potassium on admission 9.1 was treated aggressively in the ED with hyperkalemia protocol and underwent emergent dialysis with CRRT as patient was hypotensive.  Patient will be discharged on midodrine and his home blood pressure medications have been held.  Patient has been educated about compliance with dialysis        Plan:      ESRD-tolerated HD yesterday  - Patient was unable to tolerate hemodialysis started on CRRT.  Vas-Cath placed on .  Vas-Cath removed on   - Nephrology consulted, consult appreciated  - On dialysis 5 days a week at the [facility] [place] as patient refuses multiple dialysis        Hyperkalemia-improved.  Patient refused labs prior to discharge  --Treated in the ED by hyperkalemia protocol  -Potassium trending down, potassium 9.1 on admission  -Monitor     Hypotension- blood pressure relatively stable on midodrine  - Patient has chronic hypotension  -  on midodrine  - No focus of infection     Intertrigo with groin ulcer-improved  with topical treatment  - Wound care consulted  - In the ED got  broad-spectrum antibiotics     Hypophosphatemia-improved  -  Renvela, restart on discharge        Diabetes type 2  - Not on any medication at home  - Last A1c 5 .2> repeat A1c 6.5     History of hypercoagulable disease positive anticardiolipin IgM, 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

## 2025-07-22 NOTE — PLAN OF CARE
Problem: Chronic Conditions and Co-morbidities  Goal: Patient's chronic conditions and co-morbidity symptoms are monitored and maintained or improved  7/22/2025 1040 by Ade Wise RN  Outcome: Adequate for Discharge  7/21/2025 2156 by Gabriela Arevalo RN  Outcome: Progressing     Problem: Discharge Planning  Goal: Discharge to home or other facility with appropriate resources  7/22/2025 1040 by Ade Wise RN  Outcome: Adequate for Discharge  7/21/2025 2156 by Gabriela Arevalo RN  Outcome: Progressing     Problem: Pain  Goal: Verbalizes/displays adequate comfort level or baseline comfort level  7/22/2025 1040 by Ade Wise RN  Outcome: Adequate for Discharge  7/21/2025 2156 by Gabriela Arevalo RN  Outcome: Progressing     Problem: Skin/Tissue Integrity  Goal: Skin integrity remains intact  Description: 1.  Monitor for areas of redness and/or skin breakdown  2.  Assess vascular access sites hourly  3.  Every 4-6 hours minimum:  Change oxygen saturation probe site  4.  Every 4-6 hours:  If on nasal continuous positive airway pressure, respiratory therapy assess nares and determine need for appliance change or resting period  7/22/2025 1040 by Ade Wise RN  Outcome: Adequate for Discharge  Flowsheets (Taken 7/22/2025 0841)  Skin Integrity Remains Intact: Monitor for areas of redness and/or skin breakdown  7/21/2025 2156 by Gabriela Arevalo RN  Outcome: Progressing     Problem: ABCDS Injury Assessment  Goal: Absence of physical injury  7/22/2025 1040 by Ade Wise RN  Outcome: Adequate for Discharge  7/21/2025 2156 by Gabriela Arevalo RN  Outcome: Progressing     Problem: Safety - Adult  Goal: Free from fall injury  7/22/2025 1040 by Ade Wise RN  Outcome: Adequate for Discharge  7/21/2025 2156 by Gabriela Arevalo RN  Outcome: Progressing     Problem: Skin/Tissue Integrity - Adult  Goal: Skin integrity remains intact  Description: 1.  Monitor for areas of redness and/or skin breakdown  2.  Assess vascular

## 2025-07-22 NOTE — PROGRESS NOTES
Ph: [phone], Fax: [phone]           [facility]               Reason for admission:                 Missed hemodialysis    Brief Summary :     Serafin Weaver is being seen by nephrology for ESRD on hemodialysis.      Interval History and plan:        CRRT was discontinued  on 7/19/2025  Current  dose of midodrine at 15 mg 3 times daily  Out of ICU now  Got dialysis yesterday/ tolerated well    BP soft    Plan:    No indication for dialysis today  Will to MWF  Start on Retacrit        Assessment :          1.  ESRD:  Will plan HD per schedule.  He gets 5 days a week dialysis nursing home but refuses most of the time.  He came with severe azotemia and will do daily small treatments to prevent dialysis disequilibrium syndrome.    Access: AV fistula with good bruit and thrill.  Daily weights  Fluid restriction: 1 L.  Nephrocap 1 tab PO daily    2.  Anemia:  Erythropoetin dose: I will continue Procrit with dialysis to keep hemoglobin between 10-11.    3.  Osteodystrophy:  Phosphate Binder: I will check PTH, vitamin D and renal panel.  Continue phosphorus binders, renal diet and daily dialysis.    4.  Severe hyperkalemia, anion gap acidosis: Will arrange for urgent dialysis patient was told that he is at higher mortality risk.  He was given medical management in the ER.             [facility] would like to thank Flor Flores MD   for opportunity to serve this patient      Please call with questions at-   24 Hrs Answering service [phone] or  7 am- 5 pm via Perfect serve or cell phone  Dr.Sudhir Vincent MD       HPI :     Serafin Weaver is a 45 y.o. male presented to   the hospital on 7/14/2025 with missed hemodialysis.    He has past medical history of type 2 diabetes with complications including neuropathy, peripheral vascular disease and ESRD.  He gets dialysis 5 days a week at [facility] under our care.  He often refuses to go for dialysis.  Nurses 
       Ph: [phone], Fax: [phone]           [facility]               Reason for admission:                 Missed hemodialysis    Brief Summary :     Serafin Weaver is being seen by nephrology for ESRD on hemodialysis.      Interval History and plan:      Blood pressure is low and is on pressors   Seen on CRRT in MICU  Electrolytes are overall stable with potassium of 3.9, CO2 20  Sodium 137  Phosphorus low at 1.8  Prior dose of midodrine at 15 mg 3 times daily  Renvela is on hold  BP good and not requiring vasopressors since yesterday morning    Plan:    Discontinue CRRT  Continue electrolyte replacement as per protocol  Okay to check lab once a day  Will plan on doing dialysis on Monday or Tuesday       Assessment :          1.  ESRD:  Will plan HD per schedule.  He gets 5 days a week dialysis nursing home but refuses most of the time.  He came with severe azotemia and will do daily small treatments to prevent dialysis disequilibrium syndrome.    Access: AV fistula with good bruit and thrill.  Daily weights  Fluid restriction: 1 L.  Nephrocap 1 tab PO daily    2.  Anemia:  Erythropoetin dose: I will continue Procrit with dialysis to keep hemoglobin between 10-11.    3.  Osteodystrophy:  Phosphate Binder: I will check PTH, vitamin D and renal panel.  Continue phosphorus binders, renal diet and daily dialysis.    4.  Severe hyperkalemia, anion gap acidosis: Will arrange for urgent dialysis patient was told that he is at higher mortality risk.  He was given medical management in the ER.             [facility] Nephrology would like to thank Flor Flores MD   for opportunity to serve this patient      Please call with questions at-   24 Hrs Answering service [phone] or  7 am- 5 pm via Perfect serve or cell phone  Dr.Sudhir Vincent MD       HPI :     Serafin Weaver is a 45 y.o. male presented to   the hospital on 7/14/2025 with missed hemodialysis.    He has past medical 
       Ph: [phone], Fax: [phone]           [facility]               Reason for admission:                 Missed hemodialysis    Brief Summary :     Serafin Weaver is being seen by nephrology for ESRD on hemodialysis.      Interval History and plan:        CRRT was discontinued  on 7/19/2025  Current  dose of midodrine at 15 mg 3 times daily  Renvela is on hold  He is out of ICU now  Sleeping comfortably  On room air, has some edema    Plan:    Will do dialysis Monday Wednesday Friday from now on  Blood pressure is overall stable on above    Blood pressure today with lower blood pressure to ensure that he tolerates well given hypotension  May also need albumin with dialysis  Orders placed and discussed with dialysis RN       Assessment :          1.  ESRD:  Will plan HD per schedule.  He gets 5 days a week dialysis nursing home but refuses most of the time.  He came with severe azotemia and will do daily small treatments to prevent dialysis disequilibrium syndrome.    Access: AV fistula with good bruit and thrill.  Daily weights  Fluid restriction: 1 L.  Nephrocap 1 tab PO daily    2.  Anemia:  Erythropoetin dose: I will continue Procrit with dialysis to keep hemoglobin between 10-11.    3.  Osteodystrophy:  Phosphate Binder: I will check PTH, vitamin D and renal panel.  Continue phosphorus binders, renal diet and daily dialysis.    4.  Severe hyperkalemia, anion gap acidosis: Will arrange for urgent dialysis patient was told that he is at higher mortality risk.  He was given medical management in the ER.             [facility] Nephrology would like to thank Flor Flores MD   for opportunity to serve this patient      Please call with questions at-   24 Hrs Answering service [phone] or  7 am- 5 pm via Perfect serve or cell phone  Dr.Sudhir Vincent MD       HPI :     Serafin Weaver is a 45 y.o. male presented to   the hospital on 7/14/2025 with missed hemodialysis.    He 
       Ph: [phone], Fax: [phone]           [facility]               Reason for admission:                 Missed hemodialysis    Brief Summary :     Serafin Weaver is being seen by nephrology for ESRD on hemodialysis.      Interval History and plan:        CRRT was discontinued yesterday on 7/19/2025  Current  dose of midodrine at 15 mg 3 times daily  Renvela is on hold    Plan:    Will do dialysis Monday Wednesday Friday from now on  Blood pressure is overall stable on above     Assessment :          1.  ESRD:  Will plan HD per schedule.  He gets 5 days a week dialysis nursing home but refuses most of the time.  He came with severe azotemia and will do daily small treatments to prevent dialysis disequilibrium syndrome.    Access: AV fistula with good bruit and thrill.  Daily weights  Fluid restriction: 1 L.  Nephrocap 1 tab PO daily    2.  Anemia:  Erythropoetin dose: I will continue Procrit with dialysis to keep hemoglobin between 10-11.    3.  Osteodystrophy:  Phosphate Binder: I will check PTH, vitamin D and renal panel.  Continue phosphorus binders, renal diet and daily dialysis.    4.  Severe hyperkalemia, anion gap acidosis: Will arrange for urgent dialysis patient was told that he is at higher mortality risk.  He was given medical management in the ER.             [facility]phrology would like to thank Flor Flores MD   for opportunity to serve this patient      Please call with questions at-   24 Hrs Answering service [phone] or  7 am- 5 pm via Perfect serve or cell phone  Dr.Sudhir Vincent MD       HPI :     Serafin Weaver is a 45 y.o. male presented to   the hospital on 7/14/2025 with missed hemodialysis.    He has past medical history of type 2 diabetes with complications including neuropathy, peripheral vascular disease and ESRD.  He gets dialysis 5 days a week at [facility] under our care.  He often refuses to go for dialysis.  Nurses in the 
       Ph: [phone], Fax: [phone]           [facility]               Reason for admission:                 Missed hemodialysis    Brief Summary :     Serafin Weaver is being seen by nephrology for ESRD on hemodialysis.      Interval History and plan:      Blood pressure is low and is on pressors   Seen on CRRT in MICU  BUN is improving 206 > 152 >51> 19  Creatinine is improving 36> 23 >8.1> 3.6  K + is improving 9.1 > 6.7 > 4.4   Other labs reviewed     Plan:    -Continue CRRT as prescribed . He is hemodynamically unstable to tolerate HD. Clearance is acceptable . He may transition to HD once BP is better and off pressor    - Adherence to out patient dialysis schedule stressed     - electrolytes are improving with CRRT    - increase midodrine 15 mg TID    - hold Renvela with meals for now                    Assessment :          1.  ESRD:  Will plan HD per schedule.  He gets 5 days a week dialysis nursing home but refuses most of the time.  He came with severe azotemia and will do daily small treatments to prevent dialysis disequilibrium syndrome.    Access: AV fistula with good bruit and thrill.  Daily weights  Fluid restriction: 1 L.  Nephrocap 1 tab PO daily    2.  Anemia:  Erythropoetin dose: I will continue Procrit with dialysis to keep hemoglobin between 10-11.    3.  Osteodystrophy:  Phosphate Binder: I will check PTH, vitamin D and renal panel.  Continue phosphorus binders, renal diet and daily dialysis.    4.  Severe hyperkalemia, anion gap acidosis: Will arrange for urgent dialysis patient was told that he is at higher mortality risk.  He was given medical management in the ER.             [facility] Nephrology would like to thank Flor Flores MD   for opportunity to serve this patient      Please call with questions at-   24 Hrs Answering service [phone] or  7 am- 5 pm via Perfect serve or cell phone  Dr.Muhammad Rizwana Hernandez MD       HPI :     Serafin Weaver is a 
       Ph: [phone], Fax: [phone]           [facility]               Reason for admission:                 Missed hemodialysis    Brief Summary :     Serafin Weaver is being seen by nephrology for ESRD on hemodialysis.      Interval History and plan:      Blood pressure is low and is on pressors   Seen on CRRT in MICU  BUN is improving 206 > 152 >51  Creatinine is improving 36> 23 >8.1  K + is improving 9.1 > 6.7 > 4.3   Other labs reviewed     Plan:    -Continue CRRT as prescribed . Prescription changed . He is hemodynamically unstable to tolerate HD. Clearance is acceptable     -Adherence to out patient dialysis schedule stressed     - electrolytes are improving with CRRT                   Assessment :          1.  ESRD:  Will plan HD per schedule.  He gets 5 days a week dialysis nursing home but refuses most of the time.  He came with severe azotemia and will do daily small treatments to prevent dialysis disequilibrium syndrome.    Access: AV fistula with good bruit and thrill.  Daily weights  Fluid restriction: 1 L.  Nephrocap 1 tab PO daily    2.  Anemia:  Erythropoetin dose: I will continue Procrit with dialysis to keep hemoglobin between 10-11.    3.  Osteodystrophy:  Phosphate Binder: I will check PTH, vitamin D and renal panel.  Continue phosphorus binders, renal diet and daily dialysis.    4.  Severe hyperkalemia, anion gap acidosis: Will arrange for urgent dialysis patient was told that he is at higher mortality risk.  He was given medical management in the ER.             [facility] Nephrology would like to thank Flor Flores MD   for opportunity to serve this patient      Please call with questions at-   24 Hrs Answering service [phone] or  7 am- 5 pm via Perfect serve or cell phone  Dr.Muhammad Rizwana Hernandez MD       HPI :     Serafin Weaver is a 45 y.o. male presented to   the hospital on 7/14/2025 with missed hemodialysis.    He has past medical history of 
     ICU Progress Note    Admit Date: 7/14/2025  Hospital Day:  Hospital Day: 3  ICU Day: 1d 10h        Interval History   Patient was on CRRT this morning.  Patient reports to be feeling better since his admission.  Groin pain is improved, but will still bother him when he touches the area.  Patient reported last night he had a brief episode of lightheadedness, but that is not new for him, he has chronic hypotension which he takes midodrine at home for.  Patient did report a slight stomach pain this morning, he was non tender to palpation and patient reports last bowl movement was before his admission here.       Interval Plan:  CRRT per nephrology  Potassium WNL  Wean vasopressors as tolerated  Miconazole for groin wound intertrigo.   No bowel movement will recommend bowel regimen.     ROS:   Review of Systems   Constitutional:  Negative for chills, fatigue and fever.   Eyes:  Negative for pain.   Respiratory:  Negative for choking, chest tightness, shortness of breath and wheezing.    Cardiovascular:  Negative for chest pain and palpitations.   Gastrointestinal:  Positive for abdominal pain and constipation. Negative for vomiting.   Neurological:  Positive for light-headedness. Negative for syncope, weakness and headaches.         Objective     MEDICATIONS:  Scheduled:     aspirin  81 mg Oral Daily    atorvastatin  80 mg Oral QHS    [Held by provider] carvedilol  6.25 mg Oral BID    clopidogrel  75 mg Oral Daily    pregabalin  75 mg Oral Daily    sevelamer  800 mg Oral TID WC    sodium zirconium cyclosilicate  10 g Oral Once per day on Monday Wednesday Friday    balsum peru-castor oil   Topical BID    apixaban  2.5 mg Oral BID    miconazole   Topical BID    heparin (porcine)  10,000 Units IntraVENous Once     Continuous Infusions:     prismaSATE BGK 4/2.5 2,000 mL/hr at 07/16/25 0445    prismaSATE BGK 4/2.5 1,000 mL/hr at 07/16/25 0223    prismaSATE BGK 4/2.5 500 mL/hr at 07/16/25 0223    dextrose      
     ICU Progress Note    Admit Date: 7/14/2025  Hospital Day:  Hospital Day: 4  ICU Day: 2d 10h        Interval History   Patient was seen this morning and patient reports he was feeling better overall today. Patient reported having some mild nausea overnight but was given PRN Zofran.  Patient reports that nausea was much better today.  Patient also reports that groin pain is better.  Patient denies any chest pain, shortness of breath, heart palpitations.  No faintness or lightheadedness was reported.  Patient still reports no BM since admission but has not had any abdominal pain since yesterday.       Interval Plan:  Continue CRRT per nephrology  Potassium WNL  Creatinine 5.3  WBC 11.4  Wean vasopressors as tolerated.  Midodrine TID.  Miconazole for groin wound intertrigo.   No bowel movement may recommend bowel regimen.     ROS:   Review of Systems   Constitutional:  Negative for chills, fatigue and fever.   Eyes:  Negative for photophobia and pain.   Respiratory:  Negative for choking, chest tightness, shortness of breath and wheezing.    Cardiovascular:  Negative for chest pain and palpitations.   Gastrointestinal:  Positive for constipation. Negative for nausea and vomiting.   Neurological:  Negative for dizziness, syncope, weakness, light-headedness and headaches.         Objective     MEDICATIONS:  Scheduled:     midodrine  10 mg Oral TID WC    aspirin  81 mg Oral Daily    atorvastatin  80 mg Oral QHS    [Held by provider] carvedilol  6.25 mg Oral BID    clopidogrel  75 mg Oral Daily    pregabalin  75 mg Oral Daily    sevelamer  800 mg Oral TID     balsum peru-castor oil   Topical BID    apixaban  2.5 mg Oral BID    miconazole   Topical BID    heparin (porcine)  10,000 Units IntraVENous Once     Continuous Infusions:     prismaSATE BGK 4/2.5 1,000 mL/hr at 07/17/25 0335    prismaSATE BGK 4/2.5 1,000 mL/hr at 07/17/25 0335    prismaSATE BGK 4/2.5 500 mL/hr at 07/17/25 0335    norepinephrine 4 mcg/min 
     ICU Progress Note    Admit Date: 7/14/2025  Hospital Day:  Hospital Day: 5  ICU Day: 3d 10h        Interval History   Patient reports to be doing better this morning.  Patient reports no more nausea or vomiting, and still has not had a BM since admission.  Patient denies light headedness, cough, wheeze, chest pain, shortness of breath, palpitations.  It is reported groin pain is improved and doesn't bother him unless he pokes at it.  No new concerns or issues, patient does not appear in any distress.       Interval Plan:  Continue CRRT per nephrology  Potassium WNL  Creatinine 3.6  WBC 11.4   Wean vasopressors as tolerated.  Midodrine 15 TID.  Miconazole for groin wound intertrigo.   Senna-S bowel regimen to help BM.    ROS:   Review of Systems   Constitutional:  Negative for chills, fatigue and fever.   Eyes:  Positive for itching. Negative for photophobia.   Respiratory:  Negative for cough, choking, chest tightness and shortness of breath.    Cardiovascular:  Negative for chest pain, palpitations and leg swelling.   Gastrointestinal:  Positive for constipation. Negative for abdominal pain, nausea and vomiting.   Neurological:  Negative for dizziness, syncope, weakness, light-headedness and headaches.         Objective     MEDICATIONS:  Scheduled:     sennosides-docusate sodium  2 tablet Oral Daily    insulin lispro  0-8 Units SubCUTAneous 4x Daily AC & HS    midodrine  10 mg Oral TID WC    aspirin  81 mg Oral Daily    atorvastatin  80 mg Oral QHS    [Held by provider] carvedilol  6.25 mg Oral BID    clopidogrel  75 mg Oral Daily    pregabalin  75 mg Oral Daily    sevelamer  800 mg Oral TID     balsum peru-castor oil   Topical BID    apixaban  2.5 mg Oral BID    miconazole   Topical BID    heparin (porcine)  10,000 Units IntraVENous Once     Continuous Infusions:     prismaSATE BGK 4/2.5 1,000 mL/hr at 07/18/25 0510    prismaSATE BGK 4/2.5 1,000 mL/hr at 07/18/25 0510    prismaSATE BGK 4/2.5 500 mL/hr at 
     ICU Progress Note    Admit Date: 7/14/2025  Hospital Day:  Hospital Day: 6  ICU Day: 4d 14h        Interval History   Patient reports to be doing better this morning.  Patient reports no more nausea or vomiting, and still has not had a BM since admission.  Patient denies light headedness, cough, wheeze, chest pain, shortness of breath, palpitations.  It is reported groin pain is improved and doesn't bother him unless he pokes at it.  No new concerns or issues, patient does not appear in any distress.     Interval Plan:  Discontinue CRRT per nephrology - will plan on doing dialysis on Monday or Tuesday, off pressors will transition to HD   Midodrine 15 TID  Potassium WNL  Creatinine 2.5 vs 3.1   WBC 11.9 vs 11.4  Miconazole for groin wound intertrigo.   Senna-S bowel regimen to help BM.    ROS:   Review of Systems   Constitutional:  Negative for chills, fatigue and fever.   Eyes:  Positive for itching. Negative for photophobia.   Respiratory:  Negative for cough, choking, chest tightness and shortness of breath.    Cardiovascular:  Negative for chest pain, palpitations and leg swelling.   Gastrointestinal:  Positive for constipation. Negative for abdominal pain, nausea and vomiting.   Neurological:  Negative for dizziness, syncope, weakness, light-headedness and headaches.     Objective     MEDICATIONS:  Scheduled:     midodrine  15 mg Oral TID     tetrahydrozoline  1 drop Both Eyes TID    neomycin-polymyxin-dexameth  1 drop Both Eyes 4 times per day    sennosides-docusate sodium  2 tablet Oral Daily    insulin lispro  0-8 Units SubCUTAneous 4x Daily AC & HS    aspirin  81 mg Oral Daily    atorvastatin  80 mg Oral QHS    [Held by provider] carvedilol  6.25 mg Oral BID    clopidogrel  75 mg Oral Daily    pregabalin  75 mg Oral Daily    [Held by provider] sevelamer  800 mg Oral TID     balsum peru-castor oil   Topical BID    apixaban  2.5 mg Oral BID    miconazole   Topical BID    heparin (porcine)  10,000 
    V2.0    [facility] [place]      Name:  Serafin Weaver /Age/Sex: 1980  (45 y.o. male)   MRN & CSN:  [id number] & [id number] Encounter Date/Time: 2025 8:28 AM EDT   Location:  [facility]/4524- PCP: Shelia Hernandez MD     Attending:Flor Flores MD       Hospital Day: 7    HPI:    Assessment and Recommendations     Hospital course:    Serafin Weaver is a 45 y.o. male with pmh of diabetes, MRSA ESRD who presents with hyperkalemia in the setting of missing ESRD.  Potassium on admission 9.1 was treated aggressively in the ED with hyperkalemia protocol and underwent emergent dialysis with CRRT as patient was hypotensive.      Plan:     ESRD-off CRRT transition to hemodialysis possibly on Monday  - Patient was unable to tolerate hemodialysis started on CRRT.  Vas-Cath placed on .  Possibly can be removed will defer to  - Nephrology consulted, consult appreciated  - On dialysis 5 days a week      Hyperkalemia-improved  --Treated in the ED by hyperkalemia protocol  -Potassium trending down, potassium 9.1 on admission  -Monitor    Hypotension-now off pressors, blood pressure relatively stable  - Patient has chronic hypotension  -  on midodrine  - No focus of infection    Intertrigo with groin ulcer-continue topical treatment  - Wound care consulted  - In the ED got  broad-spectrum antibiotics    Hypophosphatemia-improved  - Hold Renvela, restart tomorrow      Diabetes type 2  - Not on any medication at home  - Last A1c 5 .2> repeat A1c 6.5    History of hypercoagulable disease positive anticardiolipin IgM, 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  Postop complicated by wound dehiscence.  Wound had ESBL and anaerobe          I spent _   minutes in the care of this patient.  Over 50% of that time was in face-to-face counseling regarding disease 
    V2.0    [facility] Progress Note      Name:  Serafin Weaver /Age/Sex: 1980  (45 y.o. male)   MRN & CSN:  [id number] & [id number] Encounter Date/Time: 2025 8:28 AM EDT   [facility]  [facility] PCP: Shelia Hernandez MD     Attending:Flor Flores MD       Hospital Day: 6    HPI:    Assessment and Recommendations     Hospital course:    Serafin Weaver is a 45 y.o. male with pmh of diabetes, MRSA ESRD who presents with hyperkalemia in the setting of missing ESRD.  Potassium on admission 9.1 was treated aggressively in the ED with hyperkalemia protocol and underwent emergent dialysis with CRRT as patient was hypotensive.      Plan:     ESRD-off CRRT transition to hemodialysis possibly on Monday  - Patient was unable to tolerate hemodialysis started on CRRT.  Vas-Cath placed on   - Nephrology consulted, consult appreciated  - On dialysis 5 days a week      Hyperkalemia-improved  --Treated in the ED by hyperkalemia protocol  -Potassium trending down, potassium 9.1 on admission  -Monitor    Hypotension-now off pressors  - Patient has chronic hypotension  - restarted on midodrine  - No focus of infection    Intertrigo with groin ulcer-continue topical treatment  - Wound care consulted  - In the ED got  broad-spectrum antibiotics    Hypophosphatemia  - Hold Renvela      Diabetes type 2  - Not on any medication at home  - Last A1c 5 .2> repeat A1c 6.5    History of hypercoagulable disease positive anticardiolipin IgM, 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  Postop complicated by wound dehiscence.  Wound had ESBL and anaerobe          I spent _   minutes in the care of this patient.  Over 50% of that time was in face-to-face counseling regarding disease process, diagnostic testing, preventative measures, and answering patient and family 
    V2.0    [facility] [place]      Name:  Serafin Weaver /Age/Sex: 1980  (45 y.o. male)   MRN & CSN:  [id number] & [id number] Encounter Date/Time: 7/15/2025 8:28 AM EDT   Location[facility]  [address] [place], [place] [address] PCP: Shelia Hernandez MD     Attending:Flor Flores MD       Hospital Day: 2    HPI:    Assessment and Recommendations     Hospital course:    Serafin Weaver is a 45 y.o. male with pmh of diabetes, MRSA ESRD who presents with hyperkalemia in the setting of missing ESRD.  Potassium on admission 9.1 was treated aggressively in the ED with hyperkalemia protocol and underwent emergent dialysis      Plan:     ESRD  - On CRRT  - Patient was unable to tolerate hemodialysis started on CRRT.  Vas-Cath placed on   - Nephrology consulted, consult appreciated  - On dialysis 5 days a week      Hyperkalemia  --Treated in the ED by hyperkalemia protocol  -Potassium trending down  -Monitor    Hypotension  - On pressors at present  - Patient has chronic hypotension  - No focus of infection    Intertrigo with groin ulcer  - Wound care consulted  - In the ED for broad-spectrum antibiotics      Diabetes type 2  - Not on any medication at home  - Last A1c 5 .2    History of hypercoagulable disease positive anticardiolipin IgM, 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  Postop complicated by wound dehiscence.  Wound had ESBL and anaerobe          I spent _   minutes in the care of this patient.  Over 50% of that time was in face-to-face counseling regarding disease process, diagnostic testing, preventative measures, and answering patient and family questions.     Diet No diet orders on file   DVT Prophylaxis [] Lovenox, []  Heparin, [] SCDs, [] Ambulation,  [] Eliquis, [] Xarelto  [] Coumadin   Code Status Full Code   Disposition   Unclear      Surrogate Decision 
    V2.0    [facility] [place]      Name:  Serafin Weaver /Age/Sex: 1980  (45 y.o. male)   MRN & CSN:  [id number] & [id number] Encounter Date/Time: 2025 8:28 AM EDT   Location[facility]  [facility]- PCP: Shelia Hernandez MD     Attending:Flor Flores MD       Hospital Day: 4    HPI:    Assessment and Recommendations     Hospital course:    Serafin Weaver is a 45 y.o. male with pmh of diabetes, MRSA ESRD who presents with hyperkalemia in the setting of missing ESRD.  Potassium on admission 9.1 was treated aggressively in the ED with hyperkalemia protocol and underwent emergent dialysis with CRRT as patient was hypotensive      Plan:     ESRD- still on  CRRT, depending on blood pressure patient will need to be transition to HD when stable  - Patient was unable to tolerate hemodialysis started on CRRT.  Vas-Cath placed on   - Nephrology consulted, consult appreciated  - On dialysis 5 days a week      Hyperkalemia-improved  --Treated in the ED by hyperkalemia protocol  -Potassium trending down, potassium 9.1 on admission  -Monitor    Hypotension-On pressors at present, goal of blood pressure changed to 90 systolic  - Patient has chronic hypotension  - restarted on midodrine  - No focus of infection    Intertrigo with groin ulcer  - Wound care consulted  - In the ED for broad-spectrum antibiotics      Diabetes type 2  - Not on any medication at home  - Last A1c 5 .2> repeat A1c 6.5    History of hypercoagulable disease positive anticardiolipin IgM, 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  Postop complicated by wound dehiscence.  Wound had ESBL and anaerobe          I spent _   minutes in the care of this patient.  Over 50% of that time was in face-to-face counseling regarding disease process, diagnostic testing, preventative measures, and 
    V2.0    [facility] [place] Note      Name:  Serafin Weaver /Age/Sex: 1980  (45 y.o. male)   MRN & CSN:  [id number] & [id number] Encounter Date/Time: 2025 8:28 AM EDT   Location:  63/6316-01 PCP: Shelia Hernandez MD     Attending:Flor Flores MD       Hospital Day: 8    HPI:    Assessment and Recommendations     Hospital course:    Serafin Weaver is a 45 y.o. male with pmh of diabetes, MRSA ESRD who presents with hyperkalemia in the setting of missing ESRD.  Potassium on admission 9.1 was treated aggressively in the ED with hyperkalemia protocol and underwent emergent dialysis with CRRT as patient was hypotensive.      Plan:     ESRD-for HD today  - Patient was unable to tolerate hemodialysis started on CRRT.  Vas-Cath placed on .  Vas-Cath removed on   - Nephrology consulted, consult appreciated  - On dialysis 5 days a week      Hyperkalemia-improved  --Treated in the ED by hyperkalemia protocol  -Potassium trending down, potassium 9.1 on admission  -Monitor    Hypotension-now off pressors, blood pressure relatively stable  - Patient has chronic hypotension  -  on midodrine  - No focus of infection    Intertrigo with groin ulcer-continue topical treatment  - Wound care consulted  - In the ED got  broad-spectrum antibiotics    Hypophosphatemia-improved  - Hold Renvela, restart tomorrow      Diabetes type 2  - Not on any medication at home  - Last A1c 5 .2> repeat A1c 6.5    History of hypercoagulable disease positive anticardiolipin IgM, 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  Postop complicated by wound dehiscence.  Wound had ESBL and anaerobe          I spent _   minutes in the care of this patient.  Over 50% of that time was in face-to-face counseling regarding disease process, diagnostic testing, preventative measures, 
    V2.0    [facility] [place] Progress Note      Name:  Serafin Weaver /Age/Sex: 1980  (45 y.o. male)   MRN & CSN:  [id number] & [id number] Encounter Date/Time: 2025 8:28 AM EDT   Location:  [facility]/4524- PCP: Shelia Hernandez MD     Attending:Flor Flores MD       Hospital Day: 3    HPI:    Assessment and Recommendations     Hospital course:    Serafin Weaver is a 45 y.o. male with pmh of diabetes, MRSA ESRD who presents with hyperkalemia in the setting of missing ESRD.  Potassium on admission 9.1 was treated aggressively in the ED with hyperkalemia protocol and underwent emergent dialysis with CRRT as patient was hypotensive      Plan:     ESRD- still on  CRRT  - Patient was unable to tolerate hemodialysis started on CRRT.  Vas-Cath placed on   - Nephrology consulted, consult appreciated  - On dialysis 5 days a week      Hyperkalemia-improved  --Treated in the ED by hyperkalemia protocol  -Potassium trending down, potassium 9.1 on admission  -Monitor    Hypotension-On pressors at present, goal of blood pressure changed to 90 systolic  - Patient has chronic hypotension, restart on midodrine  - No focus of infection    Intertrigo with groin ulcer  - Wound care consulted  - In the ED for broad-spectrum antibiotics      Diabetes type 2  - Not on any medication at home  - Last A1c 5 .2    History of hypercoagulable disease positive anticardiolipin IgM, 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  Postop complicated by wound dehiscence.  Wound had ESBL and anaerobe          I spent _   minutes in the care of this patient.  Over 50% of that time was in face-to-face counseling regarding disease process, diagnostic testing, preventative measures, and answering patient and family questions.     Diet ADULT DIET; Regular; Low Potassium (Less than 3000 
0652 TMP and Filter pressures rising. Returned blood.   Notified Nephro.   Per ANGEL Logan to stop CRRT at this time.     VasCath is heparin locked.     
1709 - CRRT stopped. TMP + Filter rising. Blood returned.     Electronically signed by Wyatt James RN on 7/15/2025 at 5:11 PM    
1811 - CRRT restarted. Pt tolerated well.     Electronically signed by Wyatt James RN on 7/15/2025 at 6:27 PM    
4 Eyes Admission Assessment     I agree as the admission nurse that 2 RN's have performed a thorough Head to Toe Skin Assessment on the patient. ALL assessment sites listed below have been assessed on admission.       Areas assessed by both nurses:   [x]   Head, Face, and Ears   [x]   Shoulders, Back, and Chest  [x]   Arms, Elbows, and Hands   [x]   Coccyx, Sacrum, and Ischium  [x]   Legs, Feet, and Heels        Does the Patient have Skin Breakdown?  Yes, pt has ulcer to right buttock, open. Pt also has healed toe amputation on BLE, no toes present. Pt has area to left foot on top, mepliex applied.          Alf Prevention initiated:  yes   Wound Care Orders initiated:  No      WOC nurse consulted for Pressure Injury (Stage 3,4, Unstageable, DTI, NWPT, and Complex wounds) or Alf score 18 or lower:  No      Nurse 1 eSignature: Electronically signed by Nicole Day RN on 7/20/25 at 1:25 PM EDT    **SHARE this note so that the co-signing nurse is able to place an eSignature**    Nurse 2 eSignature: Electronically signed by Katharine Chahal RN on 7/20/25 at 5:15 PM EDT     
4 Eyes Skin Assessment     NAME:  Serafin Weaver  YOB: 1980  MEDICAL RECORD NUMBER:  [id number]    The patient is being assessed for  Admission    I agree that at least one RN has performed a thorough Head to Toe Skin Assessment on the patient. ALL assessment sites listed below have been assessed.      Areas assessed by both nurses:    Head, Face, Ears, Shoulders, Back, Chest, Arms, Elbows, Hands, Sacrum. Buttock, Coccyx, Ischium, Legs. Feet and Heels, and Under Medical Devices        Amputated toes  Open ulcer top of left foot  Open tears/abrasions bilateral groin   Open abrasions lester area  Wound buttocks    Does the Patient have a Wound? Yes wound(s) were present on assessment. LDA wound assessment was Initiated and completed by JOSE       Alf Prevention initiated by RN: Yes  Wound Care Orders initiated by RN: Yes    Pressure Injury (Stage 1,2,3,4, Unstageable, DTI, NWPT, and Complex wounds) if present, place Wound referral order by RN under : Yes    New Ostomies, if present place, Ostomy referral order under : No     Nurse 1 eSignature: Electronically signed by Landy Aleman RN on 7/15/25 at 2:30 AM EDT    **SHARE this note so that the co-signing nurse can place an eSignature**    Nurse 2 eSignature: Electronically signed by Julee Nielsen RN on 7/15/25 at 2:38 AM EDT  
CRRT filter primed with heparin per order. See MAR.  CRRT resumed.  
CRRT restarted at 1804.    Electronically signed by Wyatt James RN on 7/16/2025 at 6:16 PM    
CRRT stopped at 1715. TMP + Filter pressure rising blood returned.     Electronically signed by Wyatt James RN on 7/16/2025 at 5:59 PM    
Dressing to left groin remains clean dry and intact post vas cath removal  
ICU RN up at bedside to pull vas cath  
Levo changed to systolic goal of 90. Will titrate accordingly.     Electronically signed by Wyatt James RN on 7/16/2025 at 12:19 PM    
Patient admitted to [facility]. Skin assessed and CHG bath given. VSS. Safety precautions in place. Bed alarm on. Call light use explained to patient. ICU residents to bedside. Family updated no questions at this time.   
Patient lethargic upon shift arrival and hypotensive. Vitals controlled on RA with the exception of blood pressure. Patient refusing medications and measures of care; specifically midodrine, bathing, and turing. Access and skin per protocol. Room safety measures in place. A&Ox4. RN spent 20 minutes with patient discussing care plan, quality of life measures, and end of life measures. Patient educated on current health status and required medication for sustaining hemodynamic stability. Patient agreed to critical medications and vitals are currently within defined limits. Patient states that he is agreeable to expanding communication on palliative measures surrounding quality of life.  
Patient refused PRN midodrine for SBP goal.   
Patient refused evening Midodrine dose. He stated, \"dialysis is killing me. I'm going to die anyway.\" He expressed his perception of quality of life was poor doing HD 5d/wk. Patient was educated on midodrine, HD in ESRD.    1814: BP 84/50  
Patient refusing AM meds and eye drops at this time. Will attempt again with breakfast.   
Patient refusing vitals.  
Patient was unable to tolerate HD and will start CRRT  Orders placed  Consult surgery for temp HD catheter   
Patient's right eye is very red, itchy and bothersome to the patient. Eyedrops ordered earlier in day with little relief. Patient's right eye drainage is crusting over around eye. Warm compress applied. ICU team made aware.   
Per ICU charge RN another RN will come up to pull vas cath  
Pt A&Ox 4 on RA. AM assessment and vitals completed as able and put into flowsheets. AM medications given with no s/s of aspiration. Patient takes pills whole with water. Pt with no questions or concerns voiced to RN at this time. Fall precautions in place and call light within reach.   Vitals:    07/20/25 0800   BP: (!) 111/54   Pulse: 69   Resp: 19   Temp: 98 °F (36.7 °C)   SpO2: 97%              
Pt arrived from icu oriented x3-4. Oriented to room. Skin assessment complete. Call light within reach. Fall precautions in place.   
Pt back from dialysis.  
Pt being transferred to [address] [place] [address]. Belongings sent with patient, including cell phone, shoes, clothes and the creams / eyedrops that were being kept at bedside.   
Pt discharge home by ems. Pt iv removed. No complication. Pt tele removed. Reviewed and discussed med with pt. Rn went over discharge paperwork with the pt. CORINA completed and sent it. Pt verbalized understanding. Pt belonging sent it with the pt.   
Pt off to dialysis.  
Pt refusing lab draw. NP notified.  
RN called for reports at [facility]. Report given to RN at [facility]. Plan of care ongoing.  
RN called pt's mom Annie and updated her on transfer out of ICU and on plan of care.   
Rn sent message to Dr. Brenner asking if Left Fem vas cath can be removed. Awaiting response.   
Spiritual Health Attempted Visit Note  [facility]      Room # 4524/4524-01    Name: Serafin Weaver           Age: 45 y.o.    Gender: male          MRN: [id number]  Yarsani: Non-Zoroastrian       Preferred Language: English      Date: 07/18/25  Visit Time: Begin Time: (P) 1450 End Time : (P) 1459 Complexity of Encounter: (P) Low      Visit Summary:  attempted to meet with Serafin in response to rounds. Patient was resting peacefully.  will attempt to visit at another time.  available for follow-up as needed.      Referral/Consult From: (P) Rounding  Encounter Overview/Reason: (P) Attempted Encounter  Encounter Code:     Crisis (if applicable):    Service Provided For: (P) Patient not available     Patient was not available. Patient was asleep.  will follow-up at a later time.        Electronically signed by     
TMP and Filter pressures rising. Blood returned to patient.     
Telephone report given to JOSE Rivera.   
Treatment time:  3.5 hrs    Net UF:  2000 ml     Pre weight: 140 kg  Post weight: 138 kg     Access used: RADHA AVF   Access function:  tolerated well,   ml/min     Medications or blood products given: midodrine 5mg     Regular outpatient schedule: MWF     Summary of response to treatment: Pt tolerated well. Pt remained stable throughout entire treatment and upon exiting the hemodialysis suite.      Copy of dialysis treatment record placed in chart, to be scanned into EMR.      
provider] carvedilol (COREG) tablet 6.25 mg, 6.25 mg, Oral, BID  clopidogrel (PLAVIX) tablet 75 mg, 75 mg, Oral, Daily  midodrine (PROAMATINE) tablet 5 mg, 5 mg, Oral, TID PRN  polyethylene glycol (GLYCOLAX) packet 17 g, 17 g, Oral, Daily PRN  pregabalin (LYRICA) capsule 75 mg, 75 mg, Oral, Daily  sevelamer (RENVELA) tablet 800 mg, 800 mg, Oral, TID WC  traMADol (ULTRAM) tablet 25 mg, 25 mg, Oral, Q6H PRN  balsum peru-castor oil (VENELEX) ointment, , Topical, BID  apixaban (ELIQUIS) tablet 2.5 mg, 2.5 mg, Oral, BID  glucose chewable tablet 16 g, 4 tablet, Oral, PRN  dextrose bolus 10% 125 mL, 125 mL, IntraVENous, PRN **OR** dextrose bolus 10% 250 mL, 250 mL, IntraVENous, PRN  glucagon injection 1 mg, 1 mg, SubCUTAneous, PRN  dextrose 10 % infusion, , IntraVENous, Continuous PRN  sodium chloride 0.9 % bolus 100 mL, 100 mL, IntraVENous, PRN  miconazole (MICOTIN) 2 % cream, , Topical, BID  heparin (porcine) injection 1,100-1,900 Units, 1,100-1,900 Units, IntraCATHeter, PRN **AND** heparin (porcine) injection 1,100-1,900 Units, 1,100-1,900 Units, IntraCATHeter, PRN  potassium chloride 20 mEq/50 mL IVPB (Central Line), 20 mEq, IntraVENous, PRN  magnesium sulfate 1000 mg in dextrose 5% 100 mL IVPB, 1,000 mg, IntraVENous, PRN  calcium gluconate 1,000 mg in sodium chloride 50 mL, 1,000 mg, IntraVENous, PRN **OR** calcium gluconate 2,000 mg in sodium chloride 100 mL, 2,000 mg, IntraVENous, PRN **OR** calcium gluconate 3,000 mg in sodium chloride 0.9 % 100 mL IVPB, 3,000 mg, IntraVENous, PRN **OR** calcium gluconate 4,000 mg in sodium chloride 0.9 % 100 mL IVPB, 4,000 mg, IntraVENous, PRN  sodium phosphate 6 mmol in sodium chloride 0.9 % 250 mL IVPB, 6 mmol, IntraVENous, PRN **OR** sodium phosphate 12 mmol in sodium chloride 0.9 % 250 mL IVPB, 12 mmol, IntraVENous, PRN **OR** sodium phosphate 18 mmol in sodium chloride 0.9 % 500 mL IVPB, 18 mmol, IntraVENous, PRN **OR** sodium phosphate 24 mmol in sodium chloride 0.9 % 500 mL 
Physical Examination :     appearance: Awake but not very communicative  Respiratory: no distress  Cardiovascular: no visibly  raised JVD, Edema positive  Abdomen: -  soft  Other relevant findings: AV fistula in place, positive thrill     Labs :     CBC:   Recent Labs     07/14/25  1257 07/14/25  2200 07/15/25  0400   WBC 14.6* 18.5* 13.6*   HGB 14.7 13.4* 13.2*   HCT 44.2 40.9 40.9    182 207     BMP:    Recent Labs     07/14/25  1257 07/14/25  1432 07/14/25  2200 07/14/25  2340 07/15/25  0400   *  --  133*  --  133*   K see below 9.1* 8.4*  --  6.7*   CL 82*  --  85*  --  89*   CO2 11*  --  12*  --  13*   *  --  206*  --  152*   CREATININE 36.5*  --  35.0*  --  23.8*   GLUCOSE 95  --  91  --  79   MG  --   --   --  3.24* 2.84*   PHOS  --   --  16.9*  --  10.8*     Lab Results   Component Value Date/Time    COLORU Yellow 04/24/2023 04:48 PM    NITRU Negative 04/24/2023 04:48 PM    GLUCOSEU 250 04/24/2023 04:48 PM    KETUA 15 04/24/2023 04:48 PM    UROBILINOGEN 0.2 04/24/2023 04:48 PM    BILIRUBINUR Negative 04/24/2023 04:48 PM        ----------------------------------------------------------  Please call with questions at      24 Hrs Answering service [phone]  Perfect serve, or cell phone 7 am - 5pm  Niko Hernandez MD  [facility]      
    Hemoglobin A1C:   Lab Results   Component Value Date/Time    LABA1C 6.5 07/16/2025 04:32 AM     TSH:   Lab Results   Component Value Date/Time    TSH 2.90 02/05/2019 05:18 AM     Troponin: No results found for: \"TROPONINT\"  Lactic Acid:   No results for input(s): \"LACTA\" in the last 72 hours.    BNP:   No results for input(s): \"PROBNP\" in the last 72 hours.    UA:  Lab Results   Component Value Date/Time    NITRU Negative 04/24/2023 04:48 PM    COLORU Yellow 04/24/2023 04:48 PM    PHUR 7.5 04/24/2023 04:48 PM    PHUR 7.5 04/24/2023 04:48 PM    LABCAST 0-1 Fine Gran. 05/05/2017 10:35 AM    WBCUA 6-9 04/24/2023 04:48 PM    RBCUA 3-4 04/24/2023 04:48 PM    TRICHOMONAS Present 05/13/2022 06:32 AM    BACTERIA Rare 04/24/2023 04:48 PM    CLARITYU Clear 04/24/2023 04:48 PM    LEUKOCYTESUR Negative 04/24/2023 04:48 PM    UROBILINOGEN 0.2 04/24/2023 04:48 PM    BILIRUBINUR Negative 04/24/2023 04:48 PM    BLOODU SMALL 04/24/2023 04:48 PM    GLUCOSEU 250 04/24/2023 04:48 PM    KETUA 15 04/24/2023 04:48 PM    AMORPHOUS 2+ 06/01/2014 09:56 PM     Urine Cultures:   Lab Results   Component Value Date/Time    LABURIN  05/13/2022 06:32 AM     25,000 CFU/ml  Susceptibility testing of penicillin and other beta lactams is  not necessary for beta hemolytic Streptococci since resistant  strains have not been identified. (CLSI M100)       Blood Cultures:   Lab Results   Component Value Date/Time    BC No Growth after 4 days of incubation. 07/14/2025 02:31 PM     Lab Results   Component Value Date/Time    BLOODCULT2 No Growth after 4 days of incubation. 07/14/2025 02:31 PM     Organism:   Lab Results   Component Value Date/Time    ORG Providencia species 12/03/2024 03:10 PM    ORG Enterococcus faecalis 12/03/2024 03:10 PM    ORG Klebsiella oxytoca ESBL 12/03/2024 03:10 PM         Electronically signed by Flor Flores MD on 7/22/2025 at 10:34 AM  Comment: Please note this report has been produced using speech recognition software 
07/16/25  0432 07/17/25  0429   WBC 12.0* 11.4*   HGB 12.3* 11.5*    154     BMP:    Recent Labs     07/16/25  2322 07/17/25  1122 07/17/25 2322   * 136 136   K 4.2 4.3 4.4    102 103   CO2 22 23 23   BUN 31* 21* 19   CREATININE 5.3* 4.0* 3.6*   GLUCOSE 195* 264* 179*     Hepatic: No results for input(s): \"AST\", \"ALT\", \"BILITOT\", \"ALKPHOS\" in the last 72 hours.    Invalid input(s): \"ALB\"  Lipids:   Lab Results   Component Value Date/Time    CHOL 77 08/16/2024 04:07 PM    HDL 7 08/16/2024 04:07 PM    TRIG 333 08/16/2024 04:07 PM     Hemoglobin A1C:   Lab Results   Component Value Date/Time    LABA1C 6.5 07/16/2025 04:32 AM     TSH:   Lab Results   Component Value Date/Time    TSH 2.90 02/05/2019 05:18 AM     Troponin: No results found for: \"TROPONINT\"  Lactic Acid:   No results for input(s): \"LACTA\" in the last 72 hours.    BNP:   No results for input(s): \"PROBNP\" in the last 72 hours.    UA:  Lab Results   Component Value Date/Time    NITRU Negative 04/24/2023 04:48 PM    COLORU Yellow 04/24/2023 04:48 PM    PHUR 7.5 04/24/2023 04:48 PM    PHUR 7.5 04/24/2023 04:48 PM    LABCAST 0-1 Fine Gran. 05/05/2017 10:35 AM    WBCUA 6-9 04/24/2023 04:48 PM    RBCUA 3-4 04/24/2023 04:48 PM    TRICHOMONAS Present 05/13/2022 06:32 AM    BACTERIA Rare 04/24/2023 04:48 PM    CLARITYU Clear 04/24/2023 04:48 PM    LEUKOCYTESUR Negative 04/24/2023 04:48 PM    UROBILINOGEN 0.2 04/24/2023 04:48 PM    BILIRUBINUR Negative 04/24/2023 04:48 PM    BLOODU SMALL 04/24/2023 04:48 PM    GLUCOSEU 250 04/24/2023 04:48 PM    KETUA 15 04/24/2023 04:48 PM    AMORPHOUS 2+ 06/01/2014 09:56 PM     Urine Cultures:   Lab Results   Component Value Date/Time    LABURIN  05/13/2022 06:32 AM     25,000 CFU/ml  Susceptibility testing of penicillin and other beta lactams is  not necessary for beta hemolytic Streptococci since resistant  strains have not been identified. (CLSI M100)       Blood Cultures:   Lab Results   Component Value

## 2025-07-22 NOTE — CARE COORDINATION
Case Management Assessment            Discharge Note                    Date / Time of Note: 7/22/2025 12:56 PM                  Discharge Note Completed by: Claire Patrick RN    Patient Name: Serafin Weaver   YOB: 1980  Diagnosis: Hyperkalemia [E87.5]  Metabolic acidosis [E87.20]  Uremia [N19]  ESRD (end stage renal disease) (HCC) [N18.6]   Date / Time: 7/14/2025 12:10 PM    Current PCP: Shelia Hernandez MD  Clinic patient: No    Hospitalization in the last 30 days: No       Advance Directives:  Code Status: Full Code  [place] DNR form completed and on chart: Not Indicated    Financial:  Payor: [employer] [place] / Plan: [employer] [place] / Product Type: *No Product type* /      Pharmacy:    [facility] [place], [address] [phone] -  [phone]  [address] [place] [address]  Phone: [phone] Fax: [phone]      Assistance purchasing medications?: Potential Assistance Purchasing Medications: No  Assistance provided by Case Management: None at this time    Does patient want to participate in local refill/ meds to beds program?:      Meds To Beds General Rules:  1. Can ONLY be done Monday- Friday between 8:30am-5pm  2. Prescription(s) must be in pharmacy by 3pm to be filled same day  3.Copy of patient's insurance/ prescription drug card and patient face sheet must be sent along with the prescription(s)  4. Cost of Rx cannot be added to hospital bill. If financial assistance is needed, please contact unit  or ;  or  CANNOT provide pharmacy voucher for patients co-pays  5. Patients can then  the prescription on their way out of the hospital at discharge, or pharmacy can deliver to the bedside if staff is available. (payment due at time of pick-up or delivery - cash, check, or card accepted)     Able to afford home medications/ co-pay costs: LTC    ADLS:  Current PT

## 2025-07-22 NOTE — CARE COORDINATION
To [facility] at 1630 today by [facility] stretcher.   Nurse report: [phone]  Faxed HD run sheets to facility.  Electronically signed by Claire Patrick RN on 7/22/2025 at 1:06 PM

## (undated) DEVICE — PROVE COVER: Brand: UNBRANDED

## (undated) DEVICE — SUTURE PERMA-HAND SZ 2-0 L30IN NONABSORBABLE BLK L26MM SH K833H

## (undated) DEVICE — EP VASCULAR PACK: Brand: MEDLINE INDUSTRIES, INC.

## (undated) DEVICE — TOWEL,OR,DSP,ST,BLUE,DLX,8/PK,10PK/CS: Brand: MEDLINE

## (undated) DEVICE — SURGIFOAM SPNG SZ 12-7

## (undated) DEVICE — COTTON UNDERCAST PADDING,CRIMPED FINISH: Brand: WEBRIL

## (undated) DEVICE — SUTURE VICRYL + SZ 2-0 L27IN ABSRB WHT SH 1/2 CIR TAPERCUT VCP417H

## (undated) DEVICE — LIQUIBAND RAPID ADHESIVE 36/CS 0.8ML: Brand: MEDLINE

## (undated) DEVICE — PREMIUM WET SKIN PREP TRAY: Brand: MEDLINE INDUSTRIES, INC.

## (undated) DEVICE — SOLUTION IRRIG 3000ML 0.9% SOD CHL USP UROMATIC PLAS CONT

## (undated) DEVICE — SUTURE NONABSORBABLE MONOFILAMENT 4-0 FS-2 18 IN ETHILON 662H

## (undated) DEVICE — SUTURE NONABSORBABLE MONOFILAMENT 7-0 BV-1 1X24 IN PROLENE 8702H

## (undated) DEVICE — APPLICATOR PREP 26ML 0.7% IOD POVACRYLEX 74% ISO ALC ST

## (undated) DEVICE — HANDPIECE SUCTION TUBING INTERPULSE 10FT

## (undated) DEVICE — SUTURE PROL SZ 6 0 L24IN NONABSORBABLE BLU C 1 L13MM 3 8 CIR EP8726H

## (undated) DEVICE — ZIMMER® STERILE DISPOSABLE TOURNIQUET CUFF WITH PLC, DUAL PORT, SINGLE BLADDER, 42 IN. (107 CM)

## (undated) DEVICE — INTENDED TO BE USED TO OCCLUDE, RETRACT AND IDENTIFY ARTERIES, VEINS, TENDONS AND NERVES IN SURGICAL PROCEDURES: Brand: STERION®  VESSEL LOOP

## (undated) DEVICE — SUTURE VCRL SZ 3-0 L18IN ABSRB UD PS-2 L19MM 3/8 CRV PRIM J497H

## (undated) DEVICE — SOLUTION INJ LR VISIV 1000ML BG

## (undated) DEVICE — SOLUTION IV 1000ML 0.9% SOD CHL

## (undated) DEVICE — LOOP,VESSEL,MAXI,BLUE,2/PK,STERILE: Brand: MEDLINE

## (undated) DEVICE — RADIFOCUS GLIDEWIRE ADVANTAGE GUIDEWIRE: Brand: GLIDEWIRE ADVANTAGE

## (undated) DEVICE — OLCOTT TORQUE DEVICE: Brand: OLCOTT

## (undated) DEVICE — SWAB CULTURET AMIES DBL

## (undated) DEVICE — BASIXCOMPAK INDEFLATOR

## (undated) DEVICE — GOWN,SIRUS,POLYRNF,BRTHSLV,XL,30/CS: Brand: MEDLINE

## (undated) DEVICE — PODIATRY: Brand: MEDLINE INDUSTRIES, INC.

## (undated) DEVICE — RADIFOCUS GLIDECATH: Brand: GLIDECATH

## (undated) DEVICE — SYRINGE MED 30ML STD CLR PLAS LUERLOCK TIP N CTRL DISP

## (undated) DEVICE — SHEET,DRAPE,53X77,STERILE: Brand: MEDLINE

## (undated) DEVICE — Device

## (undated) DEVICE — GARMENT,MEDLINE,DVT,INT,CALF,MED, GEN2: Brand: MEDLINE

## (undated) DEVICE — SUTURE PROL SZ 6-0 L18IN NONABSORBABLE BLU L13MM C-1 3/8 8718H

## (undated) DEVICE — GAUZE,PACKING STRIP,IODOFORM,1/2"X5YD,ST: Brand: CURAD

## (undated) DEVICE — RADIFOCUS GLIDEWIRE: Brand: GLIDEWIRE

## (undated) DEVICE — PADDING UNDERCAST W4INXL4YD 100% COT CRIMPED FINISH WBRL II

## (undated) DEVICE — COAXIAL HIGH FLOW TIP WITH SOFT SHIELD

## (undated) DEVICE — CATHETER PTCA L130CM BLLN L60MM DIA5MM DRUG COAT BLLN

## (undated) DEVICE — CATHETER ANGIO 5FR L65CM 0.038IN VIS OMNI FLUSH-0 SFT TIP

## (undated) DEVICE — GLOVE ORANGE PI 7 1/2   MSG9075

## (undated) DEVICE — CARDINAL HEALTH VESSEL LOOPS MINI RED: Brand: DEVON

## (undated) DEVICE — GUIDEWIRE VASC L150CM DIA0.035IN STR TIP PTFE FIX COR

## (undated) DEVICE — TURNOVER KIT RM INF CTRL TECH

## (undated) DEVICE — COVER LT HNDL BLU PLAS

## (undated) DEVICE — BANDAGE,GAUZE,BULKEE II,4.5"X4.1YD,STRL: Brand: MEDLINE

## (undated) DEVICE — MERCY FAIRFIELD TURNOVER KIT: Brand: MEDLINE INDUSTRIES, INC.

## (undated) DEVICE — MICRO SAGITTAL BLADE (9.4 X 0.4 X 26.2MM)

## (undated) DEVICE — SUTURE NONABSORBABLE MONOFILAMENT 2-0 FS 18 IN ETHILON 664H

## (undated) DEVICE — BANDAGE COMPR W4INXL5YD WHT BGE POLY COT M E WRP WV HK AND

## (undated) DEVICE — INTENDED FOR TISSUE SEPARATION, AND OTHER PROCEDURES THAT REQUIRE A SHARP SURGICAL BLADE TO PUNCTURE OR CUT.: Brand: BARD-PARKER ® STAINLESS STEEL BLADES

## (undated) DEVICE — LIGHT HANDLE: Brand: DEVON

## (undated) DEVICE — TRAP FLUID

## (undated) DEVICE — BANDAGE COMPR M W4INXL10YD WHT BGE VELC E MTRX HK AND LOOP

## (undated) DEVICE — BLADE ES ELASTOMERIC COAT INSUL DURABLE BEND UPTO 90DEG

## (undated) DEVICE — SURGIFOAM SPNG SZ 100

## (undated) DEVICE — PTA BALLOON DILATATION CATHETER: Brand: MUSTANG™

## (undated) DEVICE — SYRINGE MED 10ML LUERLOCK TIP W/O SFTY DISP

## (undated) DEVICE — GAUZE,SPONGE,4"X4",16PLY,XRAY,STRL,LF: Brand: MEDLINE

## (undated) DEVICE — COVER,TABLE,HEAVY DUTY,77"X90",STRL: Brand: MEDLINE

## (undated) DEVICE — GLOVE,SURG,TRIUMPH MICRO,LTX,PF,7.5: Brand: MEDLINE

## (undated) DEVICE — PINNACLE R/O II INTRODUCER SHEATH WITH RADIOPAQUE MARKER: Brand: PINNACLE

## (undated) DEVICE — STANDARD HYPODERMIC NEEDLE,POLYPROPYLENE HUB: Brand: MONOJECT

## (undated) DEVICE — SUTURE PROL SZ 3-0 L30IN NONABSORBABLE BLU L26MM SH 1/2 CIR 8832H

## (undated) DEVICE — SWAB SPEC COLLCTN DBL W/O CHAR CULTURESWAB +

## (undated) DEVICE — TRAY PREP DRY W/ PREM GLV 2 APPL 6 SPNG 2 UNDPD 1 OVERWRAP

## (undated) DEVICE — SUTURE BOOT: Brand: DEROYAL

## (undated) DEVICE — UNDERGLOVE SURG SZ 8 BLU LTX FREE SYN POLYISOPRENE POLYMER

## (undated) DEVICE — MAJOR SET UP PK

## (undated) DEVICE — HIGH FLOW TIP

## (undated) DEVICE — SUTURE ETHILON SZ 3-0 L18IN NONABSORBABLE BLK PS-2 L19MM 3/8 1669H

## (undated) DEVICE — DRAPE HND W114XL142IN BLU POLYPR W O PCH FEN CRD AND TB HLDR

## (undated) DEVICE — TOWEL,STOP FLAG GOLD N-W: Brand: MEDLINE

## (undated) DEVICE — FOGARTY - HYDRAGRIP SURGICAL - CLAMP INSERTS: Brand: FOGARTY SOFTJAW

## (undated) DEVICE — SUTURE VICRYL + SZ 3-0 L27IN ABSRB UD L26MM SH 1/2 CIR VCP416H

## (undated) DEVICE — ELECTRODE PT RET AD L9FT HI MOIST COND ADH HYDRGEL CORDED

## (undated) DEVICE — SUTURE PROL SZ 2-0 L30IN NONABSORBABLE BLU L26MM CT-2 1/2 8411H

## (undated) DEVICE — DECANTER FLD 9IN ST BG FOR ASEP TRNSF OF FLD

## (undated) DEVICE — LOTION PREP REMV 5OZ IODO CLR TINC OF BENZ DURAPREP

## (undated) DEVICE — DESTINATION RENAL GUIDING SHEATH: Brand: DESTINATION

## (undated) DEVICE — BANDAGE ROLL,100% COTTON, 8 PLY, LARGE: Brand: KERLIX

## (undated) DEVICE — SUTURE VICRYL SZ 4-0 L18IN ABSRB UD POLYGLACTIN 910 BRAID TIE J643H

## (undated) DEVICE — NEPTUNE E-SEP SMOKE EVACUATION PENCIL, COATED, 70MM BLADE, PUSH BUTTON SWITCH: Brand: NEPTUNE E-SEP

## (undated) DEVICE — SYRINGE, LUER LOCK, 10ML: Brand: MEDLINE

## (undated) DEVICE — MASTISOL ADHESIVE LIQ 2/3ML

## (undated) DEVICE — BANDAGE,ELASTIC,ESMARK,STERILE,4"X9',LF: Brand: MEDLINE

## (undated) DEVICE — GOWN SIRUS NONREIN XL W/TWL: Brand: MEDLINE INDUSTRIES, INC.

## (undated) DEVICE — SYRINGE MEDICAL 3ML CLEAR PLASTIC STANDARD NON CONTROL LUERLOCK TIP DISPOSABLE

## (undated) DEVICE — NAVICROSS SUPPORT CATHETER: Brand: NAVICROSS

## (undated) DEVICE — SOLUTION IRRIG 1000ML 0.9% SOD CHL USP POUR PLAS BTL

## (undated) DEVICE — SUTURE PROL SZ 6-0 L24IN NONABSORBABLE BLU L9.3MM BV-1 3/8 8805H

## (undated) DEVICE — SUTURE VICRYL + SZ 2-0 L27IN ABSRB UD CT-2 L26MM 1/2 CIR TAPR VCP269H

## (undated) DEVICE — BANDAGE COBAN 4 IN COMPR W4INXL5YD FOAM COHESIVE QUIK STK SELF ADH SFT

## (undated) DEVICE — ZIMMER® STERILE DISPOSABLE TOURNIQUET CUFF WITH PLC, DUAL PORT, SINGLE BLADDER, 18 IN. (46 CM)

## (undated) DEVICE — Z INACTIVE USE 2660664 SOLUTION IRRIG 3000ML 0.9% SOD CHL USP UROMATIC PLAS CONT

## (undated) DEVICE — GLOVE SURG SZ 7.5 L11.73IN FNGR THK9.8MIL STRW LTX POLYMER

## (undated) DEVICE — 20 ML SYRINGE LUER-LOCK TIP: Brand: MONOJECT

## (undated) DEVICE — SUTURE PERMAHAND SZ 2-0 L12X18IN NONABSORBABLE BLK SILK A185H

## (undated) DEVICE — PODIATRY PK

## (undated) DEVICE — NEEDLE,22GX1.5",REG,BEVEL: Brand: MEDLINE

## (undated) DEVICE — TOWEL,OR,DSP,ST,WHITE,DLX,XR,4/PK,20PK/C: Brand: MEDLINE

## (undated) DEVICE — SUTURE PERMAHAND SZ 4-0 L18IN NONABSORBABLE BLK SILK BRAID A183H

## (undated) DEVICE — BLADE,CARBON-STEEL,10,STRL,DISPOSABLE,TB: Brand: MEDLINE

## (undated) DEVICE — SUTURE MONOCRYL + SZ 4-0 L27IN ABSRB UD L19MM PS-2 3/8 CIR MCP426H